# Patient Record
Sex: FEMALE | Race: BLACK OR AFRICAN AMERICAN | NOT HISPANIC OR LATINO | URBAN - METROPOLITAN AREA
[De-identification: names, ages, dates, MRNs, and addresses within clinical notes are randomized per-mention and may not be internally consistent; named-entity substitution may affect disease eponyms.]

---

## 2017-01-17 ENCOUNTER — INPATIENT (INPATIENT)
Facility: HOSPITAL | Age: 82
LOS: 2 days | Discharge: NURSING, REHAB OR LONG-TERM CARE | End: 2017-01-20
Attending: INTERNAL MEDICINE | Admitting: INTERNAL MEDICINE
Payer: MEDICARE

## 2017-01-17 VITALS
SYSTOLIC BLOOD PRESSURE: 184 MMHG | DIASTOLIC BLOOD PRESSURE: 87 MMHG | OXYGEN SATURATION: 100 % | TEMPERATURE: 98 F | RESPIRATION RATE: 16 BRPM | HEART RATE: 64 BPM

## 2017-01-17 DIAGNOSIS — E11.9 TYPE 2 DIABETES MELLITUS WITHOUT COMPLICATIONS: ICD-10-CM

## 2017-01-17 DIAGNOSIS — E05.00 THYROTOXICOSIS WITH DIFFUSE GOITER WITHOUT THYROTOXIC CRISIS OR STORM: ICD-10-CM

## 2017-01-17 DIAGNOSIS — Z90.721 ACQUIRED ABSENCE OF OVARIES, UNILATERAL: Chronic | ICD-10-CM

## 2017-01-17 DIAGNOSIS — Z41.8 ENCOUNTER FOR OTHER PROCEDURES FOR PURPOSES OTHER THAN REMEDYING HEALTH STATE: ICD-10-CM

## 2017-01-17 DIAGNOSIS — I47.1 SUPRAVENTRICULAR TACHYCARDIA: ICD-10-CM

## 2017-01-17 DIAGNOSIS — I10 ESSENTIAL (PRIMARY) HYPERTENSION: ICD-10-CM

## 2017-01-17 DIAGNOSIS — Z95.0 PRESENCE OF CARDIAC PACEMAKER: Chronic | ICD-10-CM

## 2017-01-17 DIAGNOSIS — Z95.0 PRESENCE OF CARDIAC PACEMAKER: ICD-10-CM

## 2017-01-17 LAB
ALBUMIN SERPL ELPH-MCNC: 3.4 G/DL — SIGNIFICANT CHANGE UP (ref 3.3–5)
ALP SERPL-CCNC: 58 U/L — SIGNIFICANT CHANGE UP (ref 40–120)
ALT FLD-CCNC: 64 U/L — HIGH (ref 4–33)
APTT BLD: 35.3 SEC — SIGNIFICANT CHANGE UP (ref 27.5–37.4)
AST SERPL-CCNC: 80 U/L — HIGH (ref 4–32)
BASE EXCESS BLDV CALC-SCNC: 3.1 MMOL/L — SIGNIFICANT CHANGE UP
BASOPHILS # BLD AUTO: 0.02 K/UL — SIGNIFICANT CHANGE UP (ref 0–0.2)
BASOPHILS NFR BLD AUTO: 0.4 % — SIGNIFICANT CHANGE UP (ref 0–2)
BASOPHILS NFR SPEC: 0 % — SIGNIFICANT CHANGE UP (ref 0–2)
BILIRUB SERPL-MCNC: 0.4 MG/DL — SIGNIFICANT CHANGE UP (ref 0.2–1.2)
BLOOD GAS VENOUS - CREATININE: 0.94 MG/DL — SIGNIFICANT CHANGE UP (ref 0.5–1.3)
BUN SERPL-MCNC: 21 MG/DL — SIGNIFICANT CHANGE UP (ref 7–23)
CALCIUM SERPL-MCNC: 9.8 MG/DL — SIGNIFICANT CHANGE UP (ref 8.4–10.5)
CHLORIDE BLDV-SCNC: 106 MMOL/L — SIGNIFICANT CHANGE UP (ref 96–108)
CHLORIDE SERPL-SCNC: 101 MMOL/L — SIGNIFICANT CHANGE UP (ref 98–107)
CK MB BLD-MCNC: 3.75 NG/ML — SIGNIFICANT CHANGE UP (ref 1–4.7)
CK MB BLD-MCNC: 4.65 NG/ML — SIGNIFICANT CHANGE UP (ref 1–4.7)
CK MB BLD-MCNC: SIGNIFICANT CHANGE UP (ref 0–2.5)
CK MB BLD-MCNC: SIGNIFICANT CHANGE UP (ref 0–2.5)
CK SERPL-CCNC: 101 U/L — SIGNIFICANT CHANGE UP (ref 25–170)
CK SERPL-CCNC: 91 U/L — SIGNIFICANT CHANGE UP (ref 25–170)
CO2 SERPL-SCNC: 20 MMOL/L — LOW (ref 22–31)
CREAT SERPL-MCNC: 0.91 MG/DL — SIGNIFICANT CHANGE UP (ref 0.5–1.3)
EOSINOPHIL # BLD AUTO: 0.05 K/UL — SIGNIFICANT CHANGE UP (ref 0–0.5)
EOSINOPHIL NFR BLD AUTO: 0.9 % — SIGNIFICANT CHANGE UP (ref 0–6)
EOSINOPHIL NFR FLD: 1 % — SIGNIFICANT CHANGE UP (ref 0–6)
GAS PNL BLDV: 135 MMOL/L — LOW (ref 136–146)
GLUCOSE BLDV-MCNC: 104 — HIGH (ref 70–99)
GLUCOSE SERPL-MCNC: 101 MG/DL — HIGH (ref 70–99)
HCO3 BLDV-SCNC: 25 MMOL/L — SIGNIFICANT CHANGE UP (ref 20–27)
HCT VFR BLD CALC: 37.6 % — SIGNIFICANT CHANGE UP (ref 34.5–45)
HCT VFR BLDV CALC: 39.5 % — SIGNIFICANT CHANGE UP (ref 34.5–45)
HGB BLD-MCNC: 12.6 G/DL — SIGNIFICANT CHANGE UP (ref 11.5–15.5)
HGB BLDV-MCNC: 12.8 G/DL — SIGNIFICANT CHANGE UP (ref 11.5–15.5)
IMM GRANULOCYTES NFR BLD AUTO: 0.4 % — SIGNIFICANT CHANGE UP (ref 0–1.5)
INR BLD: 1.09 — SIGNIFICANT CHANGE UP (ref 0.87–1.18)
LACTATE BLDV-MCNC: 2.4 MMOL/L — HIGH (ref 0.5–2)
LYMPHOCYTES # BLD AUTO: 2.11 K/UL — SIGNIFICANT CHANGE UP (ref 1–3.3)
LYMPHOCYTES # BLD AUTO: 37.4 % — SIGNIFICANT CHANGE UP (ref 13–44)
LYMPHOCYTES NFR SPEC AUTO: 38 % — SIGNIFICANT CHANGE UP (ref 13–44)
MANUAL SMEAR VERIFICATION: SIGNIFICANT CHANGE UP
MCHC RBC-ENTMCNC: 30 PG — SIGNIFICANT CHANGE UP (ref 27–34)
MCHC RBC-ENTMCNC: 33.5 % — SIGNIFICANT CHANGE UP (ref 32–36)
MCV RBC AUTO: 89.5 FL — SIGNIFICANT CHANGE UP (ref 80–100)
MONOCYTES # BLD AUTO: 0.47 K/UL — SIGNIFICANT CHANGE UP (ref 0–0.9)
MONOCYTES NFR BLD AUTO: 8.3 % — SIGNIFICANT CHANGE UP (ref 2–14)
MONOCYTES NFR BLD: 7 % — SIGNIFICANT CHANGE UP (ref 2–9)
MORPHOLOGY BLD-IMP: NORMAL — SIGNIFICANT CHANGE UP
NEUTROPHIL AB SER-ACNC: 53 % — SIGNIFICANT CHANGE UP (ref 43–77)
NEUTROPHILS # BLD AUTO: 2.97 K/UL — SIGNIFICANT CHANGE UP (ref 1.8–7.4)
NEUTROPHILS NFR BLD AUTO: 52.6 % — SIGNIFICANT CHANGE UP (ref 43–77)
PCO2 BLDV: 46 MMHG — SIGNIFICANT CHANGE UP (ref 41–51)
PH BLDV: 7.4 PH — SIGNIFICANT CHANGE UP (ref 7.32–7.43)
PLATELET # BLD AUTO: 87 K/UL — LOW (ref 150–400)
PLATELET COUNT - ESTIMATE: SIGNIFICANT CHANGE UP
PMV BLD: 10.8 FL — SIGNIFICANT CHANGE UP (ref 7–13)
PO2 BLDV: < 24 MMHG — LOW (ref 35–40)
POTASSIUM BLDV-SCNC: 3.4 MMOL/L — SIGNIFICANT CHANGE UP (ref 3.4–4.5)
POTASSIUM SERPL-MCNC: 3.9 MMOL/L — SIGNIFICANT CHANGE UP (ref 3.5–5.3)
POTASSIUM SERPL-SCNC: 3.9 MMOL/L — SIGNIFICANT CHANGE UP (ref 3.5–5.3)
PROT SERPL-MCNC: 7.8 G/DL — SIGNIFICANT CHANGE UP (ref 6–8.3)
PROTHROM AB SERPL-ACNC: 12.4 SEC — SIGNIFICANT CHANGE UP (ref 10–13.1)
RBC # BLD: 4.2 M/UL — SIGNIFICANT CHANGE UP (ref 3.8–5.2)
RBC # FLD: 13.3 % — SIGNIFICANT CHANGE UP (ref 10.3–14.5)
SAO2 % BLDV: 31.9 % — LOW (ref 60–85)
SODIUM SERPL-SCNC: 140 MMOL/L — SIGNIFICANT CHANGE UP (ref 135–145)
TROPONIN T SERPL-MCNC: < 0.06 NG/ML — SIGNIFICANT CHANGE UP (ref 0–0.06)
TROPONIN T SERPL-MCNC: < 0.06 NG/ML — SIGNIFICANT CHANGE UP (ref 0–0.06)
TSH SERPL-MCNC: 2.01 UIU/ML — SIGNIFICANT CHANGE UP (ref 0.27–4.2)
VARIANT LYMPHS # BLD: 1 % — SIGNIFICANT CHANGE UP
WBC # BLD: 5.64 K/UL — SIGNIFICANT CHANGE UP (ref 3.8–10.5)
WBC # FLD AUTO: 5.64 K/UL — SIGNIFICANT CHANGE UP (ref 3.8–10.5)

## 2017-01-17 PROCEDURE — 93280 PM DEVICE PROGR EVAL DUAL: CPT | Mod: 26

## 2017-01-17 RX ORDER — GLUCAGON INJECTION, SOLUTION 0.5 MG/.1ML
1 INJECTION, SOLUTION SUBCUTANEOUS ONCE
Qty: 0 | Refills: 0 | Status: DISCONTINUED | OUTPATIENT
Start: 2017-01-17 | End: 2017-01-20

## 2017-01-17 RX ORDER — SODIUM CHLORIDE 9 MG/ML
3 INJECTION INTRAMUSCULAR; INTRAVENOUS; SUBCUTANEOUS EVERY 8 HOURS
Qty: 0 | Refills: 0 | Status: DISCONTINUED | OUTPATIENT
Start: 2017-01-17 | End: 2017-01-20

## 2017-01-17 RX ORDER — SODIUM CHLORIDE 9 MG/ML
1000 INJECTION, SOLUTION INTRAVENOUS
Qty: 0 | Refills: 0 | Status: DISCONTINUED | OUTPATIENT
Start: 2017-01-17 | End: 2017-01-20

## 2017-01-17 RX ORDER — SIMVASTATIN 20 MG/1
20 TABLET, FILM COATED ORAL AT BEDTIME
Qty: 0 | Refills: 0 | Status: DISCONTINUED | OUTPATIENT
Start: 2017-01-17 | End: 2017-01-20

## 2017-01-17 RX ORDER — DEXTROSE 50 % IN WATER 50 %
12.5 SYRINGE (ML) INTRAVENOUS ONCE
Qty: 0 | Refills: 0 | Status: DISCONTINUED | OUTPATIENT
Start: 2017-01-17 | End: 2017-01-20

## 2017-01-17 RX ORDER — LATANOPROST 0.05 MG/ML
1 SOLUTION/ DROPS OPHTHALMIC; TOPICAL AT BEDTIME
Qty: 0 | Refills: 0 | Status: DISCONTINUED | OUTPATIENT
Start: 2017-01-17 | End: 2017-01-20

## 2017-01-17 RX ORDER — DEXTROSE 50 % IN WATER 50 %
25 SYRINGE (ML) INTRAVENOUS ONCE
Qty: 0 | Refills: 0 | Status: DISCONTINUED | OUTPATIENT
Start: 2017-01-17 | End: 2017-01-20

## 2017-01-17 RX ORDER — DEXTROSE 50 % IN WATER 50 %
1 SYRINGE (ML) INTRAVENOUS ONCE
Qty: 0 | Refills: 0 | Status: DISCONTINUED | OUTPATIENT
Start: 2017-01-17 | End: 2017-01-20

## 2017-01-17 RX ORDER — METOPROLOL TARTRATE 50 MG
25 TABLET ORAL
Qty: 0 | Refills: 0 | Status: DISCONTINUED | OUTPATIENT
Start: 2017-01-17 | End: 2017-01-20

## 2017-01-17 RX ORDER — INSULIN LISPRO 100/ML
VIAL (ML) SUBCUTANEOUS
Qty: 0 | Refills: 0 | Status: DISCONTINUED | OUTPATIENT
Start: 2017-01-17 | End: 2017-01-20

## 2017-01-17 RX ORDER — IPRATROPIUM/ALBUTEROL SULFATE 18-103MCG
3 AEROSOL WITH ADAPTER (GRAM) INHALATION ONCE
Qty: 0 | Refills: 0 | Status: COMPLETED | OUTPATIENT
Start: 2017-01-17 | End: 2017-01-17

## 2017-01-17 RX ORDER — HEPARIN SODIUM 5000 [USP'U]/ML
5000 INJECTION INTRAVENOUS; SUBCUTANEOUS EVERY 8 HOURS
Qty: 0 | Refills: 0 | Status: DISCONTINUED | OUTPATIENT
Start: 2017-01-17 | End: 2017-01-17

## 2017-01-17 RX ORDER — INSULIN LISPRO 100/ML
VIAL (ML) SUBCUTANEOUS AT BEDTIME
Qty: 0 | Refills: 0 | Status: DISCONTINUED | OUTPATIENT
Start: 2017-01-17 | End: 2017-01-20

## 2017-01-17 RX ADMIN — Medication 25 MILLIGRAM(S): at 19:06

## 2017-01-17 RX ADMIN — Medication 3 MILLILITER(S): at 15:48

## 2017-01-17 RX ADMIN — SIMVASTATIN 20 MILLIGRAM(S): 20 TABLET, FILM COATED ORAL at 23:14

## 2017-01-17 RX ADMIN — SODIUM CHLORIDE 3 MILLILITER(S): 9 INJECTION INTRAMUSCULAR; INTRAVENOUS; SUBCUTANEOUS at 22:23

## 2017-01-17 RX ADMIN — LATANOPROST 1 DROP(S): 0.05 SOLUTION/ DROPS OPHTHALMIC; TOPICAL at 23:14

## 2017-01-17 NOTE — ED ADULT NURSE NOTE - OBJECTIVE STATEMENT
pt presents to ED via EMS. patient c/o chest pain/discomfort x 1 week worsening today. patient was In SVT with EMS at a rate of 200, was given 6mg Adenosine IVP with initial break in SVT that then went back into SVT, pt was given another 12mgX2 of adenosine before conversion. pt currently in atrial paced rhythm. Denies pain at this time. 18 g IV present via EMS, + flush with no s/s of infiltration. Labs drawn via butterfly and sent. pt resting at this time in NAD.

## 2017-01-17 NOTE — H&P ADULT. - HISTORY OF PRESENT ILLNESS
86 y/o F pmhx htn, DM, vertigo, graves dz, h/o PPM p/w dizziness, shortness of breath, weakness, and palpitations.  Pt was at [facility] and started experiencing symptoms upon getting out of ambulette.  Pt is unsure how long symptoms lasted for.  Symptoms were not relieved with rest or changed with position or inspiration.  She denied fevers/chills, chest pain, cough, wheezing. 84 y/o F pmhx htn, DM, vertigo, graves dz, h/o cardiac arrest s/p medtronic PPM, ambulatory with cane, p/w dizziness, shortness of breath, weakness, and palpitations.  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  Pt is unsure how long symptoms lasted for.  Symptoms were not relieved with rest or changed with position or inspiration.  EMS was called.  Pt received adenosine 6mg, adenosine 12mg, now NSR and without complaints.  Pt denied history of chest pain, palpitations, dizziness, syncope, presyncope prior to this event.  (Last time she experienced these symptoms were before cardiac arrest).

## 2017-01-17 NOTE — H&P ADULT. - PROBLEM SELECTOR PLAN 2
Monitor blood pressure  Low salt diet Monitor blood pressure, started on lopressor 25mg po bid  Low salt diet

## 2017-01-17 NOTE — H&P ADULT. - NEGATIVE CARDIOVASCULAR SYMPTOMS
no chest pain/no dyspnea on exertion/no orthopnea/no paroxysmal nocturnal dyspnea/no peripheral edema

## 2017-01-17 NOTE — ED ADULT NURSE REASSESSMENT NOTE - NS ED NURSE REASSESS COMMENT FT1
HR remains stable A-paced at 60, BP stable SPO2 100% on RA. pt currently c/o SOB that was present during her SVT however pt is currently A-paced at 60. Repeat EKG in place as per Dr. Esparza

## 2017-01-17 NOTE — H&P ADULT. - ASSESSMENT
86 y/o F pmhx htn, DM, vertigo, graves dz, h/o PPM p/w dizziness, shortness of breath, weakness, and palpitations f/w SVT s/p adenosine 6mg, adenosine 12mg now NSR

## 2017-01-17 NOTE — H&P ADULT. - PRO ARRIVE FROM
[facility] center [facility] care center where volunteers

## 2017-01-17 NOTE — H&P ADULT. - PMH
Constipation    DM (diabetes mellitus)    Glaucoma    Graves disease    HTN (hypertension)    Pacemaker    Vertigo

## 2017-01-17 NOTE — H&P ADULT. - RS GEN PE MLT RESP DETAILS PC
no rhonchi/clear to auscultation bilaterally/good air movement/airway patent/breath sounds equal/normal/no rales/no wheezes

## 2017-01-17 NOTE — H&P ADULT. - ATTENDING COMMENTS
pt seen and examined at bedside. 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

## 2017-01-17 NOTE — H&P ADULT. - FAMILY HISTORY
Father  Still living? Unknown  Family history of hypertension, Age at diagnosis: Age Unknown     Mother  Still living? Unknown  Family history of diabetes mellitus, Age at diagnosis: Age Unknown

## 2017-01-17 NOTE — ED PROVIDER NOTE - MEDICAL DECISION MAKING DETAILS
84 yo F w/ HTN, DM, cardiac arrest w/ pacemaker BIBEMS from [facility] for [facility]. Pt was in SVT , given Adenosine 6mg, broke to SR, back to SVT, given Adenosine 12mg x2, then broke to SR-ST HR 90-100s. Denies any symptoms.   - Labs, EKG, Chest x-ray

## 2017-01-17 NOTE — ED ADULT NURSE NOTE - CHIEF COMPLAINT QUOTE
brought in by EMS from [facility] for SVT. Pt was in SVT , given Adenosine 6mg then 12mg then 12mg. Then broke to SR-ST HR 90-100s. Pt states has been having SOB/palpitations and HA intermittently x 1week. NJ=233. Hx cardiac arrest, pacemaker, HLD,DM

## 2017-01-17 NOTE — H&P ADULT. - PROBLEM SELECTOR PLAN 1
CBC, CMP, CEx2, EKG  Monitor on telemetry  Echocardiogram CBC, CMP, CEx2, EKG, CXR  Monitor on telemetry  Start lopressor 25mg po bid  PPM interrogation called ([employer])  Echocardiogram as an outpatient per Dr Atkins

## 2017-01-17 NOTE — ED PROVIDER NOTE - ATTENDING CONTRIBUTION TO CARE
Seen and examined, SVT on EMS strip, very rapid rate, to sinus in ED after adenosine tx by EMS PTA. In ED c/o mild SOB and occ. irreg. heartbeat but cont. NSR on monitor. Clear lungs, NT CW, soft, NT abd, no edema, equal girth, NT calves. Cont. rate ctrl, attempt oral meds, CM, admission.

## 2017-01-17 NOTE — ED ADULT TRIAGE NOTE - CHIEF COMPLAINT QUOTE
brought in by EMS from [facility] for SVT. Pt was in SVT , given Adenosine 6mg then 12mg then 12mg. Then broke to SR-ST HR 90-100s. Pt states has been having SOB/palpitations and HA intermittently x 1week. HG=051. Hx cardiac arrest, pacemaker, HLD brought in by EMS from [employer] for SVT. Pt was in SVT , given Adenosine 6mg then 12mg then 12mg. Then broke to SR-ST HR 90-100s. Pt states has been having SOB/palpitations and HA intermittently x 1week. PZ=776. Hx cardiac arrest, pacemaker, HLD,DM

## 2017-01-17 NOTE — ED PROVIDER NOTE - OBJECTIVE STATEMENT
86 yo F w/ HTN, DM, 86 yo F w/ HTN, DM, w/ pacemaker BIBEMS from [facility] for SVT. Pt was in SVT , given Adenosine 6mg, broke to SR, back to SVT, given Adenosine 12mg x2, then broke to SR-ST HR 90-100s. Pt states has been having SOB/palpitations and HA intermittently x 1week. OE=499. Hx cardiac arrest, pacemaker, HLD,DM  s/p SVT 84 yo F w/ HTN, DM, cardiac arrest w/ pacemaker BIBEMS from [facility] for SVT. Pt was in SVT , given Adenosine 6mg, broke to SR, back to SVT, given Adenosine 12mg x2, then broke to SR-ST HR 90-100s. Pt states that she has been having SOB/palpitations and HA intermittently x 1week.  DK=532. Pt is now back to baseline, denies any symptoms.

## 2017-01-18 LAB
ALBUMIN SERPL ELPH-MCNC: 3 G/DL — LOW (ref 3.3–5)
ALP SERPL-CCNC: 50 U/L — SIGNIFICANT CHANGE UP (ref 40–120)
ALT FLD-CCNC: 53 U/L — HIGH (ref 4–33)
AST SERPL-CCNC: 63 U/L — HIGH (ref 4–32)
BILIRUB DIRECT SERPL-MCNC: 0.1 MG/DL — SIGNIFICANT CHANGE UP (ref 0.1–0.2)
BILIRUB SERPL-MCNC: 0.4 MG/DL — SIGNIFICANT CHANGE UP (ref 0.2–1.2)
BUN SERPL-MCNC: 18 MG/DL — SIGNIFICANT CHANGE UP (ref 7–23)
CALCIUM SERPL-MCNC: 9.4 MG/DL — SIGNIFICANT CHANGE UP (ref 8.4–10.5)
CHLORIDE SERPL-SCNC: 103 MMOL/L — SIGNIFICANT CHANGE UP (ref 98–107)
CHOLEST SERPL-MCNC: 138 MG/DL — SIGNIFICANT CHANGE UP (ref 120–199)
CO2 SERPL-SCNC: 25 MMOL/L — SIGNIFICANT CHANGE UP (ref 22–31)
CREAT SERPL-MCNC: 0.78 MG/DL — SIGNIFICANT CHANGE UP (ref 0.5–1.3)
GLUCOSE SERPL-MCNC: 147 MG/DL — HIGH (ref 70–99)
HBA1C BLD-MCNC: 7.5 % — HIGH (ref 4–5.6)
HCT VFR BLD CALC: 33.7 % — LOW (ref 34.5–45)
HDLC SERPL-MCNC: 64 MG/DL — SIGNIFICANT CHANGE UP (ref 45–65)
HGB BLD-MCNC: 11 G/DL — LOW (ref 11.5–15.5)
INR BLD: 1.1 — SIGNIFICANT CHANGE UP (ref 0.87–1.18)
LIPID PNL WITH DIRECT LDL SERPL: 67 MG/DL — SIGNIFICANT CHANGE UP
MAGNESIUM SERPL-MCNC: 1.5 MG/DL — LOW (ref 1.6–2.6)
MCHC RBC-ENTMCNC: 29.6 PG — SIGNIFICANT CHANGE UP (ref 27–34)
MCHC RBC-ENTMCNC: 32.6 % — SIGNIFICANT CHANGE UP (ref 32–36)
MCV RBC AUTO: 90.8 FL — SIGNIFICANT CHANGE UP (ref 80–100)
PLATELET # BLD AUTO: 79 K/UL — LOW (ref 150–400)
PMV BLD: 11.5 FL — SIGNIFICANT CHANGE UP (ref 7–13)
POTASSIUM SERPL-MCNC: 3.3 MMOL/L — LOW (ref 3.5–5.3)
POTASSIUM SERPL-SCNC: 3.3 MMOL/L — LOW (ref 3.5–5.3)
PROT SERPL-MCNC: 6.6 G/DL — SIGNIFICANT CHANGE UP (ref 6–8.3)
PROTHROM AB SERPL-ACNC: 12.6 SEC — SIGNIFICANT CHANGE UP (ref 10–13.1)
RBC # BLD: 3.71 M/UL — LOW (ref 3.8–5.2)
RBC # FLD: 13.3 % — SIGNIFICANT CHANGE UP (ref 10.3–14.5)
SODIUM SERPL-SCNC: 139 MMOL/L — SIGNIFICANT CHANGE UP (ref 135–145)
TRIGL SERPL-MCNC: 61 MG/DL — SIGNIFICANT CHANGE UP (ref 10–149)
WBC # BLD: 4.62 K/UL — SIGNIFICANT CHANGE UP (ref 3.8–10.5)
WBC # FLD AUTO: 4.62 K/UL — SIGNIFICANT CHANGE UP (ref 3.8–10.5)

## 2017-01-18 PROCEDURE — 93306 TTE W/DOPPLER COMPLETE: CPT | Mod: 26

## 2017-01-18 RX ORDER — ACETAMINOPHEN 500 MG
650 TABLET ORAL EVERY 6 HOURS
Qty: 0 | Refills: 0 | Status: DISCONTINUED | OUTPATIENT
Start: 2017-01-18 | End: 2017-01-20

## 2017-01-18 RX ORDER — LISINOPRIL 2.5 MG/1
5 TABLET ORAL DAILY
Qty: 0 | Refills: 0 | Status: DISCONTINUED | OUTPATIENT
Start: 2017-01-18 | End: 2017-01-20

## 2017-01-18 RX ORDER — POTASSIUM CHLORIDE 20 MEQ
40 PACKET (EA) ORAL EVERY 4 HOURS
Qty: 0 | Refills: 0 | Status: COMPLETED | OUTPATIENT
Start: 2017-01-18 | End: 2017-01-18

## 2017-01-18 RX ORDER — MAGNESIUM OXIDE 400 MG ORAL TABLET 241.3 MG
400 TABLET ORAL
Qty: 0 | Refills: 0 | Status: COMPLETED | OUTPATIENT
Start: 2017-01-18 | End: 2017-01-19

## 2017-01-18 RX ADMIN — SIMVASTATIN 20 MILLIGRAM(S): 20 TABLET, FILM COATED ORAL at 22:10

## 2017-01-18 RX ADMIN — Medication 40 MILLIEQUIVALENT(S): at 14:19

## 2017-01-18 RX ADMIN — Medication 2: at 17:14

## 2017-01-18 RX ADMIN — Medication 40 MILLIEQUIVALENT(S): at 10:22

## 2017-01-18 RX ADMIN — LATANOPROST 1 DROP(S): 0.05 SOLUTION/ DROPS OPHTHALMIC; TOPICAL at 22:10

## 2017-01-18 RX ADMIN — SODIUM CHLORIDE 3 MILLILITER(S): 9 INJECTION INTRAMUSCULAR; INTRAVENOUS; SUBCUTANEOUS at 22:11

## 2017-01-18 RX ADMIN — Medication 650 MILLIGRAM(S): at 03:55

## 2017-01-18 RX ADMIN — MAGNESIUM OXIDE 400 MG ORAL TABLET 400 MILLIGRAM(S): 241.3 TABLET ORAL at 14:19

## 2017-01-18 RX ADMIN — MAGNESIUM OXIDE 400 MG ORAL TABLET 400 MILLIGRAM(S): 241.3 TABLET ORAL at 17:14

## 2017-01-18 RX ADMIN — Medication 6: at 12:25

## 2017-01-18 RX ADMIN — Medication 25 MILLIGRAM(S): at 05:30

## 2017-01-18 RX ADMIN — MAGNESIUM OXIDE 400 MG ORAL TABLET 400 MILLIGRAM(S): 241.3 TABLET ORAL at 10:22

## 2017-01-18 RX ADMIN — LISINOPRIL 5 MILLIGRAM(S): 2.5 TABLET ORAL at 22:09

## 2017-01-18 RX ADMIN — Medication 25 MILLIGRAM(S): at 17:14

## 2017-01-18 RX ADMIN — Medication 650 MILLIGRAM(S): at 02:55

## 2017-01-18 RX ADMIN — SODIUM CHLORIDE 3 MILLILITER(S): 9 INJECTION INTRAMUSCULAR; INTRAVENOUS; SUBCUTANEOUS at 14:18

## 2017-01-18 RX ADMIN — SODIUM CHLORIDE 3 MILLILITER(S): 9 INJECTION INTRAMUSCULAR; INTRAVENOUS; SUBCUTANEOUS at 04:52

## 2017-01-18 RX ADMIN — Medication: at 00:09

## 2017-01-19 LAB
BUN SERPL-MCNC: 18 MG/DL — SIGNIFICANT CHANGE UP (ref 7–23)
CALCIUM SERPL-MCNC: 9.6 MG/DL — SIGNIFICANT CHANGE UP (ref 8.4–10.5)
CHLORIDE SERPL-SCNC: 104 MMOL/L — SIGNIFICANT CHANGE UP (ref 98–107)
CO2 SERPL-SCNC: 23 MMOL/L — SIGNIFICANT CHANGE UP (ref 22–31)
CREAT SERPL-MCNC: 0.75 MG/DL — SIGNIFICANT CHANGE UP (ref 0.5–1.3)
GLUCOSE SERPL-MCNC: 147 MG/DL — HIGH (ref 70–99)
HCT VFR BLD CALC: 35.7 % — SIGNIFICANT CHANGE UP (ref 34.5–45)
HGB BLD-MCNC: 11.8 G/DL — SIGNIFICANT CHANGE UP (ref 11.5–15.5)
MAGNESIUM SERPL-MCNC: 1.6 MG/DL — SIGNIFICANT CHANGE UP (ref 1.6–2.6)
MCHC RBC-ENTMCNC: 29.9 PG — SIGNIFICANT CHANGE UP (ref 27–34)
MCHC RBC-ENTMCNC: 33.1 % — SIGNIFICANT CHANGE UP (ref 32–36)
MCV RBC AUTO: 90.6 FL — SIGNIFICANT CHANGE UP (ref 80–100)
PHOSPHATE SERPL-MCNC: 2 MG/DL — LOW (ref 2.5–4.5)
PLATELET # BLD AUTO: 82 K/UL — LOW (ref 150–400)
PMV BLD: 11.3 FL — SIGNIFICANT CHANGE UP (ref 7–13)
POTASSIUM SERPL-MCNC: 4.3 MMOL/L — SIGNIFICANT CHANGE UP (ref 3.5–5.3)
POTASSIUM SERPL-SCNC: 4.3 MMOL/L — SIGNIFICANT CHANGE UP (ref 3.5–5.3)
RBC # BLD: 3.94 M/UL — SIGNIFICANT CHANGE UP (ref 3.8–5.2)
RBC # FLD: 13.4 % — SIGNIFICANT CHANGE UP (ref 10.3–14.5)
SODIUM SERPL-SCNC: 140 MMOL/L — SIGNIFICANT CHANGE UP (ref 135–145)
WBC # BLD: 5 K/UL — SIGNIFICANT CHANGE UP (ref 3.8–10.5)
WBC # FLD AUTO: 5 K/UL — SIGNIFICANT CHANGE UP (ref 3.8–10.5)

## 2017-01-19 RX ORDER — MAGNESIUM SULFATE 500 MG/ML
1 VIAL (ML) INJECTION ONCE
Qty: 0 | Refills: 0 | Status: COMPLETED | OUTPATIENT
Start: 2017-01-19 | End: 2017-01-19

## 2017-01-19 RX ORDER — POTASSIUM PHOSPHATE, MONOBASIC POTASSIUM PHOSPHATE, DIBASIC 236; 224 MG/ML; MG/ML
15 INJECTION, SOLUTION INTRAVENOUS ONCE
Qty: 0 | Refills: 0 | Status: COMPLETED | OUTPATIENT
Start: 2017-01-19 | End: 2017-01-19

## 2017-01-19 RX ORDER — DOCUSATE SODIUM 100 MG
100 CAPSULE ORAL DAILY
Qty: 0 | Refills: 0 | Status: DISCONTINUED | OUTPATIENT
Start: 2017-01-19 | End: 2017-01-20

## 2017-01-19 RX ADMIN — Medication 650 MILLIGRAM(S): at 10:12

## 2017-01-19 RX ADMIN — Medication 6: at 11:34

## 2017-01-19 RX ADMIN — LISINOPRIL 5 MILLIGRAM(S): 2.5 TABLET ORAL at 05:30

## 2017-01-19 RX ADMIN — Medication 650 MILLIGRAM(S): at 11:00

## 2017-01-19 RX ADMIN — Medication 25 MILLIGRAM(S): at 05:29

## 2017-01-19 RX ADMIN — MAGNESIUM OXIDE 400 MG ORAL TABLET 400 MILLIGRAM(S): 241.3 TABLET ORAL at 08:12

## 2017-01-19 RX ADMIN — SODIUM CHLORIDE 3 MILLILITER(S): 9 INJECTION INTRAMUSCULAR; INTRAVENOUS; SUBCUTANEOUS at 14:31

## 2017-01-19 RX ADMIN — POTASSIUM PHOSPHATE, MONOBASIC POTASSIUM PHOSPHATE, DIBASIC 62.5 MILLIMOLE(S): 236; 224 INJECTION, SOLUTION INTRAVENOUS at 11:29

## 2017-01-19 RX ADMIN — Medication 100 GRAM(S): at 10:12

## 2017-01-19 RX ADMIN — MAGNESIUM OXIDE 400 MG ORAL TABLET 400 MILLIGRAM(S): 241.3 TABLET ORAL at 11:36

## 2017-01-19 RX ADMIN — Medication 100 MILLIGRAM(S): at 11:33

## 2017-01-19 RX ADMIN — SIMVASTATIN 20 MILLIGRAM(S): 20 TABLET, FILM COATED ORAL at 22:54

## 2017-01-19 RX ADMIN — SODIUM CHLORIDE 3 MILLILITER(S): 9 INJECTION INTRAMUSCULAR; INTRAVENOUS; SUBCUTANEOUS at 05:32

## 2017-01-19 RX ADMIN — LATANOPROST 1 DROP(S): 0.05 SOLUTION/ DROPS OPHTHALMIC; TOPICAL at 22:54

## 2017-01-19 RX ADMIN — MAGNESIUM OXIDE 400 MG ORAL TABLET 400 MILLIGRAM(S): 241.3 TABLET ORAL at 18:00

## 2017-01-19 RX ADMIN — SODIUM CHLORIDE 3 MILLILITER(S): 9 INJECTION INTRAMUSCULAR; INTRAVENOUS; SUBCUTANEOUS at 22:55

## 2017-01-19 RX ADMIN — Medication 25 MILLIGRAM(S): at 18:00

## 2017-01-20 VITALS
DIASTOLIC BLOOD PRESSURE: 70 MMHG | HEART RATE: 60 BPM | RESPIRATION RATE: 16 BRPM | SYSTOLIC BLOOD PRESSURE: 149 MMHG | OXYGEN SATURATION: 98 % | TEMPERATURE: 98 F

## 2017-01-20 LAB
BUN SERPL-MCNC: 15 MG/DL — SIGNIFICANT CHANGE UP (ref 7–23)
CALCIUM SERPL-MCNC: 9.4 MG/DL — SIGNIFICANT CHANGE UP (ref 8.4–10.5)
CHLORIDE SERPL-SCNC: 104 MMOL/L — SIGNIFICANT CHANGE UP (ref 98–107)
CO2 SERPL-SCNC: 26 MMOL/L — SIGNIFICANT CHANGE UP (ref 22–31)
CREAT SERPL-MCNC: 0.78 MG/DL — SIGNIFICANT CHANGE UP (ref 0.5–1.3)
GLUCOSE SERPL-MCNC: 166 MG/DL — HIGH (ref 70–99)
HCT VFR BLD CALC: 35.6 % — SIGNIFICANT CHANGE UP (ref 34.5–45)
HGB BLD-MCNC: 11.8 G/DL — SIGNIFICANT CHANGE UP (ref 11.5–15.5)
MAGNESIUM SERPL-MCNC: 1.8 MG/DL — SIGNIFICANT CHANGE UP (ref 1.6–2.6)
MCHC RBC-ENTMCNC: 30.2 PG — SIGNIFICANT CHANGE UP (ref 27–34)
MCHC RBC-ENTMCNC: 33.1 % — SIGNIFICANT CHANGE UP (ref 32–36)
MCV RBC AUTO: 91 FL — SIGNIFICANT CHANGE UP (ref 80–100)
PHOSPHATE SERPL-MCNC: 2.3 MG/DL — LOW (ref 2.5–4.5)
PLATELET # BLD AUTO: 75 K/UL — LOW (ref 150–400)
PMV BLD: 11.6 FL — SIGNIFICANT CHANGE UP (ref 7–13)
POTASSIUM SERPL-MCNC: 4.8 MMOL/L — SIGNIFICANT CHANGE UP (ref 3.5–5.3)
POTASSIUM SERPL-SCNC: 4.8 MMOL/L — SIGNIFICANT CHANGE UP (ref 3.5–5.3)
RBC # BLD: 3.91 M/UL — SIGNIFICANT CHANGE UP (ref 3.8–5.2)
RBC # FLD: 13.5 % — SIGNIFICANT CHANGE UP (ref 10.3–14.5)
SODIUM SERPL-SCNC: 142 MMOL/L — SIGNIFICANT CHANGE UP (ref 135–145)
WBC # BLD: 4.23 K/UL — SIGNIFICANT CHANGE UP (ref 3.8–10.5)
WBC # FLD AUTO: 4.23 K/UL — SIGNIFICANT CHANGE UP (ref 3.8–10.5)

## 2017-01-20 RX ORDER — DOCUSATE SODIUM 100 MG
1 CAPSULE ORAL
Qty: 0 | Refills: 0 | COMMUNITY
Start: 2017-01-20

## 2017-01-20 RX ORDER — METOPROLOL TARTRATE 50 MG
1 TABLET ORAL
Qty: 0 | Refills: 0 | COMMUNITY
Start: 2017-01-20

## 2017-01-20 RX ORDER — LISINOPRIL 2.5 MG/1
1 TABLET ORAL
Qty: 0 | Refills: 0 | COMMUNITY
Start: 2017-01-20

## 2017-01-20 RX ORDER — SIMETHICONE 80 MG/1
80 TABLET, CHEWABLE ORAL ONCE
Qty: 0 | Refills: 0 | Status: COMPLETED | OUTPATIENT
Start: 2017-01-20 | End: 2017-01-20

## 2017-01-20 RX ADMIN — Medication 100 MILLIGRAM(S): at 11:16

## 2017-01-20 RX ADMIN — LISINOPRIL 5 MILLIGRAM(S): 2.5 TABLET ORAL at 06:05

## 2017-01-20 RX ADMIN — Medication 2: at 08:49

## 2017-01-20 RX ADMIN — SODIUM CHLORIDE 3 MILLILITER(S): 9 INJECTION INTRAMUSCULAR; INTRAVENOUS; SUBCUTANEOUS at 06:18

## 2017-01-20 RX ADMIN — SODIUM CHLORIDE 3 MILLILITER(S): 9 INJECTION INTRAMUSCULAR; INTRAVENOUS; SUBCUTANEOUS at 13:40

## 2017-01-20 RX ADMIN — Medication 8: at 12:52

## 2017-01-20 RX ADMIN — SIMETHICONE 80 MILLIGRAM(S): 80 TABLET, CHEWABLE ORAL at 11:16

## 2017-01-20 RX ADMIN — Medication 25 MILLIGRAM(S): at 06:05

## 2017-01-20 NOTE — DISCHARGE NOTE ADULT - CARE PLAN
Principal Discharge DX:	SVT (supraventricular tachycardia)  Goal:	prevent further episodes  Instructions for follow-up, activity and diet:	follow up with your PMD in one week - call for appointment

## 2017-01-20 NOTE — DISCHARGE NOTE ADULT - HOSPITAL COURSE
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    1/18 Cards: add lisinopril 5mg qd, c/w lopressor, d/c to [facility]  1/19_Echo: 1. Severely dilated left atrium.  LA volume index = 65cc/m2.   2. Mild concentric left ventricular hypertrophy.   3. Normal left ventricular systolic function. No segmental wall motion abnormalities.  4. Severe diastolic dysfunction.   5. Normal right ventricular size and function. A device wire is noted in the right heart.   6. Normal tricuspid valve. Mild tricuspid regurgitation.  7. Estimated pulmonary artery systolic pressure equals 50mm Hg, assuming right atrial pressure equals 10  mm Hg, consistent with moderate pulmonary hypertension.  Discussed with MD - pt stable for discharge to rehab, pt with no complaints.

## 2017-01-20 NOTE — DISCHARGE NOTE ADULT - MEDICATION SUMMARY - MEDICATIONS TO TAKE
I will START or STAY ON the medications listed below when I get home from the hospital:    lisinopril 10 mg oral tablet  -- 1 tab(s) by mouth 2 times a day  -- Indication: For HTN (hypertension)    metFORMIN 500 mg oral tablet  -- 1 tab(s) by mouth 2 times a day  -- Indication: For DM (diabetes mellitus)    simvastatin 20 mg oral tablet  -- 1 tab(s) by mouth once a day (at bedtime)  -- Indication: For HLD    metoprolol tartrate 25 mg oral tablet  -- 1 tab(s) by mouth 2 times a day  -- Indication: For HTN (hypertension)    docusate sodium 100 mg oral capsule  -- 1 cap(s) by mouth once a day  -- Indication: For Constipation    Xalatan 0.005% ophthalmic solution  -- 1 drop(s) to each affected eye once a day (in the evening)  -- Indication: For glaucoma

## 2017-01-20 NOTE — DISCHARGE NOTE ADULT - CARE PROVIDER_API CALL
Derek Atkins (), Cardiology; Internal Medicine  [address] [place] [address] [address]  [place] [address]  Phone: [phone]  Fax: [phone]

## 2017-01-20 NOTE — DISCHARGE NOTE ADULT - ADDITIONAL INSTRUCTIONS
follow up with your PMD in one week - call for appointment  follow up with your Cardiologist in one week - call for appointment

## 2017-01-20 NOTE — DISCHARGE NOTE ADULT - COMMUNITY RESOURCES
Pt to be transferred to [facility] [address] [place] [address], t: [phone] via [facility] [phone].

## 2017-01-27 ENCOUNTER — INPATIENT (INPATIENT)
Facility: HOSPITAL | Age: 82
LOS: 2 days | Discharge: NURSING, REHAB OR LONG-TERM CARE | DRG: 309 | End: 2017-01-30
Attending: INTERNAL MEDICINE | Admitting: INTERNAL MEDICINE
Payer: MEDICARE

## 2017-01-27 VITALS
HEIGHT: 64 IN | DIASTOLIC BLOOD PRESSURE: 78 MMHG | RESPIRATION RATE: 16 BRPM | WEIGHT: 134.92 LBS | OXYGEN SATURATION: 100 % | HEART RATE: 92 BPM | SYSTOLIC BLOOD PRESSURE: 140 MMHG | TEMPERATURE: 98 F

## 2017-01-27 DIAGNOSIS — Z90.721 ACQUIRED ABSENCE OF OVARIES, UNILATERAL: Chronic | ICD-10-CM

## 2017-01-27 DIAGNOSIS — R07.9 CHEST PAIN, UNSPECIFIED: ICD-10-CM

## 2017-01-27 DIAGNOSIS — Z29.9 ENCOUNTER FOR PROPHYLACTIC MEASURES, UNSPECIFIED: ICD-10-CM

## 2017-01-27 DIAGNOSIS — I10 ESSENTIAL (PRIMARY) HYPERTENSION: ICD-10-CM

## 2017-01-27 DIAGNOSIS — Z95.0 PRESENCE OF CARDIAC PACEMAKER: Chronic | ICD-10-CM

## 2017-01-27 DIAGNOSIS — E11.9 TYPE 2 DIABETES MELLITUS WITHOUT COMPLICATIONS: ICD-10-CM

## 2017-01-27 LAB
ALBUMIN SERPL ELPH-MCNC: 2.3 G/DL — LOW (ref 3.5–5)
ALP SERPL-CCNC: 57 U/L — SIGNIFICANT CHANGE UP (ref 40–120)
ALT FLD-CCNC: 51 U/L DA — SIGNIFICANT CHANGE UP (ref 10–60)
ANION GAP SERPL CALC-SCNC: 9 MMOL/L — SIGNIFICANT CHANGE UP (ref 5–17)
APPEARANCE UR: CLEAR — SIGNIFICANT CHANGE UP
AST SERPL-CCNC: 56 U/L — HIGH (ref 10–40)
BACTERIA # UR AUTO: NEGATIVE /HPF — SIGNIFICANT CHANGE UP
BASOPHILS # BLD AUTO: 0 K/UL — SIGNIFICANT CHANGE UP (ref 0–0.2)
BASOPHILS NFR BLD AUTO: 0.8 % — SIGNIFICANT CHANGE UP (ref 0–2)
BILIRUB SERPL-MCNC: 0.2 MG/DL — SIGNIFICANT CHANGE UP (ref 0.2–1.2)
BILIRUB UR-MCNC: NEGATIVE — SIGNIFICANT CHANGE UP
BUN SERPL-MCNC: 17 MG/DL — SIGNIFICANT CHANGE UP (ref 7–18)
CALCIUM SERPL-MCNC: 8.7 MG/DL — SIGNIFICANT CHANGE UP (ref 8.4–10.5)
CHLORIDE SERPL-SCNC: 105 MMOL/L — SIGNIFICANT CHANGE UP (ref 96–108)
CK MB BLD-MCNC: 3 % — SIGNIFICANT CHANGE UP (ref 0–3.5)
CK MB CFR SERPL CALC: 1.5 NG/ML — SIGNIFICANT CHANGE UP (ref 0–3.6)
CO2 SERPL-SCNC: 27 MMOL/L — SIGNIFICANT CHANGE UP (ref 22–31)
COLOR SPEC: YELLOW — SIGNIFICANT CHANGE UP
CREAT SERPL-MCNC: 0.97 MG/DL — SIGNIFICANT CHANGE UP (ref 0.5–1.3)
DIFF PNL FLD: ABNORMAL
EOSINOPHIL # BLD AUTO: 0.1 K/UL — SIGNIFICANT CHANGE UP (ref 0–0.5)
EOSINOPHIL NFR BLD AUTO: 1.2 % — SIGNIFICANT CHANGE UP (ref 0–6)
EPI CELLS # UR: ABNORMAL (ref 0–10)
GLUCOSE SERPL-MCNC: 271 MG/DL — HIGH (ref 70–99)
GLUCOSE UR QL: NEGATIVE — SIGNIFICANT CHANGE UP
HCT VFR BLD CALC: 33.9 % — LOW (ref 34.5–45)
HGB BLD-MCNC: 10.9 G/DL — LOW (ref 11.5–15.5)
KETONES UR-MCNC: NEGATIVE — SIGNIFICANT CHANGE UP
LEUKOCYTE ESTERASE UR-ACNC: ABNORMAL
LYMPHOCYTES # BLD AUTO: 1.5 K/UL — SIGNIFICANT CHANGE UP (ref 1–3.3)
LYMPHOCYTES # BLD AUTO: 33.7 % — SIGNIFICANT CHANGE UP (ref 13–44)
MCHC RBC-ENTMCNC: 29.5 PG — SIGNIFICANT CHANGE UP (ref 27–34)
MCHC RBC-ENTMCNC: 32.2 GM/DL — SIGNIFICANT CHANGE UP (ref 32–36)
MCV RBC AUTO: 91.8 FL — SIGNIFICANT CHANGE UP (ref 80–100)
MONOCYTES # BLD AUTO: 0.3 K/UL — SIGNIFICANT CHANGE UP (ref 0–0.9)
MONOCYTES NFR BLD AUTO: 7.3 % — SIGNIFICANT CHANGE UP (ref 2–14)
NEUTROPHILS # BLD AUTO: 2.5 K/UL — SIGNIFICANT CHANGE UP (ref 1.8–7.4)
NEUTROPHILS NFR BLD AUTO: 57 % — SIGNIFICANT CHANGE UP (ref 43–77)
NITRITE UR-MCNC: NEGATIVE — SIGNIFICANT CHANGE UP
PH UR: 7 — SIGNIFICANT CHANGE UP (ref 4.8–8)
PLATELET # BLD AUTO: 63 K/UL — LOW (ref 150–400)
POTASSIUM SERPL-MCNC: 4.1 MMOL/L — SIGNIFICANT CHANGE UP (ref 3.5–5.3)
POTASSIUM SERPL-SCNC: 4.1 MMOL/L — SIGNIFICANT CHANGE UP (ref 3.5–5.3)
PROT SERPL-MCNC: 6.4 G/DL — SIGNIFICANT CHANGE UP (ref 6–8.3)
PROT UR-MCNC: NEGATIVE — SIGNIFICANT CHANGE UP
RBC # BLD: 3.7 M/UL — LOW (ref 3.8–5.2)
RBC # FLD: 12.5 % — SIGNIFICANT CHANGE UP (ref 10.3–14.5)
RBC CASTS # UR COMP ASSIST: SIGNIFICANT CHANGE UP /HPF (ref 0–2)
SODIUM SERPL-SCNC: 141 MMOL/L — SIGNIFICANT CHANGE UP (ref 135–145)
SP GR SPEC: 1.01 — SIGNIFICANT CHANGE UP (ref 1.01–1.02)
TROPONIN I SERPL-MCNC: 0.02 NG/ML — SIGNIFICANT CHANGE UP (ref 0–0.04)
UROBILINOGEN FLD QL: NEGATIVE — SIGNIFICANT CHANGE UP
WBC # BLD: 4.5 K/UL — SIGNIFICANT CHANGE UP (ref 3.8–10.5)
WBC # FLD AUTO: 4.5 K/UL — SIGNIFICANT CHANGE UP (ref 3.8–10.5)
WBC UR QL: SIGNIFICANT CHANGE UP /HPF (ref 0–5)

## 2017-01-27 PROCEDURE — 71010: CPT | Mod: 26

## 2017-01-27 PROCEDURE — 99285 EMERGENCY DEPT VISIT HI MDM: CPT

## 2017-01-27 PROCEDURE — 70450 CT HEAD/BRAIN W/O DYE: CPT | Mod: 26

## 2017-01-27 PROCEDURE — 71275 CT ANGIOGRAPHY CHEST: CPT | Mod: 26

## 2017-01-27 RX ORDER — LISINOPRIL 2.5 MG/1
10 TABLET ORAL DAILY
Qty: 0 | Refills: 0 | Status: DISCONTINUED | OUTPATIENT
Start: 2017-01-27 | End: 2017-01-29

## 2017-01-27 RX ORDER — HYDRALAZINE HCL 50 MG
25 TABLET ORAL ONCE
Qty: 0 | Refills: 0 | Status: COMPLETED | OUTPATIENT
Start: 2017-01-27 | End: 2017-01-27

## 2017-01-27 RX ORDER — ACETAMINOPHEN 500 MG
650 TABLET ORAL ONCE
Qty: 0 | Refills: 0 | Status: COMPLETED | OUTPATIENT
Start: 2017-01-27 | End: 2017-01-27

## 2017-01-27 RX ORDER — DEXTROSE 50 % IN WATER 50 %
25 SYRINGE (ML) INTRAVENOUS ONCE
Qty: 0 | Refills: 0 | Status: DISCONTINUED | OUTPATIENT
Start: 2017-01-27 | End: 2017-01-30

## 2017-01-27 RX ORDER — ENOXAPARIN SODIUM 100 MG/ML
40 INJECTION SUBCUTANEOUS DAILY
Qty: 0 | Refills: 0 | Status: DISCONTINUED | OUTPATIENT
Start: 2017-01-27 | End: 2017-01-30

## 2017-01-27 RX ORDER — DEXTROSE 50 % IN WATER 50 %
12.5 SYRINGE (ML) INTRAVENOUS ONCE
Qty: 0 | Refills: 0 | Status: DISCONTINUED | OUTPATIENT
Start: 2017-01-27 | End: 2017-01-30

## 2017-01-27 RX ORDER — METOPROLOL TARTRATE 50 MG
25 TABLET ORAL EVERY 8 HOURS
Qty: 0 | Refills: 0 | Status: DISCONTINUED | OUTPATIENT
Start: 2017-01-27 | End: 2017-01-28

## 2017-01-27 RX ORDER — LATANOPROST 0.05 MG/ML
1 SOLUTION/ DROPS OPHTHALMIC; TOPICAL AT BEDTIME
Qty: 0 | Refills: 0 | Status: DISCONTINUED | OUTPATIENT
Start: 2017-01-27 | End: 2017-01-30

## 2017-01-27 RX ORDER — METOPROLOL TARTRATE 50 MG
25 TABLET ORAL
Qty: 0 | Refills: 0 | Status: DISCONTINUED | OUTPATIENT
Start: 2017-01-27 | End: 2017-01-27

## 2017-01-27 RX ORDER — SODIUM CHLORIDE 9 MG/ML
3 INJECTION INTRAMUSCULAR; INTRAVENOUS; SUBCUTANEOUS ONCE
Qty: 0 | Refills: 0 | Status: COMPLETED | OUTPATIENT
Start: 2017-01-27 | End: 2017-01-27

## 2017-01-27 RX ORDER — MECLIZINE HCL 12.5 MG
25 TABLET ORAL ONCE
Qty: 0 | Refills: 0 | Status: COMPLETED | OUTPATIENT
Start: 2017-01-27 | End: 2017-01-27

## 2017-01-27 RX ORDER — ACETAMINOPHEN 500 MG
650 TABLET ORAL EVERY 6 HOURS
Qty: 0 | Refills: 0 | Status: DISCONTINUED | OUTPATIENT
Start: 2017-01-27 | End: 2017-01-30

## 2017-01-27 RX ORDER — DEXTROSE 50 % IN WATER 50 %
1 SYRINGE (ML) INTRAVENOUS ONCE
Qty: 0 | Refills: 0 | Status: DISCONTINUED | OUTPATIENT
Start: 2017-01-27 | End: 2017-01-30

## 2017-01-27 RX ORDER — DOCUSATE SODIUM 100 MG
100 CAPSULE ORAL DAILY
Qty: 0 | Refills: 0 | Status: DISCONTINUED | OUTPATIENT
Start: 2017-01-27 | End: 2017-01-30

## 2017-01-27 RX ORDER — GLUCAGON INJECTION, SOLUTION 0.5 MG/.1ML
1 INJECTION, SOLUTION SUBCUTANEOUS ONCE
Qty: 0 | Refills: 0 | Status: DISCONTINUED | OUTPATIENT
Start: 2017-01-27 | End: 2017-01-30

## 2017-01-27 RX ORDER — ATORVASTATIN CALCIUM 80 MG/1
40 TABLET, FILM COATED ORAL AT BEDTIME
Qty: 0 | Refills: 0 | Status: DISCONTINUED | OUTPATIENT
Start: 2017-01-27 | End: 2017-01-30

## 2017-01-27 RX ORDER — INSULIN LISPRO 100/ML
VIAL (ML) SUBCUTANEOUS
Qty: 0 | Refills: 0 | Status: DISCONTINUED | OUTPATIENT
Start: 2017-01-27 | End: 2017-01-30

## 2017-01-27 RX ORDER — SODIUM CHLORIDE 9 MG/ML
1000 INJECTION, SOLUTION INTRAVENOUS
Qty: 0 | Refills: 0 | Status: DISCONTINUED | OUTPATIENT
Start: 2017-01-27 | End: 2017-01-30

## 2017-01-27 RX ORDER — ASPIRIN/CALCIUM CARB/MAGNESIUM 324 MG
81 TABLET ORAL DAILY
Qty: 0 | Refills: 0 | Status: DISCONTINUED | OUTPATIENT
Start: 2017-01-27 | End: 2017-01-30

## 2017-01-27 RX ADMIN — ATORVASTATIN CALCIUM 40 MILLIGRAM(S): 80 TABLET, FILM COATED ORAL at 22:37

## 2017-01-27 RX ADMIN — Medication 650 MILLIGRAM(S): at 22:39

## 2017-01-27 RX ADMIN — LATANOPROST 1 DROP(S): 0.05 SOLUTION/ DROPS OPHTHALMIC; TOPICAL at 22:39

## 2017-01-27 RX ADMIN — Medication 650 MILLIGRAM(S): at 19:41

## 2017-01-27 RX ADMIN — Medication 25 MILLIGRAM(S): at 19:41

## 2017-01-27 RX ADMIN — Medication 2: at 22:37

## 2017-01-27 RX ADMIN — Medication 25 MILLIGRAM(S): at 22:37

## 2017-01-27 RX ADMIN — LISINOPRIL 10 MILLIGRAM(S): 2.5 TABLET ORAL at 16:46

## 2017-01-27 RX ADMIN — SODIUM CHLORIDE 3 MILLILITER(S): 9 INJECTION INTRAMUSCULAR; INTRAVENOUS; SUBCUTANEOUS at 10:16

## 2017-01-27 NOTE — H&P ADULT. - HISTORY OF PRESENT ILLNESS
84 y/o F pm hx htn, DM, vertigo, graves disease , h/o cardiac arrest s/p medtronic PPM, ambulatory with cane, p/w chest pain. Patient is poor historian , stated today all of a sudden she felt funny feeling all over her body and chest pain , which resolved on coming to ED. As per paper work , pt was walking back from bathroom and started c/o chest pain , nitro SL was given with some relief and heart rate was found to be 172. Patient was discharged from [facility] , received adenosine for SVT , and was found to have narrow complex tachycardia, Echo was done that showed Severely dilated left atrium. Mild concentric left ventricular hypertrophy, Normal left ventricular systolic function. No segmental wall motion abnormalities. Severe diastolic dysfunction. Patient was discharged on lopressor 25mg BID and lisinopril 10 mg.   ROS negative for fever, chills, headache, abdominal pain, change in BM, urinary pain or burning.

## 2017-01-27 NOTE — H&P ADULT. - ASSESSMENT
86 y/o F pm hx htn, DM, vertigo, graves disease , h/o cardiac arrest s/p medtronic PPM, ambulatory with cane, p/w chest pain. Patient is poor historian , stated today all of a sudden she felt funny feeling all over her body and chest pain , which resolved on coming to ED. As per paper work , pt was walking back from bathroom and started c/o chest pain , nitro SL was given with some relief and heart rate was found to be 172. Patient was discharged from [facility] , received adenosine for SVT , and was found to have narrow complex tachycardia, Echo was done that showed Severely dilated left atrium. Mild concentric left ventricular hypertrophy, Normal left ventricular systolic function. No segmental wall motion abnormalities. Severe diastolic dysfunction. Patient was discharged on lopressor 25mg BID and lisinopril 10 mg.   Episode of chest pain likely from tachycardia vs SVT vs Afib . EKG on admission showed paced rhythm ( atrial pacemaker) , no significant ST or T wave changes.

## 2017-01-27 NOTE — ED PROVIDER NOTE - MEDICAL DECISION MAKING DETAILS
Patient with chest pain and vertigo. Requires inpatient admission for cardiac and neurologic evaluation.

## 2017-01-27 NOTE — ED PROVIDER NOTE - PROGRESS NOTE DETAILS
No stroke code called as NIHSS is zero. Dysphagia screen passed. Patient is resting comfortably, NAD. Patient endorsed to Dr. Koehler.

## 2017-01-27 NOTE — ED PROVIDER NOTE - NS ED MD SCRIBE ATTENDING SCRIBE SECTIONS
PHYSICAL EXAM/DISPOSITION/HIV/REVIEW OF SYSTEMS/PAST MEDICAL/SURGICAL/SOCIAL HISTORY/HISTORY OF PRESENT ILLNESS/VITAL SIGNS( Pullset)

## 2017-01-27 NOTE — H&P ADULT. - PROBLEM SELECTOR PLAN 1
concern for SVT vs paroxysmal Afib   Will trend cardiac enzymes , first set of CE negative   Follow up T2 and T3   Echo done recently , showed  Mild concentric left ventricular hypertrophy, Normal left ventricular systolic function. No segmental wall motion abnormalities. Severe diastolic dysfunction.  D/w cardio Dr. Boo , as pt c/w chest pain, possible SVT with Echo changes , will rule out PE   Follow up  CTA chest   Monitor pt on Tele ,   If CTA negative , stress test on monday for ischemia evaluation   Increase lopressor to 25 mg Q8

## 2017-01-27 NOTE — ED PROVIDER NOTE - OBJECTIVE STATEMENT
86 y/o female with PMHx of DM, HTN Graves Disease, Pacemaker, and Vertigo presents to the ED for vertigo today. Pt reports she woke up out of bed with sudden onset of vertigo, CP, SOB, nausea, and diaphoresis. Pt states that now everything is resolved, except vertigo, which is worse with turning head and sitting up. Pt was recently discharged from [facility] 7 days ago for similar Sx, but pt with SVT at that time. Pt denies fever, chills, HA, abd pain, vomiting, diarrhea, focal weakness, paresthesias, or any other complaints. NKDA.

## 2017-01-28 PROBLEM — R42 DIZZINESS AND GIDDINESS: Chronic | Status: ACTIVE | Noted: 2017-01-17

## 2017-01-28 PROBLEM — H40.9 UNSPECIFIED GLAUCOMA: Chronic | Status: ACTIVE | Noted: 2017-01-17

## 2017-01-28 PROBLEM — I10 ESSENTIAL (PRIMARY) HYPERTENSION: Chronic | Status: ACTIVE | Noted: 2017-01-17

## 2017-01-28 PROBLEM — K59.00 CONSTIPATION, UNSPECIFIED: Chronic | Status: ACTIVE | Noted: 2017-01-17

## 2017-01-28 LAB
CK MB BLD-MCNC: <2.3 % — SIGNIFICANT CHANGE UP (ref 0–3.5)
CK MB CFR SERPL CALC: <1 NG/ML — SIGNIFICANT CHANGE UP (ref 0–3.6)
CK SERPL-CCNC: 43 U/L — SIGNIFICANT CHANGE UP (ref 21–215)
FERRITIN SERPL-MCNC: 205 NG/ML — HIGH (ref 15–150)
IRON SATN MFR SERPL: 103 UG/DL — SIGNIFICANT CHANGE UP (ref 40–150)
IRON SATN MFR SERPL: 34 % — SIGNIFICANT CHANGE UP (ref 15–50)
PF4 HEPARIN CMPLX AB SER-ACNC: NEGATIVE — SIGNIFICANT CHANGE UP
PF4 HEPARIN CMPLX AB SERPL QL IA: 0.15 ABS — SIGNIFICANT CHANGE UP
PROT SERPL-MCNC: 6.5 G/DL — SIGNIFICANT CHANGE UP (ref 6–8.3)
PROT SERPL-MCNC: 6.5 G/DL — SIGNIFICANT CHANGE UP (ref 6–8.3)
T4 FREE SERPL-MCNC: 1.3 NG/DL — SIGNIFICANT CHANGE UP (ref 0.9–1.8)
TIBC SERPL-MCNC: 306 UG/DL — SIGNIFICANT CHANGE UP (ref 250–450)
TROPONIN I SERPL-MCNC: 0.03 NG/ML — SIGNIFICANT CHANGE UP (ref 0–0.04)
TSH SERPL-MCNC: 4.54 UU/ML — SIGNIFICANT CHANGE UP (ref 0.34–4.82)
UIBC SERPL-MCNC: 203 UG/DL — SIGNIFICANT CHANGE UP (ref 110–370)
VIT B12 SERPL-MCNC: 863 PG/ML — SIGNIFICANT CHANGE UP (ref 243–894)
VIT D25+D1,25 OH+D1,25 PNL SERPL-MCNC: 29.4 PG/ML — SIGNIFICANT CHANGE UP (ref 19.9–79.3)

## 2017-01-28 RX ORDER — MORPHINE SULFATE 50 MG/1
2 CAPSULE, EXTENDED RELEASE ORAL EVERY 6 HOURS
Qty: 0 | Refills: 0 | Status: DISCONTINUED | OUTPATIENT
Start: 2017-01-28 | End: 2017-01-30

## 2017-01-28 RX ORDER — METOPROLOL TARTRATE 50 MG
50 TABLET ORAL
Qty: 0 | Refills: 0 | Status: DISCONTINUED | OUTPATIENT
Start: 2017-01-28 | End: 2017-01-30

## 2017-01-28 RX ADMIN — Medication 25 MILLIGRAM(S): at 06:06

## 2017-01-28 RX ADMIN — LATANOPROST 1 DROP(S): 0.05 SOLUTION/ DROPS OPHTHALMIC; TOPICAL at 21:52

## 2017-01-28 RX ADMIN — Medication 81 MILLIGRAM(S): at 12:06

## 2017-01-28 RX ADMIN — Medication 1: at 12:06

## 2017-01-28 RX ADMIN — Medication 100 MILLIGRAM(S): at 12:06

## 2017-01-28 RX ADMIN — LISINOPRIL 10 MILLIGRAM(S): 2.5 TABLET ORAL at 06:06

## 2017-01-28 RX ADMIN — ATORVASTATIN CALCIUM 40 MILLIGRAM(S): 80 TABLET, FILM COATED ORAL at 21:52

## 2017-01-28 RX ADMIN — MORPHINE SULFATE 2 MILLIGRAM(S): 50 CAPSULE, EXTENDED RELEASE ORAL at 09:02

## 2017-01-28 RX ADMIN — Medication 1: at 17:33

## 2017-01-28 RX ADMIN — MORPHINE SULFATE 2 MILLIGRAM(S): 50 CAPSULE, EXTENDED RELEASE ORAL at 09:17

## 2017-01-28 RX ADMIN — ENOXAPARIN SODIUM 40 MILLIGRAM(S): 100 INJECTION SUBCUTANEOUS at 12:07

## 2017-01-28 RX ADMIN — Medication 1: at 21:52

## 2017-01-28 RX ADMIN — Medication 50 MILLIGRAM(S): at 17:32

## 2017-01-29 RX ORDER — POLYETHYLENE GLYCOL 3350 17 G/17G
17 POWDER, FOR SOLUTION ORAL ONCE
Qty: 0 | Refills: 0 | Status: COMPLETED | OUTPATIENT
Start: 2017-01-29 | End: 2017-01-29

## 2017-01-29 RX ORDER — LISINOPRIL 2.5 MG/1
10 TABLET ORAL
Qty: 0 | Refills: 0 | Status: DISCONTINUED | OUTPATIENT
Start: 2017-01-29 | End: 2017-01-30

## 2017-01-29 RX ADMIN — Medication 100 MILLIGRAM(S): at 11:46

## 2017-01-29 RX ADMIN — ENOXAPARIN SODIUM 40 MILLIGRAM(S): 100 INJECTION SUBCUTANEOUS at 11:47

## 2017-01-29 RX ADMIN — ATORVASTATIN CALCIUM 40 MILLIGRAM(S): 80 TABLET, FILM COATED ORAL at 22:18

## 2017-01-29 RX ADMIN — LATANOPROST 1 DROP(S): 0.05 SOLUTION/ DROPS OPHTHALMIC; TOPICAL at 22:18

## 2017-01-29 RX ADMIN — Medication 81 MILLIGRAM(S): at 11:46

## 2017-01-29 RX ADMIN — Medication 50 MILLIGRAM(S): at 17:45

## 2017-01-29 RX ADMIN — POLYETHYLENE GLYCOL 3350 17 GRAM(S): 17 POWDER, FOR SOLUTION ORAL at 11:47

## 2017-01-29 RX ADMIN — LISINOPRIL 10 MILLIGRAM(S): 2.5 TABLET ORAL at 05:12

## 2017-01-29 RX ADMIN — Medication 1: at 11:46

## 2017-01-29 RX ADMIN — Medication 50 MILLIGRAM(S): at 05:12

## 2017-01-29 RX ADMIN — LISINOPRIL 10 MILLIGRAM(S): 2.5 TABLET ORAL at 17:45

## 2017-01-30 VITALS — HEART RATE: 63 BPM | DIASTOLIC BLOOD PRESSURE: 62 MMHG | SYSTOLIC BLOOD PRESSURE: 149 MMHG

## 2017-01-30 LAB
% ALBUMIN: 44.1 % — SIGNIFICANT CHANGE UP
% ALPHA 1: 3.5 % — SIGNIFICANT CHANGE UP
% ALPHA 2: 14.6 % — SIGNIFICANT CHANGE UP
% BETA: 12.3 % — SIGNIFICANT CHANGE UP
% GAMMA: 25.5 % — SIGNIFICANT CHANGE UP
% M SPIKE: 8 % — SIGNIFICANT CHANGE UP
ALBUMIN SERPL ELPH-MCNC: 2.9 G/DL — LOW (ref 3.6–5.5)
ALBUMIN/GLOB SERPL ELPH: 0.8 RATIO — SIGNIFICANT CHANGE UP
ALPHA1 GLOB SERPL ELPH-MCNC: 0.2 G/DL — SIGNIFICANT CHANGE UP (ref 0.1–0.4)
ALPHA2 GLOB SERPL ELPH-MCNC: 0.9 G/DL — SIGNIFICANT CHANGE UP (ref 0.5–1)
ANION GAP SERPL CALC-SCNC: 5 MMOL/L — SIGNIFICANT CHANGE UP (ref 5–17)
B-GLOBULIN SERPL ELPH-MCNC: 0.8 G/DL — SIGNIFICANT CHANGE UP (ref 0.5–1)
BUN SERPL-MCNC: 15 MG/DL — SIGNIFICANT CHANGE UP (ref 7–18)
CALCIUM SERPL-MCNC: 9.3 MG/DL — SIGNIFICANT CHANGE UP (ref 8.4–10.5)
CHLORIDE SERPL-SCNC: 104 MMOL/L — SIGNIFICANT CHANGE UP (ref 96–108)
CO2 SERPL-SCNC: 30 MMOL/L — SIGNIFICANT CHANGE UP (ref 22–31)
CREAT SERPL-MCNC: 0.84 MG/DL — SIGNIFICANT CHANGE UP (ref 0.5–1.3)
GAMMA GLOBULIN: 1.7 G/DL — HIGH (ref 0.6–1.6)
GLUCOSE SERPL-MCNC: 162 MG/DL — HIGH (ref 70–99)
HCT VFR BLD CALC: 40.4 % — SIGNIFICANT CHANGE UP (ref 34.5–45)
HGB BLD-MCNC: 13.4 G/DL — SIGNIFICANT CHANGE UP (ref 11.5–15.5)
M-SPIKE: 0.5 G/DL — HIGH (ref 0–0)
MAGNESIUM SERPL-MCNC: 1.9 MG/DL — SIGNIFICANT CHANGE UP (ref 1.8–2.4)
MCHC RBC-ENTMCNC: 30.4 PG — SIGNIFICANT CHANGE UP (ref 27–34)
MCHC RBC-ENTMCNC: 33.1 GM/DL — SIGNIFICANT CHANGE UP (ref 32–36)
MCV RBC AUTO: 91.7 FL — SIGNIFICANT CHANGE UP (ref 80–100)
PHOSPHATE SERPL-MCNC: 3 MG/DL — SIGNIFICANT CHANGE UP (ref 2.5–4.5)
PLATELET # BLD AUTO: 84 K/UL — LOW (ref 150–400)
POTASSIUM SERPL-MCNC: 3.9 MMOL/L — SIGNIFICANT CHANGE UP (ref 3.5–5.3)
POTASSIUM SERPL-SCNC: 3.9 MMOL/L — SIGNIFICANT CHANGE UP (ref 3.5–5.3)
PROT PATTERN SERPL ELPH-IMP: SIGNIFICANT CHANGE UP
RBC # BLD: 4.41 M/UL — SIGNIFICANT CHANGE UP (ref 3.8–5.2)
RBC # FLD: 12.7 % — SIGNIFICANT CHANGE UP (ref 10.3–14.5)
SODIUM SERPL-SCNC: 139 MMOL/L — SIGNIFICANT CHANGE UP (ref 135–145)
WBC # BLD: 5.8 K/UL — SIGNIFICANT CHANGE UP (ref 3.8–10.5)
WBC # FLD AUTO: 5.8 K/UL — SIGNIFICANT CHANGE UP (ref 3.8–10.5)

## 2017-01-30 PROCEDURE — 82728 ASSAY OF FERRITIN: CPT

## 2017-01-30 PROCEDURE — 93017 CV STRESS TEST TRACING ONLY: CPT

## 2017-01-30 PROCEDURE — 78452 HT MUSCLE IMAGE SPECT MULT: CPT

## 2017-01-30 PROCEDURE — 85027 COMPLETE CBC AUTOMATED: CPT

## 2017-01-30 PROCEDURE — A9502: CPT

## 2017-01-30 PROCEDURE — 80053 COMPREHEN METABOLIC PANEL: CPT

## 2017-01-30 PROCEDURE — 82652 VIT D 1 25-DIHYDROXY: CPT

## 2017-01-30 PROCEDURE — 84165 PROTEIN E-PHORESIS SERUM: CPT

## 2017-01-30 PROCEDURE — 81001 URINALYSIS AUTO W/SCOPE: CPT

## 2017-01-30 PROCEDURE — 84155 ASSAY OF PROTEIN SERUM: CPT

## 2017-01-30 PROCEDURE — 82550 ASSAY OF CK (CPK): CPT

## 2017-01-30 PROCEDURE — 83550 IRON BINDING TEST: CPT

## 2017-01-30 PROCEDURE — 80048 BASIC METABOLIC PNL TOTAL CA: CPT

## 2017-01-30 PROCEDURE — 71275 CT ANGIOGRAPHY CHEST: CPT

## 2017-01-30 PROCEDURE — 93005 ELECTROCARDIOGRAM TRACING: CPT

## 2017-01-30 PROCEDURE — 70450 CT HEAD/BRAIN W/O DYE: CPT

## 2017-01-30 PROCEDURE — 82607 VITAMIN B-12: CPT

## 2017-01-30 PROCEDURE — 84443 ASSAY THYROID STIM HORMONE: CPT

## 2017-01-30 PROCEDURE — 71045 X-RAY EXAM CHEST 1 VIEW: CPT

## 2017-01-30 PROCEDURE — 99285 EMERGENCY DEPT VISIT HI MDM: CPT | Mod: 25

## 2017-01-30 PROCEDURE — 83735 ASSAY OF MAGNESIUM: CPT

## 2017-01-30 PROCEDURE — 84484 ASSAY OF TROPONIN QUANT: CPT

## 2017-01-30 PROCEDURE — 84439 ASSAY OF FREE THYROXINE: CPT

## 2017-01-30 PROCEDURE — 84100 ASSAY OF PHOSPHORUS: CPT

## 2017-01-30 PROCEDURE — 82553 CREATINE MB FRACTION: CPT

## 2017-01-30 PROCEDURE — 86022 PLATELET ANTIBODIES: CPT

## 2017-01-30 RX ORDER — LISINOPRIL 2.5 MG/1
10 TABLET ORAL DAILY
Qty: 0 | Refills: 0 | Status: DISCONTINUED | OUTPATIENT
Start: 2017-01-30 | End: 2017-01-30

## 2017-01-30 RX ORDER — SIMVASTATIN 20 MG/1
1 TABLET, FILM COATED ORAL
Qty: 0 | Refills: 0 | COMMUNITY

## 2017-01-30 RX ORDER — LATANOPROST 0.05 MG/ML
1 SOLUTION/ DROPS OPHTHALMIC; TOPICAL
Qty: 0 | Refills: 0 | COMMUNITY

## 2017-01-30 RX ORDER — LATANOPROST 0.05 MG/ML
1 SOLUTION/ DROPS OPHTHALMIC; TOPICAL
Qty: 30 | Refills: 0 | OUTPATIENT
Start: 2017-01-30 | End: 2017-03-01

## 2017-01-30 RX ORDER — LISINOPRIL 2.5 MG/1
1 TABLET ORAL
Qty: 30 | Refills: 0 | OUTPATIENT
Start: 2017-01-30 | End: 2017-03-01

## 2017-01-30 RX ORDER — AMLODIPINE BESYLATE 2.5 MG/1
2.5 TABLET ORAL DAILY
Qty: 0 | Refills: 0 | Status: DISCONTINUED | OUTPATIENT
Start: 2017-01-30 | End: 2017-01-30

## 2017-01-30 RX ORDER — DOCUSATE SODIUM 100 MG
1 CAPSULE ORAL
Qty: 30 | Refills: 0 | OUTPATIENT
Start: 2017-01-30 | End: 2017-03-01

## 2017-01-30 RX ORDER — DOCUSATE SODIUM 100 MG
1 CAPSULE ORAL
Qty: 0 | Refills: 0 | COMMUNITY
Start: 2017-01-30

## 2017-01-30 RX ORDER — METFORMIN HYDROCHLORIDE 850 MG/1
1 TABLET ORAL
Qty: 0 | Refills: 0 | COMMUNITY

## 2017-01-30 RX ORDER — AMLODIPINE BESYLATE 2.5 MG/1
1 TABLET ORAL
Qty: 30 | Refills: 0 | OUTPATIENT
Start: 2017-01-30 | End: 2017-03-01

## 2017-01-30 RX ORDER — METFORMIN HYDROCHLORIDE 850 MG/1
1 TABLET ORAL
Qty: 60 | Refills: 0 | OUTPATIENT
Start: 2017-01-30 | End: 2017-03-01

## 2017-01-30 RX ORDER — METOPROLOL TARTRATE 50 MG
3 TABLET ORAL
Qty: 180 | Refills: 0 | OUTPATIENT
Start: 2017-01-30 | End: 2017-03-01

## 2017-01-30 RX ORDER — ASPIRIN/CALCIUM CARB/MAGNESIUM 324 MG
1 TABLET ORAL
Qty: 30 | Refills: 0 | OUTPATIENT
Start: 2017-01-30 | End: 2017-03-01

## 2017-01-30 RX ORDER — METOPROLOL TARTRATE 50 MG
75 TABLET ORAL
Qty: 0 | Refills: 0 | Status: DISCONTINUED | OUTPATIENT
Start: 2017-01-30 | End: 2017-01-30

## 2017-01-30 RX ORDER — ATORVASTATIN CALCIUM 80 MG/1
1 TABLET, FILM COATED ORAL
Qty: 30 | Refills: 0 | OUTPATIENT
Start: 2017-01-30 | End: 2017-03-01

## 2017-01-30 RX ORDER — ACETAMINOPHEN 500 MG
2 TABLET ORAL
Qty: 0 | Refills: 0 | COMMUNITY
Start: 2017-01-30

## 2017-01-30 RX ADMIN — LISINOPRIL 10 MILLIGRAM(S): 2.5 TABLET ORAL at 05:39

## 2017-01-30 RX ADMIN — Medication 100 MILLIGRAM(S): at 11:46

## 2017-01-30 RX ADMIN — Medication 81 MILLIGRAM(S): at 11:46

## 2017-01-30 RX ADMIN — ENOXAPARIN SODIUM 40 MILLIGRAM(S): 100 INJECTION SUBCUTANEOUS at 11:47

## 2017-01-30 RX ADMIN — Medication 1: at 16:47

## 2017-01-30 RX ADMIN — Medication 50 MILLIGRAM(S): at 05:39

## 2017-01-30 RX ADMIN — Medication 4: at 12:41

## 2017-01-30 NOTE — PHYSICAL THERAPY INITIAL EVALUATION ADULT - CRITERIA FOR SKILLED THERAPEUTIC INTERVENTIONS
predicted duration of therapy intervention/therapy frequency/impairments found/rehab potential/anticipated discharge recommendation

## 2017-01-30 NOTE — DISCHARGE NOTE ADULT - MEDICATION SUMMARY - MEDICATIONS TO TAKE
I will START or STAY ON the medications listed below when I get home from the hospital:    acetaminophen 325 mg oral tablet  -- 2 tab(s) by mouth every 6 hours, As needed, Mild Pain (1 - 3)  -- Indication: For Pain    aspirin 81 mg oral tablet, chewable  -- 1 tab(s) by mouth once a day  -- Indication: For Cardioprotective    lisinopril 10 mg oral tablet  -- 1 tab(s) by mouth once a day  -- Indication: For HTN (hypertension)    aluminum hydroxide-magnesium hydroxide 200 mg-200 mg/5 mL oral suspension  -- 30 milliliter(s) by mouth every 4 hours, As needed, Dyspepsia  -- Indication: For Constipation    metFORMIN 500 mg oral tablet  -- 1 tab(s) by mouth 2 times a day  -- Indication: For Diabetes    atorvastatin 40 mg oral tablet  -- 1 tab(s) by mouth once a day (at bedtime)  -- Indication: For HLD    metoprolol tartrate 25 mg oral tablet  -- 3 tab(s) by mouth 2 times a day  -- It is very important that you take or use this exactly as directed.  Do not skip doses or discontinue unless directed by your doctor.  May cause drowsiness.  Alcohol may intensify this effect.  Use care when operating dangerous machinery.  Some non-prescription drugs may aggravate your condition.  Read all labels carefully.  If a warning appears, check with your doctor before taking.  Take with food or milk.  This drug may impair the ability to drive or operate machinery.  Use care until you become familiar with its effects.    -- Indication: For HTN (hypertension)    amLODIPine 2.5 mg oral tablet  -- 1 tab(s) by mouth once a day  -- Indication: For HTN (hypertension)    docusate sodium 100 mg oral capsule  -- 1 cap(s) by mouth once a day  -- Indication: For Constipation    Xalatan 0.005% ophthalmic solution  -- 1 drop(s) to each affected eye once a day (in the evening)  -- Indication: For Glaucoma

## 2017-01-30 NOTE — DISCHARGE NOTE ADULT - PLAN OF CARE
rate control Patient is rate controlled post admission  Continue lopressor at given dose  Follow up with cardiologist, Dr. Boo as an out patient carbohydrate consistent diet  exercise as tolerated  Continue metformin  HbA1C every 3 months  Follow up with PMD within a week of discharge DASH diet  exercise as tolerated  continue current medications

## 2017-01-30 NOTE — PHYSICAL THERAPY INITIAL EVALUATION ADULT - ADDITIONAL COMMENTS
PATIENT USED A QUADCANE.  THIS HOSPITALIZATION IS FROM REHAB AND PATIENT REQUIRED ASSIST AND USED R.W. AT REHAB

## 2017-01-30 NOTE — DISCHARGE NOTE ADULT - PATIENT PORTAL LINK FT
“You can access the FollowHealth Patient Portal, offered by [facility], by registering with the following website: [email or web address]”

## 2017-01-30 NOTE — DISCHARGE NOTE ADULT - CARE PROVIDER_API CALL
Jazmín Boo), Cardiology; Internal Medicine  [address] [address]  Phone: [phone]  Fax: [phone]    Juan Pablo Gates), Internal Medicine  [address] [place] [address]  Phone: [phone]  Fax: [phone]

## 2017-01-30 NOTE — DISCHARGE NOTE ADULT - NS MD DC FALL RISK RISK
For information on Fall & Injury Prevention, visit [email or web address]

## 2017-01-30 NOTE — DISCHARGE NOTE ADULT - MEDICATION SUMMARY - MEDICATIONS TO CHANGE
I will SWITCH the dose or number of times a day I take the medications listed below when I get home from the hospital:    metoprolol tartrate 25 mg oral tablet  -- 1 tab(s) by mouth 2 times a day

## 2017-01-30 NOTE — DISCHARGE NOTE ADULT - HOSPITAL COURSE
Patient is a 84 y/o F pm hx htn, DM, vertigo, graves disease , h/o cardiac arrest s/p medtronic PPM, ambulatory with cane, p/w chest pain. As per paper work , pt was walking back from bathroom and started c/o chest pain , nitro SL was given with some relief and heart rate was found to be 172. Patient was recently discharged from [facility] , received adenosine for SVT , and was found to have narrow complex tachycardia, Echo was done that showed Severely dilated left atrium. Mild concentric left ventricular hypertrophy, Normal left ventricular systolic function. No segmental wall motion abnormalities. Severe diastolic dysfunction. Patient was discharged on lopressor 25mg BID and lisinopril 10 mg.   Episode of chest pain was likely from tachycardia vs SVT vs Afib . EKG on admission showed paced rhythm ( atrial pacemaker) , no significant ST or T wave changes.  Cardiac enzymes were negative*3. Cardio on the case was Dr. Boo who recommended a CTA to r/o PE since patient had SVt with chest pain however PE was ruled out. Patient remained rate control on Lopressor, however BP remained on the higher side after which Norvasc was added to Lopressor and Lisinopril.  ECHO showed: 1. Severely dilated left atrium.  LA volume index = 65  cc/m2.  2. Mild concentric left ventricular hypertrophy.  3. Normal left ventricular systolic function. No segmental  wall motion abnormalities.  4. Severe diastolic dysfunction.  5. Normal right ventricular size and function. A device  wire is noted in the right heart.  6. Normal tricuspid valve. Mild tricuspid regurgitation.  7. Estimated pulmonary artery systolic pressure equals 50  mm Hg, assuming right atrial pressure equals 10  mm Hg,  consistent with moderate pulmonary hypertension.  Patient also had a stress test for ischemia eval that showed there was no evidence of ischemia with LVEF>70%.  Patient tends to get dizzy when getting up from sleeping to standing position. Plan to continue currently prescribed antihypertensives and to avoid increasing doses of any of those and patient has a tendency to become dizzy. patient should follow up with cardiologist Dr. Boo, cardiologist as an out patient.  For DM, patient was put on sliding scale with Accu-Cheks  patient is vitally stable and symptomatically improved for discharge at this time as per medical attending and patient also cleared for discharge by cardiologist.

## 2017-01-30 NOTE — DISCHARGE NOTE ADULT - CARE PLAN
Principal Discharge DX:	SVT (supraventricular tachycardia)  Goal:	rate control  Instructions for follow-up, activity and diet:	Patient is rate controlled post admission  Continue lopressor at given dose  Follow up with cardiologist, Dr. Boo as an out patient  Secondary Diagnosis:	DM (diabetes mellitus)  Instructions for follow-up, activity and diet:	carbohydrate consistent diet  exercise as tolerated  Continue metformin  HbA1C every 3 months  Follow up with PMD within a week of discharge  Secondary Diagnosis:	HTN (hypertension)  Instructions for follow-up, activity and diet:	DASH diet  exercise as tolerated  continue current medications

## 2017-02-03 DIAGNOSIS — E11.9 TYPE 2 DIABETES MELLITUS WITHOUT COMPLICATIONS: ICD-10-CM

## 2017-02-03 DIAGNOSIS — K59.00 CONSTIPATION, UNSPECIFIED: ICD-10-CM

## 2017-02-03 DIAGNOSIS — I10 ESSENTIAL (PRIMARY) HYPERTENSION: ICD-10-CM

## 2017-02-03 DIAGNOSIS — E05.00 THYROTOXICOSIS WITH DIFFUSE GOITER WITHOUT THYROTOXIC CRISIS OR STORM: ICD-10-CM

## 2017-02-03 DIAGNOSIS — I50.30 UNSPECIFIED DIASTOLIC (CONGESTIVE) HEART FAILURE: ICD-10-CM

## 2017-02-03 DIAGNOSIS — I51.7 CARDIOMEGALY: ICD-10-CM

## 2017-02-03 DIAGNOSIS — R42 DIZZINESS AND GIDDINESS: ICD-10-CM

## 2017-02-03 DIAGNOSIS — R07.89 OTHER CHEST PAIN: ICD-10-CM

## 2017-02-03 DIAGNOSIS — Z95.0 PRESENCE OF CARDIAC PACEMAKER: ICD-10-CM

## 2017-02-03 DIAGNOSIS — H40.9 UNSPECIFIED GLAUCOMA: ICD-10-CM

## 2017-02-03 DIAGNOSIS — I47.1 SUPRAVENTRICULAR TACHYCARDIA: ICD-10-CM

## 2017-02-24 ENCOUNTER — EMERGENCY (EMERGENCY)
Facility: HOSPITAL | Age: 82
LOS: 1 days | Discharge: HOME | End: 2017-02-24
Attending: EMERGENCY MEDICINE
Payer: MEDICARE

## 2017-02-24 VITALS
DIASTOLIC BLOOD PRESSURE: 96 MMHG | TEMPERATURE: 98 F | WEIGHT: 100.97 LBS | HEIGHT: 59 IN | HEART RATE: 94 BPM | OXYGEN SATURATION: 99 % | SYSTOLIC BLOOD PRESSURE: 150 MMHG | RESPIRATION RATE: 18 BRPM

## 2017-02-24 DIAGNOSIS — I10 ESSENTIAL (PRIMARY) HYPERTENSION: ICD-10-CM

## 2017-02-24 DIAGNOSIS — Z86.74 PERSONAL HISTORY OF SUDDEN CARDIAC ARREST: ICD-10-CM

## 2017-02-24 DIAGNOSIS — Z95.0 PRESENCE OF CARDIAC PACEMAKER: ICD-10-CM

## 2017-02-24 DIAGNOSIS — Z95.0 PRESENCE OF CARDIAC PACEMAKER: Chronic | ICD-10-CM

## 2017-02-24 DIAGNOSIS — Z90.721 ACQUIRED ABSENCE OF OVARIES, UNILATERAL: Chronic | ICD-10-CM

## 2017-02-24 DIAGNOSIS — E11.9 TYPE 2 DIABETES MELLITUS WITHOUT COMPLICATIONS: ICD-10-CM

## 2017-02-24 DIAGNOSIS — R00.2 PALPITATIONS: ICD-10-CM

## 2017-02-24 PROCEDURE — 99283 EMERGENCY DEPT VISIT LOW MDM: CPT | Mod: 25

## 2017-02-24 PROCEDURE — 93005 ELECTROCARDIOGRAM TRACING: CPT

## 2017-02-24 PROCEDURE — 99284 EMERGENCY DEPT VISIT MOD MDM: CPT

## 2017-02-24 NOTE — ED ADULT NURSE NOTE - NS ED NURSE DC INFO COMPLEXITY
Returned Demonstration/Verbalized Understanding/Moderate: Comprehensive teaching/Patient asked questions

## 2017-02-24 NOTE — ED ADULT NURSE NOTE - OBJECTIVE STATEMENT
Pt a+ox3 from [facility] w/ c/o dizziness and palpitations while sitting before eating breakfast, pt denies SOB, chest pain, nausea, vomiting, cough, fever, 0/10 pain scale, no peripheral edema noted on assessment, L PPM in place.

## 2017-02-24 NOTE — ED PROVIDER NOTE - OBJECTIVE STATEMENT
86 y/o F pt w/ PMHx of HTN, DM, cardiac arrest p/w palpitations. As per pt's paperwork she was having breakfast and began to c/o dizziness and palpitations, found to have heart rate in 170s, resolved and sent her in. No documentation of whether or not any medicine was given. Pt was recently admitted for similar episode 1 month ago.

## 2017-02-24 NOTE — ED PROVIDER NOTE - MEDICAL DECISION MAKING DETAILS
84 y/o F pt p/w palpitations and dizziness. Will get EKG and discuss with primary care doc. 84 y/o F pt p/w palpitations and dizziness, currently asymptomatic. Will get EKG and discuss with primary care doc. D/W Dr. Quintana who notes that pt has had full workup recently, currently asymptomatic, patient has pace maker, ecg paced, will discharge home.

## 2017-02-24 NOTE — ED PROVIDER NOTE - NS ED MD SCRIBE ATTENDING SCRIBE SECTIONS
PAST MEDICAL/SURGICAL/SOCIAL HISTORY/PHYSICAL EXAM/HISTORY OF PRESENT ILLNESS/REVIEW OF SYSTEMS/VITAL SIGNS( Pullset)/DISPOSITION

## 2018-03-26 NOTE — ED PROVIDER NOTE - CPE EDP GASTRO NORM
Please call [email or web address] with creatinine result & once IV access is obtained so that we may proceed with scan. Bernie to inform CHING. normal...

## 2018-06-07 NOTE — PATIENT PROFILE ADULT. - FUNCTIONAL SCREEN CURRENT LEVEL: TRANSFERRING, MLM
Please review recent MR of Prostate results and advise.    Florence Castro RN  [facility] T[facility]     (1) assistive equipment

## 2018-07-09 ENCOUNTER — INPATIENT (INPATIENT)
Facility: HOSPITAL | Age: 83
LOS: 9 days | Discharge: HOME | DRG: 377 | End: 2018-07-19
Attending: FAMILY MEDICINE | Admitting: FAMILY MEDICINE
Payer: MEDICARE

## 2018-07-09 VITALS
OXYGEN SATURATION: 98 % | WEIGHT: 108.03 LBS | RESPIRATION RATE: 19 BRPM | HEART RATE: 82 BPM | TEMPERATURE: 98 F | HEIGHT: 59 IN | SYSTOLIC BLOOD PRESSURE: 102 MMHG | DIASTOLIC BLOOD PRESSURE: 62 MMHG

## 2018-07-09 DIAGNOSIS — Z95.0 PRESENCE OF CARDIAC PACEMAKER: Chronic | ICD-10-CM

## 2018-07-09 DIAGNOSIS — Z90.721 ACQUIRED ABSENCE OF OVARIES, UNILATERAL: Chronic | ICD-10-CM

## 2018-07-09 LAB
ALBUMIN SERPL ELPH-MCNC: 2.9 G/DL — LOW (ref 3.5–5)
ALP SERPL-CCNC: 77 U/L — SIGNIFICANT CHANGE UP (ref 40–120)
ALT FLD-CCNC: 51 U/L DA — SIGNIFICANT CHANGE UP (ref 10–60)
ANION GAP SERPL CALC-SCNC: 9 MMOL/L — SIGNIFICANT CHANGE UP (ref 5–17)
APTT BLD: 55 SEC — HIGH (ref 27.5–37.4)
AST SERPL-CCNC: 60 U/L — HIGH (ref 10–40)
BASE EXCESS BLDV CALC-SCNC: -1.5 MMOL/L — SIGNIFICANT CHANGE UP (ref -2–2)
BASOPHILS # BLD AUTO: 0.1 K/UL — SIGNIFICANT CHANGE UP (ref 0–0.2)
BASOPHILS NFR BLD AUTO: 1.5 % — SIGNIFICANT CHANGE UP (ref 0–2)
BILIRUB SERPL-MCNC: 0.3 MG/DL — SIGNIFICANT CHANGE UP (ref 0.2–1.2)
BUN SERPL-MCNC: 25 MG/DL — HIGH (ref 7–18)
CALCIUM SERPL-MCNC: 9 MG/DL — SIGNIFICANT CHANGE UP (ref 8.4–10.5)
CHLORIDE SERPL-SCNC: 105 MMOL/L — SIGNIFICANT CHANGE UP (ref 96–108)
CO2 SERPL-SCNC: 24 MMOL/L — SIGNIFICANT CHANGE UP (ref 22–31)
CREAT SERPL-MCNC: 1.15 MG/DL — SIGNIFICANT CHANGE UP (ref 0.5–1.3)
EOSINOPHIL # BLD AUTO: 0.2 K/UL — SIGNIFICANT CHANGE UP (ref 0–0.5)
EOSINOPHIL NFR BLD AUTO: 2.4 % — SIGNIFICANT CHANGE UP (ref 0–6)
GLUCOSE SERPL-MCNC: 222 MG/DL — HIGH (ref 70–99)
HCO3 BLDV-SCNC: 23 MMOL/L — SIGNIFICANT CHANGE UP (ref 21–29)
HCT VFR BLD CALC: 22.6 % — LOW (ref 34.5–45)
HGB BLD-MCNC: 7 G/DL — CRITICAL LOW (ref 11.5–15.5)
HOROWITZ INDEX BLDV+IHG-RTO: 21 — SIGNIFICANT CHANGE UP
INR BLD: 1.47 RATIO — HIGH (ref 0.88–1.16)
LYMPHOCYTES # BLD AUTO: 2.9 K/UL — SIGNIFICANT CHANGE UP (ref 1–3.3)
LYMPHOCYTES # BLD AUTO: 45.4 % — HIGH (ref 13–44)
MCHC RBC-ENTMCNC: 31.1 PG — SIGNIFICANT CHANGE UP (ref 27–34)
MCHC RBC-ENTMCNC: 31.2 GM/DL — LOW (ref 32–36)
MCV RBC AUTO: 99.8 FL — SIGNIFICANT CHANGE UP (ref 80–100)
MONOCYTES # BLD AUTO: 0.6 K/UL — SIGNIFICANT CHANGE UP (ref 0–0.9)
MONOCYTES NFR BLD AUTO: 9.8 % — SIGNIFICANT CHANGE UP (ref 2–14)
NEUTROPHILS # BLD AUTO: 2.6 K/UL — SIGNIFICANT CHANGE UP (ref 1.8–7.4)
NEUTROPHILS NFR BLD AUTO: 40.9 % — LOW (ref 43–77)
PCO2 BLDV: 40 MMHG — SIGNIFICANT CHANGE UP (ref 35–50)
PH BLDV: 7.37 — SIGNIFICANT CHANGE UP (ref 7.35–7.45)
PLATELET # BLD AUTO: 75 K/UL — LOW (ref 150–400)
PO2 BLDV: <44 MMHG — SIGNIFICANT CHANGE UP (ref 25–45)
POTASSIUM SERPL-MCNC: 4 MMOL/L — SIGNIFICANT CHANGE UP (ref 3.5–5.3)
POTASSIUM SERPL-SCNC: 4 MMOL/L — SIGNIFICANT CHANGE UP (ref 3.5–5.3)
PROT SERPL-MCNC: 7 G/DL — SIGNIFICANT CHANGE UP (ref 6–8.3)
PROTHROM AB SERPL-ACNC: 16.1 SEC — HIGH (ref 9.8–12.7)
RBC # BLD: 2.27 M/UL — LOW (ref 3.8–5.2)
RBC # FLD: 15.7 % — HIGH (ref 10.3–14.5)
SAO2 % BLDV: 18 % — LOW (ref 67–88)
SODIUM SERPL-SCNC: 138 MMOL/L — SIGNIFICANT CHANGE UP (ref 135–145)
WBC # BLD: 6.3 K/UL — SIGNIFICANT CHANGE UP (ref 3.8–10.5)
WBC # FLD AUTO: 6.3 K/UL — SIGNIFICANT CHANGE UP (ref 3.8–10.5)

## 2018-07-09 RX ORDER — PANTOPRAZOLE SODIUM 20 MG/1
80 TABLET, DELAYED RELEASE ORAL ONCE
Qty: 0 | Refills: 0 | Status: COMPLETED | OUTPATIENT
Start: 2018-07-09 | End: 2018-07-09

## 2018-07-09 RX ADMIN — PANTOPRAZOLE SODIUM 80 MILLIGRAM(S): 20 TABLET, DELAYED RELEASE ORAL at 23:44

## 2018-07-09 NOTE — ED ADULT NURSE NOTE - OBJECTIVE STATEMENT
pt is A&Ox3, ambulatory, able to make needs known and presents with C/O HTN and elevated B/S. PT denies any other complaints at time of record

## 2018-07-09 NOTE — ED PROVIDER NOTE - MEDICAL DECISION MAKING DETAILS
88 y/o F pt presents with black tarry stool and worsening SOB. Suspect upper GI bleed causing anemia. Will check H&H, stool Guaiac, basic blood work, and reassessment. If this is a bleed, it has been going on for several days and is likely not acute.

## 2018-07-09 NOTE — ED PROVIDER NOTE - OBJECTIVE STATEMENT
86 y/o F pt with PMHx of Constipation, DM, Glaucoma, Grave's Disease, HTN, Pacemaker, and Vertigo and PSHx of Oophorectomy and Artificial Pacemaker Placement presents to ED c/o black tarry stool for several days. Pt also reports SOB that is worse with exertion and occasionally associated with chest discomfort. Pt states that she now gets SOB after walking x5 feet whereas before, pt was able to walk across a room with no problems. Per pt, pt had an appointment with her PMD in x3 days, however given pt's hypoglycemia, pt visited ED for evaluation. Pt denies nausea, vomiting, diaphoresis, pleuritic CP, or any other complaints. Pt also denies currently being on blood thinners daily. NKDA.

## 2018-07-10 DIAGNOSIS — K92.2 GASTROINTESTINAL HEMORRHAGE, UNSPECIFIED: ICD-10-CM

## 2018-07-10 DIAGNOSIS — I10 ESSENTIAL (PRIMARY) HYPERTENSION: ICD-10-CM

## 2018-07-10 DIAGNOSIS — I46.9 CARDIAC ARREST, CAUSE UNSPECIFIED: ICD-10-CM

## 2018-07-10 DIAGNOSIS — Z29.9 ENCOUNTER FOR PROPHYLACTIC MEASURES, UNSPECIFIED: ICD-10-CM

## 2018-07-10 DIAGNOSIS — I50.9 HEART FAILURE, UNSPECIFIED: ICD-10-CM

## 2018-07-10 DIAGNOSIS — K92.1 MELENA: ICD-10-CM

## 2018-07-10 DIAGNOSIS — E11.9 TYPE 2 DIABETES MELLITUS WITHOUT COMPLICATIONS: ICD-10-CM

## 2018-07-10 LAB
24R-OH-CALCIDIOL SERPL-MCNC: 69.3 NG/ML — SIGNIFICANT CHANGE UP (ref 30–80)
ABO RH CONFIRMATION: SIGNIFICANT CHANGE UP
ANION GAP SERPL CALC-SCNC: 11 MMOL/L — SIGNIFICANT CHANGE UP (ref 5–17)
ANION GAP SERPL CALC-SCNC: 8 MMOL/L — SIGNIFICANT CHANGE UP (ref 5–17)
APPEARANCE UR: CLEAR — SIGNIFICANT CHANGE UP
BACTERIA # UR AUTO: ABNORMAL /HPF
BILIRUB UR-MCNC: NEGATIVE — SIGNIFICANT CHANGE UP
BUN SERPL-MCNC: 22 MG/DL — HIGH (ref 7–18)
BUN SERPL-MCNC: 24 MG/DL — HIGH (ref 7–18)
CALCIUM SERPL-MCNC: 8.4 MG/DL — SIGNIFICANT CHANGE UP (ref 8.4–10.5)
CALCIUM SERPL-MCNC: 8.8 MG/DL — SIGNIFICANT CHANGE UP (ref 8.4–10.5)
CHLORIDE SERPL-SCNC: 108 MMOL/L — SIGNIFICANT CHANGE UP (ref 96–108)
CHLORIDE SERPL-SCNC: 109 MMOL/L — HIGH (ref 96–108)
CHLORIDE UR-SCNC: 92 MMOL/L — SIGNIFICANT CHANGE UP (ref 55–125)
CHOLEST SERPL-MCNC: 108 MG/DL — SIGNIFICANT CHANGE UP (ref 10–199)
CK MB BLD-MCNC: 2 % — SIGNIFICANT CHANGE UP (ref 0–3.5)
CK MB BLD-MCNC: 2.4 % — SIGNIFICANT CHANGE UP (ref 0–3.5)
CK MB BLD-MCNC: 2.4 % — SIGNIFICANT CHANGE UP (ref 0–3.5)
CK MB CFR SERPL CALC: 2.4 NG/ML — SIGNIFICANT CHANGE UP (ref 0–3.6)
CK MB CFR SERPL CALC: 3.6 NG/ML — SIGNIFICANT CHANGE UP (ref 0–3.6)
CK MB CFR SERPL CALC: 3.8 NG/ML — HIGH (ref 0–3.6)
CK SERPL-CCNC: 119 U/L — SIGNIFICANT CHANGE UP (ref 21–215)
CK SERPL-CCNC: 148 U/L — SIGNIFICANT CHANGE UP (ref 21–215)
CK SERPL-CCNC: 157 U/L — SIGNIFICANT CHANGE UP (ref 21–215)
CO2 SERPL-SCNC: 21 MMOL/L — LOW (ref 22–31)
CO2 SERPL-SCNC: 24 MMOL/L — SIGNIFICANT CHANGE UP (ref 22–31)
COLOR SPEC: YELLOW — SIGNIFICANT CHANGE UP
CREAT ?TM UR-MCNC: <13 MG/DL — SIGNIFICANT CHANGE UP
CREAT SERPL-MCNC: 1.19 MG/DL — SIGNIFICANT CHANGE UP (ref 0.5–1.3)
CREAT SERPL-MCNC: 1.5 MG/DL — HIGH (ref 0.5–1.3)
DIFF PNL FLD: ABNORMAL
EPI CELLS # UR: SIGNIFICANT CHANGE UP /HPF
FERRITIN SERPL-MCNC: 64 NG/ML — SIGNIFICANT CHANGE UP (ref 15–150)
FOLATE SERPL-MCNC: 15.2 NG/ML — SIGNIFICANT CHANGE UP
GLUCOSE BLDC GLUCOMTR-MCNC: 138 MG/DL — HIGH (ref 70–99)
GLUCOSE BLDC GLUCOMTR-MCNC: 262 MG/DL — HIGH (ref 70–99)
GLUCOSE BLDC GLUCOMTR-MCNC: 300 MG/DL — HIGH (ref 70–99)
GLUCOSE SERPL-MCNC: 235 MG/DL — HIGH (ref 70–99)
GLUCOSE SERPL-MCNC: 284 MG/DL — HIGH (ref 70–99)
GLUCOSE UR QL: 250
HCT VFR BLD CALC: 26 % — LOW (ref 34.5–45)
HDLC SERPL-MCNC: 38 MG/DL — LOW (ref 40–125)
HGB BLD-MCNC: 8.2 G/DL — LOW (ref 11.5–15.5)
IRON SATN MFR SERPL: 24 % — SIGNIFICANT CHANGE UP (ref 15–50)
IRON SATN MFR SERPL: 92 UG/DL — SIGNIFICANT CHANGE UP (ref 40–150)
KETONES UR-MCNC: NEGATIVE — SIGNIFICANT CHANGE UP
LEUKOCYTE ESTERASE UR-ACNC: ABNORMAL
LIPID PNL WITH DIRECT LDL SERPL: 46 MG/DL — SIGNIFICANT CHANGE UP
MCHC RBC-ENTMCNC: 30 PG — SIGNIFICANT CHANGE UP (ref 27–34)
MCHC RBC-ENTMCNC: 31.5 GM/DL — LOW (ref 32–36)
MCV RBC AUTO: 95.2 FL — SIGNIFICANT CHANGE UP (ref 80–100)
NITRITE UR-MCNC: NEGATIVE — SIGNIFICANT CHANGE UP
NT-PROBNP SERPL-SCNC: 1030 PG/ML — HIGH (ref 0–450)
NT-PROBNP SERPL-SCNC: 1037 PG/ML — HIGH (ref 0–450)
OB PNL STL: POSITIVE
OSMOLALITY UR: 247 MOS/KG — SIGNIFICANT CHANGE UP (ref 50–1200)
PH UR: 5 — SIGNIFICANT CHANGE UP (ref 5–8)
PHOSPHATE SERPL-MCNC: 2.9 MG/DL — SIGNIFICANT CHANGE UP (ref 2.5–4.5)
PLATELET # BLD AUTO: 64 K/UL — LOW (ref 150–400)
POTASSIUM SERPL-MCNC: 3.8 MMOL/L — SIGNIFICANT CHANGE UP (ref 3.5–5.3)
POTASSIUM SERPL-MCNC: 4.2 MMOL/L — SIGNIFICANT CHANGE UP (ref 3.5–5.3)
POTASSIUM SERPL-SCNC: 3.8 MMOL/L — SIGNIFICANT CHANGE UP (ref 3.5–5.3)
POTASSIUM SERPL-SCNC: 4.2 MMOL/L — SIGNIFICANT CHANGE UP (ref 3.5–5.3)
PROT ?TM UR-MCNC: 23 MG/DL — HIGH (ref 0–12)
PROT UR-MCNC: 30 MG/DL
RBC # BLD: 2.74 M/UL — LOW (ref 3.8–5.2)
RBC # FLD: 16.8 % — HIGH (ref 10.3–14.5)
RBC CASTS # UR COMP ASSIST: ABNORMAL /HPF (ref 0–2)
SODIUM SERPL-SCNC: 140 MMOL/L — SIGNIFICANT CHANGE UP (ref 135–145)
SODIUM SERPL-SCNC: 141 MMOL/L — SIGNIFICANT CHANGE UP (ref 135–145)
SODIUM UR-SCNC: 89 MMOL/L — SIGNIFICANT CHANGE UP (ref 40–220)
SP GR SPEC: 1.01 — SIGNIFICANT CHANGE UP (ref 1.01–1.02)
TIBC SERPL-MCNC: 389 UG/DL — SIGNIFICANT CHANGE UP (ref 250–450)
TOTAL CHOLESTEROL/HDL RATIO MEASUREMENT: 2.8 RATIO — LOW (ref 3.3–7.1)
TRIGL SERPL-MCNC: 121 MG/DL — SIGNIFICANT CHANGE UP (ref 10–149)
TROPONIN I SERPL-MCNC: <0.015 NG/ML — SIGNIFICANT CHANGE UP (ref 0–0.04)
TSH SERPL-MCNC: 2.16 UU/ML — SIGNIFICANT CHANGE UP (ref 0.34–4.82)
UIBC SERPL-MCNC: 297 UG/DL — SIGNIFICANT CHANGE UP (ref 110–370)
UROBILINOGEN FLD QL: NEGATIVE — SIGNIFICANT CHANGE UP
VIT B12 SERPL-MCNC: 903 PG/ML — SIGNIFICANT CHANGE UP (ref 232–1245)
WBC # BLD: 5.3 K/UL — SIGNIFICANT CHANGE UP (ref 3.8–10.5)
WBC # FLD AUTO: 5.3 K/UL — SIGNIFICANT CHANGE UP (ref 3.8–10.5)
WBC UR QL: SIGNIFICANT CHANGE UP /HPF (ref 0–5)

## 2018-07-10 PROCEDURE — 99285 EMERGENCY DEPT VISIT HI MDM: CPT | Mod: 25

## 2018-07-10 RX ORDER — DABIGATRAN ETEXILATE MESYLATE 150 MG/1
75 CAPSULE ORAL EVERY 12 HOURS
Qty: 0 | Refills: 0 | Status: DISCONTINUED | OUTPATIENT
Start: 2018-07-10 | End: 2018-07-10

## 2018-07-10 RX ORDER — LATANOPROST 0.05 MG/ML
1 SOLUTION/ DROPS OPHTHALMIC; TOPICAL AT BEDTIME
Qty: 0 | Refills: 0 | Status: DISCONTINUED | OUTPATIENT
Start: 2018-07-10 | End: 2018-07-19

## 2018-07-10 RX ORDER — SODIUM CHLORIDE 9 MG/ML
1000 INJECTION, SOLUTION INTRAVENOUS
Qty: 0 | Refills: 0 | Status: DISCONTINUED | OUTPATIENT
Start: 2018-07-10 | End: 2018-07-11

## 2018-07-10 RX ORDER — SODIUM CHLORIDE 9 MG/ML
1000 INJECTION INTRAMUSCULAR; INTRAVENOUS; SUBCUTANEOUS
Qty: 0 | Refills: 0 | Status: DISCONTINUED | OUTPATIENT
Start: 2018-07-10 | End: 2018-07-10

## 2018-07-10 RX ORDER — DABIGATRAN ETEXILATE MESYLATE 150 MG/1
0 CAPSULE ORAL
Qty: 0 | Refills: 0 | COMMUNITY

## 2018-07-10 RX ORDER — ERGOCALCIFEROL 1.25 MG/1
0 CAPSULE ORAL
Qty: 0 | Refills: 0 | COMMUNITY

## 2018-07-10 RX ORDER — DEXTROSE 50 % IN WATER 50 %
15 SYRINGE (ML) INTRAVENOUS ONCE
Qty: 0 | Refills: 0 | Status: DISCONTINUED | OUTPATIENT
Start: 2018-07-10 | End: 2018-07-11

## 2018-07-10 RX ORDER — ERGOCALCIFEROL 1.25 MG/1
50000 CAPSULE ORAL
Qty: 0 | Refills: 0 | Status: DISCONTINUED | OUTPATIENT
Start: 2018-07-10 | End: 2018-07-14

## 2018-07-10 RX ORDER — AMLODIPINE BESYLATE 2.5 MG/1
0 TABLET ORAL
Qty: 0 | Refills: 0 | COMMUNITY

## 2018-07-10 RX ORDER — GLUCAGON INJECTION, SOLUTION 0.5 MG/.1ML
1 INJECTION, SOLUTION SUBCUTANEOUS ONCE
Qty: 0 | Refills: 0 | Status: DISCONTINUED | OUTPATIENT
Start: 2018-07-10 | End: 2018-07-11

## 2018-07-10 RX ORDER — METOPROLOL TARTRATE 50 MG
0 TABLET ORAL
Qty: 0 | Refills: 0 | COMMUNITY

## 2018-07-10 RX ORDER — INSULIN LISPRO 100/ML
VIAL (ML) SUBCUTANEOUS
Qty: 0 | Refills: 0 | Status: DISCONTINUED | OUTPATIENT
Start: 2018-07-10 | End: 2018-07-15

## 2018-07-10 RX ORDER — AMLODIPINE BESYLATE 2.5 MG/1
5 TABLET ORAL DAILY
Qty: 0 | Refills: 0 | Status: DISCONTINUED | OUTPATIENT
Start: 2018-07-10 | End: 2018-07-12

## 2018-07-10 RX ORDER — DEXTROSE 50 % IN WATER 50 %
25 SYRINGE (ML) INTRAVENOUS ONCE
Qty: 0 | Refills: 0 | Status: DISCONTINUED | OUTPATIENT
Start: 2018-07-10 | End: 2018-07-11

## 2018-07-10 RX ORDER — PANTOPRAZOLE SODIUM 20 MG/1
40 TABLET, DELAYED RELEASE ORAL
Qty: 0 | Refills: 0 | Status: DISCONTINUED | OUTPATIENT
Start: 2018-07-10 | End: 2018-07-19

## 2018-07-10 RX ORDER — ASPIRIN/CALCIUM CARB/MAGNESIUM 324 MG
81 TABLET ORAL DAILY
Qty: 0 | Refills: 0 | Status: DISCONTINUED | OUTPATIENT
Start: 2018-07-10 | End: 2018-07-10

## 2018-07-10 RX ORDER — DEXTROSE 50 % IN WATER 50 %
12.5 SYRINGE (ML) INTRAVENOUS ONCE
Qty: 0 | Refills: 0 | Status: DISCONTINUED | OUTPATIENT
Start: 2018-07-10 | End: 2018-07-11

## 2018-07-10 RX ADMIN — SODIUM CHLORIDE 60 MILLILITER(S): 9 INJECTION, SOLUTION INTRAVENOUS at 12:02

## 2018-07-10 RX ADMIN — SODIUM CHLORIDE 60 MILLILITER(S): 9 INJECTION, SOLUTION INTRAVENOUS at 22:54

## 2018-07-10 RX ADMIN — Medication 3: at 18:02

## 2018-07-10 RX ADMIN — LATANOPROST 1 DROP(S): 0.05 SOLUTION/ DROPS OPHTHALMIC; TOPICAL at 22:55

## 2018-07-10 RX ADMIN — PANTOPRAZOLE SODIUM 40 MILLIGRAM(S): 20 TABLET, DELAYED RELEASE ORAL at 06:12

## 2018-07-10 RX ADMIN — ERGOCALCIFEROL 50000 UNIT(S): 1.25 CAPSULE ORAL at 11:56

## 2018-07-10 RX ADMIN — PANTOPRAZOLE SODIUM 40 MILLIGRAM(S): 20 TABLET, DELAYED RELEASE ORAL at 18:01

## 2018-07-10 NOTE — H&P ADULT - PROBLEM SELECTOR PLAN 5
hx of cardiac arrest in 2011 ,s/p medtronic PPM, Cardiologist Dr. Rosado  pt on Pradaxa 75mg bid and asa 81mg daily at home   held AC due to UGI

## 2018-07-10 NOTE — H&P ADULT - PROBLEM SELECTOR PLAN 4
pt on glimepiride 4mg daily at home  hold glimepiride now  f/u A1C   keep NPO, on IVF   monitor FS q6hrs

## 2018-07-10 NOTE — H&P ADULT - PROBLEM SELECTOR PLAN 2
hx CHF, no SOB currently and no LE edema  TTE on 01/2017 show severe diastolic dysfunction, EF 68% and NST last yr normal  f/u TTE

## 2018-07-10 NOTE — H&P ADULT - PROBLEM SELECTOR PLAN 3
pt on metoprolol 200mg daily and amlodipine 5mg , lisinopril 5mg daily at home  /62 mmHg in ER  hold all BP meds for now  monitor BP closely

## 2018-07-10 NOTE — H&P ADULT - PROBLEM SELECTOR PLAN 6
IMPROVE VTE Individual Risk Assessment    RISK                                                          Points  [] Previous VTE                                           3  [] Thrombophilia                                        2  [] Lower limb paralysis                              2   [] Current Cancer                                       2   [X] Immobilization > 24 hrs                        1  [] ICU/CCU stay > 24 hours                       1  [X] Age > 60                                                   1    IMPROVE VTE Score: 2  need DVT ppx, hold AC due to UGI  need for GI ppx, on protonix IV

## 2018-07-10 NOTE — H&P ADULT - HISTORY OF PRESENT ILLNESS
Patient is 86 y/o F from home, lives with son, ambulate with cane, with PMHx of  DM, CHF, Glaucoma, grave's disease, HTN, cardiac arrest (s/p medtronic PPM, Cardiologist Dr. Rosado) and Vertigo and PSHx of Oophorectomy presents to ED c/o black tarry stool for 10 days. Pt also had associated SOB, worse with exertion and occasionally associated with chest discomfort. Pt states that she now gets SOB after walking x5 feet whereas before, pt was able to walk across a room with no problems. Pt had chronic headache and on Advil/ Aleve/ tylenol PRN for at last 3 yrs. Pt last EGD and colonoscopy was 30 yrs ago. Pt had TTE on 01/2017 show severe diastolic dysfunction and EF 68%, NST last yr normal.  Pt denies fever, chills, chest pain, abd pain,  nausea, vomiting, diarrhea, constipation, diaphoresis or any other complaints.     ED course, pt VS temp 98.4, HR 82, /62, RR 19 and SO2 98%, Labs shows Hb 7.0, FOBT positve, UA negative,  s/p 1 PRBC and protonix 80mg IV x1 in ED. Patient is 88 y/o F from home, lives with son, ambulate with cane, with PMHx of  DM, CHF, Glaucoma, grave's disease, HTN, cardiac arrest (s/p medtronic PPM, Cardiologist Dr. Rosado) and Vertigo and PSHx of Oophorectomy presents to ED c/o black tarry stool for 10 days. Pt also had associated SOB, worse with exertion and occasionally associated with chest discomfort. Pt states that she now gets SOB after walking x5 feet whereas before, pt was able to walk across a room with no problems. Pt had chronic headache and on Advil/ Aleve/ tylenol PRN for at last 3 yrs. Pt last EGD and colonoscopy was 30 yrs ago. Pt had TTE on 01/2017 show severe diastolic dysfunction and EF 68%, NST last yr normal.  Pt denies fever, chills, chest pain, abd pain,  nausea, vomiting, diarrhea, constipation, diaphoresis or any other complaints.     ED course, pt VS temp 98.4, HR 82, /62, RR 19 and SO2 98%, Labs shows Hb 7.0,(baseline Hb 12 ),  FOBT positve, UA negative,  s/p 1 PRBC and protonix 80mg IV x1 in ED.

## 2018-07-10 NOTE — H&P ADULT - NSHPLABSRESULTS_GEN_ALL_CORE
Comprehensive Metabolic Panel (07.09.18 @ 22:46)    Sodium, Serum: 138 mmol/L    Potassium, Serum: 4.0 mmol/L    Chloride, Serum: 105 mmol/L    Carbon Dioxide, Serum: 24 mmol/L    Anion Gap, Serum: 9 mmol/L    Blood Urea Nitrogen, Serum: 25 mg/dL    Creatinine, Serum: 1.15 mg/dL    Glucose, Serum: 222 mg/dL    Calcium, Total Serum: 9.0 mg/dL    Protein Total, Serum: 7.0 g/dL    Albumin, Serum: 2.9 g/dL    Bilirubin Total, Serum: 0.3 mg/dL    Alkaline Phosphatase, Serum: 77 U/L    Aspartate Aminotransferase (AST/SGOT): 60 U/L    Alanine Aminotransferase (ALT/SGPT): 51 U/L DA    eGFR if Non : 43: Interpretative comment  The units for eGFR are ml/min/1.73m2 (normalized body surface area). The  eGFR is calculated from a serum creatinine using the CKD-EPI equation.  Other variables required for calculation are race, age and sex. Among  patients with chronic kidney disease (CKD), the eGFR is useful in  determining the stage of disease according to KDOQI CKD classification.  All eGFR results are reported numerically with the following  interpretation.          GFR                    With                 Without     (ml/min/1.73 m2)    Kidney Damage       Kidney Damage        >= 90                    Stage 1                     Normal        60-89                    Stage 2                     Decreased GFR        30-59     Stage 3                     Stage 3        15-29                    Stage 4                     Stage 4        < 15                      Stage 5                     Stage 5  Each stage of CKD assumes that the associated GFR level has been in  effect for at least 3 months. Determination of stages one and two (with  eGFR > 59 ml/min/m2) requires estimation of kidney damage for at least 3  months as defined by structural or functional abnormalities.  Limitations: All estimates of GFR will be less accurate for patients at  extremes of muscle mass (including but not limited to frail elderly,  critically ill, or cancer patients), those with unusual diets, and those  with conditions associated with reduced secretion or extrarenal  elimination of creatinine. The eGFR equation is not recommended for use  in patients with unstable creatinine levels. mL/min/1.73M2    eGFR if African American: 50 mL/min/1.73M2      Complete Blood Count + Automated Diff (07.09.18 @ 22:46)    WBC Count: 6.3 K/uL    RBC Count: 2.27 M/uL    Hemoglobin: 7.0: TYPE:(C=Critical, N=Notification, A=Abnormal) c  TESTS: _hgb  DATE/TIME CALLED: _07/09/18 22:59  CALLED TO: maria luisa lanza  READ BACK (2 Patient Identifiers)(Y/N): _y  READ BACK VALUES (Y/N): _y  CALLED BY: samantha07/09/18 22:59 g/dL    Hematocrit: 22.6 %    Mean Cell Volume: 99.8 fl    Mean Cell Hemoglobin: 31.1 pg    Mean Cell Hemoglobin Conc: 31.2 gm/dL    Red Cell Distrib Width: 15.7 %    Platelet Count - Automated: 75: Results verified by smear review. K/uL    Auto Neutrophil #: 2.6 K/uL    Auto Lymphocyte #: 2.9 K/uL    Auto Monocyte #: 0.6 K/uL    Auto Eosinophil #: 0.2 K/uL    Auto Basophil #: 0.1 K/uL    Auto Neutrophil %: 40.9: Differential percentages must be correlated with absolute numbers for  clinical significance. %    Auto Lymphocyte %: 45.4 %    Auto Monocyte %: 9.8 %    Auto Eosinophil %: 2.4 %    Auto Basophil %: 1.5 %

## 2018-07-10 NOTE — H&P ADULT - ASSESSMENT
Patient is 86 y/o F from home, lives with son, ambulate with cane, with PMHx of  DM, CHF, Glaucoma, grave's disease, HTN, cardiac arrest (s/p medtronic PPM, Cardiologist Dr. Rosado) and Vertigo and PSHx of Oophorectomy presents to ED c/o black tarry stool for 10 days. Pt also had associated SOB, worse with exertion and occasionally associated with chest discomfort. Pt states that she now gets SOB after walking x5 feet whereas before, pt was able to walk across a room with no problems. Pt had chronic headache and on Advil/ Aleve/ tylenol PRN for at last 3 yrs. Pt last EGD and colonoscopy was 30 yrs ago. Pt had TTE on 01/2017 show severe diastolic dysfunction and EF 68%, NST last yr normal.  Pt denies fever, chills, chest pain, abd pain,  nausea, vomiting, diarrhea, constipation, diaphoresis or any other complaints.     ED course, pt VS temp 98.4, HR 82, /62, RR 19 and SO2 98%, Labs shows Hb 7.0, FOBT positve, UA negative,  s/p 1 PRBC and protonix 80mg IV x1 in ED. Patient is 88 y/o F from home, lives with son, ambulate with cane, with PMHx of  DM, CHF, Glaucoma, grave's disease, HTN, cardiac arrest (s/p medtronic PPM, Cardiologist Dr. Rosado) and Vertigo and PSHx of Oophorectomy presents to ED c/o black tarry stool for 10 days, pt on NSAIDs for HA at home for 3 yrs, Pt also had associated SOB, worse with exertion and occasionally associated with chest discomfort. Hb 7.0, FOBT positve, UA negative,  s/p 1 PRBC and protonix 80mg IV x1 in ED.

## 2018-07-10 NOTE — ED ADULT NURSE REASSESSMENT NOTE - NS ED NURSE REASSESS COMMENT FT1
Received pt from ERIN Nicole, pt is observed laying in bed, breathing room air, in no distress at time of assessment. Pt is A&O x3, able to make needs known. Skin intact, Rt arm 20Ga intact. No pending meds to be given at this time. Pt to be NPO @ midnight. Report given to ERIN Leon for [address] 617B.
pt resting comfortably in bed absent any distress or C/O pain. safety measures in place, able to make needs known with care provided PRN S/P 1 unit of PRBCs with no adverse reactions.

## 2018-07-10 NOTE — H&P ADULT - FAMILY HISTORY
Father  Still living? Unknown  Family history of diabetes mellitus, Age at diagnosis: Age Unknown     Mother  Still living? Unknown  Family history of diabetes mellitus, Age at diagnosis: Age Unknown  Family history of hypertension, Age at diagnosis: Age Unknown

## 2018-07-10 NOTE — CONSULT NOTE ADULT - NEGATIVE ENMT SYMPTOMS
no gum bleeding/no throat pain/no dysphagia/no nose bleeds/no dry mouth/no ear pain/no hearing difficulty

## 2018-07-10 NOTE — CONSULT NOTE ADULT - SUBJECTIVE AND OBJECTIVE BOX
[  ] STAT REQUEST              [ X ]ROUTINE REQUEST    Patient is a 87 year old female presents with melena. GI consulted to evaluate.      HPI:  Patient is 87 year old female with multiple medical problems presented with a few days h/o black tarry stool associated with SOB.  Patient has been taking NSAID for headache for past 3 years.  Patient denies abdominal pain, hematemesis, hematochezia, fever, chills, chest pain, cough, palpitation, hematuria, dysuria or hemoptysis.        PAIN MANAGEMENT:  Pain Scale:                0 /10  Pain Location:      Prior Colonoscopy:  30 years ago    PAST MEDICAL HISTORY  Constipation  Glaucoma  Vertigo  Graves disease  Pacemaker  DM (diabetes mellitus)  HTN (hypertension)         PAST SURGICAL HISTORY  H/O oophorectomy  Artificial pacemaker         Allergies    No Known Allergies         MEDICATIONS  (STANDING):  amLODIPine   Tablet 5 milliGRAM(s) Oral daily  dextrose 5%. 1000 milliLiter(s) (60 mL/Hr) IV Continuous <Continuous>  dextrose 5%. 1000 milliLiter(s) (50 mL/Hr) IV Continuous <Continuous>  dextrose 50% Injectable 12.5 Gram(s) IV Push once  dextrose 50% Injectable 25 Gram(s) IV Push once  dextrose 50% Injectable 25 Gram(s) IV Push once  ergocalciferol 93591 Unit(s) Oral <User Schedule>  insulin lispro (HumaLOG) corrective regimen sliding scale   SubCutaneous three times a day before meals  latanoprost 0.005% Ophthalmic Solution 1 Drop(s) Both EYES at bedtime  pantoprazole  Injectable 40 milliGRAM(s) IV Push two times a day    MEDICATIONS  (PRN):  dextrose 40% Gel 15 Gram(s) Oral once PRN Blood Glucose LESS THAN 70 milliGRAM(s)/deciliter  glucagon  Injectable 1 milliGRAM(s) IntraMuscular once PRN Glucose LESS THAN 70 milligrams/deciliter      SOCIAL HISTORY  Advanced Directives:       [ X ] Full Code       [  ] DNR  Marital Status:         [ X ] M      [  ] S      [  ] D       [  ] W  Children:       [ X ] Yes      [  ] No  Occupation:        [  ] Employed       [ X ] Unemployed       [  ] Retired  Diet:       [ X ] Regular       [  ] PEG feeding          [  ] NG tube feeding  Drug Use:           [ X ] Patient denied          [  ] Yes  Alcohol:           [ X ] No             [  ] Yes (socially)         [  ] Yes (chronic)  Tobacco:           [  ] Yes           [ X ] No    FAMILY HISTORY  [ X ] Heart Disease            [  ] Diabetes             [  ] HTN             [  ] Colon Cancer             [  ] Stomach Cancer              [  ] Pancreatic Cancer    VITAL SIGNS   Vital Signs Last 24 Hrs  T(C): 37.1 (10 Jul 2018 12:30), Max: 37.3 (10 Jul 2018 07:32)  T(F): 98.7 (10 Jul 2018 12:30), Max: 99.1 (10 Jul 2018 07:32)  HR: 114 (10 Jul 2018 12:30) (63 - 116)  BP: 134/83 (10 Jul 2018 12:30) (102/62 - 134/83)   RR: 18 (10 Jul 2018 12:30) (16 - 20)  SpO2: 100% (10 Jul 2018 12:30) (95% - 100%)  Daily Height in cm: 149.86 (09 Jul 2018 20:27)            CBC Full  -  ( 10 Jul 2018 10:46 )  WBC Count : 5.3 K/uL  Hemoglobin : 8.2 g/dL  Hematocrit : 26.0 %  Platelet Count - Automated : 64 K/uL  Mean Cell Volume : 95.2 fl  Mean Cell Hemoglobin : 30.0 pg  Mean Cell Hemoglobin Concentration : 31.5 gm/dL       140  |  108  |  22<H>  ----------------------------<  235<H>  3.8   |  21<L>  |  1.19    Ca    8.8      10 Jul 2018 10:46  Phos  2.9     07-10    TPro  7.0  /  Alb  2.9<L>  /  TBili  0.3  /  DBili  x   /  AST  60<H>  /  ALT  51  /  AlkPhos  77  07-09    PT/INR - ( 09 Jul 2018 22:46 )   PT: 16.1 sec;   INR: 1.47 ratio       PTT - ( 09 Jul 2018 22:46 )  PTT:55.0 sec    Iron with Total Binding Capacity (07.10.18 @ 10:51)    Iron - Total Binding Capacity.: 389 ug/dL    % Saturation, Iron: 24 %    Iron Total, Serum: 92 ug/dL    Unsaturated Iron Binding Capacity: 297 ug/dL    Urinalysis + Microscopic Examination (07.10.18 @ 00:16)    Urine Appearance: Clear    Urobilinogen: Negative    Specific Gravity: 1.010    Protein, Urine: 30 mg/dL    pH Urine: 5.0    Leukocyte Esterase Concentration: Small    Nitrite: Negative    Ketone - Urine: Negative    Bilirubin: Negative    Color: Yellow    Glucose Qualitative, Urine: 250    Blood, Urine: Trace    Red Blood Cell - Urine: 2-5 /HPF    White Blood Cell - Urine: 3-5 /HPF    Epithelial Cells: Few /HPF    Bacteria: Trace /HPF    ECG  Ventricular Rate 61 BPM    Atrial Rate 61 BPM    P-R Interval 128 ms    QRS Duration 72 ms     ms    QTc 429 ms    P Axis 91 degrees    R Axis 22 degrees    T Axis 56 degrees    Diagnosis Line Electronic atrial pacemaker  Nonspecific ST and Twave abnormality  Abnormal ECG      RADIOLOGY/IMAGING      EXAM:  CT ANGIO CHEST (W)AW IC                            PROCEDURE DATE:  01/27/2017        INTERPRETATION:  CT ANGIOGRAM CHEST WITH CONTRAST    HISTORY: Chest pain Rule out PE, C/w SVT.    TECHNIQUE: CT pulmonary angiogram of the chest was performed after   intravenous contrast. Sagittal and coronal and MIP reformations were made.    COMPARISON: Chest x-ray 1/27/2017. No prior chest CT.    FINDINGS:  There is no luminal filling defect in the pulmonary arteries to the   segmental level to suggest thromboembolic disease. The pulmonary trunk is   enlarged, measuring 3.4 cm in diameter, which may be seen in pulmonary   arterial hypertension in the appropriate clinical setting.    Cardiomegaly. No pericardial effusion. Atherosclerotic calcifications of   the aorta, coronary arteries and mitral annulus. Left-sided dual-lead   pacemaker with leads in the right atrium and right ventricle.    No enlarged axillary or mediastinal adenopathy. Mildly enlarged bilateral   hilar lymph nodes are present measuring up to 1.1 cm in short axis on the   left.    Evaluation of the lungs is limited due to motion artifact.    There is bilateral lower lobe and lingular subsegmental atelectasis. No   focal lung consolidation. No pneumothorax or pleural effusion. Trachea   and central airways are patent.    Limited arterial phase images of the visualized upper abdomen demonstrate   a 4 mm right renal calculus.    Degenerative changes in the spine.    IMPRESSION:  No CT evidence of pulmonary embolism.    Enlarged main pulmonary trunk 3.4 cm, may be seen in pulmonary arterial   hypertension in the appropriate clinical setting.    4 mm right renal calculus.                EXAM:  CHEST SINGLE AP OR PA                            PROCEDURE DATE:  01/27/2017        INTERPRETATION:  CLINICAL STATEMENT: Chest Pain.    TECHNIQUE: AP view of the chest.    COMPARISON: 1/17/2017    FINDINGS/  IMPRESSION:  Left cardiac device.    No new consolidation or pleural effusion    Heart size cannot be accurately assessed in this projection.

## 2018-07-10 NOTE — H&P ADULT - PROBLEM SELECTOR PLAN 1
melena x 10 days, on NSAIDs for HA at home for 3 yrs  Hb 7.0 in ER, baseline Hb 12 on 01/2017  last EGD/[facility] >30 yrs ago  s/p 1 PRBC and protonix 80mg IV x1 in ED  c/w protonix 40mg IV BID   keep NPO , on IVF   GI consulted Dr. Celis melena x 10 days, on NSAIDs for HA at home for 3 yrs and pt on pradaxa 75mg bid and asa for prevent stroke as per cardiologist Dr. Rosado   held AC due to UGI bleed  Hb 7.0 in ER, baseline Hb 12 on 01/2017  last EGD/[facility] >30 yrs ago  s/p 1 PRBC and protonix 80mg IV x1 in ED  c/w protonix 40mg IV BID   keep NPO , on IVF   GI consulted Dr. Huffman

## 2018-07-10 NOTE — H&P ADULT - ATTENDING COMMENTS
Patient seen and examined. Case fully discussed with  ER attending and medical resident. Reviewed chief complaint. Reviewed review of systems. Reviewed list of medications.  Reviewed physical exam.  Reviewed assessment and plan. agree with full H and P. Will follow up clinically. Case fully discussed with the son. Will follow up with GI, Dr. Huffman and Endocrine, Dr. Sierra.  Follow up CBC.

## 2018-07-11 DIAGNOSIS — E05.00 THYROTOXICOSIS WITH DIFFUSE GOITER WITHOUT THYROTOXIC CRISIS OR STORM: ICD-10-CM

## 2018-07-11 DIAGNOSIS — K92.2 GASTROINTESTINAL HEMORRHAGE, UNSPECIFIED: ICD-10-CM

## 2018-07-11 LAB
ANION GAP SERPL CALC-SCNC: 8 MMOL/L — SIGNIFICANT CHANGE UP (ref 5–17)
BUN SERPL-MCNC: 11 MG/DL — SIGNIFICANT CHANGE UP (ref 7–18)
CALCIUM SERPL-MCNC: 8.8 MG/DL — SIGNIFICANT CHANGE UP (ref 8.4–10.5)
CEA SERPL-MCNC: 2.8 NG/ML — SIGNIFICANT CHANGE UP (ref 0–3.8)
CHLORIDE SERPL-SCNC: 109 MMOL/L — HIGH (ref 96–108)
CO2 SERPL-SCNC: 24 MMOL/L — SIGNIFICANT CHANGE UP (ref 22–31)
CREAT SERPL-MCNC: 0.87 MG/DL — SIGNIFICANT CHANGE UP (ref 0.5–1.3)
GLUCOSE BLDC GLUCOMTR-MCNC: 197 MG/DL — HIGH (ref 70–99)
GLUCOSE BLDC GLUCOMTR-MCNC: 203 MG/DL — HIGH (ref 70–99)
GLUCOSE BLDC GLUCOMTR-MCNC: 210 MG/DL — HIGH (ref 70–99)
GLUCOSE BLDC GLUCOMTR-MCNC: 250 MG/DL — HIGH (ref 70–99)
GLUCOSE SERPL-MCNC: 192 MG/DL — HIGH (ref 70–99)
HBA1C BLD-MCNC: 8 % — HIGH (ref 4–5.6)
HCT VFR BLD CALC: 26.7 % — LOW (ref 34.5–45)
HGB BLD-MCNC: 8.2 G/DL — LOW (ref 11.5–15.5)
MCHC RBC-ENTMCNC: 29.3 PG — SIGNIFICANT CHANGE UP (ref 27–34)
MCHC RBC-ENTMCNC: 30.7 GM/DL — LOW (ref 32–36)
MCV RBC AUTO: 95.4 FL — SIGNIFICANT CHANGE UP (ref 80–100)
PLATELET # BLD AUTO: 62 K/UL — LOW (ref 150–400)
POTASSIUM SERPL-MCNC: 4.2 MMOL/L — SIGNIFICANT CHANGE UP (ref 3.5–5.3)
POTASSIUM SERPL-SCNC: 4.2 MMOL/L — SIGNIFICANT CHANGE UP (ref 3.5–5.3)
RBC # BLD: 2.8 M/UL — LOW (ref 3.8–5.2)
RBC # FLD: 17 % — HIGH (ref 10.3–14.5)
SODIUM SERPL-SCNC: 141 MMOL/L — SIGNIFICANT CHANGE UP (ref 135–145)
UUN UR-MCNC: 149 MG/DL — SIGNIFICANT CHANGE UP
WBC # BLD: 4.8 K/UL — SIGNIFICANT CHANGE UP (ref 3.8–10.5)
WBC # FLD AUTO: 4.8 K/UL — SIGNIFICANT CHANGE UP (ref 3.8–10.5)

## 2018-07-11 RX ORDER — SODIUM CHLORIDE 9 MG/ML
1000 INJECTION, SOLUTION INTRAVENOUS
Qty: 0 | Refills: 0 | Status: DISCONTINUED | OUTPATIENT
Start: 2018-07-11 | End: 2018-07-12

## 2018-07-11 RX ORDER — DIGOXIN 250 MCG
0.5 TABLET ORAL ONCE
Qty: 0 | Refills: 0 | Status: COMPLETED | OUTPATIENT
Start: 2018-07-11 | End: 2018-07-11

## 2018-07-11 RX ORDER — INSULIN GLARGINE 100 [IU]/ML
8 INJECTION, SOLUTION SUBCUTANEOUS AT BEDTIME
Qty: 0 | Refills: 0 | Status: DISCONTINUED | OUTPATIENT
Start: 2018-07-11 | End: 2018-07-18

## 2018-07-11 RX ORDER — METOPROLOL TARTRATE 50 MG
50 TABLET ORAL EVERY 8 HOURS
Qty: 0 | Refills: 0 | Status: DISCONTINUED | OUTPATIENT
Start: 2018-07-11 | End: 2018-07-12

## 2018-07-11 RX ORDER — METOPROLOL TARTRATE 50 MG
200 TABLET ORAL DAILY
Qty: 0 | Refills: 0 | Status: DISCONTINUED | OUTPATIENT
Start: 2018-07-11 | End: 2018-07-11

## 2018-07-11 RX ADMIN — AMLODIPINE BESYLATE 5 MILLIGRAM(S): 2.5 TABLET ORAL at 05:09

## 2018-07-11 RX ADMIN — PANTOPRAZOLE SODIUM 40 MILLIGRAM(S): 20 TABLET, DELAYED RELEASE ORAL at 05:09

## 2018-07-11 RX ADMIN — INSULIN GLARGINE 8 UNIT(S): 100 INJECTION, SOLUTION SUBCUTANEOUS at 21:32

## 2018-07-11 RX ADMIN — Medication 0.5 MILLIGRAM(S): at 13:55

## 2018-07-11 RX ADMIN — LATANOPROST 1 DROP(S): 0.05 SOLUTION/ DROPS OPHTHALMIC; TOPICAL at 21:31

## 2018-07-11 RX ADMIN — Medication 50 MILLIGRAM(S): at 21:32

## 2018-07-11 RX ADMIN — Medication 200 MILLIGRAM(S): at 10:05

## 2018-07-11 RX ADMIN — PANTOPRAZOLE SODIUM 40 MILLIGRAM(S): 20 TABLET, DELAYED RELEASE ORAL at 17:28

## 2018-07-11 RX ADMIN — SODIUM CHLORIDE 75 MILLILITER(S): 9 INJECTION, SOLUTION INTRAVENOUS at 12:26

## 2018-07-11 NOTE — CONSULT NOTE ADULT - SUBJECTIVE AND OBJECTIVE BOX
CHIEF COMPLAINT:Patient is a 87y old  Female who presents with a chief complaint of melena.      HPI:  Patient is 86 y/o F from home, lives with son, ambulate with cane, with PMHx of  DM, CHF, Glaucoma, grave's disease, HTN, cardiac arrest (s/p medtronic PPM, Cardiologist Dr. Rosado) and Vertigo and PSHx of Oophorectomy presents to ED c/o black tarry stool for 10 days. Pt also had associated SOB, worse with exertion and occasionally associated with chest discomfort. Pt states that she now gets SOB after walking x5 feet whereas before, pt was able to walk across a room with no problems. Pt had chronic headache and on Advil/ Aleve/ tylenol PRN for at last 3 yrs. Pt last EGD and colonoscopy was 30 yrs ago. Pt had TTE on 01/2017 show severe diastolic dysfunction and EF 68%, NST last yr normal.  Pt denies fever, chills, chest pain, abd pain,  nausea, vomiting, diarrhea, constipation, diaphoresis or any other complaints.     ED course, pt VS temp 98.4, HR 82, /62, RR 19 and SO2 98%, Labs shows Hb 7.0,(baseline Hb 12 ),  FOBT positve, UA negative,  s/p 1 PRBC and protonix 80mg IV x1 in ED. (10 Jul 2018 01:52)      PAST MEDICAL & SURGICAL HISTORY:  Constipation  Glaucoma  Vertigo  Graves disease  Pacemaker  DM (diabetes mellitus)  HTN (hypertension)  H/O oophorectomy        MEDICATIONS  (STANDING):  amLODIPine   Tablet 5 milliGRAM(s) Oral daily  ergocalciferol 06284 Unit(s) Oral <User Schedule>  insulin glargine Injectable (LANTUS) 8 Unit(s) SubCutaneous at bedtime  insulin lispro (HumaLOG) corrective regimen sliding scale   SubCutaneous three times a day before meals  lactated ringers. 1000 milliLiter(s) (75 mL/Hr) IV Continuous <Continuous>  latanoprost 0.005% Ophthalmic Solution 1 Drop(s) Both EYES at bedtime  metoprolol succinate  milliGRAM(s) Oral daily  pantoprazole  Injectable 40 milliGRAM(s) IV Push two times a day    MEDICATIONS  (PRN):      FAMILY HISTORY:  Family history of hypertension (Mother)  Family history of diabetes mellitus (Father, Mother)      SOCIAL HISTORY:    [x ] Non-smoker    [x ] Alcohol-denies    Allergies    No Known Allergies    Intolerances    	    REVIEW OF SYSTEMS:  CONSTITUTIONAL: No fever, weight loss, or fatigue  EYES: No eye pain, visual disturbances, or discharge  ENT:  No difficulty hearing, tinnitus, vertigo; No sinus or throat pain  NECK: No pain or stiffness  RESPIRATORY: No cough, wheezing, chills or hemoptysis; No Shortness of Breath  CARDIOVASCULAR: No chest pain, palpitations, passing out, dizziness, or leg swelling  GASTROINTESTINAL: No abdominal or epigastric pain. No nausea, vomiting, or hematemesis; No diarrhea or constipation. No melena or hematochezia.  GENITOURINARY: No dysuria, frequency, hematuria, or incontinence  NEUROLOGICAL: No headaches, memory loss, loss of strength, numbness, or tremors  SKIN: No itching, burning, rashes, or lesions   LYMPH Nodes: No enlarged glands  ENDOCRINE: No heat or cold intolerance; No hair loss  MUSCULOSKELETAL: No joint pain or swelling; No muscle, back, or extremity pain  PSYCHIATRIC: No depression, anxiety, mood swings, or difficulty sleeping  HEME/LYMPH: No easy bruising, or bleeding gums  ALLERGY AND IMMUNOLOGIC: No hives or eczema	      PHYSICAL EXAM:  T(C): 36.8 (07-11-18 @ 05:21), Max: 37.1 (07-10-18 @ 20:51)  HR: 121 (07-11-18 @ 09:43) (111 - 121)  BP: 147/84 (07-11-18 @ 09:43) (137/75 - 151/83)  RR: 16 (07-11-18 @ 05:21) (16 - 18)  SpO2: 97% (07-11-18 @ 05:21) (96% - 100%)    I&O's Summary    10 Jul 2018 07:01  -  11 Jul 2018 07:00  --------------------------------------------------------  IN: 0 mL / OUT: 1 mL / NET: -1 mL        Appearance: Normal	  HEENT:   Normal oral mucosa, PERRL, EOMI	  Lymphatic: No lymphadenopathy  Cardiovascular: Normal S1 S2, No JVD, No murmurs, No edema  Respiratory: Lungs clear to auscultation	  Psychiatry: A & O x 3, Mood & affect appropriate  Gastrointestinal:  Soft, Non-tender, + BS	  Skin: No rashes, No ecchymoses, No cyanosis	  Neurologic: Non-focal  Extremities: Normal range of motion, No clubbing, cyanosis or edema  Vascular: Peripheral pulses palpable 2+ bilaterally      ECG:  afib with RVR, st-t depression	    	  LABS:	 	    CARDIAC MARKERS:  CARDIAC MARKERS ( 10 Jul 2018 10:45 )  <0.015 ng/mL / x     / 119 U/L / x     / 2.4 ng/mL  CARDIAC MARKERS ( 10 Jul 2018 03:49 )  <0.015 ng/mL / x     / 157 U/L / x     / 3.8 ng/mL  CARDIAC MARKERS ( 09 Jul 2018 22:46 )  <0.015 ng/mL / x     / 148 U/L / x     / 3.6 ng/mL                              8.2    4.8   )-----------( 62       ( 11 Jul 2018 05:53 )             26.7     07-11    141  |  109<H>  |  11  ----------------------------<  192<H>  4.2   |  24  |  0.87    Ca    8.8      11 Jul 2018 05:53  Phos  2.9     07-10    TPro  7.0  /  Alb  2.9<L>  /  TBili  0.3  /  DBili  x   /  AST  60<H>  /  ALT  51  /  AlkPhos  77  07-09    HgA1c: Hemoglobin A1C, Whole Blood: 8.0 % (07-11 @ 09:26)      OBSERVATIONS:  Mitral Valve: Mitral annular calcification. Mild mitral  regurgitation.  Aortic Root: Normal aortic root.  Aortic Valve: Calcified trileaflet aortic valve with normal  opening.  Left Atrium: Moderately dilated left atrium.  LA volume  index = 45 cc/m2.  Left Ventricle: Endocardium not well visualized; grossly  normal left ventricular systolic function. Normal left  ventricular internal dimensions and wall thicknesses.  Right Heart: Normal right atrium. Right ventricular  enlargement with normal RV systolic function (TAPSE 2.1 cm)    A device lead is visualized in the right heart. There is  severe tricuspid regurgitation. There is mild pulmonic  regurgitation.  Pericardium/PleuraNormal pericardium with no pericardial  effusion.  Hemodynamic: RA Pressure is 8 mm Hg. RV systolic pressure  is 63 mm Hg. Severe pulmonary hypertension.      Occult Blood, Feces (07.09.18 @ 23:44)    Occult Blood, Feces: Positive

## 2018-07-11 NOTE — PROGRESS NOTE ADULT - ASSESSMENT
Patient is 88 y/o F from home, lives with son, ambulate with cane, with PMHx of  DM, CHF, Glaucoma, grave's disease, HTN, cardiac arrest (s/p medtronic PPM, Cardiologist Dr. Rosado) and Vertigo and PSHx of Oophorectomy presents to ED c/o black tarry stool for 10 days, pt on NSAIDs for HA at home for 3 yrs, Pt also had associated SOB, worse with exertion and occasionally associated with chest discomfort. Hb 7.0, FOBT positve, UA negative,  s/p 1 PRBC and protonix 80mg IV x1 in ED.

## 2018-07-11 NOTE — CONSULT NOTE ADULT - PROBLEM SELECTOR RECOMMENDATION 9
un cont (hx of dm >25 yrs)  d/w pt and family need for insulin   start lantus 8 units   consider lantus and oral agents upon d/c  fsg ac and hs

## 2018-07-11 NOTE — PROGRESS NOTE ADULT - SUBJECTIVE AND OBJECTIVE BOX
CHIEF COMPLAINT:Patient is a 87y old  Female who presents with a chief complaint of melena (10 Jul 2018 01:52)    	  REVIEW OF SYSTEMS:  CONSTITUTIONAL: No fever, weight loss, or fatigue  EYES: No eye pain, visual disturbances, or discharge  ENMT:  No difficulty hearing, tinnitus, vertigo; No sinus or throat pain  NECK: No pain or stiffness  RESPIRATORY: No cough, wheezing, chills or hemoptysis; No Shortness of Breath  CARDIOVASCULAR: No chest pain, palpitations, passing out, dizziness, or leg swelling  GASTROINTESTINAL: No abdominal or epigastric pain. No nausea, vomiting, or hematemesis; No diarrhea or constipation. No melena or hematochezia.  GENITOURINARY: No dysuria, frequency, hematuria, or incontinence  NEUROLOGICAL: No headaches, memory loss, loss of strength, numbness, or tremors  SKIN: No itching, burning, rashes, or lesions   LYMPH Nodes: No enlarged glands  ENDOCRINE: No heat or cold intolerance; No hair loss  MUSCULOSKELETAL: No joint pain or swelling; No muscle, back, or extremity pain  PSYCHIATRIC: No depression, anxiety, mood swings, or difficulty sleeping  HEME/LYMPH: No easy bruising, or bleeding gums  ALLERY AND IMMUNOLOGIC: No hives or eczema	    [ ] All others negative	  [ ] Unable to obtain    PHYSICAL EXAM:  T(C): 37.2 (18 @ 20:45), Max: 37.2 (18 @ 20:45)  HR: 65 (18 @ 20:45) (65 - 121)  BP: 136/67 (18 @ 20:45) (131/83 - 147/84)  RR: 17 (18 @ 20:45) (16 - 18)  SpO2: 98% (18 @ 20:45) (97% - 100%)  Wt(kg): --  I&O's Summary    10 Jul 2018 07:01  -  2018 07:00  --------------------------------------------------------  IN: 0 mL / OUT: 1 mL / NET: -1 mL        Appearance: Normal	  HEENT:   Normal oral mucosa, PERRL, EOMI	  Lymphatic: No lymphadenopathy  Cardiovascular: Normal S1 S2, No JVD, No murmurs, No edema  Respiratory: Lungs clear to auscultation	  Psychiatry: A & O x 3, Mood & affect appropriate  Gastrointestinal:  Soft, Non-tender, + BS	  Skin: No rashes, No ecchymoses, No cyanosis	  Neurologic: Non-focal  Extremities: Normal range of motion, No clubbing, cyanosis or edema  Vascular: Peripheral pulses palpable 2+ bilaterally    MEDICATIONS  (STANDING):  amLODIPine   Tablet 5 milliGRAM(s) Oral daily  ergocalciferol 38877 Unit(s) Oral <User Schedule>  insulin glargine Injectable (LANTUS) 8 Unit(s) SubCutaneous at bedtime  insulin lispro (HumaLOG) corrective regimen sliding scale   SubCutaneous three times a day before meals  lactated ringers. 1000 milliLiter(s) (75 mL/Hr) IV Continuous <Continuous>  latanoprost 0.005% Ophthalmic Solution 1 Drop(s) Both EYES at bedtime  metoprolol tartrate 50 milliGRAM(s) Oral every 8 hours  pantoprazole  Injectable 40 milliGRAM(s) IV Push two times a day      TELEMETRY: 	    ECG:  	  RADIOLOGY:  OTHER: 	  	  CBC Full  -  ( 2018 05:53 )  WBC Count : 4.8 K/uL  Hemoglobin : 8.2 g/dL  Hematocrit : 26.7 %  Platelet Count - Automated : 62 K/uL  Mean Cell Volume : 95.4 fl  Mean Cell Hemoglobin : 29.3 pg  Mean Cell Hemoglobin Concentration : 30.7 gm/dL  Auto Neutrophil # : x  Auto Lymphocyte # : x  Auto Monocyte # : x  Auto Eosinophil # : x  Auto Basophil # : x  Auto Neutrophil % : x  Auto Lymphocyte % : x  Auto Monocyte % : x  Auto Eosinophil % : x  Auto Basophil % : x        CARDIAC MARKERS:  CARDIAC MARKERS ( 10 Jul 2018 10:45 )  <0.015 ng/mL / x     / 119 U/L / x     / 2.4 ng/mL  CARDIAC MARKERS ( 10 Jul 2018 03:49 )  <0.015 ng/mL / x     / 157 U/L / x     / 3.8 ng/mL                              8.2    4.8   )-----------( 62       ( 2018 05:53 )             26.7       07-11    141  |  109<H>  |  11  ----------------------------<  192<H>  4.2   |  24  |  0.87    Ca    8.8      2018 05:53  Phos  2.9     07-10            Urinalysis Basic - ( 10 Jul 2018 00:16 )    Color: Yellow / Appearance: Clear / S.010 / pH: x  Gluc: x / Ketone: Negative  / Bili: Negative / Urobili: Negative   Blood: x / Protein: 30 mg/dL / Nitrite: Negative   Leuk Esterase: Small / RBC: 2-5 /HPF / WBC 3-5 /HPF   Sq Epi: x / Non Sq Epi: Few /HPF / Bacteria: Trace /HPF      proBNP: Serum Pro-Brain Natriuretic Peptide: 1030 pg/mL (07-10 @ 03:49)  Serum Pro-Brain Natriuretic Peptide: 1037 pg/mL ( @ 22:46)    Lipid Profile: Cholesterol 108  LDL 46  HDL 38      HgA1c: Hemoglobin A1C, Whole Blood: 8.0 % ( @ 09:26)    TSH: Thyroid Stimulating Hormone, Serum: 2.16 uU/mL (07-10 @ 03:49)

## 2018-07-11 NOTE — PROGRESS NOTE ADULT - PROBLEM SELECTOR PLAN 1
melena x 10 days, on NSAIDs for HA at home for 3 yrs and pt on pradaxa 75mg bid and asa for prevent stroke as per cardiologist Dr. Rosado   held AC due to UGI bleed  Hb 7.0 in ER, baseline Hb 12 on 01/2017  last EGD/[facility] >30 yrs ago  s/p 1 PRBC and protonix 80mg IV x1 in ED  c/w protonix 40mg IV BID   keep NPO , on IVF   GI consulted Dr. Huffman

## 2018-07-11 NOTE — CONSULT NOTE ADULT - SUBJECTIVE AND OBJECTIVE BOX
Patient is a 87y old  Female who presents with a chief complaint of melena (10 Jul 2018 01:52)      HPI:  Patient is 88 y/o F from home, lives with son, ambulate with cane, with PMHx of  DM, CHF, Glaucoma, grave's disease, HTN, cardiac arrest (s/p medtronic PPM, Cardiologist Dr. Rosado) and Vertigo and PSHx of Oophorectomy presents to ED c/o black tarry stool for 10 days. Pt also had associated SOB, worse with exertion and occasionally associated with chest discomfort. Pt states that she now gets SOB after walking x5 feet whereas before, pt was able to walk across a room with no problems. Pt had chronic headache and on Advil/ Aleve/ tylenol PRN for at last 3 yrs. Pt last EGD and colonoscopy was 30 yrs ago. Pt had TTE on 2017 show severe diastolic dysfunction and EF 68%, NST last yr normal.  Pt denies fever, chills, chest pain, abd pain,  nausea, vomiting, diarrhea, constipation, diaphoresis or any other complaints.     ED course, pt VS temp 98.4, HR 82, /62, RR 19 and SO2 98%, Labs shows Hb 7.0,(baseline Hb 12 ),  FOBT positve, UA negative,  s/p 1 PRBC and protonix 80mg IV x1 in ED. (10 Jul 2018 01:52)  Found to have un cont dm. Pt admits to fsg >200 for last few months on amaryl; 4 bid.     PAST MEDICAL & SURGICAL HISTORY:  Constipation  Glaucoma  Vertigo  Graves disease  Pacemaker  DM (diabetes mellitus)  HTN (hypertension)  H/O oophorectomy  Artificial pacemaker         MEDICATIONS  (STANDING):  amLODIPine   Tablet 5 milliGRAM(s) Oral daily  dextrose 5%. 1000 milliLiter(s) (60 mL/Hr) IV Continuous <Continuous>  dextrose 5%. 1000 milliLiter(s) (50 mL/Hr) IV Continuous <Continuous>  dextrose 50% Injectable 12.5 Gram(s) IV Push once  dextrose 50% Injectable 25 Gram(s) IV Push once  dextrose 50% Injectable 25 Gram(s) IV Push once  ergocalciferol 53494 Unit(s) Oral <User Schedule>  insulin lispro (HumaLOG) corrective regimen sliding scale   SubCutaneous three times a day before meals  latanoprost 0.005% Ophthalmic Solution 1 Drop(s) Both EYES at bedtime  metoprolol succinate  milliGRAM(s) Oral daily  pantoprazole  Injectable 40 milliGRAM(s) IV Push two times a day    MEDICATIONS  (PRN):  dextrose 40% Gel 15 Gram(s) Oral once PRN Blood Glucose LESS THAN 70 milliGRAM(s)/deciliter  glucagon  Injectable 1 milliGRAM(s) IntraMuscular once PRN Glucose LESS THAN 70 milligrams/deciliter      FAMILY HISTORY:  Family history of hypertension (Mother)  Family history of diabetes mellitus (Father, Mother)      SOCIAL HISTORY:      REVIEW OF SYSTEMS:  CONSTITUTIONAL: No fever, weight loss, or fatigue  EYES: No eye pain, visual disturbances, or discharge  ENT:  No difficulty hearing, tinnitus, vertigo; No sinus or throat pain  NECK: No pain or stiffness  RESPIRATORY: No cough, wheezing, chills or hemoptysis; No Shortness of Breath  CARDIOVASCULAR: No chest pain, palpitations, passing out, dizziness, or leg swelling  GASTROINTESTINAL: No abdominal or epigastric pain. No nausea, vomiting, or hematemesis; No diarrhea or constipation. No melena or hematochezia.  GENITOURINARY: No dysuria, frequency, hematuria, or incontinence  NEUROLOGICAL: No headaches, memory loss, loss of strength, numbness, or tremors  SKIN: No itching, burning, rashes, or lesions   LYMPH Nodes: No enlarged glands  ENDOCRINE: No heat or cold intolerance; No hair loss  MUSCULOSKELETAL: No joint pain or swelling; No muscle, back, or extremity pain  PSYCHIATRIC: No depression, anxiety, mood swings, or difficulty sleeping  HEME/LYMPH: No easy bruising, or bleeding gums  ALLERGY AND IMMUNOLOGIC: No hives or eczema	        Vital Signs Last 24 Hrs  T(C): 36.8 (2018 05:21), Max: 37.1 (10 Jul 2018 12:30)  T(F): 98.3 (2018 05:21), Max: 98.8 (10 Jul 2018 20:51)  HR: 121 (2018 09:43) (111 - 121)  BP: 147/84 (2018 09:43) (134/83 - 151/83)  BP(mean): --  RR: 16 (2018 05:21) (16 - 18)  SpO2: 97% (2018 05:21) (96% - 100%)      Constitutional:    HEENT: nad    Neck:  No JVD, bruits or thyromegaly    Respiratory:  Clear without rales or rhonchi    Cardiovascular:  RR without murmur, rub or gallop.    Gastrointestinal: Soft without hepatosplenomegaly.    Extremities: without cyanosis, clubbing or edema.    Neurological:  Oriented   x 3      . No gross sensory or motor defects.        LABS:                        8.2    4.8   )-----------( 62       ( 2018 05:53 )             26.7     07-11    141  |  109<H>  |  11  ----------------------------<  192<H>  4.2   |  24  |  0.87    Ca    8.8      2018 05:53  Phos  2.9     07-10    TPro  7.0  /  Alb  2.9<L>  /  TBili  0.3  /  DBili  x   /  AST  60<H>  /  ALT  51  /  AlkPhos  77  07-09    CARDIAC MARKERS ( 10 Jul 2018 10:45 )  <0.015 ng/mL / x     / 119 U/L / x     / 2.4 ng/mL  CARDIAC MARKERS ( 10 Jul 2018 03:49 )  <0.015 ng/mL / x     / 157 U/L / x     / 3.8 ng/mL  CARDIAC MARKERS ( 2018 22:46 )  <0.015 ng/mL / x     / 148 U/L / x     / 3.6 ng/mL      PT/INR - ( 2018 22:46 )   PT: 16.1 sec;   INR: 1.47 ratio         PTT - ( 2018 22:46 )  PTT:55.0 sec  Urinalysis Basic - ( 10 Jul 2018 00:16 )    Color: Yellow / Appearance: Clear / S.010 / pH: x  Gluc: x / Ketone: Negative  / Bili: Negative / Urobili: Negative   Blood: x / Protein: 30 mg/dL / Nitrite: Negative   Leuk Esterase: Small / RBC: 2-5 /HPF / WBC 3-5 /HPF   Sq Epi: x / Non Sq Epi: Few /HPF / Bacteria: Trace /HPF      CAPILLARY BLOOD GLUCOSE      POCT Blood Glucose.: 210 mg/dL (2018 07:43)  POCT Blood Glucose.: 138 mg/dL (10 Jul 2018 22:05)  POCT Blood Glucose.: 262 mg/dL (10 Jul 2018 16:27)  POCT Blood Glucose.: 300 mg/dL (10 Jul 2018 13:15)      RADIOLOGY & ADDITIONAL STUDIES:

## 2018-07-12 LAB
24R-OH-CALCIDIOL SERPL-MCNC: 83.6 NG/ML — HIGH (ref 30–80)
GLUCOSE BLDC GLUCOMTR-MCNC: 123 MG/DL — HIGH (ref 70–99)
GLUCOSE BLDC GLUCOMTR-MCNC: 128 MG/DL — HIGH (ref 70–99)
GLUCOSE BLDC GLUCOMTR-MCNC: 226 MG/DL — HIGH (ref 70–99)
GLUCOSE BLDC GLUCOMTR-MCNC: 94 MG/DL — SIGNIFICANT CHANGE UP (ref 70–99)
HCT VFR BLD CALC: 26.1 % — LOW (ref 34.5–45)
HCT VFR BLD CALC: 26.9 % — LOW (ref 34.5–45)
HGB BLD-MCNC: 8.5 G/DL — LOW (ref 11.5–15.5)
HGB BLD-MCNC: 8.6 G/DL — LOW (ref 11.5–15.5)
MCHC RBC-ENTMCNC: 30.2 PG — SIGNIFICANT CHANGE UP (ref 27–34)
MCHC RBC-ENTMCNC: 30.3 PG — SIGNIFICANT CHANGE UP (ref 27–34)
MCHC RBC-ENTMCNC: 31.9 GM/DL — LOW (ref 32–36)
MCHC RBC-ENTMCNC: 32.5 GM/DL — SIGNIFICANT CHANGE UP (ref 32–36)
MCV RBC AUTO: 93.3 FL — SIGNIFICANT CHANGE UP (ref 80–100)
MCV RBC AUTO: 94.6 FL — SIGNIFICANT CHANGE UP (ref 80–100)
PLATELET # BLD AUTO: 69 K/UL — LOW (ref 150–400)
PLATELET # BLD AUTO: 72 K/UL — LOW (ref 150–400)
RBC # BLD: 2.8 M/UL — LOW (ref 3.8–5.2)
RBC # BLD: 2.84 M/UL — LOW (ref 3.8–5.2)
RBC # FLD: 16 % — HIGH (ref 10.3–14.5)
RBC # FLD: 16.3 % — HIGH (ref 10.3–14.5)
TSH SERPL-MCNC: 3.02 UU/ML — SIGNIFICANT CHANGE UP (ref 0.34–4.82)
VIT B12 SERPL-MCNC: 1041 PG/ML — SIGNIFICANT CHANGE UP (ref 232–1245)
WBC # BLD: 5.4 K/UL — SIGNIFICANT CHANGE UP (ref 3.8–10.5)
WBC # BLD: 6.2 K/UL — SIGNIFICANT CHANGE UP (ref 3.8–10.5)
WBC # FLD AUTO: 5.4 K/UL — SIGNIFICANT CHANGE UP (ref 3.8–10.5)
WBC # FLD AUTO: 6.2 K/UL — SIGNIFICANT CHANGE UP (ref 3.8–10.5)

## 2018-07-12 RX ORDER — METOPROLOL TARTRATE 50 MG
50 TABLET ORAL EVERY 12 HOURS
Qty: 0 | Refills: 0 | Status: DISCONTINUED | OUTPATIENT
Start: 2018-07-12 | End: 2018-07-19

## 2018-07-12 RX ORDER — ASPIRIN/CALCIUM CARB/MAGNESIUM 324 MG
81 TABLET ORAL DAILY
Qty: 0 | Refills: 0 | Status: DISCONTINUED | OUTPATIENT
Start: 2018-07-12 | End: 2018-07-19

## 2018-07-12 RX ORDER — AMLODIPINE BESYLATE 2.5 MG/1
2.5 TABLET ORAL DAILY
Qty: 0 | Refills: 0 | Status: DISCONTINUED | OUTPATIENT
Start: 2018-07-12 | End: 2018-07-19

## 2018-07-12 RX ORDER — DIGOXIN 250 MCG
0.12 TABLET ORAL DAILY
Qty: 0 | Refills: 0 | Status: DISCONTINUED | OUTPATIENT
Start: 2018-07-12 | End: 2018-07-19

## 2018-07-12 RX ORDER — SOD SULF/SODIUM/NAHCO3/KCL/PEG
4000 SOLUTION, RECONSTITUTED, ORAL ORAL ONCE
Qty: 0 | Refills: 0 | Status: COMPLETED | OUTPATIENT
Start: 2018-07-12 | End: 2018-07-12

## 2018-07-12 RX ADMIN — Medication 50 MILLIGRAM(S): at 17:59

## 2018-07-12 RX ADMIN — AMLODIPINE BESYLATE 5 MILLIGRAM(S): 2.5 TABLET ORAL at 05:21

## 2018-07-12 RX ADMIN — PANTOPRAZOLE SODIUM 40 MILLIGRAM(S): 20 TABLET, DELAYED RELEASE ORAL at 17:59

## 2018-07-12 RX ADMIN — Medication 50 MILLIGRAM(S): at 05:21

## 2018-07-12 RX ADMIN — PANTOPRAZOLE SODIUM 40 MILLIGRAM(S): 20 TABLET, DELAYED RELEASE ORAL at 05:21

## 2018-07-12 RX ADMIN — Medication 4000 MILLILITER(S): at 17:58

## 2018-07-12 RX ADMIN — LATANOPROST 1 DROP(S): 0.05 SOLUTION/ DROPS OPHTHALMIC; TOPICAL at 21:29

## 2018-07-12 RX ADMIN — Medication 81 MILLIGRAM(S): at 11:52

## 2018-07-12 RX ADMIN — Medication 2: at 12:02

## 2018-07-12 NOTE — PROGRESS NOTE ADULT - PROBLEM SELECTOR PLAN 1
s/p EGD. showed Gastritis, AVMs, Antral Ulcer.   resumed aspirin . Discussed with GI.  H/H stable at 85 g/dl  s/p 1 PRBC  c/w protonix 40mg IV BID   clear liquid diet. Off IV fluids. NPO after mid night for colonoscopy  Bowel prep today   DR Huffman following.

## 2018-07-12 NOTE — PROGRESS NOTE ADULT - ASSESSMENT
Patient is 86 y/o F from home, lives with son, ambulate with cane, with PMHx of  DM, CHF, Glaucoma, grave's disease, HTN, cardiac arrest (s/p medtronic PPM, Cardiologist Dr. Rosado) and Vertigo and PSHx of Oophorectomy presents to ED c/o black tarry stool for 10 days, pt on NSAIDs for HA at home for 3 yrs, Pt also had associated SOB, worse with exertion and occasionally associated with chest discomfort. Hb 7.0, FOBT positve, UA negative,  s/p 1 PRBC and protonix 80mg IV x1 in ED.

## 2018-07-12 NOTE — PROGRESS NOTE ADULT - SUBJECTIVE AND OBJECTIVE BOX
CHIEF COMPLAINT:Patient is a 87y old  Female who presents with a chief complaint of melena .Pt appears comfortable.    	  REVIEW OF SYSTEMS:  CONSTITUTIONAL: No fever, weight loss, or fatigue  EYES: No eye pain, visual disturbances, or discharge  ENT:  No difficulty hearing, tinnitus, vertigo; No sinus or throat pain  NECK: No pain or stiffness  RESPIRATORY: No cough, wheezing, chills or hemoptysis; No Shortness of Breath  CARDIOVASCULAR: No chest pain, palpitations, passing out, dizziness, or leg swelling  GASTROINTESTINAL: No abdominal or epigastric pain. No nausea, vomiting, or hematemesis; No diarrhea or constipation. No melena or hematochezia.  GENITOURINARY: No dysuria, frequency, hematuria, or incontinence  NEUROLOGICAL: No headaches, memory loss, loss of strength, numbness, or tremors  SKIN: No itching, burning, rashes, or lesions   LYMPH Nodes: No enlarged glands  ENDOCRINE: No heat or cold intolerance; No hair loss  MUSCULOSKELETAL: No joint pain or swelling; No muscle, back, or extremity pain  PSYCHIATRIC: No depression, anxiety, mood swings, or difficulty sleeping  HEME/LYMPH: No easy bruising, or bleeding gums  ALLERGY AND IMMUNOLOGIC: No hives or eczema	      PHYSICAL EXAM:  T(C): 36.4 (07-12-18 @ 05:23), Max: 37.2 (07-11-18 @ 20:45)  HR: 60 (07-12-18 @ 05:23) (60 - 115)  BP: 114/53 (07-12-18 @ 05:23) (114/53 - 136/67)  RR: 16 (07-12-18 @ 05:23) (16 - 18)  SpO2: 97% (07-12-18 @ 05:23) (97% - 100%)      Appearance: Normal	  HEENT:   Normal oral mucosa, PERRL, EOMI	  Lymphatic: No lymphadenopathy  Cardiovascular: Normal S1 S2, No JVD, No murmurs, No edema  Respiratory: Lungs clear to auscultation	  Psychiatry: A & O x 3, Mood & affect appropriate  Gastrointestinal:  Soft, Non-tender, + BS	  Skin: No rashes, No ecchymoses, No cyanosis	  Neurologic: Non-focal  Extremities: Normal range of motion, No clubbing, cyanosis or edema  Vascular: Peripheral pulses palpable 2+ bilaterally    MEDICATIONS  (STANDING):  amLODIPine   Tablet 5 milliGRAM(s) Oral daily  aspirin enteric coated 81 milliGRAM(s) Oral daily  digoxin     Tablet 0.125 milliGRAM(s) Oral daily  ergocalciferol 32873 Unit(s) Oral <User Schedule>  insulin glargine Injectable (LANTUS) 8 Unit(s) SubCutaneous at bedtime  insulin lispro (HumaLOG) corrective regimen sliding scale   SubCutaneous three times a day before meals  lactated ringers. 1000 milliLiter(s) (75 mL/Hr) IV Continuous <Continuous>  latanoprost 0.005% Ophthalmic Solution 1 Drop(s) Both EYES at bedtime  metoprolol tartrate 50 milliGRAM(s) Oral every 8 hours  pantoprazole  Injectable 40 milliGRAM(s) IV Push two times a day        	  LABS:	 	                       8.6    5.4   )-----------( 69       ( 12 Jul 2018 08:55 )             26.9     07-11    141  |  109<H>  |  11  ----------------------------<  192<H>  4.2   |  24  |  0.87    Ca    8.8      11 Jul 2018 05:53      proBNP: Serum Pro-Brain Natriuretic Peptide: 1030 pg/mL (07-10 @ 03:49)  Serum Pro-Brain Natriuretic Peptide: 1037 pg/mL (07-09 @ 22:46)    Lipid Profile: Cholesterol 108  LDL 46  HDL 38      HgA1c: Hemoglobin A1C, Whole Blood: 8.0 % (07-11 @ 09:26)    TSH: Thyroid Stimulating Hormone, Serum: 3.02 uU/mL (07-12 @ 08:55)  Thyroid Stimulating Hormone, Serum: 2.16 uU/mL (07-10 @ 03:49)      EGD-PUD,AVM-s/p injection.

## 2018-07-12 NOTE — PROGRESS NOTE ADULT - SUBJECTIVE AND OBJECTIVE BOX
PGY 2 Note     Patient is a 87y old  Female who presents with a chief complaint of melena (10 Jul 2018 01:52)      INTERVAL HPI/OVERNIGHT EVENTS: Patient seen and examined at bed side. patient is still having melena.   MEDICATIONS  (STANDING):  amLODIPine   Tablet 2.5 milliGRAM(s) Oral daily  aspirin enteric coated 81 milliGRAM(s) Oral daily  digoxin     Tablet 0.125 milliGRAM(s) Oral daily  ergocalciferol 37252 Unit(s) Oral <User Schedule>  insulin glargine Injectable (LANTUS) 8 Unit(s) SubCutaneous at bedtime  insulin lispro (HumaLOG) corrective regimen sliding scale   SubCutaneous three times a day before meals  lactated ringers. 1000 milliLiter(s) (75 mL/Hr) IV Continuous <Continuous>  latanoprost 0.005% Ophthalmic Solution 1 Drop(s) Both EYES at bedtime  metoprolol tartrate 50 milliGRAM(s) Oral every 12 hours  pantoprazole  Injectable 40 milliGRAM(s) IV Push two times a day  polyethylene glycol/electrolyte Solution. 4000 milliLiter(s) Oral once    MEDICATIONS  (PRN):      __________________________________________________  REVIEW OF SYSTEMS:    CONSTITUTIONAL: No fever,   EYES: no acute visual disturbances  NECK: No pain or stiffness  RESPIRATORY: No cough; No shortness of breath  CARDIOVASCULAR: No chest pain, no palpitations  GASTROINTESTINAL: No pain. No nausea or vomiting; No diarrhea   NEUROLOGICAL: No headache or numbness, no tremors  MUSCULOSKELETAL: No joint pain, no muscle pain  GENITOURINARY: no dysuria, no frequency, no hesitancy  PSYCHIATRY: no depression , no anxiety  ALL OTHER  ROS negative        Vital Signs Last 24 Hrs  T(C): 36.9 (12 Jul 2018 14:35), Max: 37.2 (11 Jul 2018 20:45)  T(F): 98.4 (12 Jul 2018 14:35), Max: 99 (11 Jul 2018 20:45)  HR: 60 (12 Jul 2018 14:35) (60 - 65)  BP: 134/66 (12 Jul 2018 14:35) (114/53 - 136/67)  BP(mean): --  RR: 16 (12 Jul 2018 14:35) (16 - 17)  SpO2: 100% (12 Jul 2018 14:35) (97% - 100%)    ________________________________________________  PHYSICAL EXAM:  GENERAL: NAD  HEENT: Normocephalic;  conjunctivae and sclerae clear; moist mucous membranes;   NECK : supple  CHEST/LUNG: Clear to auscultation bilaterally with good air entry   HEART: S1 S2  regular; no murmurs, gallops or rubs  ABDOMEN: Soft, Nontender, Nondistended; Bowel sounds present  EXTREMITIES: no cyanosis; no edema; no calf tenderness  SKIN: warm and dry; no rash  NERVOUS SYSTEM:  Awake and alert; Oriented  to place, person and time ; no new deficits    _________________________________________________  LABS:                        8.5    6.2   )-----------( 72       ( 12 Jul 2018 15:45 )             26.1     07-11    141  |  109<H>  |  11  ----------------------------<  192<H>  4.2   |  24  |  0.87    Ca    8.8      11 Jul 2018 05:53          CAPILLARY BLOOD GLUCOSE      POCT Blood Glucose.: 226 mg/dL (12 Jul 2018 11:55)  POCT Blood Glucose.: 123 mg/dL (12 Jul 2018 08:03)  POCT Blood Glucose.: 203 mg/dL (11 Jul 2018 21:04)  POCT Blood Glucose.: 197 mg/dL (11 Jul 2018 16:16)        RADIOLOGY & ADDITIONAL TESTS:    Imaging Personally Reviewed:  YES    Consultant(s) Notes Reviewed:   YES    Care Discussed with Consultants :     Plan of care was discussed with patient and /or primary care giver; all questions and concerns were addressed and care was aligned with patient's wishes.

## 2018-07-12 NOTE — PHYSICAL THERAPY INITIAL EVALUATION ADULT - GENERAL OBSERVATIONS, REHAB EVAL
pt aox4 family members bedside, I bedmobility and transfers activities, tolerating standby assisted rw ambulation

## 2018-07-12 NOTE — PROGRESS NOTE ADULT - SUBJECTIVE AND OBJECTIVE BOX
[   ] ICU                                          [   ] CCU                                      [ X  ] Medical Floor    Patient is comfortable. No new complaints reported, No abdominal pain, N/V, hematemesis, hematochezia, melena, fever, chills, chest pain, SOB, cough or diarrhea reported.    VITALS  Vital Signs Last 24 Hrs  T(C): 36.4 (12 Jul 2018 05:23), Max: 37.2 (11 Jul 2018 20:45)  T(F): 97.6 (12 Jul 2018 05:23), Max: 99 (11 Jul 2018 20:45)  HR: 60 (12 Jul 2018 05:23) (60 - 115)  BP: 114/53 (12 Jul 2018 05:23) (114/53 - 136/67)   RR: 16 (12 Jul 2018 05:23) (16 - 18)  SpO2: 97% (12 Jul 2018 05:23) (97% - 100%)         MEDICATIONS  (STANDING):  amLODIPine   Tablet 5 milliGRAM(s) Oral daily  ergocalciferol 56446 Unit(s) Oral <User Schedule>  insulin glargine Injectable (LANTUS) 8 Unit(s) SubCutaneous at bedtime  insulin lispro (HumaLOG) corrective regimen sliding scale   SubCutaneous three times a day before meals  lactated ringers. 1000 milliLiter(s) (75 mL/Hr) IV Continuous <Continuous>  latanoprost 0.005% Ophthalmic Solution 1 Drop(s) Both EYES at bedtime  metoprolol tartrate 50 milliGRAM(s) Oral every 8 hours  pantoprazole  Injectable 40 milliGRAM(s) IV Push two times a day                               8.6    5.4   )-----------( 69       ( 12 Jul 2018 08:55 )             26.9       07-11    141  |  109<H>  |  11  ----------------------------<  192<H>  4.2   |  24  |  0.87    Ca    8.8      11 Jul 2018 05:53

## 2018-07-12 NOTE — PROGRESS NOTE ADULT - PROBLEM SELECTOR PLAN 5
hx of cardiac arrest in 2011 ,s/p medtronic PPM, Cardiologist Dr. Rosado  holding Pradaxa 75mg bid   resuming Aspirin as per GI

## 2018-07-12 NOTE — PROGRESS NOTE ADULT - ASSESSMENT
1. Anemia  2. Gastric AVM's  3. Gastric ulcer  4. Esophagitis  5. No evidence of acute GI bleeding    Suggestions:    1. Monitor H/H  2. Transfuse PRBC as needed  3. Protonix daily  4. Avoid NSAID  5. DVT prophylaxis 1. Anemia  2. Gastric AVM's  3. Gastric ulcer  4. Esophagitis  5. No evidence of acute GI bleeding    Suggestions:    1. Monitor H/H  2. Transfuse PRBC as needed  3. Colonoscopy  4. Protonix daily  5. Avoid NSAID  6. DVT prophylaxis

## 2018-07-12 NOTE — PROGRESS NOTE ADULT - ASSESSMENT
88 y/o F from home, lives with son, ambulate with cane, with PMHx of  DM, CHF, Glaucoma, grave's disease, HTN, cardiac arrest (s/p medtronic PPM, Cardiologist Dr. Rosado) and Vertigo and PSHx of Oophorectomy presents to ED c/o black tarry stool for 10 days.  1.Off AC for GI work-up.  2.H/H-stable.  3.Check spep.  4.Afib-dig .125mg qd, change lopressor 50mg q12h,d/w GI ok to resume asa.  5.PPI.  6.Pulmonary HTN-cont norvasc.  7.PPM interrogation.

## 2018-07-13 LAB
ANION GAP SERPL CALC-SCNC: 7 MMOL/L — SIGNIFICANT CHANGE UP (ref 5–17)
BASOPHILS # BLD AUTO: 0.1 K/UL — SIGNIFICANT CHANGE UP (ref 0–0.2)
BASOPHILS NFR BLD AUTO: 2.1 % — HIGH (ref 0–2)
BUN SERPL-MCNC: 10 MG/DL — SIGNIFICANT CHANGE UP (ref 7–18)
CALCIUM SERPL-MCNC: 8.6 MG/DL — SIGNIFICANT CHANGE UP (ref 8.4–10.5)
CHLORIDE SERPL-SCNC: 106 MMOL/L — SIGNIFICANT CHANGE UP (ref 96–108)
CO2 SERPL-SCNC: 26 MMOL/L — SIGNIFICANT CHANGE UP (ref 22–31)
CREAT SERPL-MCNC: 1.04 MG/DL — SIGNIFICANT CHANGE UP (ref 0.5–1.3)
EOSINOPHIL # BLD AUTO: 0.1 K/UL — SIGNIFICANT CHANGE UP (ref 0–0.5)
EOSINOPHIL NFR BLD AUTO: 3.2 % — SIGNIFICANT CHANGE UP (ref 0–6)
GLUCOSE BLDC GLUCOMTR-MCNC: 107 MG/DL — HIGH (ref 70–99)
GLUCOSE BLDC GLUCOMTR-MCNC: 123 MG/DL — HIGH (ref 70–99)
GLUCOSE BLDC GLUCOMTR-MCNC: 139 MG/DL — HIGH (ref 70–99)
GLUCOSE BLDC GLUCOMTR-MCNC: 180 MG/DL — HIGH (ref 70–99)
GLUCOSE SERPL-MCNC: 111 MG/DL — HIGH (ref 70–99)
HCT VFR BLD CALC: 25.8 % — LOW (ref 34.5–45)
HGB BLD-MCNC: 8 G/DL — LOW (ref 11.5–15.5)
LYMPHOCYTES # BLD AUTO: 1.6 K/UL — SIGNIFICANT CHANGE UP (ref 1–3.3)
LYMPHOCYTES # BLD AUTO: 38.7 % — SIGNIFICANT CHANGE UP (ref 13–44)
MCHC RBC-ENTMCNC: 29.4 PG — SIGNIFICANT CHANGE UP (ref 27–34)
MCHC RBC-ENTMCNC: 31.1 GM/DL — LOW (ref 32–36)
MCV RBC AUTO: 94.5 FL — SIGNIFICANT CHANGE UP (ref 80–100)
MONOCYTES # BLD AUTO: 0.5 K/UL — SIGNIFICANT CHANGE UP (ref 0–0.9)
MONOCYTES NFR BLD AUTO: 11.1 % — SIGNIFICANT CHANGE UP (ref 2–14)
NEUTROPHILS # BLD AUTO: 1.9 K/UL — SIGNIFICANT CHANGE UP (ref 1.8–7.4)
NEUTROPHILS NFR BLD AUTO: 44.9 % — SIGNIFICANT CHANGE UP (ref 43–77)
PLATELET # BLD AUTO: 67 K/UL — LOW (ref 150–400)
POTASSIUM SERPL-MCNC: 3.6 MMOL/L — SIGNIFICANT CHANGE UP (ref 3.5–5.3)
POTASSIUM SERPL-SCNC: 3.6 MMOL/L — SIGNIFICANT CHANGE UP (ref 3.5–5.3)
PROT SERPL-MCNC: 6.2 G/DL — SIGNIFICANT CHANGE UP (ref 6–8.3)
RBC # BLD: 2.73 M/UL — LOW (ref 3.8–5.2)
RBC # FLD: 16.1 % — HIGH (ref 10.3–14.5)
SODIUM SERPL-SCNC: 139 MMOL/L — SIGNIFICANT CHANGE UP (ref 135–145)
WBC # BLD: 4.2 K/UL — SIGNIFICANT CHANGE UP (ref 3.8–10.5)
WBC # FLD AUTO: 4.2 K/UL — SIGNIFICANT CHANGE UP (ref 3.8–10.5)

## 2018-07-13 RX ADMIN — LATANOPROST 1 DROP(S): 0.05 SOLUTION/ DROPS OPHTHALMIC; TOPICAL at 21:59

## 2018-07-13 RX ADMIN — INSULIN GLARGINE 8 UNIT(S): 100 INJECTION, SOLUTION SUBCUTANEOUS at 21:58

## 2018-07-13 RX ADMIN — PANTOPRAZOLE SODIUM 40 MILLIGRAM(S): 20 TABLET, DELAYED RELEASE ORAL at 17:53

## 2018-07-13 RX ADMIN — Medication 81 MILLIGRAM(S): at 11:14

## 2018-07-13 RX ADMIN — PANTOPRAZOLE SODIUM 40 MILLIGRAM(S): 20 TABLET, DELAYED RELEASE ORAL at 05:44

## 2018-07-13 RX ADMIN — AMLODIPINE BESYLATE 2.5 MILLIGRAM(S): 2.5 TABLET ORAL at 05:44

## 2018-07-13 RX ADMIN — Medication 50 MILLIGRAM(S): at 05:44

## 2018-07-13 RX ADMIN — Medication 0.12 MILLIGRAM(S): at 05:43

## 2018-07-13 RX ADMIN — Medication 50 MILLIGRAM(S): at 17:53

## 2018-07-13 NOTE — DIETITIAN INITIAL EVALUATION ADULT. - FACTORS AFF FOOD INTAKE
weakness, GI bleeding, SOB, chest pain, diabetes, Graves disease, CHF/pain/difficulty with food procurement/preparation/other (specify)/persistent constipation

## 2018-07-13 NOTE — DIETITIAN INITIAL EVALUATION ADULT. - MD RECOMMEND
Advance diet with nutrition supplement as medically feasible, or consider alternate nutrition support, if NPO prolonged/po supplement

## 2018-07-13 NOTE — PROGRESS NOTE ADULT - SUBJECTIVE AND OBJECTIVE BOX
CHIEF COMPLAINT:Patient is a 87y old  Female who presents with a chief complaint of melena .Pt appears comfortable.    	  REVIEW OF SYSTEMS:  CONSTITUTIONAL: No fever, weight loss, or fatigue  EYES: No eye pain, visual disturbances, or discharge  ENT:  No difficulty hearing, tinnitus, vertigo; No sinus or throat pain  NECK: No pain or stiffness  RESPIRATORY: No cough, wheezing, chills or hemoptysis; No Shortness of Breath  CARDIOVASCULAR: No chest pain, palpitations, passing out, dizziness, or leg swelling  GASTROINTESTINAL: No abdominal or epigastric pain. No nausea, vomiting, or hematemesis; No diarrhea or constipation. No melena or hematochezia.  GENITOURINARY: No dysuria, frequency, hematuria, or incontinence  NEUROLOGICAL: No headaches, memory loss, loss of strength, numbness, or tremors  SKIN: No itching, burning, rashes, or lesions   LYMPH Nodes: No enlarged glands  ENDOCRINE: No heat or cold intolerance; No hair loss  MUSCULOSKELETAL: No joint pain or swelling; No muscle, back, or extremity pain  PSYCHIATRIC: No depression, anxiety, mood swings, or difficulty sleeping  HEME/LYMPH: No easy bruising, or bleeding gums  ALLERGY AND IMMUNOLOGIC: No hives or eczema	      PHYSICAL EXAM:  T(C): 36.4 (07-13-18 @ 05:25), Max: 36.9 (07-12-18 @ 14:35)  HR: 59 (07-13-18 @ 05:25) (59 - 62)  BP: 132/58 (07-13-18 @ 05:25) (126/69 - 145/61)  RR: 16 (07-13-18 @ 05:25) (16 - 17)  SpO2: 100% (07-13-18 @ 05:25) (100% - 100%)      Appearance: Normal	  HEENT:   Normal oral mucosa, PERRL, EOMI	  Lymphatic: No lymphadenopathy  Cardiovascular: Normal S1 S2, No JVD, No murmurs, No edema  Respiratory: Lungs clear to auscultation	  Psychiatry: A & O x 3, Mood & affect appropriate  Gastrointestinal:  Soft, Non-tender, + BS	  Skin: No rashes, No ecchymoses, No cyanosis	  Neurologic: Non-focal  Extremities: Normal range of motion, No clubbing, cyanosis or edema  Vascular: Peripheral pulses palpable 2+ bilaterally    MEDICATIONS  (STANDING):  amLODIPine   Tablet 2.5 milliGRAM(s) Oral daily  aspirin enteric coated 81 milliGRAM(s) Oral daily  digoxin     Tablet 0.125 milliGRAM(s) Oral daily  ergocalciferol 12238 Unit(s) Oral <User Schedule>  insulin glargine Injectable (LANTUS) 8 Unit(s) SubCutaneous at bedtime  insulin lispro (HumaLOG) corrective regimen sliding scale   SubCutaneous three times a day before meals  latanoprost 0.005% Ophthalmic Solution 1 Drop(s) Both EYES at bedtime  metoprolol tartrate 50 milliGRAM(s) Oral every 12 hours  pantoprazole  Injectable 40 milliGRAM(s) IV Push two times a day        	  LABS:	 	                          8.0    4.2   )-----------( 67       ( 13 Jul 2018 08:55 )             25.8     07-13    139  |  106  |  10  ----------------------------<  111<H>  3.6   |  26  |  1.04    Ca    8.6      13 Jul 2018 08:55    TPro  6.2  /  Alb  x   /  TBili  x   /  DBili  x   /  AST  x   /  ALT  x   /  AlkPhos  x   07-12    proBNP: Serum Pro-Brain Natriuretic Peptide: 1030 pg/mL (07-10 @ 03:49)  Serum Pro-Brain Natriuretic Peptide: 1037 pg/mL (07-09 @ 22:46)    Lipid Profile: Cholesterol 108  LDL 46  HDL 38      HgA1c: Hemoglobin A1C, Whole Blood: 8.0 % (07-11 @ 09:26)    TSH: Thyroid Stimulating Hormone, Serum: 3.02 uU/mL (07-12 @ 08:55)  Thyroid Stimulating Hormone, Serum: 2.16 uU/mL (07-10 @ 03:49)

## 2018-07-13 NOTE — CHART NOTE - NSCHARTNOTEFT_GEN_A_CORE
Upon Nutritional Assessment by the Registered Dietitian your patient was determined to meet criteria / has evidence of the following diagnosis/diagnoses:          [ ]  Mild Protein Calorie Malnutrition        [ ]  Moderate Protein Calorie Malnutrition        [ X] Severe Protein Calorie Malnutrition        [ ] Unspecified Protein Calorie Malnutrition        [ ] Underweight / BMI <19        [ ] Morbid Obesity / BMI > 40      Findings as based on:  •  Comprehensive nutrition assessment and consultation  •  Calorie counts (nutrient intake analysis)  •  Food acceptance and intake status from observations by staff  •  Follow up  •  Patient education  •  Intervention secondary to interdisciplinary rounds  •   concerns      Treatment:    The following diet has been recommended:      PROVIDER Section:     By signing this assessment you are acknowledging and agree with the diagnosis/diagnoses assigned by the Registered Dietitian    Comments: Advance diet with nutrition supplement as medically feasible, or consider alternate nutrition support if NPO prolonged.

## 2018-07-13 NOTE — PROGRESS NOTE ADULT - SUBJECTIVE AND OBJECTIVE BOX
Interval Events:  pt in nad    Allergies    No Known Allergies    Intolerances      Endocrine/Metabolic Medications:  insulin glargine Injectable (LANTUS) 8 Unit(s) SubCutaneous at bedtime  insulin lispro (HumaLOG) corrective regimen sliding scale   SubCutaneous three times a day before meals      Vital Signs Last 24 Hrs  T(C): 36.4 (13 Jul 2018 05:25), Max: 36.9 (12 Jul 2018 14:35)  T(F): 97.6 (13 Jul 2018 05:25), Max: 98.4 (12 Jul 2018 14:35)  HR: 59 (13 Jul 2018 05:25) (59 - 62)  BP: 132/58 (13 Jul 2018 05:25) (126/69 - 145/61)  BP(mean): --  RR: 16 (13 Jul 2018 05:25) (16 - 17)  SpO2: 100% (13 Jul 2018 05:25) (100% - 100%)      PHYSICAL EXAM  All physical exam findings normal, except those marked:  General:	Alert, active, cooperative, NAD, well hydrated  .		[] Abnormal:  Neck		Normal: supple, no cervical adenopathy, no palpable thyroid  .		[] Abnormal:  Cardiovascular	Normal: regular rate, normal S1, S2, no murmurs  .		[] Abnormal:  Respiratory	Normal: no chest wall deformity, normal respiratory pattern, CTA B/L  .		[] Abnormal:  Abdominal	Normal: soft, ND, NT, bowel sounds present, no masses, no organomegaly  .		[] Abnormal:  		Normal normal genitalia, testes descended, circumcised/uncircumcised  .		Sonja stage:			Breast sonja:  .		Menstrual history:  .		[] Abnormal:  Extremities	Normal: FROM x4  .		[] Abnormal:  Skin		Normal: intact and not indurated, no rash, no acanthosis nigricans  .		[] Abnormal:  Neurologic	Normal: grossly intact  .		[] Abnormal:    LABS                        8.5    6.2   )-----------( 72       ( 12 Jul 2018 15:45 )             26.1       TPro  6.2    /  Alb  x      /  TBili  x      /  DBili  x      /  AST  x      /  ALT  x      /  AlkPhos  x      12 Jul 2018 14:05    CAPILLARY BLOOD GLUCOSE      POCT Blood Glucose.: 123 mg/dL (13 Jul 2018 08:06)  POCT Blood Glucose.: 128 mg/dL (12 Jul 2018 20:53)  POCT Blood Glucose.: 94 mg/dL (12 Jul 2018 16:40)  POCT Blood Glucose.: 226 mg/dL (12 Jul 2018 11:55)        Assesment/plan   dm- good control  npo-colonoscopy today  cont lantus 8 units  add prandin 1mg once po intake resumes and if fsg>200  fsg ac and hs

## 2018-07-13 NOTE — PROGRESS NOTE ADULT - SUBJECTIVE AND OBJECTIVE BOX
PGY 2 Note     Patient is a 87y old  Female who presents with a chief complaint of melena (10 Jul 2018 01:52)      INTERVAL HPI/OVERNIGHT EVENTS: Patient seen and examined at bed side. offers no new complaints; current symptoms resolving    MEDICATIONS  (STANDING):  amLODIPine   Tablet 2.5 milliGRAM(s) Oral daily  aspirin enteric coated 81 milliGRAM(s) Oral daily  digoxin     Tablet 0.125 milliGRAM(s) Oral daily  ergocalciferol 41056 Unit(s) Oral <User Schedule>  insulin glargine Injectable (LANTUS) 8 Unit(s) SubCutaneous at bedtime  insulin lispro (HumaLOG) corrective regimen sliding scale   SubCutaneous three times a day before meals  latanoprost 0.005% Ophthalmic Solution 1 Drop(s) Both EYES at bedtime  metoprolol tartrate 50 milliGRAM(s) Oral every 12 hours  pantoprazole  Injectable 40 milliGRAM(s) IV Push two times a day    MEDICATIONS  (PRN):      __________________________________________________  REVIEW OF SYSTEMS:    CONSTITUTIONAL: No fever,   EYES: no acute visual disturbances  NECK: No pain or stiffness  RESPIRATORY: No cough; No shortness of breath  CARDIOVASCULAR: No chest pain, no palpitations  GASTROINTESTINAL: No pain. No nausea or vomiting; No diarrhea   NEUROLOGICAL: No headache or numbness, no tremors  MUSCULOSKELETAL: No joint pain, no muscle pain  GENITOURINARY: no dysuria, no frequency, no hesitancy  PSYCHIATRY: no depression , no anxiety  ALL OTHER  ROS negative        Vital Signs Last 24 Hrs  T(C): 36.4 (13 Jul 2018 05:25), Max: 36.8 (12 Jul 2018 21:01)  T(F): 97.6 (13 Jul 2018 05:25), Max: 98.3 (12 Jul 2018 21:01)  HR: 59 (13 Jul 2018 05:25) (59 - 62)  BP: 132/58 (13 Jul 2018 05:25) (132/58 - 145/61)  BP(mean): --  RR: 16 (13 Jul 2018 05:25) (16 - 17)  SpO2: 100% (13 Jul 2018 05:25) (100% - 100%)    ________________________________________________  PHYSICAL EXAM:  GENERAL: NAD  HEENT: Normocephalic;  conjunctivae and sclerae clear; moist mucous membranes;   NECK : supple  CHEST/LUNG: Clear to auscultation bilaterally with good air entry   HEART: S1 S2  regular; no murmurs, gallops or rubs  ABDOMEN: Soft, Nontender, Nondistended; Bowel sounds present  EXTREMITIES: no cyanosis; no edema; no calf tenderness  SKIN: warm and dry; no rash  NERVOUS SYSTEM:  Awake and alert; Oriented  to place, person and time ; no new deficits    _________________________________________________  LABS:                        8.0    4.2   )-----------( 67       ( 13 Jul 2018 08:55 )             25.8     07-13    139  |  106  |  10  ----------------------------<  111<H>  3.6   |  26  |  1.04    Ca    8.6      13 Jul 2018 08:55    TPro  6.2  /  Alb  x   /  TBili  x   /  DBili  x   /  AST  x   /  ALT  x   /  AlkPhos  x   07-12        CAPILLARY BLOOD GLUCOSE      POCT Blood Glucose.: 107 mg/dL (13 Jul 2018 16:13)  POCT Blood Glucose.: 139 mg/dL (13 Jul 2018 11:40)  POCT Blood Glucose.: 123 mg/dL (13 Jul 2018 08:06)  POCT Blood Glucose.: 128 mg/dL (12 Jul 2018 20:53)  POCT Blood Glucose.: 94 mg/dL (12 Jul 2018 16:40)        RADIOLOGY & ADDITIONAL TESTS:    Imaging Personally Reviewed:  YES    Consultant(s) Notes Reviewed:   YES    Care Discussed with Consultants :     Plan of care was discussed with patient and /or primary care giver; all questions and concerns were addressed and care was aligned with patient's wishes.

## 2018-07-13 NOTE — PROGRESS NOTE ADULT - ASSESSMENT
88 y/o F from home, lives with son, ambulate with cane, with PMHx of  DM, CHF, Glaucoma, grave's disease, HTN, cardiac arrest (s/p medtronic PPM, Cardiologist Dr. Rosado) and Vertigo and PSHx of Oophorectomy presents to ED c/o black tarry stool for 10 days.  1.Off AC for GI work-up.  2.H/H-stable.  3.Check spep.  4.Afib-dig .125mg qd, lopressor 50mg q12h,d/w GI ok to resume asa.  5.PPI.  6.Pulmonary HTN-cont norvasc.

## 2018-07-13 NOTE — DIETITIAN INITIAL EVALUATION ADULT. - OTHER INFO
Patient seen for NPO/C/L status >3days. Patient from home lives son. Visited pt. alert, reports appetite poor >2wks with bloody stool & SOB, not eating so well, 2-3 small meals PTA, stated  Lbs & loss 7 Lbs over 2 months with current wt. 108 Lbs, in house pt. NPO awaiting colonoscopy & followed by GI MD, per pt. diabetic >10yrs & takes diabetic meds at home, decline diet education, willing to have oral supplement with meal, Endo consult noted, skin intact. Discussed with MD/RN.

## 2018-07-13 NOTE — DIETITIAN INITIAL EVALUATION ADULT. - PROBLEM SELECTOR PROBLEM 1
"Thoracic Surgery Progress Note  05/10/17    Yesterday afternoon with acute desaturations. Taken for bronchoscopy which was notable for some thick secretions. Oxygenation improved. Was kept on bipap overnight and weaned down on her oxygen requirements. Febrile overnight, cultures pending.     This morning reports feeling much better than yesterday. Breathing is more comfortable. Pain improving.     /75 (BP Location: Right arm)  Temp 98.8  F (37.1  C) (Oral)  Resp 20  Ht 1.676 m (5' 6\")  Wt 50.5 kg (111 lb 6.4 oz)  SpO2 98%  Breastfeeding? No  BMI 17.98 kg/m2  Laying in bed in no acute distress  On BiPap 15/7, FiO2 60.   Reg rate and rhythm  Abdomen is soft, not tender or distended.   G tube venting, minimal output  Extremities warm, well perfused.     Labs reviewed. WBC 11.3 Hgb 9.9   Sputum GS with PMNs, no organisms  Blood cultures pending.     CXR from yesterday evening (post bronch) RL opacities and small pleural effusion  AM CXR pending    55 F now post op day 2 from PORT/PEG with hypoxia and increased secretions yesterday requiring bronchoscopy.   Wean BiPap to trach dome today then off as able.   Aggressive pulmonary toilet.   Resume home TF schedule this morning  Discontinue IVF  Follow up AM CXR  " Kanika

## 2018-07-13 NOTE — PROGRESS NOTE ADULT - PROBLEM SELECTOR PLAN 1
s/p colonoscopy showed Internal hemorrhoids, diverticulosis.   s/p EGD. showed Gastritis, AVMs, Antral Ulcer.   resumed aspirin . Discussed with GI.  H/H stable at 8-8.5 g/dl  s/p 1 PRBC  c/w protonix 40mg IV BID   DR Huffman following.  diet advanced to regular  f/u CBC Qd

## 2018-07-13 NOTE — DIETITIAN INITIAL EVALUATION ADULT. - PERTINENT LABORATORY DATA
07-13 Na139 mmol/L Glu 111 mg/dL<H> K+ 3.6 mmol/L Cr  1.04 mg/dL BUN 10 mg/dL 07-10 Phos 2.9 mg/dL 07-09 Alb 2.9 g/dL<L> 07-11 [id number] 8.0 %<H> 07-10 Chol 108 mg/dL LDL 46 mg/dL HDL 38 mg/dL<L> Trig 121 mg/dL

## 2018-07-14 DIAGNOSIS — D69.6 THROMBOCYTOPENIA, UNSPECIFIED: ICD-10-CM

## 2018-07-14 LAB
ANION GAP SERPL CALC-SCNC: 6 MMOL/L — SIGNIFICANT CHANGE UP (ref 5–17)
BASOPHILS # BLD AUTO: 0 K/UL — SIGNIFICANT CHANGE UP (ref 0–0.2)
BASOPHILS NFR BLD AUTO: 0.8 % — SIGNIFICANT CHANGE UP (ref 0–2)
BUN SERPL-MCNC: 14 MG/DL — SIGNIFICANT CHANGE UP (ref 7–18)
CALCIUM SERPL-MCNC: 8.7 MG/DL — SIGNIFICANT CHANGE UP (ref 8.4–10.5)
CHLORIDE SERPL-SCNC: 108 MMOL/L — SIGNIFICANT CHANGE UP (ref 96–108)
CO2 SERPL-SCNC: 26 MMOL/L — SIGNIFICANT CHANGE UP (ref 22–31)
CREAT SERPL-MCNC: 1.13 MG/DL — SIGNIFICANT CHANGE UP (ref 0.5–1.3)
EOSINOPHIL # BLD AUTO: 0.2 K/UL — SIGNIFICANT CHANGE UP (ref 0–0.5)
EOSINOPHIL NFR BLD AUTO: 3.8 % — SIGNIFICANT CHANGE UP (ref 0–6)
GLUCOSE BLDC GLUCOMTR-MCNC: 141 MG/DL — HIGH (ref 70–99)
GLUCOSE BLDC GLUCOMTR-MCNC: 203 MG/DL — HIGH (ref 70–99)
GLUCOSE BLDC GLUCOMTR-MCNC: 221 MG/DL — HIGH (ref 70–99)
GLUCOSE BLDC GLUCOMTR-MCNC: 312 MG/DL — HIGH (ref 70–99)
GLUCOSE SERPL-MCNC: 151 MG/DL — HIGH (ref 70–99)
HCT VFR BLD CALC: 24 % — LOW (ref 34.5–45)
HCT VFR BLD CALC: 28 % — LOW (ref 34.5–45)
HGB BLD-MCNC: 7.5 G/DL — LOW (ref 11.5–15.5)
HGB BLD-MCNC: 8.5 G/DL — LOW (ref 11.5–15.5)
LYMPHOCYTES # BLD AUTO: 2 K/UL — SIGNIFICANT CHANGE UP (ref 1–3.3)
LYMPHOCYTES # BLD AUTO: 44.6 % — HIGH (ref 13–44)
MCHC RBC-ENTMCNC: 29.2 PG — SIGNIFICANT CHANGE UP (ref 27–34)
MCHC RBC-ENTMCNC: 29.8 PG — SIGNIFICANT CHANGE UP (ref 27–34)
MCHC RBC-ENTMCNC: 30.3 GM/DL — LOW (ref 32–36)
MCHC RBC-ENTMCNC: 31 GM/DL — LOW (ref 32–36)
MCV RBC AUTO: 96 FL — SIGNIFICANT CHANGE UP (ref 80–100)
MCV RBC AUTO: 96.4 FL — SIGNIFICANT CHANGE UP (ref 80–100)
MONOCYTES # BLD AUTO: 0.5 K/UL — SIGNIFICANT CHANGE UP (ref 0–0.9)
MONOCYTES NFR BLD AUTO: 10.3 % — SIGNIFICANT CHANGE UP (ref 2–14)
NEUTROPHILS # BLD AUTO: 1.8 K/UL — SIGNIFICANT CHANGE UP (ref 1.8–7.4)
NEUTROPHILS NFR BLD AUTO: 40.5 % — LOW (ref 43–77)
PLATELET # BLD AUTO: 57 K/UL — LOW (ref 150–400)
PLATELET # BLD AUTO: 71 K/UL — LOW (ref 150–400)
POTASSIUM SERPL-MCNC: 4 MMOL/L — SIGNIFICANT CHANGE UP (ref 3.5–5.3)
POTASSIUM SERPL-SCNC: 4 MMOL/L — SIGNIFICANT CHANGE UP (ref 3.5–5.3)
RBC # BLD: 2.5 M/UL — LOW (ref 3.8–5.2)
RBC # BLD: 2.9 M/UL — LOW (ref 3.8–5.2)
RBC # FLD: 15.9 % — HIGH (ref 10.3–14.5)
RBC # FLD: 16 % — HIGH (ref 10.3–14.5)
SODIUM SERPL-SCNC: 140 MMOL/L — SIGNIFICANT CHANGE UP (ref 135–145)
WBC # BLD: 4.4 K/UL — SIGNIFICANT CHANGE UP (ref 3.8–10.5)
WBC # BLD: 4.9 K/UL — SIGNIFICANT CHANGE UP (ref 3.8–10.5)
WBC # FLD AUTO: 4.4 K/UL — SIGNIFICANT CHANGE UP (ref 3.8–10.5)
WBC # FLD AUTO: 4.9 K/UL — SIGNIFICANT CHANGE UP (ref 3.8–10.5)

## 2018-07-14 RX ADMIN — Medication 50 MILLIGRAM(S): at 17:17

## 2018-07-14 RX ADMIN — Medication 0.12 MILLIGRAM(S): at 06:12

## 2018-07-14 RX ADMIN — Medication 2: at 16:38

## 2018-07-14 RX ADMIN — PANTOPRAZOLE SODIUM 40 MILLIGRAM(S): 20 TABLET, DELAYED RELEASE ORAL at 06:13

## 2018-07-14 RX ADMIN — INSULIN GLARGINE 8 UNIT(S): 100 INJECTION, SOLUTION SUBCUTANEOUS at 21:28

## 2018-07-14 RX ADMIN — Medication 50 MILLIGRAM(S): at 06:12

## 2018-07-14 RX ADMIN — AMLODIPINE BESYLATE 2.5 MILLIGRAM(S): 2.5 TABLET ORAL at 06:12

## 2018-07-14 RX ADMIN — LATANOPROST 1 DROP(S): 0.05 SOLUTION/ DROPS OPHTHALMIC; TOPICAL at 21:27

## 2018-07-14 RX ADMIN — PANTOPRAZOLE SODIUM 40 MILLIGRAM(S): 20 TABLET, DELAYED RELEASE ORAL at 17:17

## 2018-07-14 RX ADMIN — Medication 81 MILLIGRAM(S): at 11:11

## 2018-07-14 RX ADMIN — Medication 4: at 12:15

## 2018-07-14 NOTE — PROGRESS NOTE ADULT - PROBLEM SELECTOR PLAN 1
s/p colonoscopy showed Internal hemorrhoids, diverticulosis.   s/p EGD. showed Gastritis, AVMs, Antral Ulcer.   on aspirin . Discussed with GI.  H/H  7.5 g/dl  no melena over night.   s/p 1 PRBC  c/w protonix 40mg IV BID   DR Huffman following.  diet advanced to regular  f/u CBC Q12 hours

## 2018-07-14 NOTE — PROGRESS NOTE ADULT - PROBLEM SELECTOR PLAN 6
hx of cardiac arrest in 2011 ,s/p medtronic PPM, Cardiologist Dr. Rosado  holding Pradaxa 75mg bid   aspirin resume

## 2018-07-14 NOTE — PROGRESS NOTE ADULT - SUBJECTIVE AND OBJECTIVE BOX
PGY 2 Note     Patient is a 87y old  Female who presents with a chief complaint of melena (10 Jul 2018 01:52)      INTERVAL HPI/OVERNIGHT EVENTS: Patient seen and examined at bed side. offers no new complaints; current symptoms resolving. no melena over night     MEDICATIONS  (STANDING):  amLODIPine   Tablet 2.5 milliGRAM(s) Oral daily  aspirin enteric coated 81 milliGRAM(s) Oral daily  digoxin     Tablet 0.125 milliGRAM(s) Oral daily  insulin glargine Injectable (LANTUS) 8 Unit(s) SubCutaneous at bedtime  insulin lispro (HumaLOG) corrective regimen sliding scale   SubCutaneous three times a day before meals  latanoprost 0.005% Ophthalmic Solution 1 Drop(s) Both EYES at bedtime  metoprolol tartrate 50 milliGRAM(s) Oral every 12 hours  pantoprazole  Injectable 40 milliGRAM(s) IV Push two times a day    MEDICATIONS  (PRN):      __________________________________________________  REVIEW OF SYSTEMS:    CONSTITUTIONAL: No fever,   EYES: no acute visual disturbances  NECK: No pain or stiffness  RESPIRATORY: No cough; No shortness of breath  CARDIOVASCULAR: No chest pain, no palpitations  GASTROINTESTINAL: No pain. No nausea or vomiting; No diarrhea   NEUROLOGICAL: No headache or numbness, no tremors  MUSCULOSKELETAL: No joint pain, no muscle pain  GENITOURINARY: no dysuria, no frequency, no hesitancy  PSYCHIATRY: no depression , no anxiety  ALL OTHER  ROS negative        Vital Signs Last 24 Hrs  T(C): 36.8 (14 Jul 2018 05:12), Max: 37.1 (13 Jul 2018 20:35)  T(F): 98.2 (14 Jul 2018 05:12), Max: 98.7 (13 Jul 2018 20:35)  HR: 68 (14 Jul 2018 05:12) (62 - 68)  BP: 124/70 (14 Jul 2018 05:12) (124/70 - 137/56)  BP(mean): --  RR: 16 (14 Jul 2018 05:12) (16 - 16)  SpO2: 100% (14 Jul 2018 05:12) (100% - 100%)    ________________________________________________  PHYSICAL EXAM:  GENERAL: NAD  HEENT: Normocephalic;  conjunctivae and sclerae clear; moist mucous membranes;   NECK : supple  CHEST/LUNG: Clear to auscultation bilaterally with good air entry   HEART: S1 S2  regular; no murmurs, gallops or rubs  ABDOMEN: Soft, Nontender, Nondistended; Bowel sounds present  EXTREMITIES: no cyanosis; no edema; no calf tenderness  SKIN: warm and dry; no rash  NERVOUS SYSTEM:  Awake and alert; Oriented  to place, person and time ; no new deficits    _________________________________________________  LABS:                        7.5    4.4   )-----------( 57       ( 14 Jul 2018 07:14 )             24.0     07-14    140  |  108  |  14  ----------------------------<  151<H>  4.0   |  26  |  1.13    Ca    8.7      14 Jul 2018 07:14    TPro  6.2  /  Alb  x   /  TBili  x   /  DBili  x   /  AST  x   /  ALT  x   /  AlkPhos  x   07-12        CAPILLARY BLOOD GLUCOSE      POCT Blood Glucose.: 141 mg/dL (14 Jul 2018 08:30)  POCT Blood Glucose.: 180 mg/dL (13 Jul 2018 21:10)  POCT Blood Glucose.: 107 mg/dL (13 Jul 2018 16:13)  POCT Blood Glucose.: 139 mg/dL (13 Jul 2018 11:40)        RADIOLOGY & ADDITIONAL TESTS:    Imaging Personally Reviewed:  YES    Consultant(s) Notes Reviewed:   YES    Care Discussed with Consultants :     Plan of care was discussed with patient and /or primary care giver; all questions and concerns were addressed and care was aligned with patient's wishes.

## 2018-07-14 NOTE — PROGRESS NOTE ADULT - SUBJECTIVE AND OBJECTIVE BOX
CHIEF COMPLAINT:Patient is a 87y old  Female who presents with a chief complaint of melena .Pt appears comfortable.    	  REVIEW OF SYSTEMS:  CONSTITUTIONAL: No fever, weight loss, or fatigue  EYES: No eye pain, visual disturbances, or discharge  ENT:  No difficulty hearing, tinnitus, vertigo; No sinus or throat pain  NECK: No pain or stiffness  RESPIRATORY: No cough, wheezing, chills or hemoptysis; No Shortness of Breath  CARDIOVASCULAR: No chest pain, palpitations, passing out, dizziness, or leg swelling  GASTROINTESTINAL: No abdominal or epigastric pain. No nausea, vomiting, or hematemesis; No diarrhea or constipation. No melena or hematochezia.  GENITOURINARY: No dysuria, frequency, hematuria, or incontinence  NEUROLOGICAL: No headaches, memory loss, loss of strength, numbness, or tremors  SKIN: No itching, burning, rashes, or lesions   LYMPH Nodes: No enlarged glands  ENDOCRINE: No heat or cold intolerance; No hair loss  MUSCULOSKELETAL: No joint pain or swelling; No muscle, back, or extremity pain  PSYCHIATRIC: No depression, anxiety, mood swings, or difficulty sleeping  HEME/LYMPH: No easy bruising, or bleeding gums  ALLERGY AND IMMUNOLOGIC: No hives or eczema	      PHYSICAL EXAM:  T(C): 36.8 (07-14-18 @ 05:12), Max: 37.1 (07-13-18 @ 20:35)  HR: 68 (07-14-18 @ 05:12) (62 - 68)  BP: 124/70 (07-14-18 @ 05:12) (124/70 - 137/56)  RR: 16 (07-14-18 @ 05:12) (16 - 16)  SpO2: 100% (07-14-18 @ 05:12) (100% - 100%)      Appearance: Normal	  HEENT:   Normal oral mucosa, PERRL, EOMI	  Lymphatic: No lymphadenopathy  Cardiovascular: Normal S1 S2, No JVD, No murmurs, No edema  Respiratory: Lungs clear to auscultation	  Psychiatry: A & O x 3, Mood & affect appropriate  Gastrointestinal:  Soft, Non-tender, + BS	  Skin: No rashes, No ecchymoses, No cyanosis	  Neurologic: Non-focal  Extremities: Normal range of motion, No clubbing, cyanosis or edema  Vascular: Peripheral pulses palpable 2+ bilaterally    MEDICATIONS  (STANDING):  amLODIPine   Tablet 2.5 milliGRAM(s) Oral daily  aspirin enteric coated 81 milliGRAM(s) Oral daily  digoxin     Tablet 0.125 milliGRAM(s) Oral daily  insulin glargine Injectable (LANTUS) 8 Unit(s) SubCutaneous at bedtime  insulin lispro (HumaLOG) corrective regimen sliding scale   SubCutaneous three times a day before meals  latanoprost 0.005% Ophthalmic Solution 1 Drop(s) Both EYES at bedtime  metoprolol tartrate 50 milliGRAM(s) Oral every 12 hours  pantoprazole  Injectable 40 milliGRAM(s) IV Push two times a day      	  LABS:	 	                       7.5    4.4   )-----------( 57       ( 14 Jul 2018 07:14 )             24.0     07-14    140  |  108  |  14  ----------------------------<  151<H>  4.0   |  26  |  1.13    Ca    8.7      14 Jul 2018 07:14    TPro  6.2  /  Alb  x   /  TBili  x   /  DBili  x   /  AST  x   /  ALT  x   /  AlkPhos  x   07-12    proBNP: Serum Pro-Brain Natriuretic Peptide: 1030 pg/mL (07-10 @ 03:49)  Serum Pro-Brain Natriuretic Peptide: 1037 pg/mL (07-09 @ 22:46)    Lipid Profile: Cholesterol 108  LDL 46  HDL 38      HgA1c: Hemoglobin A1C, Whole Blood: 8.0 % (07-11 @ 09:26)    TSH: Thyroid Stimulating Hormone, Serum: 3.02 uU/mL (07-12 @ 08:55)  Thyroid Stimulating Hormone, Serum: 2.16 uU/mL (07-10 @ 03:49)

## 2018-07-14 NOTE — PROGRESS NOTE ADULT - ASSESSMENT
86 y/o F from home, lives with son, ambulate with cane, with PMHx of  DM, CHF, Glaucoma, grave's disease, HTN, cardiac arrest (s/p medtronic PPM, Cardiologist Dr. Rosado) and Vertigo and PSHx of Oophorectomy presents to ED c/o black tarry stool for 10 days.  1.Off AC for GI work-up done given low plt and h/h no AC for now.  2.H/H-stable.  3.Check spep.  4.Afib-dig .125mg qd, lopressor 50mg q12h,asa.  5.PPI.  6.Pulmonary HTN-cont norvasc.  7.Heme eval.

## 2018-07-14 NOTE — PROGRESS NOTE ADULT - PROBLEM SELECTOR PLAN 5
pt on glimepiride 4mg daily at home  Hba1c is 8  patient started on lantus 8 units.  sliding scale  Holding PO meds  -recommend Insulin on dc if patient agrees  [facility] following

## 2018-07-15 DIAGNOSIS — K92.2 GASTROINTESTINAL HEMORRHAGE, UNSPECIFIED: ICD-10-CM

## 2018-07-15 LAB
ANION GAP SERPL CALC-SCNC: 8 MMOL/L — SIGNIFICANT CHANGE UP (ref 5–17)
BASOPHILS # BLD AUTO: 0.1 K/UL — SIGNIFICANT CHANGE UP (ref 0–0.2)
BASOPHILS NFR BLD AUTO: 1.1 % — SIGNIFICANT CHANGE UP (ref 0–2)
BUN SERPL-MCNC: 23 MG/DL — HIGH (ref 7–18)
CALCIUM SERPL-MCNC: 8.6 MG/DL — SIGNIFICANT CHANGE UP (ref 8.4–10.5)
CHLORIDE SERPL-SCNC: 107 MMOL/L — SIGNIFICANT CHANGE UP (ref 96–108)
CO2 SERPL-SCNC: 23 MMOL/L — SIGNIFICANT CHANGE UP (ref 22–31)
CREAT SERPL-MCNC: 1.17 MG/DL — SIGNIFICANT CHANGE UP (ref 0.5–1.3)
EOSINOPHIL # BLD AUTO: 0.2 K/UL — SIGNIFICANT CHANGE UP (ref 0–0.5)
EOSINOPHIL NFR BLD AUTO: 3.1 % — SIGNIFICANT CHANGE UP (ref 0–6)
ERYTHROCYTE [SEDIMENTATION RATE] IN BLOOD: 49 MM/HR — HIGH (ref 0–20)
GLUCOSE BLDC GLUCOMTR-MCNC: 192 MG/DL — HIGH (ref 70–99)
GLUCOSE BLDC GLUCOMTR-MCNC: 199 MG/DL — HIGH (ref 70–99)
GLUCOSE BLDC GLUCOMTR-MCNC: 252 MG/DL — HIGH (ref 70–99)
GLUCOSE BLDC GLUCOMTR-MCNC: 294 MG/DL — HIGH (ref 70–99)
GLUCOSE SERPL-MCNC: 201 MG/DL — HIGH (ref 70–99)
HBV SURFACE AG SER-ACNC: SIGNIFICANT CHANGE UP
HCT VFR BLD CALC: 24.9 % — LOW (ref 34.5–45)
HCT VFR BLD CALC: 27.4 % — LOW (ref 34.5–45)
HCV AB S/CO SERPL IA: 10.38 S/CO — SIGNIFICANT CHANGE UP
HCV AB SERPL-IMP: REACTIVE
HGB BLD-MCNC: 7.7 G/DL — LOW (ref 11.5–15.5)
HGB BLD-MCNC: 8.6 G/DL — LOW (ref 11.5–15.5)
LYMPHOCYTES # BLD AUTO: 2.1 K/UL — SIGNIFICANT CHANGE UP (ref 1–3.3)
LYMPHOCYTES # BLD AUTO: 38.7 % — SIGNIFICANT CHANGE UP (ref 13–44)
MCHC RBC-ENTMCNC: 29.5 PG — SIGNIFICANT CHANGE UP (ref 27–34)
MCHC RBC-ENTMCNC: 30 PG — SIGNIFICANT CHANGE UP (ref 27–34)
MCHC RBC-ENTMCNC: 31.1 GM/DL — LOW (ref 32–36)
MCHC RBC-ENTMCNC: 31.5 GM/DL — LOW (ref 32–36)
MCV RBC AUTO: 94.8 FL — SIGNIFICANT CHANGE UP (ref 80–100)
MCV RBC AUTO: 95.1 FL — SIGNIFICANT CHANGE UP (ref 80–100)
MONOCYTES # BLD AUTO: 0.6 K/UL — SIGNIFICANT CHANGE UP (ref 0–0.9)
MONOCYTES NFR BLD AUTO: 10.4 % — SIGNIFICANT CHANGE UP (ref 2–14)
NEUTROPHILS # BLD AUTO: 2.6 K/UL — SIGNIFICANT CHANGE UP (ref 1.8–7.4)
NEUTROPHILS NFR BLD AUTO: 46.6 % — SIGNIFICANT CHANGE UP (ref 43–77)
PLATELET # BLD AUTO: 59 K/UL — LOW (ref 150–400)
PLATELET # BLD AUTO: 76 K/UL — LOW (ref 150–400)
POTASSIUM SERPL-MCNC: 4 MMOL/L — SIGNIFICANT CHANGE UP (ref 3.5–5.3)
POTASSIUM SERPL-SCNC: 4 MMOL/L — SIGNIFICANT CHANGE UP (ref 3.5–5.3)
PROCALCITONIN SERPL-MCNC: 0.08 NG/ML — SIGNIFICANT CHANGE UP (ref 0.02–0.1)
RBC # BLD: 2.62 M/UL — LOW (ref 3.8–5.2)
RBC # BLD: 2.74 M/UL — LOW (ref 3.8–5.2)
RBC # BLD: 2.88 M/UL — LOW (ref 3.8–5.2)
RBC # FLD: 15.4 % — HIGH (ref 10.3–14.5)
RBC # FLD: 15.5 % — HIGH (ref 10.3–14.5)
RETICS #: 107.4 K/UL — SIGNIFICANT CHANGE UP (ref 25–125)
RETICS/RBC NFR: 3.9 % — HIGH (ref 0.5–2.5)
SODIUM SERPL-SCNC: 138 MMOL/L — SIGNIFICANT CHANGE UP (ref 135–145)
WBC # BLD: 5.5 K/UL — SIGNIFICANT CHANGE UP (ref 3.8–10.5)
WBC # BLD: 6.6 K/UL — SIGNIFICANT CHANGE UP (ref 3.8–10.5)
WBC # FLD AUTO: 5.5 K/UL — SIGNIFICANT CHANGE UP (ref 3.8–10.5)
WBC # FLD AUTO: 6.6 K/UL — SIGNIFICANT CHANGE UP (ref 3.8–10.5)

## 2018-07-15 RX ORDER — INSULIN LISPRO 100/ML
VIAL (ML) SUBCUTANEOUS
Qty: 0 | Refills: 0 | Status: DISCONTINUED | OUTPATIENT
Start: 2018-07-15 | End: 2018-07-19

## 2018-07-15 RX ORDER — IRON SUCROSE 20 MG/ML
200 INJECTION, SOLUTION INTRAVENOUS EVERY 24 HOURS
Qty: 0 | Refills: 0 | Status: COMPLETED | OUTPATIENT
Start: 2018-07-15 | End: 2018-07-17

## 2018-07-15 RX ORDER — REPAGLINIDE 1 MG/1
1 TABLET ORAL
Qty: 0 | Refills: 0 | Status: DISCONTINUED | OUTPATIENT
Start: 2018-07-15 | End: 2018-07-19

## 2018-07-15 RX ADMIN — PANTOPRAZOLE SODIUM 40 MILLIGRAM(S): 20 TABLET, DELAYED RELEASE ORAL at 17:12

## 2018-07-15 RX ADMIN — Medication 3: at 12:21

## 2018-07-15 RX ADMIN — LATANOPROST 1 DROP(S): 0.05 SOLUTION/ DROPS OPHTHALMIC; TOPICAL at 22:12

## 2018-07-15 RX ADMIN — Medication 50 MILLIGRAM(S): at 05:44

## 2018-07-15 RX ADMIN — Medication 3: at 22:11

## 2018-07-15 RX ADMIN — IRON SUCROSE 110 MILLIGRAM(S): 20 INJECTION, SOLUTION INTRAVENOUS at 22:12

## 2018-07-15 RX ADMIN — Medication 81 MILLIGRAM(S): at 12:21

## 2018-07-15 RX ADMIN — AMLODIPINE BESYLATE 2.5 MILLIGRAM(S): 2.5 TABLET ORAL at 05:44

## 2018-07-15 RX ADMIN — PANTOPRAZOLE SODIUM 40 MILLIGRAM(S): 20 TABLET, DELAYED RELEASE ORAL at 05:44

## 2018-07-15 RX ADMIN — REPAGLINIDE 1 MILLIGRAM(S): 1 TABLET ORAL at 17:12

## 2018-07-15 RX ADMIN — Medication 1: at 17:08

## 2018-07-15 RX ADMIN — Medication 50 MILLIGRAM(S): at 17:13

## 2018-07-15 RX ADMIN — Medication 0.12 MILLIGRAM(S): at 05:44

## 2018-07-15 RX ADMIN — Medication 1: at 08:30

## 2018-07-15 RX ADMIN — INSULIN GLARGINE 8 UNIT(S): 100 INJECTION, SOLUTION SUBCUTANEOUS at 22:11

## 2018-07-15 NOTE — PROGRESS NOTE ADULT - PROBLEM SELECTOR PLAN 1
s/p colonoscopy showed Internal hemorrhoids, diverticulosis.   s/p EGD. showed Gastritis, AVMs, Antral Ulcer.   H/H  7.5 g/dl  no melena over night.   s/p 1 PRBC  c/w protonix 40mg IV BID with IV venofer  DR Huffman following.  regular diet  f/u CBC Q12 hours s/p colonoscopy showed Internal hemorrhoids, diverticulosis.   s/p EGD. showed Gastritis, AVMs, Antral Ulcer.   H/H  7.5 g/dl  no melena over night.   s/p 1 PRBC  c/w protonix 40mg IV BID with IV venofer  DR Huffman following.  regular diet  f/u CBC in AM

## 2018-07-15 NOTE — PROGRESS NOTE ADULT - ASSESSMENT
88 y/o F from home, lives with son, ambulate with cane, with PMHx of  DM, CHF, Glaucoma, grave's disease, HTN, cardiac arrest (s/p medtronic PPM, Cardiologist Dr. Rosado) and Vertigo and PSHx of Oophorectomy presents to ED c/o black tarry stool for 10 days.  1.Off AC for GI work-up done given low plt and h/h no AC for now.  2.H/H-stable.  3.Check spep.  4.Afib-dig .125mg qd, lopressor 50mg q12h,asa.  5.PPI.  6.Pulmonary HTN-cont norvasc.  7.Heme eval.

## 2018-07-15 NOTE — PROGRESS NOTE ADULT - PROBLEM SELECTOR PLAN 5
pt on glimepiride 4mg daily at home  Hba1c is 8  patient started on lantus 8 units along with prandin 1mg bid as per endocrinology recommendation  c/w sliding scale  [facility] following pt on glimepiride 4mg daily at home  Hba1c is 8  patient started on lantus 8 units along with prandin 1mg bid as per endocrinology recommendation  c/w sliding scale  [facility] following  diabetic education and insulin administration teaching

## 2018-07-15 NOTE — CONSULT NOTE ADULT - ASSESSMENT
88 y/o F from home, lives with son, ambulate with cane, with PMHx of  DM, CHF, Glaucoma, grave's disease, HTN, cardiac arrest (s/p medtronic PPM, Cardiologist Dr. Rosado) and Vertigo and PSHx of Oophorectomy presents to ED c/o black tarry stool for 10 days.  1.Off AC for GI work-up.  2.H/H-stable post transfusion.  3.Check spep.  4.Afib-dig .5mg ivx1, change lopressor 50mg q8h, add as id ok with GI.  5.PPI.  6.Pulmonary HTN-cont norvasc.  7.Pt reports recent ppm interrogation.
Patient is 86 y/o F from home, lives with son, ambulate with cane, with PMHx of  DM, CHF, Glaucoma, grave's disease, HTN, cardiac arrest (s/p medtronic PPM, Cardiologist Dr. Rosado) and Vertigo and PSHx of Oophorectomy presents to ED c/o black tarry stool for 10 days. Found to have un cont dm. Pt admits to fsg >200 for last few months on amaryl; 4 bid.
thrombocytopenia and GIB
The etiology for black stool and anemia in this patient can be due to:  1. NSAID induced gastric ulcer  2. Peptic ulcer disease  3. R side colonic bleeding  4. AVM's    Suggestions:    1. Monitor H/H  2. Trandfuse PRBC as needed  3. Protonix daily  4. EGD  5. Avoid NSAID  6. DVT prophylaxis  7. Check CEA level

## 2018-07-15 NOTE — CONSULT NOTE ADULT - PROBLEM SELECTOR RECOMMENDATION 9
in jan 2017, her platelet was 60-80  so, it has been stable  it is common for pt with graves disease to have thrombocytopenia  probably from autoimmune  will check b12, folate, CHARLES, hepatitis b and c

## 2018-07-15 NOTE — CONSULT NOTE ADULT - SUBJECTIVE AND OBJECTIVE BOX
87 year old lady with CHF, cardiac arrest on PPM, Graves disease many years ago, presented with dark stool for 10 days and SOB,  She has been taking NSAID for headache for 3 years.  Echo showed severe diastolic dysfunction and LVEF 68%.  no abd pain, cp, N/V?D  alert, not in distress. no palpable nodes at neck. chest is clear, abd is soft and flat, no mass, no leg edema                        7.7    5.5   )-----------( 59       ( 15 Jul 2018 07:19 )             24.9   07-15  at admission, Hb was 6 MCV 99, platelet 75    138  |  107  |  23<H>  ----------------------------<  201<H>  4.0   |  23  |  1.17    Ca    8.6      15 Jul 2018 07:19  Ferritin, Serum (07.10.18 @ 14:09)    Ferritin, Serum: 64: Note: New reference ranges effective 04/16/2018. ng/mLIron with Total Binding Capacity (07.10.18 @ 10:51)    Iron - Total Binding Capacity.: 389 ug/dL    % Saturation, Iron: 24 %    Iron Total, Serum: 92 ug/dL    Unsaturated Iron Binding Capacity: 297 ug/dL          EGD and colonoscopy showed gastric ulcer, gastric and colonic AVM with oozing

## 2018-07-15 NOTE — PROGRESS NOTE ADULT - PROBLEM SELECTOR PLAN 6
hx of cardiac arrest in 2011 ,s/p medtronic PPM, Cardiologist Dr. Rosado  holding Pradaxa 75mg bid   aspirin resume hx of cardiac arrest in 2011 ,s/p medtronic PPM, Cardiologist Dr. Rosado  holding Pradaxa 75mg bid   c/w aspirin for now

## 2018-07-15 NOTE — PROGRESS NOTE ADULT - SUBJECTIVE AND OBJECTIVE BOX
CHIEF COMPLAINT:Patient is a 87y old  Female who presents with a chief complaint of melena. Pt appears comfortable.    	  REVIEW OF SYSTEMS:  CONSTITUTIONAL: No fever, weight loss, or fatigue  EYES: No eye pain, visual disturbances, or discharge  ENT:  No difficulty hearing, tinnitus, vertigo; No sinus or throat pain  NECK: No pain or stiffness  RESPIRATORY: No cough, wheezing, chills or hemoptysis; No Shortness of Breath  CARDIOVASCULAR: No chest pain, palpitations, passing out, dizziness, or leg swelling  GASTROINTESTINAL: No abdominal or epigastric pain. No nausea, vomiting, or hematemesis; No diarrhea or constipation. No melena or hematochezia.  GENITOURINARY: No dysuria, frequency, hematuria, or incontinence  NEUROLOGICAL: No headaches, memory loss, loss of strength, numbness, or tremors  SKIN: No itching, burning, rashes, or lesions   LYMPH Nodes: No enlarged glands  ENDOCRINE: No heat or cold intolerance; No hair loss  MUSCULOSKELETAL: No joint pain or swelling; No muscle, back, or extremity pain  PSYCHIATRIC: No depression, anxiety, mood swings, or difficulty sleeping  HEME/LYMPH: No easy bruising, or bleeding gums  ALLERGY AND IMMUNOLOGIC: No hives or eczema	      PHYSICAL EXAM:  T(C): 37 (07-15-18 @ 05:15), Max: 37.3 (07-14-18 @ 21:25)  HR: 70 (07-15-18 @ 05:15) (64 - 70)  BP: 137/47 (07-15-18 @ 05:15) (125/52 - 153/60)  RR: 17 (07-15-18 @ 05:15) (16 - 17)  SpO2: 100% (07-15-18 @ 05:15) (100% - 100%)      Appearance: Normal	  HEENT:   Normal oral mucosa, PERRL, EOMI	  Lymphatic: No lymphadenopathy  Cardiovascular: Normal S1 S2, No JVD, No murmurs, No edema  Respiratory: Lungs clear to auscultation	  Psychiatry: A & O x 3, Mood & affect appropriate  Gastrointestinal:  Soft, Non-tender, + BS	  Skin: No rashes, No ecchymoses, No cyanosis	  Neurologic: Non-focal  Extremities: Normal range of motion, No clubbing, cyanosis or edema  Vascular: Peripheral pulses palpable 2+ bilaterally    MEDICATIONS  (STANDING):  amLODIPine   Tablet 2.5 milliGRAM(s) Oral daily  aspirin enteric coated 81 milliGRAM(s) Oral daily  digoxin     Tablet 0.125 milliGRAM(s) Oral daily  insulin glargine Injectable (LANTUS) 8 Unit(s) SubCutaneous at bedtime  insulin lispro (HumaLOG) corrective regimen sliding scale   SubCutaneous three times a day before meals  iron sucrose IVPB 200 milliGRAM(s) IV Intermittent every 24 hours  latanoprost 0.005% Ophthalmic Solution 1 Drop(s) Both EYES at bedtime  metoprolol tartrate 50 milliGRAM(s) Oral every 12 hours  pantoprazole  Injectable 40 milliGRAM(s) IV Push two times a day      	  LABS:	 	                        7.7    5.5   )-----------( 59       ( 15 Jul 2018 07:19 )             24.9     07-15    138  |  107  |  23<H>  ----------------------------<  201<H>  4.0   |  23  |  1.17    Ca    8.6      15 Jul 2018 07:19      proBNP: Serum Pro-Brain Natriuretic Peptide: 1030 pg/mL (07-10 @ 03:49)  Serum Pro-Brain Natriuretic Peptide: 1037 pg/mL (07-09 @ 22:46)    Lipid Profile: Cholesterol 108  LDL 46  HDL 38      HgA1c: Hemoglobin A1C, Whole Blood: 8.0 % (07-11 @ 09:26)    TSH: Thyroid Stimulating Hormone, Serum: 3.02 uU/mL (07-12 @ 08:55)  Thyroid Stimulating Hormone, Serum: 2.16 uU/mL (07-10 @ 03:49)

## 2018-07-15 NOTE — PROGRESS NOTE ADULT - ASSESSMENT
1. Anemia  2. Positive occult blood in stool  3. Colonic AVM's    Suggestions:    1. Monitor H/H  2. Transfuse PRBC as needed  3. Protonix daily  4. Capsule endoscopy out patient  5. DVT prophylaxis  6. Avoid NSAID

## 2018-07-15 NOTE — PROGRESS NOTE ADULT - SUBJECTIVE AND OBJECTIVE BOX
[   ] ICU                                          [   ] CCU                                      [  X ] Medical Floor    Patient is comfortable. No new complaints reported, No abdominal pain, N/V, hematemesis, hematochezia, melena, fever, chills, chest pain, SOB, cough or diarrhea reported.    VITALS  Vital Signs Last 24 Hrs  T(C): 37.3 (14 Jul 2018 21:25), Max: 37.3 (14 Jul 2018 21:25)  T(F): 99.2 (14 Jul 2018 21:25), Max: 99.2 (14 Jul 2018 21:25)  HR: 64 (14 Jul 2018 21:25) (64 - 68)  BP: 125/52 (14 Jul 2018 21:25) (124/70 - 153/60)   RR: 17 (14 Jul 2018 21:25) (16 - 17)  SpO2: 100% (14 Jul 2018 21:25) (100% - 100%)           MEDICATIONS  (STANDING):  amLODIPine   Tablet 2.5 milliGRAM(s) Oral daily  aspirin enteric coated 81 milliGRAM(s) Oral daily  digoxin     Tablet 0.125 milliGRAM(s) Oral daily  insulin glargine Injectable (LANTUS) 8 Unit(s) SubCutaneous at bedtime  insulin lispro (HumaLOG) corrective regimen sliding scale   SubCutaneous three times a day before meals  latanoprost 0.005% Ophthalmic Solution 1 Drop(s) Both EYES at bedtime  metoprolol tartrate 50 milliGRAM(s) Oral every 12 hours  pantoprazole  Injectable 40 milliGRAM(s) IV Push two times a day                             8.5    4.9   )-----------( 71       ( 14 Jul 2018 12:28 )             28.0       07-14    140  |  108  |  14  ----------------------------<  151<H>  4.0   |  26  |  1.13    Ca    8.7      14 Jul 2018 07:14

## 2018-07-16 LAB
ANION GAP SERPL CALC-SCNC: 8 MMOL/L — SIGNIFICANT CHANGE UP (ref 5–17)
BASOPHILS # BLD AUTO: 0 K/UL — SIGNIFICANT CHANGE UP (ref 0–0.2)
BASOPHILS NFR BLD AUTO: 1 % — SIGNIFICANT CHANGE UP (ref 0–2)
BUN SERPL-MCNC: 20 MG/DL — HIGH (ref 7–18)
CALCIUM SERPL-MCNC: 8.6 MG/DL — SIGNIFICANT CHANGE UP (ref 8.4–10.5)
CHLORIDE SERPL-SCNC: 109 MMOL/L — HIGH (ref 96–108)
CO2 SERPL-SCNC: 24 MMOL/L — SIGNIFICANT CHANGE UP (ref 22–31)
CREAT SERPL-MCNC: 0.94 MG/DL — SIGNIFICANT CHANGE UP (ref 0.5–1.3)
EOSINOPHIL # BLD AUTO: 0.2 K/UL — SIGNIFICANT CHANGE UP (ref 0–0.5)
EOSINOPHIL NFR BLD AUTO: 3.1 % — SIGNIFICANT CHANGE UP (ref 0–6)
GLUCOSE BLDC GLUCOMTR-MCNC: 161 MG/DL — HIGH (ref 70–99)
GLUCOSE BLDC GLUCOMTR-MCNC: 170 MG/DL — HIGH (ref 70–99)
GLUCOSE BLDC GLUCOMTR-MCNC: 253 MG/DL — HIGH (ref 70–99)
GLUCOSE BLDC GLUCOMTR-MCNC: 269 MG/DL — HIGH (ref 70–99)
GLUCOSE SERPL-MCNC: 142 MG/DL — HIGH (ref 70–99)
HCT VFR BLD CALC: 24 % — LOW (ref 34.5–45)
HGB BLD-MCNC: 7.5 G/DL — LOW (ref 11.5–15.5)
LYMPHOCYTES # BLD AUTO: 1.8 K/UL — SIGNIFICANT CHANGE UP (ref 1–3.3)
LYMPHOCYTES # BLD AUTO: 35.6 % — SIGNIFICANT CHANGE UP (ref 13–44)
MCHC RBC-ENTMCNC: 29.4 PG — SIGNIFICANT CHANGE UP (ref 27–34)
MCHC RBC-ENTMCNC: 31.2 GM/DL — LOW (ref 32–36)
MCV RBC AUTO: 94 FL — SIGNIFICANT CHANGE UP (ref 80–100)
MONOCYTES # BLD AUTO: 0.5 K/UL — SIGNIFICANT CHANGE UP (ref 0–0.9)
MONOCYTES NFR BLD AUTO: 9.6 % — SIGNIFICANT CHANGE UP (ref 2–14)
NEUTROPHILS # BLD AUTO: 2.6 K/UL — SIGNIFICANT CHANGE UP (ref 1.8–7.4)
NEUTROPHILS NFR BLD AUTO: 50.7 % — SIGNIFICANT CHANGE UP (ref 43–77)
PLATELET # BLD AUTO: 59 K/UL — LOW (ref 150–400)
POTASSIUM SERPL-MCNC: 3.6 MMOL/L — SIGNIFICANT CHANGE UP (ref 3.5–5.3)
POTASSIUM SERPL-SCNC: 3.6 MMOL/L — SIGNIFICANT CHANGE UP (ref 3.5–5.3)
PROT SERPL-MCNC: 5.6 G/DL — LOW (ref 6–8.3)
PROT SERPL-MCNC: 5.6 G/DL — LOW (ref 6–8.3)
RBC # BLD: 2.56 M/UL — LOW (ref 3.8–5.2)
RBC # FLD: 14.9 % — HIGH (ref 10.3–14.5)
SODIUM SERPL-SCNC: 141 MMOL/L — SIGNIFICANT CHANGE UP (ref 135–145)
WBC # BLD: 5.1 K/UL — SIGNIFICANT CHANGE UP (ref 3.8–10.5)
WBC # FLD AUTO: 5.1 K/UL — SIGNIFICANT CHANGE UP (ref 3.8–10.5)

## 2018-07-16 RX ORDER — INSULIN LISPRO 100/ML
2 VIAL (ML) SUBCUTANEOUS
Qty: 0 | Refills: 0 | Status: DISCONTINUED | OUTPATIENT
Start: 2018-07-16 | End: 2018-07-18

## 2018-07-16 RX ORDER — ACETAMINOPHEN 500 MG
650 TABLET ORAL ONCE
Qty: 0 | Refills: 0 | Status: COMPLETED | OUTPATIENT
Start: 2018-07-16 | End: 2018-07-16

## 2018-07-16 RX ADMIN — Medication 0.12 MILLIGRAM(S): at 05:25

## 2018-07-16 RX ADMIN — INSULIN GLARGINE 8 UNIT(S): 100 INJECTION, SOLUTION SUBCUTANEOUS at 21:51

## 2018-07-16 RX ADMIN — IRON SUCROSE 110 MILLIGRAM(S): 20 INJECTION, SOLUTION INTRAVENOUS at 21:51

## 2018-07-16 RX ADMIN — Medication 50 MILLIGRAM(S): at 17:56

## 2018-07-16 RX ADMIN — LATANOPROST 1 DROP(S): 0.05 SOLUTION/ DROPS OPHTHALMIC; TOPICAL at 21:52

## 2018-07-16 RX ADMIN — Medication 3: at 16:40

## 2018-07-16 RX ADMIN — REPAGLINIDE 1 MILLIGRAM(S): 1 TABLET ORAL at 08:02

## 2018-07-16 RX ADMIN — Medication 2 UNIT(S): at 16:40

## 2018-07-16 RX ADMIN — AMLODIPINE BESYLATE 2.5 MILLIGRAM(S): 2.5 TABLET ORAL at 05:25

## 2018-07-16 RX ADMIN — REPAGLINIDE 1 MILLIGRAM(S): 1 TABLET ORAL at 17:56

## 2018-07-16 RX ADMIN — Medication 650 MILLIGRAM(S): at 09:01

## 2018-07-16 RX ADMIN — Medication 3: at 12:23

## 2018-07-16 RX ADMIN — Medication 1: at 07:55

## 2018-07-16 RX ADMIN — PANTOPRAZOLE SODIUM 40 MILLIGRAM(S): 20 TABLET, DELAYED RELEASE ORAL at 17:56

## 2018-07-16 RX ADMIN — Medication 81 MILLIGRAM(S): at 12:44

## 2018-07-16 RX ADMIN — Medication 650 MILLIGRAM(S): at 08:06

## 2018-07-16 RX ADMIN — Medication 50 MILLIGRAM(S): at 05:25

## 2018-07-16 RX ADMIN — PANTOPRAZOLE SODIUM 40 MILLIGRAM(S): 20 TABLET, DELAYED RELEASE ORAL at 05:25

## 2018-07-16 RX ADMIN — Medication 1: at 21:51

## 2018-07-16 NOTE — PROGRESS NOTE ADULT - PROBLEM SELECTOR PLAN 6
hx of cardiac arrest in 2011 ,s/p medtronic PPM, Cardiologist Dr. Rosado  holding Pradaxa 75mg bid   c/w aspirin for now

## 2018-07-16 NOTE — PROGRESS NOTE ADULT - ASSESSMENT
88 y/o F from home, lives with son, ambulate with cane, with PMHx of  DM, CHF, Glaucoma, grave's disease, HTN, cardiac arrest (s/p medtronic PPM, Cardiologist Dr. Rosado) and Vertigo and PSHx of Oophorectomy presents to ED c/o black tarry stool for 10 days.  1.Off AC for GI work-up done given low plt and h/h no AC for now.  2.H/H-stable.  3.Check spep.  4.Afib-dig .125mg qd, lopressor 50mg q12h,asa.  5.PPI.  6.Pulmonary HTN-cont norvasc.  7.Heme eval appreciated.  8.Hep c+-GI f/u.

## 2018-07-16 NOTE — PROGRESS NOTE ADULT - PROBLEM SELECTOR PLAN 1
s/p colonoscopy showed Internal hemorrhoids, diverticulosis.   s/p EGD. showed Gastritis, AVMs, Antral Ulcer.   H/H  7.5 g/dl  no melena over night.   s/p 1 PRBC  c/w protonix 40mg IV BID with IV venofer  DR Huffman following.  regular diet  Hb 7.5  will monitor H/h

## 2018-07-16 NOTE — PROGRESS NOTE ADULT - SUBJECTIVE AND OBJECTIVE BOX
PGY 2 Note discussed with primary attending    Patient is a 87y old  Female who presents with a chief complaint of melena (10 Jul 2018 01:52)      INTERVAL HPI/OVERNIGHT EVENTS: patient seen  and examined at bed side complained of headaches over night,   seen offers no new complaints; current symptoms resolving    MEDICATIONS  (STANDING):  amLODIPine   Tablet 2.5 milliGRAM(s) Oral daily  aspirin enteric coated 81 milliGRAM(s) Oral daily  digoxin     Tablet 0.125 milliGRAM(s) Oral daily  insulin glargine Injectable (LANTUS) 8 Unit(s) SubCutaneous at bedtime  insulin lispro (HumaLOG) corrective regimen sliding scale   SubCutaneous Before meals and at bedtime  iron sucrose IVPB 200 milliGRAM(s) IV Intermittent every 24 hours  latanoprost 0.005% Ophthalmic Solution 1 Drop(s) Both EYES at bedtime  metoprolol tartrate 50 milliGRAM(s) Oral every 12 hours  pantoprazole  Injectable 40 milliGRAM(s) IV Push two times a day  repaglinide 1 milliGRAM(s) Oral two times a day    MEDICATIONS  (PRN):      __________________________________________________  REVIEW OF SYSTEMS:    CONSTITUTIONAL: No fever,   EYES: no acute visual disturbances  NECK: No pain or stiffness  RESPIRATORY: No cough; No shortness of breath  CARDIOVASCULAR: No chest pain, no palpitations  GASTROINTESTINAL: No pain. No nausea or vomiting; No diarrhea   NEUROLOGICAL: No headache or numbness, no tremors  MUSCULOSKELETAL: No joint pain, no muscle pain  GENITOURINARY: no dysuria, no frequency, no hesitancy  PSYCHIATRY: no depression , no anxiety  ALL OTHER  ROS negative        Vital Signs Last 24 Hrs  T(C): 36.3 (16 Jul 2018 15:00), Max: 37.2 (15 Jul 2018 20:20)  T(F): 97.4 (16 Jul 2018 15:00), Max: 98.9 (15 Jul 2018 20:20)  HR: 60 (16 Jul 2018 15:00) (59 - 67)  BP: 119/61 (16 Jul 2018 15:00) (119/61 - 163/57)  BP(mean): --  RR: 16 (16 Jul 2018 15:00) (16 - 17)  SpO2: 100% (16 Jul 2018 15:00) (98% - 100%)    ________________________________________________  PHYSICAL EXAM:  GENERAL: NAD  HEENT: Normocephalic;  conjunctivae and sclerae clear; moist mucous membranes;   NECK : supple  CHEST/LUNG: Clear to auscultation bilaterally with good air entry   HEART: S1 S2  regular; no murmurs, gallops or rubs  ABDOMEN: Soft, Nontender, Nondistended; Bowel sounds present  EXTREMITIES: no cyanosis; no edema; no calf tenderness  SKIN: warm and dry; no rash  NERVOUS SYSTEM:  Awake and alert; Oriented  to place, person and time ; no new deficits    _________________________________________________  LABS:                        7.5    5.1   )-----------( 59       ( 16 Jul 2018 07:08 )             24.0     07-16    141  |  109<H>  |  20<H>  ----------------------------<  142<H>  3.6   |  24  |  0.94    Ca    8.6      16 Jul 2018 07:08          CAPILLARY BLOOD GLUCOSE      POCT Blood Glucose.: 269 mg/dL (16 Jul 2018 11:47)  POCT Blood Glucose.: 161 mg/dL (16 Jul 2018 07:43)  POCT Blood Glucose.: 252 mg/dL (15 Jul 2018 21:15)  POCT Blood Glucose.: 199 mg/dL (15 Jul 2018 16:21)        RADIOLOGY & ADDITIONAL TESTS:        Consultant(s) Notes Reviewed:   YES    Care Discussed with Consultants :     Plan of care was discussed with patient and /or primary care giver; all questions and concerns were addressed and care was aligned with patient's wishes.

## 2018-07-16 NOTE — PROGRESS NOTE ADULT - SUBJECTIVE AND OBJECTIVE BOX
CHIEF COMPLAINT:Patient is a 87y old  Female who presents with a chief complaint of melena .Pt appears comfortable.    	  REVIEW OF SYSTEMS:  CONSTITUTIONAL: No fever, weight loss, or fatigue  EYES: No eye pain, visual disturbances, or discharge  ENT:  No difficulty hearing, tinnitus, vertigo; No sinus or throat pain  NECK: No pain or stiffness  RESPIRATORY: No cough, wheezing, chills or hemoptysis; No Shortness of Breath  CARDIOVASCULAR: No chest pain, palpitations, passing out, dizziness, or leg swelling  GASTROINTESTINAL: No abdominal or epigastric pain. No nausea, vomiting, or hematemesis; No diarrhea or constipation. No melena or hematochezia.  GENITOURINARY: No dysuria, frequency, hematuria, or incontinence  NEUROLOGICAL: No headaches, memory loss, loss of strength, numbness, or tremors  SKIN: No itching, burning, rashes, or lesions   LYMPH Nodes: No enlarged glands  ENDOCRINE: No heat or cold intolerance; No hair loss  MUSCULOSKELETAL: No joint pain or swelling; No muscle, back, or extremity pain  PSYCHIATRIC: No depression, anxiety, mood swings, or difficulty sleeping  HEME/LYMPH: No easy bruising, or bleeding gums  ALLERGY AND IMMUNOLOGIC: No hives or eczema	    PHYSICAL EXAM:  T(C): 37.2 (07-16-18 @ 05:11), Max: 37.2 (07-15-18 @ 20:20)  HR: 59 (07-16-18 @ 05:11) (59 - 88)  BP: 163/57 (07-16-18 @ 05:11) (143/55 - 163/57)  RR: 16 (07-16-18 @ 05:11) (16 - 17)  SpO2: 99% (07-16-18 @ 05:11) (98% - 100%)        Appearance: Normal	  HEENT:   Normal oral mucosa, PERRL, EOMI	  Lymphatic: No lymphadenopathy  Cardiovascular: Normal S1 S2, No JVD, No murmurs, No edema  Respiratory: Lungs clear to auscultation	  Psychiatry: A & O x 3, Mood & affect appropriate  Gastrointestinal:  Soft, Non-tender, + BS	  Skin: No rashes, No ecchymoses, No cyanosis	  Neurologic: Non-focal  Extremities: Normal range of motion, No clubbing, cyanosis or edema  Vascular: Peripheral pulses palpable 2+ bilaterally    MEDICATIONS  (STANDING):  amLODIPine   Tablet 2.5 milliGRAM(s) Oral daily  aspirin enteric coated 81 milliGRAM(s) Oral daily  digoxin     Tablet 0.125 milliGRAM(s) Oral daily  insulin glargine Injectable (LANTUS) 8 Unit(s) SubCutaneous at bedtime  insulin lispro (HumaLOG) corrective regimen sliding scale   SubCutaneous Before meals and at bedtime  iron sucrose IVPB 200 milliGRAM(s) IV Intermittent every 24 hours  latanoprost 0.005% Ophthalmic Solution 1 Drop(s) Both EYES at bedtime  metoprolol tartrate 50 milliGRAM(s) Oral every 12 hours  pantoprazole  Injectable 40 milliGRAM(s) IV Push two times a day  repaglinide 1 milliGRAM(s) Oral two times a day      LABS:	 	                        7.5    5.1   )-----------( 59       ( 16 Jul 2018 07:08 )             24.0     07-16    141  |  109<H>  |  20<H>  ----------------------------<  142<H>  3.6   |  24  |  0.94    Ca    8.6      16 Jul 2018 07:08      proBNP: Serum Pro-Brain Natriuretic Peptide: 1030 pg/mL (07-10 @ 03:49)  Serum Pro-Brain Natriuretic Peptide: 1037 pg/mL (07-09 @ 22:46)    Lipid Profile: Cholesterol 108  LDL 46  HDL 38      HgA1c: Hemoglobin A1C, Whole Blood: 8.0 % (07-11 @ 09:26)    TSH: Thyroid Stimulating Hormone, Serum: 3.02 uU/mL (07-12 @ 08:55)  Thyroid Stimulating Hormone, Serum: 2.16 uU/mL (07-10 @ 03:49)      Hepatitis c+.

## 2018-07-16 NOTE — PROGRESS NOTE ADULT - SUBJECTIVE AND OBJECTIVE BOX
condition same  still weak  not able to go to bathroom by herself  GIB from AVM  Hb stable at 7.6  ret is elevated as expected  platelet 57, stable as Jan 2017  HepC positive  will do sono of abd to see any liver cirrhosis and splenomegaly.

## 2018-07-16 NOTE — PROGRESS NOTE ADULT - PROBLEM SELECTOR PLAN 5
pt on glimepiride 4mg daily at home  Hba1c is 8  patient started on lantus 8 units along with prandin 1mg bid as per endocrinology recommendation  c/w sliding scale  Endocrine [facility] following  diabetic education and insulin administration teaching

## 2018-07-17 LAB
% ALBUMIN: 47.1 % — SIGNIFICANT CHANGE UP
% ALBUMIN: 50.3 % — SIGNIFICANT CHANGE UP
% ALPHA 1: 3.9 % — SIGNIFICANT CHANGE UP
% ALPHA 1: 4.1 % — SIGNIFICANT CHANGE UP
% ALPHA 2: 10.8 % — SIGNIFICANT CHANGE UP
% ALPHA 2: 9.9 % — SIGNIFICANT CHANGE UP
% BETA: 12.1 % — SIGNIFICANT CHANGE UP
% BETA: 12.7 % — SIGNIFICANT CHANGE UP
% GAMMA: 23.8 % — SIGNIFICANT CHANGE UP
% GAMMA: 25.3 % — SIGNIFICANT CHANGE UP
% M SPIKE: 5.7 % — SIGNIFICANT CHANGE UP
% M SPIKE: 7.5 % — SIGNIFICANT CHANGE UP
ALBUMIN SERPL ELPH-MCNC: 2.6 G/DL — LOW (ref 3.6–5.5)
ALBUMIN SERPL ELPH-MCNC: 3.1 G/DL — LOW (ref 3.6–5.5)
ALBUMIN/GLOB SERPL ELPH: 0.9 RATIO — SIGNIFICANT CHANGE UP
ALBUMIN/GLOB SERPL ELPH: 1 RATIO — SIGNIFICANT CHANGE UP
ALPHA1 GLOB SERPL ELPH-MCNC: 0.2 G/DL — SIGNIFICANT CHANGE UP (ref 0.1–0.4)
ALPHA1 GLOB SERPL ELPH-MCNC: 0.2 G/DL — SIGNIFICANT CHANGE UP (ref 0.1–0.4)
ALPHA2 GLOB SERPL ELPH-MCNC: 0.6 G/DL — SIGNIFICANT CHANGE UP (ref 0.5–1)
ALPHA2 GLOB SERPL ELPH-MCNC: 0.6 G/DL — SIGNIFICANT CHANGE UP (ref 0.5–1)
B-GLOBULIN SERPL ELPH-MCNC: 0.7 G/DL — SIGNIFICANT CHANGE UP (ref 0.5–1)
B-GLOBULIN SERPL ELPH-MCNC: 0.8 G/DL — SIGNIFICANT CHANGE UP (ref 0.5–1)
BASOPHILS # BLD AUTO: 0 K/UL — SIGNIFICANT CHANGE UP (ref 0–0.2)
BASOPHILS NFR BLD AUTO: 0.8 % — SIGNIFICANT CHANGE UP (ref 0–2)
EOSINOPHIL # BLD AUTO: 0.2 K/UL — SIGNIFICANT CHANGE UP (ref 0–0.5)
EOSINOPHIL NFR BLD AUTO: 3 % — SIGNIFICANT CHANGE UP (ref 0–6)
GAMMA GLOBULIN: 1.4 G/DL — SIGNIFICANT CHANGE UP (ref 0.6–1.6)
GAMMA GLOBULIN: 1.5 G/DL — SIGNIFICANT CHANGE UP (ref 0.6–1.6)
GLUCOSE BLDC GLUCOMTR-MCNC: 172 MG/DL — HIGH (ref 70–99)
GLUCOSE BLDC GLUCOMTR-MCNC: 199 MG/DL — HIGH (ref 70–99)
GLUCOSE BLDC GLUCOMTR-MCNC: 272 MG/DL — HIGH (ref 70–99)
GLUCOSE BLDC GLUCOMTR-MCNC: 299 MG/DL — HIGH (ref 70–99)
GLUCOSE BLDC GLUCOMTR-MCNC: 308 MG/DL — HIGH (ref 70–99)
HCT VFR BLD CALC: 25.1 % — LOW (ref 34.5–45)
HCV RNA FLD QL NAA+PROBE: SIGNIFICANT CHANGE UP
HCV RNA SPEC QL PROBE+SIG AMP: DETECTED
HGB BLD-MCNC: 7.8 G/DL — LOW (ref 11.5–15.5)
INTERPRETATION SERPL IFE-IMP: SIGNIFICANT CHANGE UP
INTERPRETATION SERPL IFE-IMP: SIGNIFICANT CHANGE UP
LDH SERPL L TO P-CCNC: 252 U/L — HIGH (ref 120–225)
LYMPHOCYTES # BLD AUTO: 2.3 K/UL — SIGNIFICANT CHANGE UP (ref 1–3.3)
LYMPHOCYTES # BLD AUTO: 36.3 % — SIGNIFICANT CHANGE UP (ref 13–44)
M-SPIKE: 0.3 G/DL — HIGH (ref 0–0)
M-SPIKE: 0.5 G/DL — HIGH (ref 0–0)
MCHC RBC-ENTMCNC: 29.2 PG — SIGNIFICANT CHANGE UP (ref 27–34)
MCHC RBC-ENTMCNC: 30.9 GM/DL — LOW (ref 32–36)
MCV RBC AUTO: 94.3 FL — SIGNIFICANT CHANGE UP (ref 80–100)
MONOCYTES # BLD AUTO: 0.6 K/UL — SIGNIFICANT CHANGE UP (ref 0–0.9)
MONOCYTES NFR BLD AUTO: 9.7 % — SIGNIFICANT CHANGE UP (ref 2–14)
NEUTROPHILS # BLD AUTO: 3.2 K/UL — SIGNIFICANT CHANGE UP (ref 1.8–7.4)
NEUTROPHILS NFR BLD AUTO: 50.2 % — SIGNIFICANT CHANGE UP (ref 43–77)
PLATELET # BLD AUTO: 66 K/UL — LOW (ref 150–400)
PROT PATTERN SERPL ELPH-IMP: SIGNIFICANT CHANGE UP
PROT SERPL-MCNC: 6.2 G/DL — SIGNIFICANT CHANGE UP (ref 6–8.3)
RBC # BLD: 2.66 M/UL — LOW (ref 3.8–5.2)
RBC # FLD: 15 % — HIGH (ref 10.3–14.5)
WBC # BLD: 6.3 K/UL — SIGNIFICANT CHANGE UP (ref 3.8–10.5)
WBC # FLD AUTO: 6.3 K/UL — SIGNIFICANT CHANGE UP (ref 3.8–10.5)

## 2018-07-17 PROCEDURE — 76700 US EXAM ABDOM COMPLETE: CPT | Mod: 26

## 2018-07-17 RX ORDER — ACETAMINOPHEN 500 MG
650 TABLET ORAL ONCE
Qty: 0 | Refills: 0 | Status: COMPLETED | OUTPATIENT
Start: 2018-07-17 | End: 2018-07-17

## 2018-07-17 RX ADMIN — Medication 50 MILLIGRAM(S): at 06:14

## 2018-07-17 RX ADMIN — REPAGLINIDE 1 MILLIGRAM(S): 1 TABLET ORAL at 06:14

## 2018-07-17 RX ADMIN — PANTOPRAZOLE SODIUM 40 MILLIGRAM(S): 20 TABLET, DELAYED RELEASE ORAL at 06:14

## 2018-07-17 RX ADMIN — PANTOPRAZOLE SODIUM 40 MILLIGRAM(S): 20 TABLET, DELAYED RELEASE ORAL at 18:41

## 2018-07-17 RX ADMIN — INSULIN GLARGINE 8 UNIT(S): 100 INJECTION, SOLUTION SUBCUTANEOUS at 22:15

## 2018-07-17 RX ADMIN — Medication 3: at 12:00

## 2018-07-17 RX ADMIN — REPAGLINIDE 1 MILLIGRAM(S): 1 TABLET ORAL at 19:01

## 2018-07-17 RX ADMIN — Medication 650 MILLIGRAM(S): at 22:06

## 2018-07-17 RX ADMIN — Medication 2 UNIT(S): at 12:00

## 2018-07-17 RX ADMIN — Medication 650 MILLIGRAM(S): at 22:36

## 2018-07-17 RX ADMIN — Medication 1: at 08:15

## 2018-07-17 RX ADMIN — IRON SUCROSE 110 MILLIGRAM(S): 20 INJECTION, SOLUTION INTRAVENOUS at 22:06

## 2018-07-17 RX ADMIN — AMLODIPINE BESYLATE 2.5 MILLIGRAM(S): 2.5 TABLET ORAL at 06:15

## 2018-07-17 RX ADMIN — Medication 2 UNIT(S): at 08:15

## 2018-07-17 RX ADMIN — LATANOPROST 1 DROP(S): 0.05 SOLUTION/ DROPS OPHTHALMIC; TOPICAL at 22:07

## 2018-07-17 RX ADMIN — Medication 50 MILLIGRAM(S): at 18:41

## 2018-07-17 RX ADMIN — Medication 81 MILLIGRAM(S): at 11:32

## 2018-07-17 RX ADMIN — Medication 3: at 22:15

## 2018-07-17 RX ADMIN — Medication 0.12 MILLIGRAM(S): at 06:14

## 2018-07-17 NOTE — CHART NOTE - NSCHARTNOTEFT_GEN_A_CORE
Reassessment:   87yFemalePatient is a 87y old  Female who presents with a chief complaint of melena (10 Jul 2018 01:52)      Factors impacting intake: [ ] none [ ] nausea  [ ] vomiting [ ] diarrhea [ ] constipation  [ ]chewing problems [ ] swallowing issues  [X ] other:     Diet Presciption: Diet, DASH/TLC:   Sodium & Cholesterol Restricted  Consistent Carbohydrate {Evening Snacks} (18 @ 15:17)    Intake: Visited pt. reports appetite improving, intake >50% & tolerating, observed breakfast tray, per flow sheet intake 100% stated no other nutrition concerns or questions, possible d/c to home today. Discussed with RN, c/w consistent carbohydrate w/evening snack, DASH/TLC, as medically feasible. RD available.      Daily Weight in k (2018 05:11)  Weight in k.9 (15 Jul 2018 05:15)  Weight in k.4 (2018 05:12)  Weight in k.9 (2018 15:03)  Weight in k.6 (10 Jul 2018 22:18)    % Weight Change: 5%    Pertinent Medications: MEDICATIONS  (STANDING):  amLODIPine   Tablet 2.5 milliGRAM(s) Oral daily  aspirin enteric coated 81 milliGRAM(s) Oral daily  digoxin     Tablet 0.125 milliGRAM(s) Oral daily  insulin glargine Injectable (LANTUS) 8 Unit(s) SubCutaneous at bedtime  insulin lispro (HumaLOG) corrective regimen sliding scale   SubCutaneous Before meals and at bedtime  insulin lispro Injectable (HumaLOG) 2 Unit(s) SubCutaneous three times a day before meals  iron sucrose IVPB 200 milliGRAM(s) IV Intermittent every 24 hours  latanoprost 0.005% Ophthalmic Solution 1 Drop(s) Both EYES at bedtime  metoprolol tartrate 50 milliGRAM(s) Oral every 12 hours  pantoprazole  Injectable 40 milliGRAM(s) IV Push two times a day  repaglinide 1 milliGRAM(s) Oral <User Schedule>    MEDICATIONS  (PRN):    Pertinent Labs:  Na141 mmol/L Glu 142 mg/dL<H> K+ 3.6 mmol/L Cr  0.94 mg/dL BUN 20 mg/dL<H>  Alb 2.6 g/dL<L> 07-11 [id number] 8.0 %<H> 07-10 Chol 108 mg/dL LDL 46 mg/dL HDL 38 mg/dL<L> Trig 121 mg/dL     CAPILLARY BLOOD GLUCOSE      POCT Blood Glucose.: 272 mg/dL (2018 11:36)  POCT Blood Glucose.: 199 mg/dL (2018 07:45)  POCT Blood Glucose.: 170 mg/dL (2018 21:24)  POCT Blood Glucose.: 253 mg/dL (2018 16:00)    Skin: Intact    Estimated Needs:   [ X] no change since previous assessment  [ ] recalculated:     Previous Nutrition Diagnosis:   [ ] Inadequate Energy Intake [ ]Inadequate Oral Intake [ ] Excessive Energy Intake   [ ] Underweight [ ] Increased Nutrient Needs [ ] Overweight/Obesity   [ ] Altered GI Function [ ] Unintended Weight Loss [ ] Food & Nutrition Related Knowledge Deficit [Severe ] Malnutrition     Nutrition Diagnosis is [ X] ongoing  [ ] resolved [ ] not applicable     New Nutrition Diagnosis: [ ] not applicable       Interventions: To meet nutrition needs   Recommend  [ ] Change Diet To:  [ ] Nutrition Supplement  [ ] Nutrition Support  [ ] Other:     Monitoring and Evaluation:   [ X] PO intake [ x ] Tolerance to diet prescription [ x ] weights [ x ] labs[ x ] follow up per protocol  [ ] other:

## 2018-07-17 NOTE — PROGRESS NOTE ADULT - SUBJECTIVE AND OBJECTIVE BOX
CHIEF COMPLAINT:Patient is a 87y old  Female who presents with a chief complaint of melena. Pt appears comfortable.    	  REVIEW OF SYSTEMS:  CONSTITUTIONAL: No fever, weight loss, or fatigue  EYES: No eye pain, visual disturbances, or discharge  ENT:  No difficulty hearing, tinnitus, vertigo; No sinus or throat pain  NECK: No pain or stiffness  RESPIRATORY: No cough, wheezing, chills or hemoptysis; No Shortness of Breath  CARDIOVASCULAR: No chest pain, palpitations, passing out, dizziness, or leg swelling  GASTROINTESTINAL: No abdominal or epigastric pain. No nausea, vomiting, or hematemesis; No diarrhea or constipation. No melena or hematochezia.  GENITOURINARY: No dysuria, frequency, hematuria, or incontinence  NEUROLOGICAL: No headaches, memory loss, loss of strength, numbness, or tremors  SKIN: No itching, burning, rashes, or lesions   LYMPH Nodes: No enlarged glands  ENDOCRINE: No heat or cold intolerance; No hair loss  MUSCULOSKELETAL: No joint pain or swelling; No muscle, back, or extremity pain  PSYCHIATRIC: No depression, anxiety, mood swings, or difficulty sleeping  HEME/LYMPH: No easy bruising, or bleeding gums  ALLERGY AND IMMUNOLOGIC: No hives or eczema	      PHYSICAL EXAM:  T(C): 36.8 (07-17-18 @ 05:46), Max: 37.1 (07-16-18 @ 21:45)  HR: 82 (07-17-18 @ 11:21) (60 - 86)  BP: 144/62 (07-17-18 @ 11:21) (119/61 - 159/73)  RR: 16 (07-17-18 @ 05:46) (16 - 16)  SpO2: 100% (07-17-18 @ 05:46) (100% - 100%)    Appearance: Normal	  HEENT:   Normal oral mucosa, PERRL, EOMI	  Lymphatic: No lymphadenopathy  Cardiovascular: Normal S1 S2, No JVD, No murmurs, No edema  Respiratory: Lungs clear to auscultation	  Psychiatry: A & O x 3, Mood & affect appropriate  Gastrointestinal:  Soft, Non-tender, + BS	  Skin: No rashes, No ecchymoses, No cyanosis	  Neurologic: Non-focal  Extremities: Normal range of motion, No clubbing, cyanosis or edema  Vascular: Peripheral pulses palpable 2+ bilaterally    MEDICATIONS  (STANDING):  amLODIPine   Tablet 2.5 milliGRAM(s) Oral daily  aspirin enteric coated 81 milliGRAM(s) Oral daily  digoxin     Tablet 0.125 milliGRAM(s) Oral daily  insulin glargine Injectable (LANTUS) 8 Unit(s) SubCutaneous at bedtime  insulin lispro (HumaLOG) corrective regimen sliding scale   SubCutaneous Before meals and at bedtime  insulin lispro Injectable (HumaLOG) 2 Unit(s) SubCutaneous three times a day before meals  iron sucrose IVPB 200 milliGRAM(s) IV Intermittent every 24 hours  latanoprost 0.005% Ophthalmic Solution 1 Drop(s) Both EYES at bedtime  metoprolol tartrate 50 milliGRAM(s) Oral every 12 hours  pantoprazole  Injectable 40 milliGRAM(s) IV Push two times a day  repaglinide 1 milliGRAM(s) Oral two times a day      	  LABS:	 	                       7.8    6.3   )-----------( 66       ( 17 Jul 2018 06:35 )             25.1     07-16    141  |  109<H>  |  20<H>  ----------------------------<  142<H>  3.6   |  24  |  0.94    Ca    8.6      16 Jul 2018 07:08    TPro  5.6<L>  /  Alb  x   /  TBili  x   /  DBili  x   /  AST  x   /  ALT  x   /  AlkPhos  x   07-16    proBNP: Serum Pro-Brain Natriuretic Peptide: 1030 pg/mL (07-10 @ 03:49)  Serum Pro-Brain Natriuretic Peptide: 1037 pg/mL (07-09 @ 22:46)    Lipid Profile: Cholesterol 108  LDL 46  HDL 38      HgA1c: Hemoglobin A1C, Whole Blood: 8.0 % (07-11 @ 09:26)    TSH: Thyroid Stimulating Hormone, Serum: 3.02 uU/mL (07-12 @ 08:55)  Thyroid Stimulating Hormone, Serum: 2.16 uU/mL (07-10 @ 03:49)      Immunofixation, Serum (07.12.18 @ 14:44)    Immunofixation, Serum:   Comigrating  IgG Kappa Band Identified and IgG Lambda Band Identified    Reference Range: None Detected

## 2018-07-17 NOTE — PROGRESS NOTE ADULT - SUBJECTIVE AND OBJECTIVE BOX
still feel weak, no fever, chills, pain,  no bleeding now  H/H still low  has small IgG K and IgG l spikes  will check light chains  I doubt it is myeloma  she said she has been anemic for long time  Hep C positive  sono did not show liver cirrhosis or splenomegaly  Hep C itself can cause thrombocytopenia.                      7.8    6.3   )-----------( 66       ( 17 Jul 2018 06:35 )             25.1 still feel weak, no fever, chills, pain,  no bleeding now  H/H still low  has small IgG K and IgG l spikes 0.3-0.5  will check light chains  I doubt it is myeloma  she said she has been anemic for long time  Hep C positive  sono did not show liver cirrhosis or splenomegaly  Hep C itself can cause thrombocytopenia.                      7.8    6.3   )-----------( 66       ( 17 Jul 2018 06:35 )             25.1

## 2018-07-17 NOTE — PROGRESS NOTE ADULT - PROBLEM SELECTOR PLAN 1
s/p colonoscopy showed Internal hemorrhoids, diverticulosis.   s/p EGD. showed Gastritis, AVMs, Antral Ulcer.   H/H  7.5 g/dl  no melena over night.   s/p 1 PRBC  c/w protonix 40mg IV BID with IV venofer  DR Huffman following.  regular diet  Hb 7.5-->7.8  will monitor H/h

## 2018-07-17 NOTE — PROGRESS NOTE ADULT - SUBJECTIVE AND OBJECTIVE BOX
PGY 2 Note discussed with  primary attending    Patient is a 87y old  Female who presents with a chief complaint of melena (10 Jul 2018 01:52)      INTERVAL HPI/OVERNIGHT EVENTS: offers no new complaints; current symptoms resolving    MEDICATIONS  (STANDING):  amLODIPine   Tablet 2.5 milliGRAM(s) Oral daily  aspirin enteric coated 81 milliGRAM(s) Oral daily  digoxin     Tablet 0.125 milliGRAM(s) Oral daily  insulin glargine Injectable (LANTUS) 8 Unit(s) SubCutaneous at bedtime  insulin lispro (HumaLOG) corrective regimen sliding scale   SubCutaneous Before meals and at bedtime  insulin lispro Injectable (HumaLOG) 2 Unit(s) SubCutaneous three times a day before meals  iron sucrose IVPB 200 milliGRAM(s) IV Intermittent every 24 hours  latanoprost 0.005% Ophthalmic Solution 1 Drop(s) Both EYES at bedtime  metoprolol tartrate 50 milliGRAM(s) Oral every 12 hours  pantoprazole  Injectable 40 milliGRAM(s) IV Push two times a day  repaglinide 1 milliGRAM(s) Oral two times a day    MEDICATIONS  (PRN):      __________________________________________________  REVIEW OF SYSTEMS:    CONSTITUTIONAL: No fever,   EYES: no acute visual disturbances  NECK: No pain or stiffness  RESPIRATORY: No cough; No shortness of breath  CARDIOVASCULAR: No chest pain, no palpitations  GASTROINTESTINAL: No pain. No nausea or vomiting; No diarrhea   NEUROLOGICAL: No headache or numbness, no tremors  MUSCULOSKELETAL: No joint pain, no muscle pain  GENITOURINARY: no dysuria, no frequency, no hesitancy  PSYCHIATRY: no depression , no anxiety  ALL OTHER  ROS negative        Vital Signs Last 24 Hrs  T(C): 36.8 (17 Jul 2018 14:56), Max: 37.1 (16 Jul 2018 21:45)  T(F): 98.2 (17 Jul 2018 14:56), Max: 98.8 (16 Jul 2018 21:45)  HR: 71 (17 Jul 2018 14:56) (62 - 86)  BP: 126/68 (17 Jul 2018 14:56) (126/68 - 159/73)  BP(mean): --  RR: 16 (17 Jul 2018 14:56) (16 - 16)  SpO2: 97% (17 Jul 2018 14:56) (97% - 100%)    ________________________________________________  PHYSICAL EXAM:  GENERAL: NAD  HEENT: Normocephalic;  conjunctivae and sclerae clear; moist mucous membranes;   NECK : supple  CHEST/LUNG: Clear to auscultation bilaterally with good air entry   HEART: S1 S2  regular; no murmurs, gallops or rubs  ABDOMEN: Soft, Nontender, Nondistended; Bowel sounds present  EXTREMITIES: no cyanosis; no edema; no calf tenderness  SKIN: warm and dry; no rash  NERVOUS SYSTEM:  Awake and alert; Oriented  to place, person and time ; no new deficits    _________________________________________________  LABS:                        7.8    6.3   )-----------( 66       ( 17 Jul 2018 06:35 )             25.1     07-16    141  |  109<H>  |  20<H>  ----------------------------<  142<H>  3.6   |  24  |  0.94    Ca    8.6      16 Jul 2018 07:08    TPro  5.6<L>  /  Alb  2.6<L>  /  TBili  x   /  DBili  x   /  AST  x   /  ALT  x   /  AlkPhos  x   07-16        CAPILLARY BLOOD GLUCOSE      POCT Blood Glucose.: 272 mg/dL (17 Jul 2018 11:36)  POCT Blood Glucose.: 199 mg/dL (17 Jul 2018 07:45)  POCT Blood Glucose.: 170 mg/dL (16 Jul 2018 21:24)  POCT Blood Glucose.: 253 mg/dL (16 Jul 2018 16:00)        RADIOLOGY & ADDITIONAL TESTS:    Consultant(s) Notes Reviewed:   YES    Care Discussed with Consultants :     Plan of care was discussed with patient and /or primary care giver; all questions and concerns were addressed and care was aligned with patient's wishes.

## 2018-07-18 LAB
% GAMMA, URINE: 10.9 % — SIGNIFICANT CHANGE UP
ALBUMIN 24H MFR UR ELPH: 67.8 % — SIGNIFICANT CHANGE UP
ALBUMIN SERPL ELPH-MCNC: 2.3 G/DL — LOW (ref 3.5–5)
ALP SERPL-CCNC: 60 U/L — SIGNIFICANT CHANGE UP (ref 40–120)
ALPHA1 GLOB 24H MFR UR ELPH: 5.7 % — SIGNIFICANT CHANGE UP
ALPHA2 GLOB 24H MFR UR ELPH: 5.8 % — SIGNIFICANT CHANGE UP
ALT FLD-CCNC: 33 U/L DA — SIGNIFICANT CHANGE UP (ref 10–60)
ANA PAT FLD IF-IMP: ABNORMAL
ANA TITR SER: ABNORMAL
ANION GAP SERPL CALC-SCNC: 7 MMOL/L — SIGNIFICANT CHANGE UP (ref 5–17)
AST SERPL-CCNC: 45 U/L — HIGH (ref 10–40)
B-GLOBULIN 24H MFR UR ELPH: 9.8 % — SIGNIFICANT CHANGE UP
BASOPHILS # BLD AUTO: 0.1 K/UL — SIGNIFICANT CHANGE UP (ref 0–0.2)
BASOPHILS NFR BLD AUTO: 0.9 % — SIGNIFICANT CHANGE UP (ref 0–2)
BILIRUB SERPL-MCNC: 0.2 MG/DL — SIGNIFICANT CHANGE UP (ref 0.2–1.2)
BUN SERPL-MCNC: 22 MG/DL — HIGH (ref 7–18)
CALCIUM SERPL-MCNC: 8.8 MG/DL — SIGNIFICANT CHANGE UP (ref 8.4–10.5)
CHLORIDE SERPL-SCNC: 111 MMOL/L — HIGH (ref 96–108)
CO2 SERPL-SCNC: 25 MMOL/L — SIGNIFICANT CHANGE UP (ref 22–31)
CREAT SERPL-MCNC: 1.15 MG/DL — SIGNIFICANT CHANGE UP (ref 0.5–1.3)
CREATININE, URINE RESULT: 30 MG/DL — SIGNIFICANT CHANGE UP
EOSINOPHIL # BLD AUTO: 0.2 K/UL — SIGNIFICANT CHANGE UP (ref 0–0.5)
EOSINOPHIL NFR BLD AUTO: 2.8 % — SIGNIFICANT CHANGE UP (ref 0–6)
GLUCOSE BLDC GLUCOMTR-MCNC: 182 MG/DL — HIGH (ref 70–99)
GLUCOSE BLDC GLUCOMTR-MCNC: 198 MG/DL — HIGH (ref 70–99)
GLUCOSE BLDC GLUCOMTR-MCNC: 198 MG/DL — HIGH (ref 70–99)
GLUCOSE BLDC GLUCOMTR-MCNC: 273 MG/DL — HIGH (ref 70–99)
GLUCOSE BLDC GLUCOMTR-MCNC: 323 MG/DL — HIGH (ref 70–99)
GLUCOSE SERPL-MCNC: 180 MG/DL — HIGH (ref 70–99)
HAPTOGLOB SERPL-MCNC: 69 MG/DL — SIGNIFICANT CHANGE UP (ref 34–200)
HCT VFR BLD CALC: 23.7 % — LOW (ref 34.5–45)
HGB BLD-MCNC: 7.5 G/DL — LOW (ref 11.5–15.5)
INTERPRETATION 24H UR IFE-IMP: SIGNIFICANT CHANGE UP
LYMPHOCYTES # BLD AUTO: 2.6 K/UL — SIGNIFICANT CHANGE UP (ref 1–3.3)
LYMPHOCYTES # BLD AUTO: 38.5 % — SIGNIFICANT CHANGE UP (ref 13–44)
M PROTEIN 24H UR ELPH-MRATE: PRESENT
MAGNESIUM SERPL-MCNC: 2 MG/DL — SIGNIFICANT CHANGE UP (ref 1.6–2.6)
MCHC RBC-ENTMCNC: 30 PG — SIGNIFICANT CHANGE UP (ref 27–34)
MCHC RBC-ENTMCNC: 31.7 GM/DL — LOW (ref 32–36)
MCV RBC AUTO: 94.8 FL — SIGNIFICANT CHANGE UP (ref 80–100)
MONOCYTES # BLD AUTO: 0.7 K/UL — SIGNIFICANT CHANGE UP (ref 0–0.9)
MONOCYTES NFR BLD AUTO: 10.5 % — SIGNIFICANT CHANGE UP (ref 2–14)
NEUTROPHILS # BLD AUTO: 3.1 K/UL — SIGNIFICANT CHANGE UP (ref 1.8–7.4)
NEUTROPHILS NFR BLD AUTO: 47.3 % — SIGNIFICANT CHANGE UP (ref 43–77)
PHOSPHATE SERPL-MCNC: 2.5 MG/DL — SIGNIFICANT CHANGE UP (ref 2.5–4.5)
PLATELET # BLD AUTO: 64 K/UL — LOW (ref 150–400)
POTASSIUM SERPL-MCNC: 4 MMOL/L — SIGNIFICANT CHANGE UP (ref 3.5–5.3)
POTASSIUM SERPL-SCNC: 4 MMOL/L — SIGNIFICANT CHANGE UP (ref 3.5–5.3)
PROT ?TM UR-MCNC: 36 MG/DL — HIGH (ref 0–12)
PROT ?TM UR-MCNC: 36 MG/DL — HIGH (ref 0–12)
PROT PATTERN 24H UR ELPH-IMP: SIGNIFICANT CHANGE UP
PROT SERPL-MCNC: 6.2 G/DL — SIGNIFICANT CHANGE UP (ref 6–8.3)
RBC # BLD: 2.5 M/UL — LOW (ref 3.8–5.2)
RBC # FLD: 15.7 % — HIGH (ref 10.3–14.5)
SODIUM SERPL-SCNC: 143 MMOL/L — SIGNIFICANT CHANGE UP (ref 135–145)
TOTAL VOLUME - 24 HOUR: SIGNIFICANT CHANGE UP ML
URINE CREATININE CALCULATION: SIGNIFICANT CHANGE UP G/24 H (ref 0.8–1.8)
WBC # BLD: 6.6 K/UL — SIGNIFICANT CHANGE UP (ref 3.8–10.5)
WBC # FLD AUTO: 6.6 K/UL — SIGNIFICANT CHANGE UP (ref 3.8–10.5)

## 2018-07-18 RX ORDER — INSULIN LISPRO 100/ML
4 VIAL (ML) SUBCUTANEOUS
Qty: 0 | Refills: 0 | Status: DISCONTINUED | OUTPATIENT
Start: 2018-07-18 | End: 2018-07-19

## 2018-07-18 RX ORDER — INSULIN GLARGINE 100 [IU]/ML
12 INJECTION, SOLUTION SUBCUTANEOUS AT BEDTIME
Qty: 0 | Refills: 0 | Status: DISCONTINUED | OUTPATIENT
Start: 2018-07-18 | End: 2018-07-19

## 2018-07-18 RX ADMIN — Medication 4 UNIT(S): at 16:32

## 2018-07-18 RX ADMIN — Medication 1: at 08:18

## 2018-07-18 RX ADMIN — Medication 50 MILLIGRAM(S): at 17:12

## 2018-07-18 RX ADMIN — Medication 4 UNIT(S): at 12:13

## 2018-07-18 RX ADMIN — Medication 200 MILLIGRAM(S): at 17:28

## 2018-07-18 RX ADMIN — AMLODIPINE BESYLATE 2.5 MILLIGRAM(S): 2.5 TABLET ORAL at 05:36

## 2018-07-18 RX ADMIN — Medication 50 MILLIGRAM(S): at 05:36

## 2018-07-18 RX ADMIN — LATANOPROST 1 DROP(S): 0.05 SOLUTION/ DROPS OPHTHALMIC; TOPICAL at 21:39

## 2018-07-18 RX ADMIN — PANTOPRAZOLE SODIUM 40 MILLIGRAM(S): 20 TABLET, DELAYED RELEASE ORAL at 17:13

## 2018-07-18 RX ADMIN — Medication 81 MILLIGRAM(S): at 12:14

## 2018-07-18 RX ADMIN — Medication 3: at 16:32

## 2018-07-18 RX ADMIN — REPAGLINIDE 1 MILLIGRAM(S): 1 TABLET ORAL at 08:18

## 2018-07-18 RX ADMIN — Medication 4: at 12:14

## 2018-07-18 RX ADMIN — PANTOPRAZOLE SODIUM 40 MILLIGRAM(S): 20 TABLET, DELAYED RELEASE ORAL at 05:36

## 2018-07-18 RX ADMIN — Medication 1: at 21:38

## 2018-07-18 RX ADMIN — INSULIN GLARGINE 12 UNIT(S): 100 INJECTION, SOLUTION SUBCUTANEOUS at 21:38

## 2018-07-18 RX ADMIN — REPAGLINIDE 1 MILLIGRAM(S): 1 TABLET ORAL at 17:12

## 2018-07-18 RX ADMIN — Medication 0.12 MILLIGRAM(S): at 05:36

## 2018-07-18 NOTE — PROGRESS NOTE ADULT - PROBLEM SELECTOR PLAN 1
s/p colonoscopy showed Internal hemorrhoids, diverticulosis.   s/p EGD. showed Gastritis, AVMs, Antral Ulcer.   H/H  7.5 g/dl  no melena over night.   s/p 1 PRBC  c/w protonix 40mg IV BID with IV venofer  DR Huffman following.  regular diet  Hb 7.5-->7.8--> 7.6  will transfuse 1 PRBC today   pt completed IV  venofer   will discharge her tomorrow on oral Iron   will monitor H/h

## 2018-07-18 NOTE — PROGRESS NOTE ADULT - PROBLEM SELECTOR PLAN 4
metoprolol changed to 50 TID. s/p 1 dose dig.   HR now in 60s  monitor BP closely
pt on glimepiride 4mg daily at home  hold glimepiride now  f/u A1C   keep NPO, on IVF   monitor FS q6hrs
metoprolol changed to 50 TID. s/p 1 dose dig.   HR now in 60s  monitor BP closely
pt on glimepiride 4mg daily at home  Hba1c is 8  patient started on lantus 8 units.  sliding scale  Holding PO meds  -recommend Insulin on dc if patient agrees  Endocrine [facility] following
pt on glimepiride 4mg daily at home  Hba1c is 8  patient started on lantus 8 units.  sliding scale  Holding PO meds  -recommend Insulin on dc if patient agrees  Endocrine [facility] following

## 2018-07-18 NOTE — PROGRESS NOTE ADULT - ASSESSMENT
1. Anemia  2. AVM's  3. No evidence of acute GI bleeding    Suggestions:    1. [place] H/H  2. Transfuse PRBC  3. Avoid NSAID  4. Capsule endoscopy outpatient  5. Protonix daily  6. DVT prophylaxis

## 2018-07-18 NOTE — PROGRESS NOTE ADULT - PROBLEM SELECTOR PROBLEM 5
DM (diabetes mellitus)
Cardiac arrest
DM (diabetes mellitus)

## 2018-07-18 NOTE — PROGRESS NOTE ADULT - SUBJECTIVE AND OBJECTIVE BOX
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

## 2018-07-18 NOTE — PROGRESS NOTE ADULT - SUBJECTIVE AND OBJECTIVE BOX
PGY 2 Note discussed with  primary attending    Patient is a 87y old  Female who presents with a chief complaint of melena (10 Jul 2018 01:52)      INTERVAL HPI/OVERNIGHT EVENTS: offers no new complaints; current symptoms resolving    MEDICATIONS  (STANDING):  amLODIPine   Tablet 2.5 milliGRAM(s) Oral daily  aspirin enteric coated 81 milliGRAM(s) Oral daily  digoxin     Tablet 0.125 milliGRAM(s) Oral daily  insulin glargine Injectable (LANTUS) 12 Unit(s) SubCutaneous at bedtime  insulin lispro (HumaLOG) corrective regimen sliding scale   SubCutaneous Before meals and at bedtime  insulin lispro Injectable (HumaLOG) 4 Unit(s) SubCutaneous three times a day before meals  latanoprost 0.005% Ophthalmic Solution 1 Drop(s) Both EYES at bedtime  metoprolol tartrate 50 milliGRAM(s) Oral every 12 hours  pantoprazole  Injectable 40 milliGRAM(s) IV Push two times a day  repaglinide 1 milliGRAM(s) Oral two times a day    MEDICATIONS  (PRN):      __________________________________________________  REVIEW OF SYSTEMS:    CONSTITUTIONAL: No fever,   EYES: no acute visual disturbances  NECK: No pain or stiffness  RESPIRATORY: No cough; No shortness of breath  CARDIOVASCULAR: No chest pain, no palpitations  GASTROINTESTINAL: No pain. No nausea or vomiting; No diarrhea   NEUROLOGICAL: No headache or numbness, no tremors  MUSCULOSKELETAL: No joint pain, no muscle pain  GENITOURINARY: no dysuria, no frequency, no hesitancy  PSYCHIATRY: no depression , no anxiety  ALL OTHER  ROS negative        Vital Signs Last 24 Hrs  T(C): 37.2 (18 Jul 2018 05:24), Max: 37.3 (17 Jul 2018 21:20)  T(F): 98.9 (18 Jul 2018 05:24), Max: 99.1 (17 Jul 2018 21:20)  HR: 61 (18 Jul 2018 05:24) (60 - 71)  BP: 127/54 (18 Jul 2018 05:24) (118/59 - 127/54)  BP(mean): --  RR: 16 (18 Jul 2018 05:24) (16 - 17)  SpO2: 95% (18 Jul 2018 05:24) (95% - 99%)    ________________________________________________  PHYSICAL EXAM:  GENERAL: NAD  HEENT: Normocephalic;  conjunctivae and sclerae clear; moist mucous membranes;   NECK : supple  CHEST/LUNG: Clear to auscultation bilaterally with good air entry   HEART: S1 S2  regular; no murmurs, gallops or rubs  ABDOMEN: Soft, Nontender, Nondistended; Bowel sounds present  EXTREMITIES: no cyanosis; no edema; no calf tenderness  SKIN: warm and dry; no rash  NERVOUS SYSTEM:  Awake and alert; Oriented  to place, person and time ; no new deficits    _________________________________________________  LABS:                        7.5    6.6   )-----------( 64       ( 18 Jul 2018 06:14 )             23.7     07-18    143  |  111<H>  |  22<H>  ----------------------------<  180<H>  4.0   |  25  |  1.15    Ca    8.8      18 Jul 2018 06:14  Phos  2.5     07-18  Mg     2.0     07-18    TPro  6.2  /  Alb  2.3<L>  /  TBili  0.2  /  DBili  x   /  AST  45<H>  /  ALT  33  /  AlkPhos  60  07-18        CAPILLARY BLOOD GLUCOSE      POCT Blood Glucose.: 323 mg/dL (18 Jul 2018 11:36)  POCT Blood Glucose.: 182 mg/dL (18 Jul 2018 07:39)  POCT Blood Glucose.: 299 mg/dL (17 Jul 2018 22:11)  POCT Blood Glucose.: 308 mg/dL (17 Jul 2018 21:54)  POCT Blood Glucose.: 172 mg/dL (17 Jul 2018 16:17)        RADIOLOGY & ADDITIONAL TESTS:        Consultant(s) Notes Reviewed:   YES    Care Discussed with Consultants :     Plan of care was discussed with patient and /or primary care giver; all questions and concerns were addressed and care was aligned with patient's wishes.

## 2018-07-18 NOTE — PROGRESS NOTE ADULT - PROBLEM SELECTOR PROBLEM 2
CHF (congestive heart failure)
Thrombocytopenia

## 2018-07-18 NOTE — PROGRESS NOTE ADULT - NEGATIVE GENERAL GENITOURINARY SYMPTOMS
no hematuria/no renal colic/no dysuria
no dysuria/no renal colic/no nocturia/no hematuria
no hematuria/no dysuria/no renal colic/no incontinence

## 2018-07-18 NOTE — PROGRESS NOTE ADULT - SUBJECTIVE AND OBJECTIVE BOX
[   ] ICU                                          [   ] CCU                                      [ X  ] Medical Floor    Patient is comfortable. No new complaints reported, No abdominal pain, N/V, hematemesis, hematochezia, melena, fever, chills, chest pain, SOB, cough or diarrhea reported.    VITALS  Vital Signs Last 24 Hrs  T(C): 36.4 (18 Jul 2018 17:11), Max: 37.3 (17 Jul 2018 21:20)  T(F): 97.6 (18 Jul 2018 17:11), Max: 99.1 (17 Jul 2018 21:20)  HR: 59 (18 Jul 2018 17:11) (59 - 61)  BP: 142/65 (18 Jul 2018 17:11) (118/59 - 149/67)   RR: 18 (18 Jul 2018 17:11) (16 - 18)  SpO2: 100% (18 Jul 2018 17:11) (95% - 100%)       MEDICATIONS  (STANDING):  amLODIPine   Tablet 2.5 milliGRAM(s) Oral daily  aspirin enteric coated 81 milliGRAM(s) Oral daily  digoxin     Tablet 0.125 milliGRAM(s) Oral daily  insulin glargine Injectable (LANTUS) 12 Unit(s) SubCutaneous at bedtime  insulin lispro (HumaLOG) corrective regimen sliding scale   SubCutaneous Before meals and at bedtime  insulin lispro Injectable (HumaLOG) 4 Unit(s) SubCutaneous three times a day before meals  latanoprost 0.005% Ophthalmic Solution 1 Drop(s) Both EYES at bedtime  metoprolol tartrate 50 milliGRAM(s) Oral every 12 hours  pantoprazole  Injectable 40 milliGRAM(s) IV Push two times a day  repaglinide 1 milliGRAM(s) Oral two times a day    MEDICATIONS  (PRN):  guaiFENesin   Syrup  (Sugar-Free) 200 milliGRAM(s) Oral every 6 hours PRN Cough                            7.5    6.6   )-----------( 64       ( 18 Jul 2018 06:14 )             23.7       07-18    143  |  111<H>  |  22<H>  ----------------------------<  180<H>  4.0   |  25  |  1.15    Ca    8.8      18 Jul 2018 06:14  Phos  2.5     07-18  Mg     2.0     07-18    TPro  6.2  /  Alb  2.3<L>  /  TBili  0.2  /  DBili  x   /  AST  45<H>  /  ALT  33  /  AlkPhos  60  07-18

## 2018-07-18 NOTE — PROGRESS NOTE ADULT - ATTENDING COMMENTS
AF (Atrial Fibrillation)  pt had cardioversion in 7/11, recurrence in 5/12 s/p cardioversion, subsequent recurrence, s/p albation 7/3/12  NO A/C  Asthma    Esophageal atresia    H/O: Pneumothorax    Ischemic bowel disease
Patient is seen and examined. Case reviewed with the medical team. Above note is appreciated. Will follow up clinically. Continue DVT prophylaxis. Case discussed with Cardiology, and GI and endocrine. Will follow up with EGD.  Will start lantus.
Patient is seen and examined. Case reviewed with the medical team. Above note is appreciated. Will follow up clinically. Continue DVT prophylaxis. Case discussed with Cardiology and endocrine. continue lantus. Will add prandin.
Patient is seen and examined. Case reviewed with the medical team. Above note is appreciated. Will follow up clinically. Continue DVT prophylaxis. Case discussed with Cardiology, and GI and endocrine. CBC stable. Diet advanced. continue lantus insulin. spoke with family in detail. They have spoken to Dr. Sierra from endocrine about inhaled insulin as patient lives alone and hard for her to inject insulin.
Patient is seen and examined. Case reviewed with the medical team. Above note is appreciated. Will follow up clinically. Continue DVT prophylaxis. Case discussed with Cardiology , Endocrinology and oncology. Will transfuse one unit of blood today and follow up CBC. For discharge planning. Diabetic insulin training.
Patient is seen and examined. Case reviewed with the medical team. Above note is appreciated. Will follow up clinically. Continue DVT prophylaxis. Case discussed with Cardiology and Gi and hematology. Spoke with family who states the patient was diagnosed with Hep C and was told is to old for treatment. Will follow up viral load and follow up. Follow up CBC for thrombocytopenia.
Patient is seen and examined. Case reviewed with the medical team. Above note is appreciated. Will follow up clinically. Continue DVT prophylaxis. Case discussed with Cardiology, and GI and endocrine. Follow up CBC. Diabetic teaching.
Patient is seen and examined. Case reviewed with the medical team. Above note is appreciated. Will follow up clinically. Continue DVT prophylaxis. Case discussed with Cardiology, and endocrine. Spoke with family in detail and all questions answered. Doing better . Walking around and sitting in the chair. Will follow up CBC and if any lower than 7.5 Will give PRBC.
Patient is seen and examined. Case reviewed with the medical team. Above note is appreciated. Will follow up clinically. Continue DVT prophylaxis. Case discussed with Cardiology, and GI and endocrine. Will continue to follow up CBC. Continue Lantus. Need diabetic education and on how to inject insulin.

## 2018-07-18 NOTE — PROGRESS NOTE ADULT - SUBJECTIVE AND OBJECTIVE BOX
CHIEF COMPLAINT:Patient is a 87y old  Female who presents with a chief complaint of melena .Pt appears comfortable.    	  REVIEW OF SYSTEMS:  CONSTITUTIONAL: No fever, weight loss, or fatigue  EYES: No eye pain, visual disturbances, or discharge  ENT:  No difficulty hearing, tinnitus, vertigo; No sinus or throat pain  NECK: No pain or stiffness  RESPIRATORY: No cough, wheezing, chills or hemoptysis; No Shortness of Breath  CARDIOVASCULAR: No chest pain, palpitations, passing out, dizziness, or leg swelling  GASTROINTESTINAL: No abdominal or epigastric pain. No nausea, vomiting, or hematemesis; No diarrhea or constipation. No melena or hematochezia.  GENITOURINARY: No dysuria, frequency, hematuria, or incontinence  NEUROLOGICAL: No headaches, memory loss, loss of strength, numbness, or tremors  SKIN: No itching, burning, rashes, or lesions   LYMPH Nodes: No enlarged glands  ENDOCRINE: No heat or cold intolerance; No hair loss  MUSCULOSKELETAL: No joint pain or swelling; No muscle, back, or extremity pain  PSYCHIATRIC: No depression, anxiety, mood swings, or difficulty sleeping  HEME/LYMPH: No easy bruising, or bleeding gums  ALLERGY AND IMMUNOLOGIC: No hives or eczema	      PHYSICAL EXAM:  T(C): 37.2 (07-18-18 @ 05:24), Max: 37.3 (07-17-18 @ 21:20)  HR: 61 (07-18-18 @ 05:24) (60 - 82)  BP: 127/54 (07-18-18 @ 05:24) (118/59 - 144/62)  RR: 16 (07-18-18 @ 05:24) (16 - 17)  SpO2: 95% (07-18-18 @ 05:24) (95% - 99%)  Wt(kg): --  I&O's Summary      Appearance: Normal	  HEENT:   Normal oral mucosa, PERRL, EOMI	  Lymphatic: No lymphadenopathy  Cardiovascular: Normal S1 S2, No JVD, No murmurs, No edema  Respiratory: Lungs clear to auscultation	  Psychiatry: A & O x 3, Mood & affect appropriate  Gastrointestinal:  Soft, Non-tender, + BS	  Skin: No rashes, No ecchymoses, No cyanosis	  Neurologic: Non-focal  Extremities: Normal range of motion, No clubbing, cyanosis or edema  Vascular: Peripheral pulses palpable 2+ bilaterally    MEDICATIONS  (STANDING):  amLODIPine   Tablet 2.5 milliGRAM(s) Oral daily  aspirin enteric coated 81 milliGRAM(s) Oral daily  digoxin     Tablet 0.125 milliGRAM(s) Oral daily  insulin glargine Injectable (LANTUS) 12 Unit(s) SubCutaneous at bedtime  insulin lispro (HumaLOG) corrective regimen sliding scale   SubCutaneous Before meals and at bedtime  insulin lispro Injectable (HumaLOG) 4 Unit(s) SubCutaneous three times a day before meals  latanoprost 0.005% Ophthalmic Solution 1 Drop(s) Both EYES at bedtime  metoprolol tartrate 50 milliGRAM(s) Oral every 12 hours  pantoprazole  Injectable 40 milliGRAM(s) IV Push two times a day  repaglinide 1 milliGRAM(s) Oral two times a day      LABS:	 	                          7.5    6.6   )-----------( 64       ( 18 Jul 2018 06:14 )             23.7     07-18    143  |  111<H>  |  22<H>  ----------------------------<  180<H>  4.0   |  25  |  1.15    Ca    8.8      18 Jul 2018 06:14  Phos  2.5     07-18  Mg     2.0     07-18    TPro  6.2  /  Alb  2.3<L>  /  TBili  0.2  /  DBili  x   /  AST  45<H>  /  ALT  33  /  AlkPhos  60  07-18    proBNP: Serum Pro-Brain Natriuretic Peptide: 1030 pg/mL (07-10 @ 03:49)  Serum Pro-Brain Natriuretic Peptide: 1037 pg/mL (07-09 @ 22:46)    Lipid Profile: Cholesterol 108  LDL 46  HDL 38      HgA1c: Hemoglobin A1C, Whole Blood: 8.0 % (07-11 @ 09:26)    TSH: Thyroid Stimulating Hormone, Serum: 3.02 uU/mL (07-12 @ 08:55)  Thyroid Stimulating Hormone, Serum: 2.16 uU/mL (07-10 @ 03:49)      EXAM:  US ABDOMEN COMPLETE                            PROCEDURE DATE:  07/17/2018          INTERPRETATION:  EXAM: US ABDOMEN COMPLETE   INDICATION: liver cirrhosis/ splenomegaly. Low platelet count.  COMPARISON: None.    TECHNIQUE: Grayscale ultrasound of the abdomen was performed.    FINDINGS:  Liver: Liver contour appears noncirrhotic. Normal echogenicity and   echotexture. No focal hepatic mass is demonstrated. Measures 14.6 cm in   craniocaudal span. Portal and hepatic veins are patent.    Bile ducts: No intra or extrahepatic biliary ductal dilatation. Common   duct = 6 mm.    Gallbladder: Cholecystectomy.    Pancreas: Visualized pancreatic head and body are unremarkable. The   pancreatic tail is obscured by bowel gas.     Right kidney:No hydronephrosis. Measures 10.0 x 4.5 x 4.7 cm.  Left kidney: No hydronephrosis. Measures 10.5 x 3.4 x 4.7 cm. There is a   1.5 cm cyst in the lower pole.    Spleen: Normal size, 8.7 cm.    Peritoneum: No ascites.    Proximal Aorta & IVC: Visualizedabdominal aorta and IVC are grossly   unremarkable.    IMPRESSION:  Liver contour appears noncirrhotic. No splenomegaly.

## 2018-07-18 NOTE — PROGRESS NOTE ADULT - NEGATIVE RESPIRATORY AND THORAX SYMPTOMS
no wheezing/no cough/no dyspnea/no hemoptysis
no cough/no dyspnea/no hemoptysis/no wheezing
no cough/no hemoptysis/no wheezing/no dyspnea

## 2018-07-18 NOTE — PROGRESS NOTE ADULT - PROBLEM SELECTOR PROBLEM 6
Cardiac arrest
Need for prophylactic measure
Cardiac arrest
Need for prophylactic measure
Need for prophylactic measure

## 2018-07-18 NOTE — PROGRESS NOTE ADULT - PROBLEM SELECTOR PLAN 3
hx CHF, no SOB currently and no LE edema  repeat echo shows EF >55%. grossly normal LV systolic function. Severe TR  PPM check -> medtronic
pt on metoprolol 200mg daily and amlodipine 5mg , lisinopril 5mg daily at home  /62 mmHg in ER  hold all BP meds for now  monitor BP closely
hx CHF, no SOB currently and no LE edema  repeat echo shows EF >55%. grossly normal LV systolic function. Severe TR  PPM check -> medtronic
metoprolol changed to 50 TID. s/p 1 dose dig.   HR now in 60s  monitor BP closely
metoprolol changed to 50 TID. s/p 1 dose dig.   HR now in 60s  monitor BP closely

## 2018-07-18 NOTE — PROGRESS NOTE ADULT - NEUROLOGICAL DETAILS
Yes
responds to pain/alert and oriented x 3/responds to verbal commands/sensation intact
responds to pain/responds to verbal commands/sensation intact
responds to verbal commands/responds to pain/sensation intact

## 2018-07-18 NOTE — PROGRESS NOTE ADULT - NEGATIVE ENMT SYMPTOMS
no hearing difficulty/no gum bleeding/no dry mouth/no dysphagia/no ear pain/no throat pain/no nose bleeds
no dysphagia/no ear pain/no gum bleeding/no dry mouth/no throat pain/no hearing difficulty
no hearing difficulty/no gum bleeding/no throat pain/no dysphagia/no ear pain/no dry mouth

## 2018-07-18 NOTE — PROGRESS NOTE ADULT - GASTROINTESTINAL DETAILS
bowel sounds normal/no bruit/no guarding/no distention/no rebound tenderness/no rigidity/nontender/soft

## 2018-07-18 NOTE — PROGRESS NOTE ADULT - PROBLEM SELECTOR PROBLEM 4
HTN (hypertension)
DM (diabetes mellitus)
HTN (hypertension)

## 2018-07-18 NOTE — PROGRESS NOTE ADULT - NEGATIVE NEUROLOGICAL SYMPTOMS
no tremors/no syncope/no vertigo/no headache
no syncope/no headache/no tremors/no vertigo/no confusion
no vertigo/no syncope/no headache/no tremors

## 2018-07-18 NOTE — PROGRESS NOTE ADULT - PROBLEM SELECTOR PROBLEM 3
CHF (congestive heart failure)
HTN (hypertension)
CHF (congestive heart failure)
HTN (hypertension)
HTN (hypertension)

## 2018-07-18 NOTE — PROGRESS NOTE ADULT - NEGATIVE GASTROINTESTINAL SYMPTOMS
no diarrhea/no hematochezia/no jaundice/no abdominal pain/no melena/no nausea/no vomiting
no nausea/no vomiting/no hematochezia/no jaundice/no abdominal pain/no melena
no vomiting/no diarrhea/no melena/no hematochezia/no jaundice/no nausea/no abdominal pain

## 2018-07-18 NOTE — PROGRESS NOTE ADULT - NEGATIVE CARDIOVASCULAR SYMPTOMS
no chest pain/no palpitations/no orthopnea
no chest pain/no palpitations/no orthopnea
no palpitations/no chest pain/no orthopnea

## 2018-07-18 NOTE — PROGRESS NOTE ADULT - ASSESSMENT
86 y/o F from home, lives with son, ambulate with cane, with PMHx of  DM, CHF, Glaucoma, grave's disease, HTN, cardiac arrest (s/p medtronic PPM, Cardiologist Dr. Rosado) and Vertigo and PSHx of Oophorectomy presents to ED c/o black tarry stool for 10 days.  1.Off AC for GI work-up done given low plt and h/h no AC for now.  2.H/H-stable.  3.Check spep +, heme f/u..  4.Afib-dig .125mg qd, lopressor 50mg q12h,asa.  5.PPI.  6.Pulmonary HTN-cont norvasc.

## 2018-07-18 NOTE — PROGRESS NOTE ADULT - NEGATIVE MUSCULOSKELETAL SYMPTOMS
no arthralgia/no neck pain/no stiffness/no muscle cramps
no muscle cramps/no stiffness/no arthralgia/no neck pain
no neck pain/no muscle cramps/no stiffness/no arthralgia

## 2018-07-19 ENCOUNTER — TRANSCRIPTION ENCOUNTER (OUTPATIENT)
Age: 83
End: 2018-07-19

## 2018-07-19 VITALS
RESPIRATION RATE: 17 BRPM | TEMPERATURE: 98 F | HEART RATE: 66 BPM | SYSTOLIC BLOOD PRESSURE: 100 MMHG | DIASTOLIC BLOOD PRESSURE: 66 MMHG | OXYGEN SATURATION: 100 %

## 2018-07-19 LAB
ANTI-RIBONUCLEAR PROTEIN: <0.2 AI — SIGNIFICANT CHANGE UP
DSDNA AB FLD-ACNC: <0.2 AI — SIGNIFICANT CHANGE UP
DSDNA AB SER-ACNC: <12 IU/ML — SIGNIFICANT CHANGE UP
ENA SS-A AB FLD IA-ACNC: <0.2 AI — SIGNIFICANT CHANGE UP
GLUCOSE BLDC GLUCOMTR-MCNC: 195 MG/DL — HIGH (ref 70–99)
GLUCOSE BLDC GLUCOMTR-MCNC: 91 MG/DL — SIGNIFICANT CHANGE UP (ref 70–99)
HCT VFR BLD CALC: 27.6 % — LOW (ref 34.5–45)
HGB BLD-MCNC: 8.8 G/DL — LOW (ref 11.5–15.5)
KAPPA LC SER QL IFE: 6.93 MG/DL — HIGH (ref 0.33–1.94)
KAPPA/LAMBDA FREE LIGHT CHAIN RATIO, SERUM: 1.59 RATIO — SIGNIFICANT CHANGE UP (ref 0.26–1.65)
LAMBDA LC SER QL IFE: 4.35 MG/DL — HIGH (ref 0.57–2.63)
MCHC RBC-ENTMCNC: 29.8 PG — SIGNIFICANT CHANGE UP (ref 27–34)
MCHC RBC-ENTMCNC: 32 GM/DL — SIGNIFICANT CHANGE UP (ref 32–36)
MCV RBC AUTO: 92.9 FL — SIGNIFICANT CHANGE UP (ref 80–100)
PLATELET # BLD AUTO: 65 K/UL — LOW (ref 150–400)
RBC # BLD: 2.97 M/UL — LOW (ref 3.8–5.2)
RBC # FLD: 16.3 % — HIGH (ref 10.3–14.5)
RHEUMATOID FACT SERPL-ACNC: 52 IU/ML — HIGH (ref 0–13)
WBC # BLD: 7.8 K/UL — SIGNIFICANT CHANGE UP (ref 3.8–10.5)
WBC # FLD AUTO: 7.8 K/UL — SIGNIFICANT CHANGE UP (ref 3.8–10.5)

## 2018-07-19 PROCEDURE — 83935 ASSAY OF URINE OSMOLALITY: CPT

## 2018-07-19 PROCEDURE — 86334 IMMUNOFIX E-PHORESIS SERUM: CPT

## 2018-07-19 PROCEDURE — 83521 IG LIGHT CHAINS FREE EACH: CPT

## 2018-07-19 PROCEDURE — 82550 ASSAY OF CK (CPK): CPT

## 2018-07-19 PROCEDURE — 84165 PROTEIN E-PHORESIS SERUM: CPT

## 2018-07-19 PROCEDURE — 76700 US EXAM ABDOM COMPLETE: CPT

## 2018-07-19 PROCEDURE — 80061 LIPID PANEL: CPT

## 2018-07-19 PROCEDURE — 36415 COLL VENOUS BLD VENIPUNCTURE: CPT

## 2018-07-19 PROCEDURE — 86146 BETA-2 GLYCOPROTEIN ANTIBODY: CPT

## 2018-07-19 PROCEDURE — 85652 RBC SED RATE AUTOMATED: CPT

## 2018-07-19 PROCEDURE — 99285 EMERGENCY DEPT VISIT HI MDM: CPT | Mod: 25

## 2018-07-19 PROCEDURE — 83010 ASSAY OF HAPTOGLOBIN QUANT: CPT

## 2018-07-19 PROCEDURE — 84484 ASSAY OF TROPONIN QUANT: CPT

## 2018-07-19 PROCEDURE — 85730 THROMBOPLASTIN TIME PARTIAL: CPT

## 2018-07-19 PROCEDURE — 84300 ASSAY OF URINE SODIUM: CPT

## 2018-07-19 PROCEDURE — 82306 VITAMIN D 25 HYDROXY: CPT

## 2018-07-19 PROCEDURE — 83036 HEMOGLOBIN GLYCOSYLATED A1C: CPT

## 2018-07-19 PROCEDURE — 82962 GLUCOSE BLOOD TEST: CPT

## 2018-07-19 PROCEDURE — 84156 ASSAY OF PROTEIN URINE: CPT

## 2018-07-19 PROCEDURE — 82272 OCCULT BLD FECES 1-3 TESTS: CPT

## 2018-07-19 PROCEDURE — 86901 BLOOD TYPING SEROLOGIC RH(D): CPT

## 2018-07-19 PROCEDURE — 97161 PT EVAL LOW COMPLEX 20 MIN: CPT

## 2018-07-19 PROCEDURE — 82746 ASSAY OF FOLIC ACID SERUM: CPT

## 2018-07-19 PROCEDURE — 86900 BLOOD TYPING SEROLOGIC ABO: CPT

## 2018-07-19 PROCEDURE — 85027 COMPLETE CBC AUTOMATED: CPT

## 2018-07-19 PROCEDURE — 86923 COMPATIBILITY TEST ELECTRIC: CPT

## 2018-07-19 PROCEDURE — 84540 ASSAY OF URINE/UREA-N: CPT

## 2018-07-19 PROCEDURE — 36430 TRANSFUSION BLD/BLD COMPNT: CPT

## 2018-07-19 PROCEDURE — 93005 ELECTROCARDIOGRAM TRACING: CPT

## 2018-07-19 PROCEDURE — 86147 CARDIOLIPIN ANTIBODY EA IG: CPT

## 2018-07-19 PROCEDURE — 97116 GAIT TRAINING THERAPY: CPT

## 2018-07-19 PROCEDURE — 86431 RHEUMATOID FACTOR QUANT: CPT

## 2018-07-19 PROCEDURE — 83550 IRON BINDING TEST: CPT

## 2018-07-19 PROCEDURE — 82436 ASSAY OF URINE CHLORIDE: CPT

## 2018-07-19 PROCEDURE — 82378 CARCINOEMBRYONIC ANTIGEN: CPT

## 2018-07-19 PROCEDURE — 85610 PROTHROMBIN TIME: CPT

## 2018-07-19 PROCEDURE — 86803 HEPATITIS C AB TEST: CPT

## 2018-07-19 PROCEDURE — 82553 CREATINE MB FRACTION: CPT

## 2018-07-19 PROCEDURE — 83615 LACTATE (LD) (LDH) ENZYME: CPT

## 2018-07-19 PROCEDURE — 82728 ASSAY OF FERRITIN: CPT

## 2018-07-19 PROCEDURE — 86225 DNA ANTIBODY NATIVE: CPT

## 2018-07-19 PROCEDURE — 82803 BLOOD GASES ANY COMBINATION: CPT

## 2018-07-19 PROCEDURE — 82570 ASSAY OF URINE CREATININE: CPT

## 2018-07-19 PROCEDURE — P9040: CPT

## 2018-07-19 PROCEDURE — 83880 ASSAY OF NATRIURETIC PEPTIDE: CPT

## 2018-07-19 PROCEDURE — 87340 HEPATITIS B SURFACE AG IA: CPT

## 2018-07-19 PROCEDURE — 93306 TTE W/DOPPLER COMPLETE: CPT

## 2018-07-19 PROCEDURE — 80053 COMPREHEN METABOLIC PANEL: CPT

## 2018-07-19 PROCEDURE — 84166 PROTEIN E-PHORESIS/URINE/CSF: CPT

## 2018-07-19 PROCEDURE — 81001 URINALYSIS AUTO W/SCOPE: CPT

## 2018-07-19 PROCEDURE — 86235 NUCLEAR ANTIGEN ANTIBODY: CPT

## 2018-07-19 PROCEDURE — 84145 PROCALCITONIN (PCT): CPT

## 2018-07-19 PROCEDURE — 87521 HEPATITIS C PROBE&RVRS TRNSC: CPT

## 2018-07-19 PROCEDURE — 86038 ANTINUCLEAR ANTIBODIES: CPT

## 2018-07-19 PROCEDURE — 83735 ASSAY OF MAGNESIUM: CPT

## 2018-07-19 PROCEDURE — 84100 ASSAY OF PHOSPHORUS: CPT

## 2018-07-19 PROCEDURE — 85045 AUTOMATED RETICULOCYTE COUNT: CPT

## 2018-07-19 PROCEDURE — 96374 THER/PROPH/DIAG INJ IV PUSH: CPT

## 2018-07-19 PROCEDURE — 82607 VITAMIN B-12: CPT

## 2018-07-19 PROCEDURE — 84155 ASSAY OF PROTEIN SERUM: CPT

## 2018-07-19 PROCEDURE — 80048 BASIC METABOLIC PNL TOTAL CA: CPT

## 2018-07-19 PROCEDURE — 84443 ASSAY THYROID STIM HORMONE: CPT

## 2018-07-19 PROCEDURE — 86850 RBC ANTIBODY SCREEN: CPT

## 2018-07-19 RX ORDER — ENOXAPARIN SODIUM 100 MG/ML
8 INJECTION SUBCUTANEOUS
Qty: 0 | Refills: 0 | DISCHARGE
Start: 2018-07-19 | End: 2018-08-17

## 2018-07-19 RX ORDER — ASCORBIC ACID 60 MG
1 TABLET,CHEWABLE ORAL
Qty: 30 | Refills: 0
Start: 2018-07-19 | End: 2018-08-17

## 2018-07-19 RX ORDER — PANTOPRAZOLE SODIUM 20 MG/1
1 TABLET, DELAYED RELEASE ORAL
Qty: 30 | Refills: 0 | OUTPATIENT
Start: 2018-07-19 | End: 2018-08-17

## 2018-07-19 RX ORDER — DIGOXIN 250 MCG
1 TABLET ORAL
Qty: 0 | Refills: 0 | COMMUNITY
Start: 2018-07-19

## 2018-07-19 RX ORDER — IBUPROFEN 200 MG
1 TABLET ORAL
Qty: 0 | Refills: 0 | COMMUNITY

## 2018-07-19 RX ORDER — METOPROLOL TARTRATE 50 MG
1 TABLET ORAL
Qty: 60 | Refills: 0 | OUTPATIENT
Start: 2018-07-19 | End: 2018-08-17

## 2018-07-19 RX ORDER — LATANOPROST 0.05 MG/ML
1 SOLUTION/ DROPS OPHTHALMIC; TOPICAL
Qty: 0 | Refills: 0 | DISCHARGE
Start: 2018-07-19

## 2018-07-19 RX ORDER — AMLODIPINE BESYLATE 2.5 MG/1
1 TABLET ORAL
Qty: 0 | Refills: 0 | COMMUNITY

## 2018-07-19 RX ORDER — LATANOPROST 0.05 MG/ML
1 SOLUTION/ DROPS OPHTHALMIC; TOPICAL
Qty: 0 | Refills: 0 | COMMUNITY

## 2018-07-19 RX ORDER — ENOXAPARIN SODIUM 100 MG/ML
12 INJECTION SUBCUTANEOUS
Qty: 0 | Refills: 0 | DISCHARGE
Start: 2018-07-19 | End: 2018-08-17

## 2018-07-19 RX ORDER — AMLODIPINE BESYLATE 2.5 MG/1
1 TABLET ORAL
Qty: 30 | Refills: 0 | OUTPATIENT
Start: 2018-07-19 | End: 2018-08-17

## 2018-07-19 RX ORDER — ASPIRIN/CALCIUM CARB/MAGNESIUM 324 MG
1 TABLET ORAL
Qty: 0 | Refills: 0 | COMMUNITY
Start: 2018-07-19

## 2018-07-19 RX ORDER — FERROUS FUMARATE 350(115)MG
1 TABLET ORAL
Qty: 30 | Refills: 0 | OUTPATIENT
Start: 2018-07-19 | End: 2018-08-17

## 2018-07-19 RX ORDER — ASPIRIN/CALCIUM CARB/MAGNESIUM 324 MG
1 TABLET ORAL
Qty: 0 | Refills: 0 | DISCHARGE
Start: 2018-07-19 | End: 2018-08-17

## 2018-07-19 RX ORDER — ENOXAPARIN SODIUM 100 MG/ML
12 INJECTION SUBCUTANEOUS
Qty: 1 | Refills: 0 | OUTPATIENT
Start: 2018-07-19 | End: 2018-08-17

## 2018-07-19 RX ORDER — METOPROLOL TARTRATE 50 MG
1 TABLET ORAL
Qty: 0 | Refills: 0 | COMMUNITY

## 2018-07-19 RX ORDER — GLIMEPIRIDE 1 MG
1 TABLET ORAL
Qty: 0 | Refills: 0 | COMMUNITY

## 2018-07-19 RX ORDER — ENOXAPARIN SODIUM 100 MG/ML
15 INJECTION SUBCUTANEOUS
Qty: 0 | Refills: 0 | COMMUNITY
Start: 2018-07-19 | End: 2018-08-17

## 2018-07-19 RX ORDER — AMLODIPINE BESYLATE 2.5 MG/1
1 TABLET ORAL
Qty: 0 | Refills: 0 | COMMUNITY
Start: 2018-07-19

## 2018-07-19 RX ORDER — DIGOXIN 250 MCG
1 TABLET ORAL
Qty: 30 | Refills: 0
Start: 2018-07-19 | End: 2018-08-17

## 2018-07-19 RX ORDER — REPAGLINIDE 1 MG/1
1 TABLET ORAL
Qty: 0 | Refills: 0 | COMMUNITY
Start: 2018-07-19

## 2018-07-19 RX ORDER — METOPROLOL TARTRATE 50 MG
1 TABLET ORAL
Qty: 0 | Refills: 0 | COMMUNITY
Start: 2018-07-19

## 2018-07-19 RX ORDER — ASPIRIN/CALCIUM CARB/MAGNESIUM 324 MG
1 TABLET ORAL
Qty: 30 | Refills: 0
Start: 2018-07-19 | End: 2018-08-17

## 2018-07-19 RX ORDER — DABIGATRAN ETEXILATE MESYLATE 150 MG/1
1 CAPSULE ORAL
Qty: 0 | Refills: 0 | COMMUNITY

## 2018-07-19 RX ORDER — REPAGLINIDE 1 MG/1
1 TABLET ORAL
Qty: 60 | Refills: 0 | OUTPATIENT
Start: 2018-07-19 | End: 2018-08-17

## 2018-07-19 RX ORDER — LISINOPRIL 2.5 MG/1
1 TABLET ORAL
Qty: 0 | Refills: 0 | COMMUNITY

## 2018-07-19 RX ORDER — ERGOCALCIFEROL 1.25 MG/1
1 CAPSULE ORAL
Qty: 0 | Refills: 0 | COMMUNITY

## 2018-07-19 RX ORDER — SENNA PLUS 8.6 MG/1
2 TABLET ORAL
Qty: 60 | Refills: 0 | OUTPATIENT
Start: 2018-07-19 | End: 2018-08-17

## 2018-07-19 RX ADMIN — Medication 81 MILLIGRAM(S): at 12:01

## 2018-07-19 RX ADMIN — Medication 50 MILLIGRAM(S): at 05:19

## 2018-07-19 RX ADMIN — REPAGLINIDE 1 MILLIGRAM(S): 1 TABLET ORAL at 05:19

## 2018-07-19 RX ADMIN — PANTOPRAZOLE SODIUM 40 MILLIGRAM(S): 20 TABLET, DELAYED RELEASE ORAL at 05:19

## 2018-07-19 RX ADMIN — Medication 0.12 MILLIGRAM(S): at 05:19

## 2018-07-19 RX ADMIN — Medication 4 UNIT(S): at 08:11

## 2018-07-19 RX ADMIN — AMLODIPINE BESYLATE 2.5 MILLIGRAM(S): 2.5 TABLET ORAL at 05:19

## 2018-07-19 RX ADMIN — Medication 4 UNIT(S): at 12:00

## 2018-07-19 RX ADMIN — Medication 1: at 12:00

## 2018-07-19 NOTE — PROGRESS NOTE ADULT - SUBJECTIVE AND OBJECTIVE BOX
feel good, stronger  she thinks she is able to manage at home  chest is clear, abd is soft  no leg edema  no bleeding noticed  Anemia from GIB secondary to AVM  it seems stopped now  she got 1 unit of PRBC yesterday and 3 doses of venofer  will continue po iron at home  thrombocytopenia probably related to hep C, will see GI for treatment.

## 2018-07-19 NOTE — DISCHARGE NOTE ADULT - SECONDARY DIAGNOSIS.
Thrombocytopenia HTN (hypertension) Afib CHF (congestive heart failure) Hepatitis C DM (diabetes mellitus)

## 2018-07-19 NOTE — DISCHARGE NOTE ADULT - CARE PROVIDER_API CALL
Jose Daniel Huffman), Medicine  [address] [place] [address]  Phone: [phone]  Fax: [phone]    Ovidio Cohn), Internal Medicine; Medical Oncology  [address] [place] [address]  Phone: [phone]  Fax: [phone]    Santos Quintana (), Medicine  [address] [place], [place] [id number]  Phone: [phone]  Fax: [phone]

## 2018-07-19 NOTE — DISCHARGE NOTE ADULT - MEDICATION SUMMARY - MEDICATIONS TO TAKE
I will START or STAY ON the medications listed below when I get home from the hospital:    aspirin 81 mg oral delayed release tablet  -- 1 tab(s) by mouth once a day  -- Indication: For CVD    digoxin 125 mcg (0.125 mg) oral tablet  -- 1 tab(s) by mouth once a day  -- Indication: For Afib     repaglinide 1 mg oral tablet  -- 1 tab(s) by mouth 2 times a day  -- Indication: For Diabetes     Lantus Solostar Pen 100 units/mL subcutaneous solution  -- 12 unit(s) subcutaneous once a day (at bedtime)   -- Do not drink alcoholic beverages when taking this medication.  It is very important that you take or use this exactly as directed.  Do not skip doses or discontinue unless directed by your doctor.  Keep in refrigerator.  Do not freeze.    -- Indication: For DM (diabetes mellitus)    metoprolol tartrate 50 mg oral tablet  -- 1 tab(s) by mouth every 12 hours  -- Indication: For HTN (hypertension)    amLODIPine 5 mg oral tablet  -- 1 tab(s) by mouth once a day   -- It is very important that you take or use this exactly as directed.  Do not skip doses or discontinue unless directed by your doctor.  Some non-prescription drugs may aggravate your condition.  Read all labels carefully.  If a warning appears, check with your doctor before taking.    -- Indication: For HTN (hypertension)    guaiFENesin 100 mg/5 mL oral liquid  -- 10 milliliter(s) by mouth every 6 hours, As needed, Cough  -- Indication: For Cough     ferrous fumarate 325 mg (106 mg elemental iron) oral tablet  -- 1 tab(s) by mouth once a day   -- May discolor urine or feces.    -- Indication: For Anemia     senna oral tablet  -- 2 tab(s) by mouth once a day (at bedtime)   -- Indication: For Constipation     latanoprost 0.005% ophthalmic solution  -- 1 drop(s) to each affected eye once a day (at bedtime)  -- Indication: For Glucoma     Protonix 40 mg oral delayed release tablet  -- 1 tab(s) by mouth once a day   -- It is very important that you take or use this exactly as directed.  Do not skip doses or discontinue unless directed by your doctor.  Obtain medical advice before taking any non-prescription drugs as some may affect the action of this medication.  Swallow whole.  Do not crush.    -- Indication: For Gastric ulcer     Vitamin C 500 mg oral tablet  -- 1 tab(s) by mouth once a day   -- Indication: For supplement I will START or STAY ON the medications listed below when I get home from the hospital:    alcohol swab   -- 1   -- Indication: For Diabetes    insulin pen needles   -- Indication: For Diabetes     aspirin 81 mg oral delayed release tablet  -- 1 tab(s) by mouth once a day  -- Indication: For CVD    digoxin 125 mcg (0.125 mg) oral tablet  -- 1 tab(s) by mouth once a day  -- Indication: For Afib     repaglinide 1 mg oral tablet  -- 1 tab(s) by mouth 2 times a day  -- Indication: For Diabetes     Lantus Solostar Pen 100 units/mL subcutaneous solution  -- 12 unit(s) subcutaneous once a day (at bedtime)   -- Do not drink alcoholic beverages when taking this medication.  It is very important that you take or use this exactly as directed.  Do not skip doses or discontinue unless directed by your doctor.  Keep in refrigerator.  Do not freeze.    -- Indication: For DM (diabetes mellitus)    metoprolol tartrate 50 mg oral tablet  -- 1 tab(s) by mouth every 12 hours  -- Indication: For HTN (hypertension)    amLODIPine 2.5 mg oral tablet  -- 1 tab(s) by mouth once a day  -- Indication: For HTN (hypertension)    guaiFENesin 100 mg/5 mL oral liquid  -- 10 milliliter(s) by mouth every 6 hours, As needed, Cough  -- Indication: For Cough     ferrous fumarate 325 mg (106 mg elemental iron) oral tablet  -- 1 tab(s) by mouth once a day   -- May discolor urine or feces.    -- Indication: For Anemia     senna oral tablet  -- 2 tab(s) by mouth once a day (at bedtime)   -- Indication: For Constipation     latanoprost 0.005% ophthalmic solution  -- 1 drop(s) to each affected eye once a day (at bedtime)  -- Indication: For Glucoma     Protonix 40 mg oral delayed release tablet  -- 1 tab(s) by mouth once a day   -- It is very important that you take or use this exactly as directed.  Do not skip doses or discontinue unless directed by your doctor.  Obtain medical advice before taking any non-prescription drugs as some may affect the action of this medication.  Swallow whole.  Do not crush.    -- Indication: For Gastric ulcer     Vitamin C 500 mg oral tablet  -- 1 tab(s) by mouth once a day   -- Indication: For supplement

## 2018-07-19 NOTE — DISCHARGE NOTE ADULT - MEDICATION SUMMARY - MEDICATIONS TO STOP TAKING
I will STOP taking the medications listed below when I get home from the hospital:    acetaminophen 325 mg oral tablet  -- 2 tab(s) by mouth every 6 hours, As needed, Mild Pain (1 - 3)    lisinopril 5 mg oral tablet  -- 1 tab(s) by mouth once a day    Pradaxa 75 mg oral capsule  -- 1 cap(s) by mouth 2 times a day    ergocalciferol 50,000 intl units (1.25 mg) oral capsule  -- 1 cap(s) by mouth once a week    Aleve 220 mg oral tablet  -- 1 tab(s) by mouth every 8 hours, As Needed    Advil 200 mg oral tablet  -- 1 tab(s) by mouth every 6 hours, As Needed    glimepiride 4 mg oral tablet  -- 1 tab(s) by mouth 2 times a day

## 2018-07-19 NOTE — DISCHARGE NOTE ADULT - HOSPITAL COURSE
Patient is 86 y/o F from home, lives with son, ambulate with cane, with PMHx of  DM, CHF, Glaucoma, grave's disease, HTN, cardiac arrest (s/p medtronic PPM, Cardiologist Dr. Rosado) and Vertigo and PSHx of Oophorectomy presents to ED c/o black tarry stool for 10 days. Pt also had associated SOB, worse with exertion and occasionally associated with chest discomfort. Pt states that she now gets SOB after walking x5 feet whereas before, pt was able to walk across a room with no problems. Pt had chronic headache and on Advil/ Aleve/ tylenol PRN for at last 3 yrs. Pt last EGD and colonoscopy was 30 yrs ago. Pt had TTE on 01/2017 show severe diastolic dysfunction and EF 68%, NST last yr normal.  Pt denies fever, chills, chest pain, abd pain,  nausea, vomiting, diarrhea, constipation, diaphoresis or any other complaints.     ED course, pt VS temp 98.4, HR 82, /62, RR 19 and SO2 98%, Labs shows Hb 7.0,(baseline Hb 12 ),  FOBT positve, UA negative,  s/p 1 PRBC and protonix 80mg IV x1 in ED. (10 Jul 2018 01:52)    patient admitted with Melena.  Plan: s/p colonoscopy showed Internal hemorrhoids, diverticulosis. s/p EGD. showed Gastritis, AVMs, Antral Ulcer. treated with protonix For anemia paitent was treated with blood transfusion 2 PRBC and IV Venofer x3 dosed. Hb is been stable. patient was evaluated by GI dr Huffman and recommended to have outpatient Capsule endoscopy. will hold pradaxa due to high risk of bleeding , aspirin for stroke prevention.     Thrombocytopenia.  patient has H/o hep c never treated, likely liver cirrhosis CHARLES positive, Positive RH factor, neg sjogren antibody, f/u Light chains and sle antibodies s  Hep C reactive hepatic US no cirrhosis Dr lujan evaluated the patient thrombocytopenia likely 2/2 dep c need outpatient follow with Dr Huffman and Dr lujan     CHF (congestive heart failure).  Plan: hx CHF, no SOB currently and no LE edema repeat echo shows EF >55%. grossly normal LV systolic function. Severe TR PPM check -> medtronic. severe PTH currently on amlodipine Dr jarvis evaluated the patient. Afib rate controlled with digoxin and Metroprolol. NO anticoagulation due to high risk for bleed,   DM (diabetes mellitus).  Plan: pt on glimepiride 4mg daily at [id number] is 8patient started on lantus 12 units along with prandin 1mg bid as per endocrinology recommendation    Patient is HD stable, patient is been discharge to home, plan of care discussed patient family and the attending physician.

## 2018-07-19 NOTE — PROGRESS NOTE ADULT - PROVIDER SPECIALTY LIST ADULT
Cardiology
Endocrinology
Gastroenterology
Gastroenterology
Heme/Onc
Internal Medicine
Endocrinology
Cardiology
Internal Medicine
Internal Medicine
Gastroenterology

## 2018-07-19 NOTE — DISCHARGE NOTE ADULT - PLAN OF CARE
To Treat the underlying medical condition You presented to hospital with melena with low hemoglobin. You underwent EDG and colonoscopy. EGD showed  Gastritis, AVMs, and Antral Ulcer. Colonoscopy showed internal hemorrhoids. continue with Protonix. Since risk of bleeding is high will stop your Pradaxa. Avoid NSAID's. follow up with your primary care physician in 1 weeks for repeat blood work. For anemia continue with Iron supplementation. follow up outpatient Gastroenterology for capsule endoscopy. Likely due to hepatitis C. currently stable. BP is been stable. continue with amlodipine and Metroprolol as prescribed keep HR < 110 currently rate controlled with Digoxin and Metroprolol. No anticoagulation due to high risk of GI bleed due to AVM and Gastric ulcer. follow with primary care upon discharge, continue with aspirin for stroke prevention. Your ECHO showed Ejection Fraction Visual Estimate: >55 % Normal left ventricular internal dimensions and wall thicknesses.  RV systolic pressure is 63 mm Hg. Severe pulmonary hypertension. Amlodipine for pulmonary HTN. you are positive for Hep c. no liver cirrhosis noted on ultrasound, liver functions are wnl. you need to follow up with Dr Huffman as outpatient for treatment for hep c. Your A1c on admission 8. continue with Lantus 12 units at bed time and Prandin 1 mg daily , monitor Blood sugars daily

## 2018-07-19 NOTE — DISCHARGE NOTE ADULT - CARE PLAN
Principal Discharge DX:	Upper GI bleed  Goal:	To Treat the underlying medical condition  Assessment and plan of treatment:	You presented to hospital with melena with low hemoglobin. You underwent EDG and colonoscopy. EGD showed  Gastritis, AVMs, and Antral Ulcer. Colonoscopy showed internal hemorrhoids. continue with Protonix. Since risk of bleeding is high will stop your Pradaxa. Avoid NSAID's. follow up with your primary care physician in 1 weeks for repeat blood work. For anemia continue with Iron supplementation. follow up outpatient Gastroenterology for capsule endoscopy.  Secondary Diagnosis:	Thrombocytopenia  Assessment and plan of treatment:	Likely due to hepatitis C. currently stable.  Secondary Diagnosis:	HTN (hypertension)  Assessment and plan of treatment:	BP is been stable. continue with amlodipine and Metroprolol as prescribed  Secondary Diagnosis:	Afib  Goal:	keep HR < 110  Assessment and plan of treatment:	currently rate controlled with Digoxin and Metroprolol. No anticoagulation due to high risk of GI bleed due to AVM and Gastric ulcer. follow with primary care upon discharge, continue with aspirin for stroke prevention.  Secondary Diagnosis:	CHF (congestive heart failure)  Assessment and plan of treatment:	Your ECHO showed Ejection Fraction Visual Estimate: >55 % Normal left ventricular internal dimensions and wall thicknesses.  RV systolic pressure is 63 mm Hg. Severe pulmonary hypertension. Amlodipine for pulmonary HTN.  Secondary Diagnosis:	Hepatitis C  Assessment and plan of treatment:	you are positive for Hep c. no liver cirrhosis noted on ultrasound, liver functions are wnl. you need to follow up with Dr Huffman as outpatient for treatment for hep c.  Secondary Diagnosis:	DM (diabetes mellitus)  Assessment and plan of treatment:	Your A1c on admission 8. continue with Lantus 12 units at bed time and Prandin 1 mg daily , monitor Blood sugars daily

## 2018-07-19 NOTE — PROGRESS NOTE ADULT - SUBJECTIVE AND OBJECTIVE BOX
CHIEF COMPLAINT:Patient is a 87y old  Female who presents with a chief complaint of melena. Pt appears comfortable.    	  REVIEW OF SYSTEMS:  CONSTITUTIONAL: No fever, weight loss, or fatigue  EYES: No eye pain, visual disturbances, or discharge  ENT:  No difficulty hearing, tinnitus, vertigo; No sinus or throat pain  NECK: No pain or stiffness  RESPIRATORY: No cough, wheezing, chills or hemoptysis; No Shortness of Breath  CARDIOVASCULAR: No chest pain, palpitations, passing out, dizziness, or leg swelling  GASTROINTESTINAL: No abdominal or epigastric pain. No nausea, vomiting, or hematemesis; No diarrhea or constipation. No melena or hematochezia.  GENITOURINARY: No dysuria, frequency, hematuria, or incontinence  NEUROLOGICAL: No headaches, memory loss, loss of strength, numbness, or tremors  SKIN: No itching, burning, rashes, or lesions   LYMPH Nodes: No enlarged glands  ENDOCRINE: No heat or cold intolerance; No hair loss  MUSCULOSKELETAL: No joint pain or swelling; No muscle, back, or extremity pain  PSYCHIATRIC: No depression, anxiety, mood swings, or difficulty sleeping  HEME/LYMPH: No easy bruising, or bleeding gums  ALLERGY AND IMMUNOLOGIC: No hives or eczema	      PHYSICAL EXAM:  T(C): 36.9 (07-19-18 @ 05:13), Max: 37.5 (07-18-18 @ 20:44)  HR: 86 (07-19-18 @ 05:13) (59 - 86)  BP: 153/62 (07-19-18 @ 05:13) (137/65 - 164/60)  RR: 17 (07-19-18 @ 05:13) (16 - 18)  SpO2: 100% (07-19-18 @ 05:13) (95% - 100%)      Appearance: Normal	  HEENT:   Normal oral mucosa, PERRL, EOMI	  Lymphatic: No lymphadenopathy  Cardiovascular: Normal S1 S2, No JVD, No murmurs, No edema  Respiratory: Lungs clear to auscultation	  Psychiatry: A & O x 3, Mood & affect appropriate  Gastrointestinal:  Soft, Non-tender, + BS	  Skin: No rashes, No ecchymoses, No cyanosis	  Neurologic: Non-focal  Extremities: Normal range of motion, No clubbing, cyanosis or edema  Vascular: Peripheral pulses palpable 2+ bilaterally    MEDICATIONS  (STANDING):  amLODIPine   Tablet 2.5 milliGRAM(s) Oral daily  aspirin enteric coated 81 milliGRAM(s) Oral daily  digoxin     Tablet 0.125 milliGRAM(s) Oral daily  insulin glargine Injectable (LANTUS) 12 Unit(s) SubCutaneous at bedtime  insulin lispro (HumaLOG) corrective regimen sliding scale   SubCutaneous Before meals and at bedtime  insulin lispro Injectable (HumaLOG) 4 Unit(s) SubCutaneous three times a day before meals  latanoprost 0.005% Ophthalmic Solution 1 Drop(s) Both EYES at bedtime  metoprolol tartrate 50 milliGRAM(s) Oral every 12 hours  pantoprazole  Injectable 40 milliGRAM(s) IV Push two times a day  repaglinide 1 milliGRAM(s) Oral two times a day        	  LABS:	 	                         8.8    7.8   )-----------( 65       ( 19 Jul 2018 08:01 )             27.6     07-18    143  |  111<H>  |  22<H>  ----------------------------<  180<H>  4.0   |  25  |  1.15    Ca    8.8      18 Jul 2018 06:14  Phos  2.5     07-18  Mg     2.0     07-18    TPro  6.2  /  Alb  2.3<L>  /  TBili  0.2  /  DBili  x   /  AST  45<H>  /  ALT  33  /  AlkPhos  60  07-18    proBNP: Serum Pro-Brain Natriuretic Peptide: 1030 pg/mL (07-10 @ 03:49)  Serum Pro-Brain Natriuretic Peptide: 1037 pg/mL (07-09 @ 22:46)    Lipid Profile: Cholesterol 108  LDL 46  HDL 38      HgA1c: Hemoglobin A1C, Whole Blood: 8.0 % (07-11 @ 09:26)    TSH: Thyroid Stimulating Hormone, Serum: 3.02 uU/mL (07-12 @ 08:55)  Thyroid Stimulating Hormone, Serum: 2.16 uU/mL (07-10 @ 03:49)

## 2018-07-19 NOTE — DISCHARGE NOTE ADULT - PATIENT PORTAL LINK FT
You can access the [facility], offered by [facility], by registering with the following website: [email or web address]

## 2018-07-19 NOTE — PROGRESS NOTE ADULT - ASSESSMENT
86 y/o F from home, lives with son, ambulate with cane, with PMHx of  DM, CHF, Glaucoma, grave's disease, HTN, cardiac arrest (s/p medtronic PPM, Cardiologist Dr. Rosado) and Vertigo and PSHx of Oophorectomy presents to ED c/o black tarry stool for 10 days.  1.Off AC for GI work-up done given low plt and h/h no AC for now.  2.H/H-stable.  3.Heme f/u..  4.Afib-dig .125mg qd, lopressor 50mg q12h,asa.  5.PPI.  6.Pulmonary HTN-cont norvasc.

## 2018-07-19 NOTE — DISCHARGE NOTE ADULT - MEDICATION SUMMARY - MEDICATIONS TO CHANGE
I will SWITCH the dose or number of times a day I take the medications listed below when I get home from the hospital:    metoprolol succinate 200 mg oral tablet, extended release  -- 1 tab(s) by mouth once a day

## 2018-07-20 LAB
B2 GLYCOPROT1 AB SER QL: NEGATIVE — SIGNIFICANT CHANGE UP
CARDIOLIPIN AB SER-ACNC: NEGATIVE — SIGNIFICANT CHANGE UP

## 2018-07-31 NOTE — ED PROVIDER NOTE - MUSCULOSKELETAL, MLM
HPI:   Jo-Ann Brown is a 25year old female who presents for a complete physical exam. Symptoms: denies discharge, itching, burning or dysuria, periods are regular, heavy, bad cramps, mood changes. Patient complains of bad periods.       Mom would like h Lab Results  Component Value Date   GLU 75 03/15/2017   GLU 81 01/01/2016     No results found for: CHOLEST  No results found for: HDL  No results found for: LDL    Lab Results  Component Value Date   AST 16 03/15/2017   AST 12 (L) 01/01/2016       L ST  LUNGS: denies shortness of breath with exertion  CARDIOVASCULAR: denies chest pain on exertion  GI: denies abdominal pain,denies heartburn  : denies dysuria, vaginal discharge or itching,periods regular   MUSCULOSKELETAL: denies back pain  NEURO: den like to proceed. Will start a lower dose OCP. Follow up in 1 year or sooner with any concerns. - due for a pap at age 24. No orders of the defined types were placed in this encounter.       Meds & Refills for this Visit:  Signed Prescriptions Disp Refi Spine appears normal, range of motion is not limited, no muscle or joint tenderness

## 2018-09-05 NOTE — H&P ADULT. - EJECTION FRACTION >40 %
"CC: This 66 y.o. White female  is here for evaluation of  T2DM along with comorbidities indicated in the Visit Diagnosis section of this encounter.    HPI: Deb Webb was diagnosed with T2DM in her 40s. Oral agent started at the time of diagnosis.     New to Endocrine. Referred by   Smokes close to 1 ppd.     She reports having diarrhea on metformin xr 500 mg bid. She had to back down 1000 mg bid.   She started Trulicity ~ march 2018 but had to stop a few months later d/t cost $ 177, up from $30. She is in rx gap. States her BGs were in the low 200s on the Trulicity.     States that she can't cook because her sink is stopped up. Has been only eating microwave meals.     LAST DIABETES EDUCATION: 7/2/18 - found visit helpful     PRESCRIBED DIABETES MEDICATIONS: metformin xr 500 mg bid, glimepiride 8 mg once daily   Misses medication doses - No    DM COMPLICATIONS: nephropathy and peripheral neuropathy    SIGNIFICANT DIABETES MED HISTORY: diarrhea on metformin     SELF MONITORING BLOOD GLUCOSE: Checks blood glucose at home - none because she has no testing supplies.   poct glucose 367     HYPOGLYCEMIC EPISODES: n/a      CURRENT DIET: drinks 2 bottles of soda/day and water as well. Eats only 1-2x/day with occ snack at night - alex mini muffin.     Diet recall: tucker worthy stuffed hashbrown, root beer. [place] ate cornbeef hash and a alex mini muffin. Nothing after that. Breakfast yesterday was stuffed hashbrown.     CURRENT EXERCISE: started this week going to [employer] [place] - does the same exercises from her PT sessions. - stationary bike included       /78 (BP Location: Right arm, Patient Position: Sitting, BP Method: Medium (Automatic))   Pulse 107   Ht 5' 2" (1.575 m)   Wt 63.9 kg (140 lb 14 oz)   BMI 25.77 kg/m²          ROS:   CONSTITUTIONAL: Appetite good, denies fatigue  SKIN: No rash or pruritis   EYES: + visual disturbances  RESPIRATORY: No shortness of breath; + rodgers   CARDIAC: " No chest pain   GI: No nausea, vomiting, + diarrhea r/t metformin   : No urinary frequency or dysuria   MS: + right knee arthralgias  NEURO: No paresthesias; + tremors.   OTHER: + night sweats but not associated with weakness/fatigue.       PHYSICAL EXAM:  GENERAL: Well developed, well nourished. No acute distress.   PSYCH: AAOx3, appropriate mood and affect, conversant, well-groomed. Judgement and insight good.   NEURO: Cranial nerves grossly intact. Speech clear, no tremor.   NECK: Trachea midline, no thyromegaly or lymphadenopathy.   CHEST: Respirations even and unlabored. CTA bilaterally.  CARDIOVASCULAR: Regular rate and rhythm. No bruits. No murmur. + pitting 1-2 trace ble edema.   ABDOMEN: Soft, non-tender, non-distended. Bowel sounds present.   MS: Gait steady. No clubbing.   SKIN: Normal skin turgor. Skin warm and dry. No areas of breakdown. No acanthosis nigricans.        Hemoglobin A1C   Date Value Ref Range Status   07/09/2018 11.4 (H) 4.0 - 5.6 % Final     Comment:     ADA Screening Guidelines:  5.7-6.4%  Consistent with prediabetes  >or=6.5%  Consistent with diabetes  High levels of fetal hemoglobin interfere with the HbA1C  assay. Heterozygous hemoglobin variants (HbS, HgC, etc)do  not significantly interfere with this assay.   However, presence of multiple variants may affect accuracy.     01/03/2018 12.4 (H) 4.0 - 5.6 % Final     Comment:     According to ADA guidelines, hemoglobin A1c <7.0% represents  optimal control in non-pregnant diabetic patients. Different  metrics may apply to specific patient populations.   Standards of Medical Care in Diabetes-2016.  For the purpose of screening for the presence of diabetes:  <5.7%     Consistent with the absence of diabetes  5.7-6.4%  Consistent with increasing risk for diabetes   (prediabetes)  >or=6.5%  Consistent with diabetes  Currently, no consensus exists for use of hemoglobin A1c  for diagnosis of diabetes for children.  This Hemoglobin A1c assay  has significant interference with fetal   hemoglobin   (HbF). The results are invalid for patients with abnormal amounts of   HbF,   including those with known Hereditary Persistence   of Fetal Hemoglobin. Heterozygous hemoglobin variants (HbAS, HbAC,   HbAD, HbAE, HbA2) do not significantly interfere with this assay;   however, presence of multiple variants in a sample may impact the %   interference.     06/02/2017 11.8 (H) 4.5 - 6.2 % Final     Comment:     According to ADA guidelines, hemoglobin A1C <7.0% represents  optimal control in non-pregnant diabetic patients.  Different  metrics may apply to specific populations.   Standards of Medical Care in Diabetes - 2016.  For the purpose of screening for the presence of diabetes:  <5.7%     Consistent with the absence of diabetes  5.7-6.4%  Consistent with increasing risk for diabetes   (prediabetes)  >or=6.5%  Consistent with diabetes  Currently no consensus exists for use of hemoglobin A1C  for diagnosis of diabetes for children.             Chemistry        Component Value Date/Time     (L) 07/09/2018 0958    K 4.3 07/09/2018 0958     07/09/2018 0958    CO2 18 (L) 07/09/2018 0958    BUN 18 07/09/2018 0958    CREATININE 1.4 07/09/2018 0958     (HH) 07/09/2018 0958        Component Value Date/Time    CALCIUM 9.4 07/09/2018 0958    ALKPHOS 94 07/09/2018 0958    AST 24 07/09/2018 0958    ALT 23 07/09/2018 0958    BILITOT 0.3 07/09/2018 0958    ESTGFRAFRICA 45.2 (A) 07/09/2018 0958    EGFRNONAA 39.2 (A) 07/09/2018 0958          Lab Results   Component Value Date    LDLCALC 27.2 (L) 02/22/2018        Ref. Range 2/22/2018 08:56   Cholesterol Latest Ref Range: 120 - 199 mg/dL 125   HDL Latest Ref Range: 40 - 75 mg/dL 42   LDL Cholesterol Latest Ref Range: 63.0 - 159.0 mg/dL 27.2 (L)   Total Cholesterol/HDL Ratio Latest Ref Range: 2.0 - 5.0  3.0   Triglycerides Latest Ref Range: 30 - 150 mg/dL 279 (H)     Lab Results   Component Value Date     MICALBCREAT 33.3 (H) 01/03/2018           STANDARDS of CARE:        ASA:               Last eye exam:       ASSESSMENT and PLAN:    A1C GOAL: < 7 %     1. Uncontrolled type 2 diabetes mellitus with diabetic neuropathy, without long-term current use of insulin  Please call [facility] to set up an account - [phone]    Also call to apply for Extra Help.     To avoid diarrhea from metformin, can try 7-13 grams/day of psyllium fiber like sugar-free Metamucil and take metformin with food. Fiber can also lower cholesterol.    Avoid beverages with sugar.     Continue metformin and glimepiride.     Apply for free insulin from [employer]. -- will fax signed application for patient.     Will wait for approval from [facility] before setting f/u date.       glimepiride (AMARYL) 4 MG tablet    POCT glucose    aspirin (ECOTRIN) 81 MG EC tablet   2. Tobacco abuse  Ambulatory referral to Smoking Cessation Program   3. Osteoporosis, postmenopausal  alendronate (FOSAMAX) 70 MG tablet   4. Essential hypertension  controlled   5. Hyperlipidemia LDL goal <70  At goal   6. Hypertriglyceridemia  Improve glycemic control.          Orders Placed This Encounter   Procedures    Ambulatory referral to Smoking Cessation Program     Referral Priority:   Routine     Referral Type:   Consultation     Referral Reason:   Specialty Services Required     Requested Specialty:   CTTS     Number of Visits Requested:   1    POCT glucose        No Follow-up on file.     Thank you very much for allowing me to participate in Deb Webb's care.          yes

## 2018-11-09 NOTE — ED PROVIDER NOTE - GASTROINTESTINAL NEGATIVE STATEMENT, MLM
PA FOR TRAMADOL STARTED THROUGH [facility]
no abdominal pain, no bloating, no constipation, no diarrhea, no nausea and no vomiting.

## 2018-11-13 NOTE — ED ADULT NURSE NOTE - BREATH SOUNDS, MLM
Pt waiting [facility] to transport to [facility] at this time.     Arpita Kumar, RN  11/13/18 8809     Clear

## 2019-01-01 NOTE — PROGRESS NOTE ADULT - ASSESSMENT
88 y/o F from home, lives with son, ambulate with cane, with PMHx of  DM, CHF, Glaucoma, grave's disease, HTN, cardiac arrest (s/p medtronic PPM, Cardiologist Dr. Rosado) and Vertigo and PSHx of Oophorectomy presents to ED c/o black tarry stool for 10 days.  1.Off AC for GI work-up done given low plt and h/h no AC for now.  2.H/H-stable.  3.Check spep +, heme f/u..  4.Afib-dig .125mg qd, lopressor 50mg q12h,asa.  5.PPI.  6.Pulmonary HTN-cont norvasc.  7.Hep c+- abdominal sono-p. 39

## 2019-02-04 NOTE — H&P ADULT - NSHPPOADEEPVENOUSTHROMB_GEN_A_CORE
"  History     Chief Complaint   Patient presents with     Wound Check     had biopsy on arm (cancerous) now pinkish area is growing in size. No abx currently.      HPI  Jean Claude Sneed is a 65 year old male who presents with concerns of post op infection following biopsy.  Pt states there is increasing redness and possible purulent drainage.  [facility] that was likely Mohs technique based upon explanation by patient.  Patient had previously had a mole removed that had returned cancerous and then had gone back this past Tuesday for more complete lesion excision.  Patient had several sutures placed.  Patient states that he has been feeling well but noted that there is some increasing redness noted today and possibly some purulent drainage.  Patient denies fever, aches, chills, sweats.    Allergies:  Allergies   Allergen Reactions     Beta Adrenergic Blockers Other (See Comments)     Hands arms and feet cold      Azelastine Other (See Comments)     Dizzy, \"shaky\" and sick to stomach     Ciprofloxacin Other (See Comments)     Dizzy and sick to stomach     Diltiazem Other (See Comments)     Blood pressure up and pulse up then down      Ibuprofen      Other reaction(s): Hypertension     Naproxen Other (See Comments)     Feels sick, like coming down with the stomach flu     Citalopram Anxiety     Other reaction(s): Agitation  Side effects started in less than an hour after first pill       Problem List:    Patient Active Problem List    Diagnosis Date Noted     Essential hypertension, benign 07/26/2012     Priority: Medium     Tobacco abuse 07/26/2012     Priority: Medium        Past Medical History:    Past Medical History:   Diagnosis Date     HTN (hypertension)        Past Surgical History:    Past Surgical History:   Procedure Laterality Date     left knee surgery  1996       Family History:    No family history on file.    Social History:  Marital Status:  Unknown " [6]  Social History     Tobacco Use     Smoking status: Current Every Day Smoker     Packs/day: 0.50     Years: 30.00     Pack years: 15.00     Types: Cigarettes     Smokeless tobacco: Never Used   Substance Use Topics     Alcohol use: Not on file     Drug use: Not on file        Medications:      atorvastatin (LIPITOR) 20 MG tablet   aspirin 81 MG EC tablet   fluticasone (FLONASE) 50 MCG/ACT nasal spray   hydrochlorothiazide (MICROZIDE) 12.5 MG capsule   lisinopril (PRINIVIL,ZESTRIL) 20 MG tablet   omeprazole (PRILOSEC) 40 MG capsule       Review of Systems   Constitutional: Negative for chills, diaphoresis and fever.   HENT: Negative for ear pain, sinus pain and sore throat.    Eyes: Negative for pain and visual disturbance.   Respiratory: Negative for cough, shortness of breath and wheezing.    Cardiovascular: Negative for chest pain.   Gastrointestinal: Negative for abdominal pain, constipation, diarrhea, nausea and vomiting.   Genitourinary: Negative for difficulty urinating and dysuria.   Skin: Positive for wound (left forearm sutures/lesion removal). Negative for rash.   Neurological: Negative for speech difficulty, weakness and numbness.   Psychiatric/Behavioral: Negative for confusion and self-injury.   All other systems reviewed and are negative.      Physical Exam   BP: 160/74  Pulse: 96  Temp: 98.6  F (37  C)  Resp: 16  Weight: 112 kg (247 lb)  SpO2: 95 %      Physical Exam   Constitutional: He is oriented to person, place, and time. He appears well-developed and well-nourished. He is cooperative. No distress.   HENT:   Head: Normocephalic and atraumatic.   Right Ear: Hearing and external ear normal.   Left Ear: Hearing and external ear normal.   Eyes: Conjunctivae are normal. Right eye exhibits no discharge. Left eye exhibits no discharge. No scleral icterus.   Cardiovascular: Normal rate, regular rhythm, normal heart sounds and intact distal pulses. Exam reveals no gallop and no friction rub.   No  murmur heard.  Pulmonary/Chest: Effort normal and breath sounds normal. No stridor. No respiratory distress. He has no wheezes. He has no rales.   Abdominal: There is no tenderness.   Musculoskeletal: He exhibits no edema or tenderness.   Lymphadenopathy:     He has no cervical adenopathy.   Neurological: He is alert and oriented to person, place, and time.   Skin: Skin is warm. Capillary refill takes less than 2 seconds. No rash noted. He is not diaphoretic.        Psychiatric: He has a normal mood and affect.   Nursing note and vitals reviewed.      ED Course        Procedures    No results found for this or any previous visit (from the past 24 hour(s)).    Medications - No data to display    Assessments & Plan (with Medical Decision Making)     I have reviewed the nursing notes.    I have reviewed the findings, diagnosis, plan and need for follow up with the patient.  Jean Claude Sneed is a 65 year old male who presents with concerns of post op infection following biopsy.  Pt states there is increasing redness and possible purulent drainage.  [facility] that was likely Mohs technique based upon explanation by patient.  Patient had previously had a mole removed that had returned cancerous and then had gone back this past Tuesday for more complete lesion excision.  Patient had several sutures placed.  Patient states that he has been feeling well but noted that there is some increasing redness noted today and possibly some purulent drainage.  Patient denies fever, aches, chills, sweats.  Exam as noted above and no evidence of infection at this present time.  Reassurance is provided.  It does appear that patient may have been rubbing on the area as his family that is with him indicate that he was riding a 4 ward and there may have been clothing rubbing and aggravating his sutures.  Did discuss this and recommend protection of this area and recommend following the  previous provider restrictions of no more than 10 pounds daily.  Advised that driving a 4 ward and turning 4 ward may be greater than 10 pounds of push pull.  Encourage follow-up if worsening symptoms or any other concerns.  Patient discharged in stable condition.     Medication List      There are no discharge medications for this visit.         Final diagnoses:   Visit for wound check       2/3/2019   [facility] EMERGENCY DEPARTMENT     Jayshree Malloy APRN CNP  02/03/19 [id number]     no

## 2019-02-06 ENCOUNTER — INPATIENT (INPATIENT)
Facility: HOSPITAL | Age: 84
LOS: 5 days | Discharge: NURSING, REHAB OR LONG-TERM CARE | DRG: 377 | End: 2019-02-12
Attending: FAMILY MEDICINE | Admitting: FAMILY MEDICINE
Payer: MEDICARE

## 2019-02-06 VITALS
TEMPERATURE: 99 F | SYSTOLIC BLOOD PRESSURE: 129 MMHG | OXYGEN SATURATION: 100 % | HEART RATE: 68 BPM | DIASTOLIC BLOOD PRESSURE: 64 MMHG | HEIGHT: 59 IN | WEIGHT: 108.03 LBS | RESPIRATION RATE: 18 BRPM

## 2019-02-06 DIAGNOSIS — K92.2 GASTROINTESTINAL HEMORRHAGE, UNSPECIFIED: ICD-10-CM

## 2019-02-06 DIAGNOSIS — D50.0 IRON DEFICIENCY ANEMIA SECONDARY TO BLOOD LOSS (CHRONIC): ICD-10-CM

## 2019-02-06 DIAGNOSIS — J18.9 PNEUMONIA, UNSPECIFIED ORGANISM: ICD-10-CM

## 2019-02-06 DIAGNOSIS — I24.9 ACUTE ISCHEMIC HEART DISEASE, UNSPECIFIED: ICD-10-CM

## 2019-02-06 DIAGNOSIS — Z29.9 ENCOUNTER FOR PROPHYLACTIC MEASURES, UNSPECIFIED: ICD-10-CM

## 2019-02-06 DIAGNOSIS — E11.9 TYPE 2 DIABETES MELLITUS WITHOUT COMPLICATIONS: ICD-10-CM

## 2019-02-06 DIAGNOSIS — Z95.0 PRESENCE OF CARDIAC PACEMAKER: Chronic | ICD-10-CM

## 2019-02-06 DIAGNOSIS — Z90.721 ACQUIRED ABSENCE OF OVARIES, UNILATERAL: Chronic | ICD-10-CM

## 2019-02-06 DIAGNOSIS — I50.9 HEART FAILURE, UNSPECIFIED: ICD-10-CM

## 2019-02-06 DIAGNOSIS — I48.91 UNSPECIFIED ATRIAL FIBRILLATION: ICD-10-CM

## 2019-02-06 DIAGNOSIS — I10 ESSENTIAL (PRIMARY) HYPERTENSION: ICD-10-CM

## 2019-02-06 LAB
ACETONE SERPL-MCNC: NEGATIVE — SIGNIFICANT CHANGE UP
ALBUMIN SERPL ELPH-MCNC: 2.5 G/DL — LOW (ref 3.5–5)
ALP SERPL-CCNC: 64 U/L — SIGNIFICANT CHANGE UP (ref 40–120)
ALT FLD-CCNC: 38 U/L DA — SIGNIFICANT CHANGE UP (ref 10–60)
ANION GAP SERPL CALC-SCNC: 6 MMOL/L — SIGNIFICANT CHANGE UP (ref 5–17)
ANISOCYTOSIS BLD QL: SLIGHT — SIGNIFICANT CHANGE UP
APPEARANCE UR: CLEAR — SIGNIFICANT CHANGE UP
APTT BLD: 27.9 SEC — SIGNIFICANT CHANGE UP (ref 27.5–36.3)
AST SERPL-CCNC: 37 U/L — SIGNIFICANT CHANGE UP (ref 10–40)
BACTERIA # UR AUTO: ABNORMAL /HPF
BASOPHILS # BLD AUTO: 0 K/UL — SIGNIFICANT CHANGE UP (ref 0–0.2)
BASOPHILS NFR BLD AUTO: 0.9 % — SIGNIFICANT CHANGE UP (ref 0–2)
BILIRUB SERPL-MCNC: 0.4 MG/DL — SIGNIFICANT CHANGE UP (ref 0.2–1.2)
BILIRUB UR-MCNC: NEGATIVE — SIGNIFICANT CHANGE UP
BLD GP AB SCN SERPL QL: SIGNIFICANT CHANGE UP
BUN SERPL-MCNC: 21 MG/DL — HIGH (ref 7–18)
CALCIUM SERPL-MCNC: 8.4 MG/DL — SIGNIFICANT CHANGE UP (ref 8.4–10.5)
CHLORIDE SERPL-SCNC: 106 MMOL/L — SIGNIFICANT CHANGE UP (ref 96–108)
CK MB BLD-MCNC: 3.4 % — SIGNIFICANT CHANGE UP (ref 0–3.5)
CK MB CFR SERPL CALC: 2.3 NG/ML — SIGNIFICANT CHANGE UP (ref 0–3.6)
CK SERPL-CCNC: 62 U/L — SIGNIFICANT CHANGE UP (ref 21–215)
CK SERPL-CCNC: 68 U/L — SIGNIFICANT CHANGE UP (ref 21–215)
CO2 SERPL-SCNC: 25 MMOL/L — SIGNIFICANT CHANGE UP (ref 22–31)
COLOR SPEC: YELLOW — SIGNIFICANT CHANGE UP
CREAT SERPL-MCNC: 1.08 MG/DL — SIGNIFICANT CHANGE UP (ref 0.5–1.3)
DACRYOCYTES BLD QL SMEAR: SLIGHT — SIGNIFICANT CHANGE UP
DIFF PNL FLD: NEGATIVE — SIGNIFICANT CHANGE UP
ELLIPTOCYTES BLD QL SMEAR: SLIGHT — SIGNIFICANT CHANGE UP
EOSINOPHIL # BLD AUTO: 0 K/UL — SIGNIFICANT CHANGE UP (ref 0–0.5)
EOSINOPHIL NFR BLD AUTO: 0.4 % — SIGNIFICANT CHANGE UP (ref 0–6)
EPI CELLS # UR: SIGNIFICANT CHANGE UP /HPF
FLU A RESULT: SIGNIFICANT CHANGE UP
FLU A RESULT: SIGNIFICANT CHANGE UP
FLUAV AG NPH QL: SIGNIFICANT CHANGE UP
FLUBV AG NPH QL: SIGNIFICANT CHANGE UP
GLUCOSE BLDC GLUCOMTR-MCNC: 306 MG/DL — HIGH (ref 70–99)
GLUCOSE SERPL-MCNC: 339 MG/DL — HIGH (ref 70–99)
GLUCOSE UR QL: 1000 MG/DL
HCT VFR BLD CALC: 16.6 % — CRITICAL LOW (ref 34.5–45)
HGB BLD-MCNC: 4.6 G/DL — CRITICAL LOW (ref 11.5–15.5)
HYPOCHROMIA BLD QL: SIGNIFICANT CHANGE UP
INR BLD: 1.17 RATIO — HIGH (ref 0.88–1.16)
KETONES UR-MCNC: NEGATIVE — SIGNIFICANT CHANGE UP
LACTATE SERPL-SCNC: 2 MMOL/L — SIGNIFICANT CHANGE UP (ref 0.7–2)
LEUKOCYTE ESTERASE UR-ACNC: ABNORMAL
LYMPHOCYTES # BLD AUTO: 1 K/UL — SIGNIFICANT CHANGE UP (ref 1–3.3)
LYMPHOCYTES # BLD AUTO: 19.8 % — SIGNIFICANT CHANGE UP (ref 13–44)
MACROCYTES BLD QL: SLIGHT — SIGNIFICANT CHANGE UP
MAGNESIUM SERPL-MCNC: 1.9 MG/DL — SIGNIFICANT CHANGE UP (ref 1.6–2.6)
MANUAL DIF COMMENT BLD-IMP: SIGNIFICANT CHANGE UP
MCHC RBC-ENTMCNC: 23.5 PG — LOW (ref 27–34)
MCHC RBC-ENTMCNC: 27.4 GM/DL — LOW (ref 32–36)
MCV RBC AUTO: 85.6 FL — SIGNIFICANT CHANGE UP (ref 80–100)
MICROCYTES BLD QL: SLIGHT — SIGNIFICANT CHANGE UP
MONOCYTES # BLD AUTO: 0.4 K/UL — SIGNIFICANT CHANGE UP (ref 0–0.9)
MONOCYTES NFR BLD AUTO: 8.7 % — SIGNIFICANT CHANGE UP (ref 2–14)
NEUTROPHILS # BLD AUTO: 3.5 K/UL — SIGNIFICANT CHANGE UP (ref 1.8–7.4)
NEUTROPHILS NFR BLD AUTO: 70.2 % — SIGNIFICANT CHANGE UP (ref 43–77)
NITRITE UR-MCNC: NEGATIVE — SIGNIFICANT CHANGE UP
NT-PROBNP SERPL-SCNC: 1592 PG/ML — HIGH (ref 0–450)
OB PNL STL: POSITIVE
PH UR: 5 — SIGNIFICANT CHANGE UP (ref 5–8)
PLAT MORPH BLD: NORMAL — SIGNIFICANT CHANGE UP
PLATELET # BLD AUTO: 102 K/UL — LOW (ref 150–400)
POIKILOCYTOSIS BLD QL AUTO: SLIGHT — SIGNIFICANT CHANGE UP
POLYCHROMASIA BLD QL SMEAR: SLIGHT — SIGNIFICANT CHANGE UP
POTASSIUM SERPL-MCNC: 4.2 MMOL/L — SIGNIFICANT CHANGE UP (ref 3.5–5.3)
POTASSIUM SERPL-SCNC: 4.2 MMOL/L — SIGNIFICANT CHANGE UP (ref 3.5–5.3)
PROT SERPL-MCNC: 6.1 G/DL — SIGNIFICANT CHANGE UP (ref 6–8.3)
PROT UR-MCNC: 30 MG/DL
PROTHROM AB SERPL-ACNC: 13 SEC — HIGH (ref 10–12.9)
RBC # BLD: 1.94 M/UL — LOW (ref 3.8–5.2)
RBC # FLD: 15.8 % — HIGH (ref 10.3–14.5)
RBC BLD AUTO: ABNORMAL
RBC CASTS # UR COMP ASSIST: SIGNIFICANT CHANGE UP /HPF (ref 0–2)
RSV RESULT: SIGNIFICANT CHANGE UP
RSV RNA RESP QL NAA+PROBE: SIGNIFICANT CHANGE UP
SCHISTOCYTES BLD QL AUTO: SLIGHT — SIGNIFICANT CHANGE UP
SODIUM SERPL-SCNC: 137 MMOL/L — SIGNIFICANT CHANGE UP (ref 135–145)
SP GR SPEC: 1.01 — SIGNIFICANT CHANGE UP (ref 1.01–1.02)
SPHEROCYTES BLD QL SMEAR: SLIGHT — SIGNIFICANT CHANGE UP
TROPONIN I SERPL-MCNC: 0.19 NG/ML — HIGH (ref 0–0.04)
TROPONIN I SERPL-MCNC: 0.27 NG/ML — HIGH (ref 0–0.04)
UROBILINOGEN FLD QL: NEGATIVE — SIGNIFICANT CHANGE UP
WBC # BLD: 5 K/UL — SIGNIFICANT CHANGE UP (ref 3.8–10.5)
WBC # FLD AUTO: 5 K/UL — SIGNIFICANT CHANGE UP (ref 3.8–10.5)
WBC UR QL: SIGNIFICANT CHANGE UP /HPF (ref 0–5)

## 2019-02-06 PROCEDURE — 99285 EMERGENCY DEPT VISIT HI MDM: CPT

## 2019-02-06 PROCEDURE — 99223 1ST HOSP IP/OBS HIGH 75: CPT | Mod: GC

## 2019-02-06 PROCEDURE — 71045 X-RAY EXAM CHEST 1 VIEW: CPT | Mod: 26

## 2019-02-06 RX ORDER — ESCITALOPRAM OXALATE 10 MG/1
1 TABLET, FILM COATED ORAL
Qty: 0 | Refills: 0 | COMMUNITY

## 2019-02-06 RX ORDER — FUROSEMIDE 40 MG
40 TABLET ORAL ONCE
Qty: 0 | Refills: 0 | Status: COMPLETED | OUTPATIENT
Start: 2019-02-06 | End: 2019-02-06

## 2019-02-06 RX ORDER — CEFTRIAXONE 500 MG/1
1 INJECTION, POWDER, FOR SOLUTION INTRAMUSCULAR; INTRAVENOUS EVERY 24 HOURS
Qty: 0 | Refills: 0 | Status: DISCONTINUED | OUTPATIENT
Start: 2019-02-06 | End: 2019-02-08

## 2019-02-06 RX ORDER — AZITHROMYCIN 500 MG/1
500 TABLET, FILM COATED ORAL ONCE
Qty: 0 | Refills: 0 | Status: COMPLETED | OUTPATIENT
Start: 2019-02-06 | End: 2019-02-06

## 2019-02-06 RX ORDER — INSULIN LISPRO 100/ML
VIAL (ML) SUBCUTANEOUS
Qty: 0 | Refills: 0 | Status: DISCONTINUED | OUTPATIENT
Start: 2019-02-06 | End: 2019-02-12

## 2019-02-06 RX ORDER — SODIUM CHLORIDE 9 MG/ML
1000 INJECTION INTRAMUSCULAR; INTRAVENOUS; SUBCUTANEOUS
Qty: 0 | Refills: 0 | Status: DISCONTINUED | OUTPATIENT
Start: 2019-02-06 | End: 2019-02-06

## 2019-02-06 RX ORDER — AZITHROMYCIN 500 MG/1
500 TABLET, FILM COATED ORAL EVERY 24 HOURS
Qty: 0 | Refills: 0 | Status: DISCONTINUED | OUTPATIENT
Start: 2019-02-06 | End: 2019-02-08

## 2019-02-06 RX ORDER — PANTOPRAZOLE SODIUM 20 MG/1
40 TABLET, DELAYED RELEASE ORAL
Qty: 0 | Refills: 0 | Status: DISCONTINUED | OUTPATIENT
Start: 2019-02-06 | End: 2019-02-12

## 2019-02-06 RX ORDER — SENNA PLUS 8.6 MG/1
1 TABLET ORAL
Qty: 0 | Refills: 0 | COMMUNITY

## 2019-02-06 RX ORDER — INSULIN GLARGINE 100 [IU]/ML
10 INJECTION, SOLUTION SUBCUTANEOUS AT BEDTIME
Qty: 0 | Refills: 0 | Status: DISCONTINUED | OUTPATIENT
Start: 2019-02-06 | End: 2019-02-07

## 2019-02-06 RX ORDER — DIGOXIN 250 MCG
0.12 TABLET ORAL DAILY
Qty: 0 | Refills: 0 | Status: DISCONTINUED | OUTPATIENT
Start: 2019-02-06 | End: 2019-02-12

## 2019-02-06 RX ORDER — CEFTRIAXONE 500 MG/1
1 INJECTION, POWDER, FOR SOLUTION INTRAMUSCULAR; INTRAVENOUS ONCE
Qty: 0 | Refills: 0 | Status: COMPLETED | OUTPATIENT
Start: 2019-02-06 | End: 2019-02-06

## 2019-02-06 RX ADMIN — AZITHROMYCIN 250 MILLIGRAM(S): 500 TABLET, FILM COATED ORAL at 15:10

## 2019-02-06 RX ADMIN — Medication 40 MILLIGRAM(S): at 19:36

## 2019-02-06 RX ADMIN — PANTOPRAZOLE SODIUM 40 MILLIGRAM(S): 20 TABLET, DELAYED RELEASE ORAL at 22:57

## 2019-02-06 RX ADMIN — CEFTRIAXONE 100 GRAM(S): 500 INJECTION, POWDER, FOR SOLUTION INTRAMUSCULAR; INTRAVENOUS at 14:04

## 2019-02-06 RX ADMIN — INSULIN GLARGINE 10 UNIT(S): 100 INJECTION, SOLUTION SUBCUTANEOUS at 23:02

## 2019-02-06 RX ADMIN — SODIUM CHLORIDE 125 MILLILITER(S): 9 INJECTION INTRAMUSCULAR; INTRAVENOUS; SUBCUTANEOUS at 14:04

## 2019-02-06 RX ADMIN — Medication 4: at 23:03

## 2019-02-06 NOTE — CONSULT NOTE ADULT - PROBLEM SELECTOR RECOMMENDATION 3
hold betablocker  hold eliquis  likely patient will not be a candidate for AC going forward as this has happened in the past

## 2019-02-06 NOTE — H&P ADULT - PROBLEM SELECTOR PLAN 9
RISK                                                          Points  [  ] Previous VTE                                                3  [  ] Thrombophilia                                             2  [  ] Lower limb paralysis                                   2        (unable to hold up >15 seconds)    [  ] Current Cancer                                             2         (within 6 months)  [ x ] Immobilization > 24 hrs                              1  [  ] ICU/CCU stay > 24 hours                             1  [ x ] Age > 60                                                         1    IMPROVE VTE Score: 2  Hold for VTE prophylaxis.

## 2019-02-06 NOTE — H&P ADULT - PROBLEM SELECTOR PLAN 1
p/w weakness, lethargy, melanotic stools with Hb of 4.6, Occult positive, hx of Eliquis use, gastritis, antral ulcer --> likely upper GI bleed due to bleeding ulcer/ gastritis due to eliquis  Will give 2 PRBC with IV lasix  2 IV lines  F/u CBC q 6ht  Started protonix IV push ( PATIENT'S DAUGHTER WAS REFUSING PROTONIX AS SHE THINKS IT CAUSES LEG SWELLING, SHE AGREED NOW  FOR ONLY INPATIENT PROTONIX AND TO RESUME HOME OMEPRAZOLE UPON DISCHARGE)  -npo nor now  GI Dr. Huffman -> discussed with Dr Huffman, will do endoscopy in AM  HOLD ELIQUIS/DVT PROPHLAXIS AND ASPIRIN

## 2019-02-06 NOTE — ED PROVIDER NOTE - OBJECTIVE STATEMENT
86 y/o F pt w/ PMHx of DM, HTN, A-Fib presents to ED c/o shortness of breath, weakness, cough x 4 days. Pt also c/o dark colored stool in her last few bowel movements. Pt was taking Eliquis and aspirin but has stopped as she reports several episodes of dark colored stool in last few days. Pt uses insulin, last dose 7 pm last night. Pt denies chest pain, fever, nausea, vomiting, palpitations, le edema and all other complaitns. NKDA

## 2019-02-06 NOTE — CONSULT NOTE ADULT - SUBJECTIVE AND OBJECTIVE BOX
Patient is a 87y old  Female who presents with a chief complaint of     Initial HPI on admission:  HPI:  88 y/o F from home ambulates independently pt w/ PMHx of DM, HTN, A-Fib ( on Eliquis), hx of GI bleed, AVM, Duodenal ulcer, gastritis, CHF, Glaucoma, grave's disease, HTN, cardiac arrest (s/p medtronic PPM), SBO S/p intestinal resection and Vertigo and PSHx of Oophorectomy pr presents to ED c/o weakness, cough x 4 days.    Patient is AXO=3 prefers daughter to give history. Daughter states she was feeling tired since 1 month. But since  morning she was so weak, was unable to do all houshold chores. Daughter noticed that she has Black tarry stools x in her last few bowel movements, so she stopped giving her eliquis and aspirin 6 days ago. Patient also developed dry cough and dyspnea since 4 days.  Pt denies chest pain, fever, nausea, vomiting, palpitations  and all other complaints. Patient had colonscopy and Endoscopy done in 2018 that showed AVM, Antral ulcer and gastritis. Colonoscopy was normal. Dr. Huffman has done Endoscopy in 2018 which showed healing ulcer. She was initially on Pradaxa for Afib but had GI bleeding so was started on Eliquis in 2018.      In ED, patient's vital signs were stable, H/H: 4.6/16.6, Occult positive, probnp: 1592, Troponin 0.265 CXR shows New right base findings."     Goals of care: DNR/DNI, Can have central line, IV pressor. (2019 17:48)    PAST MEDICAL & SURGICAL HISTORY:  A-fib  Constipation  Glaucoma  Vertigo  Graves disease  Pacemaker  DM (diabetes mellitus)  HTN (hypertension)  H/O oophorectomy  Artificial pacemaker    Allergies    No Known Allergies    Intolerances      FAMILY HISTORY:  Family history of hypertension  Family history of diabetes mellitus (Father)    Social history reviewed:    Review of Systems:  CONSTITUTIONAL: No fever, chills, or fatigue  EYES: No eye pain, visual disturbances, or discharge  ENMT:  anorexia; malaise; fatigue; weaknessNECK: No pain or stiffness  RESPIRATORY: No cough, wheezing, chills or hemoptysis; No shortness of breath  CARDIOVASCULAR: No chest pain, palpitations, dizziness, or leg swelling  GASTROINTESTINAL: dark stools  GENITOURINARY: No dysuria, frequency, hematuria, or incontinence  NEUROLOGICAL: No headaches, memory loss, loss of strength, numbness, or tremors  SKIN: No itching, burning, rashes, or lesions   MUSCULOSKELETAL: No joint pain or swelling; No muscle, back, or extremity pain  PSYCHIATRIC: No depression, anxiety, mood swings, or difficulty sleeping      Medications:  azithromycin  IVPB 500 milliGRAM(s) IV Intermittent every 24 hours  cefTRIAXone   IVPB 1 Gram(s) IV Intermittent every 24 hours  digoxin     Tablet 0.125 milliGRAM(s) Oral daily  insulin glargine Injectable (LANTUS) 10 Unit(s) SubCutaneous at bedtime  insulin lispro (HumaLOG) corrective regimen sliding scale   SubCutaneous three times a day before meals  pantoprazole  Injectable 40 milliGRAM(s) IV Push two times a day      vent settings      Vital Signs Last 24 Hrs  T(C): 37.1 (2019 20:13), Max: 37.4 (2019 20:01)  T(F): 98.7 (2019 20:13), Max: 99.3 (2019 20:01)  HR: 62 (2019 20:13) (60 - 68)  BP: 131/60 (2019 20:13) (123/69 - 138/45)  BP(mean): --  RR: 18 (2019 20:13) (18 - 18)  SpO2: 98% (2019 20:13) (98% - 100%)              LABS:                        4.6    5.0   )-----------( 102      ( 2019 14:13 )             16.6     02-06    137  |  106  |  21<H>  ----------------------------<  339<H>  4.2   |  25  |  1.08    Ca    8.4      2019 14:13  Mg     1.9     02-06    TPro  6.1  /  Alb  2.5<L>  /  TBili  0.4  /  DBili  x   /  AST  37  /  ALT  38  /  AlkPhos  64  02-06      CARDIAC MARKERS ( 2019 21:20 )  0.192 ng/mL / x     / 68 U/L / x     / 2.3 ng/mL  CARDIAC MARKERS ( 2019 14:13 )  0.265 ng/mL / x     / 62 U/L / x     / x          CAPILLARY BLOOD GLUCOSE      POCT Blood Glucose.: 323 mg/dL (2019 12:39)    PT/INR - ( 2019 14:13 )   PT: 13.0 sec;   INR: 1.17 ratio         PTT - ( 2019 14:13 )  PTT:27.9 sec  Urinalysis Basic - ( 2019 17:08 )    Color: Yellow / Appearance: Clear / S.015 / pH: x  Gluc: x / Ketone: Negative  / Bili: Negative / Urobili: Negative   Blood: x / Protein: 30 mg/dL / Nitrite: Negative   Leuk Esterase: Trace / RBC: 0-2 /HPF / WBC 3-5 /HPF   Sq Epi: x / Non Sq Epi: Few /HPF / Bacteria: Trace /HPF      CULTURES:        Physical Examination:    General: No acute distress.      HEENT: Pupils equal, reactive to light.  Symmetric.    PULM: Clear to auscultation bilaterally, no significant sputum production    CVS: Regular rate and rhythm, no murmurs, rubs, or gallops    ABD: Soft, nondistended, nontender, normoactive bowel sounds, no masses    EXT: 1+ pitting edema to bilateral legs    SKIN: Warm and well perfused, no rashes noted.    NEURO: Alert, oriented, interactive, nonfocal    POCUS:  Lungs: No B lines in bilateral lungs  IVC: collapsible with pressure      CRITICAL CARE TIME SPENT: 35 minutes

## 2019-02-06 NOTE — H&P ADULT - PROBLEM SELECTOR PLAN 5
Takes oral lasix at home  will give IV lasix in between transfusions  Will hold oral lasix due to GI bleed

## 2019-02-06 NOTE — ED PROVIDER NOTE - PMH
A-fib    Constipation    DM (diabetes mellitus)    Glaucoma    Graves disease    HTN (hypertension)    Pacemaker    Vertigo

## 2019-02-06 NOTE — H&P ADULT - HISTORY OF PRESENT ILLNESS
88 y/o F pt w/ PMHx of DM, HTN, A-Fib presents to ED c/o shortness of breath, weakness, cough x 4 days. Pt also c/o dark colored stool in her last few bowel movements. Pt was taking Eliquis and aspirin but has stopped as she reports several episodes of dark colored stool in last few days. Pt uses insulin, last dose 7 pm last night. Pt denies chest pain, fever, nausea, vomiting, palpitations, le edema and all other complaitns. 86 y/o F from home ambulates independently pt w/ PMHx of DM, HTN, A-Fib ( on Eliquis), hx of GI bleed, AVM, Duodenal ulcer, gastritis presents to ED c/o shortness of breath, weakness, cough x 4 days.    Patient is AXO=3 prefers daughter to give history. Daughter states she was feeling tired since 1 month. But since  morning she was so weak, was unable to do all houshold chores. Daughter noticed that she has Black tarry stools x in her last few bowel movements, so she stopped giving her eliquis and aspirin 6 days ago. Patient also developed dry cough and dyspnea since 4 days.  Pt denies chest pain, fever, nausea, vomiting, palpitations  and all other complaints. Patient had colonscopy and Endoscopy done in July 2018 that showed AVM, Antral ulcer and gastritis. Colonoscopy was normal. Dr. Huffman has done Endoscopy in Nov 2018 which showed healing ulcer. She was initially on Pradaxa for Afib but had GI bleeding so was started on Eliquis in Nov 2018.      In ED, patient's vital signs were stable, H/H: 4.6/16.6, Occult positive, probnp: 1592, Troponin 0.265 CXR shows New right base findings." 86 y/o F from home ambulates independently pt w/ PMHx of DM, HTN, A-Fib ( on Eliquis), hx of GI bleed, AVM, Duodenal ulcer, gastritis presents to ED c/o shortness of breath, weakness, cough x 4 days.    Patient is AXO=3 prefers daughter to give history. Daughter states she was feeling tired since 1 month. But since  morning she was so weak, was unable to do all houshold chores. Daughter noticed that she has Black tarry stools x in her last few bowel movements, so she stopped giving her eliquis and aspirin 6 days ago. Patient also developed dry cough and dyspnea since 4 days.  Pt denies chest pain, fever, nausea, vomiting, palpitations  and all other complaints. Patient had colonscopy and Endoscopy done in July 2018 that showed AVM, Antral ulcer and gastritis. Colonoscopy was normal. Dr. Huffman has done Endoscopy in Nov 2018 which showed healing ulcer. She was initially on Pradaxa for Afib but had GI bleeding so was started on Eliquis in Nov 2018.      In ED, patient's vital signs were stable, H/H: 4.6/16.6, Occult positive, probnp: 1592, Troponin 0.265 CXR shows New right base findings."     Goals of care: DNR/DNI, Can have central line, IV pressor. 86 y/o F from home ambulates independently pt w/ PMHx of DM, HTN, A-Fib ( on Eliquis), hx of GI bleed, AVM, Duodenal ulcer, gastritis, CHF, Glaucoma, grave's disease, HTN, cardiac arrest (s/p medtronic PPM) and Vertigo and PSHx of Oophorectomy pr presents to ED c/o weakness, cough x 4 days.    Patient is AXO=3 prefers daughter to give history. Daughter states she was feeling tired since 1 month. But since  morning she was so weak, was unable to do all houshold chores. Daughter noticed that she has Black tarry stools x in her last few bowel movements, so she stopped giving her eliquis and aspirin 6 days ago. Patient also developed dry cough and dyspnea since 4 days.  Pt denies chest pain, fever, nausea, vomiting, palpitations  and all other complaints. Patient had colonscopy and Endoscopy done in July 2018 that showed AVM, Antral ulcer and gastritis. Colonoscopy was normal. Dr. Huffman has done Endoscopy in Nov 2018 which showed healing ulcer. She was initially on Pradaxa for Afib but had GI bleeding so was started on Eliquis in Nov 2018.      In ED, patient's vital signs were stable, H/H: 4.6/16.6, Occult positive, probnp: 1592, Troponin 0.265 CXR shows New right base findings."     Goals of care: DNR/DNI, Can have central line, IV pressor. 86 y/o F from home ambulates independently pt w/ PMHx of DM, HTN, A-Fib ( on Eliquis), hx of GI bleed, AVM, Duodenal ulcer, gastritis, CHF, Glaucoma, grave's disease, HTN, cardiac arrest (s/p medtronic PPM), SBO S/p intestinal resection and Vertigo and PSHx of Oophorectomy pr presents to ED c/o weakness, cough x 4 days.    Patient is AXO=3 prefers daughter to give history. Daughter states she was feeling tired since 1 month. But since  morning she was so weak, was unable to do all houshold chores. Daughter noticed that she has Black tarry stools x in her last few bowel movements, so she stopped giving her eliquis and aspirin 6 days ago. Patient also developed dry cough and dyspnea since 4 days.  Pt denies chest pain, fever, nausea, vomiting, palpitations  and all other complaints. Patient had colonscopy and Endoscopy done in July 2018 that showed AVM, Antral ulcer and gastritis. Colonoscopy was normal. Dr. Huffman has done Endoscopy in Nov 2018 which showed healing ulcer. She was initially on Pradaxa for Afib but had GI bleeding so was started on Eliquis in Nov 2018.      In ED, patient's vital signs were stable, H/H: 4.6/16.6, Occult positive, probnp: 1592, Troponin 0.265 CXR shows New right base findings."     Goals of care: DNR/DNI, Can have central line, IV pressor.

## 2019-02-06 NOTE — ED ADULT NURSE NOTE - NSIMPLEMENTINTERV_GEN_ALL_ED
Implemented All Fall Risk Interventions:  [place] to call system. Call bell, personal items and telephone within reach. Instruct patient to call for assistance. Room bathroom lighting operational. Non-slip footwear when patient is off stretcher. Physically safe environment: no spills, clutter or unnecessary equipment. Stretcher in lowest position, wheels locked, appropriate side rails in place. Provide visual cue, wrist band, yellow gown, etc. Monitor gait and stability. Monitor for mental status changes and reorient to person, place, and time. Review medications for side effects contributing to fall risk. Reinforce activity limits and safety measures with patient and family.

## 2019-02-06 NOTE — H&P ADULT - PROBLEM SELECTOR PLAN 7
c/w accuchecks every 6 hr as patient is NPO  c/w lantus half dose as Blood sugars are in 300 ( takes 15 units at bedtime and hss)

## 2019-02-06 NOTE — H&P ADULT - PROBLEM SELECTOR PLAN 4
takes metoprolol and Eliquis  CHADVASC : 6 points   Hold Metoprolol and eliquis as patient is bleeding  Cardio Dr. Boo

## 2019-02-06 NOTE — H&P ADULT - ASSESSMENT
88 y/o F from home ambulates independently pt w/ PMHx of DM, HTN, A-Fib ( on Eliquis), hx of GI bleed, AVM, Duodenal ulcer, gastritis, CHF, Glaucoma, grave's disease, HTN, cardiac arrest (s/p medtronic PPM) and Vertigo and PSHx of Oophorectomy pr presents to ED c/o weakness, cough x 4 days.

## 2019-02-06 NOTE — ED PROVIDER NOTE - MEDICAL DECISION MAKING DETAILS
86 y/o F pt w/ cough, concern for pnuemonia, pale, dizziness, concern for GI bleed, labs, x-ray and admit

## 2019-02-06 NOTE — H&P ADULT - ATTENDING COMMENTS
Patient seen/evaluated at bedside 2/6/2019 in the ED w/ Dr. Benton at 7pm. I agree with the resident H&P  note/outlined plan of care. My independent findings and conclusions are documented.    86 y/o f admitted w/     1. profound acute blood loss anemia  2. suspected upper gi hemorrhage  3. h/o gastric/duodenal ulcer  4. h/o atrial fibrillaton, rate controlled  5. chronic diastolic CHF (actually volume depleted)  6. elevated troponin/Type II MI    Pt w/ 3-4 episodes of melena w/in past week. Risk benefit ratio suggest not rechallenging this patient with anticoagulation/antiplatelets  transfuse 2 units of prbc, check cbc, likely will require at least 3 units,   protonix IV, IVF hydration  npo, maintain large bore iv  serial cbc  hold antihypertensives  Dr. Huffman who perfomed prior EGDs notified, plan for EGD in AM  ICU deemed pt hemodynamically stable for floor  elevated troponin likely demand ischemia in setting of profound anemia, statin, hold ASA. cycle cardiac biomarkers  pt known to Dr. Boo, notify for cardiology  rest of plan as above Patient seen/evaluated at bedside 2/6/2019 in the ED w/ Dr. Bentno at 7pm. I agree with the resident H&P  note/outlined plan of care. My independent findings and conclusions are documented.    86 y/o f admitted w/     1. profound acute blood loss anemia  2. suspected upper gi hemorrhage  3. h/o gastric/duodenal ulcer  4. h/o atrial fibrillation, rate controlled  5. chronic diastolic CHF (actually volume depleted)  6. elevated troponin/Type II MI  7. h/o cardiac arrest s/p ppm    Pt w/ 3-4 episodes of melena w/in past week. Risk benefit ratio suggest not rechallenging this patient with anticoagulation/antiplatelets  transfuse 2 units of prbc, check cbc, likely will require at least 3 units,   protonix IV, IVF hydration  npo, maintain large bore iv  serial cbc  hold antihypertensives  Dr. Huffman who perfomed prior EGDs notified, plan for EGD in AM  ICU deemed pt hemodynamically stable for floor  elevated troponin likely demand ischemia in setting of profound anemia, statin, hold ASA. cycle cardiac biomarkers  pt known to Dr. Boo, notify for cardiology  rest of plan as above

## 2019-02-06 NOTE — H&P ADULT - FAMILY HISTORY
Family history of hypertension     Father  Still living? Unknown  Family history of diabetes mellitus, Age at diagnosis: Age Unknown

## 2019-02-06 NOTE — CONSULT NOTE ADULT - PROBLEM SELECTOR RECOMMENDATION 5
Hold Aspirin, bb  c/w atorvastatin  previous echo shows normal EF with no mention of diastolic function   recheck TTE  primary team to consult Dr. Boo  transfuse Hb to > 8 given cardiac history

## 2019-02-06 NOTE — CONSULT NOTE ADULT - PROBLEM SELECTOR RECOMMENDATION 4
?diastolic hf?  currently fluid depleted  will get about 3 units of PRBC  monitor fluid status  may need 1 shot of lasix after transfusions

## 2019-02-06 NOTE — H&P ADULT - PROBLEM SELECTOR PLAN 2
H/H:4.6/16.6 likely due to GI bleeding  will transfuse 2 PRBC  continue to monitor CBC  Anemia panel not send in ED before blood transfusion

## 2019-02-06 NOTE — H&P ADULT - NSHPPHYSICALEXAM_GEN_ALL_CORE
ICU Vital Signs Last 24 Hrs  T(C): 37.4 (06 Feb 2019 20:01), Max: 37.4 (06 Feb 2019 20:01)  T(F): 99.3 (06 Feb 2019 20:01), Max: 99.3 (06 Feb 2019 20:01)  HR: 65 (06 Feb 2019 20:01) (60 - 68)  BP: 129/51 (06 Feb 2019 20:01) (123/69 - 138/45)  BP(mean): --  ABP: --  ABP(mean): --  RR: 18 (06 Feb 2019 20:01) (18 - 18)  SpO2: 98% (06 Feb 2019 20:01) (98% - 100%)

## 2019-02-06 NOTE — H&P ADULT - PROBLEM SELECTOR PLAN 6
c/o weakness and lethargy with low hemoglobin   Hold Aspirin, bb  c/w atorvastatin  Cardio: Dr. Boo  F/u Echo

## 2019-02-07 LAB
ALBUMIN SERPL ELPH-MCNC: 2.4 G/DL — LOW (ref 3.5–5)
ALP SERPL-CCNC: 57 U/L — SIGNIFICANT CHANGE UP (ref 40–120)
ALT FLD-CCNC: 32 U/L DA — SIGNIFICANT CHANGE UP (ref 10–60)
ANION GAP SERPL CALC-SCNC: 4 MMOL/L — LOW (ref 5–17)
AST SERPL-CCNC: 33 U/L — SIGNIFICANT CHANGE UP (ref 10–40)
BASOPHILS # BLD AUTO: 0.1 K/UL — SIGNIFICANT CHANGE UP (ref 0–0.2)
BASOPHILS NFR BLD AUTO: 1 % — SIGNIFICANT CHANGE UP (ref 0–2)
BILIRUB SERPL-MCNC: 1.1 MG/DL — SIGNIFICANT CHANGE UP (ref 0.2–1.2)
BUN SERPL-MCNC: 18 MG/DL — SIGNIFICANT CHANGE UP (ref 7–18)
CALCIUM SERPL-MCNC: 8 MG/DL — LOW (ref 8.4–10.5)
CHLORIDE SERPL-SCNC: 109 MMOL/L — HIGH (ref 96–108)
CHOLEST SERPL-MCNC: 99 MG/DL — SIGNIFICANT CHANGE UP (ref 10–199)
CK MB BLD-MCNC: 3.1 % — SIGNIFICANT CHANGE UP (ref 0–3.5)
CK MB CFR SERPL CALC: 2 NG/ML — SIGNIFICANT CHANGE UP (ref 0–3.6)
CK SERPL-CCNC: 64 U/L — SIGNIFICANT CHANGE UP (ref 21–215)
CO2 SERPL-SCNC: 27 MMOL/L — SIGNIFICANT CHANGE UP (ref 22–31)
CREAT SERPL-MCNC: 0.93 MG/DL — SIGNIFICANT CHANGE UP (ref 0.5–1.3)
EOSINOPHIL # BLD AUTO: 0.1 K/UL — SIGNIFICANT CHANGE UP (ref 0–0.5)
EOSINOPHIL NFR BLD AUTO: 1.8 % — SIGNIFICANT CHANGE UP (ref 0–6)
FOLATE SERPL-MCNC: 15.5 NG/ML — SIGNIFICANT CHANGE UP
GLUCOSE BLDC GLUCOMTR-MCNC: 116 MG/DL — HIGH (ref 70–99)
GLUCOSE BLDC GLUCOMTR-MCNC: 131 MG/DL — HIGH (ref 70–99)
GLUCOSE BLDC GLUCOMTR-MCNC: 151 MG/DL — HIGH (ref 70–99)
GLUCOSE BLDC GLUCOMTR-MCNC: 164 MG/DL — HIGH (ref 70–99)
GLUCOSE SERPL-MCNC: 161 MG/DL — HIGH (ref 70–99)
HBA1C BLD-MCNC: 7.2 % — HIGH (ref 4–5.6)
HCT VFR BLD CALC: 25 % — LOW (ref 34.5–45)
HCT VFR BLD CALC: 26 % — LOW (ref 34.5–45)
HCT VFR BLD CALC: 26.3 % — LOW (ref 34.5–45)
HDLC SERPL-MCNC: 31 MG/DL — LOW
HGB BLD-MCNC: 7.8 G/DL — LOW (ref 11.5–15.5)
HGB BLD-MCNC: 8.1 G/DL — LOW (ref 11.5–15.5)
HGB BLD-MCNC: 8.1 G/DL — LOW (ref 11.5–15.5)
LIPID PNL WITH DIRECT LDL SERPL: 48 MG/DL — SIGNIFICANT CHANGE UP
LYMPHOCYTES # BLD AUTO: 1.6 K/UL — SIGNIFICANT CHANGE UP (ref 1–3.3)
LYMPHOCYTES # BLD AUTO: 23.2 % — SIGNIFICANT CHANGE UP (ref 13–44)
MAGNESIUM SERPL-MCNC: 2 MG/DL — SIGNIFICANT CHANGE UP (ref 1.6–2.6)
MCHC RBC-ENTMCNC: 26.4 PG — LOW (ref 27–34)
MCHC RBC-ENTMCNC: 26.5 PG — LOW (ref 27–34)
MCHC RBC-ENTMCNC: 26.5 PG — LOW (ref 27–34)
MCHC RBC-ENTMCNC: 30.9 GM/DL — LOW (ref 32–36)
MCHC RBC-ENTMCNC: 31 GM/DL — LOW (ref 32–36)
MCHC RBC-ENTMCNC: 31 GM/DL — LOW (ref 32–36)
MCV RBC AUTO: 85.2 FL — SIGNIFICANT CHANGE UP (ref 80–100)
MCV RBC AUTO: 85.3 FL — SIGNIFICANT CHANGE UP (ref 80–100)
MCV RBC AUTO: 85.8 FL — SIGNIFICANT CHANGE UP (ref 80–100)
MONOCYTES # BLD AUTO: 0.7 K/UL — SIGNIFICANT CHANGE UP (ref 0–0.9)
MONOCYTES NFR BLD AUTO: 9.9 % — SIGNIFICANT CHANGE UP (ref 2–14)
NEUTROPHILS # BLD AUTO: 4.3 K/UL — SIGNIFICANT CHANGE UP (ref 1.8–7.4)
NEUTROPHILS NFR BLD AUTO: 64.2 % — SIGNIFICANT CHANGE UP (ref 43–77)
PHOSPHATE SERPL-MCNC: 2.4 MG/DL — LOW (ref 2.5–4.5)
PLATELET # BLD AUTO: 82 K/UL — LOW (ref 150–400)
PLATELET # BLD AUTO: 86 K/UL — LOW (ref 150–400)
PLATELET # BLD AUTO: 90 K/UL — LOW (ref 150–400)
POTASSIUM SERPL-MCNC: 4 MMOL/L — SIGNIFICANT CHANGE UP (ref 3.5–5.3)
POTASSIUM SERPL-SCNC: 4 MMOL/L — SIGNIFICANT CHANGE UP (ref 3.5–5.3)
PROT SERPL-MCNC: 5.6 G/DL — LOW (ref 6–8.3)
RBC # BLD: 2.94 M/UL — LOW (ref 3.8–5.2)
RBC # BLD: 3.05 M/UL — LOW (ref 3.8–5.2)
RBC # BLD: 3.06 M/UL — LOW (ref 3.8–5.2)
RBC # FLD: 14.1 % — SIGNIFICANT CHANGE UP (ref 10.3–14.5)
RBC # FLD: 14.1 % — SIGNIFICANT CHANGE UP (ref 10.3–14.5)
RBC # FLD: 14.2 % — SIGNIFICANT CHANGE UP (ref 10.3–14.5)
SODIUM SERPL-SCNC: 140 MMOL/L — SIGNIFICANT CHANGE UP (ref 135–145)
TOTAL CHOLESTEROL/HDL RATIO MEASUREMENT: 3.2 RATIO — LOW (ref 3.3–7.1)
TRIGL SERPL-MCNC: 100 MG/DL — SIGNIFICANT CHANGE UP (ref 10–149)
TROPONIN I SERPL-MCNC: 0.19 NG/ML — HIGH (ref 0–0.04)
TSH SERPL-MCNC: 2.06 UU/ML — SIGNIFICANT CHANGE UP (ref 0.34–4.82)
VIT B12 SERPL-MCNC: 661 PG/ML — SIGNIFICANT CHANGE UP (ref 232–1245)
WBC # BLD: 6 K/UL — SIGNIFICANT CHANGE UP (ref 3.8–10.5)
WBC # BLD: 6.7 K/UL — SIGNIFICANT CHANGE UP (ref 3.8–10.5)
WBC # BLD: 7.1 K/UL — SIGNIFICANT CHANGE UP (ref 3.8–10.5)
WBC # FLD AUTO: 6 K/UL — SIGNIFICANT CHANGE UP (ref 3.8–10.5)
WBC # FLD AUTO: 6.7 K/UL — SIGNIFICANT CHANGE UP (ref 3.8–10.5)
WBC # FLD AUTO: 7.1 K/UL — SIGNIFICANT CHANGE UP (ref 3.8–10.5)

## 2019-02-07 PROCEDURE — 99233 SBSQ HOSP IP/OBS HIGH 50: CPT | Mod: GC

## 2019-02-07 RX ORDER — METOPROLOL TARTRATE 50 MG
12.5 TABLET ORAL
Qty: 0 | Refills: 0 | Status: DISCONTINUED | OUTPATIENT
Start: 2019-02-07 | End: 2019-02-12

## 2019-02-07 RX ORDER — ACETAMINOPHEN 500 MG
650 TABLET ORAL EVERY 6 HOURS
Qty: 0 | Refills: 0 | Status: DISCONTINUED | OUTPATIENT
Start: 2019-02-07 | End: 2019-02-12

## 2019-02-07 RX ORDER — AMLODIPINE BESYLATE 2.5 MG/1
2.5 TABLET ORAL DAILY
Qty: 0 | Refills: 0 | Status: DISCONTINUED | OUTPATIENT
Start: 2019-02-07 | End: 2019-02-12

## 2019-02-07 RX ORDER — AMIODARONE HYDROCHLORIDE 400 MG/1
200 TABLET ORAL DAILY
Qty: 0 | Refills: 0 | Status: DISCONTINUED | OUTPATIENT
Start: 2019-02-11 | End: 2019-02-12

## 2019-02-07 RX ORDER — AMIODARONE HYDROCHLORIDE 400 MG/1
TABLET ORAL
Qty: 0 | Refills: 0 | Status: DISCONTINUED | OUTPATIENT
Start: 2019-02-07 | End: 2019-02-12

## 2019-02-07 RX ORDER — AMIODARONE HYDROCHLORIDE 400 MG/1
400 TABLET ORAL EVERY 8 HOURS
Qty: 0 | Refills: 0 | Status: COMPLETED | OUTPATIENT
Start: 2019-02-07 | End: 2019-02-11

## 2019-02-07 RX ORDER — INSULIN GLARGINE 100 [IU]/ML
10 INJECTION, SOLUTION SUBCUTANEOUS AT BEDTIME
Qty: 0 | Refills: 0 | Status: DISCONTINUED | OUTPATIENT
Start: 2019-02-08 | End: 2019-02-12

## 2019-02-07 RX ORDER — INSULIN GLARGINE 100 [IU]/ML
4 INJECTION, SOLUTION SUBCUTANEOUS AT BEDTIME
Qty: 0 | Refills: 0 | Status: DISCONTINUED | OUTPATIENT
Start: 2019-02-07 | End: 2019-02-08

## 2019-02-07 RX ADMIN — PANTOPRAZOLE SODIUM 40 MILLIGRAM(S): 20 TABLET, DELAYED RELEASE ORAL at 17:52

## 2019-02-07 RX ADMIN — Medication 0.12 MILLIGRAM(S): at 05:51

## 2019-02-07 RX ADMIN — PANTOPRAZOLE SODIUM 40 MILLIGRAM(S): 20 TABLET, DELAYED RELEASE ORAL at 05:51

## 2019-02-07 RX ADMIN — CEFTRIAXONE 100 GRAM(S): 500 INJECTION, POWDER, FOR SOLUTION INTRAMUSCULAR; INTRAVENOUS at 14:27

## 2019-02-07 RX ADMIN — INSULIN GLARGINE 4 UNIT(S): 100 INJECTION, SOLUTION SUBCUTANEOUS at 23:51

## 2019-02-07 RX ADMIN — Medication 12.5 MILLIGRAM(S): at 17:52

## 2019-02-07 RX ADMIN — AMLODIPINE BESYLATE 2.5 MILLIGRAM(S): 2.5 TABLET ORAL at 21:48

## 2019-02-07 RX ADMIN — AMIODARONE HYDROCHLORIDE 400 MILLIGRAM(S): 400 TABLET ORAL at 21:48

## 2019-02-07 RX ADMIN — AMIODARONE HYDROCHLORIDE 400 MILLIGRAM(S): 400 TABLET ORAL at 13:27

## 2019-02-07 RX ADMIN — AZITHROMYCIN 250 MILLIGRAM(S): 500 TABLET, FILM COATED ORAL at 14:27

## 2019-02-07 NOTE — CHART NOTE - NSCHARTNOTEFT_GEN_A_CORE
Pt blood sugar was low 116 and she is on npo. so gave 4 units of lantus instead of 10 units. Would monitor blood sugar again.

## 2019-02-07 NOTE — PROGRESS NOTE ADULT - SUBJECTIVE AND OBJECTIVE BOX
PGY 1 Note discussed with supervising resident and primary attending    Patient is a 87y old  Female who presents with a chief complaint of Anemia (2019 11:00)      INTERVAL HPI/OVERNIGHT EVENTS: offers no new complaints; current symptoms resolving    MEDICATIONS  (STANDING):  amiodarone    Tablet   Oral   amiodarone    Tablet 400 milliGRAM(s) Oral every 8 hours  amLODIPine   Tablet 2.5 milliGRAM(s) Oral daily  azithromycin  IVPB 500 milliGRAM(s) IV Intermittent every 24 hours  cefTRIAXone   IVPB 1 Gram(s) IV Intermittent every 24 hours  digoxin     Tablet 0.125 milliGRAM(s) Oral daily  insulin glargine Injectable (LANTUS) 10 Unit(s) SubCutaneous at bedtime  insulin lispro (HumaLOG) corrective regimen sliding scale   SubCutaneous three times a day before meals  metoprolol tartrate 12.5 milliGRAM(s) Oral two times a day  pantoprazole  Injectable 40 milliGRAM(s) IV Push two times a day    MEDICATIONS  (PRN):  acetaminophen   Tablet .. 650 milliGRAM(s) Oral every 6 hours PRN Mild Pain (1 - 3)      __________________________________________________  REVIEW OF SYSTEMS:    CONSTITUTIONAL: No fever,   EYES: no acute visual disturbances  NECK: No pain or stiffness  RESPIRATORY: No cough; No shortness of breath  CARDIOVASCULAR: No chest pain, no palpitations  GASTROINTESTINAL: No pain. No nausea or vomiting; No diarrhea   NEUROLOGICAL: No headache or numbness, no tremors  MUSCULOSKELETAL: No joint pain, no muscle pain  GENITOURINARY: no dysuria, no frequency, no hesitancy  PSYCHIATRY: no depression , no anxiety  ALL OTHER  ROS negative        Vital Signs Last 24 Hrs  T(C): 36.8 (2019 10:23), Max: 37.4 (2019 20:01)  T(F): 98.2 (2019 10:23), Max: 99.3 (2019 20:01)  HR: 65 (2019 10:23) (59 - 68)  BP: 147/48 (2019 10:23) (123/69 - 168/51)  BP(mean): --  RR: 16 (2019 10:23) (16 - 18)  SpO2: 98% (2019 10:23) (98% - 100%)    ________________________________________________  PHYSICAL EXAM:  GENERAL: NAD  HEENT: Normocephalic;  conjunctivae and sclerae clear; moist mucous membranes;   NECK : supple  CHEST/LUNG: Clear to auscultation bilaterally with good air entry   HEART: S1 S2  regular; no murmurs, gallops or rubs  ABDOMEN: Soft, Nontender, Nondistended; Bowel sounds present  EXTREMITIES: no cyanosis; no edema; no calf tenderness  SKIN: warm and dry; no rash  NERVOUS SYSTEM:  Awake and alert; Oriented  to place, person and time ; no new deficits    _________________________________________________  LABS:                        8.1    6.7   )-----------( 90       ( 2019 06:06 )             26.3     02-07    140  |  109<H>  |  18  ----------------------------<  161<H>  4.0   |  27  |  0.93    Ca    8.0<L>      2019 06:06  Phos  2.4     02-07  Mg     2.0     02-07    TPro  5.6<L>  /  Alb  2.4<L>  /  TBili  1.1  /  DBili  x   /  AST  33  /  ALT  32  /  AlkPhos  57  02-07    PT/INR - ( 2019 14:13 )   PT: 13.0 sec;   INR: 1.17 ratio         PTT - ( 2019 14:13 )  PTT:27.9 sec  Urinalysis Basic - ( 2019 17:08 )    Color: Yellow / Appearance: Clear / S.015 / pH: x  Gluc: x / Ketone: Negative  / Bili: Negative / Urobili: Negative   Blood: x / Protein: 30 mg/dL / Nitrite: Negative   Leuk Esterase: Trace / RBC: 0-2 /HPF / WBC 3-5 /HPF   Sq Epi: x / Non Sq Epi: Few /HPF / Bacteria: Trace /HPF      CAPILLARY BLOOD GLUCOSE      POCT Blood Glucose.: 151 mg/dL (2019 11:35)  POCT Blood Glucose.: 164 mg/dL (2019 08:27)  POCT Blood Glucose.: 306 mg/dL (2019 22:59)  POCT Blood Glucose.: 323 mg/dL (2019 12:39)        RADIOLOGY & ADDITIONAL TESTS:    Imaging Personally Reviewed:  YES  Consultant(s) Notes Reviewed:   YES    Care Discussed with Consultants : YES    Plan of care was discussed with patient and /or primary care giver; all questions and concerns were addressed and care was aligned with patient's wishes.

## 2019-02-07 NOTE — PROGRESS NOTE ADULT - PROBLEM SELECTOR PLAN 8
takes amlodipine, metoprolol  BP stable  Hold all antihypertensive meds c/w amlodipine, metoprolol  BP stable

## 2019-02-07 NOTE — CONSULT NOTE ADULT - SUBJECTIVE AND OBJECTIVE BOX
[  ] STAT REQUEST              [ X ]ROUTINE REQUEST    Patient is a 87 year old female with GI bleeding. GI consulted to evaluate.     HPI:  87 year old female with multiple medical problems including peptic ulcer disease and Afib on Eliquis presented with symptomatic anemia associated with black stool.  No abdominal pain, nausea, vomiting, hematemesis, hematochezia, fever, chills, chest pain, SOB, cough, hemoptysis, epistaxis, hematuria, dysuria or diarrhea reported.            PAIN MANAGEMENT:  Pain Scale:                0 /10  Pain Location:      Prior Colonoscopy:  2018    PAST MEDICAL HISTORY  A-fib  Constipation  Glaucoma  Vertigo  Graves disease  Pacemaker  DM (diabetes mellitus)  HTN (hypertension)         PAST SURGICAL HISTORY  Oophorectomy  Artificial pacemaker         Allergies    No Known Allergies       MEDICATIONS  (STANDING):  azithromycin  IVPB 500 milliGRAM(s) IV Intermittent every 24 hours  cefTRIAXone   IVPB 1 Gram(s) IV Intermittent every 24 hours  digoxin     Tablet 0.125 milliGRAM(s) Oral daily  insulin glargine Injectable (LANTUS) 10 Unit(s) SubCutaneous at bedtime  insulin lispro (HumaLOG) corrective regimen sliding scale   SubCutaneous three times a day before meals  pantoprazole  Injectable 40 milliGRAM(s) IV Push two times a day    MEDICATIONS  (PRN):  acetaminophen   Tablet .. 650 milliGRAM(s) Oral every 6 hours PRN Mild Pain (1 - 3)      SOCIAL HISTORY  Advanced Directives:       [  ] Full Code       [ X ] DNR  Marital Status:         [  ] M      [ X ] S      [  ] D       [  ] W  Children:       [ X ] Yes      [  ] No  Occupation:        [  ] Employed       [ X ] Unemployed       [  ] Retired  Diet:       [ X ] Regular       [  ] PEG feeding          [  ] NG tube feeding  Drug Use:           [ X ] No          [  ] Yes  Alcohol:           [ X ] No             [  ] Yes (socially)         [  ] Yes (chronic)  Tobacco:           [  ] Yes           [ X ] No    FAMILY HISTORY  [ X ] Heart Disease            [  ] Diabetes             [  ] HTN             [  ] Colon Cancer             [  ] Stomach Cancer              [  ] Pancreatic Cancer    VITAL SIGNS  Temp:  97  BP:  155/56  P:  62  R:  18       Complete Blood Count + Automated Diff (02.06.19 @ 14:13)    WBC Count: 5.0 K/uL    RBC Count: 1.94 M/uL    Hemoglobin: 4.6: TYPE      Mean Cell Volume: 85.6 fl    Mean Cell Hemoglobin: 23.5 pg    Mean Cell Hemoglobin Conc: 27.4 gm/dL    Red Cell Distrib Width: 15.8 %    Platelet Count - Automated: 102 K/uL    Auto Neutrophil #: 3.5 K/uL    Auto Lymphocyte #: 1.0 K/uL    Auto Monocyte #: 0.4 K/uL    Auto Eosinophil #: 0.0 K/uL    Auto Basophil #: 0.0 K/uL    Auto Neutrophil %: 70.2   clinical significance. %    Auto Lymphocyte %: 19.8 %    Auto Monocyte %: 8.7 %    Auto Eosinophil %: 0.4 %    Auto Basophil %: 0.9 %          140  |  109<H>  |  18  ----------------------------<  161<H>  4.0   |  27  |  0.93    Ca    8.0<L>      07 Feb 2019 06:06  Phos  2.4     02-07  Mg     2.0     02-07    TPro  5.6<L>  /  Alb  2.4<L>  /  TBili  1.1  /  DBili  x   /  AST  33  /  ALT  32  /  AlkPhos  57  02-07     PT/INR - ( 06 Feb 2019 14:13 )   PT: 13.0 sec;   INR: 1.17 ratio     PTT - ( 06 Feb 2019 14:13 )  PTT:27.9 sec    Occult Blood, Feces (02.06.19 @ 15:40)    Occult Blood, Feces: Positive    Iron with Total Binding Capacity (07.10.18 @ 10:51)    Iron - Total Binding Capacity.: 389 ug/dL    % Saturation, Iron: 24 %    Iron Total, Serum: 92 ug/dL    Unsaturated Iron Binding Capacity: 297 ug/dL    Urinalysis (02.06.19 @ 17:08)    Glucose Qualitative, Urine: 1000 mg/dL    Blood, Urine: Negative    pH Urine: 5.0    Color: Yellow    Urine Appearance: Clear    Bilirubin: Negative    Ketone - Urine: Negative    Specific Gravity: 1.015    Protein, Urine: 30 mg/dL    Urobilinogen: Negative    Nitrite: Negative    Leukocyte Esterase Concentration: Trace      ECG   Ventricular Rate 114 BPM    Atrial Rate 114 BPM    P-R Interval 150 ms    QRS Duration 74 ms     ms    QTc 446 ms    P Axis 99 degrees    R Axis 20 degrees    T Axis -171 degrees    Diagnosis Line atrial flutter   ST & T wave abnormality, consider anterolateral ischemia  Abnormal ECG      RADIOLOGY/IMAGING    EXAM:  CT ANGIO CHEST (W)AW IC                            PROCEDURE DATE:  01/27/2017        INTERPRETATION:  CT ANGIOGRAM CHEST WITH CONTRAST    HISTORY: Chest pain Rule out PE, C/w SVT.    TECHNIQUE: CT pulmonary angiogram of the chest was performed after   intravenous contrast. Sagittal and coronal and MIP reformations were made.    COMPARISON: Chest x-ray 1/27/2017. No prior chest CT.    FINDINGS:  There is no luminal filling defect in the pulmonary arteries to the   segmental level to suggest thromboembolic disease. The pulmonary trunk is   enlarged, measuring 3.4 cm in diameter, which may be seen in pulmonary   arterial hypertension in the appropriate clinical setting.    Cardiomegaly. No pericardial effusion. Atherosclerotic calcifications of   the aorta, coronary arteries and mitral annulus. Left-sided dual-lead   pacemaker with leads in the right atrium and right ventricle.    No enlarged axillary or mediastinal adenopathy. Mildly enlarged bilateral   hilar lymph nodes are present measuring up to 1.1 cm in short axis on the   left.    Evaluation of the lungs is limited due to motion artifact.    There is bilateral lower lobe and lingular subsegmental atelectasis. No   focal lung consolidation. No pneumothorax or pleural effusion. Trachea   and central airways are patent.    Limited arterial phase images of the visualized upper abdomen demonstrate   a 4 mm right renal calculus.    Degenerative changes in the spine.    IMPRESSION:  No CT evidence of pulmonary embolism.    Enlarged main pulmonary trunk 3.4 cm, may be seen in pulmonary arterial   hypertension in the appropriate clinical setting.    4 mm right renal calculus.                  EXAM:  XR CHEST AP OR PA 1V                            PROCEDURE DATE:  02/06/2019          INTERPRETATION:  AP semierect chest on February 6, 2019 at 2:01 PM.   Patient has cough.    Heart is magnified by technique. Left-sided pacemaker again noted.    Present film shows a right base effusion with adjacent infiltrate new   since January 27, 2017.    IMPRESSION: New right base findings.

## 2019-02-07 NOTE — PROGRESS NOTE ADULT - PROBLEM SELECTOR PLAN 4
takes metoprolol and Eliquis  CHADVASC : 6 points   Hold Metoprolol and eliquis as patient is bleeding  Cardio Dr. Boo takes metoprolol and Eliquis  CHADVASC : 6 points   C/w metoprolol, amiodarone and digox and hold eliquis as patient is bleeding  Cardio Dr. Boo

## 2019-02-07 NOTE — PROGRESS NOTE ADULT - PROBLEM SELECTOR PLAN 9
RISK                                                          Points  [  ] Previous VTE                                                3  [  ] Thrombophilia                                             2  [  ] Lower limb paralysis                                   2        (unable to hold up >15 seconds)    [  ] Current Cancer                                             2         (within 6 months)  [ x ] Immobilization > 24 hrs                              1  [  ] ICU/CCU stay > 24 hours                             1  [ x ] Age > 60                                                         1    IMPROVE VTE Score: 2  Hold for VTE prophylaxis. RISK                                                          Points  [  ] Previous VTE                                                3  [  ] Thrombophilia                                             2  [  ] Lower limb paralysis                                   2        (unable to hold up >15 seconds)    [  ] Current Cancer                                             2         (within 6 months)  [ x ] Immobilization > 24 hrs                              1  [  ] ICU/CCU stay > 24 hours                             1  [ x ] Age > 60                                                         1    IMPROVE VTE Score: 2  Hold for VTE prophylaxis.  C/w SCD boots

## 2019-02-07 NOTE — PROGRESS NOTE ADULT - PROBLEM SELECTOR PLAN 3
p/w cough with CXR finding of Rt base process  F/u Blood cultures  started rocephin and zithromax p/w cough with CXR finding of Rt base process  F/u Blood cultures  started rocephin and zithromax D2

## 2019-02-07 NOTE — PATIENT PROFILE ADULT - NSPROHMCARDIOMGMTSTRAT_GEN_A_NUR
activity/fluid modification/medication therapy/adequate rest/diet modification/exercise/routine screening/weight management

## 2019-02-07 NOTE — PROGRESS NOTE ADULT - PROBLEM SELECTOR PLAN 1
p/w weakness, lethargy, melanotic stools with Hb of 4.6, Occult positive, hx of Eliquis use, gastritis, antral ulcer --> likely upper GI bleed due to bleeding ulcer/ gastritis due to eliquis  s/p 2 PRBC with IV lasix  Hb stable at 9.1  2 IV lines  F/u CBC q 12  Started protonix IV push ( PATIENT'S DAUGHTER WAS REFUSING PROTONIX AS SHE THINKS IT CAUSES LEG SWELLING, SHE AGREED NOW  FOR ONLY INPATIENT PROTONIX AND TO RESUME HOME OMEPRAZOLE UPON DISCHARGE)  -npo nor now  GI Dr. Huffman -> discussed with Dr Huffman, will do endoscopy today  HOLD ELIQUIS/DVT PROPHLAXIS AND ASPIRIN p/w weakness, lethargy, melanotic stools with Hb of 4.6, Occult positive, hx of Eliquis use, gastritis, antral ulcer --> likely upper GI bleed due to bleeding ulcer/ gastritis due to eliquis  s/p 2 PRBC with IV lasix  Hb stable at 9.1  2 IV lines  F/u CBC q 12  Started protonix IV push ( PATIENT'S DAUGHTER WAS REFUSING PROTONIX AS SHE THINKS IT CAUSES LEG SWELLING, SHE AGREED NOW  FOR ONLY INPATIENT PROTONIX AND TO RESUME HOME OMEPRAZOLE UPON DISCHARGE)  -npo nor now  GI Dr. Huffman -> discussed with Dr Huffman, will do endoscopy today  HOLD ELIQUIS/DVT PROPHLAXIS AND ASPIRIN  f/u CBC in evening

## 2019-02-07 NOTE — PROGRESS NOTE ADULT - ATTENDING COMMENTS
Patient seen/evaluated at bedside 2/7/2019. I agree with the resident progress note/outlined plan of care. My independent findings and conclusions are documented.    Pt seen s/p EGD which revealed gastric AVMs, now status post cauterization. Pt denies abd pain, lightheadedness, nausea/vomiting.  Team advised to maintain patient NPO post procedure    TTE: 1. Mitral annular calcification. Mild mitral regurgitation.    2. Severely dilated left atrium.  LA volume index = 54  cc/m2.  3. Mild concentric left ventricular hypertrophy.  4. Endocardium not well visualized; grossly normal left  ventricular systolic function. Severe diastolic dysfunction  (stage III -IV).  5. Normal right ventricular size and function.  6. RV systolic pressure is 59 mm Hg. Moderate pulmonary  hypertension.      86 y/o f admitted w/     1. profound acute blood loss anemia  2.upper gi hemorrhage s/t bleeding AVM s/p cauterization  3. h/o gastric/duodenal ulcer  4. h/o atrial fibrillation, rate controlled  5. chronic diastolic CHF (actually volume depleted)  6. elevated troponin/Type II MI  7. h/o cardiac arrest s/p ppm    Pt w/ 3-4 episodes of melena w/in past week. Risk benefit ratio suggest not re-challenging this patient with anticoagulation/antiplatelets  hgb/hct appropriate and stable post transfusion  protonix IV  npo, maintain large bore iv--> likely advance to clear liquid diet tomorrow  serial cbc  hold antihypertensives  ICU deemed pt hemodynamically stable for floor  elevated troponin likely demand ischemia in setting of profound anemia, statin, hold ASA. cycle cardiac biomarkers  hold fluids, please note pt with severe diastolic dysfunction

## 2019-02-08 LAB
ANION GAP SERPL CALC-SCNC: 6 MMOL/L — SIGNIFICANT CHANGE UP (ref 5–17)
BUN SERPL-MCNC: 12 MG/DL — SIGNIFICANT CHANGE UP (ref 7–18)
CALCIUM SERPL-MCNC: 8.1 MG/DL — LOW (ref 8.4–10.5)
CHLORIDE SERPL-SCNC: 108 MMOL/L — SIGNIFICANT CHANGE UP (ref 96–108)
CO2 SERPL-SCNC: 26 MMOL/L — SIGNIFICANT CHANGE UP (ref 22–31)
CREAT SERPL-MCNC: 0.72 MG/DL — SIGNIFICANT CHANGE UP (ref 0.5–1.3)
CULTURE RESULTS: SIGNIFICANT CHANGE UP
GLUCOSE BLDC GLUCOMTR-MCNC: 102 MG/DL — HIGH (ref 70–99)
GLUCOSE BLDC GLUCOMTR-MCNC: 103 MG/DL — HIGH (ref 70–99)
GLUCOSE BLDC GLUCOMTR-MCNC: 196 MG/DL — HIGH (ref 70–99)
GLUCOSE BLDC GLUCOMTR-MCNC: 201 MG/DL — HIGH (ref 70–99)
GLUCOSE SERPL-MCNC: 83 MG/DL — SIGNIFICANT CHANGE UP (ref 70–99)
HCT VFR BLD CALC: 27.7 % — LOW (ref 34.5–45)
HCT VFR BLD CALC: 28.4 % — LOW (ref 34.5–45)
HGB BLD-MCNC: 8.5 G/DL — LOW (ref 11.5–15.5)
HGB BLD-MCNC: 8.7 G/DL — LOW (ref 11.5–15.5)
MCHC RBC-ENTMCNC: 26.2 PG — LOW (ref 27–34)
MCHC RBC-ENTMCNC: 26.5 PG — LOW (ref 27–34)
MCHC RBC-ENTMCNC: 30.7 GM/DL — LOW (ref 32–36)
MCHC RBC-ENTMCNC: 30.8 GM/DL — LOW (ref 32–36)
MCV RBC AUTO: 85.5 FL — SIGNIFICANT CHANGE UP (ref 80–100)
MCV RBC AUTO: 86.1 FL — SIGNIFICANT CHANGE UP (ref 80–100)
PLATELET # BLD AUTO: 100 K/UL — LOW (ref 150–400)
PLATELET # BLD AUTO: 87 K/UL — LOW (ref 150–400)
POTASSIUM SERPL-MCNC: 3.6 MMOL/L — SIGNIFICANT CHANGE UP (ref 3.5–5.3)
POTASSIUM SERPL-SCNC: 3.6 MMOL/L — SIGNIFICANT CHANGE UP (ref 3.5–5.3)
RBC # BLD: 3.22 M/UL — LOW (ref 3.8–5.2)
RBC # BLD: 3.32 M/UL — LOW (ref 3.8–5.2)
RBC # FLD: 14.2 % — SIGNIFICANT CHANGE UP (ref 10.3–14.5)
RBC # FLD: 14.2 % — SIGNIFICANT CHANGE UP (ref 10.3–14.5)
SODIUM SERPL-SCNC: 140 MMOL/L — SIGNIFICANT CHANGE UP (ref 135–145)
SPECIMEN SOURCE: SIGNIFICANT CHANGE UP
WBC # BLD: 6.2 K/UL — SIGNIFICANT CHANGE UP (ref 3.8–10.5)
WBC # BLD: 6.6 K/UL — SIGNIFICANT CHANGE UP (ref 3.8–10.5)
WBC # FLD AUTO: 6.2 K/UL — SIGNIFICANT CHANGE UP (ref 3.8–10.5)
WBC # FLD AUTO: 6.6 K/UL — SIGNIFICANT CHANGE UP (ref 3.8–10.5)

## 2019-02-08 PROCEDURE — 99233 SBSQ HOSP IP/OBS HIGH 50: CPT | Mod: GC

## 2019-02-08 RX ORDER — CYCLOBENZAPRINE HYDROCHLORIDE 10 MG/1
5 TABLET, FILM COATED ORAL
Qty: 0 | Refills: 0 | Status: DISCONTINUED | OUTPATIENT
Start: 2019-02-08 | End: 2019-02-12

## 2019-02-08 RX ORDER — CEFUROXIME AXETIL 250 MG
250 TABLET ORAL EVERY 12 HOURS
Qty: 0 | Refills: 0 | Status: DISCONTINUED | OUTPATIENT
Start: 2019-02-08 | End: 2019-02-12

## 2019-02-08 RX ORDER — AZITHROMYCIN 500 MG/1
500 TABLET, FILM COATED ORAL DAILY
Qty: 0 | Refills: 0 | Status: COMPLETED | OUTPATIENT
Start: 2019-02-08 | End: 2019-02-09

## 2019-02-08 RX ORDER — LACTOBACILLUS ACIDOPHILUS 100MM CELL
1 CAPSULE ORAL
Qty: 0 | Refills: 0 | Status: DISCONTINUED | OUTPATIENT
Start: 2019-02-08 | End: 2019-02-12

## 2019-02-08 RX ADMIN — AZITHROMYCIN 500 MILLIGRAM(S): 500 TABLET, FILM COATED ORAL at 21:42

## 2019-02-08 RX ADMIN — AZITHROMYCIN 250 MILLIGRAM(S): 500 TABLET, FILM COATED ORAL at 13:18

## 2019-02-08 RX ADMIN — AMIODARONE HYDROCHLORIDE 400 MILLIGRAM(S): 400 TABLET ORAL at 06:18

## 2019-02-08 RX ADMIN — Medication 2: at 17:37

## 2019-02-08 RX ADMIN — CEFTRIAXONE 100 GRAM(S): 500 INJECTION, POWDER, FOR SOLUTION INTRAMUSCULAR; INTRAVENOUS at 13:18

## 2019-02-08 RX ADMIN — AMIODARONE HYDROCHLORIDE 400 MILLIGRAM(S): 400 TABLET ORAL at 21:42

## 2019-02-08 RX ADMIN — CYCLOBENZAPRINE HYDROCHLORIDE 5 MILLIGRAM(S): 10 TABLET, FILM COATED ORAL at 22:26

## 2019-02-08 RX ADMIN — AMIODARONE HYDROCHLORIDE 400 MILLIGRAM(S): 400 TABLET ORAL at 13:17

## 2019-02-08 RX ADMIN — Medication 100 MILLIGRAM(S): at 17:36

## 2019-02-08 RX ADMIN — Medication 0.12 MILLIGRAM(S): at 06:18

## 2019-02-08 RX ADMIN — Medication 250 MILLIGRAM(S): at 21:43

## 2019-02-08 RX ADMIN — PANTOPRAZOLE SODIUM 40 MILLIGRAM(S): 20 TABLET, DELAYED RELEASE ORAL at 17:37

## 2019-02-08 RX ADMIN — Medication 12.5 MILLIGRAM(S): at 06:19

## 2019-02-08 RX ADMIN — PANTOPRAZOLE SODIUM 40 MILLIGRAM(S): 20 TABLET, DELAYED RELEASE ORAL at 06:19

## 2019-02-08 RX ADMIN — AMLODIPINE BESYLATE 2.5 MILLIGRAM(S): 2.5 TABLET ORAL at 06:18

## 2019-02-08 RX ADMIN — Medication 12.5 MILLIGRAM(S): at 17:37

## 2019-02-08 RX ADMIN — INSULIN GLARGINE 10 UNIT(S): 100 INJECTION, SOLUTION SUBCUTANEOUS at 22:23

## 2019-02-08 NOTE — PROGRESS NOTE ADULT - PROBLEM SELECTOR PLAN 6
c/o weakness and lethargy with low hemoglobin   Hold Aspirin, bb  c/w atorvastatin  Cardio: Dr. Boo  F/u Echo c/o weakness and lethargy with low hemoglobin   Hold Aspirin, bb  c/w atorvastatin  Cardio: Dr. Boo  Echo pEF, severe DD, pul. htn, left atrium enlargement

## 2019-02-08 NOTE — PROGRESS NOTE ADULT - SUBJECTIVE AND OBJECTIVE BOX
[   ] ICU                                          [   ] CCU                                      [ x  ] Medical Floor    Patient is comfortable. No new complaints reported, No abdominal pain, N/V, hematemesis, hematochezia, melena, fever, chills, chest pain, SOB, cough or diarrhea reported.    VITALS    Vital Signs Last 24 Hrs  T(C): 36.9 (08 Feb 2019 14:45), Max: 37.1 (08 Feb 2019 02:19)  T(F): 98.4 (08 Feb 2019 14:45), Max: 98.7 (08 Feb 2019 02:19)  HR: 59 (08 Feb 2019 14:45) (59 - 78)  BP: 136/452 (08 Feb 2019 14:45) (136/452 - 167/56)     RR: 17 (08 Feb 2019 14:45) (16 - 17)  SpO2: 99% (08 Feb 2019 14:45) (96% - 100%)BP:       MEDICATIONS  (STANDING):  amiodarone    Tablet   Oral   amiodarone    Tablet 400 milliGRAM(s) Oral every 8 hours  amLODIPine   Tablet 2.5 milliGRAM(s) Oral daily  azithromycin  IVPB 500 milliGRAM(s) IV Intermittent every 24 hours  cefTRIAXone   IVPB 1 Gram(s) IV Intermittent every 24 hours  digoxin     Tablet 0.125 milliGRAM(s) Oral daily  insulin glargine Injectable (LANTUS) 10 Unit(s) SubCutaneous at bedtime  insulin glargine Injectable (LANTUS) 4 Unit(s) SubCutaneous at bedtime  insulin lispro (HumaLOG) corrective regimen sliding scale   SubCutaneous three times a day before meals  metoprolol tartrate 12.5 milliGRAM(s) Oral two times a day  pantoprazole  Injectable 40 milliGRAM(s) IV Push two times a day    MEDICATIONS  (PRN):  acetaminophen   Tablet .. 650 milliGRAM(s) Oral every 6 hours PRN Mild Pain (1 - 3)  guaiFENesin   Syrup  (Sugar-Free) 100 milliGRAM(s) Oral every 6 hours PRN Cough                            8.5    6.6   )-----------( 100      ( 08 Feb 2019 06:30 )             27.7       02-08    140  |  108  |  12  ----------------------------<  83  3.6   |  26  |  0.72    Ca    8.1<L>      08 Feb 2019 06:30  Phos  2.4     02-07  Mg     2.0     02-07    TPro  5.6<L>  /  Alb  2.4<L>  /  TBili  1.1  /  DBili  x   /  AST  33  /  ALT  32  /  AlkPhos  57  02-07

## 2019-02-08 NOTE — PROGRESS NOTE ADULT - ATTENDING COMMENTS
Patient seen/evaluated at bedside 2/8/2019. I agree with the resident progress note/outlined plan of care. My independent findings and conclusions are documented.     Pt feels much better today. Energy improved. States cough is still present, though significantly improved.    PE unchanged    86 y/o f admitted w/     1. profound acute blood loss anemia s/p 2 units prbc  2.upper gi hemorrhage s/t bleeding AVM s/p cauterization  3. h/o gastric/duodenal ulcer  4. h/o atrial fibrillation, rate controlled  5. chronic diastolic CHF (actually volume depleted)  6. elevated troponin/Type II MI  7. h/o cardiac arrest s/p ppm    Pt w/ 3-4 episodes of melena w/in past week. Risk benefit ratio suggest not re-challenging this patient with anticoagulation/antiplatelets  hgb/hct appropriate and stable post transfusion  protonix --> switch to oral  tolerated clears, now advanced to regular diet  serial cbc have been stable. Maintain active type and screen  hold antihypertensives  elevated troponin likely demand ischemia in setting of profound anemia, statin, hold ASA. cycle cardiac biomarkers  hold fluids, pt euvolemic, please note pt with severe diastolic dysfunction .  lasix administered w/ blood transfusion  cardiology consult has initiated this patient on an amiodarone load  plan for BETSY, family submitted choices  scd, please ensure they are in place Patient seen/evaluated at bedside 2/8/2019. I agree with the resident progress note/outlined plan of care. My independent findings and conclusions are documented.     Pt feels much better today. Energy improved. States cough is still present, though significantly improved.    PE unchanged    88 y/o f admitted w/     1. profound acute blood loss anemia s/p 2 units prbc  2.upper gi hemorrhage s/t bleeding AVM s/p cauterization  3. h/o gastric/duodenal ulcer  4. h/o atrial fibrillation, rate controlled  5. chronic diastolic CHF   6. elevated troponin/Type II MI  7. h/o cardiac arrest s/p ppm    Pt w/ 3-4 episodes of melena w/in past week. Risk benefit ratio suggest not re-challenging this patient with anticoagulation/antiplatelets  hgb/hct appropriate and stable post transfusion  protonix --> switch to oral  tolerated clears, now advanced to regular diet  serial cbc have been stable. Maintain active type and screen  hold antihypertensives  elevated troponin likely demand ischemia in setting of profound anemia, statin, hold ASA. cycle cardiac biomarkers  hold fluids, pt euvolemic, please note pt with severe diastolic dysfunction .  lasix administered w/ blood transfusion  resume outpt lasix tomorrow  cardiology consult has initiated this patient on an amiodarone load  plan for [facility], family submitted choices  scd, please ensure they are in place

## 2019-02-08 NOTE — PROGRESS NOTE ADULT - PROBLEM SELECTOR PLAN 3
p/w cough with CXR finding of Rt base process  F/u Blood cultures  started rocephin and zithromax D2 p/w cough with CXR finding of Rt base process  Blood cultures neg  started rocephin and zithromax D3

## 2019-02-08 NOTE — PROGRESS NOTE ADULT - PROBLEM SELECTOR PLAN 9
RISK                                                          Points  [  ] Previous VTE                                                3  [  ] Thrombophilia                                             2  [  ] Lower limb paralysis                                   2        (unable to hold up >15 seconds)    [  ] Current Cancer                                             2         (within 6 months)  [ x ] Immobilization > 24 hrs                              1  [  ] ICU/CCU stay > 24 hours                             1  [ x ] Age > 60                                                         1    IMPROVE VTE Score: 2  Hold for VTE prophylaxis.  C/w SCD boots

## 2019-02-08 NOTE — PROGRESS NOTE ADULT - PROBLEM SELECTOR PLAN 7
c/w accuchecks every 6 hr as patient is NPO  c/w lantus half dose as Blood sugars are in 300 ( takes 15 units at bedtime and hss) c/w accuchecks   C/w lantus 10 U and humalog 4 U and HSS  Blood glucose level 100-200

## 2019-02-08 NOTE — PROGRESS NOTE ADULT - SUBJECTIVE AND OBJECTIVE BOX
CHIEF COMPLAINT:Patient is a 87y old  Female who presents with a chief complaint of Anemia .Pt appears comfortable.    	  REVIEW OF SYSTEMS:  CONSTITUTIONAL: No fever, weight loss, or fatigue  EYES: No eye pain, visual disturbances, or discharge  ENT:  No difficulty hearing, tinnitus, vertigo; No sinus or throat pain  NECK: No pain or stiffness  RESPIRATORY: No cough, wheezing, chills or hemoptysis; No Shortness of Breath  CARDIOVASCULAR: No chest pain, palpitations, passing out, dizziness, or leg swelling  GASTROINTESTINAL: No abdominal or epigastric pain. No nausea, vomiting, or hematemesis; No diarrhea or constipation. No melena or hematochezia.  GENITOURINARY: No dysuria, frequency, hematuria, or incontinence  NEUROLOGICAL: No headaches, memory loss, loss of strength, numbness, or tremors  SKIN: No itching, burning, rashes, or lesions   LYMPH Nodes: No enlarged glands  ENDOCRINE: No heat or cold intolerance; No hair loss  MUSCULOSKELETAL: No joint pain or swelling; No muscle, back, or extremity pain  PSYCHIATRIC: No depression, anxiety, mood swings, or difficulty sleeping  HEME/LYMPH: No easy bruising, or bleeding gums  ALLERGY AND IMMUNOLOGIC: No hives or eczema	      PHYSICAL EXAM:  T(C): 37 (02-08-19 @ 10:51), Max: 37.1 (02-08-19 @ 02:19)  HR: 60 (02-08-19 @ 10:51) (60 - 66)  BP: 151/51 (02-08-19 @ 10:51) (142/52 - 155/59)  RR: 17 (02-08-19 @ 10:51) (16 - 17)  SpO2: 100% (02-08-19 @ 10:51) (96% - 100%)      Appearance: Normal	  HEENT:   Normal oral mucosa, PERRL, EOMI	  Lymphatic: No lymphadenopathy  Cardiovascular: Normal S1 S2, No JVD, No murmurs, No edema  Respiratory: Lungs clear to auscultation	  Psychiatry: A & O x 3, Mood & affect appropriate  Gastrointestinal:  Soft, Non-tender, + BS	  Skin: No rashes, No ecchymoses, No cyanosis	  Neurologic: Non-focal  Extremities: Normal range of motion, No clubbing, cyanosis or edema  Vascular: Peripheral pulses palpable 2+ bilaterally    MEDICATIONS  (STANDING):  amiodarone    Tablet   Oral   amiodarone    Tablet 400 milliGRAM(s) Oral every 8 hours  amLODIPine   Tablet 2.5 milliGRAM(s) Oral daily  azithromycin  IVPB 500 milliGRAM(s) IV Intermittent every 24 hours  cefTRIAXone   IVPB 1 Gram(s) IV Intermittent every 24 hours  digoxin     Tablet 0.125 milliGRAM(s) Oral daily  insulin glargine Injectable (LANTUS) 10 Unit(s) SubCutaneous at bedtime  insulin glargine Injectable (LANTUS) 4 Unit(s) SubCutaneous at bedtime  insulin lispro (HumaLOG) corrective regimen sliding scale   SubCutaneous three times a day before meals  metoprolol tartrate 12.5 milliGRAM(s) Oral two times a day  pantoprazole  Injectable 40 milliGRAM(s) IV Push two times a day    	  LABS:	 	    CARDIAC MARKERS:  CARDIAC MARKERS ( 07 Feb 2019 06:06 )  0.186 ng/mL / x     / 64 U/L / x     / 2.0 ng/mL  CARDIAC MARKERS ( 06 Feb 2019 21:20 )  0.192 ng/mL / x     / 68 U/L / x     / 2.3 ng/mL  CARDIAC MARKERS ( 06 Feb 2019 14:13 )  0.265 ng/mL / x     / 62 U/L / x     / x                              8.5    6.6   )-----------( 100      ( 08 Feb 2019 06:30 )             27.7     02-08    140  |  108  |  12  ----------------------------<  83  3.6   |  26  |  0.72    Ca    8.1<L>      08 Feb 2019 06:30  Phos  2.4     02-07  Mg     2.0     02-07    TPro  5.6<L>  /  Alb  2.4<L>  /  TBili  1.1  /  DBili  x   /  AST  33  /  ALT  32  /  AlkPhos  57  02-07    proBNP: Serum Pro-Brain Natriuretic Peptide: 1592 pg/mL (02-06 @ 14:13)    Lipid Profile: Cholesterol 99  LDL 48  HDL 31      HgA1c: Hemoglobin A1C, Whole Blood: 7.2 % (02-07 @ 09:52)    TSH: Thyroid Stimulating Hormone, Serum: 2.06 uU/mL (02-07 @ 06:06)      	  OBSERVATIONS:  Mitral Valve: Mitral annular calcification. Mild mitral  regurgitation.  Aortic Root: Normal aortic root.  Aortic Valve: Normal trileaflet aortic valve.  Left Atrium: Severely dilated left atrium.  LA volume index  = 54 cc/m2.  Left Ventricle: Endocardium not well visualized; grossly  normal left ventricular systolic function. Severe diastolic  dysfunction (stage III -IV). Mild concentric left  ventricular hypertrophy.  Right Heart: Normal right atrium. Normal right ventricular  size and function. There is mild-moderate tricuspid  regurgitation. There is mild pulmonic regurgitation.  Pericardium/PleuraNormal pericardium with no pericardial  effusion.  Hemodynamic: RA Pressure is 10 mm Hg. RV systolic pressure  is 59 mm Hg. Moderate pulmonary hypertension.    EGD-+ AVM-s/p cautery

## 2019-02-08 NOTE — PROGRESS NOTE ADULT - PROBLEM SELECTOR PLAN 4
takes metoprolol and Eliquis  CHADVASC : 6 points   C/w metoprolol, amiodarone and digox and hold eliquis as patient is bleeding  Cardio Dr. Boo takes metoprolol and Eliquis  CHADVASC : 6 points   C/w metoprolol, amiodarone and digox and hold eliquis  Cardio Dr. Boo

## 2019-02-08 NOTE — PROGRESS NOTE ADULT - SUBJECTIVE AND OBJECTIVE BOX
PGY 1 Note discussed with supervising resident and primary attending    Patient is a 87y old  Female who presents with a chief complaint of weakness (2019 11:15)      INTERVAL HPI/OVERNIGHT EVENTS: offers no new complaints; current symptoms resolving    MEDICATIONS  (STANDING):  amiodarone    Tablet   Oral   amiodarone    Tablet 400 milliGRAM(s) Oral every 8 hours  amLODIPine   Tablet 2.5 milliGRAM(s) Oral daily  azithromycin  IVPB 500 milliGRAM(s) IV Intermittent every 24 hours  cefTRIAXone   IVPB 1 Gram(s) IV Intermittent every 24 hours  digoxin     Tablet 0.125 milliGRAM(s) Oral daily  insulin glargine Injectable (LANTUS) 10 Unit(s) SubCutaneous at bedtime  insulin glargine Injectable (LANTUS) 4 Unit(s) SubCutaneous at bedtime  insulin lispro (HumaLOG) corrective regimen sliding scale   SubCutaneous three times a day before meals  metoprolol tartrate 12.5 milliGRAM(s) Oral two times a day  pantoprazole  Injectable 40 milliGRAM(s) IV Push two times a day    MEDICATIONS  (PRN):  acetaminophen   Tablet .. 650 milliGRAM(s) Oral every 6 hours PRN Mild Pain (1 - 3)  guaiFENesin   Syrup  (Sugar-Free) 100 milliGRAM(s) Oral every 6 hours PRN Cough      __________________________________________________  REVIEW OF SYSTEMS:    CONSTITUTIONAL: No fever,   EYES: no acute visual disturbances  NECK: No pain or stiffness  RESPIRATORY: No cough; No shortness of breath  CARDIOVASCULAR: No chest pain, no palpitations  GASTROINTESTINAL: No pain. No nausea or vomiting; No diarrhea   NEUROLOGICAL: No headache or numbness, no tremors  MUSCULOSKELETAL: No joint pain, no muscle pain  GENITOURINARY: no dysuria, no frequency, no hesitancy  PSYCHIATRY: no depression , no anxiety  ALL OTHER  ROS negative        Vital Signs Last 24 Hrs  T(C): 37 (2019 10:51), Max: 37.1 (2019 02:19)  T(F): 98.6 (2019 10:51), Max: 98.7 (2019 02:19)  HR: 60 (2019 10:51) (60 - 66)  BP: 151/51 (2019 10:51) (142/52 - 155/59)  BP(mean): --  RR: 17 (2019 10:51) (16 - 17)  SpO2: 100% (2019 10:51) (96% - 100%)    ________________________________________________  PHYSICAL EXAM:  GENERAL: NAD  HEENT: Normocephalic;  conjunctivae and sclerae clear; moist mucous membranes;   NECK : supple  CHEST/LUNG: Clear to auscultation bilaterally with good air entry   HEART: S1 S2  regular; no murmurs, gallops or rubs  ABDOMEN: Soft, Nontender, Nondistended; Bowel sounds present  EXTREMITIES: no cyanosis; no edema; no calf tenderness  SKIN: warm and dry; no rash  NERVOUS SYSTEM:  Awake and alert; Oriented  to place, person and time ; no new deficits    _________________________________________________  LABS:                        8.5    6.6   )-----------( 100      ( 2019 06:30 )             27.7     02-08    140  |  108  |  12  ----------------------------<  83  3.6   |  26  |  0.72    Ca    8.1<L>      2019 06:30  Phos  2.4     02-07  Mg     2.0     02-07    TPro  5.6<L>  /  Alb  2.4<L>  /  TBili  1.1  /  DBili  x   /  AST  33  /  ALT  32  /  AlkPhos  57  02-07    PT/INR - ( 2019 14:13 )   PT: 13.0 sec;   INR: 1.17 ratio         PTT - ( 2019 14:13 )  PTT:27.9 sec  Urinalysis Basic - ( 2019 17:08 )    Color: Yellow / Appearance: Clear / S.015 / pH: x  Gluc: x / Ketone: Negative  / Bili: Negative / Urobili: Negative   Blood: x / Protein: 30 mg/dL / Nitrite: Negative   Leuk Esterase: Trace / RBC: 0-2 /HPF / WBC 3-5 /HPF   Sq Epi: x / Non Sq Epi: Few /HPF / Bacteria: Trace /HPF      CAPILLARY BLOOD GLUCOSE      POCT Blood Glucose.: 103 mg/dL (2019 11:28)  POCT Blood Glucose.: 102 mg/dL (2019 08:22)  POCT Blood Glucose.: 116 mg/dL (2019 21:37)  POCT Blood Glucose.: 131 mg/dL (2019 17:14)        RADIOLOGY & ADDITIONAL TESTS:    Imaging Personally Reviewed:  YES    Consultant(s) Notes Reviewed:   YES    Care Discussed with Consultants : YES    Plan of care was discussed with patient and /or primary care giver; all questions and concerns were addressed and care was aligned with patient's wishes.

## 2019-02-08 NOTE — PROGRESS NOTE ADULT - PROBLEM SELECTOR PLAN 1
p/w weakness, lethargy, melanotic stools with Hb of 4.6, Occult positive, hx of Eliquis use, gastritis, antral ulcer --> likely upper GI bleed due to bleeding ulcer/ gastritis due to eliquis  s/p 2 PRBC with IV lasix  Hb stable at 9.1  2 IV lines  F/u CBC q 12  Started protonix IV push ( PATIENT'S DAUGHTER WAS REFUSING PROTONIX AS SHE THINKS IT CAUSES LEG SWELLING, SHE AGREED NOW  FOR ONLY INPATIENT PROTONIX AND TO RESUME HOME OMEPRAZOLE UPON DISCHARGE)  -npo nor now  GI Dr. Huffman -> discussed with Dr Huffman, will do endoscopy today  HOLD ELIQUIS/DVT PROPHLAXIS AND ASPIRIN  f/u CBC in evening S/p EGD, Cauterization of AVM   h/h stable at 8.5  pt feeling better   CBC q12  f/u cbc in evening   avoid NSAIDS   C/w soft diet    GI Dr. Huffman  PT kalin- BETSY

## 2019-02-09 LAB
ANION GAP SERPL CALC-SCNC: 6 MMOL/L — SIGNIFICANT CHANGE UP (ref 5–17)
BUN SERPL-MCNC: 11 MG/DL — SIGNIFICANT CHANGE UP (ref 7–18)
CALCIUM SERPL-MCNC: 8 MG/DL — LOW (ref 8.4–10.5)
CHLORIDE SERPL-SCNC: 107 MMOL/L — SIGNIFICANT CHANGE UP (ref 96–108)
CO2 SERPL-SCNC: 24 MMOL/L — SIGNIFICANT CHANGE UP (ref 22–31)
CREAT SERPL-MCNC: 0.84 MG/DL — SIGNIFICANT CHANGE UP (ref 0.5–1.3)
GLUCOSE BLDC GLUCOMTR-MCNC: 153 MG/DL — HIGH (ref 70–99)
GLUCOSE BLDC GLUCOMTR-MCNC: 238 MG/DL — HIGH (ref 70–99)
GLUCOSE BLDC GLUCOMTR-MCNC: 337 MG/DL — HIGH (ref 70–99)
GLUCOSE BLDC GLUCOMTR-MCNC: 348 MG/DL — HIGH (ref 70–99)
GLUCOSE SERPL-MCNC: 132 MG/DL — HIGH (ref 70–99)
HCT VFR BLD CALC: 28.4 % — LOW (ref 34.5–45)
HGB BLD-MCNC: 9 G/DL — LOW (ref 11.5–15.5)
MCHC RBC-ENTMCNC: 26.6 PG — LOW (ref 27–34)
MCHC RBC-ENTMCNC: 31.6 GM/DL — LOW (ref 32–36)
MCV RBC AUTO: 84.3 FL — SIGNIFICANT CHANGE UP (ref 80–100)
PLATELET # BLD AUTO: 86 K/UL — LOW (ref 150–400)
POTASSIUM SERPL-MCNC: 3.6 MMOL/L — SIGNIFICANT CHANGE UP (ref 3.5–5.3)
POTASSIUM SERPL-SCNC: 3.6 MMOL/L — SIGNIFICANT CHANGE UP (ref 3.5–5.3)
RBC # BLD: 3.37 M/UL — LOW (ref 3.8–5.2)
RBC # FLD: 14.2 % — SIGNIFICANT CHANGE UP (ref 10.3–14.5)
SODIUM SERPL-SCNC: 137 MMOL/L — SIGNIFICANT CHANGE UP (ref 135–145)
WBC # BLD: 5.8 K/UL — SIGNIFICANT CHANGE UP (ref 3.8–10.5)
WBC # FLD AUTO: 5.8 K/UL — SIGNIFICANT CHANGE UP (ref 3.8–10.5)

## 2019-02-09 RX ADMIN — AMLODIPINE BESYLATE 2.5 MILLIGRAM(S): 2.5 TABLET ORAL at 06:56

## 2019-02-09 RX ADMIN — AMIODARONE HYDROCHLORIDE 400 MILLIGRAM(S): 400 TABLET ORAL at 06:56

## 2019-02-09 RX ADMIN — PANTOPRAZOLE SODIUM 40 MILLIGRAM(S): 20 TABLET, DELAYED RELEASE ORAL at 17:26

## 2019-02-09 RX ADMIN — Medication 1 TABLET(S): at 12:23

## 2019-02-09 RX ADMIN — INSULIN GLARGINE 10 UNIT(S): 100 INJECTION, SOLUTION SUBCUTANEOUS at 22:31

## 2019-02-09 RX ADMIN — AMIODARONE HYDROCHLORIDE 400 MILLIGRAM(S): 400 TABLET ORAL at 22:30

## 2019-02-09 RX ADMIN — Medication 250 MILLIGRAM(S): at 17:24

## 2019-02-09 RX ADMIN — Medication 2: at 17:24

## 2019-02-09 RX ADMIN — Medication 12.5 MILLIGRAM(S): at 17:27

## 2019-02-09 RX ADMIN — AMIODARONE HYDROCHLORIDE 400 MILLIGRAM(S): 400 TABLET ORAL at 13:09

## 2019-02-09 RX ADMIN — PANTOPRAZOLE SODIUM 40 MILLIGRAM(S): 20 TABLET, DELAYED RELEASE ORAL at 06:55

## 2019-02-09 RX ADMIN — Medication 4: at 13:09

## 2019-02-09 RX ADMIN — Medication 250 MILLIGRAM(S): at 06:54

## 2019-02-09 RX ADMIN — AZITHROMYCIN 500 MILLIGRAM(S): 500 TABLET, FILM COATED ORAL at 17:25

## 2019-02-09 RX ADMIN — Medication 0.12 MILLIGRAM(S): at 06:56

## 2019-02-09 RX ADMIN — Medication 1 TABLET(S): at 17:24

## 2019-02-09 RX ADMIN — Medication 100 MILLIGRAM(S): at 06:56

## 2019-02-09 RX ADMIN — Medication 12.5 MILLIGRAM(S): at 06:55

## 2019-02-09 RX ADMIN — Medication 1 TABLET(S): at 08:37

## 2019-02-09 NOTE — PROGRESS NOTE ADULT - PROBLEM SELECTOR PLAN 6
c/o weakness and lethargy with low hemoglobin   Hold Aspirin, bb  c/w atorvastatin  Cardio: Dr. Boo  Echo pEF, severe DD, pul. htn, left atrium enlargement

## 2019-02-09 NOTE — PROGRESS NOTE ADULT - SUBJECTIVE AND OBJECTIVE BOX
CHIEF COMPLAINT:Patient is a 87y old  Female who presents with a chief complaint of weakness.Pt feeling better.    	  REVIEW OF SYSTEMS:  CONSTITUTIONAL: No fever, weight loss, or fatigue  EYES: No eye pain, visual disturbances, or discharge  ENT:  No difficulty hearing, tinnitus, vertigo; No sinus or throat pain  NECK: No pain or stiffness  RESPIRATORY: No cough, wheezing, chills or hemoptysis; No Shortness of Breath  CARDIOVASCULAR: No chest pain, palpitations, passing out, dizziness, or leg swelling  GASTROINTESTINAL: No abdominal or epigastric pain. No nausea, vomiting, or hematemesis; No diarrhea or constipation. No melena or hematochezia.  GENITOURINARY: No dysuria, frequency, hematuria, or incontinence  NEUROLOGICAL: No headaches, memory loss, loss of strength, numbness, or tremors  SKIN: No itching, burning, rashes, or lesions   LYMPH Nodes: No enlarged glands  ENDOCRINE: No heat or cold intolerance; No hair loss  MUSCULOSKELETAL: No joint pain or swelling; No muscle, back, or extremity pain  PSYCHIATRIC: No depression, anxiety, mood swings, or difficulty sleeping  HEME/LYMPH: No easy bruising, or bleeding gums  ALLERGY AND IMMUNOLOGIC: No hives or eczema	      PHYSICAL EXAM:  T(C): 36.7 (02-09-19 @ 05:55), Max: 36.9 (02-08-19 @ 14:45)  HR: 59 (02-09-19 @ 05:55) (59 - 63)  BP: 158/51 (02-09-19 @ 05:55) (136/452 - 158/51)  RR: 17 (02-09-19 @ 05:55) (17 - 18)  SpO2: 100% (02-09-19 @ 05:55) (99% - 100%)  Wt(kg): --  I&O's Summary      Appearance: Normal	  HEENT:   Normal oral mucosa, PERRL, EOMI	  Lymphatic: No lymphadenopathy  Cardiovascular: Normal S1 S2, No JVD, No murmurs, No edema  Respiratory: Lungs clear to auscultation	  Psychiatry: A & O x 3, Mood & affect appropriate  Gastrointestinal:  Soft, Non-tender, + BS	  Skin: No rashes, No ecchymoses, No cyanosis	  Neurologic: Non-focal  Extremities: Normal range of motion, No clubbing, cyanosis or edema  Vascular: Peripheral pulses palpable 2+ bilaterally    MEDICATIONS  (STANDING):  amiodarone    Tablet   Oral   amiodarone    Tablet 400 milliGRAM(s) Oral every 8 hours  amLODIPine   Tablet 2.5 milliGRAM(s) Oral daily  azithromycin   Tablet 500 milliGRAM(s) Oral daily  cefuroxime   Tablet 250 milliGRAM(s) Oral every 12 hours  digoxin     Tablet 0.125 milliGRAM(s) Oral daily  insulin glargine Injectable (LANTUS) 10 Unit(s) SubCutaneous at bedtime  insulin lispro (HumaLOG) corrective regimen sliding scale   SubCutaneous three times a day before meals  lactobacillus acidophilus 1 Tablet(s) Oral three times a day with meals  metoprolol tartrate 12.5 milliGRAM(s) Oral two times a day  pantoprazole  Injectable 40 milliGRAM(s) IV Push two times a day    Tele-a paced    	  LABS:	 	                       9.0    5.8   )-----------( 86       ( 09 Feb 2019 07:49 )             28.4     02-09    137  |  107  |  11  ----------------------------<  132<H>  3.6   |  24  |  0.84    Ca    8.0<L>      09 Feb 2019 07:49      proBNP: Serum Pro-Brain Natriuretic Peptide: 1592 pg/mL (02-06 @ 14:13)    Lipid Profile: Cholesterol 99  LDL 48  HDL 31      HgA1c: Hemoglobin A1C, Whole Blood: 7.2 % (02-07 @ 09:52)    TSH: Thyroid Stimulating Hormone, Serum: 2.06 uU/mL (02-07 @ 06:06)

## 2019-02-09 NOTE — PROGRESS NOTE ADULT - ATTENDING COMMENTS
Covering for Dr. Quintana   Agree with above   Patient seen and examined together with Dr. Morris PGY1. Son was present during examination. No new events. H/H is stable   ROS and PE as above  Vital signs and labs reviewed     Vital Signs Last 24 Hrs  T(C): 36.9 (09 Feb 2019 13:48), Max: 36.9 (08 Feb 2019 14:45)  T(F): 98.4 (09 Feb 2019 13:48), Max: 98.4 (08 Feb 2019 14:45)  HR: 87 (09 Feb 2019 13:48) (59 - 87)  BP: 137/77 (09 Feb 2019 13:48) (136/452 - 158/51)  BP(mean): --  RR: 18 (09 Feb 2019 13:48) (17 - 18)  SpO2: 98% (09 Feb 2019 13:48) (98% - 100%)    1. GI bleed: s/p cauterisation.    2. Acute blood loss anemia: H/H is stable    3. Chronic Afib: on amiodarone loading.   4. DM   5. HTN   PT eval done.   Out of bed to chair.    Plan of care as above and it discussed with patient and her son.

## 2019-02-09 NOTE — PROGRESS NOTE ADULT - PROBLEM SELECTOR PLAN 4
takes metoprolol and Eliquis  CHADVASC : 6 points   C/w metoprolol, amiodarone and digox and hold eliquis  Cardio Dr. Boo

## 2019-02-09 NOTE — PROGRESS NOTE ADULT - SUBJECTIVE AND OBJECTIVE BOX
PGY 1 Note discussed with supervising resident and primary attending    Patient is a 87y old  Female who presents with a chief complaint of weakness (08 Feb 2019 11:15)      INTERVAL HPI/OVERNIGHT EVENTS: offers no new complaints; current symptoms resolving    MEDICATIONS  (STANDING):  amiodarone    Tablet   Oral   amiodarone    Tablet 400 milliGRAM(s) Oral every 8 hours  amLODIPine   Tablet 2.5 milliGRAM(s) Oral daily  azithromycin   Tablet 500 milliGRAM(s) Oral daily  cefuroxime   Tablet 250 milliGRAM(s) Oral every 12 hours  digoxin     Tablet 0.125 milliGRAM(s) Oral daily  insulin glargine Injectable (LANTUS) 10 Unit(s) SubCutaneous at bedtime  insulin lispro (HumaLOG) corrective regimen sliding scale   SubCutaneous three times a day before meals  lactobacillus acidophilus 1 Tablet(s) Oral three times a day with meals  metoprolol tartrate 12.5 milliGRAM(s) Oral two times a day  pantoprazole  Injectable 40 milliGRAM(s) IV Push two times a day    MEDICATIONS  (PRN):  acetaminophen   Tablet .. 650 milliGRAM(s) Oral every 6 hours PRN Mild Pain (1 - 3)  cyclobenzaprine 5 milliGRAM(s) Oral two times a day PRN Muscle Spasm  guaiFENesin   Syrup  (Sugar-Free) 100 milliGRAM(s) Oral every 6 hours PRN Cough      __________________________________________________  REVIEW OF SYSTEMS:    CONSTITUTIONAL: No fever,   EYES: no acute visual disturbances  NECK: No pain or stiffness  RESPIRATORY: No cough; No shortness of breath  CARDIOVASCULAR: No chest pain, no palpitations  GASTROINTESTINAL: No pain. No nausea or vomiting; No diarrhea   NEUROLOGICAL: No headache or numbness, no tremors  MUSCULOSKELETAL: No joint pain, no muscle pain  GENITOURINARY: no dysuria, no frequency, no hesitancy  PSYCHIATRY: no depression , no anxiety  ALL OTHER  ROS negative        Vital Signs Last 24 Hrs  T(C): 36.7 (09 Feb 2019 05:55), Max: 37 (08 Feb 2019 10:51)  T(F): 98 (09 Feb 2019 05:55), Max: 98.6 (08 Feb 2019 10:51)  HR: 59 (09 Feb 2019 05:55) (59 - 78)  BP: 158/51 (09 Feb 2019 05:55) (136/452 - 167/56)  BP(mean): --  RR: 17 (09 Feb 2019 05:55) (17 - 18)  SpO2: 100% (09 Feb 2019 05:55) (99% - 100%)    ________________________________________________  PHYSICAL EXAM:  GENERAL: NAD  HEENT: Normocephalic;  conjunctivae and sclerae clear; moist mucous membranes;   NECK : supple  CHEST/LUNG: Clear to auscultation bilaterally with good air entry   HEART: S1 S2  regular; no murmurs, gallops or rubs  ABDOMEN: Soft, Nontender, Nondistended; Bowel sounds present  EXTREMITIES: no cyanosis; no edema; no calf tenderness  SKIN: warm and dry; no rash  NERVOUS SYSTEM:  Awake and alert; Oriented  to place, person and time ; no new deficits    _________________________________________________  LABS:                        8.7    6.2   )-----------( 87       ( 08 Feb 2019 17:48 )             28.4     02-08    140  |  108  |  12  ----------------------------<  83  3.6   |  26  |  0.72    Ca    8.1<L>      08 Feb 2019 06:30          CAPILLARY BLOOD GLUCOSE      POCT Blood Glucose.: 196 mg/dL (08 Feb 2019 22:06)  POCT Blood Glucose.: 201 mg/dL (08 Feb 2019 17:01)  POCT Blood Glucose.: 103 mg/dL (08 Feb 2019 11:28)  POCT Blood Glucose.: 102 mg/dL (08 Feb 2019 08:22)        RADIOLOGY & ADDITIONAL TESTS:    Imaging Personally Reviewed:  YES/NO    Consultant(s) Notes Reviewed:   YES/ No    Care Discussed with Consultants :     Plan of care was discussed with patient and /or primary care giver; all questions and concerns were addressed and care was aligned with patient's wishes.

## 2019-02-09 NOTE — PROGRESS NOTE ADULT - PROBLEM SELECTOR PLAN 1
S/p EGD, Cauterization of AVM   h/h stable at 8.7  pt feeling better   CBC q12  f/u cbc in evening   avoid NSAIDS   C/w soft diet    GI Dr. Huffman  PT kalin- [facility]

## 2019-02-09 NOTE — PROGRESS NOTE ADULT - PROBLEM SELECTOR PLAN 3
p/w cough with CXR finding of Rt base process  Blood cultures neg  started rocephin and zithromax D4

## 2019-02-10 ENCOUNTER — TRANSCRIPTION ENCOUNTER (OUTPATIENT)
Age: 84
End: 2019-02-10

## 2019-02-10 LAB
ANION GAP SERPL CALC-SCNC: 8 MMOL/L — SIGNIFICANT CHANGE UP (ref 5–17)
BUN SERPL-MCNC: 16 MG/DL — SIGNIFICANT CHANGE UP (ref 7–18)
CALCIUM SERPL-MCNC: 8.1 MG/DL — LOW (ref 8.4–10.5)
CHLORIDE SERPL-SCNC: 108 MMOL/L — SIGNIFICANT CHANGE UP (ref 96–108)
CO2 SERPL-SCNC: 24 MMOL/L — SIGNIFICANT CHANGE UP (ref 22–31)
CREAT SERPL-MCNC: 0.84 MG/DL — SIGNIFICANT CHANGE UP (ref 0.5–1.3)
GLUCOSE BLDC GLUCOMTR-MCNC: 139 MG/DL — HIGH (ref 70–99)
GLUCOSE BLDC GLUCOMTR-MCNC: 243 MG/DL — HIGH (ref 70–99)
GLUCOSE BLDC GLUCOMTR-MCNC: 243 MG/DL — HIGH (ref 70–99)
GLUCOSE BLDC GLUCOMTR-MCNC: 331 MG/DL — HIGH (ref 70–99)
GLUCOSE SERPL-MCNC: 158 MG/DL — HIGH (ref 70–99)
HCT VFR BLD CALC: 27.7 % — LOW (ref 34.5–45)
HGB BLD-MCNC: 8.4 G/DL — LOW (ref 11.5–15.5)
MCHC RBC-ENTMCNC: 26.2 PG — LOW (ref 27–34)
MCHC RBC-ENTMCNC: 30.4 GM/DL — LOW (ref 32–36)
MCV RBC AUTO: 86.1 FL — SIGNIFICANT CHANGE UP (ref 80–100)
PLATELET # BLD AUTO: 86 K/UL — LOW (ref 150–400)
POTASSIUM SERPL-MCNC: 3.5 MMOL/L — SIGNIFICANT CHANGE UP (ref 3.5–5.3)
POTASSIUM SERPL-SCNC: 3.5 MMOL/L — SIGNIFICANT CHANGE UP (ref 3.5–5.3)
RBC # BLD: 3.22 M/UL — LOW (ref 3.8–5.2)
RBC # FLD: 15 % — HIGH (ref 10.3–14.5)
SODIUM SERPL-SCNC: 140 MMOL/L — SIGNIFICANT CHANGE UP (ref 135–145)
WBC # BLD: 7.3 K/UL — SIGNIFICANT CHANGE UP (ref 3.8–10.5)
WBC # FLD AUTO: 7.3 K/UL — SIGNIFICANT CHANGE UP (ref 3.8–10.5)

## 2019-02-10 PROCEDURE — 99232 SBSQ HOSP IP/OBS MODERATE 35: CPT | Mod: GC

## 2019-02-10 RX ORDER — LACTULOSE 10 G/15ML
10 SOLUTION ORAL ONCE
Qty: 0 | Refills: 0 | Status: COMPLETED | OUTPATIENT
Start: 2019-02-10 | End: 2019-02-10

## 2019-02-10 RX ADMIN — Medication 30 MILLILITER(S): at 20:10

## 2019-02-10 RX ADMIN — PANTOPRAZOLE SODIUM 40 MILLIGRAM(S): 20 TABLET, DELAYED RELEASE ORAL at 05:55

## 2019-02-10 RX ADMIN — AMIODARONE HYDROCHLORIDE 400 MILLIGRAM(S): 400 TABLET ORAL at 14:01

## 2019-02-10 RX ADMIN — Medication 12.5 MILLIGRAM(S): at 05:55

## 2019-02-10 RX ADMIN — Medication 250 MILLIGRAM(S): at 18:17

## 2019-02-10 RX ADMIN — Medication 1 TABLET(S): at 12:32

## 2019-02-10 RX ADMIN — Medication 250 MILLIGRAM(S): at 05:54

## 2019-02-10 RX ADMIN — Medication 12.5 MILLIGRAM(S): at 18:17

## 2019-02-10 RX ADMIN — AMLODIPINE BESYLATE 2.5 MILLIGRAM(S): 2.5 TABLET ORAL at 05:54

## 2019-02-10 RX ADMIN — Medication 0.12 MILLIGRAM(S): at 05:54

## 2019-02-10 RX ADMIN — INSULIN GLARGINE 10 UNIT(S): 100 INJECTION, SOLUTION SUBCUTANEOUS at 21:41

## 2019-02-10 RX ADMIN — LACTULOSE 10 GRAM(S): 10 SOLUTION ORAL at 19:55

## 2019-02-10 RX ADMIN — AMIODARONE HYDROCHLORIDE 400 MILLIGRAM(S): 400 TABLET ORAL at 22:16

## 2019-02-10 RX ADMIN — PANTOPRAZOLE SODIUM 40 MILLIGRAM(S): 20 TABLET, DELAYED RELEASE ORAL at 18:19

## 2019-02-10 RX ADMIN — Medication 1 TABLET(S): at 18:17

## 2019-02-10 RX ADMIN — AMIODARONE HYDROCHLORIDE 400 MILLIGRAM(S): 400 TABLET ORAL at 05:55

## 2019-02-10 RX ADMIN — Medication 2: at 12:31

## 2019-02-10 RX ADMIN — Medication 2: at 17:54

## 2019-02-10 RX ADMIN — CYCLOBENZAPRINE HYDROCHLORIDE 5 MILLIGRAM(S): 10 TABLET, FILM COATED ORAL at 18:18

## 2019-02-10 NOTE — PROGRESS NOTE ADULT - ATTENDING COMMENTS
Patient is seen and examined. Case reviewed with the medical team. Above note is appreciated. Will follow up clinically. Continue DVT prophylaxis. Coverage by the hospitalist team is greatly appreciated. S/P PRBC. Follow up CBC. Hold eliquis. Risk vs benefits fully discussed with the family.

## 2019-02-10 NOTE — CHART NOTE - NSCHARTNOTEFT_GEN_A_CORE
Paged by RN that pt has not had a BM since 3 days, and complaining of abdominal pain. Assessed bedside, patient reports epigastric pain radiating to chest. Abdomen mildly distended and tender. BS wale. Vitals stable. Noted that patient is s/p AVM cauterization. Will give patient STAT maalox and tap water enema.

## 2019-02-10 NOTE — PROGRESS NOTE ADULT - SUBJECTIVE AND OBJECTIVE BOX
[   ] ICU                                          [   ] CCU                                      [ X  ] Medical Floor    Patient is comfortable. No new complaints reported, No abdominal pain, N/V, hematemesis, hematochezia, melena, fever, chills, chest pain, SOB, cough or diarrhea reported.    VITALS  Vital Signs Last 24 Hrs  T(C): 36.6 (10 Feb 2019 14:00), Max: 37.2 (09 Feb 2019 21:50)  T(F): 97.8 (10 Feb 2019 14:00), Max: 98.9 (09 Feb 2019 21:50)  HR: 60 (10 Feb 2019 18:25) (60 - 78)  BP: 153/53 (10 Feb 2019 18:25) (138/46 - 161/58)   RR: 18 (10 Feb 2019 18:25) (16 - 18)  SpO2: 99% (10 Feb 2019 18:25) (98% - 100%)       MEDICATIONS  (STANDING):  amiodarone    Tablet   Oral   amiodarone    Tablet 400 milliGRAM(s) Oral every 8 hours  amLODIPine   Tablet 2.5 milliGRAM(s) Oral daily  cefuroxime   Tablet 250 milliGRAM(s) Oral every 12 hours  digoxin     Tablet 0.125 milliGRAM(s) Oral daily  insulin glargine Injectable (LANTUS) 10 Unit(s) SubCutaneous at bedtime  insulin lispro (HumaLOG) corrective regimen sliding scale   SubCutaneous three times a day before meals  lactobacillus acidophilus 1 Tablet(s) Oral three times a day with meals  lactulose Syrup 10 Gram(s) Oral once  metoprolol tartrate 12.5 milliGRAM(s) Oral two times a day  pantoprazole  Injectable 40 milliGRAM(s) IV Push two times a day    MEDICATIONS  (PRN):  acetaminophen   Tablet .. 650 milliGRAM(s) Oral every 6 hours PRN Mild Pain (1 - 3)  cyclobenzaprine 5 milliGRAM(s) Oral two times a day PRN Muscle Spasm  guaiFENesin   Syrup  (Sugar-Free) 100 milliGRAM(s) Oral every 6 hours PRN Cough                            8.4    7.3   )-----------( 86       ( 10 Feb 2019 06:11 )             27.7       02-10    140  |  108  |  16  ----------------------------<  158<H>  3.5   |  24  |  0.84    Ca    8.1<L>      10 Feb 2019 06:11

## 2019-02-10 NOTE — PROGRESS NOTE ADULT - SUBJECTIVE AND OBJECTIVE BOX
CHIEF COMPLAINT:Patient is a 87y old  Female who presents with a chief complaint of weakness (10 Feb 2019 10:44)    	  REVIEW OF SYSTEMS:  CONSTITUTIONAL: No fever, weight loss, or fatigue  EYES: No eye pain, visual disturbances, or discharge  ENMT:  No difficulty hearing, tinnitus, vertigo; No sinus or throat pain  NECK: No pain or stiffness  RESPIRATORY: No cough, wheezing, chills or hemoptysis; No Shortness of Breath  CARDIOVASCULAR: No chest pain, palpitations, passing out, dizziness, or leg swelling  GASTROINTESTINAL: No abdominal or epigastric pain. No nausea, vomiting, or hematemesis; No diarrhea or constipation. No melena or hematochezia.  GENITOURINARY: No dysuria, frequency, hematuria, or incontinence  NEUROLOGICAL: No headaches, memory loss, loss of strength, numbness, or tremors  SKIN: No itching, burning, rashes, or lesions   LYMPH Nodes: No enlarged glands  ENDOCRINE: No heat or cold intolerance; No hair loss  MUSCULOSKELETAL: No joint pain or swelling; No muscle, back, or extremity pain  PSYCHIATRIC: No depression, anxiety, mood swings, or difficulty sleeping  HEME/LYMPH: No easy bruising, or bleeding gums  ALLERY AND IMMUNOLOGIC: No hives or eczema	    [ ] All others negative	  [ ] Unable to obtain    PHYSICAL EXAM:  T(C): 36.8 (02-10-19 @ 21:35), Max: 37 (02-10-19 @ 02:35)  HR: 60 (02-10-19 @ 21:35) (60 - 78)  BP: 146/55 (02-10-19 @ 21:35) (138/46 - 161/58)  RR: 17 (02-10-19 @ 21:35) (16 - 18)  SpO2: 98% (02-10-19 @ 21:35) (98% - 100%)  Wt(kg): --  I&O's Summary      Appearance: Normal	  HEENT:   Normal oral mucosa, PERRL, EOMI	  Lymphatic: No lymphadenopathy  Cardiovascular: Normal S1 S2, No JVD, No murmurs, No edema  Respiratory: Lungs clear to auscultation	  Psychiatry: A & O x 3, Mood & affect appropriate  Gastrointestinal:  Soft, Non-tender, + BS	  Skin: No rashes, No ecchymoses, No cyanosis	  Neurologic: Non-focal  Extremities: Normal range of motion, No clubbing, cyanosis or edema  Vascular: Peripheral pulses palpable 2+ bilaterally    MEDICATIONS  (STANDING):  amiodarone    Tablet   Oral   amiodarone    Tablet 400 milliGRAM(s) Oral every 8 hours  amLODIPine   Tablet 2.5 milliGRAM(s) Oral daily  cefuroxime   Tablet 250 milliGRAM(s) Oral every 12 hours  digoxin     Tablet 0.125 milliGRAM(s) Oral daily  insulin glargine Injectable (LANTUS) 10 Unit(s) SubCutaneous at bedtime  insulin lispro (HumaLOG) corrective regimen sliding scale   SubCutaneous three times a day before meals  lactobacillus acidophilus 1 Tablet(s) Oral three times a day with meals  metoprolol tartrate 12.5 milliGRAM(s) Oral two times a day  pantoprazole  Injectable 40 milliGRAM(s) IV Push two times a day      TELEMETRY: 	    ECG:  	  RADIOLOGY:  OTHER: 	  	  CBC Full  -  ( 10 Feb 2019 06:11 )  WBC Count : 7.3 K/uL  Hemoglobin : 8.4 g/dL  Hematocrit : 27.7 %  Platelet Count - Automated : 86 K/uL  Mean Cell Volume : 86.1 fl  Mean Cell Hemoglobin : 26.2 pg  Mean Cell Hemoglobin Concentration : 30.4 gm/dL  Auto Neutrophil # : x  Auto Lymphocyte # : x  Auto Monocyte # : x  Auto Eosinophil # : x  Auto Basophil # : x  Auto Neutrophil % : x  Auto Lymphocyte % : x  Auto Monocyte % : x  Auto Eosinophil % : x  Auto Basophil % : x        CARDIAC MARKERS:                              8.4    7.3   )-----------( 86       ( 10 Feb 2019 06:11 )             27.7       02-10    140  |  108  |  16  ----------------------------<  158<H>  3.5   |  24  |  0.84    Ca    8.1<L>      10 Feb 2019 06:11              proBNP: Serum Pro-Brain Natriuretic Peptide: 1592 pg/mL (02-06 @ 14:13)    Lipid Profile: Cholesterol 99  LDL 48  HDL 31      HgA1c: Hemoglobin A1C, Whole Blood: 7.2 % (02-07 @ 09:52)    TSH: Thyroid Stimulating Hormone, Serum: 2.06 uU/mL (02-07 @ 06:06)

## 2019-02-10 NOTE — PROGRESS NOTE ADULT - SUBJECTIVE AND OBJECTIVE BOX
CHIEF COMPLAINT:Patient is a 87y old  Female who presents with a chief complaint of Weakness.Pt appears comfortable.    	  REVIEW OF SYSTEMS:  CONSTITUTIONAL: No fever, weight loss, or fatigue  EYES: No eye pain, visual disturbances, or discharge  ENT:  No difficulty hearing, tinnitus, vertigo; No sinus or throat pain  NECK: No pain or stiffness  RESPIRATORY: No cough, wheezing, chills or hemoptysis; No Shortness of Breath  CARDIOVASCULAR: No chest pain, palpitations, passing out, dizziness, or leg swelling  GASTROINTESTINAL: No abdominal or epigastric pain. No nausea, vomiting, or hematemesis; No diarrhea or constipation. No melena or hematochezia.  GENITOURINARY: No dysuria, frequency, hematuria, or incontinence  NEUROLOGICAL: No headaches, memory loss, loss of strength, numbness, or tremors  SKIN: No itching, burning, rashes, or lesions   LYMPH Nodes: No enlarged glands  ENDOCRINE: No heat or cold intolerance; No hair loss  MUSCULOSKELETAL: No joint pain or swelling; No muscle, back, or extremity pain  PSYCHIATRIC: No depression, anxiety, mood swings, or difficulty sleeping  HEME/LYMPH: No easy bruising, or bleeding gums  ALLERGY AND IMMUNOLOGIC: No hives or eczema	      PHYSICAL EXAM:  T(C): 36.9 (02-10-19 @ 05:05), Max: 37.2 (02-09-19 @ 21:50)  HR: 62 (02-10-19 @ 05:05) (60 - 87)  BP: 161/58 (02-10-19 @ 05:05) (137/77 - 161/58)  RR: 16 (02-10-19 @ 05:05) (16 - 18)  SpO2: 98% (02-10-19 @ 05:05) (98% - 100%)      Appearance: Normal	  HEENT:   Normal oral mucosa, PERRL, EOMI	  Lymphatic: No lymphadenopathy  Cardiovascular: Normal S1 S2, No JVD, No murmurs, No edema  Respiratory: Lungs clear to auscultation	  Psychiatry: A & O x 3, Mood & affect appropriate  Gastrointestinal:  Soft, Non-tender, + BS	  Skin: No rashes, No ecchymoses, No cyanosis	  Neurologic: Non-focal  Extremities: Normal range of motion, No clubbing, cyanosis or edema  Vascular: Peripheral pulses palpable 2+ bilaterally    MEDICATIONS  (STANDING):  amiodarone    Tablet   Oral   amiodarone    Tablet 400 milliGRAM(s) Oral every 8 hours  amLODIPine   Tablet 2.5 milliGRAM(s) Oral daily  cefuroxime   Tablet 250 milliGRAM(s) Oral every 12 hours  digoxin     Tablet 0.125 milliGRAM(s) Oral daily  insulin glargine Injectable (LANTUS) 10 Unit(s) SubCutaneous at bedtime  insulin lispro (HumaLOG) corrective regimen sliding scale   SubCutaneous three times a day before meals  lactobacillus acidophilus 1 Tablet(s) Oral three times a day with meals  metoprolol tartrate 12.5 milliGRAM(s) Oral two times a day  pantoprazole  Injectable 40 milliGRAM(s) IV Push two times a day        	  LABS:	 	                      8.4    7.3   )-----------( 86       ( 10 Feb 2019 06:11 )             27.7     02-10    140  |  108  |  16  ----------------------------<  158<H>  3.5   |  24  |  0.84    Ca    8.1<L>      10 Feb 2019 06:11      proBNP: Serum Pro-Brain Natriuretic Peptide: 1592 pg/mL (02-06 @ 14:13)    Lipid Profile: Cholesterol 99  LDL 48  HDL 31      HgA1c: Hemoglobin A1C, Whole Blood: 7.2 % (02-07 @ 09:52)    TSH: Thyroid Stimulating Hormone, Serum: 2.06 uU/mL (02-07 @ 06:06)

## 2019-02-11 LAB
ANION GAP SERPL CALC-SCNC: 8 MMOL/L — SIGNIFICANT CHANGE UP (ref 5–17)
BUN SERPL-MCNC: 20 MG/DL — HIGH (ref 7–18)
CALCIUM SERPL-MCNC: 8.5 MG/DL — SIGNIFICANT CHANGE UP (ref 8.4–10.5)
CHLORIDE SERPL-SCNC: 104 MMOL/L — SIGNIFICANT CHANGE UP (ref 96–108)
CO2 SERPL-SCNC: 24 MMOL/L — SIGNIFICANT CHANGE UP (ref 22–31)
CREAT SERPL-MCNC: 0.95 MG/DL — SIGNIFICANT CHANGE UP (ref 0.5–1.3)
CULTURE RESULTS: SIGNIFICANT CHANGE UP
CULTURE RESULTS: SIGNIFICANT CHANGE UP
GLUCOSE BLDC GLUCOMTR-MCNC: 171 MG/DL — HIGH (ref 70–99)
GLUCOSE BLDC GLUCOMTR-MCNC: 224 MG/DL — HIGH (ref 70–99)
GLUCOSE BLDC GLUCOMTR-MCNC: 304 MG/DL — HIGH (ref 70–99)
GLUCOSE SERPL-MCNC: 197 MG/DL — HIGH (ref 70–99)
HCT VFR BLD CALC: 27.2 % — LOW (ref 34.5–45)
HGB BLD-MCNC: 8.2 G/DL — LOW (ref 11.5–15.5)
MAGNESIUM SERPL-MCNC: 2.1 MG/DL — SIGNIFICANT CHANGE UP (ref 1.6–2.6)
MCHC RBC-ENTMCNC: 26 PG — LOW (ref 27–34)
MCHC RBC-ENTMCNC: 30.2 GM/DL — LOW (ref 32–36)
MCV RBC AUTO: 86 FL — SIGNIFICANT CHANGE UP (ref 80–100)
PHOSPHATE SERPL-MCNC: 2.5 MG/DL — SIGNIFICANT CHANGE UP (ref 2.5–4.5)
PLATELET # BLD AUTO: 87 K/UL — LOW (ref 150–400)
POTASSIUM SERPL-MCNC: 3.7 MMOL/L — SIGNIFICANT CHANGE UP (ref 3.5–5.3)
POTASSIUM SERPL-SCNC: 3.7 MMOL/L — SIGNIFICANT CHANGE UP (ref 3.5–5.3)
RBC # BLD: 3.17 M/UL — LOW (ref 3.8–5.2)
RBC # FLD: 14.5 % — SIGNIFICANT CHANGE UP (ref 10.3–14.5)
SODIUM SERPL-SCNC: 136 MMOL/L — SIGNIFICANT CHANGE UP (ref 135–145)
SPECIMEN SOURCE: SIGNIFICANT CHANGE UP
SPECIMEN SOURCE: SIGNIFICANT CHANGE UP
WBC # BLD: 9.3 K/UL — SIGNIFICANT CHANGE UP (ref 3.8–10.5)
WBC # FLD AUTO: 9.3 K/UL — SIGNIFICANT CHANGE UP (ref 3.8–10.5)

## 2019-02-11 RX ORDER — INSULIN LISPRO 100/ML
3 VIAL (ML) SUBCUTANEOUS
Qty: 0 | Refills: 0 | Status: DISCONTINUED | OUTPATIENT
Start: 2019-02-11 | End: 2019-02-12

## 2019-02-11 RX ORDER — SENNA PLUS 8.6 MG/1
2 TABLET ORAL AT BEDTIME
Qty: 0 | Refills: 0 | Status: DISCONTINUED | OUTPATIENT
Start: 2019-02-11 | End: 2019-02-12

## 2019-02-11 RX ORDER — APIXABAN 2.5 MG/1
1 TABLET, FILM COATED ORAL
Qty: 0 | Refills: 0 | COMMUNITY

## 2019-02-11 RX ORDER — IRON SUCROSE 20 MG/ML
200 INJECTION, SOLUTION INTRAVENOUS ONCE
Qty: 0 | Refills: 0 | Status: COMPLETED | OUTPATIENT
Start: 2019-02-11 | End: 2019-02-11

## 2019-02-11 RX ADMIN — AMLODIPINE BESYLATE 2.5 MILLIGRAM(S): 2.5 TABLET ORAL at 06:33

## 2019-02-11 RX ADMIN — Medication 1 TABLET(S): at 08:07

## 2019-02-11 RX ADMIN — Medication 250 MILLIGRAM(S): at 19:19

## 2019-02-11 RX ADMIN — Medication 0.12 MILLIGRAM(S): at 06:33

## 2019-02-11 RX ADMIN — Medication 1 TABLET(S): at 13:15

## 2019-02-11 RX ADMIN — INSULIN GLARGINE 10 UNIT(S): 100 INJECTION, SOLUTION SUBCUTANEOUS at 22:09

## 2019-02-11 RX ADMIN — IRON SUCROSE 110 MILLIGRAM(S): 20 INJECTION, SOLUTION INTRAVENOUS at 17:46

## 2019-02-11 RX ADMIN — CYCLOBENZAPRINE HYDROCHLORIDE 5 MILLIGRAM(S): 10 TABLET, FILM COATED ORAL at 04:43

## 2019-02-11 RX ADMIN — Medication 650 MILLIGRAM(S): at 05:40

## 2019-02-11 RX ADMIN — Medication 100 MILLIGRAM(S): at 22:09

## 2019-02-11 RX ADMIN — CYCLOBENZAPRINE HYDROCHLORIDE 5 MILLIGRAM(S): 10 TABLET, FILM COATED ORAL at 19:19

## 2019-02-11 RX ADMIN — Medication 4: at 13:15

## 2019-02-11 RX ADMIN — PANTOPRAZOLE SODIUM 40 MILLIGRAM(S): 20 TABLET, DELAYED RELEASE ORAL at 06:34

## 2019-02-11 RX ADMIN — Medication 3 UNIT(S): at 17:45

## 2019-02-11 RX ADMIN — Medication 650 MILLIGRAM(S): at 04:48

## 2019-02-11 RX ADMIN — Medication 12.5 MILLIGRAM(S): at 06:33

## 2019-02-11 RX ADMIN — AMIODARONE HYDROCHLORIDE 400 MILLIGRAM(S): 400 TABLET ORAL at 06:33

## 2019-02-11 RX ADMIN — Medication 250 MILLIGRAM(S): at 06:32

## 2019-02-11 RX ADMIN — Medication 1: at 08:07

## 2019-02-11 RX ADMIN — SENNA PLUS 2 TABLET(S): 8.6 TABLET ORAL at 22:09

## 2019-02-11 NOTE — PROGRESS NOTE ADULT - NEGATIVE PSYCHIATRIC SYMPTOMS
no suicidal ideation/no depression
no depression/no anxiety/no suicidal ideation
no suicidal ideation/no depression

## 2019-02-11 NOTE — PROGRESS NOTE ADULT - NEGATIVE NEUROLOGICAL SYMPTOMS
no vertigo/no headache/no syncope/no confusion/no tremors
no syncope/no headache/no vertigo/no confusion
no syncope/no tremors/no vertigo/no headache/no confusion

## 2019-02-11 NOTE — PROGRESS NOTE ADULT - NEGATIVE GENERAL SYMPTOMS
no chills/no fever
no chills/no sweating/no anorexia/no fever
no fever/no sweating/no chills/no anorexia

## 2019-02-11 NOTE — CONSULT NOTE ADULT - PROBLEM SELECTOR RECOMMENDATION 2
p/w cough with CXR finding of Rt base process  F/u Blood cultures  started rocephin and zithromax.
- monitor H/H  - management as per primary team

## 2019-02-11 NOTE — PROGRESS NOTE ADULT - NEGATIVE CARDIOVASCULAR SYMPTOMS
no chest pain/no palpitations/no orthopnea
no orthopnea/no chest pain/no palpitations
no palpitations/no chest pain/no orthopnea

## 2019-02-11 NOTE — PROGRESS NOTE ADULT - SUBJECTIVE AND OBJECTIVE BOX
CHIEF COMPLAINT:Patient is a 87y old  Female who presents with a chief complaint of weakness .Pt appears comfortable.    	  REVIEW OF SYSTEMS:  CONSTITUTIONAL: No fever, weight loss, or fatigue  EYES: No eye pain, visual disturbances, or discharge  ENT:  No difficulty hearing, tinnitus, vertigo; No sinus or throat pain  NECK: No pain or stiffness  RESPIRATORY: No cough, wheezing, chills or hemoptysis; No Shortness of Breath  CARDIOVASCULAR: No chest pain, palpitations, passing out, dizziness, or leg swelling  GASTROINTESTINAL: No abdominal or epigastric pain. No nausea, vomiting, or hematemesis; No diarrhea or constipation. No melena or hematochezia.  GENITOURINARY: No dysuria, frequency, hematuria, or incontinence  NEUROLOGICAL: No headaches, memory loss, loss of strength, numbness, or tremors  SKIN: No itching, burning, rashes, or lesions   LYMPH Nodes: No enlarged glands  ENDOCRINE: No heat or cold intolerance; No hair loss  MUSCULOSKELETAL: No joint pain or swelling; No muscle, back, or extremity pain  PSYCHIATRIC: No depression, anxiety, mood swings, or difficulty sleeping  HEME/LYMPH: No easy bruising, or bleeding gums  ALLERGY AND IMMUNOLOGIC: No hives or eczema	      PHYSICAL EXAM:  T(C): 36.9 (02-11-19 @ 05:02), Max: 37 (02-11-19 @ 01:54)  HR: 71 (02-11-19 @ 05:02) (60 - 71)  BP: 141/57 (02-11-19 @ 05:02) (141/57 - 154/59)  RR: 18 (02-11-19 @ 05:02) (17 - 18)  SpO2: 98% (02-11-19 @ 05:02) (98% - 100%)      Appearance: Normal	  HEENT:   Normal oral mucosa, PERRL, EOMI	  Lymphatic: No lymphadenopathy  Cardiovascular: Normal S1 S2, No JVD, No murmurs, No edema  Respiratory: Lungs clear to auscultation	  Psychiatry: A & O x 3, Mood & affect appropriate  Gastrointestinal:  Soft, Non-tender, + BS	  Skin: No rashes, No ecchymoses, No cyanosis	  Neurologic: Non-focal  Extremities: Normal range of motion, No clubbing, cyanosis or edema  Vascular: Peripheral pulses palpable 2+ bilaterally    MEDICATIONS  (STANDING):  amiodarone    Tablet   Oral   amiodarone    Tablet 200 milliGRAM(s) Oral daily  amLODIPine   Tablet 2.5 milliGRAM(s) Oral daily  cefuroxime   Tablet 250 milliGRAM(s) Oral every 12 hours  digoxin     Tablet 0.125 milliGRAM(s) Oral daily  insulin glargine Injectable (LANTUS) 10 Unit(s) SubCutaneous at bedtime  insulin lispro (HumaLOG) corrective regimen sliding scale   SubCutaneous three times a day before meals  lactobacillus acidophilus 1 Tablet(s) Oral three times a day with meals  metoprolol tartrate 12.5 milliGRAM(s) Oral two times a day  pantoprazole  Injectable 40 milliGRAM(s) IV Push two times a day  senna 2 Tablet(s) Oral at bedtime      	  LABS:	 	                      8.2    9.3   )-----------( 87       ( 11 Feb 2019 06:00 )             27.2     02-11    136  |  104  |  20<H>  ----------------------------<  197<H>  3.7   |  24  |  0.95    Ca    8.5      11 Feb 2019 06:00  Phos  2.5     02-11  Mg     2.1     02-11      proBNP: Serum Pro-Brain Natriuretic Peptide: 1592 pg/mL (02-06 @ 14:13)    Lipid Profile: Cholesterol 99  LDL 48  HDL 31      HgA1c: Hemoglobin A1C, Whole Blood: 7.2 % (02-07 @ 09:52)    TSH: Thyroid Stimulating Hormone, Serum: 2.06 uU/mL (02-07 @ 06:06)

## 2019-02-11 NOTE — PROGRESS NOTE ADULT - PROBLEM SELECTOR PLAN 1
S/p EGD, Cauterization of AVM   Hb falling, IV Venofer first, will tranfuse if falls further  Pt feeling better   CBC q12  F/u cbc in evening   Avoid NSAIDS   C/w DASH diet    GI Dr. Huffman  PT eval- [facility]

## 2019-02-11 NOTE — PROGRESS NOTE ADULT - CONSTITUTIONAL DETAILS
no distress/well-groomed/well-developed/well-nourished
well-developed/well-groomed/no distress/well-nourished
well-developed/well-nourished/no distress/well-groomed

## 2019-02-11 NOTE — PROGRESS NOTE ADULT - NEGATIVE MUSCULOSKELETAL SYMPTOMS
no neck pain/no muscle cramps/no stiffness
no muscle cramps/no stiffness/no neck pain
no muscle cramps/no stiffness/no neck pain

## 2019-02-11 NOTE — PROGRESS NOTE ADULT - NEUROLOGICAL DETAILS
responds to verbal commands/sensation intact/responds to pain
sensation intact/responds to pain/responds to verbal commands

## 2019-02-11 NOTE — PROGRESS NOTE ADULT - SUBJECTIVE AND OBJECTIVE BOX
PGY 1 Note discussed with supervising resident and primary attending    Patient is a 87y old  Female who presents with a chief complaint of Weakness (11 Feb 2019 09:28)      INTERVAL HPI/OVERNIGHT EVENTS: No acute events overnight, remains afebrile; HD stable, WBC WNL  Hb falling 9.0->8.4->8.2, patient also appears weak today, will transfuse 1 PRBC tomorrow if Hgb further falls tomorrow.  Will give IV Venofer to the patient tomorrow.    MEDICATIONS  (STANDING):  amiodarone    Tablet   Oral   amiodarone    Tablet 200 milliGRAM(s) Oral daily  amLODIPine   Tablet 2.5 milliGRAM(s) Oral daily  cefuroxime   Tablet 250 milliGRAM(s) Oral every 12 hours  digoxin     Tablet 0.125 milliGRAM(s) Oral daily  insulin glargine Injectable (LANTUS) 10 Unit(s) SubCutaneous at bedtime  insulin lispro (HumaLOG) corrective regimen sliding scale   SubCutaneous three times a day before meals  lactobacillus acidophilus 1 Tablet(s) Oral three times a day with meals  metoprolol tartrate 12.5 milliGRAM(s) Oral two times a day  pantoprazole  Injectable 40 milliGRAM(s) IV Push two times a day  senna 2 Tablet(s) Oral at bedtime    MEDICATIONS  (PRN):  acetaminophen   Tablet .. 650 milliGRAM(s) Oral every 6 hours PRN Mild Pain (1 - 3)  aluminum hydroxide/magnesium hydroxide/simethicone Suspension 30 milliLiter(s) Oral every 4 hours PRN Dyspepsia  cyclobenzaprine 5 milliGRAM(s) Oral two times a day PRN Muscle Spasm  guaiFENesin   Syrup  (Sugar-Free) 100 milliGRAM(s) Oral every 6 hours PRN Cough      __________________________________________________  REVIEW OF SYSTEMS:    CONSTITUTIONAL: No fever, fatigued  EYES: No acute visual disturbances  NECK: No pain or stiffness  RESPIRATORY: No cough; No shortness of breath  CARDIOVASCULAR: No chest pain, No palpitations  GASTROINTESTINAL: No pain. No nausea or vomiting; No diarrhea   NEUROLOGICAL: No headache or numbness, no tremors  MUSCULOSKELETAL: No joint pain, no muscle pain  GENITOURINARY: no dysuria, no frequency, no hesitancy  PSYCHIATRY: no depression , no anxiety  ALL OTHER  ROS negative          Vital Signs Last 24 Hrs  T(C): 36.7 (11 Feb 2019 10:51), Max: 37 (11 Feb 2019 01:54)  T(F): 98.1 (11 Feb 2019 10:51), Max: 98.6 (11 Feb 2019 01:54)  HR: 60 (11 Feb 2019 10:51) (60 - 71)  BP: 130/45 (11 Feb 2019 10:51) (130/45 - 154/59)  BP(mean): --  RR: 18 (11 Feb 2019 10:51) (17 - 18)  SpO2: 100% (11 Feb 2019 10:51) (98% - 100%)    ________________________________________________  PHYSICAL EXAM:    GENERAL: NAD  HEENT: Normocephalic;  conjunctivae and sclerae clear; moist mucous membranes;   NECK : supple  CHEST/LUNG: Clear to auscultation bilaterally with good air entry   HEART: S1 S2  regular; no murmurs, gallops or rubs  ABDOMEN: Soft, Nontender, Nondistended; Bowel sounds present  EXTREMITIES: no cyanosis; no edema; no calf tenderness  SKIN: warm and dry; no rash  NERVOUS SYSTEM:  Awake and alert; Oriented  to place, person and time ; no new deficits    _________________________________________________  LABS:                        8.2    9.3   )-----------( 87       ( 11 Feb 2019 06:00 )             27.2     02-11    136  |  104  |  20<H>  ----------------------------<  197<H>  3.7   |  24  |  0.95    Ca    8.5      11 Feb 2019 06:00  Phos  2.5     02-11  Mg     2.1     02-11          CAPILLARY BLOOD GLUCOSE      POCT Blood Glucose.: 171 mg/dL (11 Feb 2019 07:37)  POCT Blood Glucose.: 331 mg/dL (10 Feb 2019 21:32)  POCT Blood Glucose.: 243 mg/dL (10 Feb 2019 17:19)        RADIOLOGY & ADDITIONAL TESTS:    Imaging Personally Reviewed:  YES    Consultant(s) Notes Reviewed:   YES    Care Discussed with Consultants :     Plan of care was discussed with patient and /or primary care giver; all questions and concerns were addressed and care was aligned with patient's wishes.

## 2019-02-11 NOTE — PROGRESS NOTE ADULT - NEGATIVE GASTROINTESTINAL SYMPTOMS
no nausea/no abdominal pain/no melena/no hematochezia/no jaundice/no diarrhea/no vomiting
no hematochezia/no melena/no jaundice/no diarrhea/no vomiting/no nausea/no abdominal pain
no melena/no hematochezia/no jaundice/no nausea/no abdominal pain/no vomiting/no diarrhea

## 2019-02-11 NOTE — PROGRESS NOTE ADULT - SUBJECTIVE AND OBJECTIVE BOX
[   ] ICU                                          [   ] CCU                                      [ X  ] Medical Floor    Patient is comfortable. No new complaints reported, No abdominal pain, N/V, hematemesis, hematochezia, melena, fever, chills, chest pain, SOB, cough or diarrhea reported.    VITALS  Vital Signs Last 24 Hrs  T(C): 36.7 (11 Feb 2019 13:40), Max: 37 (11 Feb 2019 01:54)  T(F): 98 (11 Feb 2019 13:40), Max: 98.6 (11 Feb 2019 01:54)  HR: 65 (11 Feb 2019 13:40) (60 - 71)  BP: 129/54 (11 Feb 2019 13:40) (129/54 - 154/59)   RR: 16 (11 Feb 2019 13:40) (16 - 18)  SpO2: 99% (11 Feb 2019 13:40) (98% - 100%)       MEDICATIONS  (STANDING):  amiodarone    Tablet   Oral   amiodarone    Tablet 200 milliGRAM(s) Oral daily  amLODIPine   Tablet 2.5 milliGRAM(s) Oral daily  cefuroxime   Tablet 250 milliGRAM(s) Oral every 12 hours  digoxin     Tablet 0.125 milliGRAM(s) Oral daily  insulin glargine Injectable (LANTUS) 10 Unit(s) SubCutaneous at bedtime  insulin lispro (HumaLOG) corrective regimen sliding scale   SubCutaneous three times a day before meals  insulin lispro Injectable (HumaLOG) 3 Unit(s) SubCutaneous three times a day before meals  iron sucrose IVPB 200 milliGRAM(s) IV Intermittent once  lactobacillus acidophilus 1 Tablet(s) Oral three times a day with meals  metoprolol tartrate 12.5 milliGRAM(s) Oral two times a day  pantoprazole  Injectable 40 milliGRAM(s) IV Push two times a day  senna 2 Tablet(s) Oral at bedtime    MEDICATIONS  (PRN):  acetaminophen   Tablet .. 650 milliGRAM(s) Oral every 6 hours PRN Mild Pain (1 - 3)  aluminum hydroxide/magnesium hydroxide/simethicone Suspension 30 milliLiter(s) Oral every 4 hours PRN Dyspepsia  cyclobenzaprine 5 milliGRAM(s) Oral two times a day PRN Muscle Spasm  guaiFENesin   Syrup  (Sugar-Free) 100 milliGRAM(s) Oral every 6 hours PRN Cough                            8.2    9.3   )-----------( 87       ( 11 Feb 2019 06:00 )             27.2       02-11    136  |  104  |  20<H>  ----------------------------<  197<H>  3.7   |  24  |  0.95    Ca    8.5      11 Feb 2019 06:00  Phos  2.5     02-11  Mg     2.1     02-11

## 2019-02-11 NOTE — PROGRESS NOTE ADULT - PROBLEM SELECTOR PLAN 4
takes metoprolol and Eliquis  CHADVASC : 6 points   C/w metoprolol, amiodarone and digox and hold eliquis  Cardio Dr. Boo  Afib-low dose b blocker and amiodarone maintenance dosage s/p loading to keep in NSR (intolerant of Multaq as outpatient)

## 2019-02-11 NOTE — PROGRESS NOTE ADULT - PROBLEM SELECTOR PROBLEM 6
ACS (acute coronary syndrome)

## 2019-02-11 NOTE — CONSULT NOTE ADULT - PROBLEM SELECTOR RECOMMENDATION 9
- HbA1c currently 7.1, last month was around 6.9 or 7 as per patient's daughter; results could be false low secondary to anemia  - Blood sugars currently running high   - Continue Lantus 10 units qhs, add Humalog 3 units tid and continue mild HSS  - monitor FS ac and hs  - Discussed the option for Afrezza as outpatient with patient and family at bedside
p/w weakness, lethargy, melanotic stools with Hb of 4.6, Occult positive, hx of Eliquis use, gastritis, antral ulcer --> likely upper GI bleed due to bleeding ulcer/ gastritis due to eliquis or possibly due AVMs since that has happened in the past  Will give 3 PRBC with IV lasix  2 IV lines  F/u CBC q 6ht  Started protonix IV push ( PATIENT'S DAUGHTER WAS REFUSING PROTONIX AS SHE THINKS IT CAUSES LEG SWELLING, SHE AGREED NOW  FOR ONLY INPATIENT PROTONIX AND TO RESUME HOME OMEPRAZOLE UPON DISCHARGE)  -npo nor now  GI Dr. Huffman -> discussed with Dr Huffman, will do endoscopy in AM  HOLD ELIQUIS/DVT PROPHLAXIS AND ASPIRIN.     At this time patient is hemodynamically stable and doesn't need ICU admission at this time

## 2019-02-11 NOTE — PROGRESS NOTE ADULT - PROBLEM SELECTOR PLAN 3
p/w cough with CXR finding of Rt base process  Blood cultures nega  started rocephin and zithromax D4

## 2019-02-11 NOTE — DISCHARGE NOTE ADULT - PATIENT PORTAL LINK FT
You can access the [email or web address] Patient Portal, offered by [facility], by registering with the following website: [email or web address]

## 2019-02-11 NOTE — PROGRESS NOTE ADULT - NEGATIVE ENMT SYMPTOMS
no ear pain/no hearing difficulty/no nose bleeds/no dry mouth/no gum bleeding/no throat pain/no dysphagia
no nose bleeds/no dry mouth/no hearing difficulty/no dysphagia/no gum bleeding/no throat pain/no ear pain
no dry mouth/no dysphagia/no nose bleeds/no gum bleeding/no hearing difficulty/no throat pain

## 2019-02-11 NOTE — PROGRESS NOTE ADULT - ATTENDING COMMENTS
Patient is seen and examined. Case reviewed with the medical team. Above note is appreciated. Will follow up clinically. Continue DVT prophylaxis. Case discussed with Cardiology. Will continue ASA only. No eliquis and plavix given 2nd episode of GI bleed. Continue amiodarone. Continue Vinofer and follow up CBC in am.

## 2019-02-11 NOTE — CONSULT NOTE ADULT - SUBJECTIVE AND OBJECTIVE BOX
HPI:  88 y/o F from home ambulates independently pt w/ PMHx of DM, HTN, A-Fib ( on Eliquis), hx of GI bleed, AVM, Duodenal ulcer, gastritis, CHF, Glaucoma, grave's disease, HTN, cardiac arrest (s/p medtronic PPM), SBO S/p intestinal resection and Vertigo and PSHx of Oophorectomy pr presents to ED c/o weakness, cough x 4 days.   (06 Feb 2019 17:48)    Patient admitted to medicine for GI bleeding secondary to AVM s/p EGD s/p cauterization.     Patient's blood sugars running high, hence asked to see. Patient known from before, follows as outpatient. Currently on Lantus 12 qhs and repaglinide 2 mg bid. HbA1c currently is 7.2, but could be falsely low secondary to anemia and blood transfusion. Currently blood sugars running slightly high. Spoke to daughter and patient at bedside, daughter stated that blood sugars running high pre meals at home also So discussed with daughter about other options like inhaled insulin like Afrezza to try as outpatient.       PAST MEDICAL & SURGICAL HISTORY:  A-fib  Constipation  Glaucoma  Vertigo  Graves disease  Pacemaker  DM (diabetes mellitus)  HTN (hypertension)  H/O oophorectomy  Artificial pacemaker      FAMILY HISTORY:  Family history of hypertension  Family history of diabetes mellitus (Father)      Social History:  denies any smoking, alcohol or illicit drug use     Outpatient Medications:  Lantus 12 qhs  Repaglinide 2 mg bid     MEDICATIONS  (STANDING):  amiodarone    Tablet   Oral   amiodarone    Tablet 200 milliGRAM(s) Oral daily  amLODIPine   Tablet 2.5 milliGRAM(s) Oral daily  cefuroxime   Tablet 250 milliGRAM(s) Oral every 12 hours  digoxin     Tablet 0.125 milliGRAM(s) Oral daily  insulin glargine Injectable (LANTUS) 10 Unit(s) SubCutaneous at bedtime  insulin lispro (HumaLOG) corrective regimen sliding scale   SubCutaneous three times a day before meals  iron sucrose IVPB 200 milliGRAM(s) IV Intermittent once  lactobacillus acidophilus 1 Tablet(s) Oral three times a day with meals  metoprolol tartrate 12.5 milliGRAM(s) Oral two times a day  pantoprazole  Injectable 40 milliGRAM(s) IV Push two times a day  senna 2 Tablet(s) Oral at bedtime    MEDICATIONS  (PRN):  acetaminophen   Tablet .. 650 milliGRAM(s) Oral every 6 hours PRN Mild Pain (1 - 3)  aluminum hydroxide/magnesium hydroxide/simethicone Suspension 30 milliLiter(s) Oral every 4 hours PRN Dyspepsia  cyclobenzaprine 5 milliGRAM(s) Oral two times a day PRN Muscle Spasm  guaiFENesin   Syrup  (Sugar-Free) 100 milliGRAM(s) Oral every 6 hours PRN Cough      Allergies  No Known Allergies      Review of Systems:  Constitutional: no complaints   Cardiovascular: No chest pain, palpitations  Respiratory: No SOB, no cough  GI: No nausea, vomiting,   : No dysuria, hematuria  Skin: no rash  Psych: no depression          VITALS: T(C): 36.7 (02-11-19 @ 13:40)  T(F): 98 (02-11-19 @ 13:40), Max: 98.6 (02-11-19 @ 01:54)  HR: 65 (02-11-19 @ 13:40) (60 - 71)  BP: 129/54 (02-11-19 @ 13:40) (129/54 - 154/59)  RR:  (16 - 18)  SpO2:  (98% - 100%)      PHYSICAL EXAM:   GENERAL: NAD, well-groomed, well-developed  HEENT: PERRLA, no other abnormalities   RESPIRATORY: Clear to auscultation bilaterally; No rales, rhonchi, wheezing, or rubs  CARDIOVASCULAR: Regular rate and rhythm; No murmurs; no peripheral edema  GI: Soft, nontender, non distended, normal bowel sounds  SKIN: Dry, intact, No rashes or lesions  NEURO: grossly intact         POCT Blood Glucose.: 304 mg/dL (02-11-19 @ 12:20)  POCT Blood Glucose.: 171 mg/dL (02-11-19 @ 07:37)  POCT Blood Glucose.: 331 mg/dL (02-10-19 @ 21:32)  POCT Blood Glucose.: 243 mg/dL (02-10-19 @ 17:19)  POCT Blood Glucose.: 243 mg/dL (02-10-19 @ 12:00)  POCT Blood Glucose.: 139 mg/dL (02-10-19 @ 08:07)  POCT Blood Glucose.: 337 mg/dL (02-09-19 @ 22:24)  POCT Blood Glucose.: 238 mg/dL (02-09-19 @ 17:08)  POCT Blood Glucose.: 348 mg/dL (02-09-19 @ 12:34)  POCT Blood Glucose.: 153 mg/dL (02-09-19 @ 08:36)  POCT Blood Glucose.: 196 mg/dL (02-08-19 @ 22:06)  POCT Blood Glucose.: 201 mg/dL (02-08-19 @ 17:01)                            8.2    9.3   )-----------( 87       ( 11 Feb 2019 06:00 )             27.2       02-11    136  |  104  |  20<H>  ----------------------------<  197<H>  3.7   |  24  |  0.95    EGFR if : 62  EGFR if non : 54<L>    Ca    8.5      02-11  Mg     2.1     02-11  Phos  2.5     02-11        Thyroid Function Tests:  02-07 @ 06:06 TSH 2.06       Hemoglobin A1C, Whole Blood: 7.2 % <H> [4.0 - 5.6] (02-07-19 @ 09:52)      02-07 Chol 99 LDL 48 HDL 31<L> Trig 100 HPI:  86 y/o F from home ambulates independently pt w/ PMHx of DM, HTN, A-Fib ( on Eliquis), hx of GI bleed, AVM, Duodenal ulcer, gastritis, CHF, Glaucoma, grave's disease, HTN, cardiac arrest (s/p medtronic PPM), SBO S/p intestinal resection and Vertigo and PSHx of Oophorectomy pr presents to ED c/o weakness, cough x 4 days.   (06 Feb 2019 17:48)    Patient admitted to medicine for GI bleeding secondary to AVM s/p EGD s/p cauterization.     Patient's blood sugars running high, hence asked to see. Patient known from before, follows as outpatient. Currently on Lantus 12 qhs and repaglinide 2 mg bid. HbA1c currently is 7.2, but could be falsely low secondary to anemia and blood transfusion. Currently blood sugars running slightly high. Spoke to daughter and patient at bedside, daughter stated that blood sugars running high pre meals at home also So discussed with daughter about other options like inhaled insulin- Afrezza to try as outpatient.       PAST MEDICAL & SURGICAL HISTORY:  A-fib  Constipation  Glaucoma  Vertigo  Graves disease  Pacemaker  DM (diabetes mellitus)  HTN (hypertension)  H/O oophorectomy  Artificial pacemaker      FAMILY HISTORY:  Family history of hypertension  Family history of diabetes mellitus (Father)      Social History:  denies any smoking, alcohol or illicit drug use     Outpatient Medications:  Lantus 12 qhs  Repaglinide 2 mg bid     MEDICATIONS  (STANDING):  amiodarone    Tablet   Oral   amiodarone    Tablet 200 milliGRAM(s) Oral daily  amLODIPine   Tablet 2.5 milliGRAM(s) Oral daily  cefuroxime   Tablet 250 milliGRAM(s) Oral every 12 hours  digoxin     Tablet 0.125 milliGRAM(s) Oral daily  insulin glargine Injectable (LANTUS) 10 Unit(s) SubCutaneous at bedtime  insulin lispro (HumaLOG) corrective regimen sliding scale   SubCutaneous three times a day before meals  iron sucrose IVPB 200 milliGRAM(s) IV Intermittent once  lactobacillus acidophilus 1 Tablet(s) Oral three times a day with meals  metoprolol tartrate 12.5 milliGRAM(s) Oral two times a day  pantoprazole  Injectable 40 milliGRAM(s) IV Push two times a day  senna 2 Tablet(s) Oral at bedtime    MEDICATIONS  (PRN):  acetaminophen   Tablet .. 650 milliGRAM(s) Oral every 6 hours PRN Mild Pain (1 - 3)  aluminum hydroxide/magnesium hydroxide/simethicone Suspension 30 milliLiter(s) Oral every 4 hours PRN Dyspepsia  cyclobenzaprine 5 milliGRAM(s) Oral two times a day PRN Muscle Spasm  guaiFENesin   Syrup  (Sugar-Free) 100 milliGRAM(s) Oral every 6 hours PRN Cough      Allergies  No Known Allergies      Review of Systems:  Constitutional: no complaints   Cardiovascular: No chest pain, palpitations  Respiratory: No SOB, no cough  GI: No nausea, vomiting,   : No dysuria, hematuria  Skin: no rash  Psych: no depression          VITALS: T(C): 36.7 (02-11-19 @ 13:40)  T(F): 98 (02-11-19 @ 13:40), Max: 98.6 (02-11-19 @ 01:54)  HR: 65 (02-11-19 @ 13:40) (60 - 71)  BP: 129/54 (02-11-19 @ 13:40) (129/54 - 154/59)  RR:  (16 - 18)  SpO2:  (98% - 100%)      PHYSICAL EXAM:   GENERAL: NAD, well-groomed, well-developed  HEENT: PERRLA, no other abnormalities   RESPIRATORY: Clear to auscultation bilaterally; No rales, rhonchi, wheezing, or rubs  CARDIOVASCULAR: Regular rate and rhythm; No murmurs; no peripheral edema  GI: Soft, nontender, non distended, normal bowel sounds  SKIN: Dry, intact, No rashes or lesions  NEURO: grossly intact         POCT Blood Glucose.: 304 mg/dL (02-11-19 @ 12:20)  POCT Blood Glucose.: 171 mg/dL (02-11-19 @ 07:37)  POCT Blood Glucose.: 331 mg/dL (02-10-19 @ 21:32)  POCT Blood Glucose.: 243 mg/dL (02-10-19 @ 17:19)  POCT Blood Glucose.: 243 mg/dL (02-10-19 @ 12:00)  POCT Blood Glucose.: 139 mg/dL (02-10-19 @ 08:07)  POCT Blood Glucose.: 337 mg/dL (02-09-19 @ 22:24)  POCT Blood Glucose.: 238 mg/dL (02-09-19 @ 17:08)  POCT Blood Glucose.: 348 mg/dL (02-09-19 @ 12:34)  POCT Blood Glucose.: 153 mg/dL (02-09-19 @ 08:36)  POCT Blood Glucose.: 196 mg/dL (02-08-19 @ 22:06)  POCT Blood Glucose.: 201 mg/dL (02-08-19 @ 17:01)                            8.2    9.3   )-----------( 87       ( 11 Feb 2019 06:00 )             27.2       02-11    136  |  104  |  20<H>  ----------------------------<  197<H>  3.7   |  24  |  0.95    EGFR if : 62  EGFR if non : 54<L>    Ca    8.5      02-11  Mg     2.1     02-11  Phos  2.5     02-11        Thyroid Function Tests:  02-07 @ 06:06 TSH 2.06       Hemoglobin A1C, Whole Blood: 7.2 % <H> [4.0 - 5.6] (02-07-19 @ 09:52)      02-07 Chol 99 LDL 48 HDL 31<L> Trig 100

## 2019-02-11 NOTE — PROGRESS NOTE ADULT - GASTROINTESTINAL DETAILS
no distention/nontender/no rebound tenderness/soft/bowel sounds normal/no rigidity/no guarding
soft/no distention/nontender/bowel sounds normal/no guarding/no rebound tenderness/no rigidity
soft/nontender/no guarding/bowel sounds normal/no rebound tenderness/no rigidity/no distention

## 2019-02-11 NOTE — DISCHARGE NOTE ADULT - MEDICATION SUMMARY - MEDICATIONS TO TAKE
I will START or STAY ON the medications listed below when I get home from the hospital:    glucometer  -- Indication: For DM (diabetes mellitus)    lancets  -- 100   -- Indication: For DM (diabetes mellitus)    alcohol swab   -- 1   -- Indication: For DM (diabetes mellitus)    insulin pen needles   -- Indication: For DM (diabetes mellitus)    aspirin 81 mg oral delayed release tablet  -- 1 tab(s) by mouth once a day  -- Indication: For Prophylactic measure    digoxin 125 mcg (0.125 mg) oral tablet  -- 1 tab(s) by mouth once a day  -- Indication: For A-fib    amiodarone 200 mg oral tablet  -- 1 tab(s) by mouth once a day   -- Indication: For A-fib    Prandin 2 mg oral tablet  -- 1 tab(s) by mouth 3 times a day (before meals)  -- Indication: For DM (diabetes mellitus)    Lantus Solostar Pen 100 units/mL subcutaneous solution  -- 15 unit(s) subcutaneous once a day (at bedtime)  -- Do not drink alcoholic beverages when taking this medication.  It is very important that you take or use this exactly as directed.  Do not skip doses or discontinue unless directed by your doctor.  Keep in refrigerator.  Do not freeze.    -- Indication: For DM (diabetes mellitus)    Toprol-XL 25 mg oral tablet, extended release  -- 1 tab(s) by mouth once a day  -- Indication: For HTN (hypertension)    amLODIPine 2.5 mg oral tablet  -- 1 tab(s) by mouth once a day  -- Indication: For HTN (hypertension)    Lasix 20 mg oral tablet  -- 1 tab(s) by mouth once a day  -- Indication: For Heart failure    senna oral tablet  -- 2 tab(s) by mouth once a day (at bedtime)   -- Indication: For Constipation    potassium chloride 10 mEq oral capsule, extended release  -- 1 cap(s) by mouth once a day  -- Indication: For Supplement    latanoprost 0.005% ophthalmic solution  -- 1 drop(s) to each affected eye once a day (at bedtime)  -- Indication: For Ophthalmic agent    omeprazole 20 mg oral delayed release capsule  -- 1 cap(s) by mouth once a day  -- Indication: For GIB (gastrointestinal bleeding)    Vitamin C 500 mg oral tablet  -- 1 tab(s) by mouth once a day   -- Indication: For Supplement

## 2019-02-11 NOTE — PROGRESS NOTE ADULT - RS GEN PE MLT RESP DETAILS PC
breath sounds equal/airway patent/good air movement/respirations non-labored
respirations non-labored/airway patent/breath sounds equal/good air movement
good air movement/breath sounds equal/respirations non-labored/airway patent

## 2019-02-11 NOTE — PROGRESS NOTE ADULT - NEGATIVE RESPIRATORY AND THORAX SYMPTOMS
no hemoptysis/no wheezing/no dyspnea/no cough
no wheezing/no dyspnea/no cough/no hemoptysis
no wheezing/no dyspnea/no cough/no hemoptysis

## 2019-02-11 NOTE — DISCHARGE NOTE ADULT - PLAN OF CARE
To avoid NSAIDs and follow up with your primary care doctor in a week You were admitted to the hospital secondary to stomach bleed, you were transfused 2 units of blood and you were seen by Dr. Huffman and you had endoscopy and stomach bleed was arrested. Please take all medications as advised and follow up with your primary care doctor in a week. Continue with your blood sugar medication. HbAIC was found 7.2 on admission.  You must maintain a healthy diet that consist of low sugar, low fat, low sodium diet. Exercise frequently if possible. Consider repeating your Hemoglobin A1c within 3 months after discharge to monitor your average blood glucose control. Follow up with primary care physician in one week after discharge. Continue with blood pressure medication. Maintain a healthy diet that consist of low sugar, low fat, low sodium diet. Exercise frequently if possible.  Follow up with primary care physician in one week after discharge. You were found to have pneumonia. Take all your medications as advised. You were admitted to the hospital secondary to stomach bleed, you were transfused 2 units of blood and you were seen by Dr. Huffman and you had endoscopy. Please take all medications as advised and follow up with your primary care doctor in a week. Continue with your blood sugar medication. HbAIC was found 7.2 on admission.  You must maintain a healthy diet that consist of low sugar, low fat, low sodium diet. Exercise frequently if possible. Consider repeating your Hemoglobin A1c within 3 months after discharge to monitor your average blood glucose control. Follow up with primary care physician in one week after discharge. Your medication regimen for this medical condition is not changed.

## 2019-02-11 NOTE — PROGRESS NOTE ADULT - PROBLEM SELECTOR PLAN 5
Takes oral lasix at home  c/w metoprolol, digox  Echo pEF, severe DD, pul. htn, left atrium enlargement
Takes oral lasix at home  will give IV lasix in between transfusions  Will hold oral lasix due to GI bleed
Takes oral lasix at home  will give IV lasix in between transfusions  Will hold oral lasix due to GI bleed

## 2019-02-11 NOTE — CONSULT NOTE ADULT - ASSESSMENT
86 y/o F from home ambulates independently pt w/ PMHx of DM, HTN, A-Fib ( on Eliquis), hx of GI bleed, AVM, Duodenal ulcer, gastritis, CHF, Glaucoma, grave's disease, HTN, cardiac arrest (s/p medtronic PPM) and Vertigo and PSHx of Oophorectomy pr presents to ED c/o weakness, cough x 4 days.  Found to have GI bleeding/ s/p EGD, AVM noted, s/p cauterization. Patient known from before, asked to evaluated for uncontrolled blood sugars.
88 y/o F from home, lives with son, ambulate with cane, with PMHx of  DM, CHF, Glaucoma, grave's disease, HTN, s/p medtronic PPM and Vertigo and PSHx of Oophorectomy presents to ED c/o black tarry stool, anemia,pneumonia.  1.Off AC for GI bleed-recurrent.  2.H/H-stable post transfusion.  3.Pneumonia-ABX.  4.Afib-low dose b blocker and amiodarone loading to keep in NSR (intolerant of Multaq as outpatient)  5.PPI.  6.Pulmonary HTN-resume norvasc.  7.GI work-up, PPI.
88 y/o F from home ambulates independently pt w/ PMHx of DM, HTN, A-Fib ( on Eliquis), hx of GI bleed, AVM, Duodenal ulcer, gastritis, CHFpEF, Glaucoma, grave's disease, HTN, cardiac arrest (s/p medtronic PPM) and Vertigo and PSHx of Oophorectomy pr presents to ED c/o weakness, cough x 4 days.    Patient is found to be profoundly anemic but currently hemodynamically stable. Patient is fluid depleted and will likely respond further to blood transfusions. Goal is to get Hb > 8 given cardiac history. At this time given patient is hemodynamically stable, not accepted into the ICU.
The etiology for GI bleeding in this patient is most likely due to:  1. Recurrent peptic ulcer disease  2. AVM's  3. Right side colonic bleed    Suggestions:    1. Monitor H/H  2. Transfuse PRBC as needed  3. Protonix daily  4. EGD  5. Avoid NSAID  6. DVT prophylaxis

## 2019-02-11 NOTE — DISCHARGE NOTE ADULT - HOSPITAL COURSE
HPI:  88 y/o F from home ambulates independently pt w/ PMHx of DM, HTN, A-Fib ( on Eliquis), hx of GI bleed, AVM, Duodenal ulcer, gastritis, CHF, Glaucoma, grave's disease, HTN, cardiac arrest (s/p medtronic PPM), SBO S/p intestinal resection and Vertigo and PSHx of Oophorectomy pr presents to ED c/o weakness, cough x 4 days.    Patient is AXO=3 prefers daughter to give history. Daughter states she was feeling tired since 1 month. But since  morning she was so weak, was unable to do all houshold chores. Daughter noticed that she has Black tarry stools x in her last few bowel movements, so she stopped giving her eliquis and aspirin 6 days ago. Patient also developed dry cough and dyspnea since 4 days.  Pt denies chest pain, fever, nausea, vomiting, palpitations  and all other complaints. Patient had colonscopy and Endoscopy done in July 2018 that showed AVM, Antral ulcer and gastritis. Colonoscopy was normal. Dr. Crespo has done Endoscopy in Nov 2018 which showed healing ulcer. She was initially on Pradaxa for Afib but had GI bleeding so was started on Eliquis in Nov 2018.      In ED, patient's vital signs were stable, H/H: 4.6/16.6, Occult positive, probnp: 1592, Troponin 0.265 CXR shows New right base findings."     Goals of care: DNR/DNI, Can have central line, IV pressor. (06 Feb 2019 17:48)    pt was admitted with weakness and found to have low hb of 4.6, fobt was positive, pt was transfused 2 prbcs and pt had egd by dr. crespo which showed AVM in gastric base which was cauterized, pt's diet was advanced as tolerated and protonix was continued, cbc was monitored closely. all the anticoagulation was held and scd boots were given to prevent dvt.  Afib was seen by Dr. Boo and pt was started on amio load. anti coagulation was held.   PNA; pt was treated with azithromycin and rocephin for cap and later rocephin was changed to ceftin   PT eval: BETSY

## 2019-02-11 NOTE — DISCHARGE NOTE ADULT - MEDICATION SUMMARY - MEDICATIONS TO STOP TAKING
I will STOP taking the medications listed below when I get home from the hospital:    repaglinide 1 mg oral tablet  -- 1 tab(s) by mouth 2 times a day    Protonix 40 mg oral delayed release tablet  -- 1 tab(s) by mouth once a day   -- It is very important that you take or use this exactly as directed.  Do not skip doses or discontinue unless directed by your doctor.  Obtain medical advice before taking any non-prescription drugs as some may affect the action of this medication.  Swallow whole.  Do not crush.

## 2019-02-11 NOTE — PROGRESS NOTE ADULT - PROBLEM SELECTOR PLAN 2
H/H:4.6/16.6 initially, likely due to GI bleeding  S/p 2 prbc  Continue to monitor CBC  Anemia panel not send in ED before blood transfusion
H/H:4.6/16.6 likely due to GI bleeding  s/p 2 prbc  continue to monitor CBC  Anemia panel not send in ED before blood transfusion

## 2019-02-11 NOTE — PROGRESS NOTE ADULT - NEGATIVE GENERAL GENITOURINARY SYMPTOMS
no renal colic/no dysuria/no hematuria
no dysuria/no hematuria/no incontinence/no renal colic
no renal colic/no hematuria

## 2019-02-12 VITALS
DIASTOLIC BLOOD PRESSURE: 50 MMHG | RESPIRATION RATE: 16 BRPM | SYSTOLIC BLOOD PRESSURE: 130 MMHG | OXYGEN SATURATION: 100 % | HEART RATE: 60 BPM | TEMPERATURE: 99 F

## 2019-02-12 LAB
ANION GAP SERPL CALC-SCNC: 8 MMOL/L — SIGNIFICANT CHANGE UP (ref 5–17)
BASOPHILS # BLD AUTO: 0.1 K/UL — SIGNIFICANT CHANGE UP (ref 0–0.2)
BASOPHILS NFR BLD AUTO: 0.7 % — SIGNIFICANT CHANGE UP (ref 0–2)
BUN SERPL-MCNC: 20 MG/DL — HIGH (ref 7–18)
CALCIUM SERPL-MCNC: 8.3 MG/DL — LOW (ref 8.4–10.5)
CHLORIDE SERPL-SCNC: 103 MMOL/L — SIGNIFICANT CHANGE UP (ref 96–108)
CO2 SERPL-SCNC: 24 MMOL/L — SIGNIFICANT CHANGE UP (ref 22–31)
CREAT SERPL-MCNC: 0.94 MG/DL — SIGNIFICANT CHANGE UP (ref 0.5–1.3)
EOSINOPHIL # BLD AUTO: 0.1 K/UL — SIGNIFICANT CHANGE UP (ref 0–0.5)
EOSINOPHIL NFR BLD AUTO: 1.1 % — SIGNIFICANT CHANGE UP (ref 0–6)
GLUCOSE BLDC GLUCOMTR-MCNC: 118 MG/DL — HIGH (ref 70–99)
GLUCOSE SERPL-MCNC: 112 MG/DL — HIGH (ref 70–99)
HCT VFR BLD CALC: 27.1 % — LOW (ref 34.5–45)
HGB BLD-MCNC: 8.2 G/DL — LOW (ref 11.5–15.5)
LYMPHOCYTES # BLD AUTO: 1.5 K/UL — SIGNIFICANT CHANGE UP (ref 1–3.3)
LYMPHOCYTES # BLD AUTO: 17.6 % — SIGNIFICANT CHANGE UP (ref 13–44)
MCHC RBC-ENTMCNC: 25.5 PG — LOW (ref 27–34)
MCHC RBC-ENTMCNC: 30.2 GM/DL — LOW (ref 32–36)
MCV RBC AUTO: 84.7 FL — SIGNIFICANT CHANGE UP (ref 80–100)
MONOCYTES # BLD AUTO: 0.8 K/UL — SIGNIFICANT CHANGE UP (ref 0–0.9)
MONOCYTES NFR BLD AUTO: 9.2 % — SIGNIFICANT CHANGE UP (ref 2–14)
NEUTROPHILS # BLD AUTO: 6.2 K/UL — SIGNIFICANT CHANGE UP (ref 1.8–7.4)
NEUTROPHILS NFR BLD AUTO: 71.4 % — SIGNIFICANT CHANGE UP (ref 43–77)
PLATELET # BLD AUTO: 84 K/UL — LOW (ref 150–400)
POTASSIUM SERPL-MCNC: 3.8 MMOL/L — SIGNIFICANT CHANGE UP (ref 3.5–5.3)
POTASSIUM SERPL-SCNC: 3.8 MMOL/L — SIGNIFICANT CHANGE UP (ref 3.5–5.3)
RBC # BLD: 3.2 M/UL — LOW (ref 3.8–5.2)
RBC # FLD: 15.2 % — HIGH (ref 10.3–14.5)
SODIUM SERPL-SCNC: 135 MMOL/L — SIGNIFICANT CHANGE UP (ref 135–145)
WBC # BLD: 8.6 K/UL — SIGNIFICANT CHANGE UP (ref 3.8–10.5)
WBC # FLD AUTO: 8.6 K/UL — SIGNIFICANT CHANGE UP (ref 3.8–10.5)

## 2019-02-12 PROCEDURE — 80061 LIPID PANEL: CPT

## 2019-02-12 PROCEDURE — 86923 COMPATIBILITY TEST ELECTRIC: CPT

## 2019-02-12 PROCEDURE — 85027 COMPLETE CBC AUTOMATED: CPT

## 2019-02-12 PROCEDURE — 82009 KETONE BODYS QUAL: CPT

## 2019-02-12 PROCEDURE — 80048 BASIC METABOLIC PNL TOTAL CA: CPT

## 2019-02-12 PROCEDURE — 82746 ASSAY OF FOLIC ACID SERUM: CPT

## 2019-02-12 PROCEDURE — 82553 CREATINE MB FRACTION: CPT

## 2019-02-12 PROCEDURE — 84443 ASSAY THYROID STIM HORMONE: CPT

## 2019-02-12 PROCEDURE — 84100 ASSAY OF PHOSPHORUS: CPT

## 2019-02-12 PROCEDURE — 99285 EMERGENCY DEPT VISIT HI MDM: CPT | Mod: 25

## 2019-02-12 PROCEDURE — 82272 OCCULT BLD FECES 1-3 TESTS: CPT

## 2019-02-12 PROCEDURE — 93306 TTE W/DOPPLER COMPLETE: CPT

## 2019-02-12 PROCEDURE — 71045 X-RAY EXAM CHEST 1 VIEW: CPT

## 2019-02-12 PROCEDURE — 97162 PT EVAL MOD COMPLEX 30 MIN: CPT

## 2019-02-12 PROCEDURE — 84484 ASSAY OF TROPONIN QUANT: CPT

## 2019-02-12 PROCEDURE — 87086 URINE CULTURE/COLONY COUNT: CPT

## 2019-02-12 PROCEDURE — 96375 TX/PRO/DX INJ NEW DRUG ADDON: CPT

## 2019-02-12 PROCEDURE — 87631 RESP VIRUS 3-5 TARGETS: CPT

## 2019-02-12 PROCEDURE — 85730 THROMBOPLASTIN TIME PARTIAL: CPT

## 2019-02-12 PROCEDURE — 80053 COMPREHEN METABOLIC PANEL: CPT

## 2019-02-12 PROCEDURE — 36430 TRANSFUSION BLD/BLD COMPNT: CPT

## 2019-02-12 PROCEDURE — 83735 ASSAY OF MAGNESIUM: CPT

## 2019-02-12 PROCEDURE — P9040: CPT

## 2019-02-12 PROCEDURE — 86901 BLOOD TYPING SEROLOGIC RH(D): CPT

## 2019-02-12 PROCEDURE — 81001 URINALYSIS AUTO W/SCOPE: CPT

## 2019-02-12 PROCEDURE — 82550 ASSAY OF CK (CPK): CPT

## 2019-02-12 PROCEDURE — 96374 THER/PROPH/DIAG INJ IV PUSH: CPT

## 2019-02-12 PROCEDURE — 93005 ELECTROCARDIOGRAM TRACING: CPT

## 2019-02-12 PROCEDURE — 36415 COLL VENOUS BLD VENIPUNCTURE: CPT

## 2019-02-12 PROCEDURE — 85610 PROTHROMBIN TIME: CPT

## 2019-02-12 PROCEDURE — 82962 GLUCOSE BLOOD TEST: CPT

## 2019-02-12 PROCEDURE — 83605 ASSAY OF LACTIC ACID: CPT

## 2019-02-12 PROCEDURE — 86900 BLOOD TYPING SEROLOGIC ABO: CPT

## 2019-02-12 PROCEDURE — 83880 ASSAY OF NATRIURETIC PEPTIDE: CPT

## 2019-02-12 PROCEDURE — 86850 RBC ANTIBODY SCREEN: CPT

## 2019-02-12 PROCEDURE — 82607 VITAMIN B-12: CPT

## 2019-02-12 PROCEDURE — 87040 BLOOD CULTURE FOR BACTERIA: CPT

## 2019-02-12 PROCEDURE — 83036 HEMOGLOBIN GLYCOSYLATED A1C: CPT

## 2019-02-12 RX ORDER — INSULIN LISPRO 100/ML
5 VIAL (ML) SUBCUTANEOUS
Qty: 0 | Refills: 0 | Status: DISCONTINUED | OUTPATIENT
Start: 2019-02-12 | End: 2019-02-12

## 2019-02-12 RX ORDER — AMIODARONE HYDROCHLORIDE 400 MG/1
1 TABLET ORAL
Qty: 30 | Refills: 0 | OUTPATIENT
Start: 2019-02-12 | End: 2019-03-13

## 2019-02-12 RX ORDER — IRON SUCROSE 20 MG/ML
200 INJECTION, SOLUTION INTRAVENOUS ONCE
Qty: 0 | Refills: 0 | Status: COMPLETED | OUTPATIENT
Start: 2019-02-12 | End: 2019-02-12

## 2019-02-12 RX ADMIN — IRON SUCROSE 110 MILLIGRAM(S): 20 INJECTION, SOLUTION INTRAVENOUS at 10:03

## 2019-02-12 RX ADMIN — PANTOPRAZOLE SODIUM 40 MILLIGRAM(S): 20 TABLET, DELAYED RELEASE ORAL at 05:43

## 2019-02-12 RX ADMIN — Medication 1 TABLET(S): at 08:46

## 2019-02-12 RX ADMIN — Medication 250 MILLIGRAM(S): at 05:43

## 2019-02-12 RX ADMIN — Medication 0.12 MILLIGRAM(S): at 05:44

## 2019-02-12 RX ADMIN — AMLODIPINE BESYLATE 2.5 MILLIGRAM(S): 2.5 TABLET ORAL at 05:43

## 2019-02-12 RX ADMIN — Medication 3 UNIT(S): at 08:46

## 2019-02-12 RX ADMIN — Medication 100 MILLIGRAM(S): at 05:43

## 2019-02-12 RX ADMIN — Medication 12.5 MILLIGRAM(S): at 05:43

## 2019-02-12 RX ADMIN — AMIODARONE HYDROCHLORIDE 200 MILLIGRAM(S): 400 TABLET ORAL at 05:44

## 2019-02-12 NOTE — PROGRESS NOTE ADULT - PROVIDER SPECIALTY LIST ADULT
Cardiology
Endocrinology
Gastroenterology
Gastroenterology
Internal Medicine
Cardiology
Internal Medicine
Gastroenterology

## 2019-02-12 NOTE — PROGRESS NOTE ADULT - REASON FOR ADMISSION
Quality 337: Tuberculosis Prevention For Psoriasis And Psoriatic Arthritis Patients On A Biological Immune Response Modifier: Patient has a documented negative annual TB screening.
Detail Level: Generalized
weakness

## 2019-02-12 NOTE — PROGRESS NOTE ADULT - SUBJECTIVE AND OBJECTIVE BOX
Interval Events:      Allergies    No Known Allergies    Intolerances      Endocrine/Metabolic Medications:  insulin glargine Injectable (LANTUS) 10 Unit(s) SubCutaneous at bedtime  insulin lispro (HumaLOG) corrective regimen sliding scale   SubCutaneous three times a day before meals  insulin lispro Injectable (HumaLOG) 3 Unit(s) SubCutaneous three times a day before meals      Vital Signs Last 24 Hrs  T(C): 37.4 (12 Feb 2019 05:00), Max: 37.4 (12 Feb 2019 05:00)  T(F): 99.4 (12 Feb 2019 05:00), Max: 99.4 (12 Feb 2019 05:00)  HR: 61 (12 Feb 2019 05:00) (60 - 65)  BP: 138/70 (12 Feb 2019 05:00) (124/85 - 139/53)  BP(mean): --  RR: 16 (12 Feb 2019 05:00) (16 - 18)  SpO2: 100% (12 Feb 2019 05:00) (99% - 100%)      PHYSICAL EXAM  All physical exam findings normal, except those marked:  General:	Alert, active, cooperative, NAD, well hydrated  .		[] Abnormal:  Neck		Normal: supple, no cervical adenopathy, no palpable thyroid  .		[] Abnormal:  Cardiovascular	Normal: regular rate, normal S1, S2, no murmurs  .		[] Abnormal:  Respiratory	Normal: no chest wall deformity, normal respiratory pattern, CTA B/L  .		[] Abnormal:  Abdominal	Normal: soft, ND, NT, bowel sounds present, no masses, no organomegaly  .		[] Abnormal:  		Normal normal genitalia, testes descended, circumcised/uncircumcised  .		Sonja stage:			Breast sonja:  .		Menstrual history:  .		[] Abnormal:  Extremities	Normal: FROM x4  .		[] Abnormal:  Skin		Normal: intact and not indurated, no rash, no acanthosis nigricans  .		[] Abnormal:  Neurologic	Normal: grossly intact  .		[] Abnormal:    LABS                        8.2    8.6   )-----------( 84       ( 12 Feb 2019 07:26 )             27.1                               135    |  103    |  20                  Calcium: 8.3   / iCa: x      (02-12 @ 07:26)    ----------------------------<  112       Magnesium: x                                3.8     |  24     |  0.94             Phosphorous: x          CAPILLARY BLOOD GLUCOSE      POCT Blood Glucose.: 118 mg/dL (12 Feb 2019 07:50)  POCT Blood Glucose.: 224 mg/dL (11 Feb 2019 21:18)  POCT Blood Glucose.: 271 mg/dL (11 Feb 2019 16:53)  POCT Blood Glucose.: 304 mg/dL (11 Feb 2019 12:20)        Assesment/plan Interval Events:  pt in nad    Allergies    No Known Allergies    Intolerances      Endocrine/Metabolic Medications:  insulin glargine Injectable (LANTUS) 10 Unit(s) SubCutaneous at bedtime  insulin lispro (HumaLOG) corrective regimen sliding scale   SubCutaneous three times a day before meals  insulin lispro Injectable (HumaLOG) 3 Unit(s) SubCutaneous three times a day before meals      Vital Signs Last 24 Hrs  T(C): 37.4 (12 Feb 2019 05:00), Max: 37.4 (12 Feb 2019 05:00)  T(F): 99.4 (12 Feb 2019 05:00), Max: 99.4 (12 Feb 2019 05:00)  HR: 61 (12 Feb 2019 05:00) (60 - 65)  BP: 138/70 (12 Feb 2019 05:00) (124/85 - 139/53)  BP(mean): --  RR: 16 (12 Feb 2019 05:00) (16 - 18)  SpO2: 100% (12 Feb 2019 05:00) (99% - 100%)      PHYSICAL EXAM  All physical exam findings normal, except those marked:  General:	Alert, active, cooperative, NAD, well hydrated  .		[] Abnormal:  Neck		Normal: supple, no cervical adenopathy, no palpable thyroid  .		[] Abnormal:  Cardiovascular	Normal: regular rate, normal S1, S2, no murmurs  .		[] Abnormal:  Respiratory	Normal: no chest wall deformity, normal respiratory pattern, CTA B/L  .		[] Abnormal:  Abdominal	Normal: soft, ND, NT, bowel sounds present, no masses, no organomegaly  .		[] Abnormal:  		Normal normal genitalia, testes descended, circumcised/uncircumcised  .		Sonja stage:			Breast sonja:  .		Menstrual history:  .		[] Abnormal:  Extremities	Normal: FROM x4  .		[] Abnormal:  Skin		Normal: intact and not indurated, no rash, no acanthosis nigricans  .		[] Abnormal:  Neurologic	Normal: grossly intact  .		[] Abnormal:    LABS                        8.2    8.6   )-----------( 84       ( 12 Feb 2019 07:26 )             27.1                               135    |  103    |  20                  Calcium: 8.3   / iCa: x      (02-12 @ 07:26)    ----------------------------<  112       Magnesium: x                                3.8     |  24     |  0.94             Phosphorous: x          CAPILLARY BLOOD GLUCOSE      POCT Blood Glucose.: 118 mg/dL (12 Feb 2019 07:50)  POCT Blood Glucose.: 224 mg/dL (11 Feb 2019 21:18)  POCT Blood Glucose.: 271 mg/dL (11 Feb 2019 16:53)  POCT Blood Glucose.: 304 mg/dL (11 Feb 2019 12:20)        Assesment/plan   Dm- remains hyperglycemic post meals  change humalog to 5 ac tid  cont lantus 10 units  fsg ac and hs  fine tuning as out pt

## 2019-02-12 NOTE — PROGRESS NOTE ADULT - SUBJECTIVE AND OBJECTIVE BOX
CHIEF COMPLAINT:Patient is a 87y old  Female who presents with a chief complaint of Weakness.Pt appears comfortable.    	  REVIEW OF SYSTEMS:  CONSTITUTIONAL: No fever, weight loss, or fatigue  EYES: No eye pain, visual disturbances, or discharge  ENT:  No difficulty hearing, tinnitus, vertigo; No sinus or throat pain  NECK: No pain or stiffness  RESPIRATORY: No cough, wheezing, chills or hemoptysis; No Shortness of Breath  CARDIOVASCULAR: No chest pain, palpitations, passing out, dizziness, or leg swelling  GASTROINTESTINAL: No abdominal or epigastric pain. No nausea, vomiting, or hematemesis; No diarrhea or constipation. No melena or hematochezia.  GENITOURINARY: No dysuria, frequency, hematuria, or incontinence  NEUROLOGICAL: No headaches, memory loss, loss of strength, numbness, or tremors  SKIN: No itching, burning, rashes, or lesions   LYMPH Nodes: No enlarged glands  ENDOCRINE: No heat or cold intolerance; No hair loss  MUSCULOSKELETAL: No joint pain or swelling; No muscle, back, or extremity pain  PSYCHIATRIC: No depression, anxiety, mood swings, or difficulty sleeping  HEME/LYMPH: No easy bruising, or bleeding gums  ALLERGY AND IMMUNOLOGIC: No hives or eczema	      PHYSICAL EXAM:  T(C): 37.4 (02-12-19 @ 05:00), Max: 37.4 (02-12-19 @ 05:00)  HR: 61 (02-12-19 @ 05:00) (60 - 65)  BP: 138/70 (02-12-19 @ 05:00) (124/85 - 139/53)  RR: 16 (02-12-19 @ 05:00) (16 - 18)  SpO2: 100% (02-12-19 @ 05:00) (99% - 100%)    I&O's Summary    11 Feb 2019 07:01  -  12 Feb 2019 07:00  --------------------------------------------------------  IN: 0 mL / OUT: 0 mL / NET: 0 mL        Appearance: Normal	  HEENT:   Normal oral mucosa, PERRL, EOMI	  Lymphatic: No lymphadenopathy  Cardiovascular: Normal S1 S2, No JVD, No murmurs, No edema  Respiratory: Lungs clear to auscultation	  Psychiatry: A & O x 3, Mood & affect appropriate  Gastrointestinal:  Soft, Non-tender, + BS	  Skin: No rashes, No ecchymoses, No cyanosis	  Neurologic: Non-focal  Extremities: Normal range of motion, No clubbing, cyanosis or edema  Vascular: Peripheral pulses palpable 2+ bilaterally    MEDICATIONS  (STANDING):  amiodarone    Tablet   Oral   amiodarone    Tablet 200 milliGRAM(s) Oral daily  amLODIPine   Tablet 2.5 milliGRAM(s) Oral daily  cefuroxime   Tablet 250 milliGRAM(s) Oral every 12 hours  digoxin     Tablet 0.125 milliGRAM(s) Oral daily  insulin glargine Injectable (LANTUS) 10 Unit(s) SubCutaneous at bedtime  insulin lispro (HumaLOG) corrective regimen sliding scale   SubCutaneous three times a day before meals  insulin lispro Injectable (HumaLOG) 5 Unit(s) SubCutaneous three times a day before meals  lactobacillus acidophilus 1 Tablet(s) Oral three times a day with meals  metoprolol tartrate 12.5 milliGRAM(s) Oral two times a day  pantoprazole  Injectable 40 milliGRAM(s) IV Push two times a day  senna 2 Tablet(s) Oral at bedtime        	  LABS:	 	                     8.2    8.6   )-----------( 84       ( 12 Feb 2019 07:26 )             27.1     02-12    135  |  103  |  20<H>  ----------------------------<  112<H>  3.8   |  24  |  0.94    Ca    8.3<L>      12 Feb 2019 07:26  Phos  2.5     02-11  Mg     2.1     02-11      proBNP: Serum Pro-Brain Natriuretic Peptide: 1592 pg/mL (02-06 @ 14:13)    Lipid Profile: Cholesterol 99  LDL 48  HDL 31      HgA1c: Hemoglobin A1C, Whole Blood: 7.2 % (02-07 @ 09:52)    TSH: Thyroid Stimulating Hormone, Serum: 2.06 uU/mL (02-07 @ 06:06)

## 2019-02-12 NOTE — PROGRESS NOTE ADULT - ASSESSMENT
88 y/o F from home, lives with son, ambulate with cane, with PMHx of  DM, CHF, Glaucoma, grave's disease, HTN, s/p medtronic PPM and Vertigo and PSHx of Oophorectomy presents to ED c/o black tarry stool, anemia,pneumonia.  1.Off AC for GI bleed-recurrent.  2.H/H-stable.  3.Pneumonia-ABX.  4.Afib-low dose b blocker and amiodarone loading to keep in NSR (intolerant of Multaq as outpatient)  5.PPI.  6.Pulmonary HTN-norvasc.  7.GI f/u PPI.  8.Advance diet as tolerated.  9.Above plan d/w pt's family at bed side and via phone.
86 y/o F from home ambulates independently pt w/ PMHx of DM, HTN, A-Fib ( on Eliquis), hx of GI bleed, AVM, Duodenal ulcer, gastritis, CHF, Glaucoma, grave's disease, HTN, cardiac arrest (s/p medtronic PPM) and Vertigo and PSHx of Oophorectomy pr presents to ED c/o weakness, cough x 4 days.
86 y/o F from home, lives with son, ambulate with cane, with PMHx of  DM, CHF, Glaucoma, grave's disease, HTN, s/p medtronic PPM and Vertigo and PSHx of Oophorectomy presents to ED c/o black tarry stool, anemia,pneumonia.  1.Off AC for GI bleed-recurrent.  2.H/H-stable.  3.Pneumonia-ABX.  4.Afib-low dose b blocker and amiodarone 200mg qd.  5.PPI.  6.Pulmonary HTN-norvasc.  7.F/U as outpatient 2 weeks.
86 y/o F from home, lives with son, ambulate with cane, with PMHx of  DM, CHF, Glaucoma, grave's disease, HTN, s/p medtronic PPM and Vertigo and PSHx of Oophorectomy presents to ED c/o black tarry stool, anemia,pneumonia.  1.Off AC for GI bleed-recurrent.  2.H/H-stable.  3.Pneumonia-ABX.  4.Afib-low dose b blocker and amiodarone loading to keep in NSR (intolerant of Multaq as outpatient)  5.PPI.  6.Pulmonary HTN-norvasc.  7.GI f/u PPI.
88 y/o F from home ambulates independently pt w/ PMHx of DM, HTN, A-Fib ( on Eliquis), hx of GI bleed, AVM, Duodenal ulcer, gastritis, CHF, Glaucoma, grave's disease, HTN, cardiac arrest (s/p medtronic PPM) and Vertigo and PSHx of Oophorectomy pr presents to ED c/o weakness, cough x 4 days  with melanotic stool. The daughter in law had stopped the Eliquis one week ago.
88 y/o F from home ambulates independently pt w/ PMHx of DM, HTN, A-Fib ( on Eliquis), hx of GI bleed, AVM, Duodenal ulcer, gastritis, CHF, Glaucoma, grave's disease, HTN, cardiac arrest (s/p medtronic PPM) and Vertigo and PSHx of Oophorectomy pr presents to ED c/o weakness, cough x 4 days.
1. Anemia  2. GI bleeding stopped  3. Bleeding gastric AVM's (s/p cautry)    Suggestions:    1. Monitor H/H  2. Protonix daily  3. Transfuse PRBC as needed  4. Protonix daily  5. DVT prophylaxis
1. Anemia  2. AVM's  3. No evidence of acute GI bleeding    Suggestions:    1. Monitor H/H  2. Transfuse PRBc as needed  3. Protonix daily  4. Avoid NSAID  5. DVT prophylaxis
1. Anemia  2. AVM's  3. No evidence of acute GI bleeding    Suggestions:    1. Monitor H/H  2. Transfuse PRBC as needed  3. Avoid NSAID  4. Protonix daily  5. DVT prophylkaxis

## 2019-04-10 ENCOUNTER — INPATIENT (INPATIENT)
Facility: HOSPITAL | Age: 84
LOS: 3 days | Discharge: HOME | DRG: 391 | End: 2019-04-14
Attending: FAMILY MEDICINE | Admitting: FAMILY MEDICINE
Payer: MEDICARE

## 2019-04-10 VITALS
HEART RATE: 69 BPM | RESPIRATION RATE: 16 BRPM | WEIGHT: 104.94 LBS | HEIGHT: 59 IN | OXYGEN SATURATION: 100 % | DIASTOLIC BLOOD PRESSURE: 75 MMHG | SYSTOLIC BLOOD PRESSURE: 127 MMHG | TEMPERATURE: 97 F

## 2019-04-10 DIAGNOSIS — E86.0 DEHYDRATION: ICD-10-CM

## 2019-04-10 DIAGNOSIS — Z90.721 ACQUIRED ABSENCE OF OVARIES, UNILATERAL: Chronic | ICD-10-CM

## 2019-04-10 DIAGNOSIS — K52.9 NONINFECTIVE GASTROENTERITIS AND COLITIS, UNSPECIFIED: ICD-10-CM

## 2019-04-10 DIAGNOSIS — Z95.0 PRESENCE OF CARDIAC PACEMAKER: Chronic | ICD-10-CM

## 2019-04-10 DIAGNOSIS — E11.9 TYPE 2 DIABETES MELLITUS WITHOUT COMPLICATIONS: ICD-10-CM

## 2019-04-10 DIAGNOSIS — H40.9 UNSPECIFIED GLAUCOMA: ICD-10-CM

## 2019-04-10 DIAGNOSIS — I48.91 UNSPECIFIED ATRIAL FIBRILLATION: ICD-10-CM

## 2019-04-10 DIAGNOSIS — K80.51 CALCULUS OF BILE DUCT WITHOUT CHOLANGITIS OR CHOLECYSTITIS WITH OBSTRUCTION: ICD-10-CM

## 2019-04-10 DIAGNOSIS — E05.00 THYROTOXICOSIS WITH DIFFUSE GOITER WITHOUT THYROTOXIC CRISIS OR STORM: ICD-10-CM

## 2019-04-10 DIAGNOSIS — Z29.9 ENCOUNTER FOR PROPHYLACTIC MEASURES, UNSPECIFIED: ICD-10-CM

## 2019-04-10 DIAGNOSIS — I10 ESSENTIAL (PRIMARY) HYPERTENSION: ICD-10-CM

## 2019-04-10 LAB
ACETONE SERPL-MCNC: NEGATIVE — SIGNIFICANT CHANGE UP
ALBUMIN SERPL ELPH-MCNC: 2.6 G/DL — LOW (ref 3.5–5)
ALP SERPL-CCNC: 100 U/L — SIGNIFICANT CHANGE UP (ref 40–120)
ALT FLD-CCNC: 47 U/L DA — SIGNIFICANT CHANGE UP (ref 10–60)
ANION GAP SERPL CALC-SCNC: 5 MMOL/L — SIGNIFICANT CHANGE UP (ref 5–17)
APPEARANCE UR: CLEAR — SIGNIFICANT CHANGE UP
AST SERPL-CCNC: 52 U/L — HIGH (ref 10–40)
BACTERIA # UR AUTO: ABNORMAL /HPF
BASOPHILS # BLD AUTO: 0.02 K/UL — SIGNIFICANT CHANGE UP (ref 0–0.2)
BASOPHILS NFR BLD AUTO: 0.4 % — SIGNIFICANT CHANGE UP (ref 0–2)
BILIRUB SERPL-MCNC: 0.3 MG/DL — SIGNIFICANT CHANGE UP (ref 0.2–1.2)
BILIRUB UR-MCNC: NEGATIVE — SIGNIFICANT CHANGE UP
BUN SERPL-MCNC: 21 MG/DL — HIGH (ref 7–18)
CALCIUM SERPL-MCNC: 8.5 MG/DL — SIGNIFICANT CHANGE UP (ref 8.4–10.5)
CHLORIDE SERPL-SCNC: 100 MMOL/L — SIGNIFICANT CHANGE UP (ref 96–108)
CO2 SERPL-SCNC: 28 MMOL/L — SIGNIFICANT CHANGE UP (ref 22–31)
COLOR SPEC: YELLOW — SIGNIFICANT CHANGE UP
CREAT SERPL-MCNC: 1.1 MG/DL — SIGNIFICANT CHANGE UP (ref 0.5–1.3)
DIFF PNL FLD: ABNORMAL
EOSINOPHIL # BLD AUTO: 0.02 K/UL — SIGNIFICANT CHANGE UP (ref 0–0.5)
EOSINOPHIL NFR BLD AUTO: 0.4 % — SIGNIFICANT CHANGE UP (ref 0–6)
EPI CELLS # UR: ABNORMAL /HPF
GLUCOSE SERPL-MCNC: 170 MG/DL — HIGH (ref 70–99)
GLUCOSE UR QL: 50 MG/DL
GRAN CASTS # UR COMP ASSIST: ABNORMAL /LPF
HCT VFR BLD CALC: 40.6 % — SIGNIFICANT CHANGE UP (ref 34.5–45)
HGB BLD-MCNC: 13.1 G/DL — SIGNIFICANT CHANGE UP (ref 11.5–15.5)
HYALINE CASTS # UR AUTO: ABNORMAL /LPF
IMM GRANULOCYTES NFR BLD AUTO: 0.2 % — SIGNIFICANT CHANGE UP (ref 0–1.5)
KETONES UR-MCNC: NEGATIVE — SIGNIFICANT CHANGE UP
LACTATE SERPL-SCNC: 1.1 MMOL/L — SIGNIFICANT CHANGE UP (ref 0.7–2)
LEUKOCYTE ESTERASE UR-ACNC: ABNORMAL
LIDOCAIN IGE QN: 248 U/L — SIGNIFICANT CHANGE UP (ref 73–393)
LYMPHOCYTES # BLD AUTO: 1.08 K/UL — SIGNIFICANT CHANGE UP (ref 1–3.3)
LYMPHOCYTES # BLD AUTO: 21.3 % — SIGNIFICANT CHANGE UP (ref 13–44)
MAGNESIUM SERPL-MCNC: 2 MG/DL — SIGNIFICANT CHANGE UP (ref 1.6–2.6)
MCHC RBC-ENTMCNC: 27.4 PG — SIGNIFICANT CHANGE UP (ref 27–34)
MCHC RBC-ENTMCNC: 32.3 GM/DL — SIGNIFICANT CHANGE UP (ref 32–36)
MCV RBC AUTO: 84.9 FL — SIGNIFICANT CHANGE UP (ref 80–100)
MONOCYTES # BLD AUTO: 0.41 K/UL — SIGNIFICANT CHANGE UP (ref 0–0.9)
MONOCYTES NFR BLD AUTO: 8.1 % — SIGNIFICANT CHANGE UP (ref 2–14)
NEUTROPHILS # BLD AUTO: 3.53 K/UL — SIGNIFICANT CHANGE UP (ref 1.8–7.4)
NEUTROPHILS NFR BLD AUTO: 69.6 % — SIGNIFICANT CHANGE UP (ref 43–77)
NITRITE UR-MCNC: NEGATIVE — SIGNIFICANT CHANGE UP
NRBC # BLD: 0 /100 WBCS — SIGNIFICANT CHANGE UP (ref 0–0)
NT-PROBNP SERPL-SCNC: 3280 PG/ML — HIGH (ref 0–450)
PH UR: 5 — SIGNIFICANT CHANGE UP (ref 5–8)
PLATELET # BLD AUTO: 76 K/UL — LOW (ref 150–400)
POTASSIUM SERPL-MCNC: 4.3 MMOL/L — SIGNIFICANT CHANGE UP (ref 3.5–5.3)
POTASSIUM SERPL-SCNC: 4.3 MMOL/L — SIGNIFICANT CHANGE UP (ref 3.5–5.3)
PROT SERPL-MCNC: 6.8 G/DL — SIGNIFICANT CHANGE UP (ref 6–8.3)
PROT UR-MCNC: 500 MG/DL
RBC # BLD: 4.78 M/UL — SIGNIFICANT CHANGE UP (ref 3.8–5.2)
RBC # FLD: 17.8 % — HIGH (ref 10.3–14.5)
RBC CASTS # UR COMP ASSIST: SIGNIFICANT CHANGE UP /HPF (ref 0–2)
SODIUM SERPL-SCNC: 133 MMOL/L — LOW (ref 135–145)
SP GR SPEC: 1.02 — SIGNIFICANT CHANGE UP (ref 1.01–1.02)
TROPONIN I SERPL-MCNC: 0.03 NG/ML — SIGNIFICANT CHANGE UP (ref 0–0.04)
UROBILINOGEN FLD QL: 1
WBC # BLD: 5.07 K/UL — SIGNIFICANT CHANGE UP (ref 3.8–10.5)
WBC # FLD AUTO: 5.07 K/UL — SIGNIFICANT CHANGE UP (ref 3.8–10.5)
WBC UR QL: SIGNIFICANT CHANGE UP /HPF (ref 0–5)

## 2019-04-10 PROCEDURE — 74177 CT ABD & PELVIS W/CONTRAST: CPT | Mod: 26

## 2019-04-10 PROCEDURE — 71045 X-RAY EXAM CHEST 1 VIEW: CPT | Mod: 26

## 2019-04-10 PROCEDURE — 99285 EMERGENCY DEPT VISIT HI MDM: CPT

## 2019-04-10 RX ORDER — ONDANSETRON 8 MG/1
4 TABLET, FILM COATED ORAL ONCE
Qty: 0 | Refills: 0 | Status: COMPLETED | OUTPATIENT
Start: 2019-04-10 | End: 2019-04-10

## 2019-04-10 RX ORDER — AMIODARONE HYDROCHLORIDE 400 MG/1
200 TABLET ORAL DAILY
Qty: 0 | Refills: 0 | Status: DISCONTINUED | OUTPATIENT
Start: 2019-04-10 | End: 2019-04-14

## 2019-04-10 RX ORDER — LATANOPROST 0.05 MG/ML
1 SOLUTION/ DROPS OPHTHALMIC; TOPICAL AT BEDTIME
Qty: 0 | Refills: 0 | Status: DISCONTINUED | OUTPATIENT
Start: 2019-04-10 | End: 2019-04-14

## 2019-04-10 RX ORDER — SODIUM CHLORIDE 9 MG/ML
3 INJECTION INTRAMUSCULAR; INTRAVENOUS; SUBCUTANEOUS ONCE
Qty: 0 | Refills: 0 | Status: COMPLETED | OUTPATIENT
Start: 2019-04-10 | End: 2019-04-10

## 2019-04-10 RX ORDER — METOPROLOL TARTRATE 50 MG
25 TABLET ORAL DAILY
Qty: 0 | Refills: 0 | Status: DISCONTINUED | OUTPATIENT
Start: 2019-04-10 | End: 2019-04-14

## 2019-04-10 RX ORDER — ASPIRIN/CALCIUM CARB/MAGNESIUM 324 MG
81 TABLET ORAL DAILY
Qty: 0 | Refills: 0 | Status: DISCONTINUED | OUTPATIENT
Start: 2019-04-10 | End: 2019-04-14

## 2019-04-10 RX ORDER — HEPARIN SODIUM 5000 [USP'U]/ML
5000 INJECTION INTRAVENOUS; SUBCUTANEOUS EVERY 8 HOURS
Qty: 0 | Refills: 0 | Status: DISCONTINUED | OUTPATIENT
Start: 2019-04-10 | End: 2019-04-14

## 2019-04-10 RX ORDER — SODIUM CHLORIDE 9 MG/ML
1000 INJECTION INTRAMUSCULAR; INTRAVENOUS; SUBCUTANEOUS
Qty: 0 | Refills: 0 | Status: DISCONTINUED | OUTPATIENT
Start: 2019-04-10 | End: 2019-04-10

## 2019-04-10 RX ORDER — REPAGLINIDE 1 MG/1
1 TABLET ORAL
Qty: 0 | Refills: 0 | COMMUNITY

## 2019-04-10 RX ORDER — PSYLLIUM SEED (WITH DEXTROSE)
1 POWDER (GRAM) ORAL DAILY
Qty: 0 | Refills: 0 | Status: DISCONTINUED | OUTPATIENT
Start: 2019-04-10 | End: 2019-04-14

## 2019-04-10 RX ORDER — DIGOXIN 250 MCG
0.12 TABLET ORAL DAILY
Qty: 0 | Refills: 0 | Status: DISCONTINUED | OUTPATIENT
Start: 2019-04-10 | End: 2019-04-14

## 2019-04-10 RX ORDER — LACTULOSE 10 G/15ML
20 SOLUTION ORAL ONCE
Qty: 0 | Refills: 0 | Status: COMPLETED | OUTPATIENT
Start: 2019-04-10 | End: 2019-04-10

## 2019-04-10 RX ORDER — ONDANSETRON 8 MG/1
4 TABLET, FILM COATED ORAL EVERY 4 HOURS
Qty: 0 | Refills: 0 | Status: DISCONTINUED | OUTPATIENT
Start: 2019-04-10 | End: 2019-04-14

## 2019-04-10 RX ORDER — PANTOPRAZOLE SODIUM 20 MG/1
40 TABLET, DELAYED RELEASE ORAL
Qty: 0 | Refills: 0 | Status: DISCONTINUED | OUTPATIENT
Start: 2019-04-10 | End: 2019-04-14

## 2019-04-10 RX ORDER — ASCORBIC ACID 60 MG
500 TABLET,CHEWABLE ORAL DAILY
Qty: 0 | Refills: 0 | Status: DISCONTINUED | OUTPATIENT
Start: 2019-04-10 | End: 2019-04-14

## 2019-04-10 RX ORDER — INSULIN GLARGINE 100 [IU]/ML
12 INJECTION, SOLUTION SUBCUTANEOUS AT BEDTIME
Qty: 0 | Refills: 0 | Status: DISCONTINUED | OUTPATIENT
Start: 2019-04-10 | End: 2019-04-12

## 2019-04-10 RX ORDER — METRONIDAZOLE 500 MG
500 TABLET ORAL ONCE
Qty: 0 | Refills: 0 | Status: COMPLETED | OUTPATIENT
Start: 2019-04-10 | End: 2019-04-10

## 2019-04-10 RX ORDER — IOHEXOL 300 MG/ML
30 INJECTION, SOLUTION INTRAVENOUS ONCE
Qty: 0 | Refills: 0 | Status: COMPLETED | OUTPATIENT
Start: 2019-04-10 | End: 2019-04-10

## 2019-04-10 RX ORDER — INSULIN LISPRO 100/ML
VIAL (ML) SUBCUTANEOUS
Qty: 0 | Refills: 0 | Status: DISCONTINUED | OUTPATIENT
Start: 2019-04-10 | End: 2019-04-14

## 2019-04-10 RX ORDER — AMLODIPINE BESYLATE 2.5 MG/1
2.5 TABLET ORAL DAILY
Qty: 0 | Refills: 0 | Status: DISCONTINUED | OUTPATIENT
Start: 2019-04-10 | End: 2019-04-14

## 2019-04-10 RX ORDER — METRONIDAZOLE 500 MG
500 TABLET ORAL EVERY 8 HOURS
Qty: 0 | Refills: 0 | Status: DISCONTINUED | OUTPATIENT
Start: 2019-04-11 | End: 2019-04-14

## 2019-04-10 RX ORDER — CEFTRIAXONE 500 MG/1
1 INJECTION, POWDER, FOR SOLUTION INTRAMUSCULAR; INTRAVENOUS EVERY 24 HOURS
Qty: 0 | Refills: 0 | Status: DISCONTINUED | OUTPATIENT
Start: 2019-04-11 | End: 2019-04-14

## 2019-04-10 RX ORDER — FUROSEMIDE 40 MG
20 TABLET ORAL DAILY
Qty: 0 | Refills: 0 | Status: DISCONTINUED | OUTPATIENT
Start: 2019-04-10 | End: 2019-04-14

## 2019-04-10 RX ORDER — POTASSIUM CHLORIDE 20 MEQ
10 PACKET (EA) ORAL DAILY
Qty: 0 | Refills: 0 | Status: DISCONTINUED | OUTPATIENT
Start: 2019-04-10 | End: 2019-04-14

## 2019-04-10 RX ORDER — CIPROFLOXACIN LACTATE 400MG/40ML
400 VIAL (ML) INTRAVENOUS ONCE
Qty: 0 | Refills: 0 | Status: COMPLETED | OUTPATIENT
Start: 2019-04-10 | End: 2019-04-10

## 2019-04-10 RX ADMIN — SODIUM CHLORIDE 3 MILLILITER(S): 9 INJECTION INTRAMUSCULAR; INTRAVENOUS; SUBCUTANEOUS at 12:21

## 2019-04-10 RX ADMIN — IOHEXOL 30 MILLILITER(S): 300 INJECTION, SOLUTION INTRAVENOUS at 12:15

## 2019-04-10 RX ADMIN — Medication 200 MILLIGRAM(S): at 16:53

## 2019-04-10 RX ADMIN — ONDANSETRON 4 MILLIGRAM(S): 8 TABLET, FILM COATED ORAL at 19:22

## 2019-04-10 RX ADMIN — Medication 100 MILLIGRAM(S): at 17:38

## 2019-04-10 RX ADMIN — SODIUM CHLORIDE 125 MILLILITER(S): 9 INJECTION INTRAMUSCULAR; INTRAVENOUS; SUBCUTANEOUS at 19:22

## 2019-04-10 NOTE — ED PROVIDER NOTE - CLINICAL SUMMARY MEDICAL DECISION MAKING FREE TEXT BOX
weakness, no appetite, abd pain, pt with h/o ?SBO, will get CT A/P, give IVF, concern for infectious process

## 2019-04-10 NOTE — H&P ADULT - ATTENDING COMMENTS
Patient seen and examined. Case fully discussed with  ER attending and medical resident. Reviewed chief complaint. Reviewed review of systems. Reviewed list of medications.  Reviewed physical exam.  Reviewed assessment and plan. agree with full H and P. Will follow up clinically. Will follow up with Gi Dr. Huffman. Will continue antibiotics.

## 2019-04-10 NOTE — H&P ADULT - HISTORY OF PRESENT ILLNESS
87 y.o. female with h/o IDDM, last dose this am, as per daughter-in-law, pt complaitns of feeling nausea for past 10 days, hiccups, weakness with 5 lbs weight loss in 2 weeks, abd pain/bloating for past 10 days, last BM was 2 days ago. Pt describes abdominal pain as right-sided cramping radiating to the left. Pt denies fever, chills, vomiting, burning with urination, however admits to urinary frequency of every 1-2 hours. Pt denies diarrhea, all other ROS negative. Of note, patient last had colonoscopy on 07/2018 which was negative and EGD in 02/2019 which showed healing gastric ulcer along with GERD. 86 yo F with PMH of Afib, PPM, DM, HTN, Hep C, Grave's disease, last dose this am, as per daughter-in-law, pt complaitns of feeling nausea for past 10 days, hiccups, weakness with 5 lbs weight loss in 2 weeks, abd pain/bloating for past 10 days, last BM was 2 days ago. Pt describes abdominal pain as right-sided cramping radiating to the left. Pt denies fever, chills, vomiting, burning with urination, however admits to urinary frequency of every 1-2 hours. Pt denies diarrhea, all other ROS negative. Of note, patient last had colonoscopy on 07/2018 which was negative and EGD in 02/2019 which showed healing gastric ulcer along with GERD. 88 yo F with PMH of Afib, PPM, DM, HTN, Hep C, Grave's disease, s/p cholecystectomy, bowel obstruction in the past, presented to ED due to nausea for the past 10 days. Per daughter-in-law, pt has also had generalized weakness, deconditioning, hiccups, weakness with 5 lbs weight loss in 2 weeks, abd pain/bloating for past 10 days, last BM was 2 days ago. Pt describes abdominal pain as right-sided cramping radiating to the left. Pt denies fever, chills, vomiting, burning with urination, however admits to urinary frequency of every 1-2 hours. Pt denies diarrhea, all other ROS negative. Of note, patient last had colonoscopy on 07/2018 which was negative and EGD in 02/2019 which showed healing gastric ulcer along with GERD.

## 2019-04-10 NOTE — H&P ADULT - NSHPPHYSICALEXAM_GEN_ALL_CORE
Vital Signs Last 24 Hrs  T(C): 36.6 (10 Apr 2019 15:57), Max: 36.6 (10 Apr 2019 15:57)  T(F): 97.8 (10 Apr 2019 15:57), Max: 97.8 (10 Apr 2019 15:57)  HR: 63 (10 Apr 2019 15:57) (63 - 69)  BP: 168/63 (10 Apr 2019 15:57) (127/75 - 168/63)  RR: 17 (10 Apr 2019 15:57) (16 - 17)  SpO2: 100% (10 Apr 2019 15:57) (100% - 100%)  ________________________________________________  PHYSICAL EXAM:  GENERAL: NAD  HEENT:Normocephalic;  conjunctivae and sclerae clear; moist mucous membranes;   NECK : supple  CHEST/LUNG: Clear to auscuitation bilaterally with good air entry   HEART: S1 S2  regular; no murmurs, gallops or rubs  ABDOMEN: Soft, Nontender, Nondistended; Bowel sounds present  EXTREMITIES: no cyanosis; no edema; no calf tenderness  NERVOUS SYSTEM:  Awake and alert; Oriented  to place, person and time ; no new deficits Vital Signs Last 24 Hrs  T(C): 36.6 (10 Apr 2019 15:57), Max: 36.6 (10 Apr 2019 15:57)  T(F): 97.8 (10 Apr 2019 15:57), Max: 97.8 (10 Apr 2019 15:57)  HR: 63 (10 Apr 2019 15:57) (63 - 69)  BP: 168/63 (10 Apr 2019 15:57) (127/75 - 168/63)  RR: 17 (10 Apr 2019 15:57) (16 - 17)  SpO2: 100% (10 Apr 2019 15:57) (100% - 100%)  ________________________________________________  PHYSICAL EXAM:  GENERAL: NAD  HEENT: Normocephalic;  conjunctivae and sclerae clear; moist mucous membranes;   NECK : supple  CHEST/LUNG: Clear to auscultation bilaterally with good air entry   HEART: S1 S2  regular; no murmurs, gallops or rubs  ABDOMEN: Soft, mild tenderness on left flank, Nondistended; Bowel sounds present  SKIN: umbilical scar  EXTREMITIES: no cyanosis; trace bilateral lower extremity pitting edema; no calf tenderness  NERVOUS SYSTEM:  Awake and alert; Oriented  to place, person; no focal neurologic deficits

## 2019-04-10 NOTE — H&P ADULT - PROBLEM SELECTOR PLAN 1
likely secondary to constipation  will give lactulose one dose for now  diet as tolerated   Zofran PRN nausea  s/p 1 dose of cipro, flagyl  c/w cipro/flagyl for now likely secondary to constipation  will give metamucil and lactulose one dose for now  diet as tolerated   Zofran PRN nausea  s/p 1 dose of cipro, flagyl  c/w rocephin and flagyl given reduced creatinine clearance and possible QT prolongation with medication interaction between amiodarone and fluoroquinolones likely secondary to constipation  will give metamucil and lactulose one dose for now  diet as tolerated   Zofran PRN nausea  s/p 1 dose of cipro, flagyl  c/w rocephin and flagyl given reduced creatinine clearance and possible QT prolongation with medication interaction between amiodarone and fluoroquinolones  f/u GI consult - Dr. Huffman

## 2019-04-10 NOTE — H&P ADULT - ASSESSMENT
Patient admitted for nausea, poor appetite, found to have colitis, with possible choledocholithiasis

## 2019-04-10 NOTE — H&P ADULT - NSHPLABSRESULTS_GEN_ALL_CORE
LABS:    CBC Full  -  ( 10 Apr 2019 12:12 )  WBC Count : 5.07 K/uL  RBC Count : 4.78 M/uL  Hemoglobin : 13.1 g/dL  Hematocrit : 40.6 %  Platelet Count - Automated : 76 K/uL  Mean Cell Volume : 84.9 fl  Mean Cell Hemoglobin : 27.4 pg  Mean Cell Hemoglobin Concentration : 32.3 gm/dL  Auto Neutrophil # : 3.53 K/uL  Auto Lymphocyte # : 1.08 K/uL  Auto Monocyte # : 0.41 K/uL  Auto Eosinophil # : 0.02 K/uL  Auto Basophil # : 0.02 K/uL  Auto Neutrophil % : 69.6 %  Auto Lymphocyte % : 21.3 %  Auto Monocyte % : 8.1 %  Auto Eosinophil % : 0.4 %  Auto Basophil % : 0.4 %    04-10    133<L>  |  100  |  21<H>  ----------------------------<  170<H>  4.3   |  28  |  1.10    Ca    8.5      10 Apr 2019 12:12  Mg     2.0     04-10    TPro  6.8  /  Alb  2.6<L>  /  TBili  0.3  /  DBili  x   /  AST  52<H>  /  ALT  47  /  AlkPhos  100  04-10    Urinalysis Basic - ( 10 Apr 2019 12:13 )    Color: Yellow / Appearance: Clear / S.020 / pH: x  Gluc: x / Ketone: Negative  / Bili: Negative / Urobili: 1   Blood: x / Protein: 500 mg/dL / Nitrite: Negative   Leuk Esterase: Trace / RBC: 0-2 /HPF / WBC 0-2 /HPF   Sq Epi: x / Non Sq Epi: Occasional /HPF / Bacteria: Trace /HPF    RADIOLOGY & ADDITIONAL STUDIES (The following images were personally reviewed):  EXAM:  CT ABDOMEN AND PELVIS OC IC                        PROCEDURE DATE:  04/10/2019    INTERPRETATION:  CLINICAL HISTORY: 87 years  Female with generalized abd   pain, not eating 11.  COMPARISON: Chest CT 2017  Survey scans through the lung bases demonstrate mild bibasilar discoid   atelectasis. The heart is mildly enlarged. There is no pericardial   effusion. Pacemaker leads are seen in the heart. Coronary artery   calcifications are present.. The descending thoracic aorta is   atherosclerotic but not aneurysmal.  The liver demonstrates no focal mass or biliary distention.  The pancreas demonstrates no mass or duct distention. The pancreatic duct   is normal in caliber measuring 2.5 mm.  The gallbladder is nonvisualized. The common duct is distended measuring   1.4 cm. There is intermediate density in the distal common duct which may   represent a calculus or mass.   The spleen is normal in size without focal mass.  The adrenal glands are normal without focal nodule.  The kidneys demonstrate no hydronephrosis, mass or calculi. There is a   1.4 cm left lower pole renal cyst and an 8 mm left upper pole renal cyst.   There is a 5 mm hypodensity in the right midpole cortex, too small to   characterize certainty. There is a 6 mm right lower pole renal cyst..  There is no retroperitoneal or mesenteric adenopathy and no ascites. The   aorta is atherosclerotic but normal in caliber.  The bowel demonstrates no obstruction or surrounding inflammation. There   is mild colonic wall thickening involving the ascending and transverse   colon. The stomach is decompressed and cannot be assessed. There is   retained fecal matter from cecum to rectum. The appendix is not   visualized with certainty. There is no pneumoperitoneum.  There is a small fat-containing left inguinal hernia.  Within the pelvis, the urinary bladder is unremarkable. The uterus is   normal in size. There is 1 cm calcified uterine myoma. There is no pelvic   adenopathy.  The osseous structures are unremarkable.    IMPRESSION:  Mild right-sided colonic wall thickening consistent with colitis.  Nonvisualization the gallbladder. Distended common duct. Questionable   mass or calculus in the distal common duct. If there is concern for   distal common duct obstruction, ERCP is advised.  Constipated colon.  Cardiomegaly. Pacemaker.

## 2019-04-10 NOTE — H&P ADULT - NSICDXPASTMEDICALHX_GEN_ALL_CORE_FT
PAST MEDICAL HISTORY:  A-fib     Constipation     DM (diabetes mellitus)     Glaucoma     Graves disease     HTN (hypertension)     Pacemaker     Vertigo PAST MEDICAL HISTORY:  A-fib     Constipation     DM (diabetes mellitus)     Glaucoma     Graves disease     Hepatitis C virus     HTN (hypertension)     Pacemaker     Vertigo

## 2019-04-10 NOTE — H&P ADULT - PROBLEM SELECTOR PLAN 3
patient is s/p cholecystectomy, however has bile duct dilatation  continue to monitor for now, may dilation due to colonic distension 2/2 constipation  continue to monitor liver enzymes

## 2019-04-10 NOTE — H&P ADULT - PROBLEM SELECTOR PLAN 2
hyponatremic, likely due to poor oral intake as described by daughter-in-law  s/p IVF  holding on lasix for now

## 2019-04-10 NOTE — ED PROVIDER NOTE - OBJECTIVE STATEMENT
87 y.o. female with h/o IDDM, last dose this am, as per daughter in law, pt c/o feeling nausea for past 10 days, hiccups, weakness, no vomiting, no appetite, lost 5 lbs in 2 weeks, abd pain/bloating for past 10 days, last BM yest., no dysuria, fever, last colonoscopy 7/18-neg, EGD 2/19-gastric ulcer with GERD, no bleeding

## 2019-04-11 DIAGNOSIS — K59.00 CONSTIPATION, UNSPECIFIED: ICD-10-CM

## 2019-04-11 LAB
24R-OH-CALCIDIOL SERPL-MCNC: 31.2 NG/ML — SIGNIFICANT CHANGE UP (ref 30–80)
ANION GAP SERPL CALC-SCNC: 7 MMOL/L — SIGNIFICANT CHANGE UP (ref 5–17)
BASOPHILS # BLD AUTO: 0.02 K/UL — SIGNIFICANT CHANGE UP (ref 0–0.2)
BASOPHILS NFR BLD AUTO: 0.4 % — SIGNIFICANT CHANGE UP (ref 0–2)
BUN SERPL-MCNC: 15 MG/DL — SIGNIFICANT CHANGE UP (ref 7–18)
CALCIUM SERPL-MCNC: 8.5 MG/DL — SIGNIFICANT CHANGE UP (ref 8.4–10.5)
CHLORIDE SERPL-SCNC: 101 MMOL/L — SIGNIFICANT CHANGE UP (ref 96–108)
CHOLEST SERPL-MCNC: 220 MG/DL — HIGH (ref 10–199)
CO2 SERPL-SCNC: 28 MMOL/L — SIGNIFICANT CHANGE UP (ref 22–31)
CREAT SERPL-MCNC: 1.02 MG/DL — SIGNIFICANT CHANGE UP (ref 0.5–1.3)
CULTURE RESULTS: SIGNIFICANT CHANGE UP
EOSINOPHIL # BLD AUTO: 0.06 K/UL — SIGNIFICANT CHANGE UP (ref 0–0.5)
EOSINOPHIL NFR BLD AUTO: 1.2 % — SIGNIFICANT CHANGE UP (ref 0–6)
GLUCOSE BLDC GLUCOMTR-MCNC: 114 MG/DL — HIGH (ref 70–99)
GLUCOSE BLDC GLUCOMTR-MCNC: 129 MG/DL — HIGH (ref 70–99)
GLUCOSE BLDC GLUCOMTR-MCNC: 181 MG/DL — HIGH (ref 70–99)
GLUCOSE BLDC GLUCOMTR-MCNC: 222 MG/DL — HIGH (ref 70–99)
GLUCOSE BLDC GLUCOMTR-MCNC: 229 MG/DL — HIGH (ref 70–99)
GLUCOSE SERPL-MCNC: 139 MG/DL — HIGH (ref 70–99)
HBA1C BLD-MCNC: 7.8 % — HIGH (ref 4–5.6)
HCT VFR BLD CALC: 39.8 % — SIGNIFICANT CHANGE UP (ref 34.5–45)
HDLC SERPL-MCNC: 54 MG/DL — SIGNIFICANT CHANGE UP
HGB BLD-MCNC: 12.5 G/DL — SIGNIFICANT CHANGE UP (ref 11.5–15.5)
IMM GRANULOCYTES NFR BLD AUTO: 0.2 % — SIGNIFICANT CHANGE UP (ref 0–1.5)
LIPID PNL WITH DIRECT LDL SERPL: 138 MG/DL — SIGNIFICANT CHANGE UP
LYMPHOCYTES # BLD AUTO: 1.16 K/UL — SIGNIFICANT CHANGE UP (ref 1–3.3)
LYMPHOCYTES # BLD AUTO: 22.4 % — SIGNIFICANT CHANGE UP (ref 13–44)
MAGNESIUM SERPL-MCNC: 2.1 MG/DL — SIGNIFICANT CHANGE UP (ref 1.6–2.6)
MCHC RBC-ENTMCNC: 27.1 PG — SIGNIFICANT CHANGE UP (ref 27–34)
MCHC RBC-ENTMCNC: 31.4 GM/DL — LOW (ref 32–36)
MCV RBC AUTO: 86.1 FL — SIGNIFICANT CHANGE UP (ref 80–100)
MONOCYTES # BLD AUTO: 0.41 K/UL — SIGNIFICANT CHANGE UP (ref 0–0.9)
MONOCYTES NFR BLD AUTO: 7.9 % — SIGNIFICANT CHANGE UP (ref 2–14)
NEUTROPHILS # BLD AUTO: 3.51 K/UL — SIGNIFICANT CHANGE UP (ref 1.8–7.4)
NEUTROPHILS NFR BLD AUTO: 67.9 % — SIGNIFICANT CHANGE UP (ref 43–77)
NRBC # BLD: 0 /100 WBCS — SIGNIFICANT CHANGE UP (ref 0–0)
PHOSPHATE SERPL-MCNC: 2.4 MG/DL — LOW (ref 2.5–4.5)
PLATELET # BLD AUTO: 74 K/UL — LOW (ref 150–400)
POTASSIUM SERPL-MCNC: 3.9 MMOL/L — SIGNIFICANT CHANGE UP (ref 3.5–5.3)
POTASSIUM SERPL-SCNC: 3.9 MMOL/L — SIGNIFICANT CHANGE UP (ref 3.5–5.3)
RBC # BLD: 4.62 M/UL — SIGNIFICANT CHANGE UP (ref 3.8–5.2)
RBC # FLD: 17.7 % — HIGH (ref 10.3–14.5)
SODIUM SERPL-SCNC: 136 MMOL/L — SIGNIFICANT CHANGE UP (ref 135–145)
SPECIMEN SOURCE: SIGNIFICANT CHANGE UP
TOTAL CHOLESTEROL/HDL RATIO MEASUREMENT: 4.1 RATIO — SIGNIFICANT CHANGE UP (ref 3.3–7.1)
TRIGL SERPL-MCNC: 142 MG/DL — SIGNIFICANT CHANGE UP (ref 10–149)
TSH SERPL-MCNC: 2.82 UU/ML — SIGNIFICANT CHANGE UP (ref 0.34–4.82)
VIT B12 SERPL-MCNC: 799 PG/ML — SIGNIFICANT CHANGE UP (ref 232–1245)
WBC # BLD: 5.17 K/UL — SIGNIFICANT CHANGE UP (ref 3.8–10.5)
WBC # FLD AUTO: 5.17 K/UL — SIGNIFICANT CHANGE UP (ref 3.8–10.5)

## 2019-04-11 RX ADMIN — INSULIN GLARGINE 12 UNIT(S): 100 INJECTION, SOLUTION SUBCUTANEOUS at 22:15

## 2019-04-11 RX ADMIN — LATANOPROST 1 DROP(S): 0.05 SOLUTION/ DROPS OPHTHALMIC; TOPICAL at 22:15

## 2019-04-11 RX ADMIN — Medication 1 PACKET(S): at 02:56

## 2019-04-11 RX ADMIN — HEPARIN SODIUM 5000 UNIT(S): 5000 INJECTION INTRAVENOUS; SUBCUTANEOUS at 02:00

## 2019-04-11 RX ADMIN — Medication 100 MILLIGRAM(S): at 22:14

## 2019-04-11 RX ADMIN — Medication 20 MILLIGRAM(S): at 06:20

## 2019-04-11 RX ADMIN — AMLODIPINE BESYLATE 2.5 MILLIGRAM(S): 2.5 TABLET ORAL at 06:20

## 2019-04-11 RX ADMIN — Medication 500 MILLIGRAM(S): at 13:40

## 2019-04-11 RX ADMIN — Medication 1 PACKET(S): at 16:04

## 2019-04-11 RX ADMIN — LACTULOSE 20 GRAM(S): 10 SOLUTION ORAL at 01:59

## 2019-04-11 RX ADMIN — HEPARIN SODIUM 5000 UNIT(S): 5000 INJECTION INTRAVENOUS; SUBCUTANEOUS at 22:15

## 2019-04-11 RX ADMIN — Medication 100 MILLIGRAM(S): at 06:23

## 2019-04-11 RX ADMIN — Medication 81 MILLIGRAM(S): at 13:40

## 2019-04-11 RX ADMIN — Medication 10 MILLIEQUIVALENT(S): at 13:40

## 2019-04-11 RX ADMIN — CEFTRIAXONE 100 GRAM(S): 500 INJECTION, POWDER, FOR SOLUTION INTRAMUSCULAR; INTRAVENOUS at 02:01

## 2019-04-11 RX ADMIN — INSULIN GLARGINE 12 UNIT(S): 100 INJECTION, SOLUTION SUBCUTANEOUS at 02:59

## 2019-04-11 RX ADMIN — Medication 25 MILLIGRAM(S): at 06:20

## 2019-04-11 RX ADMIN — AMIODARONE HYDROCHLORIDE 200 MILLIGRAM(S): 400 TABLET ORAL at 06:20

## 2019-04-11 RX ADMIN — HEPARIN SODIUM 5000 UNIT(S): 5000 INJECTION INTRAVENOUS; SUBCUTANEOUS at 06:20

## 2019-04-11 RX ADMIN — HEPARIN SODIUM 5000 UNIT(S): 5000 INJECTION INTRAVENOUS; SUBCUTANEOUS at 13:40

## 2019-04-11 RX ADMIN — Medication 0.12 MILLIGRAM(S): at 06:20

## 2019-04-11 RX ADMIN — Medication 100 MILLIGRAM(S): at 13:40

## 2019-04-11 RX ADMIN — PANTOPRAZOLE SODIUM 40 MILLIGRAM(S): 20 TABLET, DELAYED RELEASE ORAL at 06:20

## 2019-04-11 RX ADMIN — Medication 2: at 12:07

## 2019-04-11 NOTE — CONSULT NOTE ADULT - SUBJECTIVE AND OBJECTIVE BOX
Patient is a 87y old  Female who presents with a chief complaint of nausea, abdominal pain and bloating (2019 17:20)      HPI:  86 yo F with PMH of Afib, PPM, DM, HTN, Hep C, Grave's disease, s/p cholecystectomy, bowel obstruction in the past, presented to ED due to nausea for the past 10 days. Per daughter-in-law, pt has also had generalized weakness, deconditioning, hiccups, weakness with 5 lbs weight loss in 2 weeks, abd pain/bloating for past 10 days, last BM was 2 days ago. Pt describes abdominal pain as right-sided cramping radiating to the left. Pt denies fever, chills, vomiting, burning with urination, however admits to urinary frequency of every 1-2 hours. Pt denies diarrhea, all other ROS negative. Of note, patient last had colonoscopy on 2018 which was negative and EGD in 2019 which showed healing gastric ulcer along with GERD. (10 Apr 2019 17:00). Found to have un cont dm. Pt was recently started on afrezza with better dm control.       PAST MEDICAL & SURGICAL HISTORY:  Hepatitis C virus  A-fib  Constipation  Glaucoma  Vertigo  Graves disease  Pacemaker  DM (diabetes mellitus)  HTN (hypertension)  H/O oophorectomy  Artificial pacemaker         MEDICATIONS  (STANDING):  amiodarone    Tablet 200 milliGRAM(s) Oral daily  amLODIPine   Tablet 2.5 milliGRAM(s) Oral daily  ascorbic acid 500 milliGRAM(s) Oral daily  aspirin enteric coated 81 milliGRAM(s) Oral daily  cefTRIAXone   IVPB 1 Gram(s) IV Intermittent every 24 hours  digoxin     Tablet 0.125 milliGRAM(s) Oral daily  furosemide    Tablet 20 milliGRAM(s) Oral daily  heparin  Injectable 5000 Unit(s) SubCutaneous every 8 hours  insulin glargine Injectable (LANTUS) 12 Unit(s) SubCutaneous at bedtime  insulin lispro (HumaLOG) corrective regimen sliding scale   SubCutaneous three times a day before meals  latanoprost 0.005% Ophthalmic Solution 1 Drop(s) Both EYES at bedtime  metoprolol succinate ER 25 milliGRAM(s) Oral daily  metroNIDAZOLE  IVPB 500 milliGRAM(s) IV Intermittent every 8 hours  pantoprazole    Tablet 40 milliGRAM(s) Oral before breakfast  potassium chloride    Tablet ER 10 milliEquivalent(s) Oral daily  psyllium Powder 1 Packet(s) Oral daily    MEDICATIONS  (PRN):  ondansetron Injectable 4 milliGRAM(s) IV Push every 4 hours PRN Nausea and/or Vomiting      FAMILY HISTORY:  Family history of hypertension  Family history of diabetes mellitus      SOCIAL HISTORY:      REVIEW OF SYSTEMS:  CONSTITUTIONAL: No fever, weight loss, or fatigue  EYES: No eye pain, visual disturbances, or discharge  ENT:  No difficulty hearing, tinnitus, vertigo; No sinus or throat pain  NECK: No pain or stiffness  RESPIRATORY: No cough, wheezing, chills or hemoptysis; No Shortness of Breath  CARDIOVASCULAR: No chest pain, palpitations, passing out, dizziness, or leg swelling  GASTROINTESTINAL: No abdominal or epigastric pain. No nausea, vomiting, or hematemesis; No diarrhea or constipation. No melena or hematochezia.  GENITOURINARY: No dysuria, frequency, hematuria, or incontinence  NEUROLOGICAL: No headaches, memory loss, loss of strength, numbness, or tremors  SKIN: No itching, burning, rashes, or lesions   LYMPH Nodes: No enlarged glands  ENDOCRINE: No heat or cold intolerance; No hair loss  MUSCULOSKELETAL: No joint pain or swelling; No muscle, back, or extremity pain  PSYCHIATRIC: No depression, anxiety, mood swings, or difficulty sleeping  HEME/LYMPH: No easy bruising, or bleeding gums  ALLERGY AND IMMUNOLOGIC: No hives or eczema	        Vital Signs Last 24 Hrs  T(C): 36.4 (2019 20:47), Max: 36.7 (2019 05:31)  T(F): 97.5 (2019 20:47), Max: 98 (2019 05:31)  HR: 60 (2019 20:47) (60 - 71)  BP: 153/64 (2019 20:47) (136/62 - 156/92)  BP(mean): --  RR: 16 (2019 20:47) (14 - 16)  SpO2: 100% (2019 20:47) (100% - 100%)      Constitutional:    HEENT: nad    Neck:  No JVD, bruits or thyromegaly    Respiratory:  Clear without rales or rhonchi    Cardiovascular:  RR without murmur, rub or gallop.    Gastrointestinal: Soft without hepatosplenomegaly.    Extremities: without cyanosis, clubbing or edema.    Neurological:  Oriented   x  3    . No gross sensory or motor defects.        LABS:                        12.5   5.17  )-----------( 74       ( 2019 07:47 )             39.8     04-11    136  |  101  |  15  ----------------------------<  139<H>  3.9   |  28  |  1.02    Ca    8.5      2019 07:47  Phos  2.4     -  Mg     2.1     11    TPro  6.8  /  Alb  2.6<L>  /  TBili  0.3  /  DBili  x   /  AST  52<H>  /  ALT  47  /  AlkPhos  100  04-10    CARDIAC MARKERS ( 10 Apr 2019 12:12 )  0.026 ng/mL / x     / x     / x     / x            Urinalysis Basic - ( 10 Apr 2019 12:13 )    Color: Yellow / Appearance: Clear / S.020 / pH: x  Gluc: x / Ketone: Negative  / Bili: Negative / Urobili: 1   Blood: x / Protein: 500 mg/dL / Nitrite: Negative   Leuk Esterase: Trace / RBC: 0-2 /HPF / WBC 0-2 /HPF   Sq Epi: x / Non Sq Epi: Occasional /HPF / Bacteria: Trace /HPF      Hemoglobin A1C, Whole Blood (19 @ 11:34)    Hemoglobin A1C, Whole Blood: 7.8: Method: Immunoassay           CAPILLARY BLOOD GLUCOSE      POCT Blood Glucose.: 181 mg/dL (2019 21:33)  POCT Blood Glucose.: 129 mg/dL (2019 17:04)  POCT Blood Glucose.: 222 mg/dL (2019 11:52)  POCT Blood Glucose.: 114 mg/dL (2019 08:03)  POCT Blood Glucose.: 229 mg/dL (2019 02:37)      RADIOLOGY & ADDITIONAL STUDIES:
[  ] STAT REQUEST              [ C ] ROUTINE REQUEST    Patient is a 87 year old female with abdominal pain. GI consulted to evaluate.      HPI:  87 year old female with multiple medical problems including Afib and hepatitis C presented with diffuse crampy abdominal pain, nausea, bloating and hiccups associated with weight loss. Patient denies vomiting, hematemesis, hematochezia, melena, fever, chills, chest pain, SOB, palpitation, hemoptysis, cough, hematuria, dysuria or diarrhea. EGD 02/2019 showed healing gastric ulcer.      PAIN MANAGEMENT:  Pain Scale:                3-4 /10  Pain Location:  Diffuse abdominal pain    Prior Colonoscopy:  7/2018    PAST MEDICAL HISTORY  Hepatitis C   A-fib  Glaucoma  Vertigo  Graves disease  SBO   DM    HTN          PAST SURGICAL HISTORY  Oophorectomy  PPM  Cholecystectomy       Allergies    No Known Allergies          MEDICATIONS  (STANDING):  amiodarone    Tablet 200 milliGRAM(s) Oral daily  amLODIPine   Tablet 2.5 milliGRAM(s) Oral daily  ascorbic acid 500 milliGRAM(s) Oral daily  aspirin enteric coated 81 milliGRAM(s) Oral daily  cefTRIAXone   IVPB 1 Gram(s) IV Intermittent every 24 hours  digoxin     Tablet 0.125 milliGRAM(s) Oral daily  furosemide    Tablet 20 milliGRAM(s) Oral daily  heparin  Injectable 5000 Unit(s) SubCutaneous every 8 hours  insulin glargine Injectable (LANTUS) 12 Unit(s) SubCutaneous at bedtime  insulin lispro (HumaLOG) corrective regimen sliding scale   SubCutaneous three times a day before meals  latanoprost 0.005% Ophthalmic Solution 1 Drop(s) Both EYES at bedtime  metoprolol succinate ER 25 milliGRAM(s) Oral daily  metroNIDAZOLE  IVPB 500 milliGRAM(s) IV Intermittent every 8 hours  pantoprazole    Tablet 40 milliGRAM(s) Oral before breakfast  potassium chloride    Tablet ER 10 milliEquivalent(s) Oral daily  psyllium Powder 1 Packet(s) Oral daily    MEDICATIONS  (PRN):  ondansetron Injectable 4 milliGRAM(s) IV Push every 4 hours PRN Nausea and/or Vomiting      SOCIAL HISTORY  Advanced Directives:       [ X ] Full Code       [  ] DNR  Marital Status:         [  ] M      [X  ] S      [  ] D       [  ] W  Children:       [ X ] Yes      [  ] No  Occupation:        [  ] Employed       [ X ] Unemployed       [  ] Retired  Diet:       [ X ] Regular       [  ] PEG feeding          [  ] NG tube feeding  Drug Use:           [ X ] Patient denied          [  ] Yes  Alcohol:           [ X ] No             [  ] Yes (socially)         [  ] Yes (chronic)  Tobacco:           [  ] Yes           [ X ] No    FAMILY HISTORY  [ X ] Heart Disease            [  ] Diabetes             [  ] HTN             [  ] Colon Cancer             [  ] Stomach Cancer              [  ] Pancreatic Cancer    VITAL SIGNS   Vital Signs Last 24 Hrs  T(C): 36.7 (11 Apr 2019 05:31), Max: 36.7 (11 Apr 2019 05:31)  T(F): 98 (11 Apr 2019 05:31), Max: 98 (11 Apr 2019 05:31)  HR: 63 (11 Apr 2019 05:31) (62 - 71)  BP: 150/63 (11 Apr 2019 05:31) (136/62 - 168/63)   RR: 14 (11 Apr 2019 05:31) (14 - 18)  SpO2: 100% (11 Apr 2019 05:31) (100% - 100%)  Daily Height in cm: 149.86 (11 Apr 2019 03:26)           CBC Full  -  ( 11 Apr 2019 07:47 )  WBC Count : 5.17 K/uL  RBC Count : 4.62 M/uL  Hemoglobin : 12.5 g/dL  Hematocrit : 39.8 %  Platelet Count - Automated : 74 K/uL  Mean Cell Volume : 86.1 fl  Mean Cell Hemoglobin : 27.1 pg  Mean Cell Hemoglobin Concentration : 31.4 gm/dL  Auto Neutrophil # : 3.51 K/uL  Auto Lymphocyte # : 1.16 K/uL  Auto Monocyte # : 0.41 K/uL  Auto Eosinophil # : 0.06 K/uL  Auto Basophil # : 0.02 K/uL  Auto Neutrophil % : 67.9 %  Auto Lymphocyte % : 22.4 %  Auto Monocyte % : 7.9 %  Auto Eosinophil % : 1.2 %  Auto Basophil % : 0.4 %      04-11    136  |  101  |  15  ----------------------------<  139<H>  3.9   |  28  |  1.02    Ca    8.5      11 Apr 2019 07:47  Phos  2.4     04-11  Mg     2.1     04-11    TPro  6.8  /  Alb  2.6<L>  /  TBili  0.3  /  DBili  x   /  AST  52<H>  /  ALT  47  /  AlkPhos  100  04-10      Lipase, Serum: 248 U/L (04-10 @ 12:12)     Occult Blood, Feces (02.06.19 @ 15:40)    Occult Blood, Feces: Positive    Iron with Total Binding Capacity (07.10.18 @ 10:51)    Iron - Total Binding Capacity.: 389 ug/dL    % Saturation, Iron: 24 %    Iron Total, Serum: 92 ug/dL    Unsaturated Iron Binding Capacity: 297 ug/dL    Urinalysis (04.10.19 @ 12:13)    Blood, Urine: Small    Glucose Qualitative, Urine: 50 mg/dL    pH Urine: 5.0    Color: Yellow    Urine Appearance: Clear    Bilirubin: Negative    Ketone - Urine: Negative    Specific Gravity: 1.020    Protein, Urine: 500 mg/dL    Urobilinogen: 1    Nitrite: Negative    Leukocyte Esterase Concentration: Trace      ECG  Ventricular Rate 64 BPM    Atrial Rate 300 BPM    P-R Interval 250 ms    QRS Duration 74 ms    Q-T Interval 354 ms    QTC Calculation(Bezet) 365 ms    R Axis 24 degrees    T Axis 196 degrees    Diagnosis Line Atrial-paced rhythm with prolonged AV conduction  Marked ST abnormality, possible inferior subendocardial injury  Abnormal ECG    RADIOLOGY/IMAGING      EXAM:  CT ABDOMEN AND PELVIS OC IC                            PROCEDURE DATE:  04/10/2019          INTERPRETATION:  CLINICAL HISTORY: 87 years  Female with generalized abd   pain, not eating 11.    COMPARISON: Chest CT 1/27/2017    PROCEDURE:   CT of the Abdomen and Pelvis was performed with intravenous contrast.   Intravenous contrast: 90 ml Omnipaque 350. 10 ml discarded.  Oral contrast: positive contrast was administered.  Sagittal and coronal reformats were performed.    Survey scans through the lung bases demonstrate mild bibasilar discoid   atelectasis. The heart is mildly enlarged. There is no pericardial   effusion. Pacemaker leads are seen in the heart. Coronary artery   calcifications are present.. The descending thoracic aorta is  atherosclerotic but not aneurysmal.    The liver demonstrates no focal mass or biliary distention.    The pancreas demonstrates no mass or duct distention. The pancreatic duct   is normal in caliber measuring 2.5 mm.    The gallbladder is nonvisualized. The common duct is distended measuring   1.4 cm. There is intermediate density in the distal common duct which may   represent a calculus or mass.     The spleen is normal in size without focal mass.    The adrenal glands are normal without focal nodule.    The kidneys demonstrate no hydronephrosis, mass or calculi. There is a   1.4 cm left lower pole renal cyst and an 8 mm left upper pole renal cyst.   There is a 5 mm hypodensity in the right midpole cortex, too small to   characterize certainty. There is a 6 mm right lower pole renal cyst..    There is no retroperitoneal or mesenteric adenopathy and no ascites. The   aorta is atherosclerotic but normal in caliber.    The bowel demonstrates no obstruction or surrounding inflammation. There   is mild colonic wall thickening involving the ascending and transverse   colon. The stomach is decompressed and cannot be assessed. There is   retained fecal matter from cecum to rectum. The appendix is not   visualized with certainty. There is no pneumoperitoneum.    There is a small fat-containing left inguinal hernia.    Within the pelvis, the urinary bladder is unremarkable. The uterus is   normal in size. There is 1 cm calcified uterine myoma. There is no pelvic   adenopathy.    The osseous structures are unremarkable.    IMPRESSION:    Mild right-sided colonic wall thickening consistent with colitis.    Nonvisualization the gallbladder. Distended common duct. Questionable   mass or calculus in the distal common duct. If there is concern for   distal common duct obstruction, ERCP is advised.    Constipated colon.    Cardiomegaly. Pacemaker.

## 2019-04-11 NOTE — PROGRESS NOTE ADULT - SUBJECTIVE AND OBJECTIVE BOX
Patient is a 87y old  Female who presents with a chief complaint of nausea, abdominal pain and bloating (2019 16:18)      HPI:  88 yo F with PMH of Afib, PPM, DM, HTN, Hep C, Grave's disease, s/p cholecystectomy, bowel obstruction in the past, presented to ED due to nausea for the past 10 days. Per daughter-in-law, pt has also had generalized weakness, deconditioning, hiccups, weakness with 5 lbs weight loss in 2 weeks, abd pain/bloating for past 10 days, last BM was 2 days ago. Pt describes abdominal pain as right-sided cramping radiating to the left. Pt denies fever, chills, vomiting, burning with urination, however admits to urinary frequency of every 1-2 hours. Pt denies diarrhea, all other ROS negative. Of note, patient last had colonoscopy on 2018 which was negative and EGD in 2019 which showed healing gastric ulcer along with GERD. (10 Apr 2019 17:00)      PAST MEDICAL & SURGICAL HISTORY:  Hepatitis C virus  A-fib  Constipation  Glaucoma  Vertigo  Graves disease  Pacemaker  DM (diabetes mellitus)  HTN (hypertension)  H/O oophorectomy  Artificial pacemaker         MEDICATIONS  (STANDING):  amiodarone    Tablet 200 milliGRAM(s) Oral daily  amLODIPine   Tablet 2.5 milliGRAM(s) Oral daily  ascorbic acid 500 milliGRAM(s) Oral daily  aspirin enteric coated 81 milliGRAM(s) Oral daily  cefTRIAXone   IVPB 1 Gram(s) IV Intermittent every 24 hours  digoxin     Tablet 0.125 milliGRAM(s) Oral daily  furosemide    Tablet 20 milliGRAM(s) Oral daily  heparin  Injectable 5000 Unit(s) SubCutaneous every 8 hours  insulin glargine Injectable (LANTUS) 12 Unit(s) SubCutaneous at bedtime  insulin lispro (HumaLOG) corrective regimen sliding scale   SubCutaneous three times a day before meals  latanoprost 0.005% Ophthalmic Solution 1 Drop(s) Both EYES at bedtime  metoprolol succinate ER 25 milliGRAM(s) Oral daily  metroNIDAZOLE  IVPB 500 milliGRAM(s) IV Intermittent every 8 hours  pantoprazole    Tablet 40 milliGRAM(s) Oral before breakfast  potassium chloride    Tablet ER 10 milliEquivalent(s) Oral daily  psyllium Powder 1 Packet(s) Oral daily    MEDICATIONS  (PRN):  ondansetron Injectable 4 milliGRAM(s) IV Push every 4 hours PRN Nausea and/or Vomiting      FAMILY HISTORY:  Family history of hypertension  Family history of diabetes mellitus      SOCIAL HISTORY:      REVIEW OF SYSTEMS:  CONSTITUTIONAL: No fever, weight loss, or fatigue  EYES: No eye pain, visual disturbances, or discharge  ENT:  No difficulty hearing, tinnitus, vertigo; No sinus or throat pain  NECK: No pain or stiffness  RESPIRATORY: No cough, wheezing, chills or hemoptysis; No Shortness of Breath  CARDIOVASCULAR: No chest pain, palpitations, passing out, dizziness, or leg swelling  GASTROINTESTINAL: No abdominal or epigastric pain. No nausea, vomiting, or hematemesis; No diarrhea or constipation. No melena or hematochezia.  GENITOURINARY: No dysuria, frequency, hematuria, or incontinence  NEUROLOGICAL: No headaches, memory loss, loss of strength, numbness, or tremors  SKIN: No itching, burning, rashes, or lesions   LYMPH Nodes: No enlarged glands  ENDOCRINE: No heat or cold intolerance; No hair loss  MUSCULOSKELETAL: No joint pain or swelling; No muscle, back, or extremity pain  PSYCHIATRIC: No depression, anxiety, mood swings, or difficulty sleeping  HEME/LYMPH: No easy bruising, or bleeding gums  ALLERGY AND IMMUNOLOGIC: No hives or eczema	        Vital Signs Last 24 Hrs  T(C): 36.6 (2019 14:32), Max: 36.7 (2019 05:31)  T(F): 97.8 (2019 14:32), Max: 98 (2019 05:31)  HR: 60 (2019 14:32) (60 - 71)  BP: 140/56 (2019 14:32) (136/62 - 156/92)  BP(mean): --  RR: 16 (2019 14:32) (14 - 18)  SpO2: 100% (2019 14:32) (100% - 100%)      Constitutional:    NC/AT:    HEENT:    Neck:  No JVD, bruits or thyromegaly    Respiratory:  Clear without rales or rhonchi    Cardiovascular:  RR without murmur, rub or gallop.    Gastrointestinal: Soft without hepatosplenomegaly.    Extremities: without cyanosis, clubbing or edema.    Neurological:  Oriented   x      . No gross sensory or motor defects.        LABS:                        12.5   5.17  )-----------( 74       ( 2019 07:47 )             39.8     04-11    136  |  101  |  15  ----------------------------<  139<H>  3.9   |  28  |  1.02    Ca    8.5      2019 07:47  Phos  2.4     04-11  Mg     2.1     04-11    TPro  6.8  /  Alb  2.6<L>  /  TBili  0.3  /  DBili  x   /  AST  52<H>  /  ALT  47  /  AlkPhos  100  04-10    CARDIAC MARKERS ( 10 Apr 2019 12:12 )  0.026 ng/mL / x     / x     / x     / x            Urinalysis Basic - ( 10 Apr 2019 12:13 )    Color: Yellow / Appearance: Clear / S.020 / pH: x  Gluc: x / Ketone: Negative  / Bili: Negative / Urobili: 1   Blood: x / Protein: 500 mg/dL / Nitrite: Negative   Leuk Esterase: Trace / RBC: 0-2 /HPF / WBC 0-2 /HPF   Sq Epi: x / Non Sq Epi: Occasional /HPF / Bacteria: Trace /HPF      CAPILLARY BLOOD GLUCOSE      POCT Blood Glucose.: 129 mg/dL (2019 17:04)  POCT Blood Glucose.: 222 mg/dL (2019 11:52)  POCT Blood Glucose.: 114 mg/dL (2019 08:03)  POCT Blood Glucose.: 229 mg/dL (2019 02:37)      RADIOLOGY & ADDITIONAL STUDIES:

## 2019-04-11 NOTE — CONSULT NOTE ADULT - ASSESSMENT
88 yo F with PMH of Afib, PPM, DM, HTN, Hep C, Grave's disease, s/p cholecystectomy, bowel obstruction in the past, presented to ED due to nausea for the past 10 days. Found to have un cont dm. Pt was recently started on afrezza with better dm control.
A. The etiology for abdominal pain is most likely due to:  1. Fecal impaction  2. Colitis    B. Dilated CBD with ? stone or mass on CT-Scan      - No evidence of biliary obstruction    C. Chronic hepatitis C    Suggestions:    1. Fleet enema x 2 half hour apart  2. Citrate magnesium one bottle (8oz) half hour after second enema  3. Check stool for culture, O7P and c. diff toxin  4. MRCP  5. Check AFP and   6. Protonix daily  7. Avoid NSAID  8. DVT prophylaxis

## 2019-04-11 NOTE — PROGRESS NOTE ADULT - SUBJECTIVE AND OBJECTIVE BOX
86 yo F with PMH of Afib, PPM, DM, HTN, Hep C, Grave's disease, s/p cholecystectomy, bowel obstruction in the past, presented to ED due to nausea for the past 10 days. Per daughter-in-law, pt has also had generalized weakness, deconditioning, hiccups, weakness with 5 lbs weight loss in 2 weeks, abd pain/bloating for past 10 days, last BM was 2 days ago. Pt describes abdominal pain as right-sided cramping radiating to the left. Pt denies fever, chills, vomiting, burning with urination, however admits to urinary frequency of every 1-2 hours. Pt denies diarrhea, all other ROS negative. Of note, patient last had colonoscopy on 2018 which was negative and EGD in 2019 which showed healing gastric ulcer along with GERD.   NP Note discussed with  Primary Attending    Patient is a 87y old  Female who presents with a chief complaint of nausea, abdominal pain and bloating (2019 11:42)      INTERVAL HPI/OVERNIGHT EVENTS: no new complaints    MEDICATIONS  (STANDING):  amiodarone    Tablet 200 milliGRAM(s) Oral daily  amLODIPine   Tablet 2.5 milliGRAM(s) Oral daily  ascorbic acid 500 milliGRAM(s) Oral daily  aspirin enteric coated 81 milliGRAM(s) Oral daily  cefTRIAXone   IVPB 1 Gram(s) IV Intermittent every 24 hours  digoxin     Tablet 0.125 milliGRAM(s) Oral daily  furosemide    Tablet 20 milliGRAM(s) Oral daily  heparin  Injectable 5000 Unit(s) SubCutaneous every 8 hours  insulin glargine Injectable (LANTUS) 12 Unit(s) SubCutaneous at bedtime  insulin lispro (HumaLOG) corrective regimen sliding scale   SubCutaneous three times a day before meals  latanoprost 0.005% Ophthalmic Solution 1 Drop(s) Both EYES at bedtime  metoprolol succinate ER 25 milliGRAM(s) Oral daily  metroNIDAZOLE  IVPB 500 milliGRAM(s) IV Intermittent every 8 hours  pantoprazole    Tablet 40 milliGRAM(s) Oral before breakfast  potassium chloride    Tablet ER 10 milliEquivalent(s) Oral daily  psyllium Powder 1 Packet(s) Oral daily    MEDICATIONS  (PRN):  ondansetron Injectable 4 milliGRAM(s) IV Push every 4 hours PRN Nausea and/or Vomiting      __________________________________________________  REVIEW OF SYSTEMS:    CONSTITUTIONAL: No fever,   EYES: no acute visual disturbances  NECK: No pain or stiffness  RESPIRATORY: No cough; No shortness of breath  CARDIOVASCULAR: No chest pain, no palpitations  GASTROINTESTINAL: No pain. No nausea or vomiting; No diarrhea   NEUROLOGICAL: No headache or numbness, no tremors  MUSCULOSKELETAL: No joint pain, no muscle pain  GENITOURINARY: no dysuria, no frequency, no hesitancy  PSYCHIATRY: no depression , no anxiety  ALL OTHER  ROS negative        Vital Signs Last 24 Hrs  T(C): 36.6 (2019 14:32), Max: 36.7 (2019 05:31)  T(F): 97.8 (2019 14:32), Max: 98 (2019 05:31)  HR: 60 (2019 14:32) (60 - 71)  BP: 140/56 (2019 14:32) (136/62 - 156/92)  BP(mean): --  RR: 16 (2019 14:32) (14 - 18)  SpO2: 100% (2019 14:32) (100% - 100%)    ________________________________________________  PHYSICAL EXAM:  GENERAL: NAD  HEENT: Normocephalic;  conjunctivae and sclerae clear; moist mucous membranes;   NECK : supple  CHEST/LUNG: Clear to auscultation bilaterally with good air entry   HEART: S1 S2 1rregularA  ABDOMEN: Soft, Nontender, Nondistended; Bowel sounds present s/p bowel evacuation  EXTREMITIES: no cyanosis; no edema; no calf tenderness  SKIN: warm and dry; no rash  NERVOUS SYSTEM:  Awake and alert; Oriented  to place, person and time     _________________________________________________  LABS:                        12.5   5.17  )-----------( 74       ( 2019 07:47 )             39.8     04-11    136  |  101  |  15  ----------------------------<  139<H>  3.9   |  28  |  1.02    Ca    8.5      2019 07:47  Phos  2.4     04-11  Mg     2.1     04-11    TPro  6.8  /  Alb  2.6<L>  /  TBili  0.3  /  DBili  x   /  AST  52<H>  /  ALT  47  /  AlkPhos  100  04-10      Urinalysis Basic - ( 10 Apr 2019 12:13 )    Color: Yellow / Appearance: Clear / S.020 / pH: x  Gluc: x / Ketone: Negative  / Bili: Negative / Urobili: 1   Blood: x / Protein: 500 mg/dL / Nitrite: Negative   Leuk Esterase: Trace / RBC: 0-2 /HPF / WBC 0-2 /HPF   Sq Epi: x / Non Sq Epi: Occasional /HPF / Bacteria: Trace /HPF      CAPILLARY BLOOD GLUCOSE      POCT Blood Glucose.: 222 mg/dL (2019 11:52)  POCT Blood Glucose.: 114 mg/dL (2019 08:03)  POCT Blood Glucose.: 229 mg/dL (2019 02:37)        RADIOLOGY & ADDITIONAL TESTS:    Imaging Personally Reviewed:  YES    Consultant(s) Notes Reviewed:   YES    Care Discussed with Consultants : yes. Dr Huffman    Plan of care was discussed with patient all questions and concerns were addressed and care was aligned with patient's wishes.

## 2019-04-12 ENCOUNTER — TRANSCRIPTION ENCOUNTER (OUTPATIENT)
Age: 84
End: 2019-04-12

## 2019-04-12 LAB
AFP-TM SERPL-MCNC: 13.7 NG/ML — HIGH
ALBUMIN SERPL ELPH-MCNC: 2.3 G/DL — LOW (ref 3.5–5)
ALP SERPL-CCNC: 89 U/L — SIGNIFICANT CHANGE UP (ref 40–120)
ALT FLD-CCNC: 41 U/L DA — SIGNIFICANT CHANGE UP (ref 10–60)
ANION GAP SERPL CALC-SCNC: 7 MMOL/L — SIGNIFICANT CHANGE UP (ref 5–17)
AST SERPL-CCNC: 52 U/L — HIGH (ref 10–40)
BILIRUB DIRECT SERPL-MCNC: 0.1 MG/DL — SIGNIFICANT CHANGE UP (ref 0–0.2)
BILIRUB INDIRECT FLD-MCNC: 0.2 MG/DL — SIGNIFICANT CHANGE UP (ref 0.2–1)
BILIRUB SERPL-MCNC: 0.3 MG/DL — SIGNIFICANT CHANGE UP (ref 0.2–1.2)
BUN SERPL-MCNC: 13 MG/DL — SIGNIFICANT CHANGE UP (ref 7–18)
CALCIUM SERPL-MCNC: 8.3 MG/DL — LOW (ref 8.4–10.5)
CANCER AG19-9 SERPL-ACNC: 127 U/ML — HIGH
CHLORIDE SERPL-SCNC: 105 MMOL/L — SIGNIFICANT CHANGE UP (ref 96–108)
CO2 SERPL-SCNC: 29 MMOL/L — SIGNIFICANT CHANGE UP (ref 22–31)
CREAT SERPL-MCNC: 0.87 MG/DL — SIGNIFICANT CHANGE UP (ref 0.5–1.3)
GLUCOSE BLDC GLUCOMTR-MCNC: 126 MG/DL — HIGH (ref 70–99)
GLUCOSE BLDC GLUCOMTR-MCNC: 130 MG/DL — HIGH (ref 70–99)
GLUCOSE BLDC GLUCOMTR-MCNC: 169 MG/DL — HIGH (ref 70–99)
GLUCOSE BLDC GLUCOMTR-MCNC: 190 MG/DL — HIGH (ref 70–99)
GLUCOSE BLDC GLUCOMTR-MCNC: 54 MG/DL — LOW (ref 70–99)
GLUCOSE SERPL-MCNC: 66 MG/DL — LOW (ref 70–99)
HCT VFR BLD CALC: 38 % — SIGNIFICANT CHANGE UP (ref 34.5–45)
HGB BLD-MCNC: 12.4 G/DL — SIGNIFICANT CHANGE UP (ref 11.5–15.5)
MCHC RBC-ENTMCNC: 27.6 PG — SIGNIFICANT CHANGE UP (ref 27–34)
MCHC RBC-ENTMCNC: 32.6 GM/DL — SIGNIFICANT CHANGE UP (ref 32–36)
MCV RBC AUTO: 84.4 FL — SIGNIFICANT CHANGE UP (ref 80–100)
NRBC # BLD: 0 /100 WBCS — SIGNIFICANT CHANGE UP (ref 0–0)
PLATELET # BLD AUTO: 71 K/UL — LOW (ref 150–400)
POTASSIUM SERPL-MCNC: 4.4 MMOL/L — SIGNIFICANT CHANGE UP (ref 3.5–5.3)
POTASSIUM SERPL-SCNC: 4.4 MMOL/L — SIGNIFICANT CHANGE UP (ref 3.5–5.3)
PROT SERPL-MCNC: 6.3 G/DL — SIGNIFICANT CHANGE UP (ref 6–8.3)
RBC # BLD: 4.5 M/UL — SIGNIFICANT CHANGE UP (ref 3.8–5.2)
RBC # FLD: 17.3 % — HIGH (ref 10.3–14.5)
SODIUM SERPL-SCNC: 141 MMOL/L — SIGNIFICANT CHANGE UP (ref 135–145)
WBC # BLD: 5.7 K/UL — SIGNIFICANT CHANGE UP (ref 3.8–10.5)
WBC # FLD AUTO: 5.7 K/UL — SIGNIFICANT CHANGE UP (ref 3.8–10.5)

## 2019-04-12 RX ORDER — INSULIN LISPRO 100/ML
4 VIAL (ML) SUBCUTANEOUS
Qty: 0 | Refills: 0 | Status: DISCONTINUED | OUTPATIENT
Start: 2019-04-12 | End: 2019-04-14

## 2019-04-12 RX ORDER — MULTIVIT WITH MIN/MFOLATE/K2 340-15/3 G
1 POWDER (GRAM) ORAL ONCE
Qty: 0 | Refills: 0 | Status: COMPLETED | OUTPATIENT
Start: 2019-04-12 | End: 2019-04-12

## 2019-04-12 RX ORDER — ACETAMINOPHEN 500 MG
650 TABLET ORAL EVERY 6 HOURS
Qty: 0 | Refills: 0 | Status: DISCONTINUED | OUTPATIENT
Start: 2019-04-12 | End: 2019-04-14

## 2019-04-12 RX ORDER — INSULIN GLARGINE 100 [IU]/ML
10 INJECTION, SOLUTION SUBCUTANEOUS AT BEDTIME
Qty: 0 | Refills: 0 | Status: DISCONTINUED | OUTPATIENT
Start: 2019-04-12 | End: 2019-04-14

## 2019-04-12 RX ORDER — AMIODARONE HYDROCHLORIDE 400 MG/1
1 TABLET ORAL
Qty: 30 | Refills: 0 | OUTPATIENT
Start: 2019-04-12 | End: 2019-05-11

## 2019-04-12 RX ORDER — AMLODIPINE BESYLATE 2.5 MG/1
1 TABLET ORAL
Qty: 0 | Refills: 0 | DISCHARGE
Start: 2019-04-12

## 2019-04-12 RX ORDER — MORPHINE SULFATE 50 MG/1
2 CAPSULE, EXTENDED RELEASE ORAL ONCE
Qty: 0 | Refills: 0 | Status: DISCONTINUED | OUTPATIENT
Start: 2019-04-12 | End: 2019-04-14

## 2019-04-12 RX ADMIN — INSULIN GLARGINE 10 UNIT(S): 100 INJECTION, SOLUTION SUBCUTANEOUS at 21:43

## 2019-04-12 RX ADMIN — AMIODARONE HYDROCHLORIDE 200 MILLIGRAM(S): 400 TABLET ORAL at 06:17

## 2019-04-12 RX ADMIN — Medication 650 MILLIGRAM(S): at 11:42

## 2019-04-12 RX ADMIN — Medication 1: at 12:15

## 2019-04-12 RX ADMIN — Medication 500 MILLIGRAM(S): at 12:19

## 2019-04-12 RX ADMIN — AMLODIPINE BESYLATE 2.5 MILLIGRAM(S): 2.5 TABLET ORAL at 06:17

## 2019-04-12 RX ADMIN — Medication 4 UNIT(S): at 12:16

## 2019-04-12 RX ADMIN — Medication 100 MILLIGRAM(S): at 13:41

## 2019-04-12 RX ADMIN — HEPARIN SODIUM 5000 UNIT(S): 5000 INJECTION INTRAVENOUS; SUBCUTANEOUS at 06:17

## 2019-04-12 RX ADMIN — Medication 4 UNIT(S): at 17:20

## 2019-04-12 RX ADMIN — HEPARIN SODIUM 5000 UNIT(S): 5000 INJECTION INTRAVENOUS; SUBCUTANEOUS at 13:41

## 2019-04-12 RX ADMIN — Medication 650 MILLIGRAM(S): at 12:20

## 2019-04-12 RX ADMIN — Medication 25 MILLIGRAM(S): at 06:17

## 2019-04-12 RX ADMIN — Medication 1 BOTTLE: at 13:42

## 2019-04-12 RX ADMIN — HEPARIN SODIUM 5000 UNIT(S): 5000 INJECTION INTRAVENOUS; SUBCUTANEOUS at 21:42

## 2019-04-12 RX ADMIN — PANTOPRAZOLE SODIUM 40 MILLIGRAM(S): 20 TABLET, DELAYED RELEASE ORAL at 06:17

## 2019-04-12 RX ADMIN — Medication 100 MILLIGRAM(S): at 21:43

## 2019-04-12 RX ADMIN — Medication 20 MILLIGRAM(S): at 06:17

## 2019-04-12 RX ADMIN — Medication 10 MILLIEQUIVALENT(S): at 12:19

## 2019-04-12 RX ADMIN — LATANOPROST 1 DROP(S): 0.05 SOLUTION/ DROPS OPHTHALMIC; TOPICAL at 21:43

## 2019-04-12 RX ADMIN — Medication 100 MILLIGRAM(S): at 06:17

## 2019-04-12 RX ADMIN — Medication 81 MILLIGRAM(S): at 12:19

## 2019-04-12 RX ADMIN — CEFTRIAXONE 100 GRAM(S): 500 INJECTION, POWDER, FOR SOLUTION INTRAMUSCULAR; INTRAVENOUS at 01:18

## 2019-04-12 RX ADMIN — Medication 1 PACKET(S): at 12:19

## 2019-04-12 RX ADMIN — Medication 0.12 MILLIGRAM(S): at 06:17

## 2019-04-12 NOTE — PROGRESS NOTE ADULT - SUBJECTIVE AND OBJECTIVE BOX
86 yo F with PMH of Afib, PPM, DM, HTN, Hep C, Grave's disease, s/p cholecystectomy, bowel obstruction in the past, presented to ED due to nausea for the past 10 days. Per daughter-in-law, pt has also had generalized weakness, deconditioning, hiccups, weakness with 5 lbs weight loss in 2 weeks, abd pain/bloating for past 10 days, last BM was 2 days ago. Pt describes abdominal pain as right-sided cramping radiating to the left. Pt denies fever, chills, vomiting, burning with urination, however admits to urinary frequency of every 1-2 hours. Pt denies diarrhea, all other ROS negative. Of note, patient last had colonoscopy on 07/2018 which was negative and EGD in 02/2019 which showed healing gastric ulcer along with GERD. 86 yo F with PMH of Afib, PPM, DM, HTN, Hep C, Grave's disease, s/p cholecystectomy, bowel obstruction in the past, presented to ED due to nausea for the past 10 days. Per daughter-in-law, pt has also had generalized weakness, deconditioning, hiccups, weakness with 5 lbs weight loss in 2 weeks, abd pain/bloating for past 10 days, last BM was 2 days ago. Pt describes abdominal pain as right-sided cramping radiating to the left. Pt denies fever, chills, vomiting, burning with urination, however admits to urinary frequency of every 1-2 hours. Pt denies diarrhea, all other ROS negative. Of note, patient last had colonoscopy on 07/2018 which was negative and EGD in 02/2019 which showed healing gastric ulcer along with GERD.   NP Note discussed with  Primary Attending    Patient is a 87y old  Female who presents with a chief complaint of nausea, abdominal pain and bloating (12 Apr 2019 12:58)      INTERVAL HPI/OVERNIGHT EVENTS: no new complaints    MEDICATIONS  (STANDING):  amiodarone    Tablet 200 milliGRAM(s) Oral daily  amLODIPine   Tablet 2.5 milliGRAM(s) Oral daily  ascorbic acid 500 milliGRAM(s) Oral daily  aspirin enteric coated 81 milliGRAM(s) Oral daily  cefTRIAXone   IVPB 1 Gram(s) IV Intermittent every 24 hours  digoxin     Tablet 0.125 milliGRAM(s) Oral daily  furosemide    Tablet 20 milliGRAM(s) Oral daily  heparin  Injectable 5000 Unit(s) SubCutaneous every 8 hours  insulin glargine Injectable (LANTUS) 12 Unit(s) SubCutaneous at bedtime  insulin lispro (HumaLOG) corrective regimen sliding scale   SubCutaneous three times a day before meals  insulin lispro Injectable (HumaLOG) 4 Unit(s) SubCutaneous three times a day before meals  latanoprost 0.005% Ophthalmic Solution 1 Drop(s) Both EYES at bedtime  magnesium citrate Oral Solution 1 Bottle Oral once  metoprolol succinate ER 25 milliGRAM(s) Oral daily  metroNIDAZOLE  IVPB 500 milliGRAM(s) IV Intermittent every 8 hours  pantoprazole    Tablet 40 milliGRAM(s) Oral before breakfast  potassium chloride    Tablet ER 10 milliEquivalent(s) Oral daily  psyllium Powder 1 Packet(s) Oral daily    MEDICATIONS  (PRN):  acetaminophen   Tablet .. 650 milliGRAM(s) Oral every 6 hours PRN Mild Pain (1 - 3), Moderate Pain (4 - 6)  ondansetron Injectable 4 milliGRAM(s) IV Push every 4 hours PRN Nausea and/or Vomiting      __________________________________________________  REVIEW OF SYSTEMS:    CONSTITUTIONAL: No fever,   EYES: no acute visual disturbances  NECK: No pain or stiffness  RESPIRATORY: No cough; No shortness of breath  CARDIOVASCULAR: No chest pain, no palpitations  GASTROINTESTINAL: No pain. No nausea or vomiting; + diarrhea   NEUROLOGICAL: No headache or numbness, no tremors  MUSCULOSKELETAL: No joint pain, no muscle pain  GENITOURINARY: no dysuria, no frequency, no hesitancy  PSYCHIATRY: no depression , no anxiety  ALL OTHER  ROS negative        Vital Signs Last 24 Hrs  T(C): 36.9 (12 Apr 2019 04:59), Max: 36.9 (12 Apr 2019 04:59)  T(F): 98.4 (12 Apr 2019 04:59), Max: 98.4 (12 Apr 2019 04:59)  HR: 60 (12 Apr 2019 04:59) (60 - 60)  BP: 143/60 (12 Apr 2019 04:59) (140/56 - 153/64)  BP(mean): --  RR: 18 (12 Apr 2019 04:59) (16 - 18)  SpO2: 100% (12 Apr 2019 04:59) (100% - 100%)    ________________________________________________  PHYSICAL EXAM:  GENERAL: NAD  HEENT: Normocephalic;  conjunctivae and sclerae clear; moist mucous membranes;   NECK : supple  CHEST/LUNG: Clear to auscultation bilaterally with good air entry   HEART: S1 S2  regular; no murmurs, gallops or rubs  ABDOMEN: + firm, + distention  ; Bowel sounds present  EXTREMITIES: no cyanosis; no edema; no calf tenderness  SKIN: warm and dry; no rash  NERVOUS SYSTEM:  Awake and alert; Oriented  to place, person and time     _________________________________________________  LABS:                        12.4   5.70  )-----------( 71       ( 12 Apr 2019 07:29 )             38.0     04-12    141  |  105  |  13  ----------------------------<  66<L>  4.4   |  29  |  0.87    Ca    8.3<L>      12 Apr 2019 07:29  Phos  2.4     04-11  Mg     2.1     04-11    TPro  6.3  /  Alb  2.3<L>  /  TBili  0.3  /  DBili  0.1  /  AST  52<H>  /  ALT  41  /  AlkPhos  89  04-12        CAPILLARY BLOOD GLUCOSE      POCT Blood Glucose.: 190 mg/dL (12 Apr 2019 12:03)  POCT Blood Glucose.: 169 mg/dL (12 Apr 2019 09:34)  POCT Blood Glucose.: 54 mg/dL (12 Apr 2019 08:04)  POCT Blood Glucose.: 181 mg/dL (11 Apr 2019 21:33)  POCT Blood Glucose.: 129 mg/dL (11 Apr 2019 17:04)        RADIOLOGY & ADDITIONAL TESTS:    Imaging Personally Reviewed:  YES  Consultant(s) Notes Reviewed:   YES  Care Discussed with Consultants :     Plan of care was discussed with patient and or primary care giver; all questions and concerns were addressed and care was aligned with patient's wishes.

## 2019-04-12 NOTE — DISCHARGE NOTE PROVIDER - NSDCCPCAREPLAN_GEN_ALL_CORE_FT
PRINCIPAL DISCHARGE DIAGNOSIS  Diagnosis: Colitis  Assessment and Plan of Treatment: You were admitted for colitis diagnosed on CT scan      SECONDARY DISCHARGE DIAGNOSES  Diagnosis: Dehydration  Assessment and Plan of Treatment: You were goiven IVF for dehydration PRINCIPAL DISCHARGE DIAGNOSIS  Diagnosis: Colitis  Assessment and Plan of Treatment: You were admitted for colitis diagnosed on CT scan      SECONDARY DISCHARGE DIAGNOSES  Diagnosis: Choledocholithiasis with obstruction  Assessment and Plan of Treatment: You have a blockage in you common bile duct. You need to f/u with Dr Huffman in 1 week to schedule an Endoscopic Ultrasound  at [facility] to evaluate the blokage    Diagnosis: Dehydration  Assessment and Plan of Treatment: You were given IVF for dehydration

## 2019-04-12 NOTE — PROGRESS NOTE ADULT - SUBJECTIVE AND OBJECTIVE BOX
[   ] ICU                                          [   ] CCU                                      [  X ] Medical Floor    Patient is comfortable. No new complaints reported, No abdominal pain, N/V, hematemesis, hematochezia, melena, fever, chills, chest pain, SOB, cough or diarrhea reported.    VITALS  Vital Signs Last 24 Hrs  T(C): 36.6 (12 Apr 2019 13:48), Max: 36.9 (12 Apr 2019 04:59)  T(F): 97.8 (12 Apr 2019 13:48), Max: 98.4 (12 Apr 2019 04:59)  HR: 60 (12 Apr 2019 13:48) (60 - 60)  BP: 157/67 (12 Apr 2019 13:48) (143/60 - 157/67)     RR: 18 (12 Apr 2019 13:48) (16 - 18)  SpO2: 100% (12 Apr 2019 13:48) (100% - 100%)       MEDICATIONS  (STANDING):  amiodarone    Tablet 200 milliGRAM(s) Oral daily  amLODIPine   Tablet 2.5 milliGRAM(s) Oral daily  ascorbic acid 500 milliGRAM(s) Oral daily  aspirin enteric coated 81 milliGRAM(s) Oral daily  cefTRIAXone   IVPB 1 Gram(s) IV Intermittent every 24 hours  digoxin     Tablet 0.125 milliGRAM(s) Oral daily  furosemide    Tablet 20 milliGRAM(s) Oral daily  heparin  Injectable 5000 Unit(s) SubCutaneous every 8 hours  insulin glargine Injectable (LANTUS) 10 Unit(s) SubCutaneous at bedtime  insulin lispro (HumaLOG) corrective regimen sliding scale   SubCutaneous three times a day before meals  insulin lispro Injectable (HumaLOG) 4 Unit(s) SubCutaneous three times a day before meals  latanoprost 0.005% Ophthalmic Solution 1 Drop(s) Both EYES at bedtime  metoprolol succinate ER 25 milliGRAM(s) Oral daily  metroNIDAZOLE  IVPB 500 milliGRAM(s) IV Intermittent every 8 hours  pantoprazole    Tablet 40 milliGRAM(s) Oral before breakfast  potassium chloride    Tablet ER 10 milliEquivalent(s) Oral daily  psyllium Powder 1 Packet(s) Oral daily    MEDICATIONS  (PRN):  acetaminophen   Tablet .. 650 milliGRAM(s) Oral every 6 hours PRN Mild Pain (1 - 3), Moderate Pain (4 - 6)  ondansetron Injectable 4 milliGRAM(s) IV Push every 4 hours PRN Nausea and/or Vomiting                            12.4   5.70  )-----------( 71       ( 12 Apr 2019 07:29 )             38.0       04-12    141  |  105  |  13  ----------------------------<  66<L>  4.4   |  29  |  0.87    Ca    8.3<L>      12 Apr 2019 07:29  Phos  2.4     04-11  Mg     2.1     04-11    TPro  6.3  /  Alb  2.3<L>  /  TBili  0.3  /  DBili  0.1  /  AST  52<H>  /  ALT  41  /  AlkPhos  89  04-12

## 2019-04-12 NOTE — PROGRESS NOTE ADULT - SUBJECTIVE AND OBJECTIVE BOX
Interval Events:      Allergies    No Known Allergies    Intolerances      Endocrine/Metabolic Medications:  insulin glargine Injectable (LANTUS) 12 Unit(s) SubCutaneous at bedtime  insulin lispro (HumaLOG) corrective regimen sliding scale   SubCutaneous three times a day before meals  insulin lispro Injectable (HumaLOG) 4 Unit(s) SubCutaneous three times a day before meals      Vital Signs Last 24 Hrs  T(C): 36.9 (12 Apr 2019 04:59), Max: 36.9 (12 Apr 2019 04:59)  T(F): 98.4 (12 Apr 2019 04:59), Max: 98.4 (12 Apr 2019 04:59)  HR: 60 (12 Apr 2019 04:59) (60 - 60)  BP: 143/60 (12 Apr 2019 04:59) (140/56 - 153/64)  BP(mean): --  RR: 18 (12 Apr 2019 04:59) (16 - 18)  SpO2: 100% (12 Apr 2019 04:59) (100% - 100%)      PHYSICAL EXAM  All physical exam findings normal, except those marked:  General:	Alert, active, cooperative, NAD, well hydrated  .		[] Abnormal:  Neck		Normal: supple, no cervical adenopathy, no palpable thyroid  .		[] Abnormal:  Cardiovascular	Normal: regular rate, normal S1, S2, no murmurs  .		[] Abnormal:  Respiratory	Normal: no chest wall deformity, normal respiratory pattern, CTA B/L  .		[] Abnormal:  Abdominal	Normal: soft, ND, NT, bowel sounds present, no masses, no organomegaly  .		[] Abnormal:  		Normal normal genitalia, testes descended, circumcised/uncircumcised  .		Sonja stage:			Breast sonja:  .		Menstrual history:  .		[] Abnormal:  Extremities	Normal: FROM x4  .		[] Abnormal:  Skin		Normal: intact and not indurated, no rash, no acanthosis nigricans  .		[] Abnormal:  Neurologic	Normal: grossly intact  .		[] Abnormal:    LABS                        12.4   5.70  )-----------( 71       ( 12 Apr 2019 07:29 )             38.0                               141    |  105    |  13                  Calcium: 8.3   / iCa: x      (04-12 @ 07:29)    ----------------------------<  66        Magnesium: x                                4.4     |  29     |  0.87             Phosphorous: x        TPro  6.3    /  Alb  2.3    /  TBili  0.3    /  DBili  0.1    /  AST  52     /  ALT  41     /  AlkPhos  89     12 Apr 2019 07:29    CAPILLARY BLOOD GLUCOSE      POCT Blood Glucose.: 169 mg/dL (12 Apr 2019 09:34)  POCT Blood Glucose.: 54 mg/dL (12 Apr 2019 08:04)  POCT Blood Glucose.: 181 mg/dL (11 Apr 2019 21:33)  POCT Blood Glucose.: 129 mg/dL (11 Apr 2019 17:04)  POCT Blood Glucose.: 222 mg/dL (11 Apr 2019 11:52)        Assesment/plan Interval Events:  pt in nad    Allergies    No Known Allergies    Intolerances      Endocrine/Metabolic Medications:  insulin glargine Injectable (LANTUS) 12 Unit(s) SubCutaneous at bedtime  insulin lispro (HumaLOG) corrective regimen sliding scale   SubCutaneous three times a day before meals  insulin lispro Injectable (HumaLOG) 4 Unit(s) SubCutaneous three times a day before meals      Vital Signs Last 24 Hrs  T(C): 36.9 (12 Apr 2019 04:59), Max: 36.9 (12 Apr 2019 04:59)  T(F): 98.4 (12 Apr 2019 04:59), Max: 98.4 (12 Apr 2019 04:59)  HR: 60 (12 Apr 2019 04:59) (60 - 60)  BP: 143/60 (12 Apr 2019 04:59) (140/56 - 153/64)  BP(mean): --  RR: 18 (12 Apr 2019 04:59) (16 - 18)  SpO2: 100% (12 Apr 2019 04:59) (100% - 100%)      PHYSICAL EXAM  All physical exam findings normal, except those marked:  General:	Alert, active, cooperative, NAD, well hydrated  .		[] Abnormal:  Neck		Normal: supple, no cervical adenopathy, no palpable thyroid  .		[] Abnormal:  Cardiovascular	Normal: regular rate, normal S1, S2, no murmurs  .		[] Abnormal:  Respiratory	Normal: no chest wall deformity, normal respiratory pattern, CTA B/L  .		[] Abnormal:  Abdominal	Normal: soft, ND, NT, bowel sounds present, no masses, no organomegaly  .		[] Abnormal:  		Normal normal genitalia, testes descended, circumcised/uncircumcised  .		Sonja stage:			Breast sonja:  .		Menstrual history:  .		[] Abnormal:  Extremities	Normal: FROM x4  .		[] Abnormal:  Skin		Normal: intact and not indurated, no rash, no acanthosis nigricans  .		[] Abnormal:  Neurologic	Normal: grossly intact  .		[] Abnormal:    LABS                        12.4   5.70  )-----------( 71       ( 12 Apr 2019 07:29 )             38.0                               141    |  105    |  13                  Calcium: 8.3   / iCa: x      (04-12 @ 07:29)    ----------------------------<  66        Magnesium: x                                4.4     |  29     |  0.87             Phosphorous: x        TPro  6.3    /  Alb  2.3    /  TBili  0.3    /  DBili  0.1    /  AST  52     /  ALT  41     /  AlkPhos  89     12 Apr 2019 07:29    CAPILLARY BLOOD GLUCOSE      POCT Blood Glucose.: 169 mg/dL (12 Apr 2019 09:34)  POCT Blood Glucose.: 54 mg/dL (12 Apr 2019 08:04)  POCT Blood Glucose.: 181 mg/dL (11 Apr 2019 21:33)  POCT Blood Glucose.: 129 mg/dL (11 Apr 2019 17:04)  POCT Blood Glucose.: 222 mg/dL (11 Apr 2019 11:52)        Assesment/plan  Dm- with am hypos  change lantus to 10 units  cont pre meal humalog  fsg ac and hs  afrezza pre meals as out pt  d/w pts family

## 2019-04-12 NOTE — DISCHARGE NOTE PROVIDER - PROVIDER TOKENS
PROVIDER:[TOKEN:[[id number]],FOLLOWUP:[1-3 days]],PROVIDER:[TOKEN:[[id number]],FOLLOWUP:[1 week]]

## 2019-04-12 NOTE — DISCHARGE NOTE PROVIDER - HOSPITAL COURSE
88 yo F with PMH of Afib, PPM, DM, HTN, Hep C, Grave's disease, s/p cholecystectomy, bowel obstruction in the past, presented to ED due to nausea for the past 10 days. Per daughter-in-law, pt has also had generalized weakness, deconditioning, hiccups, weakness with 5 lbs weight loss in 2 weeks, abd pain/bloating for past 10 days, last BM was 2 days ago. Pt describes abdominal pain as right-sided cramping radiating to the left. Pt denies fever, chills, vomiting, burning with urination, however admits to urinary frequency of every 1-2 hours. Pt denies diarrhea, all other ROS negative. Of note, patient last had colonoscopy on 07/2018 which was negative and EGD in 02/2019 which showed healing gastric ulcer along with GERD.     Pt ruelas ct OF ABD Pt had CT of abdomen    < from: CT Abdomen and Pelvis w/ Oral Cont and w/ IV Cont (04.10.19 @ 14:18) >        IMPRESSION:        Mild right-sided colonic wall thickening consistent with colitis.        Nonvisualization the gallbladder. Distended common duct. Questionable     mass or calculus in the distal common duct. If there is concern for     distal common duct obstruction, ERCP is advised.        Constipated colon.        Cardiomegaly. Pacemaker.        < end of copied text >    Dr Huffman was consulted for ERCP which cannot be done in a timely fashion. MRCP is contraindicated 2/2 PPM    Pt received enemas  x 2 and mag citrate x 1 on 4/12 . Pt had 1 soft BM up until 4:30. No large evacuation noted.     The plan is for outpatient Endoscopic Ultrasound to be set up after April 24 when DIL and son return from [place]. This needs to be scheduled by Dr Huffman

## 2019-04-12 NOTE — DISCHARGE NOTE PROVIDER - CARE PROVIDER_API CALL
Jose Daniel Huffman)  Medicine  [address] [place] [address]  Phone: [phone]  Fax: [phone]  Follow Up Time: 1-3 days    Santos Quintana ()  Medicine  [address] [place] [address], [address] [place] [address]  Phone: [phone]  Fax: [phone]  Follow Up Time: 1 week

## 2019-04-13 LAB
ANION GAP SERPL CALC-SCNC: 3 MMOL/L — LOW (ref 5–17)
BUN SERPL-MCNC: 10 MG/DL — SIGNIFICANT CHANGE UP (ref 7–18)
CALCIUM SERPL-MCNC: 8.8 MG/DL — SIGNIFICANT CHANGE UP (ref 8.4–10.5)
CHLORIDE SERPL-SCNC: 102 MMOL/L — SIGNIFICANT CHANGE UP (ref 96–108)
CO2 SERPL-SCNC: 33 MMOL/L — HIGH (ref 22–31)
CREAT SERPL-MCNC: 0.75 MG/DL — SIGNIFICANT CHANGE UP (ref 0.5–1.3)
GLUCOSE BLDC GLUCOMTR-MCNC: 111 MG/DL — HIGH (ref 70–99)
GLUCOSE BLDC GLUCOMTR-MCNC: 157 MG/DL — HIGH (ref 70–99)
GLUCOSE BLDC GLUCOMTR-MCNC: 171 MG/DL — HIGH (ref 70–99)
GLUCOSE BLDC GLUCOMTR-MCNC: 79 MG/DL — SIGNIFICANT CHANGE UP (ref 70–99)
GLUCOSE SERPL-MCNC: 81 MG/DL — SIGNIFICANT CHANGE UP (ref 70–99)
HCT VFR BLD CALC: 40.1 % — SIGNIFICANT CHANGE UP (ref 34.5–45)
HGB BLD-MCNC: 13 G/DL — SIGNIFICANT CHANGE UP (ref 11.5–15.5)
MCHC RBC-ENTMCNC: 27 PG — SIGNIFICANT CHANGE UP (ref 27–34)
MCHC RBC-ENTMCNC: 32.4 GM/DL — SIGNIFICANT CHANGE UP (ref 32–36)
MCV RBC AUTO: 83.4 FL — SIGNIFICANT CHANGE UP (ref 80–100)
NRBC # BLD: 0 /100 WBCS — SIGNIFICANT CHANGE UP (ref 0–0)
PLATELET # BLD AUTO: 68 K/UL — LOW (ref 150–400)
POTASSIUM SERPL-MCNC: 3.6 MMOL/L — SIGNIFICANT CHANGE UP (ref 3.5–5.3)
POTASSIUM SERPL-SCNC: 3.6 MMOL/L — SIGNIFICANT CHANGE UP (ref 3.5–5.3)
RBC # BLD: 4.81 M/UL — SIGNIFICANT CHANGE UP (ref 3.8–5.2)
RBC # FLD: 17.6 % — HIGH (ref 10.3–14.5)
SODIUM SERPL-SCNC: 138 MMOL/L — SIGNIFICANT CHANGE UP (ref 135–145)
WBC # BLD: 5.07 K/UL — SIGNIFICANT CHANGE UP (ref 3.8–10.5)
WBC # FLD AUTO: 5.07 K/UL — SIGNIFICANT CHANGE UP (ref 3.8–10.5)

## 2019-04-13 RX ADMIN — Medication 100 MILLIGRAM(S): at 14:21

## 2019-04-13 RX ADMIN — Medication 650 MILLIGRAM(S): at 14:26

## 2019-04-13 RX ADMIN — Medication 650 MILLIGRAM(S): at 17:21

## 2019-04-13 RX ADMIN — CEFTRIAXONE 100 GRAM(S): 500 INJECTION, POWDER, FOR SOLUTION INTRAMUSCULAR; INTRAVENOUS at 01:28

## 2019-04-13 RX ADMIN — AMLODIPINE BESYLATE 2.5 MILLIGRAM(S): 2.5 TABLET ORAL at 05:43

## 2019-04-13 RX ADMIN — AMIODARONE HYDROCHLORIDE 200 MILLIGRAM(S): 400 TABLET ORAL at 05:43

## 2019-04-13 RX ADMIN — INSULIN GLARGINE 10 UNIT(S): 100 INJECTION, SOLUTION SUBCUTANEOUS at 21:21

## 2019-04-13 RX ADMIN — Medication 0.12 MILLIGRAM(S): at 05:43

## 2019-04-13 RX ADMIN — Medication 20 MILLIGRAM(S): at 05:43

## 2019-04-13 RX ADMIN — HEPARIN SODIUM 5000 UNIT(S): 5000 INJECTION INTRAVENOUS; SUBCUTANEOUS at 21:20

## 2019-04-13 RX ADMIN — Medication 4 UNIT(S): at 17:23

## 2019-04-13 RX ADMIN — Medication 100 MILLIGRAM(S): at 05:44

## 2019-04-13 RX ADMIN — HEPARIN SODIUM 5000 UNIT(S): 5000 INJECTION INTRAVENOUS; SUBCUTANEOUS at 14:21

## 2019-04-13 RX ADMIN — Medication 25 MILLIGRAM(S): at 05:43

## 2019-04-13 RX ADMIN — Medication 4 UNIT(S): at 08:42

## 2019-04-13 RX ADMIN — HEPARIN SODIUM 5000 UNIT(S): 5000 INJECTION INTRAVENOUS; SUBCUTANEOUS at 05:44

## 2019-04-13 RX ADMIN — Medication 100 MILLIGRAM(S): at 21:19

## 2019-04-13 RX ADMIN — PANTOPRAZOLE SODIUM 40 MILLIGRAM(S): 20 TABLET, DELAYED RELEASE ORAL at 05:44

## 2019-04-13 RX ADMIN — Medication 10 MILLIEQUIVALENT(S): at 12:13

## 2019-04-13 RX ADMIN — Medication 1: at 17:24

## 2019-04-13 RX ADMIN — LATANOPROST 1 DROP(S): 0.05 SOLUTION/ DROPS OPHTHALMIC; TOPICAL at 21:19

## 2019-04-13 RX ADMIN — Medication 1 PACKET(S): at 12:20

## 2019-04-13 RX ADMIN — Medication 81 MILLIGRAM(S): at 12:13

## 2019-04-13 RX ADMIN — Medication 4 UNIT(S): at 12:12

## 2019-04-13 RX ADMIN — Medication 500 MILLIGRAM(S): at 12:13

## 2019-04-13 NOTE — DIETITIAN INITIAL EVALUATION ADULT. - OTHER INFO
Nutrition consult requested for unintended wt loss and decreased intake; lives home PTA with family; skin intact; still poor appetite, food choices obtained, requesting Glucerna Shake; GI consult noted, possible transfer to [facility] for EUS and ERCP per MD

## 2019-04-13 NOTE — DIETITIAN INITIAL EVALUATION ADULT. - FACTORS AFF FOOD INTAKE
difficulty with food procurement/preparation/[place]/ethnic/cultural/personal food preferences/persistent lack of appetite/altered GI function with nausea, distended/bloated abdomin

## 2019-04-13 NOTE — CHART NOTE - NSCHARTNOTEFT_GEN_A_CORE
Upon Nutritional Assessment by the Registered Dietitian your patient was determined to meet criteria / has evidence of the following diagnosis/diagnoses:          [ ]  Mild Protein Calorie Malnutrition        [ ]  Moderate Protein Calorie Malnutrition        [ X ] Severe Protein Calorie Malnutrition        [ ] Unspecified Protein Calorie Malnutrition        [ ] Underweight / BMI <19        [ ] Morbid Obesity / BMI > 40      Findings as based on:  •  Comprehensive nutrition assessment and consultation  •  Calorie counts (nutrient intake analysis)  •  Food acceptance and intake status from observations by staff  •  Follow up  •  Patient education  •  Intervention secondary to interdisciplinary rounds  •   concerns      Treatment:    The following diet has been recommended:  Add Glucerna Shake 1can bid as medically feasible (440kcal, 20g protein)        PROVIDER Section:     By signing this assessment you are acknowledging and agree with the diagnosis/diagnoses assigned by the Registered Dietitian    Comments:

## 2019-04-13 NOTE — PROGRESS NOTE ADULT - SUBJECTIVE AND OBJECTIVE BOX
CHIEF COMPLAINT:Patient is a 87y old  Female who presents with a chief complaint of nausea, abdominal pain and bloating (12 Apr 2019 17:17)    	  REVIEW OF SYSTEMS:  CONSTITUTIONAL: No fever, weight loss, or fatigue  EYES: No eye pain, visual disturbances, or discharge  ENMT:  No difficulty hearing, tinnitus, vertigo; No sinus or throat pain  NECK: No pain or stiffness  RESPIRATORY: No cough, wheezing, chills or hemoptysis; No Shortness of Breath  CARDIOVASCULAR: No chest pain, palpitations, passing out, dizziness, or leg swelling  GASTROINTESTINAL: No abdominal or epigastric pain. No nausea, vomiting, or hematemesis; No diarrhea or constipation. No melena or hematochezia.  GENITOURINARY: No dysuria, frequency, hematuria, or incontinence  NEUROLOGICAL: No headaches, memory loss, loss of strength, numbness, or tremors  SKIN: No itching, burning, rashes, or lesions   LYMPH Nodes: No enlarged glands  ENDOCRINE: No heat or cold intolerance; No hair loss  MUSCULOSKELETAL: No joint pain or swelling; No muscle, back, or extremity pain  PSYCHIATRIC: No depression, anxiety, mood swings, or difficulty sleeping  HEME/LYMPH: No easy bruising, or bleeding gums  ALLERY AND IMMUNOLOGIC: No hives or eczema	    [ ] All others negative	  [ ] Unable to obtain    PHYSICAL EXAM:  T(C): 36.4 (04-13-19 @ 14:50), Max: 36.9 (04-12-19 @ 20:53)  HR: 61 (04-13-19 @ 14:50) (60 - 61)  BP: 131/86 (04-13-19 @ 14:50) (131/86 - 158/73)  RR: 18 (04-13-19 @ 14:50) (16 - 18)  SpO2: 100% (04-13-19 @ 14:50) (100% - 100%)  Wt(kg): --  I&O's Summary      Appearance: Normal	  HEENT:   Normal oral mucosa, PERRL, EOMI	  Lymphatic: No lymphadenopathy  Cardiovascular: Normal S1 S2, No JVD, No murmurs, No edema  Respiratory: Lungs clear to auscultation	  Psychiatry: A & O x 3, Mood & affect appropriate  Gastrointestinal:  Soft, mildly distended, Non-tender, + BS	  Skin: No rashes, No ecchymoses, No cyanosis	  Neurologic: Non-focal  Extremities: Normal range of motion, No clubbing, cyanosis or edema  Vascular: Peripheral pulses palpable 2+ bilaterally    MEDICATIONS  (STANDING):  amiodarone    Tablet 200 milliGRAM(s) Oral daily  amLODIPine   Tablet 2.5 milliGRAM(s) Oral daily  ascorbic acid 500 milliGRAM(s) Oral daily  aspirin enteric coated 81 milliGRAM(s) Oral daily  cefTRIAXone   IVPB 1 Gram(s) IV Intermittent every 24 hours  digoxin     Tablet 0.125 milliGRAM(s) Oral daily  furosemide    Tablet 20 milliGRAM(s) Oral daily  heparin  Injectable 5000 Unit(s) SubCutaneous every 8 hours  insulin glargine Injectable (LANTUS) 10 Unit(s) SubCutaneous at bedtime  insulin lispro (HumaLOG) corrective regimen sliding scale   SubCutaneous three times a day before meals  insulin lispro Injectable (HumaLOG) 4 Unit(s) SubCutaneous three times a day before meals  latanoprost 0.005% Ophthalmic Solution 1 Drop(s) Both EYES at bedtime  metoprolol succinate ER 25 milliGRAM(s) Oral daily  metroNIDAZOLE  IVPB 500 milliGRAM(s) IV Intermittent every 8 hours  pantoprazole    Tablet 40 milliGRAM(s) Oral before breakfast  potassium chloride    Tablet ER 10 milliEquivalent(s) Oral daily  psyllium Powder 1 Packet(s) Oral daily      TELEMETRY: 	    ECG:  	  RADIOLOGY:  OTHER: 	  	  CBC Full  -  ( 13 Apr 2019 07:16 )  WBC Count : 5.07 K/uL  Hemoglobin : 13.0 g/dL  Hematocrit : 40.1 %  Platelet Count - Automated : 68 K/uL  Mean Cell Volume : 83.4 fl  Mean Cell Hemoglobin : 27.0 pg  Mean Cell Hemoglobin Concentration : 32.4 gm/dL  Auto Neutrophil # : x  Auto Lymphocyte # : x  Auto Monocyte # : x  Auto Eosinophil # : x  Auto Basophil # : x  Auto Neutrophil % : x  Auto Lymphocyte % : x  Auto Monocyte % : x  Auto Eosinophil % : x  Auto Basophil % : x        CARDIAC MARKERS:                              13.0   5.07  )-----------( 68       ( 13 Apr 2019 07:16 )             40.1       04-13    138  |  102  |  10  ----------------------------<  81  3.6   |  33<H>  |  0.75    Ca    8.8      13 Apr 2019 07:16    TPro  6.3  /  Alb  2.3<L>  /  TBili  0.3  /  DBili  0.1  /  AST  52<H>  /  ALT  41  /  AlkPhos  89  04-12            proBNP: Serum Pro-Brain Natriuretic Peptide: 3280 pg/mL (04-10 @ 12:12)    Lipid Profile: Cholesterol 220    HDL 54      HgA1c: Hemoglobin A1C, Whole Blood: 7.8 % (04-11 @ 11:34)    TSH: Thyroid Stimulating Hormone, Serum: 2.82 uU/mL (04-11 @ 07:47)

## 2019-04-13 NOTE — DIETITIAN INITIAL EVALUATION ADULT. - PERTINENT MEDS FT
reviewed   MEDICATIONS  (STANDING):  amiodarone    Tablet 200 milliGRAM(s) Oral daily  amLODIPine   Tablet 2.5 milliGRAM(s) Oral daily  ascorbic acid 500 milliGRAM(s) Oral daily  aspirin enteric coated 81 milliGRAM(s) Oral daily  cefTRIAXone   IVPB 1 Gram(s) IV Intermittent every 24 hours  digoxin     Tablet 0.125 milliGRAM(s) Oral daily  furosemide    Tablet 20 milliGRAM(s) Oral daily  heparin  Injectable 5000 Unit(s) SubCutaneous every 8 hours  insulin glargine Injectable (LANTUS) 10 Unit(s) SubCutaneous at bedtime  insulin lispro (HumaLOG) corrective regimen sliding scale   SubCutaneous three times a day before meals  insulin lispro Injectable (HumaLOG) 4 Unit(s) SubCutaneous three times a day before meals  latanoprost 0.005% Ophthalmic Solution 1 Drop(s) Both EYES at bedtime  metoprolol succinate ER 25 milliGRAM(s) Oral daily  metroNIDAZOLE  IVPB 500 milliGRAM(s) IV Intermittent every 8 hours  pantoprazole    Tablet 40 milliGRAM(s) Oral before breakfast  potassium chloride    Tablet ER 10 milliEquivalent(s) Oral daily  psyllium Powder 1 Packet(s) Oral daily    MEDICATIONS  (PRN):  acetaminophen   Tablet .. 650 milliGRAM(s) Oral every 6 hours PRN Mild Pain (1 - 3), Moderate Pain (4 - 6)  morphine  - Injectable 2 milliGRAM(s) IV Push once PRN Severe Pain (7 - 10)  ondansetron Injectable 4 milliGRAM(s) IV Push every 4 hours PRN Nausea and/or Vomiting

## 2019-04-13 NOTE — DIETITIAN INITIAL EVALUATION ADULT. - PERTINENT LABORATORY DATA
reviewed   04-13 Na138 mmol/L Glu 81 mg/dL K+ 3.6 mmol/L Cr  0.75 mg/dL BUN 10 mg/dL  Finger stick range=79 to 222   KneM0W=5.8

## 2019-04-14 ENCOUNTER — TRANSCRIPTION ENCOUNTER (OUTPATIENT)
Age: 84
End: 2019-04-14

## 2019-04-14 VITALS
SYSTOLIC BLOOD PRESSURE: 136 MMHG | HEART RATE: 60 BPM | TEMPERATURE: 98 F | RESPIRATION RATE: 16 BRPM | OXYGEN SATURATION: 100 % | DIASTOLIC BLOOD PRESSURE: 64 MMHG

## 2019-04-14 LAB
ANION GAP SERPL CALC-SCNC: 3 MMOL/L — LOW (ref 5–17)
BUN SERPL-MCNC: 11 MG/DL — SIGNIFICANT CHANGE UP (ref 7–18)
CALCIUM SERPL-MCNC: 8.4 MG/DL — SIGNIFICANT CHANGE UP (ref 8.4–10.5)
CHLORIDE SERPL-SCNC: 102 MMOL/L — SIGNIFICANT CHANGE UP (ref 96–108)
CO2 SERPL-SCNC: 32 MMOL/L — HIGH (ref 22–31)
CREAT SERPL-MCNC: 0.83 MG/DL — SIGNIFICANT CHANGE UP (ref 0.5–1.3)
GLUCOSE BLDC GLUCOMTR-MCNC: 189 MG/DL — HIGH (ref 70–99)
GLUCOSE BLDC GLUCOMTR-MCNC: 72 MG/DL — SIGNIFICANT CHANGE UP (ref 70–99)
GLUCOSE SERPL-MCNC: 84 MG/DL — SIGNIFICANT CHANGE UP (ref 70–99)
HCT VFR BLD CALC: 39.5 % — SIGNIFICANT CHANGE UP (ref 34.5–45)
HGB BLD-MCNC: 12.8 G/DL — SIGNIFICANT CHANGE UP (ref 11.5–15.5)
MCHC RBC-ENTMCNC: 27.1 PG — SIGNIFICANT CHANGE UP (ref 27–34)
MCHC RBC-ENTMCNC: 32.4 GM/DL — SIGNIFICANT CHANGE UP (ref 32–36)
MCV RBC AUTO: 83.5 FL — SIGNIFICANT CHANGE UP (ref 80–100)
NRBC # BLD: 0 /100 WBCS — SIGNIFICANT CHANGE UP (ref 0–0)
PLATELET # BLD AUTO: 72 K/UL — LOW (ref 150–400)
POTASSIUM SERPL-MCNC: 3.6 MMOL/L — SIGNIFICANT CHANGE UP (ref 3.5–5.3)
POTASSIUM SERPL-SCNC: 3.6 MMOL/L — SIGNIFICANT CHANGE UP (ref 3.5–5.3)
RBC # BLD: 4.73 M/UL — SIGNIFICANT CHANGE UP (ref 3.8–5.2)
RBC # FLD: 17.9 % — HIGH (ref 10.3–14.5)
SODIUM SERPL-SCNC: 137 MMOL/L — SIGNIFICANT CHANGE UP (ref 135–145)
WBC # BLD: 5.09 K/UL — SIGNIFICANT CHANGE UP (ref 3.8–10.5)
WBC # FLD AUTO: 5.09 K/UL — SIGNIFICANT CHANGE UP (ref 3.8–10.5)

## 2019-04-14 PROCEDURE — 36415 COLL VENOUS BLD VENIPUNCTURE: CPT

## 2019-04-14 PROCEDURE — 85027 COMPLETE CBC AUTOMATED: CPT

## 2019-04-14 PROCEDURE — 84484 ASSAY OF TROPONIN QUANT: CPT

## 2019-04-14 PROCEDURE — 87086 URINE CULTURE/COLONY COUNT: CPT

## 2019-04-14 PROCEDURE — 80053 COMPREHEN METABOLIC PANEL: CPT

## 2019-04-14 PROCEDURE — 80048 BASIC METABOLIC PNL TOTAL CA: CPT

## 2019-04-14 PROCEDURE — 82962 GLUCOSE BLOOD TEST: CPT

## 2019-04-14 PROCEDURE — 80061 LIPID PANEL: CPT

## 2019-04-14 PROCEDURE — 81001 URINALYSIS AUTO W/SCOPE: CPT

## 2019-04-14 PROCEDURE — 83605 ASSAY OF LACTIC ACID: CPT

## 2019-04-14 PROCEDURE — 83735 ASSAY OF MAGNESIUM: CPT

## 2019-04-14 PROCEDURE — 84100 ASSAY OF PHOSPHORUS: CPT

## 2019-04-14 PROCEDURE — 82009 KETONE BODYS QUAL: CPT

## 2019-04-14 PROCEDURE — 86301 IMMUNOASSAY TUMOR CA 19-9: CPT

## 2019-04-14 PROCEDURE — 87040 BLOOD CULTURE FOR BACTERIA: CPT

## 2019-04-14 PROCEDURE — 71045 X-RAY EXAM CHEST 1 VIEW: CPT

## 2019-04-14 PROCEDURE — 82306 VITAMIN D 25 HYDROXY: CPT

## 2019-04-14 PROCEDURE — 83690 ASSAY OF LIPASE: CPT

## 2019-04-14 PROCEDURE — 82105 ALPHA-FETOPROTEIN SERUM: CPT

## 2019-04-14 PROCEDURE — 82607 VITAMIN B-12: CPT

## 2019-04-14 PROCEDURE — 74177 CT ABD & PELVIS W/CONTRAST: CPT

## 2019-04-14 PROCEDURE — 99285 EMERGENCY DEPT VISIT HI MDM: CPT | Mod: 25

## 2019-04-14 PROCEDURE — 87045 FECES CULTURE AEROBIC BACT: CPT

## 2019-04-14 PROCEDURE — 87046 STOOL CULTR AEROBIC BACT EA: CPT

## 2019-04-14 PROCEDURE — 84443 ASSAY THYROID STIM HORMONE: CPT

## 2019-04-14 PROCEDURE — 83880 ASSAY OF NATRIURETIC PEPTIDE: CPT

## 2019-04-14 PROCEDURE — 83036 HEMOGLOBIN GLYCOSYLATED A1C: CPT

## 2019-04-14 PROCEDURE — 93005 ELECTROCARDIOGRAM TRACING: CPT

## 2019-04-14 PROCEDURE — 80076 HEPATIC FUNCTION PANEL: CPT

## 2019-04-14 RX ORDER — METRONIDAZOLE 500 MG
1 TABLET ORAL
Qty: 15 | Refills: 0
Start: 2019-04-14 | End: 2019-04-18

## 2019-04-14 RX ORDER — CEFUROXIME AXETIL 250 MG
500 TABLET ORAL EVERY 12 HOURS
Qty: 0 | Refills: 0 | Status: DISCONTINUED | OUTPATIENT
Start: 2019-04-14 | End: 2019-04-14

## 2019-04-14 RX ORDER — CEFUROXIME AXETIL 250 MG
1 TABLET ORAL
Qty: 10 | Refills: 0
Start: 2019-04-14 | End: 2019-04-18

## 2019-04-14 RX ORDER — INSULIN GLARGINE 100 [IU]/ML
8 INJECTION, SOLUTION SUBCUTANEOUS AT BEDTIME
Qty: 0 | Refills: 0 | Status: DISCONTINUED | OUTPATIENT
Start: 2019-04-14 | End: 2019-04-14

## 2019-04-14 RX ADMIN — Medication 10 MILLIEQUIVALENT(S): at 11:20

## 2019-04-14 RX ADMIN — Medication 100 MILLIGRAM(S): at 06:27

## 2019-04-14 RX ADMIN — Medication 0.12 MILLIGRAM(S): at 06:28

## 2019-04-14 RX ADMIN — Medication 25 MILLIGRAM(S): at 06:28

## 2019-04-14 RX ADMIN — Medication 1: at 11:19

## 2019-04-14 RX ADMIN — AMLODIPINE BESYLATE 2.5 MILLIGRAM(S): 2.5 TABLET ORAL at 06:28

## 2019-04-14 RX ADMIN — PANTOPRAZOLE SODIUM 40 MILLIGRAM(S): 20 TABLET, DELAYED RELEASE ORAL at 06:28

## 2019-04-14 RX ADMIN — HEPARIN SODIUM 5000 UNIT(S): 5000 INJECTION INTRAVENOUS; SUBCUTANEOUS at 06:27

## 2019-04-14 RX ADMIN — CEFTRIAXONE 100 GRAM(S): 500 INJECTION, POWDER, FOR SOLUTION INTRAMUSCULAR; INTRAVENOUS at 02:03

## 2019-04-14 RX ADMIN — Medication 4 UNIT(S): at 08:00

## 2019-04-14 RX ADMIN — AMIODARONE HYDROCHLORIDE 200 MILLIGRAM(S): 400 TABLET ORAL at 06:28

## 2019-04-14 RX ADMIN — Medication 81 MILLIGRAM(S): at 11:20

## 2019-04-14 RX ADMIN — Medication 1 PACKET(S): at 12:53

## 2019-04-14 RX ADMIN — Medication 20 MILLIGRAM(S): at 06:28

## 2019-04-14 RX ADMIN — Medication 500 MILLIGRAM(S): at 11:20

## 2019-04-14 RX ADMIN — Medication 4 UNIT(S): at 11:18

## 2019-04-14 NOTE — PROGRESS NOTE ADULT - PROBLEM SELECTOR PROBLEM 1
Choledocholithiasis with obstruction

## 2019-04-14 NOTE — PROGRESS NOTE ADULT - ASSESSMENT
1. Fecal impaction improving  2. ? CBD stone vs tumor  3. Anemia  4. No evidence of acute GI bleeding    Suggestions:    1. Continue stool softener / Laxative  2. Monitor H/H  3. Transfuse PRBC as needed  4. Patient needs EUS and ERCP at [facility]  5. Avoid NSAID  6. DVT prophylaxis
1. Abdominal pain  2. Fecal impaction  3. Colitis  4. Dilated CBD with ? mass or stone  5. Elevated   6. R/o pancreatic CA    Suggestions:    1. Transfer to [facility] for EUS and ERCP  2. Daily stool softener / Laxative  3. Avoid NSAID  4. Protonix daily  5. DVT prophylaxis

## 2019-04-14 NOTE — PROGRESS NOTE ADULT - PROBLEM SELECTOR PLAN 4
DVT PROPHYLAXIS WITH HEPARIN  GI ppx with Protonix

## 2019-04-14 NOTE — PROGRESS NOTE ADULT - PROBLEM SELECTOR PLAN 3
Pt has large amount of stool in her colon possibly impacted possibly causing spurious diarrhea.  Enema x 2 ordered and then to follow with Citrate of magnesia.
Pt has large amount of stool in her colon possibly impacted possibly causing spurious diarrhea.  Now resolved.
S/P LACTULOSE LAST NIGHT WITH MULTIPLE LARGE BMS.
Pt has large amount of stool in her colon possibly impacted possibly causing spurious diarrhea.  Enema x 2 ordered and then to follow with Citrate of magnesia.

## 2019-04-14 NOTE — PROGRESS NOTE ADULT - PROBLEM SELECTOR PLAN 1
D/W DR Huffman. CT scan showed possible stone or mass. Had remote Kalyn in past Pt may need ERCP. Continue Ceftriaxone and Flagyl. WBC  WNL
D/W DR Huffman. Feels well with normal bowel movement today.  No abdominal pain Tolerates diet. Pt will need EUS at [facility] as outpt.      Continue Ceftriaxone and Flagyl. WBC  WNL
D/W DR Huffman. Still with mild abdominal distention and still having bowel movements with all the stool softeners. Pt will need EUS at [facility] as outpt.      Continue Ceftriaxone and Flagyl. WBC  WNL
D/W DR Huffman. CT scan showed possible stone or mass AEB dilated CBC. Had remote Kalyn in past. Case discussed at length with Rehana Quintana and Armida. MRCP is contraindicated due to PPM. Pt will need EUS at [facility] as outpt.   Ca19-9 AFP   Continue Ceftriaxone and Flagyl. WBC  WNL

## 2019-04-14 NOTE — PROGRESS NOTE ADULT - PROBLEM SELECTOR PLAN 2
Continue AMIODARONE, DIGOXIN AND TOPROL, rate irregular today
Continue Amiodarone, Toprol and Dig, rate regular today

## 2019-04-14 NOTE — PROGRESS NOTE ADULT - GASTROINTESTINAL DETAILS
no rebound tenderness/no rigidity/soft/nontender/no guarding/no distention/bowel sounds normal
no distention/no guarding/no rigidity/nontender/no rebound tenderness/soft/bowel sounds normal

## 2019-04-14 NOTE — PROGRESS NOTE ADULT - REASON FOR ADMISSION
nausea, abdominal pain and bloating

## 2019-04-14 NOTE — PROGRESS NOTE ADULT - SUBJECTIVE AND OBJECTIVE BOX
Interval Events:      Allergies    No Known Allergies    Intolerances      Endocrine/Metabolic Medications:  insulin glargine Injectable (LANTUS) 8 Unit(s) SubCutaneous at bedtime  insulin lispro (HumaLOG) corrective regimen sliding scale   SubCutaneous three times a day before meals  insulin lispro Injectable (HumaLOG) 4 Unit(s) SubCutaneous three times a day before meals      Vital Signs Last 24 Hrs  T(C): 36.9 (14 Apr 2019 05:18), Max: 36.9 (14 Apr 2019 05:18)  T(F): 98.5 (14 Apr 2019 05:18), Max: 98.5 (14 Apr 2019 05:18)  HR: 60 (14 Apr 2019 05:18) (60 - 62)  BP: 136/64 (14 Apr 2019 05:18) (131/86 - 143/66)  BP(mean): --  RR: 16 (14 Apr 2019 05:18) (16 - 18)  SpO2: 100% (14 Apr 2019 05:18) (100% - 100%)      PHYSICAL EXAM  All physical exam findings normal, except those marked:  General:	Alert, active, cooperative, NAD, well hydrated  .		[] Abnormal:  Neck		Normal: supple, no cervical adenopathy, no palpable thyroid  .		[] Abnormal:  Cardiovascular	Normal: regular rate, normal S1, S2, no murmurs  .		[] Abnormal:  Respiratory	Normal: no chest wall deformity, normal respiratory pattern, CTA B/L  .		[] Abnormal:  Abdominal	Normal: soft, ND, NT, bowel sounds present, no masses, no organomegaly  .		[] Abnormal:  		Normal normal genitalia, testes descended, circumcised/uncircumcised  .		Sonja stage:			Breast sonja:  .		Menstrual history:  .		[] Abnormal:  Extremities	Normal: FROM x4  .		[] Abnormal:  Skin		Normal: intact and not indurated, no rash, no acanthosis nigricans  .		[] Abnormal:  Neurologic	Normal: grossly intact  .		[] Abnormal:    LABS                        12.8   5.09  )-----------( 72       ( 14 Apr 2019 07:50 )             39.5                               137    |  102    |  11                  Calcium: 8.4   / iCa: x      (04-14 @ 07:50)    ----------------------------<  84        Magnesium: x                                3.6     |  32     |  0.83             Phosphorous: x          CAPILLARY BLOOD GLUCOSE      POCT Blood Glucose.: 189 mg/dL (14 Apr 2019 11:11)  POCT Blood Glucose.: 72 mg/dL (14 Apr 2019 07:51)  POCT Blood Glucose.: 171 mg/dL (13 Apr 2019 22:08)  POCT Blood Glucose.: 157 mg/dL (13 Apr 2019 17:08)        Assesment/plan Interval Events:  pt in nad    Allergies    No Known Allergies    Intolerances      Endocrine/Metabolic Medications:  insulin glargine Injectable (LANTUS) 8 Unit(s) SubCutaneous at bedtime  insulin lispro (HumaLOG) corrective regimen sliding scale   SubCutaneous three times a day before meals  insulin lispro Injectable (HumaLOG) 4 Unit(s) SubCutaneous three times a day before meals      Vital Signs Last 24 Hrs  T(C): 36.9 (14 Apr 2019 05:18), Max: 36.9 (14 Apr 2019 05:18)  T(F): 98.5 (14 Apr 2019 05:18), Max: 98.5 (14 Apr 2019 05:18)  HR: 60 (14 Apr 2019 05:18) (60 - 62)  BP: 136/64 (14 Apr 2019 05:18) (131/86 - 143/66)  BP(mean): --  RR: 16 (14 Apr 2019 05:18) (16 - 18)  SpO2: 100% (14 Apr 2019 05:18) (100% - 100%)      PHYSICAL EXAM  All physical exam findings normal, except those marked:  General:	Alert, active, cooperative, NAD, well hydrated  .		[] Abnormal:  Neck		Normal: supple, no cervical adenopathy, no palpable thyroid  .		[] Abnormal:  Cardiovascular	Normal: regular rate, normal S1, S2, no murmurs  .		[] Abnormal:  Respiratory	Normal: no chest wall deformity, normal respiratory pattern, CTA B/L  .		[] Abnormal:  Abdominal	Normal: soft, ND, NT, bowel sounds present, no masses, no organomegaly  .		[] Abnormal:  		Normal normal genitalia, testes descended, circumcised/uncircumcised  .		Sonja stage:			Breast sonja:  .		Menstrual history:  .		[] Abnormal:  Extremities	Normal: FROM x4  .		[] Abnormal:  Skin		Normal: intact and not indurated, no rash, no acanthosis nigricans  .		[] Abnormal:  Neurologic	Normal: grossly intact  .		[] Abnormal:    LABS                        12.8   5.09  )-----------( 72       ( 14 Apr 2019 07:50 )             39.5                               137    |  102    |  11                  Calcium: 8.4   / iCa: x      (04-14 @ 07:50)    ----------------------------<  84        Magnesium: x                                3.6     |  32     |  0.83             Phosphorous: x          CAPILLARY BLOOD GLUCOSE      POCT Blood Glucose.: 189 mg/dL (14 Apr 2019 11:11)  POCT Blood Glucose.: 72 mg/dL (14 Apr 2019 07:51)  POCT Blood Glucose.: 171 mg/dL (13 Apr 2019 22:08)  POCT Blood Glucose.: 157 mg/dL (13 Apr 2019 17:08)        Assesment/plan  Dm- with mild am hpos  change lantus to 8 units  change humalog to afrezza 4 ubits ac tid as out pt  d/w pts family

## 2019-04-14 NOTE — PROGRESS NOTE ADULT - SUBJECTIVE AND OBJECTIVE BOX
[   ] ICU                                          [   ] CCU                                      [  X ] Medical Floor    Patient is comfortable. Nursing staff reported patient with large bowel movement. No new complaints reported.  No abdominal pain, N/V, hematemesis, hematochezia, melena, fever, chills, chest pain, SOB, cough or diarrhea reported.    VITALS  Vital Signs Last 24 Hrs  T(C): 36.9 (14 Apr 2019 05:18), Max: 36.9 (14 Apr 2019 05:18)  T(F): 98.5 (14 Apr 2019 05:18), Max: 98.5 (14 Apr 2019 05:18)  HR: 60 (14 Apr 2019 05:18) (60 - 62)  BP: 136/64 (14 Apr 2019 05:18) (131/86 - 143/66)   RR: 16 (14 Apr 2019 05:18) (16 - 18)  SpO2: 100% (14 Apr 2019 05:18) (100% - 100%)       MEDICATIONS  (STANDING):  amiodarone    Tablet 200 milliGRAM(s) Oral daily  amLODIPine   Tablet 2.5 milliGRAM(s) Oral daily  ascorbic acid 500 milliGRAM(s) Oral daily  aspirin enteric coated 81 milliGRAM(s) Oral daily  cefuroxime   Tablet 500 milliGRAM(s) Oral every 12 hours  digoxin     Tablet 0.125 milliGRAM(s) Oral daily  furosemide    Tablet 20 milliGRAM(s) Oral daily  heparin  Injectable 5000 Unit(s) SubCutaneous every 8 hours  insulin glargine Injectable (LANTUS) 8 Unit(s) SubCutaneous at bedtime  insulin lispro (HumaLOG) corrective regimen sliding scale   SubCutaneous three times a day before meals  insulin lispro Injectable (HumaLOG) 4 Unit(s) SubCutaneous three times a day before meals  latanoprost 0.005% Ophthalmic Solution 1 Drop(s) Both EYES at bedtime  metoprolol succinate ER 25 milliGRAM(s) Oral daily  metroNIDAZOLE  IVPB 500 milliGRAM(s) IV Intermittent every 8 hours  pantoprazole    Tablet 40 milliGRAM(s) Oral before breakfast  potassium chloride    Tablet ER 10 milliEquivalent(s) Oral daily  psyllium Powder 1 Packet(s) Oral daily    MEDICATIONS  (PRN):  acetaminophen   Tablet .. 650 milliGRAM(s) Oral every 6 hours PRN Mild Pain (1 - 3), Moderate Pain (4 - 6)  morphine  - Injectable 2 milliGRAM(s) IV Push once PRN Severe Pain (7 - 10)  ondansetron Injectable 4 milliGRAM(s) IV Push every 4 hours PRN Nausea and/or Vomiting                            12.8   5.09  )-----------( 72       ( 14 Apr 2019 07:50 )             39.5       04-14    137  |  102  |  11  ----------------------------<  84  3.6   |  32<H>  |  0.83    Ca    8.4      14 Apr 2019 07:50

## 2019-04-14 NOTE — PROGRESS NOTE ADULT - PROVIDER SPECIALTY LIST ADULT
Endocrinology
Gastroenterology
Internal Medicine
Gastroenterology

## 2019-04-14 NOTE — DISCHARGE NOTE NURSING/CASE MANAGEMENT/SOCIAL WORK - NSDCDPATPORTLINK_GEN_ALL_CORE
You can access the [facility] Patient Portal, offered by [facility], by registering with the following website: [email or web address]

## 2019-04-14 NOTE — PROGRESS NOTE ADULT - ATTENDING COMMENTS
Patient is seen and examined. Case reviewed with the medical team. Above note is appreciated. Will follow up clinically. Continue DVT prophylaxis. Case discussed with GI and Endocrine. Will monitor all BMs. discharge planning and will schedule endoscopic ultrasound as outpatient. Spoke with son and daughter in law in the room. Patient wants to go home likely tomorrow, possibly on monday.
Patient is seen and examined. Case reviewed with the medical team. Above note is appreciated. Will follow up clinically. Continue DVT prophylaxis. Case discussed with GI and Endocrine. Will monitor all BMs. discharge planning and will schedule endoscopic ultrasound as outpatient. Spoke with son and daughter in law in the room. Patient wants to go home likely tomorrow, possibly on monday.
Patient is seen and examined. Case reviewed with the medical team. Above note is appreciated. Will follow up clinically. Continue DVT prophylaxis. Case discussed with NP and  GI. Will follow up clinically. May need ERCP  Spoke with family in detail. Endocrine note is greatly appreciated.
Patient is seen and examined. Case reviewed with the medical team. Above note is appreciated. Will follow up clinically. Continue DVT prophylaxis. Case discussed with GI and Endocrine. Will monitor all BMs. discharge planning and will schedule endoscopic ultrasound as outpatient. Spoke with son and daughter in law in the room.

## 2019-04-14 NOTE — PROGRESS NOTE ADULT - SUBJECTIVE AND OBJECTIVE BOX
CHIEF COMPLAINT:Patient is a 87y old  Female who presents with a chief complaint of nausea, abdominal pain and bloating (14 Apr 2019 13:53)    	  REVIEW OF SYSTEMS:  CONSTITUTIONAL: No fever, weight loss, or fatigue  EYES: No eye pain, visual disturbances, or discharge  ENMT:  No difficulty hearing, tinnitus, vertigo; No sinus or throat pain  NECK: No pain or stiffness  RESPIRATORY: No cough, wheezing, chills or hemoptysis; No Shortness of Breath  CARDIOVASCULAR: No chest pain, palpitations, passing out, dizziness, or leg swelling  GASTROINTESTINAL: No abdominal or epigastric pain. No nausea, vomiting, or hematemesis; No diarrhea or constipation. No melena or hematochezia.  GENITOURINARY: No dysuria, frequency, hematuria, or incontinence  NEUROLOGICAL: No headaches, memory loss, loss of strength, numbness, or tremors  SKIN: No itching, burning, rashes, or lesions   LYMPH Nodes: No enlarged glands  ENDOCRINE: No heat or cold intolerance; No hair loss  MUSCULOSKELETAL: No joint pain or swelling; No muscle, back, or extremity pain  PSYCHIATRIC: No depression, anxiety, mood swings, or difficulty sleeping  HEME/LYMPH: No easy bruising, or bleeding gums  ALLERY AND IMMUNOLOGIC: No hives or eczema	    [ ] All others negative	  [ ] Unable to obtain    PHYSICAL EXAM:  T(C): 36.9 (04-14-19 @ 05:18), Max: 36.9 (04-14-19 @ 05:18)  HR: 60 (04-14-19 @ 05:18) (60 - 62)  BP: 136/64 (04-14-19 @ 05:18) (136/64 - 143/66)  RR: 16 (04-14-19 @ 05:18) (16 - 16)  SpO2: 100% (04-14-19 @ 05:18) (100% - 100%)  Wt(kg): --  I&O's Summary      Appearance: Normal	  HEENT:   Normal oral mucosa, PERRL, EOMI	  Lymphatic: No lymphadenopathy  Cardiovascular: Normal S1 S2, No JVD, No murmurs, No edema  Respiratory: Lungs clear to auscultation	  Psychiatry: A & O x 3, Mood & affect appropriate  Gastrointestinal:  Soft, Non-tender, + BS	  Skin: No rashes, No ecchymoses, No cyanosis	  Neurologic: Non-focal  Extremities: Normal range of motion, No clubbing, cyanosis or edema  Vascular: Peripheral pulses palpable 2+ bilaterally    MEDICATIONS  (STANDING):  amiodarone    Tablet 200 milliGRAM(s) Oral daily  amLODIPine   Tablet 2.5 milliGRAM(s) Oral daily  ascorbic acid 500 milliGRAM(s) Oral daily  aspirin enteric coated 81 milliGRAM(s) Oral daily  cefuroxime   Tablet 500 milliGRAM(s) Oral every 12 hours  digoxin     Tablet 0.125 milliGRAM(s) Oral daily  furosemide    Tablet 20 milliGRAM(s) Oral daily  heparin  Injectable 5000 Unit(s) SubCutaneous every 8 hours  insulin glargine Injectable (LANTUS) 8 Unit(s) SubCutaneous at bedtime  insulin lispro (HumaLOG) corrective regimen sliding scale   SubCutaneous three times a day before meals  insulin lispro Injectable (HumaLOG) 4 Unit(s) SubCutaneous three times a day before meals  latanoprost 0.005% Ophthalmic Solution 1 Drop(s) Both EYES at bedtime  metoprolol succinate ER 25 milliGRAM(s) Oral daily  metroNIDAZOLE  IVPB 500 milliGRAM(s) IV Intermittent every 8 hours  pantoprazole    Tablet 40 milliGRAM(s) Oral before breakfast  potassium chloride    Tablet ER 10 milliEquivalent(s) Oral daily  psyllium Powder 1 Packet(s) Oral daily      TELEMETRY: 	    ECG:  	  RADIOLOGY:  OTHER: 	  	  CBC Full  -  ( 14 Apr 2019 07:50 )  WBC Count : 5.09 K/uL  Hemoglobin : 12.8 g/dL  Hematocrit : 39.5 %  Platelet Count - Automated : 72 K/uL  Mean Cell Volume : 83.5 fl  Mean Cell Hemoglobin : 27.1 pg  Mean Cell Hemoglobin Concentration : 32.4 gm/dL  Auto Neutrophil # : x  Auto Lymphocyte # : x  Auto Monocyte # : x  Auto Eosinophil # : x  Auto Basophil # : x  Auto Neutrophil % : x  Auto Lymphocyte % : x  Auto Monocyte % : x  Auto Eosinophil % : x  Auto Basophil % : x        CARDIAC MARKERS:                              12.8   5.09  )-----------( 72       ( 14 Apr 2019 07:50 )             39.5       04-14    137  |  102  |  11  ----------------------------<  84  3.6   |  32<H>  |  0.83    Ca    8.4      14 Apr 2019 07:50              proBNP: Serum Pro-Brain Natriuretic Peptide: 3280 pg/mL (04-10 @ 12:12)    Lipid Profile: Cholesterol 220    HDL 54      HgA1c: Hemoglobin A1C, Whole Blood: 7.8 % (04-11 @ 11:34)    TSH: Thyroid Stimulating Hormone, Serum: 2.82 uU/mL (04-11 @ 07:47)

## 2019-04-14 NOTE — PROGRESS NOTE ADULT - PROBLEM SELECTOR PLAN 5
BS  today. A1C 7.8 cont Lantus and SS
-378. A1C pending cont Lantus and SS
well controlled.  A1C 7.8 cont Lantus and SS
well controlled.  A1C 7.8 cont Lantus and SS

## 2019-04-14 NOTE — PROGRESS NOTE ADULT - RS GEN PE MLT RESP DETAILS PC
respirations non-labored/good air movement/airway patent/breath sounds equal
breath sounds equal/respirations non-labored/good air movement/airway patent

## 2019-04-15 LAB
CULTURE RESULTS: SIGNIFICANT CHANGE UP
SPECIMEN SOURCE: SIGNIFICANT CHANGE UP

## 2019-05-03 PROBLEM — Z00.00 ENCOUNTER FOR PREVENTIVE HEALTH EXAMINATION: Status: ACTIVE | Noted: 2019-05-03

## 2019-05-10 DIAGNOSIS — K83.9 DISEASE OF BILIARY TRACT, UNSPECIFIED: ICD-10-CM

## 2019-05-17 ENCOUNTER — OUTPATIENT (OUTPATIENT)
Dept: OUTPATIENT SERVICES | Facility: HOSPITAL | Age: 84
LOS: 1 days | End: 2019-05-17
Payer: MEDICARE

## 2019-05-17 VITALS
OXYGEN SATURATION: 97 % | RESPIRATION RATE: 14 BRPM | HEART RATE: 60 BPM | HEIGHT: 59 IN | TEMPERATURE: 99 F | WEIGHT: 95.9 LBS | SYSTOLIC BLOOD PRESSURE: 135 MMHG | DIASTOLIC BLOOD PRESSURE: 71 MMHG

## 2019-05-17 DIAGNOSIS — Z90.49 ACQUIRED ABSENCE OF OTHER SPECIFIED PARTS OF DIGESTIVE TRACT: Chronic | ICD-10-CM

## 2019-05-17 DIAGNOSIS — K83.9 DISEASE OF BILIARY TRACT, UNSPECIFIED: ICD-10-CM

## 2019-05-17 DIAGNOSIS — Z90.721 ACQUIRED ABSENCE OF OVARIES, UNILATERAL: Chronic | ICD-10-CM

## 2019-05-17 DIAGNOSIS — Z95.0 PRESENCE OF CARDIAC PACEMAKER: Chronic | ICD-10-CM

## 2019-05-17 DIAGNOSIS — Z95.0 PRESENCE OF CARDIAC PACEMAKER: ICD-10-CM

## 2019-05-17 DIAGNOSIS — K83.1 OBSTRUCTION OF BILE DUCT: ICD-10-CM

## 2019-05-17 DIAGNOSIS — E11.9 TYPE 2 DIABETES MELLITUS WITHOUT COMPLICATIONS: ICD-10-CM

## 2019-05-17 DIAGNOSIS — Z01.818 ENCOUNTER FOR OTHER PREPROCEDURAL EXAMINATION: ICD-10-CM

## 2019-05-17 LAB
ALBUMIN SERPL ELPH-MCNC: 2.7 G/DL — LOW (ref 3.3–5)
ALP SERPL-CCNC: 192 U/L — HIGH (ref 40–120)
ALT FLD-CCNC: 37 U/L — SIGNIFICANT CHANGE UP (ref 10–45)
ANION GAP SERPL CALC-SCNC: 10 MMOL/L — SIGNIFICANT CHANGE UP (ref 5–17)
AST SERPL-CCNC: 45 U/L — HIGH (ref 10–40)
BILIRUB SERPL-MCNC: 0.6 MG/DL — SIGNIFICANT CHANGE UP (ref 0.2–1.2)
BUN SERPL-MCNC: 18 MG/DL — SIGNIFICANT CHANGE UP (ref 7–23)
CALCIUM SERPL-MCNC: 9.1 MG/DL — SIGNIFICANT CHANGE UP (ref 8.4–10.5)
CHLORIDE SERPL-SCNC: 91 MMOL/L — LOW (ref 96–108)
CO2 SERPL-SCNC: 27 MMOL/L — SIGNIFICANT CHANGE UP (ref 22–31)
CREAT SERPL-MCNC: 0.94 MG/DL — SIGNIFICANT CHANGE UP (ref 0.5–1.3)
GLUCOSE SERPL-MCNC: 178 MG/DL — HIGH (ref 70–99)
HCT VFR BLD CALC: 37.7 % — SIGNIFICANT CHANGE UP (ref 34.5–45)
HGB BLD-MCNC: 12.2 G/DL — SIGNIFICANT CHANGE UP (ref 11.5–15.5)
MCHC RBC-ENTMCNC: 27.5 PG — SIGNIFICANT CHANGE UP (ref 27–34)
MCHC RBC-ENTMCNC: 32.4 GM/DL — SIGNIFICANT CHANGE UP (ref 32–36)
MCV RBC AUTO: 85.1 FL — SIGNIFICANT CHANGE UP (ref 80–100)
PLATELET # BLD AUTO: 106 K/UL — LOW (ref 150–400)
POTASSIUM SERPL-MCNC: 5 MMOL/L — SIGNIFICANT CHANGE UP (ref 3.5–5.3)
POTASSIUM SERPL-SCNC: 5 MMOL/L — SIGNIFICANT CHANGE UP (ref 3.5–5.3)
PROT SERPL-MCNC: 6.6 G/DL — SIGNIFICANT CHANGE UP (ref 6–8.3)
RBC # BLD: 4.43 M/UL — SIGNIFICANT CHANGE UP (ref 3.8–5.2)
RBC # FLD: 16.5 % — HIGH (ref 10.3–14.5)
SODIUM SERPL-SCNC: 128 MMOL/L — LOW (ref 135–145)
WBC # BLD: 7.59 K/UL — SIGNIFICANT CHANGE UP (ref 3.8–10.5)
WBC # FLD AUTO: 7.59 K/UL — SIGNIFICANT CHANGE UP (ref 3.8–10.5)

## 2019-05-17 PROCEDURE — G0463: CPT

## 2019-05-17 PROCEDURE — 85027 COMPLETE CBC AUTOMATED: CPT

## 2019-05-17 PROCEDURE — 80053 COMPREHEN METABOLIC PANEL: CPT

## 2019-05-17 RX ORDER — POTASSIUM CHLORIDE 20 MEQ
1 PACKET (EA) ORAL
Qty: 0 | Refills: 0 | DISCHARGE

## 2019-05-17 RX ORDER — INSULIN HUMAN 4-8-12(60)
4 KIT INHALATION
Qty: 0 | Refills: 0 | DISCHARGE

## 2019-05-17 NOTE — H&P PST ADULT - OTHER CARE PROVIDERS
Dr. Jose Daniel Huffman  (GI) [phone]    Dr. Tobias Huffman (EP)   Dr. Boo  (cards) Dr. Jose Daniel Huffman  () [phone]    Dr. Tobias Huffman (EP) [phone]  Dr. Boo ([facility]) [phone]

## 2019-05-17 NOTE — H&P PST ADULT - HISTORY OF PRESENT ILLNESS
88 yo F with PMH of Afib, PPM, DM, HTN, Hep C, Grave's disease, s/p cholecystectomy, bowel obstruction in the past, presented to ED due to nausea for the past 10 days. Per daughter-in-law, pt has also had generalized weakness, deconditioning, hiccups, weakness with 5 lbs weight loss in 2 weeks, abd pain/bloating for past 10 days, last BM was 2 days ago. Pt describes abdominal pain as right-sided cramping radiating to the left. Pt denies fever, chills, vomiting, burning with urination, however admits to urinary frequency of every 1-2 hours. Pt denies diarrhea, all other ROS negative. Of note, patient last had colonoscopy on 07/2018 which was negative and EGD in 02/2019 which showed healing gastric ulcer along with GERD. 87 y/o female with pmhx of Afib (no Eliquis since GIB in January), bradycardia s/p PPM in 2011, T2DM, HCV, GERD, 87 y/o female with pmhx of Afib (no Eliquis since GIB in January), CHF, s/p PPM in 2011, T2DM, HCV, GERD, SBO s/p colon resection, chronic vertigo, 89 y/o female, poor historian, accompanied by her DIL, Yanna, with pmhx of Afib (no Eliquis since GIB in January), CHF, s/p PPM in 2011, T2DM, HCV, GERD, SBO s/p colon resection, chronic vertigo, presented to [facility] in April w/ nausea, worsening weakness and weight loss, RUQ abdominal pain, bloating and constipation. CT of the abdomen found mild right-sided colonic wall thickening consistent with colitis, distended common duct and questionable mass or calculus in the distal common duct. She was discharged and instructed to follow-up with GI. Now scheduled for ERCP.

## 2019-05-17 NOTE — H&P PST ADULT - NSICDXPASTSURGICALHX_GEN_ALL_CORE_FT
PAST SURGICAL HISTORY:  Artificial pacemaker     H/O oophorectomy     S/P cholecystectomy     S/P partial resection of colon > 5 yrs ago PAST SURGICAL HISTORY:  Artificial pacemaker <edtronic    H/O oophorectomy     S/P cholecystectomy     S/P partial resection of colon > 5 yrs ago PAST SURGICAL HISTORY:  H/O oophorectomy     S/P cardiac pacemaker procedure Medtronic [id number]  2011    S/P cholecystectomy     S/P partial resection of colon > 5 yrs ago

## 2019-05-17 NOTE — H&P PST ADULT - ACTIVITY
since April she mostly lays in bed; rarely gets up to do chores 2/2 extreme fatigue; prior to April she would walk down stairs to get her mail, able to do light household work

## 2019-05-17 NOTE — H&P PST ADULT - NSICDXPASTMEDICALHX_GEN_ALL_CORE_FT
PAST MEDICAL HISTORY:  A-fib on AC    Constipation     Glaucoma     Graves disease     Hepatitis C virus     HTN (hypertension)     Pacemaker     T2DM (type 2 diabetes mellitus)     Vertigo PAST MEDICAL HISTORY:  A-fib no Eliquis since 1/2019    Constipation     Glaucoma     Graves disease no treatment as per patient and family    Hepatitis C virus not treated    HTN (hypertension)     Pacemaker medtronic ADDRL1  2011    T2DM (type 2 diabetes mellitus) last A1c 7.8   4/11/19    Vertigo PAST MEDICAL HISTORY:  A-fib no Eliquis since 1/2019    Constipation     Gastric AVM EGD 2/2019 - cauterized    GIB (gastrointestinal bleeding) 2/2019 requiring 2 PRBCs    Glaucoma     Graves disease no treatment as per patient and family    Hepatitis C virus not treated    HTN (hypertension)     Pacemaker medtronic ADDRL1  2011    PNA (pneumonia) 2/2019   treated with ABX while in [facility]    T2DM (type 2 diabetes mellitus) last A1c 7.8   4/11/19    Vertigo

## 2019-05-17 NOTE — H&P PST ADULT - NSICDXPROBLEM_GEN_ALL_CORE_FT
PROBLEM DIAGNOSES  Problem: Common bile duct (CBD) obstruction  Assessment and Plan: Scheduled ERCP  Last seen by her cardiologist in May. Will request note from office    Problem: T2DM (type 2 diabetes mellitus)  Assessment and Plan: Last dose of Afrezza will be the morning before ERCP  Will take 4 units of Lantus the evening before  last A1c 7.8 from April 2019      Problem: Pacemaker  Assessment and Plan: will need last interrogation report from Dr. Tobias Huffman's office

## 2019-05-17 NOTE — H&P PST ADULT - NSANTHOSAYNRD_GEN_A_CORE
No. GOLDEN screening performed.  STOP BANG Legend: 0-2 = LOW Risk; 3-4 = INTERMEDIATE Risk; 5-8 = HIGH Risk

## 2019-05-24 ENCOUNTER — APPOINTMENT (OUTPATIENT)
Dept: GASTROENTEROLOGY | Facility: HOSPITAL | Age: 84
End: 2019-05-24

## 2019-05-24 ENCOUNTER — OUTPATIENT (OUTPATIENT)
Dept: OUTPATIENT SERVICES | Facility: HOSPITAL | Age: 84
LOS: 1 days | End: 2019-05-24
Payer: MEDICARE

## 2019-05-24 ENCOUNTER — RESULT REVIEW (OUTPATIENT)
Age: 84
End: 2019-05-24

## 2019-05-24 DIAGNOSIS — Z95.0 PRESENCE OF CARDIAC PACEMAKER: Chronic | ICD-10-CM

## 2019-05-24 DIAGNOSIS — K83.9 DISEASE OF BILIARY TRACT, UNSPECIFIED: ICD-10-CM

## 2019-05-24 DIAGNOSIS — Z90.49 ACQUIRED ABSENCE OF OTHER SPECIFIED PARTS OF DIGESTIVE TRACT: Chronic | ICD-10-CM

## 2019-05-24 DIAGNOSIS — Z01.818 ENCOUNTER FOR OTHER PREPROCEDURAL EXAMINATION: ICD-10-CM

## 2019-05-24 DIAGNOSIS — Z90.721 ACQUIRED ABSENCE OF OVARIES, UNILATERAL: Chronic | ICD-10-CM

## 2019-05-24 LAB
GLUCOSE BLDC GLUCOMTR-MCNC: 103 MG/DL — HIGH (ref 70–99)
GLUCOSE BLDC GLUCOMTR-MCNC: 97 MG/DL — SIGNIFICANT CHANGE UP (ref 70–99)

## 2019-05-24 PROCEDURE — 43239 EGD BIOPSY SINGLE/MULTIPLE: CPT | Mod: GC,59

## 2019-05-24 PROCEDURE — 82962 GLUCOSE BLOOD TEST: CPT

## 2019-05-24 PROCEDURE — 43259 EGD US EXAM DUODENUM/JEJUNUM: CPT | Mod: GC

## 2019-05-24 PROCEDURE — 43259 EGD US EXAM DUODENUM/JEJUNUM: CPT

## 2019-05-24 PROCEDURE — 43239 EGD BIOPSY SINGLE/MULTIPLE: CPT | Mod: XS

## 2019-05-28 ENCOUNTER — INPATIENT (INPATIENT)
Facility: HOSPITAL | Age: 84
LOS: 7 days | Discharge: NURSING, REHAB OR LONG-TERM CARE | DRG: 193 | End: 2019-06-05
Attending: FAMILY MEDICINE | Admitting: FAMILY MEDICINE
Payer: MEDICARE

## 2019-05-28 VITALS
WEIGHT: 95.02 LBS | TEMPERATURE: 98 F | OXYGEN SATURATION: 97 % | HEART RATE: 67 BPM | DIASTOLIC BLOOD PRESSURE: 69 MMHG | HEIGHT: 59 IN | SYSTOLIC BLOOD PRESSURE: 157 MMHG | RESPIRATION RATE: 18 BRPM

## 2019-05-28 DIAGNOSIS — Z90.721 ACQUIRED ABSENCE OF OVARIES, UNILATERAL: Chronic | ICD-10-CM

## 2019-05-28 DIAGNOSIS — Z95.0 PRESENCE OF CARDIAC PACEMAKER: Chronic | ICD-10-CM

## 2019-05-28 DIAGNOSIS — R42 DIZZINESS AND GIDDINESS: ICD-10-CM

## 2019-05-28 DIAGNOSIS — Z90.49 ACQUIRED ABSENCE OF OTHER SPECIFIED PARTS OF DIGESTIVE TRACT: Chronic | ICD-10-CM

## 2019-05-28 LAB
ANION GAP SERPL CALC-SCNC: 6 MMOL/L — SIGNIFICANT CHANGE UP (ref 5–17)
BASOPHILS # BLD AUTO: 0.02 K/UL — SIGNIFICANT CHANGE UP (ref 0–0.2)
BASOPHILS NFR BLD AUTO: 0.2 % — SIGNIFICANT CHANGE UP (ref 0–2)
BUN SERPL-MCNC: 19 MG/DL — HIGH (ref 7–18)
CALCIUM SERPL-MCNC: 8.3 MG/DL — LOW (ref 8.4–10.5)
CHLORIDE SERPL-SCNC: 98 MMOL/L — SIGNIFICANT CHANGE UP (ref 96–108)
CK MB BLD-MCNC: 1.9 % — SIGNIFICANT CHANGE UP (ref 0–3.5)
CK MB CFR SERPL CALC: 1.9 NG/ML — SIGNIFICANT CHANGE UP (ref 0–3.6)
CK SERPL-CCNC: 99 U/L — SIGNIFICANT CHANGE UP (ref 21–215)
CO2 SERPL-SCNC: 28 MMOL/L — SIGNIFICANT CHANGE UP (ref 22–31)
CREAT SERPL-MCNC: 0.85 MG/DL — SIGNIFICANT CHANGE UP (ref 0.5–1.3)
EOSINOPHIL # BLD AUTO: 0.1 K/UL — SIGNIFICANT CHANGE UP (ref 0–0.5)
EOSINOPHIL NFR BLD AUTO: 0.9 % — SIGNIFICANT CHANGE UP (ref 0–6)
GLUCOSE SERPL-MCNC: 207 MG/DL — HIGH (ref 70–99)
HCT VFR BLD CALC: 35 % — SIGNIFICANT CHANGE UP (ref 34.5–45)
HGB BLD-MCNC: 11.5 G/DL — SIGNIFICANT CHANGE UP (ref 11.5–15.5)
IMM GRANULOCYTES NFR BLD AUTO: 0.5 % — SIGNIFICANT CHANGE UP (ref 0–1.5)
LYMPHOCYTES # BLD AUTO: 0.89 K/UL — LOW (ref 1–3.3)
LYMPHOCYTES # BLD AUTO: 8.4 % — LOW (ref 13–44)
MAGNESIUM SERPL-MCNC: 2.1 MG/DL — SIGNIFICANT CHANGE UP (ref 1.6–2.6)
MCHC RBC-ENTMCNC: 28 PG — SIGNIFICANT CHANGE UP (ref 27–34)
MCHC RBC-ENTMCNC: 32.9 GM/DL — SIGNIFICANT CHANGE UP (ref 32–36)
MCV RBC AUTO: 85.4 FL — SIGNIFICANT CHANGE UP (ref 80–100)
MONOCYTES # BLD AUTO: 0.71 K/UL — SIGNIFICANT CHANGE UP (ref 0–0.9)
MONOCYTES NFR BLD AUTO: 6.7 % — SIGNIFICANT CHANGE UP (ref 2–14)
NEUTROPHILS # BLD AUTO: 8.8 K/UL — HIGH (ref 1.8–7.4)
NEUTROPHILS NFR BLD AUTO: 83.3 % — HIGH (ref 43–77)
NRBC # BLD: 0 /100 WBCS — SIGNIFICANT CHANGE UP (ref 0–0)
PHOSPHATE SERPL-MCNC: 2 MG/DL — LOW (ref 2.5–4.5)
PLATELET # BLD AUTO: 115 K/UL — LOW (ref 150–400)
POTASSIUM SERPL-MCNC: 4.5 MMOL/L — SIGNIFICANT CHANGE UP (ref 3.5–5.3)
POTASSIUM SERPL-SCNC: 4.5 MMOL/L — SIGNIFICANT CHANGE UP (ref 3.5–5.3)
RBC # BLD: 4.1 M/UL — SIGNIFICANT CHANGE UP (ref 3.8–5.2)
RBC # FLD: 16.4 % — HIGH (ref 10.3–14.5)
SODIUM SERPL-SCNC: 132 MMOL/L — LOW (ref 135–145)
TROPONIN I SERPL-MCNC: 0.04 NG/ML — SIGNIFICANT CHANGE UP (ref 0–0.04)
WBC # BLD: 10.57 K/UL — HIGH (ref 3.8–10.5)
WBC # FLD AUTO: 10.57 K/UL — HIGH (ref 3.8–10.5)

## 2019-05-28 PROCEDURE — 93010 ELECTROCARDIOGRAM REPORT: CPT

## 2019-05-28 PROCEDURE — 99284 EMERGENCY DEPT VISIT MOD MDM: CPT

## 2019-05-28 PROCEDURE — 71045 X-RAY EXAM CHEST 1 VIEW: CPT | Mod: 26

## 2019-05-28 PROCEDURE — 70450 CT HEAD/BRAIN W/O DYE: CPT | Mod: 26

## 2019-05-28 RX ORDER — POTASSIUM CHLORIDE 20 MEQ
1 PACKET (EA) ORAL
Qty: 0 | Refills: 0 | DISCHARGE

## 2019-05-28 NOTE — ED PROVIDER NOTE - PSH
H/O oophorectomy    S/P cardiac pacemaker procedure  Medtronic ADDRL1  2011  S/P cholecystectomy    S/P partial resection of colon  > 5 yrs ago

## 2019-05-28 NOTE — H&P ADULT - ASSESSMENT
88 Female with PMH of Multicystic complex pancreatic head lesion, Afib (No A/c due to UGIB, AVM) s/p Medtronic PPM, DM, HTN, Grade 4 Diastolic HF, Glaucoma, Grave's disease, HTN, SBO (s/p intestinal resection) and Vertigo and PSHx of Oophorectomy came to hospital after 2 episodes of fall today. Admitted to medicine for weakness.

## 2019-05-28 NOTE — H&P ADULT - PROBLEM SELECTOR PLAN 4
Pt underwent Upper EUS last week and was found to have A 20mm x 19mm multicystic complex lesion was seen in the pancrease head calcifications  Currently having epigastric and LLQ tenderness  Ordered Ct abdomen EKG showed Paced @ 65 bpm  C/w Rhythm control with Digoxin and Amiodarone  Rate control with metoprolol   No Anticoagulation due to AVM and UGIB

## 2019-05-28 NOTE — H&P ADULT - PROBLEM SELECTOR PLAN 3
EKG showed Paced @ 65 bpm  C/w Rhythm control with Digoxin and Amiodarone  Rate control with metoprolol   No Anticoagulation due to AVM and UGIB Pt underwent Upper EUS last week and was found to have A 20mm x 19mm multicystic complex lesion was seen in the pancrease head calcifications  Currently having epigastric and LLQ tenderness  Ordered Ct abdomen

## 2019-05-28 NOTE — H&P ADULT - NSICDXPASTSURGICALHX_GEN_ALL_CORE_FT
PAST SURGICAL HISTORY:  H/O oophorectomy     S/P cardiac pacemaker procedure Medtronic ADDRL1  2011    S/P cholecystectomy     S/P partial resection of colon > 5 yrs ago

## 2019-05-28 NOTE — ED PROVIDER NOTE - PMH
A-fib  no Eliquis since 1/2019  Constipation    Gastric AVM  EGD 2/2019 - cauterized  GIB (gastrointestinal bleeding)  2/2019 requiring 2 PRBCs  Glaucoma    Graves disease  no treatment as per patient and family  Hepatitis C virus  not treated  HTN (hypertension)    Multiple falls    Pacemaker  medtronic ADDRL1  2011  PNA (pneumonia)  2/2019   treated with ABX while in [facility]  T2DM (type 2 diabetes mellitus)  last A1c 7.8   4/11/19  Vertigo

## 2019-05-28 NOTE — H&P ADULT - ATTENDING COMMENTS
Patient seen and examined. Case fully discussed with  ER attending and medical resident. Reviewed chief complaint. Reviewed review of systems. Reviewed list of medications.  Reviewed physical exam.  Reviewed assessment and plan. agree with full H and P. Will follow up clinically. Will get PT consult.

## 2019-05-28 NOTE — H&P ADULT - PROBLEM SELECTOR PLAN 1
Recurrent falls with use of walker  Denies loss of consciousness   Pt is too weak to stand up and refuses Orthostatic BP  Unable to give IV fluids due to Grade 4 Diastolic HF  PT evaluation

## 2019-05-28 NOTE — ED PROVIDER NOTE - MUSCULOSKELETAL, MLM
Spine appears normal, range of motion is not limited, no muscle or joint tenderness. Head with no signs of trauma. Moving all extremities.

## 2019-05-28 NOTE — ED ADULT NURSE NOTE - CHIEF COMPLAINT QUOTE
brought by family forc/o dizziness , weakness , decreased PO for few days . daughter reports pt had fallen twice for the last 24 hrs

## 2019-05-28 NOTE — ED ADULT TRIAGE NOTE - CHIEF COMPLAINT QUOTE
brought by family forc/o dizziness , weakness , decreased PO for few days . daughter reports pt had fallen twice for the last 24 hrs None

## 2019-05-28 NOTE — H&P ADULT - NSHPPHYSICALEXAM_GEN_ALL_CORE
Vital Signs Last 24 Hrs  T(C): 37.1 (28 May 2019 23:37), Max: 37.1 (28 May 2019 23:37)  T(F): 98.7 (28 May 2019 23:37), Max: 98.7 (28 May 2019 23:37)  HR: 61 (28 May 2019 23:37) (61 - 67)  BP: 133/50 (28 May 2019 23:37) (133/50 - 157/69)  RR: 18 (28 May 2019 23:37) (18 - 18)  SpO2: 99% (28 May 2019 23:37) (97% - 99%)  .  GENERAL: Well developed, Elderly female, NAD  HEENT:  Normocephalic/Atraumatic, reactive light reflex, moist mucous membranes  NECK: Supple, no JVD  RESP: Symmetric movement of the chest, clear to auscultation bilaterally  CVS: +Paced S1 and S2 audible, no murmur, rubs or gallops noted, +PPM  GI: Normal active bowel sounds present, abdomen soft, epigastric tenderness, LLQ pain   EXTREMITIES:  1+ edema, no clubbing, cyanosis  MSK: 5/5 strength bilateral upper and lower extremities, intact sensations to light & crude touch  PSYCH: Normal mood, normal affect observed    NEURO: Alert and oriented x 3

## 2019-05-28 NOTE — ED PROVIDER NOTE - CLINICAL SUMMARY MEDICAL DECISION MAKING FREE TEXT BOX
87 y/o F with multiple falls, living at home, complaining of weakness and dizziness. Will order labs, head CT and reassess for admission.

## 2019-05-28 NOTE — ED PROVIDER NOTE - OBJECTIVE STATEMENT
87 y/o F with a significant PMHx of atrial fibrillation, not on blood thinners, gastric AVM, GIB, glaucoma, graves disease, Hepatitis C, HTN, PNA, type 2 DM, multiple falls and a significant PSHx of oophorectomy, cardiac pacemaker, cholecystectomy and partial resection of colon  presents to the ED s/p fall x2 today. Patient reports feeling dizzy, weak and eating/drinking less. Patient reports fall at home and stayed on ground for 15-20 minutes due to being unable to get up. Patient had cell phone on her and called neighbor for help. In ED, patient reports feeling weak. Patient lives alone.

## 2019-05-28 NOTE — H&P ADULT - NSICDXPASTMEDICALHX_GEN_ALL_CORE_FT
PAST MEDICAL HISTORY:  A-fib no Eliquis since 1/2019    Constipation     Gastric AVM EGD 2/2019 - cauterized    GIB (gastrointestinal bleeding) 2/2019 requiring 2 PRBCs    Glaucoma     Graves disease no treatment as per patient and family    Hepatitis C virus not treated    HTN (hypertension)     Multiple falls     Pacemaker medtronic ADDRL1  2011    PNA (pneumonia) 2/2019   treated with ABX while in [facility]    T2DM (type 2 diabetes mellitus) last A1c 7.8   4/11/19    Vertigo

## 2019-05-28 NOTE — H&P ADULT - HISTORY OF PRESENT ILLNESS
88 Female with PMH of Afib (No A/c due to UGIB, AVM) s/p medtronic PPM, DM, HTN, CHF, Glaucoma, Grave's disease, HTN, SBO (s/p intestinal resection) and Vertigo and PSHx of Oophorectomy came to hospital after 2 falls today. 88 Female with PMH of Multicystic complex pancreatic head lesion, Afib (No A/c due to UGIB, AVM) s/p Medtronic PPM, DM, HTN, CHF, Glaucoma, Grave's disease, HTN, SBO (s/p intestinal resection) and Vertigo and PSHx of Oophorectomy came to hospital after 2 episodes of fall today. Pt lives alone and had new commode installation recently to prevent falls. According to patient, the new commode is too low for her and it is hard for her to stand up from that. While getting up she had a fall. Later she was about to sit on her bed when she was feeling dizzy leading to fall. She called her neighbors who came for her help. Pt has been feeling very weak now and it is hard for her to stand up. No focal weakness, hip pain, chest pain, headache, problems with lower back.    SH: Lives alone, walks with walker. No smoking    ED Course: Hemodynamically stable. Labs showed Leukocytosis and serum na of 132. Cardiac enzymes and Ct head was normal. EKG showed Paced @ 65 bpm. 88 Female with PMH of Multicystic complex pancreatic head lesion, Afib (No A/c due to UGIB, AVM) s/p Medtronic PPM, DM, HTN, Grade 4 Diastolic HF, Glaucoma, Grave's disease, HTN, SBO (s/p intestinal resection) and Vertigo and PSHx of Oophorectomy came to hospital after 2 episodes of fall today. Pt lives alone and had new commode installation recently to prevent falls. According to patient, the new commode is too low for her and it is hard for her to stand up from that. While getting up she had a fall. Later she was about to sit on her bed when she was feeling dizzy leading to fall. She called her neighbors who came for her help. Pt has been feeling very weak now and it is hard for her to stand up. No focal weakness, hip pain, chest pain, headache, problems with lower back.    SH: Lives alone, walks with walker. No smoking    ED Course: Hemodynamically stable. Labs showed Leukocytosis and serum na of 132. Cardiac enzymes and Ct head was normal. EKG showed Paced @ 65 bpm.

## 2019-05-28 NOTE — H&P ADULT - PROBLEM SELECTOR PLAN 2
Chronic history of vertigo  Denies focal weakness and vomiting  Ct head: No acute pathology  PT evaluation Chronic history of vertigo  Denies focal weakness and vomiting  Ct head: No acute pathology  PT evaluation  Consulted Dr Boo

## 2019-05-29 DIAGNOSIS — Z29.9 ENCOUNTER FOR PROPHYLACTIC MEASURES, UNSPECIFIED: ICD-10-CM

## 2019-05-29 DIAGNOSIS — I48.91 UNSPECIFIED ATRIAL FIBRILLATION: ICD-10-CM

## 2019-05-29 DIAGNOSIS — I50.32 CHRONIC DIASTOLIC (CONGESTIVE) HEART FAILURE: ICD-10-CM
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
## 2019-05-29 DIAGNOSIS — E11.9 TYPE 2 DIABETES MELLITUS WITHOUT COMPLICATIONS: ICD-10-CM

## 2019-05-29 DIAGNOSIS — Z71.89 OTHER SPECIFIED COUNSELING: ICD-10-CM

## 2019-05-29 DIAGNOSIS — K86.2 CYST OF PANCREAS: ICD-10-CM

## 2019-05-29 DIAGNOSIS — K27.9 PEPTIC ULCER, SITE UNSPECIFIED, UNSPECIFIED AS ACUTE OR CHRONIC, WITHOUT HEMORRHAGE OR PERFORATION: ICD-10-CM

## 2019-05-29 DIAGNOSIS — I10 ESSENTIAL (PRIMARY) HYPERTENSION: ICD-10-CM

## 2019-05-29 DIAGNOSIS — R42 DIZZINESS AND GIDDINESS: ICD-10-CM

## 2019-05-29 PROBLEM — J18.9 PNEUMONIA, UNSPECIFIED ORGANISM: Chronic | Status: ACTIVE | Noted: 2019-05-17

## 2019-05-29 PROBLEM — Z95.0 PRESENCE OF CARDIAC PACEMAKER: Chronic | Status: ACTIVE | Noted: 2017-01-17

## 2019-05-29 PROBLEM — K31.819 ANGIODYSPLASIA OF STOMACH AND DUODENUM WITHOUT BLEEDING: Chronic | Status: ACTIVE | Noted: 2019-05-17

## 2019-05-29 PROBLEM — K92.2 GASTROINTESTINAL HEMORRHAGE, UNSPECIFIED: Chronic | Status: ACTIVE | Noted: 2019-05-17

## 2019-05-29 PROBLEM — E05.00 THYROTOXICOSIS WITH DIFFUSE GOITER WITHOUT THYROTOXIC CRISIS OR STORM: Chronic | Status: ACTIVE | Noted: 2017-01-17

## 2019-05-29 PROBLEM — B19.20 UNSPECIFIED VIRAL HEPATITIS C WITHOUT HEPATIC COMA: Chronic | Status: ACTIVE | Noted: 2019-04-10

## 2019-05-29 LAB
ALBUMIN SERPL ELPH-MCNC: 2 G/DL — LOW (ref 3.5–5)
ALP SERPL-CCNC: 175 U/L — HIGH (ref 40–120)
ALT FLD-CCNC: 35 U/L DA — SIGNIFICANT CHANGE UP (ref 10–60)
ANION GAP SERPL CALC-SCNC: 5 MMOL/L — SIGNIFICANT CHANGE UP (ref 5–17)
APPEARANCE UR: CLEAR — SIGNIFICANT CHANGE UP
AST SERPL-CCNC: 29 U/L — SIGNIFICANT CHANGE UP (ref 10–40)
BACTERIA # UR AUTO: ABNORMAL /HPF
BASOPHILS # BLD AUTO: 0.01 K/UL — SIGNIFICANT CHANGE UP (ref 0–0.2)
BASOPHILS NFR BLD AUTO: 0.1 % — SIGNIFICANT CHANGE UP (ref 0–2)
BILIRUB SERPL-MCNC: 0.8 MG/DL — SIGNIFICANT CHANGE UP (ref 0.2–1.2)
BILIRUB UR-MCNC: NEGATIVE — SIGNIFICANT CHANGE UP
BUN SERPL-MCNC: 18 MG/DL — SIGNIFICANT CHANGE UP (ref 7–18)
CALCIUM SERPL-MCNC: 8.6 MG/DL — SIGNIFICANT CHANGE UP (ref 8.4–10.5)
CHLORIDE SERPL-SCNC: 99 MMOL/L — SIGNIFICANT CHANGE UP (ref 96–108)
CHOLEST SERPL-MCNC: 203 MG/DL — HIGH (ref 10–199)
CO2 SERPL-SCNC: 29 MMOL/L — SIGNIFICANT CHANGE UP (ref 22–31)
COLOR SPEC: YELLOW — SIGNIFICANT CHANGE UP
COMMENT - URINE: SIGNIFICANT CHANGE UP
CREAT SERPL-MCNC: 0.79 MG/DL — SIGNIFICANT CHANGE UP (ref 0.5–1.3)
DIFF PNL FLD: NEGATIVE — SIGNIFICANT CHANGE UP
EOSINOPHIL # BLD AUTO: 0.07 K/UL — SIGNIFICANT CHANGE UP (ref 0–0.5)
EOSINOPHIL NFR BLD AUTO: 0.6 % — SIGNIFICANT CHANGE UP (ref 0–6)
EPI CELLS # UR: ABNORMAL /HPF
ERYTHROCYTE [SEDIMENTATION RATE] IN BLOOD: 62 MM/HR — HIGH (ref 0–20)
FOLATE SERPL-MCNC: 7.8 NG/ML — SIGNIFICANT CHANGE UP
GLUCOSE BLDC GLUCOMTR-MCNC: 162 MG/DL — HIGH (ref 70–99)
GLUCOSE BLDC GLUCOMTR-MCNC: 219 MG/DL — HIGH (ref 70–99)
GLUCOSE BLDC GLUCOMTR-MCNC: 256 MG/DL — HIGH (ref 70–99)
GLUCOSE BLDC GLUCOMTR-MCNC: 294 MG/DL — HIGH (ref 70–99)
GLUCOSE SERPL-MCNC: 171 MG/DL — HIGH (ref 70–99)
GLUCOSE UR QL: 1000 MG/DL
HBA1C BLD-MCNC: 7.7 % — HIGH (ref 4–5.6)
HCT VFR BLD CALC: 33.7 % — LOW (ref 34.5–45)
HDLC SERPL-MCNC: 35 MG/DL — LOW
HGB BLD-MCNC: 11.1 G/DL — LOW (ref 11.5–15.5)
IMM GRANULOCYTES NFR BLD AUTO: 0.5 % — SIGNIFICANT CHANGE UP (ref 0–1.5)
KETONES UR-MCNC: NEGATIVE — SIGNIFICANT CHANGE UP
LEUKOCYTE ESTERASE UR-ACNC: NEGATIVE — SIGNIFICANT CHANGE UP
LIPID PNL WITH DIRECT LDL SERPL: 139 MG/DL — SIGNIFICANT CHANGE UP
LYMPHOCYTES # BLD AUTO: 0.97 K/UL — LOW (ref 1–3.3)
LYMPHOCYTES # BLD AUTO: 8.7 % — LOW (ref 13–44)
MAGNESIUM SERPL-MCNC: 2 MG/DL — SIGNIFICANT CHANGE UP (ref 1.6–2.6)
MCHC RBC-ENTMCNC: 27.9 PG — SIGNIFICANT CHANGE UP (ref 27–34)
MCHC RBC-ENTMCNC: 32.9 GM/DL — SIGNIFICANT CHANGE UP (ref 32–36)
MCV RBC AUTO: 84.7 FL — SIGNIFICANT CHANGE UP (ref 80–100)
MONOCYTES # BLD AUTO: 0.72 K/UL — SIGNIFICANT CHANGE UP (ref 0–0.9)
MONOCYTES NFR BLD AUTO: 6.5 % — SIGNIFICANT CHANGE UP (ref 2–14)
NEUTROPHILS # BLD AUTO: 9.28 K/UL — HIGH (ref 1.8–7.4)
NEUTROPHILS NFR BLD AUTO: 83.6 % — HIGH (ref 43–77)
NITRITE UR-MCNC: NEGATIVE — SIGNIFICANT CHANGE UP
NRBC # BLD: 0 /100 WBCS — SIGNIFICANT CHANGE UP (ref 0–0)
PH UR: 5 — SIGNIFICANT CHANGE UP (ref 5–8)
PHOSPHATE SERPL-MCNC: 2 MG/DL — LOW (ref 2.5–4.5)
PLATELET # BLD AUTO: 125 K/UL — LOW (ref 150–400)
POTASSIUM SERPL-MCNC: 4.4 MMOL/L — SIGNIFICANT CHANGE UP (ref 3.5–5.3)
POTASSIUM SERPL-SCNC: 4.4 MMOL/L — SIGNIFICANT CHANGE UP (ref 3.5–5.3)
PROT SERPL-MCNC: 6.5 G/DL — SIGNIFICANT CHANGE UP (ref 6–8.3)
PROT UR-MCNC: 100
RBC # BLD: 3.98 M/UL — SIGNIFICANT CHANGE UP (ref 3.8–5.2)
RBC # FLD: 16.3 % — HIGH (ref 10.3–14.5)
RBC CASTS # UR COMP ASSIST: SIGNIFICANT CHANGE UP /HPF (ref 0–2)
SODIUM SERPL-SCNC: 133 MMOL/L — LOW (ref 135–145)
SP GR SPEC: 1.01 — SIGNIFICANT CHANGE UP (ref 1.01–1.02)
T4 AB SER-ACNC: 18.1 UG/DL — HIGH (ref 4.6–12)
TOTAL CHOLESTEROL/HDL RATIO MEASUREMENT: 5.8 RATIO — SIGNIFICANT CHANGE UP (ref 3.3–7.1)
TRIGL SERPL-MCNC: 147 MG/DL — SIGNIFICANT CHANGE UP (ref 10–149)
TSH SERPL-MCNC: 1.47 UU/ML — SIGNIFICANT CHANGE UP (ref 0.34–4.82)
UROBILINOGEN FLD QL: 1
VIT B12 SERPL-MCNC: 953 PG/ML — SIGNIFICANT CHANGE UP (ref 232–1245)
WBC # BLD: 11.11 K/UL — HIGH (ref 3.8–10.5)
WBC # FLD AUTO: 11.11 K/UL — HIGH (ref 3.8–10.5)
WBC UR QL: SIGNIFICANT CHANGE UP /HPF (ref 0–5)

## 2019-05-29 PROCEDURE — 93970 EXTREMITY STUDY: CPT | Mod: 26

## 2019-05-29 PROCEDURE — 74177 CT ABD & PELVIS W/CONTRAST: CPT | Mod: 26

## 2019-05-29 RX ORDER — SODIUM CHLORIDE 9 MG/ML
1000 INJECTION INTRAMUSCULAR; INTRAVENOUS; SUBCUTANEOUS
Refills: 0 | Status: DISCONTINUED | OUTPATIENT
Start: 2019-05-29 | End: 2019-05-30

## 2019-05-29 RX ORDER — ASPIRIN/CALCIUM CARB/MAGNESIUM 324 MG
81 TABLET ORAL DAILY
Refills: 0 | Status: DISCONTINUED | OUTPATIENT
Start: 2019-05-29 | End: 2019-06-05

## 2019-05-29 RX ORDER — DIGOXIN 250 MCG
0.12 TABLET ORAL DAILY
Refills: 0 | Status: DISCONTINUED | OUTPATIENT
Start: 2019-05-29 | End: 2019-06-05

## 2019-05-29 RX ORDER — INSULIN LISPRO 100/ML
VIAL (ML) SUBCUTANEOUS
Refills: 0 | Status: DISCONTINUED | OUTPATIENT
Start: 2019-05-28 | End: 2019-06-05

## 2019-05-29 RX ORDER — SODIUM,POTASSIUM PHOSPHATES 278-250MG
1 POWDER IN PACKET (EA) ORAL ONCE
Refills: 0 | Status: COMPLETED | OUTPATIENT
Start: 2019-05-29 | End: 2019-05-30

## 2019-05-29 RX ORDER — AMLODIPINE BESYLATE 2.5 MG/1
2.5 TABLET ORAL AT BEDTIME
Refills: 0 | Status: DISCONTINUED | OUTPATIENT
Start: 2019-05-29 | End: 2019-06-05

## 2019-05-29 RX ORDER — AMIODARONE HYDROCHLORIDE 400 MG/1
200 TABLET ORAL DAILY
Refills: 0 | Status: DISCONTINUED | OUTPATIENT
Start: 2019-05-29 | End: 2019-06-05

## 2019-05-29 RX ORDER — ENOXAPARIN SODIUM 100 MG/ML
40 INJECTION SUBCUTANEOUS DAILY
Refills: 0 | Status: DISCONTINUED | OUTPATIENT
Start: 2019-05-29 | End: 2019-06-05

## 2019-05-29 RX ORDER — ACETAMINOPHEN 500 MG
650 TABLET ORAL EVERY 6 HOURS
Refills: 0 | Status: DISCONTINUED | OUTPATIENT
Start: 2019-05-29 | End: 2019-06-05

## 2019-05-29 RX ORDER — METOPROLOL TARTRATE 50 MG
25 TABLET ORAL DAILY
Refills: 0 | Status: DISCONTINUED | OUTPATIENT
Start: 2019-05-29 | End: 2019-06-05

## 2019-05-29 RX ORDER — SODIUM CHLORIDE 9 MG/ML
1000 INJECTION INTRAMUSCULAR; INTRAVENOUS; SUBCUTANEOUS
Refills: 0 | Status: DISCONTINUED | OUTPATIENT
Start: 2019-05-29 | End: 2019-05-29

## 2019-05-29 RX ADMIN — SODIUM CHLORIDE 60 MILLILITER(S): 9 INJECTION INTRAMUSCULAR; INTRAVENOUS; SUBCUTANEOUS at 12:13

## 2019-05-29 RX ADMIN — Medication 650 MILLIGRAM(S): at 06:12

## 2019-05-29 RX ADMIN — Medication 81 MILLIGRAM(S): at 12:02

## 2019-05-29 RX ADMIN — AMLODIPINE BESYLATE 2.5 MILLIGRAM(S): 2.5 TABLET ORAL at 21:39

## 2019-05-29 RX ADMIN — Medication 650 MILLIGRAM(S): at 08:33

## 2019-05-29 RX ADMIN — Medication 1: at 08:06

## 2019-05-29 RX ADMIN — AMIODARONE HYDROCHLORIDE 200 MILLIGRAM(S): 400 TABLET ORAL at 06:12

## 2019-05-29 RX ADMIN — Medication 25 MILLIGRAM(S): at 06:12

## 2019-05-29 RX ADMIN — Medication 3: at 12:01

## 2019-05-29 RX ADMIN — Medication 2: at 21:39

## 2019-05-29 RX ADMIN — ENOXAPARIN SODIUM 40 MILLIGRAM(S): 100 INJECTION SUBCUTANEOUS at 12:02

## 2019-05-29 RX ADMIN — Medication 0.12 MILLIGRAM(S): at 06:12

## 2019-05-29 NOTE — PATIENT PROFILE ADULT - FUNCTIONAL SCREEN CURRENT LEVEL: COMMUNICATION, MLM
0 = understands/communicates without difficulty 2 = difficulty understanding (not related to language barrier)/related to hearing difficulty

## 2019-05-29 NOTE — PROGRESS NOTE ADULT - SUBJECTIVE AND OBJECTIVE BOX
NP Note discussed with  Primary Attending    Patient is a 88y old  Female who presents with a chief complaint of Fall (28 May 2019 23:56)      INTERVAL HPI/OVERNIGHT EVENTS: no new complaints    MEDICATIONS  (STANDING):  amiodarone    Tablet 200 milliGRAM(s) Oral daily  amLODIPine   Tablet 2.5 milliGRAM(s) Oral at bedtime  aspirin enteric coated 81 milliGRAM(s) Oral daily  digoxin     Tablet 0.125 milliGRAM(s) Oral daily  enoxaparin Injectable 40 milliGRAM(s) SubCutaneous daily  insulin lispro (HumaLOG) corrective regimen sliding scale   SubCutaneous Before meals and at bedtime  metoprolol succinate ER 25 milliGRAM(s) Oral daily  sodium chloride 0.9%. 1000 milliLiter(s) (60 mL/Hr) IV Continuous <Continuous>    MEDICATIONS  (PRN):  acetaminophen   Tablet .. 650 milliGRAM(s) Oral every 6 hours PRN Mild Pain (1 - 3)      __________________________________________________  REVIEW OF SYSTEMS:    CONSTITUTIONAL: No fever,   EYES: no acute visual disturbances  NECK: No pain or stiffness  RESPIRATORY: No cough; No shortness of breath  CARDIOVASCULAR: No chest pain, no palpitations  GASTROINTESTINAL: No pain. No nausea or vomiting; No diarrhea   NEUROLOGICAL: No headache or numbness, no tremors  MUSCULOSKELETAL: No joint pain, no muscle pain  GENITOURINARY: no dysuria, no frequency, no hesitancy  PSYCHIATRY: no depression , no anxiety  ALL OTHER  ROS negative        Vital Signs Last 24 Hrs  T(C): 36.7 (29 May 2019 11:20), Max: 37.2 (29 May 2019 07:22)  T(F): 98 (29 May 2019 11:20), Max: 99 (29 May 2019 07:22)  HR: 59 (29 May 2019 11:20) (59 - 119)  BP: 128/52 (29 May 2019 11:20) (125/51 - 157/69)  BP(mean): --  RR: 17 (29 May 2019 07:22) (16 - 18)  SpO2: 96% (29 May 2019 07:22) (96% - 99%)    ________________________________________________  PHYSICAL EXAM:  GENERAL: NAD  HEENT: Normocephalic;  conjunctivae and sclerae clear; moist mucous membranes;   NECK : supple  CHEST/LUNG: Clear to auscultation bilaterally with good air entry   HEART: S1 S2  regular; no murmurs, gallops or rubs  ABDOMEN: Soft, Nontender, Nondistended; Bowel sounds present  EXTREMITIES: no cyanosis; no edema; no calf tenderness  SKIN: warm and dry; no rash  NERVOUS SYSTEM:  Awake and alert; Oriented  x 2-3   forgetful   _________________________________________________  LABS:                        11.1   11.11 )-----------( 125      ( 29 May 2019 05:31 )             33.7     05-29    133<L>  |  99  |  18  ----------------------------<  171<H>  4.4   |  29  |  0.79    Ca    8.6      29 May 2019 05:31  Phos  2.0     05-29  Mg     2.0     05-29    TPro  6.5  /  Alb  2.0<L>  /  TBili  0.8  /  DBili  x   /  AST  29  /  ALT  35  /  AlkPhos  175<H>  05-29        CAPILLARY BLOOD GLUCOSE      POCT Blood Glucose.: 294 mg/dL (29 May 2019 11:02)  POCT Blood Glucose.: 162 mg/dL (29 May 2019 07:52)        RADIOLOGY & ADDITIONAL TESTS:    Imaging Personally Reviewed:  YES/NO    Consultant(s) Notes Reviewed:   YES/ No    Care Discussed with Consultants :     Plan of care was discussed with patient and /or primary care giver; all questions and concerns were addressed and care was aligned with patient's wishes.

## 2019-05-29 NOTE — PROGRESS NOTE ADULT - ASSESSMENT
88 Female with PMH of Multicystic complex pancreatic head lesion, Afib (No A/c due to UGIB, AVM) s/p Medtronic PPM, DM, HTN, Grade 4 Diastolic HF, Glaucoma, Grave's disease, HTN, SBO (s/p intestinal resection) and Vertigo and PSHx of Oophorectomy came to hospital after 2 episodes of fall today. Pt lives alone and had new commode installation recently to prevent falls. According to patient, the new commode is too low for her and it is hard for her to stand up from that. While getting up she had a fall. Later she was about to sit on her bed when she was feeling dizzy leading to fall. She called her neighbors who came for her help. Pt has been feeling very weak now and it is hard for her to stand up. No focal weakness, hip pain, chest pain, headache, problems with lower back. admitted to medicine due to weakness. spoke with son in AM and daughter in law this afternoon, daughter in law brought HCP and living will form with goal of care choices, DNR/ DNI/ No artificial feeding or ABT use. HCP is son. Pt reported poor appetite for few weeks and wt loss of 20lbs x several months. Claimed that her dentures need  adjustment for better fitting 2/2 wt. loss. Pt and family agree rehab if she needs. Afebrile VSs. 88 Female with PMH of Multicystic complex pancreatic head lesion, Afib (No A/c due to UGIB, AVM) s/p Medtronic PPM, DM, HTN, Grade 4 Diastolic HF, Glaucoma, Grave's disease, HTN, SBO (s/p intestinal resection) and Vertigo and PSHx of Oophorectomy came to hospital after 2 episodes of fall today. Pt lives alone and had new commode installation recently to prevent falls. According to patient, the new commode is too low for her and it is hard for her to stand up from that. While getting up she had a fall. Later she was about to sit on her bed when she was feeling dizzy leading to fall. She called her neighbors who came for her help. Pt has been feeling very weak now and it is hard for her to stand up. No focal weakness, hip pain, chest pain, headache, problems with lower back. admitted to medicine due to weakness. spoke with son in AM and daughter in law this afternoon, daughter in law brought HCP and living will form with goal of care choices, DNR/ DNI/ No artificial feeding or ABT use. HCP is son. went over [facility] together, continue DNR/ DNI/ No artificial feeding / determine use of ABT.  Pt reported poor appetite for few weeks and wt loss of 20lbs x several months. Claimed that her dentures need  adjustment for better fitting 2/2 wt. loss. Pt and family agree rehab if she needs. Afebrile VSs.

## 2019-05-29 NOTE — CONSULT NOTE ADULT - SUBJECTIVE AND OBJECTIVE BOX
CHIEF COMPLAINT:Patient is a 88y old  Female who presents with a chief complaint of Fall.      HPI:  88 Female with PMH of Multicystic complex pancreatic head lesion, Afib (No A/c due to UGIB, AVM) s/p Medtronic PPM, DM, HTN, Grade 4 Diastolic HF, Glaucoma, Grave's disease, HTN, SBO (s/p intestinal resection) and Vertigo and PSHx of Oophorectomy came to hospital after 2 episodes of fall today. Pt lives alone and had new commode installation recently to prevent falls. According to patient, the new commode is too low for her and it is hard for her to stand up from that. While getting up she had a fall. Later she was about to sit on her bed when she was feeling dizzy leading to fall. She called her neighbors who came for her help. Pt has been feeling very weak now and it is hard for her to stand up. No focal weakness, hip pain, chest pain, headache, problems with lower back.        PAST MEDICAL & SURGICAL HISTORY:  Multiple falls  GIB (gastrointestinal bleeding): 2/2019 requiring 2 PRBCs  Gastric AVM: EGD 2/2019 - cauterized  PNA (pneumonia): 2/2019   treated with ABX while in [facility]  T2DM (type 2 diabetes mellitus): last A1c 7.8   4/11/19  Hepatitis C virus: not treated  A-fib: no Eliquis since 1/2019  Constipation  Glaucoma  Vertigo  Graves disease: no treatment as per patient and family  Pacemaker: medtronic ADDRL1  2011  HTN (hypertension)  S/P cardiac pacemaker procedure: Medtronic ADDRL1  2011  S/P cholecystectomy  S/P partial resection of colon: &gt; 5 yrs ago  H/O oophorectomy      MEDICATIONS  (STANDING):  amiodarone    Tablet 200 milliGRAM(s) Oral daily  amLODIPine   Tablet 2.5 milliGRAM(s) Oral at bedtime  aspirin enteric coated 81 milliGRAM(s) Oral daily  digoxin     Tablet 0.125 milliGRAM(s) Oral daily  enoxaparin Injectable 40 milliGRAM(s) SubCutaneous daily  insulin lispro (HumaLOG) corrective regimen sliding scale   SubCutaneous Before meals and at bedtime  metoprolol succinate ER 25 milliGRAM(s) Oral daily  potassium phosphate / sodium phosphate powder 1 Packet(s) Oral once  sodium chloride 0.9%. 1000 milliLiter(s) (60 mL/Hr) IV Continuous <Continuous>    MEDICATIONS  (PRN):  acetaminophen   Tablet .. 650 milliGRAM(s) Oral every 6 hours PRN Mild Pain (1 - 3)      FAMILY HISTORY:  Family history of hypertension  Family history of diabetes mellitus      SOCIAL HISTORY:    [ x] Non-smoker    [x ] Alcohol-denies    Allergies    No Known Allergies    Intolerances    	    REVIEW OF SYSTEMS:  CONSTITUTIONAL: No fever, weight loss, or fatigue  EYES: No eye pain, visual disturbances, or discharge  ENT:  No difficulty hearing, tinnitus, vertigo; No sinus or throat pain  NECK: No pain or stiffness  RESPIRATORY: No cough, wheezing, chills or hemoptysis; No Shortness of Breath  CARDIOVASCULAR: No chest pain, palpitations, passing out,+ dizziness  GASTROINTESTINAL: No abdominal or epigastric pain. No nausea, vomiting, or hematemesis; No diarrhea or constipation. No melena or hematochezia.  GENITOURINARY: No dysuria, frequency, hematuria, or incontinence  NEUROLOGICAL: No headaches, memory loss, loss of strength, numbness, or tremors  SKIN: No itching, burning, rashes, or lesions   LYMPH Nodes: No enlarged glands  ENDOCRINE: No heat or cold intolerance; No hair loss  MUSCULOSKELETAL: No joint pain or swelling; No muscle, back, or extremity pain  PSYCHIATRIC: No depression, anxiety, mood swings, or difficulty sleeping  HEME/LYMPH: No easy bruising, or bleeding gums  ALLERGY AND IMMUNOLOGIC: No hives or eczema	      PHYSICAL EXAM:  T(C): 36.7 (05-29-19 @ 11:20), Max: 37.2 (05-29-19 @ 07:22)  HR: 59 (05-29-19 @ 11:20) (59 - 119)  BP: 128/52 (05-29-19 @ 11:20) (125/51 - 157/69)  RR: 17 (05-29-19 @ 07:22) (16 - 18)  SpO2: 96% (05-29-19 @ 07:22) (96% - 99%)      Appearance: Normal	  HEENT:   Normal oral mucosa, PERRL, EOMI	  Lymphatic: No lymphadenopathy  Cardiovascular: Normal S1 S2, No JVD, No murmurs, No edema  Respiratory: Lungs clear to auscultation	  Psychiatry: A & O x 3, Mood & affect appropriate  Gastrointestinal:  Soft, Non-tender, + BS	  Skin: No rashes, No ecchymoses, No cyanosis	  Neurologic: Non-focal  Extremities: Normal range of motion, No clubbing, cyanosis or edema  Vascular: Peripheral pulses palpable 2+ bilaterally        ECG:  a paced v sensed	   	  	  LABS:	 	    CARDIAC MARKERS:  CARDIAC MARKERS ( 28 May 2019 21:09 )  0.037 ng/mL / x     / 99 U/L / x     / 1.9 ng/mL                              11.1   11.11 )-----------( 125      ( 29 May 2019 05:31 )             33.7     05-29    133<L>  |  99  |  18  ----------------------------<  171<H>  4.4   |  29  |  0.79    Ca    8.6      29 May 2019 05:31  Phos  2.0     05-29  Mg     2.0     05-29    TPro  6.5  /  Alb  2.0<L>  /  TBili  0.8  /  DBili  x   /  AST  29  /  ALT  35  /  AlkPhos  175<H>  05-29      Lipid Profile: Cholesterol 203    HDL 35      HgA1c: Hemoglobin A1C, Whole Blood: 7.7 % (05-29 @ 09:20)    TSH: Thyroid Stimulating Hormone, Serum: 1.47 uU/mL (05-29 @ 05:31)        EXAM:  CT BRAIN                            PROCEDURE DATE:  05/28/2019          INTERPRETATION:  CLINICAL INFORMATION: fall. fall. .    TECHNIQUE: Sequential axial images were obtained from the vertex to the   skull base without intravenous contrast. Coronal and sagittal   reformations were obtained.     COMPARISON: Prior CT dated 1/27/2017    FINDINGS:    There is no acute intracranial hemorrhage or mass effect. There are areas   of hypodensity in the bilateral hemispheric white matter suggesting   chronic white matter microvascular ischemic change. There is cerebral   volume loss.    There is no extraaxial fluid collection.     There is no displaced calvarial fracture. Status post bilateral   intraocular lens implants. The visualized portions of the paranasal   sinuses are well aerated. The mastoid air cells are well aerated.    IMPRESSION: No acute intracranial hemorrhage or mass effect.           EXAM:  CT ABDOMEN AND PELVIS IC                            PROCEDURE DATE:  05/29/2019          INTERPRETATION:  CT ABDOMEN AND PELVIS WITH IV CONTRAST    CLINICAL INFORMATION: epigastric pain and left lower quadrant pain      COMPARISON: CT abdomen pelvis 4/10/2019    PROCEDURE:   CT of the Abdomen and Pelvis was performed with intravenous contrast.  Intravenous Contrast: 90 cc Omnipaque 350  Oral contrast: None.  Sagittal and coronal reformats were performed.    FINDINGS:    LOWER CHEST: Trace bilateral pleural effusions. Right lower lobe   atelectasis versus consolidation.    LIVER: Normal.  BILE DUCTS: Stable caliber.  GALLBLADDER: Not seen  SPLEEN: Normal.  PANCREAS: Normal.  ADRENALS: Normal.  KIDNEYS/URETERS: Symmetric nephrograms. No hydronephrosis. Small right   renal calculus.    BLADDER: Normal.  REPRODUCTIVE ORGANS: No masses.    BOWEL: No bowel-related abnormality. Specifically, no evidence of acute   diverticulitis. Normal appendix and ileocecal region. No bowel   obstruction or bowel inflammation.  PERITONEUM: Small ascites. No free air.  VESSELS:  Aortoiliac atherosclerosis without aneurysm.  RETROPERITONEUM: No adenopathy.    ABDOMINAL WALL: Diffuse body wall edema.  BONES: No acute bony abnormality.    IMPRESSION:     No acute pathology in the abdomen.    Stable degree of mild biliary dilatation. Correlate with LFTs to   determine the clinical significance.    Right lower lobe atelectasis versus consolidation.          Findings:       EGD:       - Normal esophagus.       - Erythematous mucosa in gastric antrum. Biopsied.       - Normal duodenal bulb and D2.       EUS:       - EUS was performed using a linear echoendoscope. A 20mm x 19mm multicystic complex lesion        was seen in the pancreas head with calcifications. This lesion was located just adjacent to        the distal bile duct. The pancreatic duct were normal in caliber and contour. The bile duct        was mildly dilated. No intrinsic stones/sludge were seen in the common bile duct. Remainder        of pancreas appeared normal. The ampulla was imaged and appeared normal.       The liver appeared heterogeneous.                                                                                                        Impression:          A 20mm x 19mm multicystic complex lesion was seen in the pancreas head with                        calcifications. This lesion was located just adjacent to the distal bile                        duct. The pancreatic duct were normal in caliber and contour. The bile duct                        was mildly dilated. No intrinsic stones/sludge were seen in the common bile                        duct. Remainder of pancreas appeared normal. The ampulla was imaged and                        appeared normal.                       The liver appeared heterogeneous.  Recommendation:      - Discharge patient to home.                       - Await path results.                       - Would survey pancreas cyst in 1 year if clinically warranted.                                          Attending Participation:       I was present and participated during the entire procedure, including non-key portions.               ______________________  Braydon Bedolla MD      OBSERVATIONS:  Mitral Valve: Mitral annular calcification. Mild mitral  regurgitation.  Aortic Root: Normal aortic root.  Aortic Valve: Normal trileaflet aortic valve.  Left Atrium: Severely dilated left atrium.  LA volume index  = 54 cc/m2.  Left Ventricle: Endocardium not well visualized; grossly  normal left ventricular systolic function. Severe diastolic  dysfunction (stage III -IV). Mild concentric left  ventricular hypertrophy.  Right Heart: Normal right atrium. Normal right ventricular  size and function. There is mild-moderate tricuspid  regurgitation. There is mild pulmonic regurgitation.  Pericardium/PleuraNormal pericardium with no pericardial  effusion.  Hemodynamic: RA Pressure is 10 mm Hg. RV systolic pressure  is 59 mm Hg. Moderate pulmonary hypertension.

## 2019-05-29 NOTE — PROGRESS NOTE ADULT - PROBLEM SELECTOR PLAN 4
EKG showed Paced @ 65 bpm  C/w Rhythm control with Digoxin and Amiodarone  Rate control with metoprolol   No Anticoagulation due to AVM and UGIB.

## 2019-05-29 NOTE — PROGRESS NOTE ADULT - PROBLEM SELECTOR PLAN 1
Recurrent falls with use of walker  No LOC  Pt is too weak to stand up and refuses Orthostatic BP  PT evaluation.

## 2019-05-30 DIAGNOSIS — D72.829 ELEVATED WHITE BLOOD CELL COUNT, UNSPECIFIED: ICD-10-CM

## 2019-05-30 LAB
% ALBUMIN: 39.6 % — SIGNIFICANT CHANGE UP
% ALPHA 1: 5.4 % — SIGNIFICANT CHANGE UP
% ALPHA 2: 16.8 % — SIGNIFICANT CHANGE UP
% BETA: 12.9 % — SIGNIFICANT CHANGE UP
% GAMMA: 25.3 % — SIGNIFICANT CHANGE UP
% M SPIKE: SIGNIFICANT CHANGE UP
ALBUMIN SERPL ELPH-MCNC: 2.6 G/DL — LOW (ref 3.6–5.5)
ALBUMIN/GLOB SERPL ELPH: 0.7 RATIO — SIGNIFICANT CHANGE UP
ALPHA1 GLOB SERPL ELPH-MCNC: 0.4 G/DL — SIGNIFICANT CHANGE UP (ref 0.1–0.4)
ALPHA2 GLOB SERPL ELPH-MCNC: 1.1 G/DL — HIGH (ref 0.5–1)
ANION GAP SERPL CALC-SCNC: 7 MMOL/L — SIGNIFICANT CHANGE UP (ref 5–17)
B-GLOBULIN SERPL ELPH-MCNC: 0.8 G/DL — SIGNIFICANT CHANGE UP (ref 0.5–1)
BUN SERPL-MCNC: 21 MG/DL — HIGH (ref 7–18)
CALCIUM SERPL-MCNC: 8.2 MG/DL — LOW (ref 8.4–10.5)
CHLORIDE SERPL-SCNC: 99 MMOL/L — SIGNIFICANT CHANGE UP (ref 96–108)
CO2 SERPL-SCNC: 26 MMOL/L — SIGNIFICANT CHANGE UP (ref 22–31)
CREAT SERPL-MCNC: 0.91 MG/DL — SIGNIFICANT CHANGE UP (ref 0.5–1.3)
CULTURE RESULTS: NO GROWTH — SIGNIFICANT CHANGE UP
GAMMA GLOBULIN: 1.6 G/DL — SIGNIFICANT CHANGE UP (ref 0.6–1.6)
GLUCOSE BLDC GLUCOMTR-MCNC: 169 MG/DL — HIGH (ref 70–99)
GLUCOSE BLDC GLUCOMTR-MCNC: 186 MG/DL — HIGH (ref 70–99)
GLUCOSE BLDC GLUCOMTR-MCNC: 238 MG/DL — HIGH (ref 70–99)
GLUCOSE BLDC GLUCOMTR-MCNC: 273 MG/DL — HIGH (ref 70–99)
GLUCOSE BLDC GLUCOMTR-MCNC: 302 MG/DL — HIGH (ref 70–99)
GLUCOSE SERPL-MCNC: 188 MG/DL — HIGH (ref 70–99)
HCT VFR BLD CALC: 33.3 % — LOW (ref 34.5–45)
HGB BLD-MCNC: 11 G/DL — LOW (ref 11.5–15.5)
INTERPRETATION SERPL IFE-IMP: SIGNIFICANT CHANGE UP
M-SPIKE: SIGNIFICANT CHANGE UP (ref 0–0)
MCHC RBC-ENTMCNC: 27.8 PG — SIGNIFICANT CHANGE UP (ref 27–34)
MCHC RBC-ENTMCNC: 33 GM/DL — SIGNIFICANT CHANGE UP (ref 32–36)
MCV RBC AUTO: 84.1 FL — SIGNIFICANT CHANGE UP (ref 80–100)
NRBC # BLD: 0 /100 WBCS — SIGNIFICANT CHANGE UP (ref 0–0)
PHOSPHATE SERPL-MCNC: 2.1 MG/DL — LOW (ref 2.5–4.5)
PLATELET # BLD AUTO: 127 K/UL — LOW (ref 150–400)
POTASSIUM SERPL-MCNC: 4.6 MMOL/L — SIGNIFICANT CHANGE UP (ref 3.5–5.3)
POTASSIUM SERPL-SCNC: 4.6 MMOL/L — SIGNIFICANT CHANGE UP (ref 3.5–5.3)
PROT PATTERN SERPL ELPH-IMP: SIGNIFICANT CHANGE UP
PROT SERPL-MCNC: 6.5 G/DL — SIGNIFICANT CHANGE UP (ref 6–8.3)
RBC # BLD: 3.96 M/UL — SIGNIFICANT CHANGE UP (ref 3.8–5.2)
RBC # FLD: 16.6 % — HIGH (ref 10.3–14.5)
SODIUM SERPL-SCNC: 132 MMOL/L — LOW (ref 135–145)
SPECIMEN SOURCE: SIGNIFICANT CHANGE UP
WBC # BLD: 15.36 K/UL — HIGH (ref 3.8–10.5)
WBC # FLD AUTO: 15.36 K/UL — HIGH (ref 3.8–10.5)

## 2019-05-30 RX ORDER — INSULIN LISPRO 100/ML
3 VIAL (ML) SUBCUTANEOUS
Refills: 0 | Status: DISCONTINUED | OUTPATIENT
Start: 2019-05-30 | End: 2019-06-05

## 2019-05-30 RX ORDER — CEFTRIAXONE 500 MG/1
1 INJECTION, POWDER, FOR SOLUTION INTRAMUSCULAR; INTRAVENOUS EVERY 24 HOURS
Refills: 0 | Status: DISCONTINUED | OUTPATIENT
Start: 2019-05-31 | End: 2019-05-31

## 2019-05-30 RX ORDER — CEFTRIAXONE 500 MG/1
1 INJECTION, POWDER, FOR SOLUTION INTRAMUSCULAR; INTRAVENOUS ONCE
Refills: 0 | Status: COMPLETED | OUTPATIENT
Start: 2019-05-30 | End: 2019-05-30

## 2019-05-30 RX ORDER — CEFTRIAXONE 500 MG/1
INJECTION, POWDER, FOR SOLUTION INTRAMUSCULAR; INTRAVENOUS
Refills: 0 | Status: DISCONTINUED | OUTPATIENT
Start: 2019-05-30 | End: 2019-05-31

## 2019-05-30 RX ADMIN — AMIODARONE HYDROCHLORIDE 200 MILLIGRAM(S): 400 TABLET ORAL at 05:42

## 2019-05-30 RX ADMIN — CEFTRIAXONE 100 GRAM(S): 500 INJECTION, POWDER, FOR SOLUTION INTRAMUSCULAR; INTRAVENOUS at 09:24

## 2019-05-30 RX ADMIN — Medication 1: at 07:57

## 2019-05-30 RX ADMIN — Medication 1 PACKET(S): at 05:42

## 2019-05-30 RX ADMIN — Medication 81 MILLIGRAM(S): at 14:08

## 2019-05-30 RX ADMIN — SODIUM CHLORIDE 60 MILLILITER(S): 9 INJECTION INTRAMUSCULAR; INTRAVENOUS; SUBCUTANEOUS at 07:58

## 2019-05-30 RX ADMIN — Medication 0.12 MILLIGRAM(S): at 05:42

## 2019-05-30 RX ADMIN — ENOXAPARIN SODIUM 40 MILLIGRAM(S): 100 INJECTION SUBCUTANEOUS at 14:07

## 2019-05-30 RX ADMIN — Medication 4: at 12:12

## 2019-05-30 RX ADMIN — Medication 3: at 21:35

## 2019-05-30 RX ADMIN — AMLODIPINE BESYLATE 2.5 MILLIGRAM(S): 2.5 TABLET ORAL at 21:35

## 2019-05-30 RX ADMIN — Medication 2: at 16:52

## 2019-05-30 RX ADMIN — Medication 25 MILLIGRAM(S): at 05:42

## 2019-05-30 NOTE — PROGRESS NOTE ADULT - ASSESSMENT
88 Female with PMH of Multicystic complex pancreatic head lesion, Afib (No A/c due to UGIB, AVM) s/p Medtronic PPM, DM, HTN, Grade 4 Diastolic HF, Glaucoma, Grave's disease, HTN, SBO (s/p intestinal resection) and Vertigo and PSHx of Oophorectomy came to hospital after 2 episodes of fall today,UTI  1.ABX.  2.PAF-asa,amiodarone, dig,toproloff NOAC due to recurrent GI bleed.  3.DM-insulin.  4.HTN and pulm htn-cont bp medication.  5.Check Spep.  6.D/C IVF.  7.GI and DVT prophylaxis.

## 2019-05-30 NOTE — PROGRESS NOTE ADULT - PROBLEM SELECTOR PLAN 3
Pt underwent Upper EUS last week and was found to have A 20mm x 19mm multicystic complex lesion was seen in the pancrease head calcifications  Currently having epigastric and LLQ tenderness  Ordered Ct abdomen Pt underwent Upper EUS last week and was found to have A 20mm x 19mm multicystic complex lesion was seen in the pancrease head calcifications  Currently having epigastric and LLQ tenderness  -No acute pathology in abdomen  -Tolerating PO diet with no N/V

## 2019-05-30 NOTE — PROGRESS NOTE ADULT - ASSESSMENT
88 Female with PMH of Multicystic complex pancreatic head lesion, Afib (No A/c due to UGIB, AVM) s/p Medtronic PPM, DM, HTN, Grade 4 Diastolic HF, Glaucoma, Grave's disease, HTN, SBO (s/p intestinal resection) and Vertigo and PSHx of Oophorectomy came to hospital after 2 episodes of fall today. Pt lives alone and had new commode installation recently to prevent falls. According to patient, the new commode is too low for her and it is hard for her to stand up from that. While getting up she had a fall. Later she was about to sit on her bed when she was feeling dizzy leading to fall. She called her neighbors who came for her help. Pt has been feeling very weak now and it is hard for her to stand up. No focal weakness, hip pain, chest pain, headache, problems with lower back.    Pt. seen and examined, noted alert/oriented x 1, in NAD, no pain or bruises noted, REESE with no difficulty 88 Female with PMH of Multicystic complex pancreatic head lesion, Afib (No A/c due to UGIB, AVM) s/p Medtronic PPM, DM, HTN, Grade 4 Diastolic HF, Glaucoma, Grave's disease, HTN, SBO (s/p intestinal resection) and Vertigo and PSHx of Oophorectomy came to hospital after 2 episodes of fall today. Pt lives alone and had new commode installation recently to prevent falls. According to patient, the new commode is too low for her and it is hard for her to stand up from that. While getting up she had a fall. Later she was about to sit on her bed when she was feeling dizzy leading to fall. She called her neighbors who came for her help. Pt has been feeling very weak now and it is hard for her to stand up. No focal weakness, hip pain, chest pain, headache, problems with lower back.    Pt. seen and examined, noted alert/oriented x 1, in NAD, no pain or bruises noted, REESE with no difficulty, denies CP, SOB, palpitations, dizziness or other discomfort.

## 2019-05-30 NOTE — PHYSICAL THERAPY INITIAL EVALUATION ADULT - ADDITIONAL COMMENTS
Per pts son, pt was previously independent with device (rollator walker or tripod walker). Pt also has a cane at home. In the last two weeks patient has gotten very weak, poor appetite. Pt has had home health aid for the past two weeks and the health aid assists her with all ADLs including dressing, cleaning, bathing and household chores. Pt was ambulating independently with tripod walking in the home.

## 2019-05-30 NOTE — PROGRESS NOTE ADULT - PROBLEM SELECTOR PLAN 8
Pt takes lantus 8 and afrezza at home  Started HSS  F/u Hab1c HgnA1C 7.7  -Cont FS with HSS  -Pt takes lantus 8 and afrezza at home

## 2019-05-30 NOTE — PHYSICAL THERAPY INITIAL EVALUATION ADULT - LIVES WITH, PROFILE
Pt has home health aide 5 hours a day, 7 days a week according to son, home health aide only recently hired (about 2 weeks ago), pts son and daughter in law live across the street, pt has no stairs in the home/alone

## 2019-05-30 NOTE — PROGRESS NOTE ADULT - PROBLEM SELECTOR PLAN 1
Recurrent falls with use of walker  Denies loss of consciousness   Pt is too weak to stand up and refuses Orthostatic BP  Unable to give IV fluids due to Grade 4 Diastolic HF  PT evaluation WBC trending up, now 15.36, pt. afebrile  -UA many bacteria  -f/u UCx  -CXR->New 2.6 cm focal opacification in the right lower lung zone;   if clinically indicated, chest CT may be pursued for further evaluation.   This finding is communicated with the emergency department via the PACS   communication system.  No evidence for pleural effusion or pneumothorax. Skinfold on the left.  -Cont Rocephin for now  -May transition to PO prior to d/c to rehab per attending  -f/u WBC, fever

## 2019-05-30 NOTE — PROGRESS NOTE ADULT - PROBLEM SELECTOR PLAN 4
EKG showed Paced @ 65 bpm  C/w Rhythm control with Digoxin and Amiodarone  Rate control with metoprolol   No Anticoagulation due to AVM and UGIB -EKG showed Paced @ 65 bpm  -C/w Rhythm control with Digoxin and Amiodarone  -Rate control with metoprolol   -No Anticoagulation due to AVM and UGIB  -Card Dr. Boo

## 2019-05-30 NOTE — PHYSICAL THERAPY INITIAL EVALUATION ADULT - CRITERIA FOR SKILLED THERAPEUTIC INTERVENTIONS
impairments found/anticipated discharge recommendation/predicted duration of therapy intervention/functional limitations in following categories/therapy frequency/risk reduction/prevention/rehab potential

## 2019-05-30 NOTE — PROGRESS NOTE ADULT - SUBJECTIVE AND OBJECTIVE BOX
NP Note discussed with  Primary Attending    Patient is a 88y old  Female who presents with a chief complaint of Fall (30 May 2019 08:14)      INTERVAL HPI/OVERNIGHT EVENTS: no new complaints    MEDICATIONS  (STANDING):  amiodarone    Tablet 200 milliGRAM(s) Oral daily  amLODIPine   Tablet 2.5 milliGRAM(s) Oral at bedtime  aspirin enteric coated 81 milliGRAM(s) Oral daily  cefTRIAXone   IVPB      digoxin     Tablet 0.125 milliGRAM(s) Oral daily  enoxaparin Injectable 40 milliGRAM(s) SubCutaneous daily  insulin lispro (HumaLOG) corrective regimen sliding scale   SubCutaneous Before meals and at bedtime  metoprolol succinate ER 25 milliGRAM(s) Oral daily    MEDICATIONS  (PRN):  acetaminophen   Tablet .. 650 milliGRAM(s) Oral every 6 hours PRN Mild Pain (1 - 3)      __________________________________________________  REVIEW OF SYSTEMS:    CONSTITUTIONAL: No fever,   EYES: no acute visual disturbances  NECK: No pain or stiffness  RESPIRATORY: No cough; No shortness of breath  CARDIOVASCULAR: No chest pain, no palpitations  GASTROINTESTINAL: No pain. No nausea or vomiting; No diarrhea   NEUROLOGICAL: No headache or numbness, no tremors  MUSCULOSKELETAL: No joint pain, no muscle pain  GENITOURINARY: no dysuria, no frequency, no hesitancy  PSYCHIATRY: no depression , no anxiety  ALL OTHER  ROS negative        Vital Signs Last 24 Hrs  T(C): 37.4 (30 May 2019 05:39), Max: 38 (29 May 2019 21:32)  T(F): 99.3 (30 May 2019 05:39), Max: 100.4 (29 May 2019 21:32)  HR: 60 (30 May 2019 12:15) (59 - 66)  BP: 124/58 (30 May 2019 12:15) (96/75 - 133/50)  BP(mean): 71 (29 May 2019 15:00) (71 - 71)  RR: 18 (30 May 2019 05:39) (16 - 18)  SpO2: 96% (30 May 2019 12:15) (94% - 97%)    ________________________________________________  PHYSICAL EXAM:  GENERAL: NAD  HEENT: Normocephalic;  conjunctivae and sclerae clear; moist mucous membranes;   NECK : supple  CHEST/LUNG: Clear to auscultation bilaterally with good air entry   HEART: S1 S2  regular; no murmurs, gallops or rubs  ABDOMEN: Soft, Nontender, Nondistended; Bowel sounds present  EXTREMITIES: no cyanosis; no edema; no calf tenderness  SKIN: warm and dry; no rash  NERVOUS SYSTEM:  Awake and alert; Oriented  to place, person and time ; no new deficits    _________________________________________________  LABS:                        11.0   15.36 )-----------( 127      ( 30 May 2019 08:02 )             33.3     -    132<L>  |  99  |  21<H>  ----------------------------<  188<H>  4.6   |  26  |  0.91    Ca    8.2<L>      30 May 2019 08:02  Phos  2.1       Mg     2.0         TPro  6.5  /  Alb  2.0<L>  /  TBili  0.8  /  DBili  x   /  AST  29  /  ALT  35  /  AlkPhos  175<H>        Urinalysis Basic - ( 29 May 2019 14:21 )    Color: Yellow / Appearance: Clear / S.010 / pH: x  Gluc: x / Ketone: Negative  / Bili: Negative / Urobili: 1   Blood: x / Protein: 100 / Nitrite: Negative   Leuk Esterase: Negative / RBC: 0-2 /HPF / WBC 0-2 /HPF   Sq Epi: x / Non Sq Epi: Occasional /HPF / Bacteria: Many /HPF      CAPILLARY BLOOD GLUCOSE      POCT Blood Glucose.: 302 mg/dL (30 May 2019 11:27)  POCT Blood Glucose.: 186 mg/dL (30 May 2019 07:52)  POCT Blood Glucose.: 219 mg/dL (29 May 2019 21:12)  POCT Blood Glucose.: 256 mg/dL (29 May 2019 16:32)    RADIOLOGY & ADDITIONAL TESTS:    Xray Chest 1 View AP/PA (19 @ 20:41) >  EXAM:  XR CHEST AP OR PA 1V                            PROCEDURE DATE:  2019          INTERPRETATION:  Portable chest x-ray    Indication: The patient fell. Chest pain.    Portable chest x-ray is compared to a previous examination dated   4/10/2019.    Impression: New 2.6 cm focal opacification in the right lower lung zone;   if clinically indicated, chest CT may be pursued for further evaluation.   This finding is communicated with the emergency department via the PACS   communication system.    No evidence for pleural effusion or pneumothorax. Skinfold on the left.    The trachea is midline.    Stable cardiac silhouette and left cardiac device. Stable mitral annular   calcifications.    No gross acute bony fracture is identified.    < end of copied text >      CT Head No Cont (19 @ 20:51) >  EXAM:  CT BRAIN                            PROCEDURE DATE:  2019          INTERPRETATION:  CLINICAL INFORMATION: fall. fall. .    TECHNIQUE: Sequential axial images were obtained from the vertex to the   skull base without intravenous contrast. Coronal and sagittal   reformations were obtained.     COMPARISON: Prior CT dated 2017    FINDINGS:    There is no acute intracranial hemorrhage or mass effect. There are areas   of hypodensity in the bilateral hemispheric white matter suggesting   chronic white matter microvascular ischemic change. There is cerebral   volume loss.    There is no extraaxial fluid collection.     There is no displaced calvarial fracture. Status post bilateral   intraocular lens implants. The visualized portions of the paranasal   sinuses are well aerated. The mastoid air cells are well aerated.    IMPRESSION: No acute intracranial hemorrhage or mass effect.     < end of copied text >    CT Abdomen and Pelvis w/ IV Cont (19 @ 09:36) >  EXAM:  CT ABDOMEN AND PELVIS IC                            PROCEDURE DATE:  2019          INTERPRETATION:  CT ABDOMEN AND PELVIS WITH IV CONTRAST    CLINICAL INFORMATION: epigastric pain and left lower quadrant pain      COMPARISON: CT abdomen pelvis 4/10/2019    PROCEDURE:   CT of the Abdomen and Pelvis was performed with intravenous contrast.  Intravenous Contrast: 90 cc Omnipaque 350  Oral contrast: None.  Sagittal and coronal reformats were performed.    FINDINGS:    LOWER CHEST: Trace bilateral pleural effusions. Right lower lobe   atelectasis versus consolidation.    LIVER: Normal.  BILE DUCTS: Stable caliber.  GALLBLADDER: Not seen  SPLEEN: Normal.  PANCREAS: Normal.  ADRENALS: Normal.  KIDNEYS/URETERS: Symmetric nephrograms. No hydronephrosis. Small right   renal calculus.    BLADDER: Normal.  REPRODUCTIVE ORGANS: No masses.    BOWEL: No bowel-related abnormality. Specifically, no evidence of acute   diverticulitis. Normal appendix and ileocecal region. No bowel   obstruction or bowel inflammation.  PERITONEUM: Small ascites. No free air.  VESSELS:  Aortoiliac atherosclerosis without aneurysm.  RETROPERITONEUM: No adenopathy.    ABDOMINAL WALL: Diffuse body wall edema.  BONES: No acute bony abnormality.    IMPRESSION:     No acute pathology in the abdomen.    Stable degree of mild biliary dilatation. Correlate with LFTs to   determine the clinical significance.    Right lower lobe atelectasis versus consolidation.    < end of copied text >    US Duplex Venous Lower Ext Complete, Bilateral (19 @ 15:10) >  EXAM:  US DPLX LWR EXT VEINS COMPL BI                            PROCEDURE DATE:  2019          INTERPRETATION:  CLINICAL STATEMENT: Swelling leg.    TECHNIQUE: Ultrasound of bilateral lower extremity deep venous system.    COMPARISON: None.    FINDINGS:  There is color and spectral flow, compression and augmentation of the   common femoral, superficial femoral and popliteal veins.    There is flow in the posterior tibial vein.    IMPRESSION:  No evidence of DVT.      < end of copied text >      Imaging Personally Reviewed:  YES/NO    Consultant(s) Notes Reviewed:   YES/ No    Care Discussed with Consultants :     Plan of care was discussed with patient and /or primary care giver; all questions and concerns were addressed and care was aligned with patient's wishes.

## 2019-05-30 NOTE — PHYSICAL THERAPY INITIAL EVALUATION ADULT - ORIENTATION, REHAB EVAL
pt A&Ox4 at the moment, but son reports pt gets confused at times/oriented to person, place, time and situation

## 2019-05-30 NOTE — PHYSICAL THERAPY INITIAL EVALUATION ADULT - DIAGNOSIS, PT EVAL
Pt presents with generalized weakness affecting her functional mobility. Additionally, pt has orthostatic hypotension affecting mobility.

## 2019-05-30 NOTE — CONSULT NOTE ADULT - SUBJECTIVE AND OBJECTIVE BOX
Patient is a 88y old  Female who presents with a chief complaint of Fall (30 May 2019 13:51)      HPI:  88 Female with PMH of Multicystic complex pancreatic head lesion, Afib (No A/c due to UGIB, AVM) s/p Medtronic PPM, DM, HTN, Grade 4 Diastolic HF, Glaucoma, Grave's disease, HTN, SBO (s/p intestinal resection) and Vertigo and PSHx of Oophorectomy came to hospital after 2 episodes of fall today. Pt lives alone and had new commode installation recently to prevent falls. According to patient, the new commode is too low for her and it is hard for her to stand up from that. While getting up she had a fall. Later she was about to sit on her bed when she was feeling dizzy leading to fall. She called her neighbors who came for her help. Pt has been feeling very weak now and it is hard for her to stand up. No focal weakness, hip pain, chest pain, headache, problems with lower back.    SH: Lives alone, walks with walker. No smoking    ED Course: Hemodynamically stable. Labs showed Leukocytosis and serum na of 132. Cardiac enzymes and Ct head was normal. EKG showed Paced @ 65 bpm. (28 May 2019 23:56)      PAST MEDICAL & SURGICAL HISTORY:  Multiple falls  GIB (gastrointestinal bleeding): 2019 requiring 2 PRBCs  Gastric AVM: EGD 2019 - cauterized  PNA (pneumonia): 2019   treated with ABX while in [facility]  T2DM (type 2 diabetes mellitus): last A1c 7.8   19  Hepatitis C virus: not treated  A-fib: no Eliquis since 2019  Constipation  Glaucoma  Vertigo  Graves disease: no treatment as per patient and family  Pacemaker: medtronic ADDRL1    HTN (hypertension)  S/P cardiac pacemaker procedure: Medtronic ADDRL1    S/P cholecystectomy  S/P partial resection of colon: &gt; 5 yrs ago  H/O oophorectomy         MEDICATIONS  (STANDING):  amiodarone    Tablet 200 milliGRAM(s) Oral daily  amLODIPine   Tablet 2.5 milliGRAM(s) Oral at bedtime  aspirin enteric coated 81 milliGRAM(s) Oral daily  cefTRIAXone   IVPB      digoxin     Tablet 0.125 milliGRAM(s) Oral daily  enoxaparin Injectable 40 milliGRAM(s) SubCutaneous daily  insulin lispro (HumaLOG) corrective regimen sliding scale   SubCutaneous Before meals and at bedtime  metoprolol succinate ER 25 milliGRAM(s) Oral daily    MEDICATIONS  (PRN):  acetaminophen   Tablet .. 650 milliGRAM(s) Oral every 6 hours PRN Mild Pain (1 - 3)      FAMILY HISTORY:  Family history of hypertension  Family history of diabetes mellitus      SOCIAL HISTORY:      REVIEW OF SYSTEMS:  CONSTITUTIONAL: No fever, weight loss, or fatigue  EYES: No eye pain, visual disturbances, or discharge  ENT:  No difficulty hearing, tinnitus, vertigo; No sinus or throat pain  NECK: No pain or stiffness  RESPIRATORY: No cough, wheezing, chills or hemoptysis; No Shortness of Breath  CARDIOVASCULAR: No chest pain, palpitations, passing out, dizziness, or leg swelling  GASTROINTESTINAL: No abdominal or epigastric pain. No nausea, vomiting, or hematemesis; No diarrhea or constipation. No melena or hematochezia.  GENITOURINARY: No dysuria, frequency, hematuria, or incontinence  NEUROLOGICAL: No headaches, memory loss, loss of strength, numbness, or tremors  SKIN: No itching, burning, rashes, or lesions   LYMPH Nodes: No enlarged glands  ENDOCRINE: No heat or cold intolerance; No hair loss  MUSCULOSKELETAL: No joint pain or swelling; No muscle, back, or extremity pain  PSYCHIATRIC: No depression, anxiety, mood swings, or difficulty sleeping  HEME/LYMPH: No easy bruising, or bleeding gums  ALLERGY AND IMMUNOLOGIC: No hives or eczema	        Vital Signs Last 24 Hrs  T(C): 36.9 (30 May 2019 14:21), Max: 38 (29 May 2019 21:32)  T(F): 98.4 (30 May 2019 14:21), Max: 100.4 (29 May 2019 21:32)  HR: 61 (30 May 2019 14:21) (59 - 66)  BP: 142/50 (30 May 2019 14:21) (96/75 - 142/50)  BP(mean): --  RR: 17 (30 May 2019 14:21) (16 - 18)  SpO2: 96% (30 May 2019 14:21) (94% - 97%)      Constitutional:    NC/AT:    HEENT:    Neck:  No JVD, bruits or thyromegaly    Respiratory:  Clear without rales or rhonchi    Cardiovascular:  RR without murmur, rub or gallop.    Gastrointestinal: Soft without hepatosplenomegaly.    Extremities: without cyanosis, clubbing or edema.    Neurological:  Oriented   x      . No gross sensory or motor defects.        LABS:                        11.0   15.36 )-----------( 127      ( 30 May 2019 08:02 )             33.3     05-30    132<L>  |  99  |  21<H>  ----------------------------<  188<H>  4.6   |  26  |  0.91    Ca    8.2<L>      30 May 2019 08:02  Phos  2.1     05-30  Mg     2.0     05-29    TPro  x   /  Alb  2.6<L>  /  TBili  x   /  DBili  x   /  AST  x   /  ALT  x   /  AlkPhos  x   05-30    CARDIAC MARKERS ( 28 May 2019 21:09 )  0.037 ng/mL / x     / 99 U/L / x     / 1.9 ng/mL        Urinalysis Basic - ( 29 May 2019 14:21 )    Color: Yellow / Appearance: Clear / S.010 / pH: x  Gluc: x / Ketone: Negative  / Bili: Negative / Urobili: 1   Blood: x / Protein: 100 / Nitrite: Negative   Leuk Esterase: Negative / RBC: 0-2 /HPF / WBC 0-2 /HPF   Sq Epi: x / Non Sq Epi: Occasional /HPF / Bacteria: Many /HPF      CAPILLARY BLOOD GLUCOSE      POCT Blood Glucose.: 238 mg/dL (30 May 2019 16:21)  POCT Blood Glucose.: 302 mg/dL (30 May 2019 11:27)  POCT Blood Glucose.: 186 mg/dL (30 May 2019 07:52)  POCT Blood Glucose.: 219 mg/dL (29 May 2019 21:12)      RADIOLOGY & ADDITIONAL STUDIES: Patient is a 88y old  Female who presents with a chief complaint of Fall (30 May 2019 13:51)      HPI:  88 Female with PMH of Multicystic complex pancreatic head lesion, Afib (No A/c due to UGIB, AVM) s/p Medtronic PPM, DM, HTN, Grade 4 Diastolic HF, Glaucoma, Grave's disease, HTN, SBO (s/p intestinal resection) and Vertigo and PSHx of Oophorectomy came to hospital after 2 episodes of fall today. Pt lives alone and had new commode installation recently to prevent falls. According to patient, the new commode is too low for her and it is hard for her to stand up from that. While getting up she had a fall. Later she was about to sit on her bed when she was feeling dizzy leading to fall. She called her neighbors who came for her help. Pt has been feeling very weak now and it is hard for her to stand up. No focal weakness, hip pain, chest pain, headache, problems with lower back. Found to have un cont dm. Pt takes latus 8 units at home with occ am hypos and afrezza pre meals to be started.     SH: Lives alone, walks with walker. No smoking    ED Course: Hemodynamically stable. Labs showed Leukocytosis and serum na of 132. Cardiac enzymes and Ct head was normal. EKG showed Paced @ 65 bpm. (28 May 2019 23:56)      PAST MEDICAL & SURGICAL HISTORY:  Multiple falls  GIB (gastrointestinal bleeding): 2019 requiring 2 PRBCs  Gastric AVM: EGD 2019 - cauterized  PNA (pneumonia): 2019   treated with ABX while in [facility]  T2DM (type 2 diabetes mellitus): last A1c 7.8   19  Hepatitis C virus: not treated  A-fib: no Eliquis since 2019  Constipation  Glaucoma  Vertigo  Graves disease: no treatment as per patient and family  Pacemaker: medtronic ADDRL1    HTN (hypertension)  S/P cardiac pacemaker procedure: Medtronic ADDRL1    S/P cholecystectomy  S/P partial resection of colon: &gt; 5 yrs ago  H/O oophorectomy         MEDICATIONS  (STANDING):  amiodarone    Tablet 200 milliGRAM(s) Oral daily  amLODIPine   Tablet 2.5 milliGRAM(s) Oral at bedtime  aspirin enteric coated 81 milliGRAM(s) Oral daily  cefTRIAXone   IVPB      digoxin     Tablet 0.125 milliGRAM(s) Oral daily  enoxaparin Injectable 40 milliGRAM(s) SubCutaneous daily  insulin lispro (HumaLOG) corrective regimen sliding scale   SubCutaneous Before meals and at bedtime  metoprolol succinate ER 25 milliGRAM(s) Oral daily    MEDICATIONS  (PRN):  acetaminophen   Tablet .. 650 milliGRAM(s) Oral every 6 hours PRN Mild Pain (1 - 3)      FAMILY HISTORY:  Family history of hypertension  Family history of diabetes mellitus      SOCIAL HISTORY:      REVIEW OF SYSTEMS:  CONSTITUTIONAL: No fever, weight loss, or fatigue  EYES: No eye pain, visual disturbances, or discharge  ENT:  No difficulty hearing, tinnitus, vertigo; No sinus or throat pain  NECK: No pain or stiffness  RESPIRATORY: No cough, wheezing, chills or hemoptysis; No Shortness of Breath  CARDIOVASCULAR: No chest pain, palpitations, passing out, dizziness, or leg swelling  GASTROINTESTINAL: No abdominal or epigastric pain. No nausea, vomiting, or hematemesis; No diarrhea or constipation. No melena or hematochezia.  GENITOURINARY: No dysuria, frequency, hematuria, or incontinence  NEUROLOGICAL: No headaches, memory loss, loss of strength, numbness, or tremors  SKIN: No itching, burning, rashes, or lesions   LYMPH Nodes: No enlarged glands  ENDOCRINE: No heat or cold intolerance; No hair loss  MUSCULOSKELETAL: No joint pain or swelling; No muscle, back, or extremity pain  PSYCHIATRIC: No depression, anxiety, mood swings, or difficulty sleeping  HEME/LYMPH: No easy bruising, or bleeding gums  ALLERGY AND IMMUNOLOGIC: No hives or eczema	        Vital Signs Last 24 Hrs  T(C): 36.9 (30 May 2019 14:21), Max: 38 (29 May 2019 21:32)  T(F): 98.4 (30 May 2019 14:21), Max: 100.4 (29 May 2019 21:32)  HR: 61 (30 May 2019 14:21) (59 - 66)  BP: 142/50 (30 May 2019 14:21) (96/75 - 142/50)  BP(mean): --  RR: 17 (30 May 2019 14:21) (16 - 18)  SpO2: 96% (30 May 2019 14:21) (94% - 97%)      Constitutional:    HEENT: nad    Neck:  No JVD, bruits or thyromegaly    Respiratory:  Clear without rales or rhonchi    Cardiovascular:  RR without murmur, rub or gallop.    Gastrointestinal: Soft without hepatosplenomegaly.    Extremities: without cyanosis, clubbing or edema.    Neurological:  Oriented   x  3    . No gross sensory or motor defects.        LABS:                        11.0   15.36 )-----------( 127      ( 30 May 2019 08:02 )             33.3     05-30    132<L>  |  99  |  21<H>  ----------------------------<  188<H>  4.6   |  26  |  0.91    Ca    8.2<L>      30 May 2019 08:02  Phos  2.1     -  Mg     2.0     -    TPro  x   /  Alb  2.6<L>  /  TBili  x   /  DBili  x   /  AST  x   /  ALT  x   /  AlkPhos  x       CARDIAC MARKERS ( 28 May 2019 21:09 )  0.037 ng/mL / x     / 99 U/L / x     / 1.9 ng/mL        Urinalysis Basic - ( 29 May 2019 14:21 )    Color: Yellow / Appearance: Clear / S.010 / pH: x  Gluc: x / Ketone: Negative  / Bili: Negative / Urobili: 1   Blood: x / Protein: 100 / Nitrite: Negative   Leuk Esterase: Negative / RBC: 0-2 /HPF / WBC 0-2 /HPF   Sq Epi: x / Non Sq Epi: Occasional /HPF / Bacteria: Many /HPF      CAPILLARY BLOOD GLUCOSE      POCT Blood Glucose.: 238 mg/dL (30 May 2019 16:21)  POCT Blood Glucose.: 302 mg/dL (30 May 2019 11:27)  POCT Blood Glucose.: 186 mg/dL (30 May 2019 07:52)  POCT Blood Glucose.: 219 mg/dL (29 May 2019 21:12)      RADIOLOGY & ADDITIONAL STUDIES:

## 2019-05-30 NOTE — PROGRESS NOTE ADULT - PROBLEM SELECTOR PLAN 2
Chronic history of vertigo  Denies focal weakness and vomiting  Ct head: No acute pathology  PT evaluation  Consulted Dr Boo -[facility]-> No acute intracranial hemorrhage or mass effect.   -Pt. with no s&s of injury  -PT eval appreciated, recc [facility]  -CM for arrangements  -Orthostatics 3x/day  -Fall precautions

## 2019-05-30 NOTE — PROGRESS NOTE ADULT - SUBJECTIVE AND OBJECTIVE BOX
CHIEF COMPLAINT:Patient is a 88y old  Female who presents with a chief complaint of Fall.Pt appears comfortable.    	  REVIEW OF SYSTEMS:  CONSTITUTIONAL: No fever, weight loss, or fatigue  EYES: No eye pain, visual disturbances, or discharge  ENT:  No difficulty hearing, tinnitus, vertigo; No sinus or throat pain  NECK: No pain or stiffness  RESPIRATORY: No cough, wheezing, chills or hemoptysis; No Shortness of Breath  CARDIOVASCULAR: No chest pain, palpitations, passing out, dizziness, or leg swelling  GASTROINTESTINAL: No abdominal or epigastric pain. No nausea, vomiting, or hematemesis; No diarrhea or constipation. No melena or hematochezia.  GENITOURINARY: No dysuria, frequency, hematuria, or incontinence  NEUROLOGICAL: No headaches, memory loss, loss of strength, numbness, or tremors  SKIN: No itching, burning, rashes, or lesions   LYMPH Nodes: No enlarged glands  ENDOCRINE: No heat or cold intolerance; No hair loss  MUSCULOSKELETAL: No joint pain or swelling; No muscle, back, or extremity pain  PSYCHIATRIC: No depression, anxiety, mood swings, or difficulty sleeping  HEME/LYMPH: No easy bruising, or bleeding gums  ALLERGY AND IMMUNOLOGIC: No hives or eczema	      PHYSICAL EXAM:  T(C): 37.4 (05-30-19 @ 05:39), Max: 38 (05-29-19 @ 21:32)  HR: 60 (05-29-19 @ 21:32) (59 - 65)  BP: 125/63 (05-30-19 @ 05:39) (121/62 - 133/50)  RR: 18 (05-30-19 @ 05:39) (16 - 18)  SpO2: 95% (05-30-19 @ 05:39) (94% - 97%)      Appearance: Normal	  HEENT:   Normal oral mucosa, PERRL, EOMI	  Lymphatic: No lymphadenopathy  Cardiovascular: Normal S1 S2, No JVD, No murmurs, No edema  Respiratory: Lungs clear to auscultation	  Psychiatry: A & O x 3, Mood & affect appropriate  Gastrointestinal:  Soft, Non-tender, + BS	  Skin: No rashes, No ecchymoses, No cyanosis	  Neurologic: Non-focal  Extremities: Normal range of motion, No clubbing, cyanosis or edema  Vascular: Peripheral pulses palpable 2+ bilaterally    MEDICATIONS  (STANDING):  amiodarone    Tablet 200 milliGRAM(s) Oral daily  amLODIPine   Tablet 2.5 milliGRAM(s) Oral at bedtime  aspirin enteric coated 81 milliGRAM(s) Oral daily  cefTRIAXone   IVPB 1 Gram(s) IV Intermittent once  cefTRIAXone   IVPB      digoxin     Tablet 0.125 milliGRAM(s) Oral daily  enoxaparin Injectable 40 milliGRAM(s) SubCutaneous daily  insulin lispro (HumaLOG) corrective regimen sliding scale   SubCutaneous Before meals and at bedtime  metoprolol succinate ER 25 milliGRAM(s) Oral daily        	  LABS:	 	    CARDIAC MARKERS:  CARDIAC MARKERS ( 28 May 2019 21:09 )  0.037 ng/mL / x     / 99 U/L / x     / 1.9 ng/mL                         11.0   15.36 )-----------( 127      ( 30 May 2019 08:02 )             33.3     05-29    133<L>  |  99  |  18  ----------------------------<  171<H>  4.4   |  29  |  0.79    Ca    8.6      29 May 2019 05:31  Phos  2.0     05-29  Mg     2.0     05-29    TPro  6.5  /  Alb  2.0<L>  /  TBili  0.8  /  DBili  x   /  AST  29  /  ALT  35  /  AlkPhos  175<H>  05-29      Lipid Profile: Cholesterol 203    HDL 35      HgA1c: Hemoglobin A1C, Whole Blood: 7.7 % (05-29 @ 09:20)    TSH: Thyroid Stimulating Hormone, Serum: 1.47 uU/mL (05-29 @ 05:31)      Urine Microscopic-Add On (NC) (05.29.19 @ 14:21)    Bacteria: Many /HPF    Comment - Urine: Moderate Amorphous Urates    Epithelial Cells: Occasional /HPF    Red Blood Cell - Urine: 0-2 /HPF    White Blood Cell - Urine: 0-2 /HPF    Sedimentation Rate, Erythrocyte: 62 mm/Hr (05.29.19 @ 14:38)

## 2019-05-31 ENCOUNTER — TRANSCRIPTION ENCOUNTER (OUTPATIENT)
Age: 84
End: 2019-05-31

## 2019-05-31 ENCOUNTER — CHART COPY (OUTPATIENT)
Age: 84
End: 2019-05-31

## 2019-05-31 LAB
ANION GAP SERPL CALC-SCNC: 7 MMOL/L — SIGNIFICANT CHANGE UP (ref 5–17)
BUN SERPL-MCNC: 26 MG/DL — HIGH (ref 7–18)
CALCIUM SERPL-MCNC: 8.5 MG/DL — SIGNIFICANT CHANGE UP (ref 8.4–10.5)
CHLORIDE SERPL-SCNC: 97 MMOL/L — SIGNIFICANT CHANGE UP (ref 96–108)
CO2 SERPL-SCNC: 25 MMOL/L — SIGNIFICANT CHANGE UP (ref 22–31)
CREAT SERPL-MCNC: 1.15 MG/DL — SIGNIFICANT CHANGE UP (ref 0.5–1.3)
GLUCOSE BLDC GLUCOMTR-MCNC: 158 MG/DL — HIGH (ref 70–99)
GLUCOSE BLDC GLUCOMTR-MCNC: 162 MG/DL — HIGH (ref 70–99)
GLUCOSE BLDC GLUCOMTR-MCNC: 181 MG/DL — HIGH (ref 70–99)
GLUCOSE BLDC GLUCOMTR-MCNC: 205 MG/DL — HIGH (ref 70–99)
GLUCOSE SERPL-MCNC: 205 MG/DL — HIGH (ref 70–99)
HCT VFR BLD CALC: 34.9 % — SIGNIFICANT CHANGE UP (ref 34.5–45)
HGB BLD-MCNC: 11.6 G/DL — SIGNIFICANT CHANGE UP (ref 11.5–15.5)
MAGNESIUM SERPL-MCNC: 2.1 MG/DL — SIGNIFICANT CHANGE UP (ref 1.6–2.6)
MCHC RBC-ENTMCNC: 27.8 PG — SIGNIFICANT CHANGE UP (ref 27–34)
MCHC RBC-ENTMCNC: 33.2 GM/DL — SIGNIFICANT CHANGE UP (ref 32–36)
MCV RBC AUTO: 83.7 FL — SIGNIFICANT CHANGE UP (ref 80–100)
NRBC # BLD: 0 /100 WBCS — SIGNIFICANT CHANGE UP (ref 0–0)
PHOSPHATE SERPL-MCNC: 2.3 MG/DL — LOW (ref 2.5–4.5)
PLATELET # BLD AUTO: 147 K/UL — LOW (ref 150–400)
POTASSIUM SERPL-MCNC: 4.7 MMOL/L — SIGNIFICANT CHANGE UP (ref 3.5–5.3)
POTASSIUM SERPL-SCNC: 4.7 MMOL/L — SIGNIFICANT CHANGE UP (ref 3.5–5.3)
RBC # BLD: 4.17 M/UL — SIGNIFICANT CHANGE UP (ref 3.8–5.2)
RBC # FLD: 16.6 % — HIGH (ref 10.3–14.5)
SODIUM SERPL-SCNC: 129 MMOL/L — LOW (ref 135–145)
WBC # BLD: 20.33 K/UL — HIGH (ref 3.8–10.5)
WBC # FLD AUTO: 20.33 K/UL — HIGH (ref 3.8–10.5)

## 2019-05-31 RX ORDER — LANOLIN ALCOHOL/MO/W.PET/CERES
3 CREAM (GRAM) TOPICAL AT BEDTIME
Refills: 0 | Status: DISCONTINUED | OUTPATIENT
Start: 2019-05-31 | End: 2019-06-05

## 2019-05-31 RX ORDER — CEFTRIAXONE 500 MG/1
1 INJECTION, POWDER, FOR SOLUTION INTRAMUSCULAR; INTRAVENOUS EVERY 24 HOURS
Refills: 0 | Status: DISCONTINUED | OUTPATIENT
Start: 2019-06-01 | End: 2019-06-01

## 2019-05-31 RX ORDER — ACETAMINOPHEN 500 MG
650 TABLET ORAL EVERY 6 HOURS
Refills: 0 | Status: DISCONTINUED | OUTPATIENT
Start: 2019-05-31 | End: 2019-06-05

## 2019-05-31 RX ORDER — CEFTRIAXONE 500 MG/1
1 INJECTION, POWDER, FOR SOLUTION INTRAMUSCULAR; INTRAVENOUS ONCE
Refills: 0 | Status: COMPLETED | OUTPATIENT
Start: 2019-05-31 | End: 2019-05-31

## 2019-05-31 RX ORDER — CEFTRIAXONE 500 MG/1
INJECTION, POWDER, FOR SOLUTION INTRAMUSCULAR; INTRAVENOUS
Refills: 0 | Status: DISCONTINUED | OUTPATIENT
Start: 2019-05-31 | End: 2019-06-01

## 2019-05-31 RX ADMIN — Medication 650 MILLIGRAM(S): at 00:35

## 2019-05-31 RX ADMIN — Medication 0.12 MILLIGRAM(S): at 05:53

## 2019-05-31 RX ADMIN — Medication 3 UNIT(S): at 16:49

## 2019-05-31 RX ADMIN — AMIODARONE HYDROCHLORIDE 200 MILLIGRAM(S): 400 TABLET ORAL at 05:53

## 2019-05-31 RX ADMIN — Medication 1: at 21:34

## 2019-05-31 RX ADMIN — Medication 650 MILLIGRAM(S): at 00:01

## 2019-05-31 RX ADMIN — CEFTRIAXONE 100 GRAM(S): 500 INJECTION, POWDER, FOR SOLUTION INTRAMUSCULAR; INTRAVENOUS at 22:36

## 2019-05-31 RX ADMIN — Medication 81 MILLIGRAM(S): at 11:55

## 2019-05-31 RX ADMIN — Medication 650 MILLIGRAM(S): at 22:37

## 2019-05-31 RX ADMIN — Medication 3 UNIT(S): at 11:53

## 2019-05-31 RX ADMIN — Medication 25 MILLIGRAM(S): at 05:53

## 2019-05-31 RX ADMIN — Medication 1: at 16:48

## 2019-05-31 RX ADMIN — AMLODIPINE BESYLATE 2.5 MILLIGRAM(S): 2.5 TABLET ORAL at 22:37

## 2019-05-31 RX ADMIN — Medication 1: at 11:53

## 2019-05-31 RX ADMIN — Medication 3 MILLIGRAM(S): at 22:37

## 2019-05-31 RX ADMIN — CEFTRIAXONE 100 GRAM(S): 500 INJECTION, POWDER, FOR SOLUTION INTRAMUSCULAR; INTRAVENOUS at 11:59

## 2019-05-31 RX ADMIN — Medication 650 MILLIGRAM(S): at 23:10

## 2019-05-31 RX ADMIN — ENOXAPARIN SODIUM 40 MILLIGRAM(S): 100 INJECTION SUBCUTANEOUS at 11:55

## 2019-05-31 RX ADMIN — Medication 2: at 07:59

## 2019-05-31 RX ADMIN — Medication 3 UNIT(S): at 08:01

## 2019-05-31 NOTE — PROGRESS NOTE ADULT - SUBJECTIVE AND OBJECTIVE BOX
CHIEF COMPLAINT:Patient is a 88y old  Female who presents with a chief complaint of Fall.Pt appears comfortable.    	  REVIEW OF SYSTEMS:  CONSTITUTIONAL: No fever, weight loss, or fatigue  EYES: No eye pain, visual disturbances, or discharge  ENT:  No difficulty hearing, tinnitus, vertigo; No sinus or throat pain  NECK: No pain or stiffness  RESPIRATORY: No cough, wheezing, chills or hemoptysis; No Shortness of Breath  CARDIOVASCULAR: No chest pain, palpitations, passing out, dizziness, or leg swelling  GASTROINTESTINAL: No abdominal or epigastric pain. No nausea, vomiting, or hematemesis; No diarrhea or constipation. No melena or hematochezia.  GENITOURINARY: No dysuria, frequency, hematuria, or incontinence  NEUROLOGICAL: No headaches, memory loss, loss of strength, numbness, or tremors  SKIN: No itching, burning, rashes, or lesions   LYMPH Nodes: No enlarged glands  ENDOCRINE: No heat or cold intolerance; No hair loss  MUSCULOSKELETAL: No joint pain or swelling; No muscle, back, or extremity pain  PSYCHIATRIC: No depression, anxiety, mood swings, or difficulty sleeping  HEME/LYMPH: No easy bruising, or bleeding gums  ALLERGY AND IMMUNOLOGIC: No hives or eczema	      PHYSICAL EXAM:  T(C): 36.8 (05-31-19 @ 05:49), Max: 38.1 (05-30-19 @ 23:56)  HR: 59 (05-31-19 @ 05:49) (59 - 64)  BP: 152/69 (05-31-19 @ 05:49) (142/50 - 156/56)  RR: 18 (05-31-19 @ 05:49) (17 - 18)  SpO2: 93% (05-31-19 @ 05:49) (93% - 97%)      Appearance: Normal	  HEENT:   Normal oral mucosa, PERRL, EOMI	  Lymphatic: No lymphadenopathy  Cardiovascular: Normal S1 S2, No JVD, No murmurs, No edema  Respiratory: Lungs clear to auscultation	  Psychiatry: A & O x 3, Mood & affect appropriate  Gastrointestinal:  Soft, Non-tender, + BS	  Skin: No rashes, No ecchymoses, No cyanosis	  Neurologic: Non-focal  Extremities: Normal range of motion, No clubbing, cyanosis or edema  Vascular: Peripheral pulses palpable 2+ bilaterally    MEDICATIONS  (STANDING):  amiodarone    Tablet 200 milliGRAM(s) Oral daily  amLODIPine   Tablet 2.5 milliGRAM(s) Oral at bedtime  aspirin enteric coated 81 milliGRAM(s) Oral daily  cefTRIAXone   IVPB      cefTRIAXone   IVPB 1 Gram(s) IV Intermittent every 24 hours  digoxin     Tablet 0.125 milliGRAM(s) Oral daily  enoxaparin Injectable 40 milliGRAM(s) SubCutaneous daily  insulin lispro (HumaLOG) corrective regimen sliding scale   SubCutaneous Before meals and at bedtime  insulin lispro Injectable (HumaLOG) 3 Unit(s) SubCutaneous three times a day before meals  melatonin 3 milliGRAM(s) Oral at bedtime  metoprolol succinate ER 25 milliGRAM(s) Oral daily      	  LABS:	 	                       11.6   20.33 )-----------( 147      ( 31 May 2019 07:22 )             34.9     05-31    129<L>  |  97  |  26<H>  ----------------------------<  205<H>  4.7   |  25  |  1.15    Ca    8.5      31 May 2019 07:22  Phos  2.3     05-31  Mg     2.1     05-31    TPro  x   /  Alb  2.6<L>  /  TBili  x   /  DBili  x   /  AST  x   /  ALT  x   /  AlkPhos  x   05-30      Lipid Profile: Cholesterol 203    HDL 35      HgA1c: Hemoglobin A1C, Whole Blood: 7.7 % (05-29 @ 09:20)    TSH: Thyroid Stimulating Hormone, Serum: 1.47 uU/mL (05-29 @ 05:31)      	  Culture - Urine (05.29.19 @ 20:21)    Specimen Source: .Urine    Culture Results:   No growth    Protein Electrophoresis, Serum (05.30.19 @ 00:16)    Total Protein, Serum: 6.5 g/dL    Albumin, Serum: 2.6 g/dL    Alpha 1: 0.4 g/dL    Alpha 2: 1.1 g/dL    Beta Globulin: 0.8 g/dL    Gamma Globulin: 1.6 g/dL    M-Ted: See Note    % Albumin: 39.6 %    % Alpha 1: 5.4 %    % Alpha 2: 16.8 %    % Beta: 12.9 %    % Gamma: 25.3 %    % M Ted: See Note: M-1&2 Ted= 5.5%= 0.4 g/dl= Two IgG Kappa  M-3 Ted= Unable to quantitate= IgG Lambda    Albumin/Globulin Ratio: 0.7 Ratio    Serum Protein Electrophoresis Interp: Three Gamma-Migrating Paraproteins Identified

## 2019-05-31 NOTE — PROGRESS NOTE ADULT - PROBLEM SELECTOR PLAN 4
C/w Rhythm control with Digoxin and Amiodarone  -Rate control with metoprolol   -No Anticoagulation due to AVM and UGIB  -Card Dr. Boo.

## 2019-05-31 NOTE — PROGRESS NOTE ADULT - PROBLEM SELECTOR PLAN 1
WBC trending up, now 15.36, pt. afebrile  -UA many bacteria  -UCx negative  -CXR->New 2.6 cm focal opacification in the right lower lung zone;   if clinically indicated, chest CT may be pursued for further evaluation.   This finding is communicated with the emergency department via the PACS   communication system.  No evidence for pleural effusion or pneumothorax. Skinfold on the left.  -Cont Rocephin for now  -May transition to PO prior to d/c to rehab per attending  -f/u WBC, fever.   - Dr. Munoz consulted

## 2019-05-31 NOTE — PROGRESS NOTE ADULT - SUBJECTIVE AND OBJECTIVE BOX
Interval Events:      Allergies    No Known Allergies    Intolerances      Endocrine/Metabolic Medications:  insulin lispro (HumaLOG) corrective regimen sliding scale   SubCutaneous Before meals and at bedtime  insulin lispro Injectable (HumaLOG) 3 Unit(s) SubCutaneous three times a day before meals      Vital Signs Last 24 Hrs  T(C): 36.7 (31 May 2019 13:52), Max: 38.1 (30 May 2019 23:56)  T(F): 98 (31 May 2019 13:52), Max: 100.5 (30 May 2019 23:56)  HR: 89 (31 May 2019 13:52) (59 - 89)  BP: 130/47 (31 May 2019 13:52) (130/47 - 156/56)  BP(mean): --  RR: 18 (31 May 2019 13:52) (17 - 18)  SpO2: 95% (31 May 2019 13:52) (93% - 97%)      PHYSICAL EXAM  All physical exam findings normal, except those marked:  General:	Alert, active, cooperative, NAD, well hydrated  .		[] Abnormal:  Neck		Normal: supple, no cervical adenopathy, no palpable thyroid  .		[] Abnormal:  Cardiovascular	Normal: regular rate, normal S1, S2, no murmurs  .		[] Abnormal:  Respiratory	Normal: no chest wall deformity, normal respiratory pattern, CTA B/L  .		[] Abnormal:  Abdominal	Normal: soft, ND, NT, bowel sounds present, no masses, no organomegaly  .		[] Abnormal:  		Normal normal genitalia, testes descended, circumcised/uncircumcised  .		Sonja stage:			Breast sonja:  .		Menstrual history:  .		[] Abnormal:  Extremities	Normal: FROM x4  .		[] Abnormal:  Skin		Normal: intact and not indurated, no rash, no acanthosis nigricans  .		[] Abnormal:  Neurologic	Normal: grossly intact  .		[] Abnormal:    LABS                        11.6   20.33 )-----------( 147      ( 31 May 2019 07:22 )             34.9                               129    |  97     |  26                  Calcium: 8.5   / iCa: x      (05-31 @ 07:22)    ----------------------------<  205       Magnesium: 2.1                              4.7     |  25     |  1.15             Phosphorous: 2.3        CAPILLARY BLOOD GLUCOSE      POCT Blood Glucose.: 158 mg/dL (31 May 2019 11:07)  POCT Blood Glucose.: 205 mg/dL (31 May 2019 07:35)  POCT Blood Glucose.: 273 mg/dL (30 May 2019 21:01)  POCT Blood Glucose.: 238 mg/dL (30 May 2019 16:21)        Assesment/plan Interval Events:  pt in nad    Allergies    No Known Allergies    Intolerances      Endocrine/Metabolic Medications:  insulin lispro (HumaLOG) corrective regimen sliding scale   SubCutaneous Before meals and at bedtime  insulin lispro Injectable (HumaLOG) 3 Unit(s) SubCutaneous three times a day before meals      Vital Signs Last 24 Hrs  T(C): 36.7 (31 May 2019 13:52), Max: 38.1 (30 May 2019 23:56)  T(F): 98 (31 May 2019 13:52), Max: 100.5 (30 May 2019 23:56)  HR: 89 (31 May 2019 13:52) (59 - 89)  BP: 130/47 (31 May 2019 13:52) (130/47 - 156/56)  BP(mean): --  RR: 18 (31 May 2019 13:52) (17 - 18)  SpO2: 95% (31 May 2019 13:52) (93% - 97%)      PHYSICAL EXAM  All physical exam findings normal, except those marked:  General:	Alert, active, cooperative, NAD, well hydrated  .		[] Abnormal:  Neck		Normal: supple, no cervical adenopathy, no palpable thyroid  .		[] Abnormal:  Cardiovascular	Normal: regular rate, normal S1, S2, no murmurs  .		[] Abnormal:  Respiratory	Normal: no chest wall deformity, normal respiratory pattern, CTA B/L  .		[] Abnormal:  Abdominal	Normal: soft, ND, NT, bowel sounds present, no masses, no organomegaly  .		[] Abnormal:  		Normal normal genitalia, testes descended, circumcised/uncircumcised  .		Sonja stage:			Breast sonja:  .		Menstrual history:  .		[] Abnormal:  Extremities	Normal: FROM x4  .		[] Abnormal:  Skin		Normal: intact and not indurated, no rash, no acanthosis nigricans  .		[] Abnormal:  Neurologic	Normal: grossly intact  .		[] Abnormal:    LABS                        11.6   20.33 )-----------( 147      ( 31 May 2019 07:22 )             34.9                               129    |  97     |  26                  Calcium: 8.5   / iCa: x      (05-31 @ 07:22)    ----------------------------<  205       Magnesium: 2.1                              4.7     |  25     |  1.15             Phosphorous: 2.3        CAPILLARY BLOOD GLUCOSE      POCT Blood Glucose.: 158 mg/dL (31 May 2019 11:07)  POCT Blood Glucose.: 205 mg/dL (31 May 2019 07:35)  POCT Blood Glucose.: 273 mg/dL (30 May 2019 21:01)  POCT Blood Glucose.: 238 mg/dL (30 May 2019 16:21)        Assesment/plan   Dm- better controlled   cont humalog 3 ac tid  ?need for lantus   fsg ac and hs  d/w pts family No

## 2019-05-31 NOTE — PROGRESS NOTE ADULT - SUBJECTIVE AND OBJECTIVE BOX
NP Note discussed with  Primary Attending    Patient is a 88y old  Female who presents with a chief complaint of Fall (31 May 2019 13:55)      INTERVAL HPI/OVERNIGHT EVENTS: no new complaints    MEDICATIONS  (STANDING):  amiodarone    Tablet 200 milliGRAM(s) Oral daily  amLODIPine   Tablet 2.5 milliGRAM(s) Oral at bedtime  aspirin enteric coated 81 milliGRAM(s) Oral daily  digoxin     Tablet 0.125 milliGRAM(s) Oral daily  enoxaparin Injectable 40 milliGRAM(s) SubCutaneous daily  insulin lispro (HumaLOG) corrective regimen sliding scale   SubCutaneous Before meals and at bedtime  insulin lispro Injectable (HumaLOG) 3 Unit(s) SubCutaneous three times a day before meals  melatonin 3 milliGRAM(s) Oral at bedtime  metoprolol succinate ER 25 milliGRAM(s) Oral daily    MEDICATIONS  (PRN):  acetaminophen   Tablet .. 650 milliGRAM(s) Oral every 6 hours PRN Mild Pain (1 - 3)      __________________________________________________  REVIEW OF SYSTEMS:    CONSTITUTIONAL: No fever,   EYES: no acute visual disturbances  NECK: No pain or stiffness  RESPIRATORY: No cough; No shortness of breath  CARDIOVASCULAR: No chest pain, no palpitations  GASTROINTESTINAL: No pain. No nausea or vomiting; No diarrhea   NEUROLOGICAL: No headache or numbness, no tremors  MUSCULOSKELETAL: No joint pain, no muscle pain  GENITOURINARY: no dysuria, no frequency, no hesitancy  PSYCHIATRY: no depression , no anxiety  ALL OTHER  ROS negative        Vital Signs Last 24 Hrs  T(C): 36.7 (31 May 2019 13:52), Max: 38.1 (30 May 2019 23:56)  T(F): 98 (31 May 2019 13:52), Max: 100.5 (30 May 2019 23:56)  HR: 89 (31 May 2019 13:52) (59 - 89)  BP: 130/47 (31 May 2019 13:52) (130/47 - 156/56)  BP(mean): --  RR: 18 (31 May 2019 13:52) (17 - 18)  SpO2: 95% (31 May 2019 13:52) (93% - 97%)    ________________________________________________  PHYSICAL EXAM:  GENERAL: NAD  HEENT: Normocephalic;  conjunctivae and sclerae clear; moist mucous membranes;   NECK : supple  CHEST/LUNG: Clear to auscultation bilaterally with good air entry   HEART: S1 S2  regular; no murmurs, gallops or rubs  ABDOMEN: Soft, Nontender, Nondistended; Bowel sounds present  EXTREMITIES: no cyanosis; no edema; no calf tenderness  SKIN: warm and dry; no rash  NERVOUS SYSTEM:  Awake and alert; Oriented  to place, person, forgetful     _________________________________________________  LABS:                        11.6   20.33 )-----------( 147      ( 31 May 2019 07:22 )             34.9     05-31    129<L>  |  97  |  26<H>  ----------------------------<  205<H>  4.7   |  25  |  1.15    Ca    8.5      31 May 2019 07:22  Phos  2.3     05-31  Mg     2.1     05-31    TPro  x   /  Alb  2.6<L>  /  TBili  x   /  DBili  x   /  AST  x   /  ALT  x   /  AlkPhos  x   05-30        CAPILLARY BLOOD GLUCOSE      POCT Blood Glucose.: 158 mg/dL (31 May 2019 11:07)  POCT Blood Glucose.: 205 mg/dL (31 May 2019 07:35)  POCT Blood Glucose.: 273 mg/dL (30 May 2019 21:01)  POCT Blood Glucose.: 238 mg/dL (30 May 2019 16:21)        RADIOLOGY & ADDITIONAL TESTS:    Imaging Personally Reviewed:  YES/NO    Consultant(s) Notes Reviewed:   YES/ No    Care Discussed with Consultants :     Plan of care was discussed with patient and /or primary care giver; all questions and concerns were addressed and care was aligned with patient's wishes.

## 2019-05-31 NOTE — PROGRESS NOTE ADULT - ASSESSMENT
88 Female with PMH of Multicystic complex pancreatic head lesion, Afib (No A/c due to UGIB, AVM) s/p [employer] PPM, DM, HTN, Grade 4 Diastolic HF, Glaucoma, Grave's disease, HTN, SBO (s/p intestinal resection) and Vertigo and PSHx of Oophorectomy came to hospital after 2 episodes of fall today. Pt lives alone and had new commode installation recently to prevent falls. According to patient, the new commode is too low for her and it is hard for her to stand up from that. While getting up she had a fall. Later she was about to sit on her bed when she was feeling dizzy leading to fall. She called her neighbors who came for her help. Pt has been feeling very weak now and it is hard for her to stand up. No focal weakness, hip pain, chest pain, headache, problems with lower back.    Pt. seen and examined, noted alert/oriented x 1-2, in NAD, no pain or bruises noted, denies CP, SOB, palpitations, dizziness or other discomfort.

## 2019-05-31 NOTE — CHART NOTE - NSCHARTNOTEFT_GEN_A_CORE
paged by the nurse that the patient had fever of 101.5    sent blood cultures   ua negative and no obvious source infection   will start  ceftriaxone and tylenol  for fever and primary team to follow

## 2019-05-31 NOTE — PROGRESS NOTE ADULT - ASSESSMENT
88 Female with PMH of Multicystic complex pancreatic head lesion, Afib (No A/c due to UGIB, AVM) s/p [employer] PPM, DM, HTN, Grade 4 Diastolic HF, Glaucoma, Grave's disease, HTN, SBO (s/p intestinal resection) and Vertigo and PSHx of Oophorectomy came to hospital after 2 episodes of fall today,+SPEP.  1.D/C ABX.  2.PAF-asa,amiodarone, dig,toproloff NOAC due to recurrent GI bleed.  3.DM-insulin.  4.HTN and pulm htn-cont bp medication.  5. Spep +-Heme eval called.  6.GI and DVT prophylaxis.

## 2019-05-31 NOTE — PROGRESS NOTE ADULT - PROBLEM SELECTOR PLAN 2
-Pt. with no s&s of injury  -PT eval appreciated, recc [facility]  -CM for arrangements  -Orthostatics 3x/day  -Fall precautions.

## 2019-06-01 DIAGNOSIS — D47.2 MONOCLONAL GAMMOPATHY: ICD-10-CM

## 2019-06-01 DIAGNOSIS — R50.9 FEVER, UNSPECIFIED: ICD-10-CM

## 2019-06-01 LAB
ALBUMIN SERPL ELPH-MCNC: 1.5 G/DL — LOW (ref 3.5–5)
ALP SERPL-CCNC: 163 U/L — HIGH (ref 40–120)
ALT FLD-CCNC: 24 U/L DA — SIGNIFICANT CHANGE UP (ref 10–60)
ANION GAP SERPL CALC-SCNC: 9 MMOL/L — SIGNIFICANT CHANGE UP (ref 5–17)
APPEARANCE UR: ABNORMAL
AST SERPL-CCNC: 23 U/L — SIGNIFICANT CHANGE UP (ref 10–40)
BILIRUB SERPL-MCNC: 0.9 MG/DL — SIGNIFICANT CHANGE UP (ref 0.2–1.2)
BILIRUB UR-MCNC: NEGATIVE — SIGNIFICANT CHANGE UP
BUN SERPL-MCNC: 26 MG/DL — HIGH (ref 7–18)
CALCIUM SERPL-MCNC: 8.4 MG/DL — SIGNIFICANT CHANGE UP (ref 8.4–10.5)
CHLORIDE SERPL-SCNC: 97 MMOL/L — SIGNIFICANT CHANGE UP (ref 96–108)
CO2 SERPL-SCNC: 25 MMOL/L — SIGNIFICANT CHANGE UP (ref 22–31)
COLOR SPEC: YELLOW — SIGNIFICANT CHANGE UP
CREAT SERPL-MCNC: 1.08 MG/DL — SIGNIFICANT CHANGE UP (ref 0.5–1.3)
CRP SERPL-MCNC: 12.27 MG/DL — HIGH (ref 0–0.4)
DIFF PNL FLD: ABNORMAL
GLUCOSE BLDC GLUCOMTR-MCNC: 136 MG/DL — HIGH (ref 70–99)
GLUCOSE BLDC GLUCOMTR-MCNC: 210 MG/DL — HIGH (ref 70–99)
GLUCOSE BLDC GLUCOMTR-MCNC: 225 MG/DL — HIGH (ref 70–99)
GLUCOSE BLDC GLUCOMTR-MCNC: 298 MG/DL — HIGH (ref 70–99)
GLUCOSE SERPL-MCNC: 182 MG/DL — HIGH (ref 70–99)
GLUCOSE UR QL: 50 MG/DL
HCT VFR BLD CALC: 37.2 % — SIGNIFICANT CHANGE UP (ref 34.5–45)
HGB BLD-MCNC: 12.5 G/DL — SIGNIFICANT CHANGE UP (ref 11.5–15.5)
KETONES UR-MCNC: ABNORMAL
LEUKOCYTE ESTERASE UR-ACNC: ABNORMAL
MCHC RBC-ENTMCNC: 28.2 PG — SIGNIFICANT CHANGE UP (ref 27–34)
MCHC RBC-ENTMCNC: 33.6 GM/DL — SIGNIFICANT CHANGE UP (ref 32–36)
MCV RBC AUTO: 83.8 FL — SIGNIFICANT CHANGE UP (ref 80–100)
NITRITE UR-MCNC: NEGATIVE — SIGNIFICANT CHANGE UP
NRBC # BLD: 0 /100 WBCS — SIGNIFICANT CHANGE UP (ref 0–0)
PH UR: 5 — SIGNIFICANT CHANGE UP (ref 5–8)
PLATELET # BLD AUTO: 145 K/UL — LOW (ref 150–400)
POTASSIUM SERPL-MCNC: 4.7 MMOL/L — SIGNIFICANT CHANGE UP (ref 3.5–5.3)
POTASSIUM SERPL-SCNC: 4.7 MMOL/L — SIGNIFICANT CHANGE UP (ref 3.5–5.3)
PROCALCITONIN SERPL-MCNC: 0.39 NG/ML — HIGH (ref 0.02–0.1)
PROT SERPL-MCNC: 6.2 G/DL — SIGNIFICANT CHANGE UP (ref 6–8.3)
PROT UR-MCNC: 100
RBC # BLD: 4.44 M/UL — SIGNIFICANT CHANGE UP (ref 3.8–5.2)
RBC # FLD: 16.2 % — HIGH (ref 10.3–14.5)
SODIUM SERPL-SCNC: 131 MMOL/L — LOW (ref 135–145)
SP GR SPEC: 1.01 — SIGNIFICANT CHANGE UP (ref 1.01–1.02)
UROBILINOGEN FLD QL: NEGATIVE — SIGNIFICANT CHANGE UP
WBC # BLD: 22.96 K/UL — HIGH (ref 3.8–10.5)
WBC # FLD AUTO: 22.96 K/UL — HIGH (ref 3.8–10.5)

## 2019-06-01 PROCEDURE — 71045 X-RAY EXAM CHEST 1 VIEW: CPT | Mod: 26

## 2019-06-01 RX ORDER — CEFEPIME 1 G/1
1000 INJECTION, POWDER, FOR SOLUTION INTRAMUSCULAR; INTRAVENOUS EVERY 12 HOURS
Refills: 0 | Status: DISCONTINUED | OUTPATIENT
Start: 2019-06-02 | End: 2019-06-05

## 2019-06-01 RX ORDER — SODIUM CHLORIDE 9 MG/ML
1000 INJECTION, SOLUTION INTRAVENOUS
Refills: 0 | Status: DISCONTINUED | OUTPATIENT
Start: 2019-06-01 | End: 2019-06-03

## 2019-06-01 RX ORDER — AZITHROMYCIN 500 MG/1
500 TABLET, FILM COATED ORAL ONCE
Refills: 0 | Status: COMPLETED | OUTPATIENT
Start: 2019-06-01 | End: 2019-06-01

## 2019-06-01 RX ORDER — CEFEPIME 1 G/1
1000 INJECTION, POWDER, FOR SOLUTION INTRAMUSCULAR; INTRAVENOUS ONCE
Refills: 0 | Status: COMPLETED | OUTPATIENT
Start: 2019-06-01 | End: 2019-06-01

## 2019-06-01 RX ORDER — AZITHROMYCIN 500 MG/1
500 TABLET, FILM COATED ORAL EVERY 24 HOURS
Refills: 0 | Status: DISCONTINUED | OUTPATIENT
Start: 2019-06-02 | End: 2019-06-05

## 2019-06-01 RX ORDER — CEFEPIME 1 G/1
INJECTION, POWDER, FOR SOLUTION INTRAMUSCULAR; INTRAVENOUS
Refills: 0 | Status: DISCONTINUED | OUTPATIENT
Start: 2019-06-01 | End: 2019-06-05

## 2019-06-01 RX ORDER — AZITHROMYCIN 500 MG/1
TABLET, FILM COATED ORAL
Refills: 0 | Status: DISCONTINUED | OUTPATIENT
Start: 2019-06-01 | End: 2019-06-05

## 2019-06-01 RX ADMIN — CEFEPIME 100 MILLIGRAM(S): 1 INJECTION, POWDER, FOR SOLUTION INTRAMUSCULAR; INTRAVENOUS at 18:38

## 2019-06-01 RX ADMIN — Medication 3 MILLIGRAM(S): at 21:58

## 2019-06-01 RX ADMIN — AMLODIPINE BESYLATE 2.5 MILLIGRAM(S): 2.5 TABLET ORAL at 21:58

## 2019-06-01 RX ADMIN — Medication 3: at 21:58

## 2019-06-01 RX ADMIN — SODIUM CHLORIDE 60 MILLILITER(S): 9 INJECTION, SOLUTION INTRAVENOUS at 11:33

## 2019-06-01 RX ADMIN — Medication 2: at 08:13

## 2019-06-01 RX ADMIN — AMIODARONE HYDROCHLORIDE 200 MILLIGRAM(S): 400 TABLET ORAL at 06:09

## 2019-06-01 RX ADMIN — Medication 3 UNIT(S): at 08:14

## 2019-06-01 RX ADMIN — Medication 2: at 16:32

## 2019-06-01 RX ADMIN — Medication 3 UNIT(S): at 16:35

## 2019-06-01 RX ADMIN — ENOXAPARIN SODIUM 40 MILLIGRAM(S): 100 INJECTION SUBCUTANEOUS at 11:30

## 2019-06-01 RX ADMIN — SODIUM CHLORIDE 60 MILLILITER(S): 9 INJECTION, SOLUTION INTRAVENOUS at 16:36

## 2019-06-01 RX ADMIN — Medication 81 MILLIGRAM(S): at 11:30

## 2019-06-01 RX ADMIN — AZITHROMYCIN 250 MILLIGRAM(S): 500 TABLET, FILM COATED ORAL at 20:13

## 2019-06-01 RX ADMIN — Medication 0.12 MILLIGRAM(S): at 06:09

## 2019-06-01 RX ADMIN — Medication 25 MILLIGRAM(S): at 06:09

## 2019-06-01 NOTE — CONSULT NOTE ADULT - SUBJECTIVE AND OBJECTIVE BOX
Patient is a 88y old  Female who presents with a chief complaint of Fall (01 Jun 2019 12:13)      HPI:  88 Female with PMH of Multicystic complex pancreatic head lesion, Afib (No A/c due to UGIB, AVM) s/p Medtronic PPM, DM, HTN, Grade 4 Diastolic HF, Glaucoma, Grave's disease, HTN, SBO (s/p intestinal resection) and Vertigo and PSHx of Oophorectomy came to hospital after 2 episodes of fall today. Pt lives alone and had new commode installation recently to prevent falls. According to patient, the new commode is too low for her and it is hard for her to stand up from that. While getting up she had a fall. Later she was about to sit on her bed when she was feeling dizzy leading to fall. She called her neighbors who came for her help. Pt has been feeling very weak now and it is hard for her to stand up. No focal weakness, hip pain, chest pain, headache, problems with lower back.  CT chest showed RLL consolidation.  there are also 3 small spikes in ROSALINA.    SH: Lives alone, walks with walker. No smoking    ED Course: Hemodynamically stable. Labs showed Leukocytosis and serum na of 132. Cardiac enzymes and Ct head was normal. EKG showed Paced @ 65 bpm. (28 May 2019 23:56)       ROS:  Negative except for:    PAST MEDICAL & SURGICAL HISTORY:  Multiple falls  GIB (gastrointestinal bleeding): 2/2019 requiring 2 PRBCs  Gastric AVM: EGD 2/2019 - cauterized  PNA (pneumonia): 2/2019   treated with ABX while in [facility]  T2DM (type 2 diabetes mellitus): last A1c 7.8   4/11/19  Hepatitis C virus: not treated  A-fib: no Eliquis since 1/2019  Constipation  Glaucoma  Vertigo  Graves disease: no treatment as per patient and family  Pacemaker: medtronic ADDRL1  2011  HTN (hypertension)  S/P cardiac pacemaker procedure: Medtronic ADDRL1  2011  S/P cholecystectomy  S/P partial resection of colon: &gt; 5 yrs ago  H/O oophorectomy      SOCIAL HISTORY:    FAMILY HISTORY:  Family history of hypertension  Family history of diabetes mellitus      MEDICATIONS  (STANDING):  amiodarone    Tablet 200 milliGRAM(s) Oral daily  amLODIPine   Tablet 2.5 milliGRAM(s) Oral at bedtime  aspirin enteric coated 81 milliGRAM(s) Oral daily  cefTRIAXone   IVPB      cefTRIAXone   IVPB 1 Gram(s) IV Intermittent every 24 hours  dextrose 5% + sodium chloride 0.9%. 1000 milliLiter(s) (60 mL/Hr) IV Continuous <Continuous>  digoxin     Tablet 0.125 milliGRAM(s) Oral daily  enoxaparin Injectable 40 milliGRAM(s) SubCutaneous daily  insulin lispro (HumaLOG) corrective regimen sliding scale   SubCutaneous Before meals and at bedtime  insulin lispro Injectable (HumaLOG) 3 Unit(s) SubCutaneous three times a day before meals  melatonin 3 milliGRAM(s) Oral at bedtime  metoprolol succinate ER 25 milliGRAM(s) Oral daily    MEDICATIONS  (PRN):  acetaminophen   Tablet .. 650 milliGRAM(s) Oral every 6 hours PRN Mild Pain (1 - 3)  acetaminophen   Tablet .. 650 milliGRAM(s) Oral every 6 hours PRN Temp greater or equal to 38C (100.4F)      Allergies    No Known Allergies    Intolerances        Vital Signs Last 24 Hrs  T(C): 36.7 (01 Jun 2019 13:43), Max: 38.6 (31 May 2019 20:23)  T(F): 98 (01 Jun 2019 13:43), Max: 101.5 (31 May 2019 20:23)  HR: 80 (01 Jun 2019 13:43) (64 - 89)  BP: 130/60 (01 Jun 2019 13:43) (124/63 - 157/52)  BP(mean): --  RR: 18 (01 Jun 2019 13:43) (16 - 18)  SpO2: 97% (01 Jun 2019 13:43) (95% - 98%)    PHYSICAL EXAM  General: adult in NAD  HEENT: clear oropharynx, anicteric sclera, pink conjunctiva  Neck: supple  CV: normal S1/S2 with no murmur rubs or gallops  Lungs: positive air movement b/l ant lungs,clear to auscultation, no wheezes, no rales  Abdomen: soft non-tender non-distended, no hepatosplenomegaly  Ext: no clubbing cyanosis or edema  Skin: no rashes and no petechiae  Neuro: alert and oriented X 4, no focal deficits      LABS:                          12.5   22.96 )-----------( 145      ( 01 Jun 2019 07:19 )             37.2         Mean Cell Volume : 83.8 fl  Mean Cell Hemoglobin : 28.2 pg  Mean Cell Hemoglobin Concentration : 33.6 gm/dL  Auto Neutrophil # : x  Auto Lymphocyte # : x  Auto Monocyte # : x  Auto Eosinophil # : x  Auto Basophil # : x  Auto Neutrophil % : x  Auto Lymphocyte % : x  Auto Monocyte % : x  Auto Eosinophil % : x  Auto Basophil % : x      Serial CBC's  06-01 @ 07:19  Hct-37.2 / Hgb-12.5 / Plat-145 / RBC-4.44 / WBC-22.96  Serial CBC's  05-31 @ 07:22  Hct-34.9 / Hgb-11.6 / Plat-147 / RBC-4.17 / WBC-20.33  Serial CBC's  05-30 @ 08:02  Hct-33.3 / Hgb-11.0 / Plat-127 / RBC-3.96 / WBC-15.36  Serial CBC's  05-29 @ 05:31  Hct-33.7 / Hgb-11.1 / Plat-125 / RBC-3.98 / WBC-11.11  Serial CBC's  05-28 @ 21:09  Hct-35.0 / Hgb-11.5 / Plat-115 / RBC-4.10 / WBC-10.57      06-01    131<L>  |  97  |  26<H>  ----------------------------<  182<H>  4.7   |  25  |  1.08    Ca    8.4      01 Jun 2019 07:19  Phos  2.3     05-31  Mg     2.1     05-31    TPro  6.2  /  Alb  1.5<L>  /  TBili  0.9  /  DBili  x   /  AST  23  /  ALT  24  /  AlkPhos  163<H>  06-01          Folate, Serum: 7.8 ng/mL (05-29 @ 09:24)  Vitamin B12, Serum: 953 pg/mL (05-29 @ 09:24)      Serum Protein Electrophoresis Interp: Three Gamma-Migrating Paraproteins Identified (05-30 @ 00:16)  Immunofixation, Serum:   Two IgG Kappa and One Weak IgG Lambda Bands Identified    Reference Range: None Detected (05-30 @ 00:16)          BLOOD SMEAR INTERPRETATION:       RADIOLOGY & ADDITIONAL STUDIES:    < from: CT Abdomen and Pelvis w/ IV Cont (05.29.19 @ 09:36) >  PROCEDURE:   CT of the Abdomen and Pelvis was performed with intravenous contrast.  Intravenous Contrast: 90 cc Omnipaque 350  Oral contrast: None.  Sagittal and coronal reformats were performed.    FINDINGS:    LOWER CHEST: Trace bilateral pleural effusions. Right lower lobe   atelectasis versus consolidation.    LIVER: Normal.  BILE DUCTS: Stable caliber.  GALLBLADDER: Not seen  SPLEEN: Normal.  PANCREAS: Normal.  ADRENALS: Normal.  KIDNEYS/URETERS: Symmetric nephrograms. No hydronephrosis. Small right   renal calculus.    BLADDER: Normal.  REPRODUCTIVE ORGANS: No masses.    BOWEL: No bowel-related abnormality. Specifically, no evidence of acute   diverticulitis. Normal appendix and ileocecal region. No bowel   obstruction or bowel inflammation.  PERITONEUM: Small ascites. No free air.  VESSELS:  Aortoiliac atherosclerosis without aneurysm.  RETROPERITONEUM: No adenopathy.    ABDOMINAL WALL: Diffuse body wall edema.  BONES: No acute bony abnormality.    IMPRESSION:     No acute pathology in the abdomen.      < end of copied text >  < from: CT Abdomen and Pelvis w/ IV Cont (05.29.19 @ 09:36) >  Stable degree of mild biliary dilatation. Correlate with LFTs to   determine the clinical significance.    Right lower lobe atelectasis versus consolidation.    < end of copied text >

## 2019-06-01 NOTE — CONSULT NOTE ADULT - ASSESSMENT
88 Female with PMH of Multicystic complex pancreatic head lesion, Afib (No A/c due to UGIB, AVM) s/p Medtronic PPM, DM, HTN, Grade 4 Diastolic HF, Glaucoma, Grave's disease, HTN, SBO (s/p intestinal resection) and Vertigo and PSHx of Oophorectomy came to hospital after 2 episodes of fall today.  1.Orthostatic BP q shift.  2.Check ua and u cx.  3.PAF-asa,amiodarone, dig,toproloff NOAC due to recurrent GI bleed.  4.DM-insulin.  5.HTN and pulm htn-cont bp medication.  6.Check ESR and Spep.  7.GI and DVT prophylaxis.
88 year old lady with multiple issues, including a 2 cm complex cystic mass at pancreatic head pressing on bile duct, Afib not on  AC d for GIB, DM, CHF, wasa dmited for fall and weakness.  WBC was rising and a RLL pneumonia was found.  # small spikes were found in ROSALINA.
88 Female with PMH of Multicystic complex pancreatic head lesion, Afib (No A/c due to UGIB, AVM) s/p Medtronic PPM, DM, HTN, Grade 4 Diastolic HF, Glaucoma, Grave's disease, HTN, SBO (s/p intestinal resection) and Vertigo and PSHx of Oophorectomy came to hospital after 2 episodes of fall. Found to have un cont dm. Pt takes latus 8 units at home with occ am hypos and afrezza pre meals to be started.
Pneumonia  Fevers  Leukocytosis - increasing    plan - dc rocephin  start Maxipime 1gm iv q12hrs  start Azithromycin 500mgs iv q24hrs

## 2019-06-01 NOTE — PROGRESS NOTE ADULT - PROBLEM SELECTOR PLAN 4
-c/w metoprolol , digoxin and amiodarone   -No Anticoagulation due to AVM and UGIB  -Cardio Dr. Boo.

## 2019-06-01 NOTE — PROGRESS NOTE ADULT - SUBJECTIVE AND OBJECTIVE BOX
Resident Note discussed with  primary attending    Patient is a 88y old  Female who presents with a chief complaint of Fall (31 May 2019 16:11)      INTERVAL HPI/ OVERNIGHT EVENTS: says feeling weak, not eating well.     MEDICATIONS  (STANDING):  amiodarone    Tablet 200 milliGRAM(s) Oral daily  amLODIPine   Tablet 2.5 milliGRAM(s) Oral at bedtime  aspirin enteric coated 81 milliGRAM(s) Oral daily  cefTRIAXone   IVPB      cefTRIAXone   IVPB 1 Gram(s) IV Intermittent every 24 hours  dextrose 5% + sodium chloride 0.9%. 1000 milliLiter(s) (60 mL/Hr) IV Continuous <Continuous>  digoxin     Tablet 0.125 milliGRAM(s) Oral daily  enoxaparin Injectable 40 milliGRAM(s) SubCutaneous daily  insulin lispro (HumaLOG) corrective regimen sliding scale   SubCutaneous Before meals and at bedtime  insulin lispro Injectable (HumaLOG) 3 Unit(s) SubCutaneous three times a day before meals  melatonin 3 milliGRAM(s) Oral at bedtime  metoprolol succinate ER 25 milliGRAM(s) Oral daily    MEDICATIONS  (PRN):  acetaminophen   Tablet .. 650 milliGRAM(s) Oral every 6 hours PRN Mild Pain (1 - 3)  acetaminophen   Tablet .. 650 milliGRAM(s) Oral every 6 hours PRN Temp greater or equal to 38C (100.4F)      __________________________________________________  REVIEW OF SYSTEMS:    CONSTITUTIONAL: No fever,   EYES: no acute visual disturbances  NECK: No pain or stiffness  RESPIRATORY: No cough; No shortness of breath  CARDIOVASCULAR: No chest pain, no palpitations  GASTROINTESTINAL: No pain. No nausea or vomiting; No diarrhea   NEUROLOGICAL: No headache or numbness, no tremors  MUSCULOSKELETAL: No joint pain, no muscle pain  GENITOURINARY: no dysuria, no frequency, no hesitancy  PSYCHIATRY: no depression , no anxiety  ALL OTHER  ROS negative        Vital Signs Last 24 Hrs  T(C): 36.7 (2019 09:36), Max: 38.6 (31 May 2019 20:23)  T(F): 98 (2019 09:36), Max: 101.5 (31 May 2019 20:23)  HR: 80 (2019 09:36) (64 - 89)  BP: 124/63 (2019 05:47) (124/63 - 157/52)  BP(mean): --  RR: 16 (2019 05:47) (16 - 18)  SpO2: 98% (2019 05:47) (95% - 98%)    ________________________________________________  PHYSICAL EXAM:  GENERAL: NAD  HEENT: Normocephalic;  conjunctivae and sclerae clear; moist mucous membranes;   NECK : supple  CHEST/LUNG: Clear to auscultation bilaterally with good air entry   HEART: S1 S2  regular; no murmurs, gallops or rubs  ABDOMEN: Soft, Nontender, Nondistended; Bowel sounds present  EXTREMITIES: no cyanosis; no edema; no calf tenderness  NERVOUS SYSTEM:  Awake and alert; Oriented  to place, person and time ; no new deficits    _________________________________________________  LABS:                        12.5   22.96 )-----------( 145      ( 2019 07:19 )             37.2     06-01    131<L>  |  97  |  26<H>  ----------------------------<  182<H>  4.7   |  25  |  1.08    Ca    8.4      2019 07:19  Phos  2.3     05-31  Mg     2.1     05-31    TPro  6.2  /  Alb  1.5<L>  /  TBili  0.9  /  DBili  x   /  AST  23  /  ALT  24  /  AlkPhos  163<H>  0601      Urinalysis Basic - ( 2019 11:20 )    Color: Yellow / Appearance: Slightly Turbid / S.015 / pH: x  Gluc: x / Ketone: Trace  / Bili: Negative / Urobili: Negative   Blood: x / Protein: 100 / Nitrite: Negative   Leuk Esterase: Moderate / RBC: x / WBC x   Sq Epi: x / Non Sq Epi: x / Bacteria: x      CAPILLARY BLOOD GLUCOSE      POCT Blood Glucose.: 136 mg/dL (2019 11:08)  POCT Blood Glucose.: 225 mg/dL (2019 08:00)  POCT Blood Glucose.: 162 mg/dL (31 May 2019 21:10)  POCT Blood Glucose.: 181 mg/dL (31 May 2019 16:21)        Consultant(s) Notes Reviewed:   YES      Plan of care was discussed with patient and /or primary care giver; all questions and concerns were addressed and care was aligned with patient's wishes.

## 2019-06-01 NOTE — CONSULT NOTE ADULT - SUBJECTIVE AND OBJECTIVE BOX
HPI:  88 Female with PMH of Multicystic complex pancreatic head lesion, Afib (No A/c due to UGIB, AVM) s/p Medtronic PPM, DM, HTN, Grade 4 Diastolic HF, Glaucoma, Grave's disease, HTN, SBO (s/p intestinal resection) and Vertigo and PSHx of Oophorectomy came to hospital after 2 episodes of fall today. Pt lives alone and had new commode installation recently to prevent falls. According to patient, the new commode is too low for her and it is hard for her to stand up from that. While getting up she had a fall. Later she was about to sit on her bed when she was feeling dizzy leading to fall. She called her neighbors who came for her help. Pt has been feeling very weak now and it is hard for her to stand up. No focal weakness, hip pain, chest pain, headache, problems with lower back.                SH: Lives alone, walks with walker. No smoking      PAST MEDICAL & SURGICAL HISTORY:  Multiple falls  GIB (gastrointestinal bleeding): 2019 requiring 2 PRBCs  Gastric AVM: EGD 2019 - cauterized  PNA (pneumonia): 2019   treated with ABX while in [facility]  T2DM (type 2 diabetes mellitus): last A1c 7.8   19  Hepatitis C virus: not treated  A-fib: no Eliquis since 2019  Constipation  Glaucoma  Vertigo  Graves disease: no treatment as per patient and family  Pacemaker: medtronic ADDRL1    HTN (hypertension)  S/P cardiac pacemaker procedure: Medtronic ADDRL1    S/P cholecystectomy  S/P partial resection of colon: &gt; 5 yrs ago  H/O oophorectomy      No Known Allergies      Meds:  acetaminophen   Tablet .. 650 milliGRAM(s) Oral every 6 hours PRN  acetaminophen   Tablet .. 650 milliGRAM(s) Oral every 6 hours PRN  amiodarone    Tablet 200 milliGRAM(s) Oral daily  amLODIPine   Tablet 2.5 milliGRAM(s) Oral at bedtime  aspirin enteric coated 81 milliGRAM(s) Oral daily  cefTRIAXone   IVPB      cefTRIAXone   IVPB 1 Gram(s) IV Intermittent every 24 hours  dextrose 5% + sodium chloride 0.9%. 1000 milliLiter(s) IV Continuous <Continuous>  digoxin     Tablet 0.125 milliGRAM(s) Oral daily  enoxaparin Injectable 40 milliGRAM(s) SubCutaneous daily  insulin lispro (HumaLOG) corrective regimen sliding scale   SubCutaneous Before meals and at bedtime  insulin lispro Injectable (HumaLOG) 3 Unit(s) SubCutaneous three times a day before meals  melatonin 3 milliGRAM(s) Oral at bedtime  metoprolol succinate ER 25 milliGRAM(s) Oral daily        FAMILY HISTORY:  Family history of hypertension  Family history of diabetes mellitus      VITALS:  Vital Signs Last 24 Hrs  T(C): 36.7 (2019 13:43), Max: 38.6 (31 May 2019 20:23)  T(F): 98 (2019 13:43), Max: 101.5 (31 May 2019 20:23)  HR: 80 (2019 13:43) (64 - 89)  BP: 130/60 (2019 13:43) (124/63 - 157/52)  BP(mean): --  RR: 18 (2019 13:43) (16 - 18)  SpO2: 97% (2019 13:43) (95% - 98%)    LABS/DIAGNOSTIC TESTS:                          12.5   22.96 )-----------( 145      ( 2019 07:19 )             37.2     WBC Count: 22.96 K/uL ( @ 07:19)  WBC Count: 20.33 K/uL ( @ 07:22)  WBC Count: 15.36 K/uL ( @ 08:02)          131<L>  |  97  |  26<H>  ----------------------------<  182<H>  4.7   |  25  |  1.08    Ca    8.4      2019 07:19  Phos  2.3       Mg     2.1         TPro  6.2  /  Alb  1.5<L>  /  TBili  0.9  /  DBili  x   /  AST  23  /  ALT  24  /  AlkPhos  163<H>        Urinalysis Basic - ( 2019 11:20 )    Color: Yellow / Appearance: Slightly Turbid / S.015 / pH: x  Gluc: x / Ketone: Trace  / Bili: Negative / Urobili: Negative   Blood: x / Protein: 100 / Nitrite: Negative   Leuk Esterase: Moderate / RBC: 2-5 /HPF / WBC 11-25 /HPF   Sq Epi: x / Non Sq Epi: Few /HPF / Bacteria: Few /HPF        LIVER FUNCTIONS - ( 2019 07:19 )  Alb: 1.5 g/dL / Pro: 6.2 g/dL / ALK PHOS: 163 U/L / ALT: 24 U/L DA / AST: 23 U/L / GGT: x                 LACTATE:    ABG -     CULTURES:   .Urine   @ 20:21   No growth  --  --      .Stool   @ 22:27   No enteric pathogens isolated.  (Stool culture examined for Salmonella,  Shigella, Campylobacter, Aeromonas, Plesiomonas,  Vibrio, E.coli O157 and Yersinia)  No enteric gram negative rods isolated  --  --      .Urine  04-10 @ 19:36   <10,000 CFU/mL Normal Urogenital Dianne  --  --      .Blood  04-10 @ 18:32   No growth at 5 days.  --  --          RADIOLOGY: < from: Xray Chest 1 View- PORTABLE-Routine (19 @ 09:36) >  EXAM:  XR CHEST PORTABLE ROUTINE 1V                            PROCEDURE DATE:  2019          INTERPRETATION:  HISTORY: Pneumonia. Dizziness. Patient is unable to   communicate    Portable chest radiograph    TECHNIQUE:  Single portable frontal AP view of the chest was obtained.    FINDINGS:   Comparison radiographs including from 528 are  available for review.    There is a right lower lobe consolidation, markedly increased compared   the prior study. The rest of the lung appears unchanged. The heart and   mediastinum are unchanged.  The bones are unchanged. Left-sided dual-lead pacemaker is unchanged.    IMPRESSION: Right lower lobe consolidation, markedly increased as   compared the prior study      < end of copied text >        ROS  [  ] UNABLE TO ELICIT HPI:  88 Female with PMH of Multicystic complex pancreatic head lesion, Afib (No A/c due to UGIB, AVM) s/p Medtronic PPM, DM, HTN, Grade 4 Diastolic HF, Glaucoma, Grave's disease, HTN, SBO (s/p intestinal resection) and Vertigo and PSHx of Oophorectomy came to hospital after 2 episodes of fall today. Pt lives alone and had new commode installation recently to prevent falls. According to patient, the new commode is too low for her and it is hard for her to stand up from that. While getting up she had a fall. Later she was about to sit on her bed when she was feeling dizzy leading to fall. She called her neighbors who came for her help. Pt has been feeling very weak now and it is hard for her to stand up. No focal weakness, hip pain, chest pain, headache, problems with lower back.      History as above, pt is lethargic and a poor historian, she was admitted with syncope and found to have  fevers and now an increasing wbc count, and has a RLL infiltrate on her CXR. she has apparently had multiple hospital admissions recently but unable to give me details and is confused. she does c/o of a cough and some SOB.      SH: Lives alone, walks with walker. No smoking      PAST MEDICAL & SURGICAL HISTORY:  Multiple falls  GIB (gastrointestinal bleeding): 2019 requiring 2 PRBCs  Gastric AVM: EGD 2019 - cauterized  PNA (pneumonia): 2019   treated with ABX while in [facility]  T2DM (type 2 diabetes mellitus): last A1c 7.8   19  Hepatitis C virus: not treated  A-fib: no Eliquis since 2019  Constipation  Glaucoma  Vertigo  Graves disease: no treatment as per patient and family  Pacemaker: medtronic ADDRL1    HTN (hypertension)  S/P cardiac pacemaker procedure: Medtronic ADDRL1    S/P cholecystectomy  S/P partial resection of colon: &gt; 5 yrs ago  H/O oophorectomy      No Known Allergies      Meds:  acetaminophen   Tablet .. 650 milliGRAM(s) Oral every 6 hours PRN  acetaminophen   Tablet .. 650 milliGRAM(s) Oral every 6 hours PRN  amiodarone    Tablet 200 milliGRAM(s) Oral daily  amLODIPine   Tablet 2.5 milliGRAM(s) Oral at bedtime  aspirin enteric coated 81 milliGRAM(s) Oral daily  cefTRIAXone   IVPB      cefTRIAXone   IVPB 1 Gram(s) IV Intermittent every 24 hours  dextrose 5% + sodium chloride 0.9%. 1000 milliLiter(s) IV Continuous <Continuous>  digoxin     Tablet 0.125 milliGRAM(s) Oral daily  enoxaparin Injectable 40 milliGRAM(s) SubCutaneous daily  insulin lispro (HumaLOG) corrective regimen sliding scale   SubCutaneous Before meals and at bedtime  insulin lispro Injectable (HumaLOG) 3 Unit(s) SubCutaneous three times a day before meals  melatonin 3 milliGRAM(s) Oral at bedtime  metoprolol succinate ER 25 milliGRAM(s) Oral daily        FAMILY HISTORY:  Family history of hypertension  Family history of diabetes mellitus      VITALS:  Vital Signs Last 24 Hrs  T(C): 36.7 (2019 13:43), Max: 38.6 (31 May 2019 20:23)  T(F): 98 (2019 13:43), Max: 101.5 (31 May 2019 20:23)  HR: 80 (2019 13:43) (64 - 89)  BP: 130/60 (2019 13:43) (124/63 - 157/52)  BP(mean): --  RR: 18 (2019 13:43) (16 - 18)  SpO2: 97% (2019 13:43) (95% - 98%)    LABS/DIAGNOSTIC TESTS:                          12.5   22.96 )-----------( 145      ( 2019 07:19 )             37.2     WBC Count: 22.96 K/uL ( @ 07:19)  WBC Count: 20.33 K/uL ( @ 07:22)  WBC Count: 15.36 K/uL ( @ 08:02)          131<L>  |  97  |  26<H>  ----------------------------<  182<H>  4.7   |  25  |  1.08    Ca    8.4      2019 07:19  Phos  2.3       Mg     2.1         TPro  6.2  /  Alb  1.5<L>  /  TBili  0.9  /  DBili  x   /  AST  23  /  ALT  24  /  AlkPhos  163<H>        Urinalysis Basic - ( 2019 11:20 )    Color: Yellow / Appearance: Slightly Turbid / S.015 / pH: x  Gluc: x / Ketone: Trace  / Bili: Negative / Urobili: Negative   Blood: x / Protein: 100 / Nitrite: Negative   Leuk Esterase: Moderate / RBC: 2-5 /HPF / WBC 11-25 /HPF   Sq Epi: x / Non Sq Epi: Few /HPF / Bacteria: Few /HPF        LIVER FUNCTIONS - ( 2019 07:19 )  Alb: 1.5 g/dL / Pro: 6.2 g/dL / ALK PHOS: 163 U/L / ALT: 24 U/L DA / AST: 23 U/L / GGT: x                 LACTATE:    ABG -     CULTURES:   .Urine  - @ 20:21   No growth  --  --      .Stool  04- @ 22:27   No enteric pathogens isolated.  (Stool culture examined for Salmonella,  Shigella, Campylobacter, Aeromonas, Plesiomonas,  Vibrio, E.coli O157 and Yersinia)  No enteric gram negative rods isolated  --  --      .Urine  04-10 @ 19:36   <10,000 CFU/mL Normal Urogenital Dianne  --  --      .Blood  04-10 @ 18:32   No growth at 5 days.  --  --          RADIOLOGY: < from: Xray Chest 1 View- PORTABLE-Routine (19 @ 09:36) >  EXAM:  XR CHEST PORTABLE ROUTINE 1V                            PROCEDURE DATE:  2019          INTERPRETATION:  HISTORY: Pneumonia. Dizziness. Patient is unable to   communicate    Portable chest radiograph    TECHNIQUE:  Single portable frontal AP view of the chest was obtained.    FINDINGS:   Comparison radiographs including from 528 are  available for review.    There is a right lower lobe consolidation, markedly increased compared   the prior study. The rest of the lung appears unchanged. The heart and   mediastinum are unchanged.  The bones are unchanged. Left-sided dual-lead pacemaker is unchanged.    IMPRESSION: Right lower lobe consolidation, markedly increased as   compared the prior study      < end of copied text >        ROS  [  ] UNABLE TO ELICIT, limited

## 2019-06-01 NOTE — PROGRESS NOTE ADULT - ASSESSMENT
88 Female with PMH of Multicystic complex pancreatic head lesion, Afib (No A/c due to UGIB, AVM) s/p Medtronic PPM, DM, HTN, Grade 4 Diastolic HF, Glaucoma, Grave's disease, HTN, SBO (s/p intestinal resection) and Vertigo and PSHx of Oophorectomy came to hospital after 2 episodes of fall. Also noted to have leukocytosis, no clear source identified. Fever noted last night.

## 2019-06-01 NOTE — CONSULT NOTE ADULT - PROBLEM SELECTOR RECOMMENDATION 2
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
tx per prim team

## 2019-06-01 NOTE — CONSULT NOTE ADULT - PROBLEM SELECTOR RECOMMENDATION 9
it increased from 10 to 22  most likely due to pneumonia
un cont dm  start humalog 3 units pre meals  hold lantus home dose  fsg ac and hs  consider afrezza as out pt

## 2019-06-01 NOTE — PROGRESS NOTE ADULT - SUBJECTIVE AND OBJECTIVE BOX
CHIEF COMPLAINT:Patient is a 88y old  Female who presents with a chief complaint of Fall .Pt appears comfortable, s/p temp spike.    	  REVIEW OF SYSTEMS:  CONSTITUTIONAL: No fever, weight loss, or fatigue  EYES: No eye pain, visual disturbances, or discharge  ENT:  No difficulty hearing, tinnitus, vertigo; No sinus or throat pain  NECK: No pain or stiffness  RESPIRATORY: No cough, wheezing, chills or hemoptysis; No Shortness of Breath  CARDIOVASCULAR: No chest pain, palpitations, passing out, dizziness, or leg swelling  GASTROINTESTINAL: No abdominal or epigastric pain. No nausea, vomiting, or hematemesis; No diarrhea or constipation. No melena or hematochezia.  GENITOURINARY: No dysuria, frequency, hematuria, or incontinence  NEUROLOGICAL: No headaches, memory loss, loss of strength, numbness, or tremors  SKIN: No itching, burning, rashes, or lesions   LYMPH Nodes: No enlarged glands  ENDOCRINE: No heat or cold intolerance; No hair loss  MUSCULOSKELETAL: No joint pain or swelling; No muscle, back, or extremity pain  PSYCHIATRIC: No depression, anxiety, mood swings, or difficulty sleeping  HEME/LYMPH: No easy bruising, or bleeding gums  ALLERGY AND IMMUNOLOGIC: No hives or eczema	      PHYSICAL EXAM:  T(C): 36.7 (06-01-19 @ 09:36), Max: 38.6 (05-31-19 @ 20:23)  HR: 80 (06-01-19 @ 09:36) (64 - 89)  BP: 124/63 (06-01-19 @ 05:47) (124/63 - 157/52)  RR: 16 (06-01-19 @ 05:47) (16 - 18)  SpO2: 98% (06-01-19 @ 05:47) (95% - 98%)  Wt(kg): --  I&O's Summary      Appearance: Normal	  HEENT:   Normal oral mucosa, PERRL, EOMI	  Lymphatic: No lymphadenopathy  Cardiovascular: Normal S1 S2, No JVD, No murmurs, No edema  Respiratory: Dec BS at bases  Psychiatry: A & O x 3, Mood & affect appropriate  Gastrointestinal:  Soft, Non-tender, + BS	  Skin: No rashes, No ecchymoses, No cyanosis	  Neurologic: Non-focal  Extremities: Normal range of motion, No clubbing, cyanosis or edema  Vascular: Peripheral pulses palpable 2+ bilaterally    MEDICATIONS  (STANDING):  amiodarone    Tablet 200 milliGRAM(s) Oral daily  amLODIPine   Tablet 2.5 milliGRAM(s) Oral at bedtime  aspirin enteric coated 81 milliGRAM(s) Oral daily  cefTRIAXone   IVPB      cefTRIAXone   IVPB 1 Gram(s) IV Intermittent every 24 hours  dextrose 5% + sodium chloride 0.9%. 1000 milliLiter(s) (60 mL/Hr) IV Continuous <Continuous>  digoxin     Tablet 0.125 milliGRAM(s) Oral daily  enoxaparin Injectable 40 milliGRAM(s) SubCutaneous daily  insulin lispro (HumaLOG) corrective regimen sliding scale   SubCutaneous Before meals and at bedtime  insulin lispro Injectable (HumaLOG) 3 Unit(s) SubCutaneous three times a day before meals  melatonin 3 milliGRAM(s) Oral at bedtime  metoprolol succinate ER 25 milliGRAM(s) Oral daily        	  LABS:	 	                       12.5   22.96 )-----------( 145      ( 01 Jun 2019 07:19 )             37.2     06-01    131<L>  |  97  |  26<H>  ----------------------------<  182<H>  4.7   |  25  |  1.08    Ca    8.4      01 Jun 2019 07:19  Phos  2.3     05-31  Mg     2.1     05-31    TPro  6.2  /  Alb  1.5<L>  /  TBili  0.9  /  DBili  x   /  AST  23  /  ALT  24  /  AlkPhos  163<H>  06-01  Lipid Profile: Cholesterol 203    HDL 35      HgA1c: Hemoglobin A1C, Whole Blood: 7.7 % (05-29 @ 09:20)    TSH: Thyroid Stimulating Hormone, Serum: 1.47 uU/mL (05-29 @ 05:31)      Culture - Urine (05.29.19 @ 20:21)    Specimen Source: .Urine    Culture Results:   No growth      EXAM:  XR CHEST PORTABLE ROUTINE 1V                            PROCEDURE DATE:  06/01/2019          INTERPRETATION:  HISTORY: Pneumonia. Dizziness. Patient is unable to   communicate    Portable chest radiograph    TECHNIQUE:  Single portable frontal AP view of the chest was obtained.    FINDINGS:   Comparison radiographs including from 528 are  available for review.    There is a right lower lobe consolidation, markedly increased compared   the prior study. The rest of the lung appears unchanged. The heart and   mediastinum are unchanged.  The bones are unchanged. Left-sided dual-lead pacemaker is unchanged.    IMPRESSION: Right lower lobe consolidation, markedly increased as   compared the prior study

## 2019-06-01 NOTE — PROGRESS NOTE ADULT - PROBLEM SELECTOR PLAN 3
-Pt takes Lantus 8 and Afrezza at home.   -HbA1C 7.7  -Cont HSS , monitor FS and adjust as needed  -Dr. Sierra following

## 2019-06-01 NOTE — PROGRESS NOTE ADULT - PROBLEM SELECTOR PLAN 1
- patient with worsening leukocytosis since admission and now with fever 101.5  - Initial UA showed many bacteria but no leukocytes, urine cultures were negative, repeat UA showed leukocyte 11-25 with few bacteria.  -c/w Rocephin  -f/u Blood cultures   -f/u CXR

## 2019-06-01 NOTE — PROGRESS NOTE ADULT - ASSESSMENT
88 Female with PMH of Multicystic complex pancreatic head lesion, Afib (No A/c due to UGIB, AVM) s/p Medtronic PPM, DM, HTN, Grade 4 Diastolic HF, Glaucoma, Grave's disease, HTN, SBO (s/p intestinal resection) and Vertigo and PSHx of Oophorectomy came to hospital after 2 episodes of fall today,+SPEP, now RLL pneumonia.  1.ID eval called, ABX, blood cx-p.  2.PAF-asa,amiodarone, dig,toproloff NOAC due to recurrent GI bleed.  3.DM-insulin.  4.HTN and pulm htn-cont bp medication.  5. Spep +-Heme eval called.  6.CT chest ordered.  7.GI and DVT prophylaxis.

## 2019-06-02 LAB
GLUCOSE BLDC GLUCOMTR-MCNC: 199 MG/DL — HIGH (ref 70–99)
GLUCOSE BLDC GLUCOMTR-MCNC: 233 MG/DL — HIGH (ref 70–99)
GLUCOSE BLDC GLUCOMTR-MCNC: 255 MG/DL — HIGH (ref 70–99)
GLUCOSE BLDC GLUCOMTR-MCNC: 297 MG/DL — HIGH (ref 70–99)

## 2019-06-02 PROCEDURE — 71250 CT THORAX DX C-: CPT | Mod: 26

## 2019-06-02 RX ORDER — FUROSEMIDE 40 MG
20 TABLET ORAL ONCE
Refills: 0 | Status: COMPLETED | OUTPATIENT
Start: 2019-06-02 | End: 2019-06-02

## 2019-06-02 RX ADMIN — AMLODIPINE BESYLATE 2.5 MILLIGRAM(S): 2.5 TABLET ORAL at 21:15

## 2019-06-02 RX ADMIN — Medication 81 MILLIGRAM(S): at 12:22

## 2019-06-02 RX ADMIN — Medication 2: at 21:15

## 2019-06-02 RX ADMIN — AZITHROMYCIN 250 MILLIGRAM(S): 500 TABLET, FILM COATED ORAL at 18:08

## 2019-06-02 RX ADMIN — Medication 0.12 MILLIGRAM(S): at 06:44

## 2019-06-02 RX ADMIN — Medication 3 MILLIGRAM(S): at 21:15

## 2019-06-02 RX ADMIN — CEFEPIME 100 MILLIGRAM(S): 1 INJECTION, POWDER, FOR SOLUTION INTRAMUSCULAR; INTRAVENOUS at 07:09

## 2019-06-02 RX ADMIN — Medication 3 UNIT(S): at 16:48

## 2019-06-02 RX ADMIN — Medication 3: at 12:22

## 2019-06-02 RX ADMIN — AMIODARONE HYDROCHLORIDE 200 MILLIGRAM(S): 400 TABLET ORAL at 07:08

## 2019-06-02 RX ADMIN — ENOXAPARIN SODIUM 40 MILLIGRAM(S): 100 INJECTION SUBCUTANEOUS at 12:22

## 2019-06-02 RX ADMIN — Medication 20 MILLIGRAM(S): at 13:24

## 2019-06-02 RX ADMIN — Medication 3: at 08:21

## 2019-06-02 RX ADMIN — CEFEPIME 100 MILLIGRAM(S): 1 INJECTION, POWDER, FOR SOLUTION INTRAMUSCULAR; INTRAVENOUS at 19:01

## 2019-06-02 RX ADMIN — Medication 3 UNIT(S): at 12:22

## 2019-06-02 RX ADMIN — Medication 1: at 16:49

## 2019-06-02 RX ADMIN — Medication 3 UNIT(S): at 08:22

## 2019-06-02 RX ADMIN — Medication 25 MILLIGRAM(S): at 06:44

## 2019-06-02 NOTE — PROGRESS NOTE ADULT - SUBJECTIVE AND OBJECTIVE BOX
CHIEF COMPLAINT:Patient is a 88y old  Female who presents with a chief complaint of Fall (2019 12:51)    	  REVIEW OF SYSTEMS:  CONSTITUTIONAL: No fever, weight loss, or fatigue  EYES: No eye pain, visual disturbances, or discharge  ENMT:  No difficulty hearing, tinnitus, vertigo; No sinus or throat pain  NECK: No pain or stiffness  RESPIRATORY: No cough, wheezing, chills or hemoptysis; No Shortness of Breath  CARDIOVASCULAR: No chest pain, palpitations, passing out, dizziness, or leg swelling  GASTROINTESTINAL: No abdominal or epigastric pain. No nausea, vomiting, or hematemesis; No diarrhea or constipation. No melena or hematochezia.  GENITOURINARY: No dysuria, frequency, hematuria, or incontinence  NEUROLOGICAL: No headaches, memory loss, loss of strength, numbness, or tremors  SKIN: No itching, burning, rashes, or lesions   LYMPH Nodes: No enlarged glands  ENDOCRINE: No heat or cold intolerance; No hair loss  MUSCULOSKELETAL: No joint pain or swelling; No muscle, back, or extremity pain  PSYCHIATRIC: No depression, anxiety, mood swings, or difficulty sleeping  HEME/LYMPH: No easy bruising, or bleeding gums  ALLERY AND IMMUNOLOGIC: No hives or eczema	    [ ] All others negative	  [ ] Unable to obtain    PHYSICAL EXAM:  T(C): 36.9 (19 @ 20:44), Max: 37.8 (19 @ 21:41)  HR: 94 (19 @ 20:44) (91 - 94)  BP: 112/58 (19 @ 20:44) (112/58 - 122/61)  RR: 18 (19 @ 20:44) (16 - 18)  SpO2: 100% (19 @ 20:44) (95% - 100%)  Wt(kg): --  I&O's Summary      Appearance: Normal	  HEENT:   Normal oral mucosa, PERRL, EOMI	  Lymphatic: No lymphadenopathy  Cardiovascular: Normal S1 S2, No JVD, No murmurs, No edema  Respiratory: Lungs clear to auscultation	  Psychiatry: A & O x 3, Mood & affect appropriate  Gastrointestinal:  Soft, Non-tender, + BS	  Skin: No rashes, No ecchymoses, No cyanosis	  Neurologic: Non-focal  Extremities: Normal range of motion, No clubbing, cyanosis or edema  Vascular: Peripheral pulses palpable 2+ bilaterally    MEDICATIONS  (STANDING):  amiodarone    Tablet 200 milliGRAM(s) Oral daily  amLODIPine   Tablet 2.5 milliGRAM(s) Oral at bedtime  aspirin enteric coated 81 milliGRAM(s) Oral daily  azithromycin  IVPB      azithromycin  IVPB 500 milliGRAM(s) IV Intermittent every 24 hours  cefepime   IVPB 1000 milliGRAM(s) IV Intermittent every 12 hours  cefepime   IVPB      dextrose 5% + sodium chloride 0.9%. 1000 milliLiter(s) (60 mL/Hr) IV Continuous <Continuous>  digoxin     Tablet 0.125 milliGRAM(s) Oral daily  enoxaparin Injectable 40 milliGRAM(s) SubCutaneous daily  insulin lispro (HumaLOG) corrective regimen sliding scale   SubCutaneous Before meals and at bedtime  insulin lispro Injectable (HumaLOG) 3 Unit(s) SubCutaneous three times a day before meals  melatonin 3 milliGRAM(s) Oral at bedtime  metoprolol succinate ER 25 milliGRAM(s) Oral daily      TELEMETRY: 	    ECG:  	  RADIOLOGY:  OTHER: 	  	  CBC Full  -  ( 2019 07:19 )  WBC Count : 22.96 K/uL  Hemoglobin : 12.5 g/dL  Hematocrit : 37.2 %  Platelet Count - Automated : 145 K/uL  Mean Cell Volume : 83.8 fl  Mean Cell Hemoglobin : 28.2 pg  Mean Cell Hemoglobin Concentration : 33.6 gm/dL  Auto Neutrophil # : x  Auto Lymphocyte # : x  Auto Monocyte # : x  Auto Eosinophil # : x  Auto Basophil # : x  Auto Neutrophil % : x  Auto Lymphocyte % : x  Auto Monocyte % : x  Auto Eosinophil % : x  Auto Basophil % : x        CARDIAC MARKERS:                              12.5   22.96 )-----------( 145      ( 2019 07:19 )             37.2       06-    131<L>  |  97  |  26<H>  ----------------------------<  182<H>  4.7   |  25  |  1.08    Ca    8.4      2019 07:19    TPro  6.2  /  Alb  1.5<L>  /  TBili  0.9  /  DBili  x   /  AST  23  /  ALT  24  /  AlkPhos  163<H>  06-01          Urinalysis Basic - ( 2019 11:20 )    Color: Yellow / Appearance: Slightly Turbid / S.015 / pH: x  Gluc: x / Ketone: Trace  / Bili: Negative / Urobili: Negative   Blood: x / Protein: 100 / Nitrite: Negative   Leuk Esterase: Moderate / RBC: 2-5 /HPF / WBC 11-25 /HPF   Sq Epi: x / Non Sq Epi: Few /HPF / Bacteria: Few /HPF      proBNP:   Lipid Profile: Cholesterol 203    HDL 35      HgA1c: Hemoglobin A1C, Whole Blood: 7.7 % ( @ 09:20)    TSH: Thyroid Stimulating Hormone, Serum: 1.47 uU/mL ( @ 05:31)    < from: CT Chest No Cont (19 @ 09:05) >  EXAM:  CT CHEST                            PROCEDURE DATE:  2019          INTERPRETATION:  CLINICAL INFORMATION: Follow-up pneumonia    COMPARISON: 2017    PROCEDURE:   CT of the Chest was performed without intravenous contrast.  Sagittal and coronal reformats were performed.      FINDINGS:    LUNGS AND AIRWAYS: Partial opacification of right middle and lower lobe   bronchi.  Right middle and lower lobe consolidations. Bilateral dependent   and basilar atelectasis.    PLEURA: Small bilateral pleural effusions.    MEDIASTINUM AND ROLF: Mild adenopathy measuring up to 1.8 cm.    VESSELS: Atherosclerotic calcifications of thoracic aorta and coronary   arteries.    HEART: Mildly enlarged. Mitral annulus calcifications. No pericardial  effusion.    CHEST WALL AND LOWER NECK: Left upper anterior chest wall cardiac   conduction device with leads to the heart. Subcutaneous soft tissue edema.    VISUALIZED UPPER ABDOMEN: Mild ascites. Right renal calculus. Nonspecific   perinephric stranding.    BONES: Spinal degenerative changes.    IMPRESSION:     Right middle and lower lobe pneumonia. Follow-up to resolution is   recommended.  Small bilateral pleural effusions.                    RORO CORADO M.D., ATTENDING RADIOLOGIST  This document has been electronically signed. 2019 11:03AM        < end of copied text >

## 2019-06-02 NOTE — PROGRESS NOTE ADULT - SUBJECTIVE AND OBJECTIVE BOX
CHIEF COMPLAINT:Patient is a 88y old  Female who presents with a chief complaint of Fall .Pt appears comfortable.    	  REVIEW OF SYSTEMS:  CONSTITUTIONAL: No fever, weight loss, or fatigue  EYES: No eye pain, visual disturbances, or discharge  ENT:  No difficulty hearing, tinnitus, vertigo; No sinus or throat pain  NECK: No pain or stiffness  RESPIRATORY: No cough, wheezing, chills or hemoptysis; No Shortness of Breath  CARDIOVASCULAR: No chest pain, palpitations, passing out, dizziness, or leg swelling  GASTROINTESTINAL: No abdominal or epigastric pain. No nausea, vomiting, or hematemesis; No diarrhea or constipation. No melena or hematochezia.  GENITOURINARY: No dysuria, frequency, hematuria, or incontinence  NEUROLOGICAL: No headaches, memory loss, loss of strength, numbness, or tremors  SKIN: No itching, burning, rashes, or lesions   LYMPH Nodes: No enlarged glands  ENDOCRINE: No heat or cold intolerance; No hair loss  MUSCULOSKELETAL: No joint pain or swelling; No muscle, back, or extremity pain  PSYCHIATRIC: No depression, anxiety, mood swings, or difficulty sleeping  HEME/LYMPH: No easy bruising, or bleeding gums  ALLERGY AND IMMUNOLOGIC: No hives or eczema	      PHYSICAL EXAM:  T(C): 37.3 (06-01-19 @ 22:35), Max: 37.8 (06-01-19 @ 21:41)  HR: 93 (06-01-19 @ 21:41) (80 - 93)  BP: 122/61 (06-01-19 @ 21:41) (122/61 - 130/60)  RR: 16 (06-02-19 @ 04:30) (16 - 18)  SpO2: 95% (06-02-19 @ 04:30) (95% - 97%)  Wt(kg): --  I&O's Summary      Appearance: Normal	  HEENT:   Normal oral mucosa, PERRL, EOMI	  Lymphatic: No lymphadenopathy  Cardiovascular: Normal S1 S2, No JVD, No murmurs, No edema  Respiratory: Dec BS at bases  Psychiatry: A & O x 3, Mood & affect appropriate  Gastrointestinal:  Soft, Non-tender, + BS	  Skin: No rashes, No ecchymoses, No cyanosis	  Neurologic: Non-focal  Extremities: Normal range of motion, No clubbing, cyanosis or edema  Vascular: Peripheral pulses palpable 2+ bilaterally    MEDICATIONS  (STANDING):  amiodarone    Tablet 200 milliGRAM(s) Oral daily  amLODIPine   Tablet 2.5 milliGRAM(s) Oral at bedtime  aspirin enteric coated 81 milliGRAM(s) Oral daily  azithromycin  IVPB      azithromycin  IVPB 500 milliGRAM(s) IV Intermittent every 24 hours  cefepime   IVPB 1000 milliGRAM(s) IV Intermittent every 12 hours  cefepime   IVPB      dextrose 5% + sodium chloride 0.9%. 1000 milliLiter(s) (60 mL/Hr) IV Continuous <Continuous>  digoxin     Tablet 0.125 milliGRAM(s) Oral daily  enoxaparin Injectable 40 milliGRAM(s) SubCutaneous daily  insulin lispro (HumaLOG) corrective regimen sliding scale   SubCutaneous Before meals and at bedtime  insulin lispro Injectable (HumaLOG) 3 Unit(s) SubCutaneous three times a day before meals  melatonin 3 milliGRAM(s) Oral at bedtime  metoprolol succinate ER 25 milliGRAM(s) Oral daily        	  LABS:	 	                       12.5   22.96 )-----------( 145      ( 01 Jun 2019 07:19 )             37.2     06-01    131<L>  |  97  |  26<H>  ----------------------------<  182<H>  4.7   |  25  |  1.08    Ca    8.4      01 Jun 2019 07:19    TPro  6.2  /  Alb  1.5<L>  /  TBili  0.9  /  DBili  x   /  AST  23  /  ALT  24  /  AlkPhos  163<H>  06-01      Lipid Profile: Cholesterol 203    HDL 35      HgA1c: Hemoglobin A1C, Whole Blood: 7.7 % (05-29 @ 09:20)    TSH: Thyroid Stimulating Hormone, Serum: 1.47 uU/mL (05-29 @ 05:31)      EXAM:  CT CHEST                            PROCEDURE DATE:  06/02/2019          INTERPRETATION:  CLINICAL INFORMATION: Follow-up pneumonia    COMPARISON: 1/27/2017    PROCEDURE:   CT of the Chest was performed without intravenous contrast.  Sagittal and coronal reformats were performed.      FINDINGS:    LUNGS AND AIRWAYS: Partial opacification of right middle and lower lobe   bronchi.  Right middle and lower lobe consolidations. Bilateral dependent   and basilar atelectasis.    PLEURA: Small bilateral pleural effusions.    MEDIASTINUM AND ROLF: Mild adenopathy measuring up to 1.8 cm.    VESSELS: Atherosclerotic calcifications of thoracic aorta and coronary   arteries.    HEART: Mildly enlarged. Mitral annulus calcifications. No pericardial  effusion.    CHEST WALL AND LOWER NECK: Left upper anterior chest wall cardiac   conduction device with leads to the heart. Subcutaneous soft tissue edema.    VISUALIZED UPPER ABDOMEN: Mild ascites. Right renal calculus. Nonspecific   perinephric stranding.    BONES: Spinal degenerative changes.    IMPRESSION:     Right middle and lower lobe pneumonia. Follow-up to resolution is   recommended.  Small bilateral pleural effusions.

## 2019-06-02 NOTE — PROGRESS NOTE ADULT - ASSESSMENT
88 Female with PMH of Multicystic complex pancreatic head lesion, Afib (No A/c due to UGIB, AVM) s/p Medtr[employer] PPM, DM, HTN, Grade 4 Diastolic HF, Glaucoma, Grave's disease, HTN, SBO (s/p intestinal resection) and Vertigo and PSHx of Oophorectomy came to hospital after 2 episodes of fall today,+SPEP, now pneumonia.  1.ID eval noted, ABX, blood cx-p.  2.PAF-asa,amiodarone, dig,toprol, off NOAC due to recurrent GI bleed.  3.DM-insulin.  4.HTN and pulm htn-cont bp medication.  5. Spep +-Heme eval noted.  6.Lasix 20mg ivx1.  7.GI and DVT prophylaxis.

## 2019-06-02 NOTE — PROGRESS NOTE ADULT - PROBLEM SELECTOR PLAN 2
-Pt. with no s&s of injury  -PT eval appreciated, recc [facility]  -CM for arrangements  -Fall precautions.

## 2019-06-02 NOTE — PROGRESS NOTE ADULT - PROBLEM SELECTOR PLAN 1
No fever in the past 24 hours. Patient with pneumonia.   - Initial UA showed many bacteria but no leukocytes, urine cultures were negative, repeat UA showed leukocyte 11-25 with few bacteria.  on zithromax and maxipime  -f/u Blood cultures

## 2019-06-03 LAB
ANION GAP SERPL CALC-SCNC: 8 MMOL/L — SIGNIFICANT CHANGE UP (ref 5–17)
BUN SERPL-MCNC: 30 MG/DL — HIGH (ref 7–18)
CALCIUM SERPL-MCNC: 8.4 MG/DL — SIGNIFICANT CHANGE UP (ref 8.4–10.5)
CHLORIDE SERPL-SCNC: 97 MMOL/L — SIGNIFICANT CHANGE UP (ref 96–108)
CO2 SERPL-SCNC: 24 MMOL/L — SIGNIFICANT CHANGE UP (ref 22–31)
CREAT SERPL-MCNC: 0.98 MG/DL — SIGNIFICANT CHANGE UP (ref 0.5–1.3)
GLUCOSE BLDC GLUCOMTR-MCNC: 131 MG/DL — HIGH (ref 70–99)
GLUCOSE BLDC GLUCOMTR-MCNC: 162 MG/DL — HIGH (ref 70–99)
GLUCOSE BLDC GLUCOMTR-MCNC: 218 MG/DL — HIGH (ref 70–99)
GLUCOSE BLDC GLUCOMTR-MCNC: 250 MG/DL — HIGH (ref 70–99)
GLUCOSE SERPL-MCNC: 245 MG/DL — HIGH (ref 70–99)
HCT VFR BLD CALC: 36 % — SIGNIFICANT CHANGE UP (ref 34.5–45)
HGB BLD-MCNC: 12.1 G/DL — SIGNIFICANT CHANGE UP (ref 11.5–15.5)
KAPPA LC SER QL IFE: 8.14 MG/DL — HIGH (ref 0.33–1.94)
KAPPA/LAMBDA FREE LIGHT CHAIN RATIO, SERUM: 1.24 RATIO — SIGNIFICANT CHANGE UP (ref 0.26–1.65)
LAMBDA LC SER QL IFE: 6.59 MG/DL — HIGH (ref 0.57–2.63)
MCHC RBC-ENTMCNC: 28.1 PG — SIGNIFICANT CHANGE UP (ref 27–34)
MCHC RBC-ENTMCNC: 33.6 GM/DL — SIGNIFICANT CHANGE UP (ref 32–36)
MCV RBC AUTO: 83.7 FL — SIGNIFICANT CHANGE UP (ref 80–100)
NRBC # BLD: 0 /100 WBCS — SIGNIFICANT CHANGE UP (ref 0–0)
PLATELET # BLD AUTO: 163 K/UL — SIGNIFICANT CHANGE UP (ref 150–400)
POTASSIUM SERPL-MCNC: 4.5 MMOL/L — SIGNIFICANT CHANGE UP (ref 3.5–5.3)
POTASSIUM SERPL-SCNC: 4.5 MMOL/L — SIGNIFICANT CHANGE UP (ref 3.5–5.3)
RBC # BLD: 4.3 M/UL — SIGNIFICANT CHANGE UP (ref 3.8–5.2)
RBC # FLD: 16.4 % — HIGH (ref 10.3–14.5)
SODIUM SERPL-SCNC: 129 MMOL/L — LOW (ref 135–145)
WBC # BLD: 18.94 K/UL — HIGH (ref 3.8–10.5)
WBC # FLD AUTO: 18.94 K/UL — HIGH (ref 3.8–10.5)

## 2019-06-03 RX ORDER — SODIUM CHLORIDE 9 MG/ML
1000 INJECTION INTRAMUSCULAR; INTRAVENOUS; SUBCUTANEOUS
Refills: 0 | Status: DISCONTINUED | OUTPATIENT
Start: 2019-06-03 | End: 2019-06-05

## 2019-06-03 RX ADMIN — Medication 1: at 16:33

## 2019-06-03 RX ADMIN — Medication 0.12 MILLIGRAM(S): at 05:25

## 2019-06-03 RX ADMIN — Medication 81 MILLIGRAM(S): at 12:05

## 2019-06-03 RX ADMIN — AMIODARONE HYDROCHLORIDE 200 MILLIGRAM(S): 400 TABLET ORAL at 05:25

## 2019-06-03 RX ADMIN — CEFEPIME 100 MILLIGRAM(S): 1 INJECTION, POWDER, FOR SOLUTION INTRAMUSCULAR; INTRAVENOUS at 17:56

## 2019-06-03 RX ADMIN — Medication 2: at 08:32

## 2019-06-03 RX ADMIN — AZITHROMYCIN 250 MILLIGRAM(S): 500 TABLET, FILM COATED ORAL at 17:56

## 2019-06-03 RX ADMIN — Medication 3 UNIT(S): at 12:05

## 2019-06-03 RX ADMIN — AMLODIPINE BESYLATE 2.5 MILLIGRAM(S): 2.5 TABLET ORAL at 22:12

## 2019-06-03 RX ADMIN — CEFEPIME 100 MILLIGRAM(S): 1 INJECTION, POWDER, FOR SOLUTION INTRAMUSCULAR; INTRAVENOUS at 05:24

## 2019-06-03 RX ADMIN — Medication 25 MILLIGRAM(S): at 05:25

## 2019-06-03 RX ADMIN — ENOXAPARIN SODIUM 40 MILLIGRAM(S): 100 INJECTION SUBCUTANEOUS at 12:05

## 2019-06-03 RX ADMIN — Medication 2: at 12:05

## 2019-06-03 RX ADMIN — Medication 3 UNIT(S): at 16:34

## 2019-06-03 RX ADMIN — Medication 3 MILLIGRAM(S): at 22:12

## 2019-06-03 RX ADMIN — Medication 3 UNIT(S): at 08:33

## 2019-06-03 NOTE — PROGRESS NOTE ADULT - SUBJECTIVE AND OBJECTIVE BOX
feel very weak, has intermittent headache at right temoporal area  no fever or sob  no other pain     MEDICATIONS  (STANDING):  amiodarone    Tablet 200 milliGRAM(s) Oral daily  amLODIPine   Tablet 2.5 milliGRAM(s) Oral at bedtime  aspirin enteric coated 81 milliGRAM(s) Oral daily  azithromycin  IVPB 500 milliGRAM(s) IV Intermittent every 24 hours  azithromycin  IVPB      cefepime   IVPB 1000 milliGRAM(s) IV Intermittent every 12 hours  cefepime   IVPB      digoxin     Tablet 0.125 milliGRAM(s) Oral daily  enoxaparin Injectable 40 milliGRAM(s) SubCutaneous daily  insulin lispro (HumaLOG) corrective regimen sliding scale   SubCutaneous Before meals and at bedtime  insulin lispro Injectable (HumaLOG) 3 Unit(s) SubCutaneous three times a day before meals  melatonin 3 milliGRAM(s) Oral at bedtime  metoprolol succinate ER 25 milliGRAM(s) Oral daily  sodium chloride 0.9%. 1000 milliLiter(s) (60 mL/Hr) IV Continuous <Continuous>    MEDICATIONS  (PRN):  acetaminophen   Tablet .. 650 milliGRAM(s) Oral every 6 hours PRN Mild Pain (1 - 3)  acetaminophen   Tablet .. 650 milliGRAM(s) Oral every 6 hours PRN Temp greater or equal to 38C (100.4F)      Allergies    No Known Allergies    Intolerances        Vital Signs Last 24 Hrs  T(C): 36.6 (03 Jun 2019 05:05), Max: 37 (02 Jun 2019 14:07)  T(F): 97.8 (03 Jun 2019 05:05), Max: 98.6 (02 Jun 2019 14:07)  HR: 92 (03 Jun 2019 05:05) (91 - 94)  BP: 127/74 (03 Jun 2019 05:05) (112/58 - 127/74)  BP(mean): --  RR: 16 (03 Jun 2019 05:05) (16 - 18)  SpO2: 95% (03 Jun 2019 05:05) (95% - 100%)    PHYSICAL EXAM  General: adult in NAD  HEENT: clear oropharynx, anicteric sclera, pink conjunctiva  Neck: supple  CV: normal S1/S2 with no murmur rubs or gallops  Lungs: positive air movement b/l ant lungs,clear to auscultation, no wheezes, no rales  Abdomen: soft non-tender non-distended, no hepatosplenomegaly  Ext: no clubbing cyanosis or edema  Skin: no rashes and no petechiae  Neuro: alert and oriented X 4, no focal deficits  LABS:                          12.1   18.94 )-----------( 163      ( 03 Jun 2019 06:37 )             36.0         Mean Cell Volume : 83.7 fl  Mean Cell Hemoglobin : 28.1 pg  Mean Cell Hemoglobin Concentration : 33.6 gm/dL  Auto Neutrophil # : x  Auto Lymphocyte # : x  Auto Monocyte # : x  Auto Eosinophil # : x  Auto Basophil # : x  Auto Neutrophil % : x  Auto Lymphocyte % : x  Auto Monocyte % : x  Auto Eosinophil % : x  Auto Basophil % : x    Serial CBC  Hematocrit 36.0  Hemoglobin 12.1  Plat 163  RBC 4.30  WBC 18.94  Serial CBC  Hematocrit 37.2  Hemoglobin 12.5  Plat 145  RBC 4.44  WBC 22.96  Serial CBC  Hematocrit 34.9  Hemoglobin 11.6  Plat 147  RBC 4.17  WBC 20.33    06-03    129<L>  |  97  |  30<H>  ----------------------------<  245<H>  4.5   |  24  |  0.98    Ca    8.4      03 Jun 2019 06:37            Folate, Serum: 7.8 ng/mL (05-29 @ 09:24)  Vitamin B12, Serum: 953 pg/mL (05-29 @ 09:24)      Serum Protein Electrophoresis Interp: Three Gamma-Migrating Paraproteins Identified (05-30 @ 00:16)  Immunofixation, Serum:   Two IgG Kappa and One Weak IgG Lambda Bands Identified    Reference Range: None Detected (05-30 @ 00:16)        BLOOD SMEAR INTERPRETATION:       RADIOLOGY & ADDITIONAL STUDIES:

## 2019-06-03 NOTE — PROGRESS NOTE ADULT - ASSESSMENT
88 Female with PMH of Multicystic complex pancreatic head lesion, Afib (No A/c due to UGIB, AVM) s/p Medtronic PPM, DM, HTN, Grade 4 Diastolic HF, Glaucoma, Grave's disease, HTN, SBO (s/p intestinal resection) and Vertigo and PSHx of Oophorectomy came to hospital after 2 episodes of fall today. Pt lives alone and had new commode installation recently to prevent falls. According to patient, the new commode is too low for her and it is hard for her to stand up from that. While getting up she had a fall. Later she was about to sit on her bed when she was feeling dizzy leading to fall. She called her neighbors who came for her help. Pt has been feeling very weak now and it is hard for her to stand up. No focal weakness, hip pain, chest pain, headache, problems with lower back. admitted to medicine due to weakness. spoke with son in AM and daughter in law this afternoon, daughter in law brought HCP and living will form with goal of care choices, DNR/ DNI/ No artificial feeding or ABT use. HCP is son. went over MOLST together, continue DNR/ DNI/ No artificial feeding / determine use of ABT.  Pt reported poor appetite for few weeks and wt loss of 20lbs x several months. Claimed that her dentures need  adjustment for better fitting 2/2 wt. loss. Pt and family agree rehab if she needs. Afebrile VSs.

## 2019-06-03 NOTE — DIETITIAN INITIAL EVALUATION ADULT. - PROBLEM SELECTOR PLAN 2
Chronic history of vertigo  Denies focal weakness and vomiting  Ct head: No acute pathology  PT evaluation  Consulted Dr Boo

## 2019-06-03 NOTE — PROGRESS NOTE ADULT - PROBLEM SELECTOR PLAN 7
MOLST done with family and pt -  DNR/ DNI/ No artificial feeding/  determine use of ABD  family will discuss MOLST  again with pt and until then continue current wishes  Per family pt has poor appetite for a while, resulting in wt loss of 20lbs, dentures  adjustment needed in the future  IV gentle hydration started

## 2019-06-03 NOTE — CHART NOTE - NSCHARTNOTEFT_GEN_A_CORE
Upon Nutritional Assessment by the Registered Dietitian your patient was determined to meet criteria / has evidence of the following diagnosis/diagnoses:          [ ]  Mild Protein Calorie Malnutrition        [ ]  Moderate Protein Calorie Malnutrition        [x ] Severe Protein Calorie Malnutrition        [ ] Unspecified Protein Calorie Malnutrition        [ ] Underweight / BMI <19        [ ] Morbid Obesity / BMI > 40      Findings as based on:  •  Comprehensive nutrition assessment and consultation  •  Calorie counts (nutrient intake analysis)  •  Food acceptance and intake status from observations by staff  •  Follow up  •  Patient education  •  Intervention secondary to interdisciplinary rounds  •   concerns      Treatment:    The following diet has been recommended:  Add Sara krause BID    PROVIDER Section:     By signing this assessment you are acknowledging and agree with the diagnosis/diagnoses assigned by the Registered Dietitian    Comments:

## 2019-06-03 NOTE — DIETITIAN INITIAL EVALUATION ADULT. - PROBLEM SELECTOR PLAN 3
Pt underwent Upper EUS last week and was found to have A 20mm x 19mm multicystic complex lesion was seen in the pancrease head calcifications  Currently having epigastric and LLQ tenderness  Ordered Ct abdomen

## 2019-06-03 NOTE — PROGRESS NOTE ADULT - SUBJECTIVE AND OBJECTIVE BOX
CHIEF COMPLAINT:Patient is a 88y old  Female who presents with a chief complaint of Fall.Pt appears comfortable.    	  REVIEW OF SYSTEMS:  CONSTITUTIONAL: No fever, weight loss, or fatigue  EYES: No eye pain, visual disturbances, or discharge  ENT:  No difficulty hearing, tinnitus, vertigo; No sinus or throat pain  NECK: No pain or stiffness  RESPIRATORY: No cough, wheezing, chills or hemoptysis; No Shortness of Breath  CARDIOVASCULAR: No chest pain, palpitations, passing out, dizziness, or leg swelling  GASTROINTESTINAL: No abdominal or epigastric pain. No nausea, vomiting, or hematemesis; No diarrhea or constipation. No melena or hematochezia.  GENITOURINARY: No dysuria, frequency, hematuria, or incontinence  NEUROLOGICAL: No headaches, memory loss, loss of strength, numbness, or tremors  SKIN: No itching, burning, rashes, or lesions   LYMPH Nodes: No enlarged glands  ENDOCRINE: No heat or cold intolerance; No hair loss  MUSCULOSKELETAL: No joint pain or swelling; No muscle, back, or extremity pain  PSYCHIATRIC: No depression, anxiety, mood swings, or difficulty sleeping  HEME/LYMPH: No easy bruising, or bleeding gums  ALLERGY AND IMMUNOLOGIC: No hives or eczema	      PHYSICAL EXAM:  T(C): 36.6 (06-03-19 @ 05:05), Max: 37 (06-02-19 @ 14:07)  HR: 92 (06-03-19 @ 05:05) (91 - 94)  BP: 127/74 (06-03-19 @ 05:05) (112/58 - 127/74)  RR: 16 (06-03-19 @ 05:05) (16 - 18)  SpO2: 95% (06-03-19 @ 05:05) (95% - 100%)      Appearance: Normal	  HEENT:   Normal oral mucosa, PERRL, EOMI	  Lymphatic: No lymphadenopathy  Cardiovascular: Normal S1 S2, No JVD, No murmurs, No edema  Respiratory: B/L ronchi	  Psychiatry: A & O x 3, Mood & affect appropriate  Gastrointestinal:  Soft, Non-tender, + BS	  Skin: No rashes, No ecchymoses, No cyanosis	  Neurologic: Non-focal  Extremities: Normal range of motion, No clubbing, cyanosis or edema  Vascular: Peripheral pulses palpable 2+ bilaterally    MEDICATIONS  (STANDING):  amiodarone    Tablet 200 milliGRAM(s) Oral daily  amLODIPine   Tablet 2.5 milliGRAM(s) Oral at bedtime  aspirin enteric coated 81 milliGRAM(s) Oral daily  azithromycin  IVPB 500 milliGRAM(s) IV Intermittent every 24 hours  azithromycin  IVPB      cefepime   IVPB 1000 milliGRAM(s) IV Intermittent every 12 hours  cefepime   IVPB      digoxin     Tablet 0.125 milliGRAM(s) Oral daily  enoxaparin Injectable 40 milliGRAM(s) SubCutaneous daily  insulin lispro (HumaLOG) corrective regimen sliding scale   SubCutaneous Before meals and at bedtime  insulin lispro Injectable (HumaLOG) 3 Unit(s) SubCutaneous three times a day before meals  melatonin 3 milliGRAM(s) Oral at bedtime  metoprolol succinate ER 25 milliGRAM(s) Oral daily  sodium chloride 0.9%. 1000 milliLiter(s) (60 mL/Hr) IV Continuous <Continuous>        	  LABS:	 	                        12.1   18.94 )-----------( 163      ( 03 Jun 2019 06:37 )             36.0     06-03    129<L>  |  97  |  30<H>  ----------------------------<  245<H>  4.5   |  24  |  0.98    Ca    8.4      03 Jun 2019 06:37        Lipid Profile: Cholesterol 203    HDL 35      HgA1c: Hemoglobin A1C, Whole Blood: 7.7 % (05-29 @ 09:20)    TSH: Thyroid Stimulating Hormone, Serum: 1.47 uU/mL (05-29 @ 05:31)      	  Culture - Blood (05.31.19 @ 22:36)    Specimen Source: .Blood    Culture Results:   No growth to date.

## 2019-06-03 NOTE — DIETITIAN INITIAL EVALUATION ADULT. - PROBLEM SELECTOR PLAN 4
EKG showed Paced @ 65 bpm  C/w Rhythm control with Digoxin and Amiodarone  Rate control with metoprolol   No Anticoagulation due to AVM and UGIB

## 2019-06-03 NOTE — PROGRESS NOTE ADULT - ASSESSMENT
88 Female with PMH of Multicystic complex pancreatic head lesion, Afib (No A/c due to UGIB, AVM) s/p Medtronic PPM, DM, HTN, Grade 4 Diastolic HF, Glaucoma, Grave's disease, HTN, SBO (s/p intestinal resection) and Vertigo and PSHx of Oophorectomy came to hospital after 2 episodes of fall today,+SPEP, now pneumonia.  1.ID f/u, ABX.  2.PAF-asa,amiodarone, dig,toprol, off NOAC due to recurrent GI bleed.  3.DM-insulin.  4.HTN and pulm htn-cont bp medication.  5. Spep +-Heme f/u noted.  6.GI and DVT prophylaxis.

## 2019-06-03 NOTE — PROGRESS NOTE ADULT - PROBLEM SELECTOR PLAN 2
Not in exacerbation  -Appears euvolemic  -CXR->Right lower lobe consolidation, markedly increased as   compared the prior study  -Cont ASA, Metoprolol, Digoxing, Amldipine  -LE Doppler neg for DVT

## 2019-06-03 NOTE — DIETITIAN INITIAL EVALUATION ADULT. - DIET TYPE
dysphagia 3, soft, thin liquids/consistent carbohydrate (evening snack)/DASH/TLC (sodium and cholesterol restricted diet)

## 2019-06-03 NOTE — PROGRESS NOTE ADULT - SUBJECTIVE AND OBJECTIVE BOX
NP Note discussed with  Primary Attending    Patient is a 88y old  Female who presents with a chief complaint of Fall (03 Jun 2019 12:50)      INTERVAL HPI/OVERNIGHT EVENTS: no new complaints    MEDICATIONS  (STANDING):  amiodarone    Tablet 200 milliGRAM(s) Oral daily  amLODIPine   Tablet 2.5 milliGRAM(s) Oral at bedtime  aspirin enteric coated 81 milliGRAM(s) Oral daily  azithromycin  IVPB 500 milliGRAM(s) IV Intermittent every 24 hours  azithromycin  IVPB      cefepime   IVPB 1000 milliGRAM(s) IV Intermittent every 12 hours  cefepime   IVPB      digoxin     Tablet 0.125 milliGRAM(s) Oral daily  enoxaparin Injectable 40 milliGRAM(s) SubCutaneous daily  insulin lispro (HumaLOG) corrective regimen sliding scale   SubCutaneous Before meals and at bedtime  insulin lispro Injectable (HumaLOG) 3 Unit(s) SubCutaneous three times a day before meals  melatonin 3 milliGRAM(s) Oral at bedtime  metoprolol succinate ER 25 milliGRAM(s) Oral daily  sodium chloride 0.9%. 1000 milliLiter(s) (60 mL/Hr) IV Continuous <Continuous>    MEDICATIONS  (PRN):  acetaminophen   Tablet .. 650 milliGRAM(s) Oral every 6 hours PRN Mild Pain (1 - 3)  acetaminophen   Tablet .. 650 milliGRAM(s) Oral every 6 hours PRN Temp greater or equal to 38C (100.4F)      __________________________________________________  REVIEW OF SYSTEMS:    CONSTITUTIONAL: No fever,   EYES: no acute visual disturbances  NECK: No pain or stiffness  RESPIRATORY: No cough; No shortness of breath  CARDIOVASCULAR: No chest pain, no palpitations  GASTROINTESTINAL: No pain. No nausea or vomiting; No diarrhea   NEUROLOGICAL: No headache or numbness, no tremors  MUSCULOSKELETAL: No joint pain, no muscle pain  GENITOURINARY: no dysuria, no frequency, no hesitancy  PSYCHIATRY: no depression , no anxiety  ALL OTHER  ROS negative        Vital Signs Last 24 Hrs  T(C): 36.8 (03 Jun 2019 14:58), Max: 36.9 (02 Jun 2019 20:44)  T(F): 98.3 (03 Jun 2019 14:58), Max: 98.4 (02 Jun 2019 20:44)  HR: 77 (03 Jun 2019 14:58) (77 - 94)  BP: 109/59 (03 Jun 2019 14:58) (109/59 - 127/74)  BP(mean): --  RR: 17 (03 Jun 2019 14:58) (16 - 18)  SpO2: 100% (03 Jun 2019 14:58) (95% - 100%)    ________________________________________________  PHYSICAL EXAM:  GENERAL: NAD  HEENT: Normocephalic;  conjunctivae and sclerae clear; moist mucous membranes;   NECK : supple  CHEST/LUNG: Clear to auscultation bilaterally with good air entry   HEART: S1 S2  regular; no murmurs, gallops or rubs  ABDOMEN: Soft, Nontender, Nondistended; Bowel sounds present  EXTREMITIES: no cyanosis; no edema; no calf tenderness  SKIN: warm and dry; no rash  NERVOUS SYSTEM:  Awake and alert; Oriented  to place, person and time ; no new deficits    _________________________________________________  LABS:                        12.1   18.94 )-----------( 163      ( 03 Jun 2019 06:37 )             36.0     06-03    129<L>  |  97  |  30<H>  ----------------------------<  245<H>  4.5   |  24  |  0.98    Ca    8.4      03 Jun 2019 06:37          CAPILLARY BLOOD GLUCOSE      POCT Blood Glucose.: 218 mg/dL (03 Jun 2019 11:36)  POCT Blood Glucose.: 250 mg/dL (03 Jun 2019 08:10)  POCT Blood Glucose.: 233 mg/dL (02 Jun 2019 21:00)  POCT Blood Glucose.: 199 mg/dL (02 Jun 2019 16:24)    RADIOLOGY & ADDITIONAL TESTS:      CT Chest No Cont (06.02.19 @ 09:05) >  EXAM:  CT CHEST                            PROCEDURE DATE:  06/02/2019          INTERPRETATION:  CLINICAL INFORMATION: Follow-up pneumonia    COMPARISON: 1/27/2017    PROCEDURE:   CT of the Chest was performed without intravenous contrast.  Sagittal and coronal reformats were performed.      FINDINGS:    LUNGS AND AIRWAYS: Partial opacification of right middle and lower lobe   bronchi.  Right middle and lower lobe consolidations. Bilateral dependent   and basilar atelectasis.    PLEURA: Small bilateral pleural effusions.    MEDIASTINUM AND ROLF: Mild adenopathy measuring up to 1.8 cm.    VESSELS: Atherosclerotic calcifications of thoracic aorta and coronary   arteries.    HEART: Mildly enlarged. Mitral annulus calcifications. No pericardial  effusion.    CHEST WALL AND LOWER NECK: Left upper anterior chest wall cardiac   conduction device with leads to the heart. Subcutaneous soft tissue edema.    VISUALIZED UPPER ABDOMEN: Mild ascites. Right renal calculus. Nonspecific   perinephric stranding.    BONES: Spinal degenerative changes.    IMPRESSION:     Right middle and lower lobe pneumonia. Follow-up to resolution is   recommended.  Small bilateral pleural effusions.    < end of copied text >      Xray Chest 1 View- PORTABLE-Routine (06.01.19 @ 09:36) >  EXAM:  XR CHEST PORTABLE ROUTINE 1V                            PROCEDURE DATE:  06/01/2019          INTERPRETATION:  HISTORY: Pneumonia. Dizziness. Patient is unable to   communicate    Portable chest radiograph    TECHNIQUE:  Single portable frontal AP view of the chest was obtained.    FINDINGS:   Comparison radiographs including from 528 are  available for review.    There is a right lower lobe consolidation, markedly increased compared   the prior study. The rest of the lung appears unchanged. The heart and   mediastinum are unchanged.  The bones are unchanged. Left-sided dual-lead pacemaker is unchanged.    IMPRESSION: Right lower lobe consolidation, markedly increased as   compared the prior study    < end of copied text >        Imaging Personally Reviewed:  YES/NO    Consultant(s) Notes Reviewed:   YES/ No    Care Discussed with Consultants :     Plan of care was discussed with patient and /or primary care giver; all questions and concerns were addressed and care was aligned with patient's wishes.

## 2019-06-03 NOTE — PROGRESS NOTE ADULT - PROBLEM SELECTOR PLAN 1
Leukocytosis 2/2 RLL and RML PNA  WBC slowly trending down, pt. afebrile  -ID Dr. Munoz following  -Cont Maxipime and Azithrp

## 2019-06-03 NOTE — PROGRESS NOTE ADULT - PROBLEM SELECTOR PLAN 4
EKG showed Paced @ 65 bpm  -C/w Rhythm control with Digoxin and Amiodarone  -Rate control with metoprolol   -No Anticoagulation due to AVM and UGIB.  -Card Dr. Boo following

## 2019-06-03 NOTE — DIETITIAN INITIAL EVALUATION ADULT. - PERTINENT LABORATORY DATA
06-03 Na129 mmol/L<L> Glu 245 mg/dL<H> K+ 4.5 mmol/L Cr  0.98 mg/dL BUN 30 mg/dL<H> 05-31 Phos 2.3 mg/dL<L> 06-01 Alb 1.5 g/dL<L> 05-29 [id number] 7.7 %<H> 05-29 Chol 203 mg/dL<H>  mg/dL HDL 35 mg/dL<L> Trig 147 mg/dL

## 2019-06-04 LAB
ANION GAP SERPL CALC-SCNC: 6 MMOL/L — SIGNIFICANT CHANGE UP (ref 5–17)
BUN SERPL-MCNC: 31 MG/DL — HIGH (ref 7–18)
CALCIUM SERPL-MCNC: 8.6 MG/DL — SIGNIFICANT CHANGE UP (ref 8.4–10.5)
CHLORIDE SERPL-SCNC: 100 MMOL/L — SIGNIFICANT CHANGE UP (ref 96–108)
CO2 SERPL-SCNC: 24 MMOL/L — SIGNIFICANT CHANGE UP (ref 22–31)
CREAT SERPL-MCNC: 0.82 MG/DL — SIGNIFICANT CHANGE UP (ref 0.5–1.3)
GLUCOSE BLDC GLUCOMTR-MCNC: 157 MG/DL — HIGH (ref 70–99)
GLUCOSE BLDC GLUCOMTR-MCNC: 170 MG/DL — HIGH (ref 70–99)
GLUCOSE BLDC GLUCOMTR-MCNC: 172 MG/DL — HIGH (ref 70–99)
GLUCOSE BLDC GLUCOMTR-MCNC: 187 MG/DL — HIGH (ref 70–99)
GLUCOSE SERPL-MCNC: 173 MG/DL — HIGH (ref 70–99)
HCT VFR BLD CALC: 35 % — SIGNIFICANT CHANGE UP (ref 34.5–45)
HGB BLD-MCNC: 11.7 G/DL — SIGNIFICANT CHANGE UP (ref 11.5–15.5)
MCHC RBC-ENTMCNC: 27.6 PG — SIGNIFICANT CHANGE UP (ref 27–34)
MCHC RBC-ENTMCNC: 33.4 GM/DL — SIGNIFICANT CHANGE UP (ref 32–36)
MCV RBC AUTO: 82.5 FL — SIGNIFICANT CHANGE UP (ref 80–100)
NRBC # BLD: 0 /100 WBCS — SIGNIFICANT CHANGE UP (ref 0–0)
PLATELET # BLD AUTO: 131 K/UL — LOW (ref 150–400)
POTASSIUM SERPL-MCNC: 4.5 MMOL/L — SIGNIFICANT CHANGE UP (ref 3.5–5.3)
POTASSIUM SERPL-SCNC: 4.5 MMOL/L — SIGNIFICANT CHANGE UP (ref 3.5–5.3)
RBC # BLD: 4.24 M/UL — SIGNIFICANT CHANGE UP (ref 3.8–5.2)
RBC # FLD: 15.9 % — HIGH (ref 10.3–14.5)
SODIUM SERPL-SCNC: 130 MMOL/L — LOW (ref 135–145)
WBC # BLD: 15.07 K/UL — HIGH (ref 3.8–10.5)
WBC # FLD AUTO: 15.07 K/UL — HIGH (ref 3.8–10.5)

## 2019-06-04 RX ORDER — FUROSEMIDE 40 MG
20 TABLET ORAL ONCE
Refills: 0 | Status: COMPLETED | OUTPATIENT
Start: 2019-06-04 | End: 2019-06-04

## 2019-06-04 RX ADMIN — AMLODIPINE BESYLATE 2.5 MILLIGRAM(S): 2.5 TABLET ORAL at 22:39

## 2019-06-04 RX ADMIN — AMIODARONE HYDROCHLORIDE 200 MILLIGRAM(S): 400 TABLET ORAL at 05:37

## 2019-06-04 RX ADMIN — Medication 0.12 MILLIGRAM(S): at 05:37

## 2019-06-04 RX ADMIN — Medication 81 MILLIGRAM(S): at 13:10

## 2019-06-04 RX ADMIN — Medication 1: at 22:04

## 2019-06-04 RX ADMIN — SODIUM CHLORIDE 60 MILLILITER(S): 9 INJECTION INTRAMUSCULAR; INTRAVENOUS; SUBCUTANEOUS at 22:39

## 2019-06-04 RX ADMIN — Medication 20 MILLIGRAM(S): at 13:11

## 2019-06-04 RX ADMIN — Medication 3 MILLIGRAM(S): at 22:39

## 2019-06-04 RX ADMIN — SODIUM CHLORIDE 60 MILLILITER(S): 9 INJECTION INTRAMUSCULAR; INTRAVENOUS; SUBCUTANEOUS at 05:37

## 2019-06-04 RX ADMIN — Medication 1: at 16:48

## 2019-06-04 RX ADMIN — Medication 25 MILLIGRAM(S): at 06:31

## 2019-06-04 RX ADMIN — AZITHROMYCIN 250 MILLIGRAM(S): 500 TABLET, FILM COATED ORAL at 16:48

## 2019-06-04 RX ADMIN — CEFEPIME 100 MILLIGRAM(S): 1 INJECTION, POWDER, FOR SOLUTION INTRAMUSCULAR; INTRAVENOUS at 05:36

## 2019-06-04 RX ADMIN — ENOXAPARIN SODIUM 40 MILLIGRAM(S): 100 INJECTION SUBCUTANEOUS at 13:10

## 2019-06-04 RX ADMIN — Medication 3 UNIT(S): at 16:47

## 2019-06-04 RX ADMIN — CEFEPIME 100 MILLIGRAM(S): 1 INJECTION, POWDER, FOR SOLUTION INTRAMUSCULAR; INTRAVENOUS at 17:31

## 2019-06-04 RX ADMIN — Medication 3 UNIT(S): at 08:14

## 2019-06-04 NOTE — PROGRESS NOTE ADULT - ASSESSMENT
88 Female with PMH of Multicystic complex pancreatic head lesion, Afib (No A/c due to UGIB, AVM) s/p [employer] PPM, DM, HTN, Grade 4 Diastolic HF, Glaucoma, Grave's disease, HTN, SBO (s/p intestinal resection) and Vertigo and PSHx of Oophorectomy came to hospital after 2 episodes of fall today,+SPEP, now pneumonia.  1.ID f/u, ABX.  2.PAF-asa,amiodarone, dig,toprol, off NOAC due to recurrent GI bleed.  3.DM-insulin.  4.HTN and pulm htn-cont bp medication.  5. Spep +-Heme.  6.GI and DVT prophylaxis.  7.Lasix 20mg ivx1.

## 2019-06-04 NOTE — PROGRESS NOTE ADULT - PROBLEM SELECTOR PLAN 1
Leukocytosis 2/2 RLL and RML PNA  WBC slowly trending down, pt. afebrile  -ID Dr. Munoz following  -Cont Maxipime and Azithro  6/4  -WBC continues to trend down, now 15K, pt. afebrile

## 2019-06-04 NOTE — PROGRESS NOTE ADULT - SUBJECTIVE AND OBJECTIVE BOX
NP Note discussed with  Primary Attending    Patient is a 88y old  Female who presents with a chief complaint of Fall (04 Jun 2019 11:44)      INTERVAL HPI/OVERNIGHT EVENTS: no new complaints    MEDICATIONS  (STANDING):  amiodarone    Tablet 200 milliGRAM(s) Oral daily  amLODIPine   Tablet 2.5 milliGRAM(s) Oral at bedtime  aspirin enteric coated 81 milliGRAM(s) Oral daily  azithromycin  IVPB 500 milliGRAM(s) IV Intermittent every 24 hours  azithromycin  IVPB      cefepime   IVPB 1000 milliGRAM(s) IV Intermittent every 12 hours  cefepime   IVPB      digoxin     Tablet 0.125 milliGRAM(s) Oral daily  enoxaparin Injectable 40 milliGRAM(s) SubCutaneous daily  insulin lispro (HumaLOG) corrective regimen sliding scale   SubCutaneous Before meals and at bedtime  insulin lispro Injectable (HumaLOG) 3 Unit(s) SubCutaneous three times a day before meals  melatonin 3 milliGRAM(s) Oral at bedtime  metoprolol succinate ER 25 milliGRAM(s) Oral daily  sodium chloride 0.9%. 1000 milliLiter(s) (60 mL/Hr) IV Continuous <Continuous>    MEDICATIONS  (PRN):  acetaminophen   Tablet .. 650 milliGRAM(s) Oral every 6 hours PRN Mild Pain (1 - 3)  acetaminophen   Tablet .. 650 milliGRAM(s) Oral every 6 hours PRN Temp greater or equal to 38C (100.4F)      __________________________________________________  REVIEW OF SYSTEMS:    CONSTITUTIONAL: No fever,   EYES: no acute visual disturbances  NECK: No pain or stiffness  RESPIRATORY: No cough; No shortness of breath  CARDIOVASCULAR: No chest pain, no palpitations  GASTROINTESTINAL: No pain. No nausea or vomiting; No diarrhea   NEUROLOGICAL: No headache or numbness, no tremors  MUSCULOSKELETAL: No joint pain, no muscle pain  GENITOURINARY: no dysuria, no frequency, no hesitancy  PSYCHIATRY: no depression , no anxiety  ALL OTHER  ROS negative        Vital Signs Last 24 Hrs  T(C): 36.9 (04 Jun 2019 14:19), Max: 37 (03 Jun 2019 20:22)  T(F): 98.5 (04 Jun 2019 14:19), Max: 98.6 (03 Jun 2019 20:22)  HR: 78 (04 Jun 2019 14:19) (71 - 96)  BP: 124/63 (04 Jun 2019 14:19) (112/49 - 130/67)  BP(mean): --  RR: 18 (04 Jun 2019 14:19) (17 - 18)  SpO2: 99% (04 Jun 2019 14:19) (97% - 99%)    ________________________________________________  PHYSICAL EXAM:  GENERAL: NAD  HEENT: Normocephalic;  conjunctivae and sclerae clear; moist mucous membranes;   NECK : supple  CHEST/LUNG: Clear to auscultation bilaterally with good air entry   HEART: S1 S2  regular; no murmurs, gallops or rubs  ABDOMEN: Soft, Nontender, Nondistended; Bowel sounds present  EXTREMITIES: no cyanosis; no edema; no calf tenderness  SKIN: warm and dry; no rash  NERVOUS SYSTEM:  Awake and alert; Oriented  to place, person and time ; no new deficits    _________________________________________________  LABS:                        11.7   15.07 )-----------( 131      ( 04 Jun 2019 07:29 )             35.0     06-04    130<L>  |  100  |  31<H>  ----------------------------<  173<H>  4.5   |  24  |  0.82    Ca    8.6      04 Jun 2019 07:29          CAPILLARY BLOOD GLUCOSE      POCT Blood Glucose.: 187 mg/dL (04 Jun 2019 16:19)  POCT Blood Glucose.: 172 mg/dL (04 Jun 2019 11:27)  POCT Blood Glucose.: 157 mg/dL (04 Jun 2019 07:44)  POCT Blood Glucose.: 131 mg/dL (03 Jun 2019 21:43)    RADIOLOGY & ADDITIONAL TESTS:    Imaging Personally Reviewed:  YES/NO    Consultant(s) Notes Reviewed:   YES/ No    Care Discussed with Consultants :     Plan of care was discussed with patient and /or primary care giver; all questions and concerns were addressed and care was aligned with patient's wishes.

## 2019-06-04 NOTE — PROGRESS NOTE ADULT - PROBLEM SELECTOR PLAN 5
HgA1c 7.7%  c/w HISS
HgA1c 7.7%  c/w HISS
C/w Amlodipine  Holding Lasix due to risk of hypovolemia.
HgA1c 7.7%  c/w HISS
C/w Amlodipine  Holding Lasix due to risk of hypovolemia

## 2019-06-04 NOTE — PROGRESS NOTE ADULT - PROBLEM SELECTOR PROBLEM 5
Diabetes
HTN (hypertension)

## 2019-06-04 NOTE — PROGRESS NOTE ADULT - PROBLEM SELECTOR PLAN 1
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

## 2019-06-04 NOTE — PROGRESS NOTE ADULT - SUBJECTIVE AND OBJECTIVE BOX
feel very weak  and dizzy  no sob  no fever or pain    MEDICATIONS  (STANDING):  amiodarone    Tablet 200 milliGRAM(s) Oral daily  amLODIPine   Tablet 2.5 milliGRAM(s) Oral at bedtime  aspirin enteric coated 81 milliGRAM(s) Oral daily  azithromycin  IVPB 500 milliGRAM(s) IV Intermittent every 24 hours  azithromycin  IVPB      cefepime   IVPB 1000 milliGRAM(s) IV Intermittent every 12 hours  cefepime   IVPB      digoxin     Tablet 0.125 milliGRAM(s) Oral daily  enoxaparin Injectable 40 milliGRAM(s) SubCutaneous daily  insulin lispro (HumaLOG) corrective regimen sliding scale   SubCutaneous Before meals and at bedtime  insulin lispro Injectable (HumaLOG) 3 Unit(s) SubCutaneous three times a day before meals  melatonin 3 milliGRAM(s) Oral at bedtime  metoprolol succinate ER 25 milliGRAM(s) Oral daily  sodium chloride 0.9%. 1000 milliLiter(s) (60 mL/Hr) IV Continuous <Continuous>    MEDICATIONS  (PRN):  acetaminophen   Tablet .. 650 milliGRAM(s) Oral every 6 hours PRN Mild Pain (1 - 3)  acetaminophen   Tablet .. 650 milliGRAM(s) Oral every 6 hours PRN Temp greater or equal to 38C (100.4F)      Allergies    No Known Allergies    Intolerances        Vital Signs Last 24 Hrs  T(C): 36.9 (04 Jun 2019 20:06), Max: 36.9 (04 Jun 2019 14:19)  T(F): 98.5 (04 Jun 2019 20:06), Max: 98.5 (04 Jun 2019 14:19)  HR: 93 (04 Jun 2019 20:06) (78 - 96)  BP: 122/61 (04 Jun 2019 20:06) (116/60 - 130/67)  BP(mean): --  RR: 18 (04 Jun 2019 20:06) (18 - 18)  SpO2: 98% (04 Jun 2019 20:06) (97% - 99%)    PHYSICAL EXAM  General: adult in NAD  HEENT: clear oropharynx, anicteric sclera, pink conjunctiva  Neck: supple  CV: normal S1/S2 with no murmur rubs or gallops  Lungs: positive air movement b/l ant lungs,clear to auscultation, no wheezes, no rales  Abdomen: soft non-tender non-distended, no hepatosplenomegaly  Ext: no clubbing cyanosis or edema  Skin: no rashes and no petechiae  Neuro: alert and oriented X 4, no focal deficits  LABS:                          11.7   15.07 )-----------( 131      ( 04 Jun 2019 07:29 )             35.0         Mean Cell Volume : 82.5 fl  Mean Cell Hemoglobin : 27.6 pg  Mean Cell Hemoglobin Concentration : 33.4 gm/dL  Auto Neutrophil # : x  Auto Lymphocyte # : x  Auto Monocyte # : x  Auto Eosinophil # : x  Auto Basophil # : x  Auto Neutrophil % : x  Auto Lymphocyte % : x  Auto Monocyte % : x  Auto Eosinophil % : x  Auto Basophil % : x    Serial CBC  Hematocrit 35.0  Hemoglobin 11.7  Plat 131  RBC 4.24  WBC 15.07  Serial CBC  Hematocrit 36.0  Hemoglobin 12.1  Plat 163  RBC 4.30  WBC 18.94  Serial CBC  Hematocrit 37.2  Hemoglobin 12.5  Plat 145  RBC 4.44  WBC 22.96    06-04    130<L>  |  100  |  31<H>  ----------------------------<  173<H>  4.5   |  24  |  0.82    Ca    8.6      04 Jun 2019 07:29            Folate, Serum: 7.8 ng/mL (05-29 @ 09:24)  Vitamin B12, Serum: 953 pg/mL (05-29 @ 09:24)      ROSALINA Kappa: 8.14 mg/dL (06-02 @ 13:34)  ROSALINA Lambda: 6.59 mg/dL (06-02 @ 13:34)        BLOOD SMEAR INTERPRETATION:       RADIOLOGY & ADDITIONAL STUDIES:

## 2019-06-04 NOTE — PROGRESS NOTE ADULT - PROBLEM SELECTOR PLAN 6
s/p EUS EGD- path result to f/u  no pain
s/p EUS EGD- path result to f/u  no pain
Lovenox sq.
s/p EUS EGD- path result to f/u  no pain
1+ bilateral pitting edema  Normal clear CXR  F/u LE Doppler

## 2019-06-04 NOTE — PROGRESS NOTE ADULT - PROBLEM SELECTOR PROBLEM 7
Goals of care, counseling/discussion
PUD (peptic ulcer disease)

## 2019-06-04 NOTE — PROGRESS NOTE ADULT - SUBJECTIVE AND OBJECTIVE BOX
CHIEF COMPLAINT:Patient is a 88y old  Female who presents with a chief complaint of Fall .Pt appears comfortable.    	  REVIEW OF SYSTEMS:  CONSTITUTIONAL: No fever, weight loss, or fatigue  EYES: No eye pain, visual disturbances, or discharge  ENT:  No difficulty hearing, tinnitus, vertigo; No sinus or throat pain  NECK: No pain or stiffness  RESPIRATORY: No cough, wheezing, chills or hemoptysis; No Shortness of Breath  CARDIOVASCULAR: No chest pain, palpitations, passing out, dizziness, or leg swelling  GASTROINTESTINAL: No abdominal or epigastric pain. No nausea, vomiting, or hematemesis; No diarrhea or constipation. No melena or hematochezia.  GENITOURINARY: No dysuria, frequency, hematuria, or incontinence  NEUROLOGICAL: No headaches, memory loss, loss of strength, numbness, or tremors  SKIN: No itching, burning, rashes, or lesions   LYMPH Nodes: No enlarged glands  ENDOCRINE: No heat or cold intolerance; No hair loss  MUSCULOSKELETAL: No joint pain or swelling; No muscle, back, or extremity pain  PSYCHIATRIC: No depression, anxiety, mood swings, or difficulty sleeping  HEME/LYMPH: No easy bruising, or bleeding gums  ALLERGY AND IMMUNOLOGIC: No hives or eczema	    PHYSICAL EXAM:  T(C): 36.6 (06-04-19 @ 05:37), Max: 37 (06-03-19 @ 20:22)  HR: 96 (06-04-19 @ 10:48) (71 - 96)  BP: 130/67 (06-04-19 @ 10:48) (109/59 - 130/67)  RR: 18 (06-04-19 @ 05:37) (17 - 18)  SpO2: 99% (06-04-19 @ 10:48) (97% - 100%)      Appearance: Normal	  HEENT:   Normal oral mucosa, PERRL, EOMI	  Lymphatic: No lymphadenopathy  Cardiovascular: Normal S1 S2, No JVD, No murmurs, No edema  Respiratory: Dec BS at bases	  Psychiatry: A & O x 3, Mood & affect appropriate  Gastrointestinal:  Soft, Non-tender, + BS	  Skin: No rashes, No ecchymoses, No cyanosis	  Neurologic: Non-focal  Extremities: Normal range of motion, No clubbing, cyanosis or edema  Vascular: Peripheral pulses palpable 2+ bilaterally    MEDICATIONS  (STANDING):  amiodarone    Tablet 200 milliGRAM(s) Oral daily  amLODIPine   Tablet 2.5 milliGRAM(s) Oral at bedtime  aspirin enteric coated 81 milliGRAM(s) Oral daily  azithromycin  IVPB 500 milliGRAM(s) IV Intermittent every 24 hours  azithromycin  IVPB      cefepime   IVPB 1000 milliGRAM(s) IV Intermittent every 12 hours  cefepime   IVPB      digoxin     Tablet 0.125 milliGRAM(s) Oral daily  enoxaparin Injectable 40 milliGRAM(s) SubCutaneous daily  furosemide   Injectable 20 milliGRAM(s) IV Push once  insulin lispro (HumaLOG) corrective regimen sliding scale   SubCutaneous Before meals and at bedtime  insulin lispro Injectable (HumaLOG) 3 Unit(s) SubCutaneous three times a day before meals  melatonin 3 milliGRAM(s) Oral at bedtime  metoprolol succinate ER 25 milliGRAM(s) Oral daily  sodium chloride 0.9%. 1000 milliLiter(s) (60 mL/Hr) IV Continuous <Continuous>      	  LABS:	 	                       11.7   15.07 )-----------( 131      ( 04 Jun 2019 07:29 )             35.0     06-04    130<L>  |  100  |  31<H>  ----------------------------<  173<H>  4.5   |  24  |  0.82    Ca    8.6      04 Jun 2019 07:29        Lipid Profile: Cholesterol 203    HDL 35      HgA1c: Hemoglobin A1C, Whole Blood: 7.7 % (05-29 @ 09:20)    TSH: Thyroid Stimulating Hormone, Serum: 1.47 uU/mL (05-29 @ 05:31)

## 2019-06-04 NOTE — PROGRESS NOTE ADULT - PROBLEM SELECTOR PROBLEM 6
Pancreatic cyst
Pancreatic cyst
Need for prophylactic measure
Pancreatic cyst
Chronic diastolic congestive heart failure

## 2019-06-04 NOTE — PROGRESS NOTE ADULT - SUBJECTIVE AND OBJECTIVE BOX
Interval Events:      Allergies    No Known Allergies    Intolerances      Endocrine/Metabolic Medications:  insulin lispro (HumaLOG) corrective regimen sliding scale   SubCutaneous Before meals and at bedtime  insulin lispro Injectable (HumaLOG) 3 Unit(s) SubCutaneous three times a day before meals      Vital Signs Last 24 Hrs  T(C): 36.9 (04 Jun 2019 14:19), Max: 37 (03 Jun 2019 20:22)  T(F): 98.5 (04 Jun 2019 14:19), Max: 98.6 (03 Jun 2019 20:22)  HR: 78 (04 Jun 2019 14:19) (71 - 96)  BP: 124/63 (04 Jun 2019 14:19) (112/49 - 130/67)  BP(mean): --  RR: 18 (04 Jun 2019 14:19) (17 - 18)  SpO2: 99% (04 Jun 2019 14:19) (97% - 99%)      PHYSICAL EXAM  All physical exam findings normal, except those marked:  General:	Alert, active, cooperative, NAD, well hydrated  .		[] Abnormal:  Neck		Normal: supple, no cervical adenopathy, no palpable thyroid  .		[] Abnormal:  Cardiovascular	Normal: regular rate, normal S1, S2, no murmurs  .		[] Abnormal:  Respiratory	Normal: no chest wall deformity, normal respiratory pattern, CTA B/L  .		[] Abnormal:  Abdominal	Normal: soft, ND, NT, bowel sounds present, no masses, no organomegaly  .		[] Abnormal:  		Normal normal genitalia, testes descended, circumcised/uncircumcised  .		Sonja stage:			Breast osnja:  .		Menstrual history:  .		[] Abnormal:  Extremities	Normal: FROM x4  .		[] Abnormal:  Skin		Normal: intact and not indurated, no rash, no acanthosis nigricans  .		[] Abnormal:  Neurologic	Normal: grossly intact  .		[] Abnormal:    LABS                        11.7   15.07 )-----------( 131      ( 04 Jun 2019 07:29 )             35.0                               130    |  100    |  31                  Calcium: 8.6   / iCa: x      (06-04 @ 07:29)    ----------------------------<  173       Magnesium: x                                4.5     |  24     |  0.82             Phosphorous: x          CAPILLARY BLOOD GLUCOSE      POCT Blood Glucose.: 187 mg/dL (04 Jun 2019 16:19)  POCT Blood Glucose.: 172 mg/dL (04 Jun 2019 11:27)  POCT Blood Glucose.: 157 mg/dL (04 Jun 2019 07:44)  POCT Blood Glucose.: 131 mg/dL (03 Jun 2019 21:43)        Assesment/plan Interval Events:  pt in nad    Allergies    No Known Allergies    Intolerances      Endocrine/Metabolic Medications:  insulin lispro (HumaLOG) corrective regimen sliding scale   SubCutaneous Before meals and at bedtime  insulin lispro Injectable (HumaLOG) 3 Unit(s) SubCutaneous three times a day before meals      Vital Signs Last 24 Hrs  T(C): 36.9 (04 Jun 2019 14:19), Max: 37 (03 Jun 2019 20:22)  T(F): 98.5 (04 Jun 2019 14:19), Max: 98.6 (03 Jun 2019 20:22)  HR: 78 (04 Jun 2019 14:19) (71 - 96)  BP: 124/63 (04 Jun 2019 14:19) (112/49 - 130/67)  BP(mean): --  RR: 18 (04 Jun 2019 14:19) (17 - 18)  SpO2: 99% (04 Jun 2019 14:19) (97% - 99%)      PHYSICAL EXAM  All physical exam findings normal, except those marked:  General:	Alert, active, cooperative, NAD, well hydrated  .		[] Abnormal:  Neck		Normal: supple, no cervical adenopathy, no palpable thyroid  .		[] Abnormal:  Cardiovascular	Normal: regular rate, normal S1, S2, no murmurs  .		[] Abnormal:  Respiratory	Normal: no chest wall deformity, normal respiratory pattern, CTA B/L  .		[] Abnormal:  Abdominal	Normal: soft, ND, NT, bowel sounds present, no masses, no organomegaly  .		[] Abnormal:  		Normal normal genitalia, testes descended, circumcised/uncircumcised  .		Sonja stage:			Breast sonja:  .		Menstrual history:  .		[] Abnormal:  Extremities	Normal: FROM x4  .		[] Abnormal:  Skin		Normal: intact and not indurated, no rash, no acanthosis nigricans  .		[] Abnormal:  Neurologic	Normal: grossly intact  .		[] Abnormal:    LABS                        11.7   15.07 )-----------( 131      ( 04 Jun 2019 07:29 )             35.0                               130    |  100    |  31                  Calcium: 8.6   / iCa: x      (06-04 @ 07:29)    ----------------------------<  173       Magnesium: x                                4.5     |  24     |  0.82             Phosphorous: x          CAPILLARY BLOOD GLUCOSE      POCT Blood Glucose.: 187 mg/dL (04 Jun 2019 16:19)  POCT Blood Glucose.: 172 mg/dL (04 Jun 2019 11:27)  POCT Blood Glucose.: 157 mg/dL (04 Jun 2019 07:44)  POCT Blood Glucose.: 131 mg/dL (03 Jun 2019 21:43)        Assesment/plan    Dm- overall good control  cont pre meal humalog 3 units tid  fsg ac and hs  d/c planning per prim team   d/w NP

## 2019-06-04 NOTE — PROGRESS NOTE ADULT - ATTENDING COMMENTS
Patient is seen and examined. Case reviewed with the medical team. Above note is appreciated. Will follow up clinically. Continue DVT prophylaxis. Case discussed with Cardiology, ID and hematology. Will continue Maxipime and zithromax.
Patient is seen and examined. Case reviewed with the medical team. Above note is appreciated. Will follow up clinically. Continue DVT prophylaxis. Case discussed NP. persistent leukocytosis. Will follow up with ID. No clear source of infection.
Patient is seen and examined. Case reviewed with the medical team. Above note is appreciated. Will follow up clinically. Continue DVT prophylaxis. Case discussed with Cardiology, ID and hematology. Will continue Maxipime and zithromax.
Patient is seen and examined. Case reviewed with the medical team. Above note is appreciated. Will follow up clinically. Continue DVT prophylaxis. Case discussed with Cardiology.
Patient is seen and examined. Case reviewed with the medical team. Above note is appreciated. Will follow up clinically. Continue DVT prophylaxis. Case discussed with Cardiology, ID. Continue antibiotics.
Patient is seen and examined. Case reviewed with the medical team. Above note is appreciated. Will follow up clinically. Continue DVT prophylaxis. Case discussed with Cardiology. Doing well. Continue antibiotics. Likely discharge to rehab in am.
Patient is seen and examined. Case reviewed with the medical team. Above note is appreciated. Will follow up clinically. Continue DVT prophylaxis. Case discussed with Cardiology, Endocrine. Spoke with daughter in law in detail. No clear source of infection for the leukocytosis. Follow up UA. Follow up CBC in AM.

## 2019-06-05 ENCOUNTER — TRANSCRIPTION ENCOUNTER (OUTPATIENT)
Age: 84
End: 2019-06-05

## 2019-06-05 ENCOUNTER — APPOINTMENT (OUTPATIENT)
Dept: GASTROENTEROLOGY | Facility: CLINIC | Age: 84
End: 2019-06-05

## 2019-06-05 VITALS
HEART RATE: 94 BPM | SYSTOLIC BLOOD PRESSURE: 120 MMHG | DIASTOLIC BLOOD PRESSURE: 75 MMHG | TEMPERATURE: 98 F | OXYGEN SATURATION: 99 % | RESPIRATION RATE: 18 BRPM

## 2019-06-05 LAB
ANION GAP SERPL CALC-SCNC: 5 MMOL/L — SIGNIFICANT CHANGE UP (ref 5–17)
BUN SERPL-MCNC: 31 MG/DL — HIGH (ref 7–18)
CALCIUM SERPL-MCNC: 8.3 MG/DL — LOW (ref 8.4–10.5)
CHLORIDE SERPL-SCNC: 102 MMOL/L — SIGNIFICANT CHANGE UP (ref 96–108)
CO2 SERPL-SCNC: 24 MMOL/L — SIGNIFICANT CHANGE UP (ref 22–31)
CREAT SERPL-MCNC: 0.91 MG/DL — SIGNIFICANT CHANGE UP (ref 0.5–1.3)
CULTURE RESULTS: SIGNIFICANT CHANGE UP
CULTURE RESULTS: SIGNIFICANT CHANGE UP
GLUCOSE BLDC GLUCOMTR-MCNC: 164 MG/DL — HIGH (ref 70–99)
GLUCOSE BLDC GLUCOMTR-MCNC: 218 MG/DL — HIGH (ref 70–99)
GLUCOSE SERPL-MCNC: 201 MG/DL — HIGH (ref 70–99)
HCT VFR BLD CALC: 35.4 % — SIGNIFICANT CHANGE UP (ref 34.5–45)
HGB BLD-MCNC: 11.7 G/DL — SIGNIFICANT CHANGE UP (ref 11.5–15.5)
MAGNESIUM SERPL-MCNC: 2.1 MG/DL — SIGNIFICANT CHANGE UP (ref 1.6–2.6)
MCHC RBC-ENTMCNC: 27.7 PG — SIGNIFICANT CHANGE UP (ref 27–34)
MCHC RBC-ENTMCNC: 33.1 GM/DL — SIGNIFICANT CHANGE UP (ref 32–36)
MCV RBC AUTO: 83.9 FL — SIGNIFICANT CHANGE UP (ref 80–100)
NRBC # BLD: 0 /100 WBCS — SIGNIFICANT CHANGE UP (ref 0–0)
PHOSPHATE SERPL-MCNC: 2.3 MG/DL — LOW (ref 2.5–4.5)
PLATELET # BLD AUTO: 86 K/UL — LOW (ref 150–400)
POTASSIUM SERPL-MCNC: 4.5 MMOL/L — SIGNIFICANT CHANGE UP (ref 3.5–5.3)
POTASSIUM SERPL-SCNC: 4.5 MMOL/L — SIGNIFICANT CHANGE UP (ref 3.5–5.3)
RBC # BLD: 4.22 M/UL — SIGNIFICANT CHANGE UP (ref 3.8–5.2)
RBC # FLD: 16.1 % — HIGH (ref 10.3–14.5)
SODIUM SERPL-SCNC: 131 MMOL/L — LOW (ref 135–145)
SPECIMEN SOURCE: SIGNIFICANT CHANGE UP
SPECIMEN SOURCE: SIGNIFICANT CHANGE UP
WBC # BLD: 11.23 K/UL — HIGH (ref 3.8–10.5)
WBC # FLD AUTO: 11.23 K/UL — HIGH (ref 3.8–10.5)

## 2019-06-05 PROCEDURE — 83036 HEMOGLOBIN GLYCOSYLATED A1C: CPT

## 2019-06-05 PROCEDURE — 83735 ASSAY OF MAGNESIUM: CPT

## 2019-06-05 PROCEDURE — 71045 X-RAY EXAM CHEST 1 VIEW: CPT

## 2019-06-05 PROCEDURE — 85652 RBC SED RATE AUTOMATED: CPT

## 2019-06-05 PROCEDURE — 84484 ASSAY OF TROPONIN QUANT: CPT

## 2019-06-05 PROCEDURE — 99285 EMERGENCY DEPT VISIT HI MDM: CPT | Mod: 25

## 2019-06-05 PROCEDURE — 84443 ASSAY THYROID STIM HORMONE: CPT

## 2019-06-05 PROCEDURE — 87086 URINE CULTURE/COLONY COUNT: CPT

## 2019-06-05 PROCEDURE — 93970 EXTREMITY STUDY: CPT

## 2019-06-05 PROCEDURE — 97116 GAIT TRAINING THERAPY: CPT

## 2019-06-05 PROCEDURE — 84100 ASSAY OF PHOSPHORUS: CPT

## 2019-06-05 PROCEDURE — 81001 URINALYSIS AUTO W/SCOPE: CPT

## 2019-06-05 PROCEDURE — 70450 CT HEAD/BRAIN W/O DYE: CPT

## 2019-06-05 PROCEDURE — 86334 IMMUNOFIX E-PHORESIS SERUM: CPT

## 2019-06-05 PROCEDURE — 82550 ASSAY OF CK (CPK): CPT

## 2019-06-05 PROCEDURE — 82746 ASSAY OF FOLIC ACID SERUM: CPT

## 2019-06-05 PROCEDURE — 71250 CT THORAX DX C-: CPT

## 2019-06-05 PROCEDURE — 82962 GLUCOSE BLOOD TEST: CPT

## 2019-06-05 PROCEDURE — 84145 PROCALCITONIN (PCT): CPT

## 2019-06-05 PROCEDURE — 80061 LIPID PANEL: CPT

## 2019-06-05 PROCEDURE — 97110 THERAPEUTIC EXERCISES: CPT

## 2019-06-05 PROCEDURE — 74177 CT ABD & PELVIS W/CONTRAST: CPT

## 2019-06-05 PROCEDURE — 86140 C-REACTIVE PROTEIN: CPT

## 2019-06-05 PROCEDURE — 84165 PROTEIN E-PHORESIS SERUM: CPT

## 2019-06-05 PROCEDURE — 83521 IG LIGHT CHAINS FREE EACH: CPT

## 2019-06-05 PROCEDURE — 93005 ELECTROCARDIOGRAM TRACING: CPT

## 2019-06-05 PROCEDURE — 36415 COLL VENOUS BLD VENIPUNCTURE: CPT

## 2019-06-05 PROCEDURE — 82607 VITAMIN B-12: CPT

## 2019-06-05 PROCEDURE — 84155 ASSAY OF PROTEIN SERUM: CPT

## 2019-06-05 PROCEDURE — 80053 COMPREHEN METABOLIC PANEL: CPT

## 2019-06-05 PROCEDURE — 84436 ASSAY OF TOTAL THYROXINE: CPT

## 2019-06-05 PROCEDURE — 97530 THERAPEUTIC ACTIVITIES: CPT

## 2019-06-05 PROCEDURE — 87040 BLOOD CULTURE FOR BACTERIA: CPT

## 2019-06-05 PROCEDURE — 82553 CREATINE MB FRACTION: CPT

## 2019-06-05 PROCEDURE — 85027 COMPLETE CBC AUTOMATED: CPT

## 2019-06-05 PROCEDURE — 80048 BASIC METABOLIC PNL TOTAL CA: CPT

## 2019-06-05 PROCEDURE — 97162 PT EVAL MOD COMPLEX 30 MIN: CPT

## 2019-06-05 RX ORDER — CEFEPIME 1 G/1
1000 INJECTION, POWDER, FOR SOLUTION INTRAMUSCULAR; INTRAVENOUS
Qty: 0 | Refills: 0 | DISCHARGE
Start: 2019-06-05 | End: 2019-06-09

## 2019-06-05 RX ORDER — ENOXAPARIN SODIUM 100 MG/ML
40 INJECTION SUBCUTANEOUS DAILY
Refills: 0 | Status: DISCONTINUED | OUTPATIENT
Start: 2019-06-05 | End: 2019-06-05

## 2019-06-05 RX ORDER — INSULIN LISPRO 100/ML
3 VIAL (ML) SUBCUTANEOUS
Qty: 1 | Refills: 0
Start: 2019-06-05

## 2019-06-05 RX ORDER — INSULIN HUMAN 4-8-12(60)
4 KIT INHALATION
Qty: 0 | Refills: 0 | DISCHARGE

## 2019-06-05 RX ORDER — FUROSEMIDE 40 MG
20 TABLET ORAL ONCE
Refills: 0 | Status: COMPLETED | OUTPATIENT
Start: 2019-06-05 | End: 2019-06-05

## 2019-06-05 RX ORDER — FUROSEMIDE 40 MG
1 TABLET ORAL
Qty: 30 | Refills: 0
Start: 2019-06-05 | End: 2019-07-04

## 2019-06-05 RX ORDER — FUROSEMIDE 40 MG
1 TABLET ORAL
Qty: 0 | Refills: 0 | DISCHARGE

## 2019-06-05 RX ADMIN — Medication 20 MILLIGRAM(S): at 16:04

## 2019-06-05 RX ADMIN — AMIODARONE HYDROCHLORIDE 200 MILLIGRAM(S): 400 TABLET ORAL at 05:56

## 2019-06-05 RX ADMIN — Medication 3 UNIT(S): at 08:37

## 2019-06-05 RX ADMIN — Medication 1: at 12:27

## 2019-06-05 RX ADMIN — Medication 2: at 08:36

## 2019-06-05 RX ADMIN — Medication 81 MILLIGRAM(S): at 11:55

## 2019-06-05 RX ADMIN — CEFEPIME 100 MILLIGRAM(S): 1 INJECTION, POWDER, FOR SOLUTION INTRAMUSCULAR; INTRAVENOUS at 05:56

## 2019-06-05 RX ADMIN — ENOXAPARIN SODIUM 40 MILLIGRAM(S): 100 INJECTION SUBCUTANEOUS at 11:56

## 2019-06-05 RX ADMIN — Medication 0.12 MILLIGRAM(S): at 05:56

## 2019-06-05 RX ADMIN — Medication 3 UNIT(S): at 12:27

## 2019-06-05 NOTE — DISCHARGE NOTE PROVIDER - HOSPITAL COURSE
88 Female with PMH of Multicystic complex pancreatic head lesion, Afib (No A/c due to UGIB, AVM) s/p Medtronic PPM, DM, HTN, Grade 4 Diastolic HF, Glaucoma, Grave's disease, HTN, SBO (s/p intestinal resection) and Vertigo and PSHx of Oophorectomy came to hospital after 2 episodes of fall today. Pt lives alone and had new commode installation recently to prevent falls. According to patient, the new commode is too low for her and it is hard for her to stand up from that. While getting up she had a fall. Later she was about to sit on her bed when she was feeling dizzy leading to fall. She called her neighbors who came for her help. Pt has been feeling very weak now and it is hard for her to stand up. No focal weakness, hip pain, chest pain, headache, problems with lower back.        CT Brain 5/28/19->No acute intracranial hemorrhage or mass effect.     CT Abdomen and Pelvis->No acute pathology in the abdomen. Stable degree of mild biliary dilatation. Correlate with LFTs to determine the clinical significance.    Right lower lobe atelectasis versus consolidation.    CT Chest 6/2/19->Right middle and lower lobe pneumonia. Follow-up to resolution is recommended. Small bilateral pleural effusions.    CXR 6/1/19->Right lower lobe consolidation, markedly increased as compared the prior study        Pt. admitted to medicine for s/p fall, noted with no injury however with generalized weakness and debility.  Pt. with gradually increasing leukocytosis and PNA on CXR, followed by ID Dr. Munoz and treated with IV Maxipime and Azithro.  Leukocytosis gradually trended down, now almost resolved, pt. afebrile.      Pt. evaluated by PT, subacute rehab recommended.    Discussed with attending, pt. is medically stable for discharge to [facility].

## 2019-06-05 NOTE — PROGRESS NOTE ADULT - SUBJECTIVE AND OBJECTIVE BOX
CHIEF COMPLAINT:Patient is a 88y old  Female who presents with a chief complaint of Fall.Pt appears comfortable.    	  REVIEW OF SYSTEMS:  CONSTITUTIONAL: No fever, weight loss, or fatigue  EYES: No eye pain, visual disturbances, or discharge  ENT:  No difficulty hearing, tinnitus, vertigo; No sinus or throat pain  NECK: No pain or stiffness  RESPIRATORY: No cough, wheezing, chills or hemoptysis; No Shortness of Breath  CARDIOVASCULAR: No chest pain, palpitations, passing out, dizziness, or leg swelling  GASTROINTESTINAL: No abdominal or epigastric pain. No nausea, vomiting, or hematemesis; No diarrhea or constipation. No melena or hematochezia.  GENITOURINARY: No dysuria, frequency, hematuria, or incontinence  NEUROLOGICAL: No headaches, memory loss, loss of strength, numbness, or tremors  SKIN: No itching, burning, rashes, or lesions   LYMPH Nodes: No enlarged glands  ENDOCRINE: No heat or cold intolerance; No hair loss  MUSCULOSKELETAL: No joint pain or swelling; No muscle, back, or extremity pain  PSYCHIATRIC: No depression, anxiety, mood swings, or difficulty sleeping  HEME/LYMPH: No easy bruising, or bleeding gums  ALLERGY AND IMMUNOLOGIC: No hives or eczema	      PHYSICAL EXAM:  T(C): 36.4 (06-05-19 @ 10:20), Max: 36.9 (06-04-19 @ 14:19)  HR: 109 (06-05-19 @ 10:20) (78 - 109)  BP: 116/76 (06-05-19 @ 10:20) (116/76 - 124/63)  RR: 17 (06-05-19 @ 10:20) (16 - 18)  SpO2: 99% (06-05-19 @ 10:20) (97% - 99%)      Appearance: Normal	  HEENT:   Normal oral mucosa, PERRL, EOMI	  Lymphatic: No lymphadenopathy  Cardiovascular: Normal S1 S2, No JVD, No murmurs, No edema  Respiratory: Lungs clear to auscultation	  Psychiatry: A & O x 3, Mood & affect appropriate  Gastrointestinal:  Soft, Non-tender, + BS	  Skin: No rashes, No ecchymoses, No cyanosis	  Neurologic: Non-focal  Extremities: Normal range of motion, No clubbing, cyanosis or edema  Vascular: Peripheral pulses palpable 2+ bilaterally    MEDICATIONS  (STANDING):  amiodarone    Tablet 200 milliGRAM(s) Oral daily  amLODIPine   Tablet 2.5 milliGRAM(s) Oral at bedtime  aspirin enteric coated 81 milliGRAM(s) Oral daily  azithromycin  IVPB 500 milliGRAM(s) IV Intermittent every 24 hours  azithromycin  IVPB      cefepime   IVPB 1000 milliGRAM(s) IV Intermittent every 12 hours  cefepime   IVPB      digoxin     Tablet 0.125 milliGRAM(s) Oral daily  enoxaparin Injectable 40 milliGRAM(s) SubCutaneous daily  furosemide   Injectable 20 milliGRAM(s) IV Push once  insulin lispro (HumaLOG) corrective regimen sliding scale   SubCutaneous Before meals and at bedtime  insulin lispro Injectable (HumaLOG) 3 Unit(s) SubCutaneous three times a day before meals  melatonin 3 milliGRAM(s) Oral at bedtime  metoprolol succinate ER 25 milliGRAM(s) Oral daily  sodium chloride 0.9%. 1000 milliLiter(s) (60 mL/Hr) IV Continuous <Continuous>      	  LABS:	 	                         11.7   11.23 )-----------( 86       ( 05 Jun 2019 07:31 )             35.4     06-05    131<L>  |  102  |  31<H>  ----------------------------<  201<H>  4.5   |  24  |  0.91    Ca    8.3<L>      05 Jun 2019 07:31  Phos  2.3     06-05  Mg     2.1     06-05        Lipid Profile: Cholesterol 203    HDL 35      HgA1c: Hemoglobin A1C, Whole Blood: 7.7 % (05-29 @ 09:20)    TSH: Thyroid Stimulating Hormone, Serum: 1.47 uU/mL (05-29 @ 05:31)

## 2019-06-05 NOTE — DISCHARGE NOTE NURSING/CASE MANAGEMENT/SOCIAL WORK - NSDCDPATPORTLINK_GEN_ALL_CORE
You can access the [facility] Patient Portal, offered by [facility], by registering with the following website: [email or web address]

## 2019-06-05 NOTE — PROGRESS NOTE ADULT - PROVIDER SPECIALTY LIST ADULT
Cardiology
Endocrinology
Hospice
Internal Medicine
Endocrinology
Heme/Onc
Cardiology
Internal Medicine
Heme/Onc
Internal Medicine

## 2019-06-05 NOTE — PROGRESS NOTE ADULT - SUBJECTIVE AND OBJECTIVE BOX
feel better today  no more dizziness  no fever, a little sob      MEDICATIONS  (STANDING):  amiodarone    Tablet 200 milliGRAM(s) Oral daily  amLODIPine   Tablet 2.5 milliGRAM(s) Oral at bedtime  aspirin enteric coated 81 milliGRAM(s) Oral daily  azithromycin  IVPB 500 milliGRAM(s) IV Intermittent every 24 hours  azithromycin  IVPB      cefepime   IVPB 1000 milliGRAM(s) IV Intermittent every 12 hours  cefepime   IVPB      digoxin     Tablet 0.125 milliGRAM(s) Oral daily  enoxaparin Injectable 40 milliGRAM(s) SubCutaneous daily  insulin lispro (HumaLOG) corrective regimen sliding scale   SubCutaneous Before meals and at bedtime  insulin lispro Injectable (HumaLOG) 3 Unit(s) SubCutaneous three times a day before meals  melatonin 3 milliGRAM(s) Oral at bedtime  metoprolol succinate ER 25 milliGRAM(s) Oral daily  sodium chloride 0.9%. 1000 milliLiter(s) (60 mL/Hr) IV Continuous <Continuous>    MEDICATIONS  (PRN):  acetaminophen   Tablet .. 650 milliGRAM(s) Oral every 6 hours PRN Mild Pain (1 - 3)  acetaminophen   Tablet .. 650 milliGRAM(s) Oral every 6 hours PRN Temp greater or equal to 38C (100.4F)      Allergies    No Known Allergies    Intolerances        Vital Signs Last 24 Hrs  T(C): 36.4 (05 Jun 2019 10:20), Max: 36.9 (04 Jun 2019 14:19)  T(F): 97.6 (05 Jun 2019 10:20), Max: 98.5 (04 Jun 2019 14:19)  HR: 109 (05 Jun 2019 10:20) (78 - 109)  BP: 116/76 (05 Jun 2019 10:20) (116/76 - 124/63)  BP(mean): --  RR: 17 (05 Jun 2019 10:20) (16 - 18)  SpO2: 99% (05 Jun 2019 10:20) (97% - 99%)    PHYSICAL EXAM  General: adult in NAD  HEENT: clear oropharynx, anicteric sclera, pink conjunctiva  Neck: supple  CV: normal S1/S2 with no murmur rubs or gallops  Lungs: positive air movement b/l ant lungs,clear to auscultation, no wheezes, no rales  Abdomen: soft non-tender non-distended, no hepatosplenomegaly  Ext: no clubbing cyanosis or edema  Skin: no rashes and no petechiae  Neuro: alert and oriented X 4, no focal deficits  LABS:                          11.7   11.23 )-----------( 86       ( 05 Jun 2019 07:31 )             35.4         Mean Cell Volume : 83.9 fl  Mean Cell Hemoglobin : 27.7 pg  Mean Cell Hemoglobin Concentration : 33.1 gm/dL  Auto Neutrophil # : x  Auto Lymphocyte # : x  Auto Monocyte # : x  Auto Eosinophil # : x  Auto Basophil # : x  Auto Neutrophil % : x  Auto Lymphocyte % : x  Auto Monocyte % : x  Auto Eosinophil % : x  Auto Basophil % : x    Serial CBC  Hematocrit 35.4  Hemoglobin 11.7  Plat 86  RBC 4.22  WBC 11.23  Serial CBC  Hematocrit 35.0  Hemoglobin 11.7  Plat 131  RBC 4.24  WBC 15.07  Serial CBC  Hematocrit 36.0  Hemoglobin 12.1  Plat 163  RBC 4.30  WBC 18.94    06-05    131<L>  |  102  |  31<H>  ----------------------------<  201<H>  4.5   |  24  |  0.91    Ca    8.3<L>      05 Jun 2019 07:31  Phos  2.3     06-05  Mg     2.1     06-05                ROSALINA Kappa: 8.14 mg/dL (06-02 @ 13:34)  ROSALINA Lambda: 6.59 mg/dL (06-02 @ 13:34)        BLOOD SMEAR INTERPRETATION:       RADIOLOGY & ADDITIONAL STUDIES:

## 2019-06-05 NOTE — DISCHARGE NOTE PROVIDER - NSDCCPCAREPLAN_GEN_ALL_CORE_FT
PRINCIPAL DISCHARGE DIAGNOSIS  Diagnosis: Falls  Assessment and Plan of Treatment: You were admitted after you sustained a fall and reported multiple falls.  You noted with no injury on x-rays.  -As per PT recommendation you are discharged to rehab to regain your strenghth and acheive optimal level of function  -Please follow physical therapy as tolerated      SECONDARY DISCHARGE DIAGNOSES  Diagnosis: PUD (peptic ulcer disease)  Assessment and Plan of Treatment: PUD (peptic ulcer disease)    Diagnosis: Diabetes  Assessment and Plan of Treatment: Your Hemoglobin A1C was 7.7.  You were followed by endocrine MD Dr. Sierra who adjsted your Insulin with good control  -Monitor FS 1hr AC&HS  -Pre meal Humalog 3 units 3x/day  -Repeat HgnA1C in 3 months.    Diagnosis: Leukocytosis  Assessment and Plan of Treatment: You were noted with gradually increasing white blood count and pneumonia on CXR.  You were treated with intravenous antibiotics which will be completed in rehab for another 3 days.  Your white blood count is almost normal now.  -Please complete course of antibiotics for another 3 days  -Please follow up with rehab doctor  -Repeat CXR in 1 week

## 2019-07-09 NOTE — PROGRESS NOTE ADULT - SUBJECTIVE AND OBJECTIVE BOX
Interval Events:  pt in nad      Allergies    No Known Allergies    Intolerances      Endocrine/Metabolic Medications:  insulin glargine Injectable (LANTUS) 8 Unit(s) SubCutaneous at bedtime  insulin lispro (HumaLOG) corrective regimen sliding scale   SubCutaneous three times a day before meals      Vital Signs Last 24 Hrs  T(C): 36.9 (12 Jul 2018 14:35), Max: 37.2 (11 Jul 2018 20:45)  T(F): 98.4 (12 Jul 2018 14:35), Max: 99 (11 Jul 2018 20:45)  HR: 60 (12 Jul 2018 14:35) (60 - 65)  BP: 134/66 (12 Jul 2018 14:35) (114/53 - 136/67)  BP(mean): --  RR: 16 (12 Jul 2018 14:35) (16 - 17)  SpO2: 100% (12 Jul 2018 14:35) (97% - 100%)      PHYSICAL EXAM  All physical exam findings normal, except those marked:  General:	Alert, active, cooperative, NAD, well hydrated  .		[] Abnormal:  Neck		Normal: supple, no cervical adenopathy, no palpable thyroid  .		[] Abnormal:  Cardiovascular	Normal: regular rate, normal S1, S2, no murmurs  .		[] Abnormal:  Respiratory	Normal: no chest wall deformity, normal respiratory pattern, CTA B/L  .		[] Abnormal:  Abdominal	Normal: soft, ND, NT, bowel sounds present, no masses, no organomegaly  .		[] Abnormal:  		Normal normal genitalia, testes descended, circumcised/uncircumcised  .		Sonja stage:			Breast sonja:  .		Menstrual history:  .		[] Abnormal:  Extremities	Normal: FROM x4  .		[] Abnormal:  Skin		Normal: intact and not indurated, no rash, no acanthosis nigricans  .		[] Abnormal:  Neurologic	Normal: grossly intact  .		[] Abnormal:    LABS                        8.5    6.2   )-----------( 72       ( 12 Jul 2018 15:45 )             26.1         CAPILLARY BLOOD GLUCOSE      POCT Blood Glucose.: 94 mg/dL (12 Jul 2018 16:40)  POCT Blood Glucose.: 226 mg/dL (12 Jul 2018 11:55)  POCT Blood Glucose.: 123 mg/dL (12 Jul 2018 08:03)  POCT Blood Glucose.: 203 mg/dL (11 Jul 2018 21:04)        Assesment/plan    Dm- better controlled  cont low dose lantus  consider low dose starlix when po intake resumes  fsg ac and hs  d/w family VTE: Hold in setting of preop but restart after surgery    FULL CODE    Dispo: Admit to [facility] for multiple fractures pending ortho procedure, pending PT evaluation

## 2019-07-15 ENCOUNTER — INPATIENT (INPATIENT)
Facility: HOSPITAL | Age: 84
LOS: 7 days | Discharge: NURSING, REHAB OR LONG-TERM CARE | DRG: 840 | End: 2019-07-23
Attending: FAMILY MEDICINE | Admitting: FAMILY MEDICINE
Payer: MEDICARE

## 2019-07-15 VITALS
DIASTOLIC BLOOD PRESSURE: 78 MMHG | RESPIRATION RATE: 22 BRPM | SYSTOLIC BLOOD PRESSURE: 123 MMHG | TEMPERATURE: 98 F | HEART RATE: 93 BPM | OXYGEN SATURATION: 98 % | WEIGHT: 89.95 LBS

## 2019-07-15 DIAGNOSIS — I48.91 UNSPECIFIED ATRIAL FIBRILLATION: ICD-10-CM

## 2019-07-15 DIAGNOSIS — E11.9 TYPE 2 DIABETES MELLITUS WITHOUT COMPLICATIONS: ICD-10-CM

## 2019-07-15 DIAGNOSIS — I50.9 HEART FAILURE, UNSPECIFIED: ICD-10-CM

## 2019-07-15 DIAGNOSIS — R18.8 OTHER ASCITES: ICD-10-CM

## 2019-07-15 DIAGNOSIS — Z90.721 ACQUIRED ABSENCE OF OVARIES, UNILATERAL: Chronic | ICD-10-CM

## 2019-07-15 DIAGNOSIS — Z90.49 ACQUIRED ABSENCE OF OTHER SPECIFIED PARTS OF DIGESTIVE TRACT: Chronic | ICD-10-CM

## 2019-07-15 DIAGNOSIS — E87.1 HYPO-OSMOLALITY AND HYPONATREMIA: ICD-10-CM

## 2019-07-15 DIAGNOSIS — I10 ESSENTIAL (PRIMARY) HYPERTENSION: ICD-10-CM

## 2019-07-15 DIAGNOSIS — Z95.0 PRESENCE OF CARDIAC PACEMAKER: Chronic | ICD-10-CM

## 2019-07-15 DIAGNOSIS — J86.9 PYOTHORAX WITHOUT FISTULA: ICD-10-CM

## 2019-07-15 DIAGNOSIS — Z29.9 ENCOUNTER FOR PROPHYLACTIC MEASURES, UNSPECIFIED: ICD-10-CM

## 2019-07-15 PROBLEM — R29.6 REPEATED FALLS: Chronic | Status: ACTIVE | Noted: 2019-05-28

## 2019-07-15 LAB
ALBUMIN SERPL ELPH-MCNC: 1.7 G/DL — LOW (ref 3.5–5)
ALP SERPL-CCNC: 239 U/L — HIGH (ref 40–120)
ALT FLD-CCNC: 26 U/L DA — SIGNIFICANT CHANGE UP (ref 10–60)
ANION GAP SERPL CALC-SCNC: 6 MMOL/L — SIGNIFICANT CHANGE UP (ref 5–17)
AST SERPL-CCNC: 45 U/L — HIGH (ref 10–40)
BILIRUB SERPL-MCNC: 0.6 MG/DL — SIGNIFICANT CHANGE UP (ref 0.2–1.2)
BUN SERPL-MCNC: 18 MG/DL — SIGNIFICANT CHANGE UP (ref 7–18)
CALCIUM SERPL-MCNC: 8.6 MG/DL — SIGNIFICANT CHANGE UP (ref 8.4–10.5)
CHLORIDE SERPL-SCNC: 95 MMOL/L — LOW (ref 96–108)
CO2 SERPL-SCNC: 29 MMOL/L — SIGNIFICANT CHANGE UP (ref 22–31)
CREAT SERPL-MCNC: 0.73 MG/DL — SIGNIFICANT CHANGE UP (ref 0.5–1.3)
GLUCOSE BLDC GLUCOMTR-MCNC: 201 MG/DL — HIGH (ref 70–99)
GLUCOSE SERPL-MCNC: 165 MG/DL — HIGH (ref 70–99)
HCT VFR BLD CALC: 34.1 % — LOW (ref 34.5–45)
HGB BLD-MCNC: 11 G/DL — LOW (ref 11.5–15.5)
LIDOCAIN IGE QN: 98 U/L — SIGNIFICANT CHANGE UP (ref 73–393)
MCHC RBC-ENTMCNC: 29.3 PG — SIGNIFICANT CHANGE UP (ref 27–34)
MCHC RBC-ENTMCNC: 32.3 GM/DL — SIGNIFICANT CHANGE UP (ref 32–36)
MCV RBC AUTO: 90.7 FL — SIGNIFICANT CHANGE UP (ref 80–100)
NRBC # BLD: 0 /100 WBCS — SIGNIFICANT CHANGE UP (ref 0–0)
NT-PROBNP SERPL-SCNC: 6781 PG/ML — HIGH (ref 0–450)
PLATELET # BLD AUTO: 158 K/UL — SIGNIFICANT CHANGE UP (ref 150–400)
POTASSIUM SERPL-MCNC: 4.5 MMOL/L — SIGNIFICANT CHANGE UP (ref 3.5–5.3)
POTASSIUM SERPL-SCNC: 4.5 MMOL/L — SIGNIFICANT CHANGE UP (ref 3.5–5.3)
PROT SERPL-MCNC: 7.2 G/DL — SIGNIFICANT CHANGE UP (ref 6–8.3)
RBC # BLD: 3.76 M/UL — LOW (ref 3.8–5.2)
RBC # FLD: 17.2 % — HIGH (ref 10.3–14.5)
SODIUM SERPL-SCNC: 130 MMOL/L — LOW (ref 135–145)
TROPONIN I SERPL-MCNC: 0.03 NG/ML — SIGNIFICANT CHANGE UP (ref 0–0.04)
TROPONIN I SERPL-MCNC: <0.015 NG/ML — SIGNIFICANT CHANGE UP (ref 0–0.04)
WBC # BLD: 9.62 K/UL — SIGNIFICANT CHANGE UP (ref 3.8–10.5)
WBC # FLD AUTO: 9.62 K/UL — SIGNIFICANT CHANGE UP (ref 3.8–10.5)

## 2019-07-15 PROCEDURE — 71250 CT THORAX DX C-: CPT | Mod: 26

## 2019-07-15 PROCEDURE — 71045 X-RAY EXAM CHEST 1 VIEW: CPT | Mod: 26

## 2019-07-15 PROCEDURE — 99285 EMERGENCY DEPT VISIT HI MDM: CPT

## 2019-07-15 PROCEDURE — 74177 CT ABD & PELVIS W/CONTRAST: CPT | Mod: 26

## 2019-07-15 RX ORDER — AZITHROMYCIN 500 MG/1
500 TABLET, FILM COATED ORAL EVERY 24 HOURS
Refills: 0 | Status: DISCONTINUED | OUTPATIENT
Start: 2019-07-16 | End: 2019-07-17

## 2019-07-15 RX ORDER — CEFTRIAXONE 500 MG/1
1000 INJECTION, POWDER, FOR SOLUTION INTRAMUSCULAR; INTRAVENOUS EVERY 24 HOURS
Refills: 0 | Status: DISCONTINUED | OUTPATIENT
Start: 2019-07-15 | End: 2019-07-17

## 2019-07-15 RX ORDER — ASPIRIN/CALCIUM CARB/MAGNESIUM 324 MG
81 TABLET ORAL DAILY
Refills: 0 | Status: DISCONTINUED | OUTPATIENT
Start: 2019-07-15 | End: 2019-07-23

## 2019-07-15 RX ORDER — ACETAMINOPHEN 500 MG
650 TABLET ORAL EVERY 6 HOURS
Refills: 0 | Status: DISCONTINUED | OUTPATIENT
Start: 2019-07-15 | End: 2019-07-23

## 2019-07-15 RX ORDER — DEXTROSE 50 % IN WATER 50 %
12.5 SYRINGE (ML) INTRAVENOUS ONCE
Refills: 0 | Status: DISCONTINUED | OUTPATIENT
Start: 2019-07-15 | End: 2019-07-23

## 2019-07-15 RX ORDER — AZITHROMYCIN 500 MG/1
500 TABLET, FILM COATED ORAL ONCE
Refills: 0 | Status: COMPLETED | OUTPATIENT
Start: 2019-07-15 | End: 2019-07-15

## 2019-07-15 RX ORDER — DIGOXIN 250 MCG
0.12 TABLET ORAL DAILY
Refills: 0 | Status: DISCONTINUED | OUTPATIENT
Start: 2019-07-15 | End: 2019-07-23

## 2019-07-15 RX ORDER — INSULIN LISPRO 100/ML
VIAL (ML) SUBCUTANEOUS
Refills: 0 | Status: DISCONTINUED | OUTPATIENT
Start: 2019-07-15 | End: 2019-07-23

## 2019-07-15 RX ORDER — DEXTROSE 50 % IN WATER 50 %
15 SYRINGE (ML) INTRAVENOUS ONCE
Refills: 0 | Status: DISCONTINUED | OUTPATIENT
Start: 2019-07-15 | End: 2019-07-23

## 2019-07-15 RX ORDER — HEPARIN SODIUM 5000 [USP'U]/ML
5000 INJECTION INTRAVENOUS; SUBCUTANEOUS EVERY 12 HOURS
Refills: 0 | Status: DISCONTINUED | OUTPATIENT
Start: 2019-07-15 | End: 2019-07-23

## 2019-07-15 RX ORDER — AZITHROMYCIN 500 MG/1
TABLET, FILM COATED ORAL
Refills: 0 | Status: DISCONTINUED | OUTPATIENT
Start: 2019-07-15 | End: 2019-07-17

## 2019-07-15 RX ORDER — METOPROLOL TARTRATE 50 MG
25 TABLET ORAL DAILY
Refills: 0 | Status: DISCONTINUED | OUTPATIENT
Start: 2019-07-15 | End: 2019-07-23

## 2019-07-15 RX ORDER — DOCUSATE SODIUM 100 MG
100 CAPSULE ORAL
Refills: 0 | Status: DISCONTINUED | OUTPATIENT
Start: 2019-07-15 | End: 2019-07-18

## 2019-07-15 RX ORDER — SODIUM CHLORIDE 9 MG/ML
1000 INJECTION, SOLUTION INTRAVENOUS
Refills: 0 | Status: DISCONTINUED | OUTPATIENT
Start: 2019-07-15 | End: 2019-07-22

## 2019-07-15 RX ORDER — FUROSEMIDE 40 MG
40 TABLET ORAL
Refills: 0 | Status: DISCONTINUED | OUTPATIENT
Start: 2019-07-15 | End: 2019-07-18

## 2019-07-15 RX ORDER — FUROSEMIDE 40 MG
20 TABLET ORAL DAILY
Refills: 0 | Status: DISCONTINUED | OUTPATIENT
Start: 2019-07-15 | End: 2019-07-15

## 2019-07-15 RX ORDER — GLUCAGON INJECTION, SOLUTION 0.5 MG/.1ML
1 INJECTION, SOLUTION SUBCUTANEOUS ONCE
Refills: 0 | Status: DISCONTINUED | OUTPATIENT
Start: 2019-07-15 | End: 2019-07-23

## 2019-07-15 RX ORDER — SENNA PLUS 8.6 MG/1
2 TABLET ORAL AT BEDTIME
Refills: 0 | Status: DISCONTINUED | OUTPATIENT
Start: 2019-07-15 | End: 2019-07-23

## 2019-07-15 RX ORDER — INSULIN LISPRO 100/ML
3 VIAL (ML) SUBCUTANEOUS
Refills: 0 | Status: DISCONTINUED | OUTPATIENT
Start: 2019-07-15 | End: 2019-07-23

## 2019-07-15 RX ORDER — AMIODARONE HYDROCHLORIDE 400 MG/1
200 TABLET ORAL DAILY
Refills: 0 | Status: DISCONTINUED | OUTPATIENT
Start: 2019-07-15 | End: 2019-07-23

## 2019-07-15 RX ORDER — SPIRONOLACTONE 25 MG/1
50 TABLET, FILM COATED ORAL DAILY
Refills: 0 | Status: DISCONTINUED | OUTPATIENT
Start: 2019-07-15 | End: 2019-07-15

## 2019-07-15 RX ORDER — DEXTROSE 50 % IN WATER 50 %
25 SYRINGE (ML) INTRAVENOUS ONCE
Refills: 0 | Status: DISCONTINUED | OUTPATIENT
Start: 2019-07-15 | End: 2019-07-23

## 2019-07-15 RX ORDER — LATANOPROST 0.05 MG/ML
1 SOLUTION/ DROPS OPHTHALMIC; TOPICAL AT BEDTIME
Refills: 0 | Status: DISCONTINUED | OUTPATIENT
Start: 2019-07-15 | End: 2019-07-23

## 2019-07-15 RX ORDER — OMEPRAZOLE 10 MG/1
1 CAPSULE, DELAYED RELEASE ORAL
Qty: 0 | Refills: 0 | DISCHARGE

## 2019-07-15 RX ADMIN — SENNA PLUS 2 TABLET(S): 8.6 TABLET ORAL at 23:34

## 2019-07-15 RX ADMIN — Medication 650 MILLIGRAM(S): at 19:54

## 2019-07-15 RX ADMIN — Medication 40 MILLIGRAM(S): at 19:11

## 2019-07-15 RX ADMIN — Medication 650 MILLIGRAM(S): at 21:05

## 2019-07-15 RX ADMIN — Medication 2: at 22:13

## 2019-07-15 RX ADMIN — CEFTRIAXONE 100 MILLIGRAM(S): 500 INJECTION, POWDER, FOR SOLUTION INTRAMUSCULAR; INTRAVENOUS at 23:14

## 2019-07-15 NOTE — ED PROVIDER NOTE - CLINICAL SUMMARY MEDICAL DECISION MAKING FREE TEXT BOX
89 yo female was seen in the ED for worsening abdominal swelling for 2 weeks.     labs  abd CT  ekg  reassess   will admit to Dr. Quintana

## 2019-07-15 NOTE — H&P ADULT - HISTORY OF PRESENT ILLNESS
Patient is 88 year old female has medical history of HTN, A fib (not on AC), CHF, Gastric ulcer, Hepatitis C ,DM, glaucoma, anemia, SBO, surgical history significant for Intestinal resection, oophorectomy cholecystectomy. Pt was sent by Dr. Quintana for worsening abdominal swelling     Patient was in her usual health 2 weeks ago, when she was noticed abdominal swelling, insidious onset, slowly worsening, pt was given lasix but it did not help, pt has on and off abdominal pain, anorexia, weakness, lethargy, loss of energy, orthopnea and leg swelling Pt denies nausea, vomiting, constipation, diarrhea, fever, dysuria. Patient is also complaining of cough with white sputum.

## 2019-07-15 NOTE — ED ADULT NURSE NOTE - NSIMPLEMENTINTERV_GEN_ALL_ED
Implemented All Universal Safety Interventions:  [place] to call system. Call bell, personal items and telephone within reach. Instruct patient to call for assistance. Room bathroom lighting operational. Non-slip footwear when patient is off stretcher. Physically safe environment: no spills, clutter or unnecessary equipment. Stretcher in lowest position, wheels locked, appropriate side rails in place.

## 2019-07-15 NOTE — H&P ADULT - PROBLEM SELECTOR PLAN 1
orthopnea, leg swelling, abdominal swelling   bnp 6781  troponin 1 negative   echo feb 2019- pEF with severe grade 3-4 DD  will do echocardiogram   c/w furosemide 40 bid for now  f/u cardiologist in AM orthopnea, leg swelling, abdominal swelling   bnp 6781  troponin 1 negative   echo feb 2019- pEF with severe grade 3-4 DD  will do echocardiogram   c/w furosemide 40 bid for now  f/u cardiologist in AM  Dr. Rajeev CARTER

## 2019-07-15 NOTE — H&P ADULT - MUSCULOSKELETAL
detailed exam no joint erythema/no joint warmth/no calf tenderness/normal strength/no joint swelling/ROM intact

## 2019-07-15 NOTE — ED PROVIDER NOTE - OBJECTIVE STATEMENT
87 yo female with pmh of HTN, A fib (not on AC), CHF, Gastric ulcer, Hepatitis C ,DM, glaucoma, anemia, SBO, surgical history significant for Intestinal resection, oophorectomy cholecystectomy presents to the ED for worsening abdominal swelling. History was obtained from pts daughter at bedside. Daughter states that pt has been started having worsening abd swelling 2 weeks ago, slow onset.  Pt was given lasix which did not improve symptoms. Daughter states that pts PMd Dr. Quintana sent pt to the ED for further evaluation of abd swelling. Daughter states pt never had a history of ascites. Denies chest pain, sob, fevers, abd pain, N/V/D/C, dysuria.

## 2019-07-15 NOTE — PATIENT PROFILE ADULT - STATED REASON FOR ADMISSION
abdominal pain and swelling Patient says she was on the rehab and her doctor sent her to the hospital.

## 2019-07-15 NOTE — H&P ADULT - PROBLEM SELECTOR PLAN 2
productive cough + nasal congestion + CT chest concerning for empyema vs hydropneumothorax   will consult Pulmonary and ID  will start rocephine and zithromax productive cough + nasal congestion + CT chest concerning for empyema vs hydropneumothorax   will consult Pulmonary and ID  Dr. Vinod Munoz  will start rocephine and zithromax

## 2019-07-15 NOTE — H&P ADULT - NSICDXPASTMEDICALHX_GEN_ALL_CORE_FT
PAST MEDICAL HISTORY:  A-fib no Eliquis since 1/2019    Constipation     Gastric AVM EGD 2/2019 - cauterized    GIB (gastrointestinal bleeding) 2/2019 requiring 2 PRBCs    Glaucoma     Graves disease no treatment as per patient and family    Hepatitis C virus not treated    HTN (hypertension)     Multiple falls     Pacemaker medtronic ADDRL1  2011    PNA (pneumonia) 2/2019   treated with ABX while in [facility]    T2DM (type 2 diabetes mellitus) last A1c 7.8   4/11/19    Vertigo

## 2019-07-15 NOTE — ED ADULT NURSE NOTE - OBJECTIVE STATEMENT
As per daughter pt is here for distended abdomen. Pt. denies any pain/ n/v/d. Pt. noticed distention 2 weeks ago and was sent by PCP for drainage.

## 2019-07-15 NOTE — H&P ADULT - PROBLEM SELECTOR PLAN 6
c/w heparin sq for ppx likely coming form chf as pt is fluid overloaded   will send urine lytes and will do fluid restriction

## 2019-07-15 NOTE — H&P ADULT - ASSESSMENT
Patient is 88 year old female has medical history of HTN, A fib (not on AC), CHF, Gastric ulcer, Hepatitis C ,DM, glaucoma, anemia, SBO, surgical history significant for Intestinal resection, oophorectomy cholecystectomy. Pt was sent by Dr. Quintana for worsening abdominal swelling, with leg swelling and orthopnea, Ct abdomen concerning for anasarca, CT chest concerning for hydropneumothorax vs empyema.

## 2019-07-15 NOTE — H&P ADULT - ATTENDING COMMENTS
Patient seen and examined. Case fully discussed with  ER attending and medical resident. Reviewed chief complaint. Reviewed review of systems. Reviewed list of medications.  Reviewed physical exam.  Reviewed assessment and plan. agree with full H and P. Will follow up clinically. Will follow up with cardiology, pulmonary and GI. Would consider a diagnostic paracentesis. Spoke with daughter in law in detail.

## 2019-07-16 LAB
ALBUMIN SERPL ELPH-MCNC: 1.7 G/DL — LOW (ref 3.5–5)
ALP SERPL-CCNC: 236 U/L — HIGH (ref 40–120)
ALT FLD-CCNC: 23 U/L DA — SIGNIFICANT CHANGE UP (ref 10–60)
ANION GAP SERPL CALC-SCNC: 4 MMOL/L — LOW (ref 5–17)
AST SERPL-CCNC: 31 U/L — SIGNIFICANT CHANGE UP (ref 10–40)
BILIRUB SERPL-MCNC: 0.6 MG/DL — SIGNIFICANT CHANGE UP (ref 0.2–1.2)
BUN SERPL-MCNC: 16 MG/DL — SIGNIFICANT CHANGE UP (ref 7–18)
CALCIUM SERPL-MCNC: 8.6 MG/DL — SIGNIFICANT CHANGE UP (ref 8.4–10.5)
CHLORIDE SERPL-SCNC: 96 MMOL/L — SIGNIFICANT CHANGE UP (ref 96–108)
CHOLEST SERPL-MCNC: 173 MG/DL — SIGNIFICANT CHANGE UP (ref 10–199)
CO2 SERPL-SCNC: 32 MMOL/L — HIGH (ref 22–31)
CREAT SERPL-MCNC: 0.83 MG/DL — SIGNIFICANT CHANGE UP (ref 0.5–1.3)
FOLATE SERPL-MCNC: 8.5 NG/ML — SIGNIFICANT CHANGE UP
GLUCOSE BLDC GLUCOMTR-MCNC: 133 MG/DL — HIGH (ref 70–99)
GLUCOSE BLDC GLUCOMTR-MCNC: 135 MG/DL — HIGH (ref 70–99)
GLUCOSE BLDC GLUCOMTR-MCNC: 155 MG/DL — HIGH (ref 70–99)
GLUCOSE BLDC GLUCOMTR-MCNC: 160 MG/DL — HIGH (ref 70–99)
GLUCOSE SERPL-MCNC: 139 MG/DL — HIGH (ref 70–99)
HBA1C BLD-MCNC: 6.9 % — HIGH (ref 4–5.6)
HBA1C BLD-MCNC: 7.1 % — HIGH (ref 4–5.6)
HCT VFR BLD CALC: 35.4 % — SIGNIFICANT CHANGE UP (ref 34.5–45)
HDLC SERPL-MCNC: 32 MG/DL — LOW
HGB BLD-MCNC: 11.3 G/DL — LOW (ref 11.5–15.5)
IRON SATN MFR SERPL: 15 % — SIGNIFICANT CHANGE UP (ref 15–50)
IRON SATN MFR SERPL: 25 UG/DL — LOW (ref 40–150)
LIPID PNL WITH DIRECT LDL SERPL: 114 MG/DL — SIGNIFICANT CHANGE UP
MAGNESIUM SERPL-MCNC: 1.8 MG/DL — SIGNIFICANT CHANGE UP (ref 1.6–2.6)
MCHC RBC-ENTMCNC: 29.6 PG — SIGNIFICANT CHANGE UP (ref 27–34)
MCHC RBC-ENTMCNC: 31.9 GM/DL — LOW (ref 32–36)
MCV RBC AUTO: 92.7 FL — SIGNIFICANT CHANGE UP (ref 80–100)
NRBC # BLD: 0 /100 WBCS — SIGNIFICANT CHANGE UP (ref 0–0)
PHOSPHATE SERPL-MCNC: 3 MG/DL — SIGNIFICANT CHANGE UP (ref 2.5–4.5)
PLATELET # BLD AUTO: 151 K/UL — SIGNIFICANT CHANGE UP (ref 150–400)
POTASSIUM SERPL-MCNC: 4.2 MMOL/L — SIGNIFICANT CHANGE UP (ref 3.5–5.3)
POTASSIUM SERPL-SCNC: 4.2 MMOL/L — SIGNIFICANT CHANGE UP (ref 3.5–5.3)
PROT SERPL-MCNC: 7.2 G/DL — SIGNIFICANT CHANGE UP (ref 6–8.3)
RBC # BLD: 3.82 M/UL — SIGNIFICANT CHANGE UP (ref 3.8–5.2)
RBC # FLD: 17.3 % — HIGH (ref 10.3–14.5)
SODIUM SERPL-SCNC: 132 MMOL/L — LOW (ref 135–145)
TIBC SERPL-MCNC: 166 UG/DL — LOW (ref 250–450)
TOTAL CHOLESTEROL/HDL RATIO MEASUREMENT: 5.4 RATIO — SIGNIFICANT CHANGE UP (ref 3.3–7.1)
TRIGL SERPL-MCNC: 137 MG/DL — SIGNIFICANT CHANGE UP (ref 10–149)
TROPONIN I SERPL-MCNC: 0.02 NG/ML — SIGNIFICANT CHANGE UP (ref 0–0.04)
TSH SERPL-MCNC: 3.61 UU/ML — SIGNIFICANT CHANGE UP (ref 0.34–4.82)
UIBC SERPL-MCNC: 141 UG/DL — SIGNIFICANT CHANGE UP (ref 110–370)
VIT B12 SERPL-MCNC: 1449 PG/ML — HIGH (ref 232–1245)
WBC # BLD: 9.38 K/UL — SIGNIFICANT CHANGE UP (ref 3.8–10.5)
WBC # FLD AUTO: 9.38 K/UL — SIGNIFICANT CHANGE UP (ref 3.8–10.5)

## 2019-07-16 RX ORDER — SPIRONOLACTONE 25 MG/1
25 TABLET, FILM COATED ORAL DAILY
Refills: 0 | Status: DISCONTINUED | OUTPATIENT
Start: 2019-07-16 | End: 2019-07-16

## 2019-07-16 RX ORDER — KETOROLAC TROMETHAMINE 30 MG/ML
15 SYRINGE (ML) INJECTION ONCE
Refills: 0 | Status: DISCONTINUED | OUTPATIENT
Start: 2019-07-16 | End: 2019-07-16

## 2019-07-16 RX ORDER — SPIRONOLACTONE 25 MG/1
25 TABLET, FILM COATED ORAL DAILY
Refills: 0 | Status: DISCONTINUED | OUTPATIENT
Start: 2019-07-16 | End: 2019-07-23

## 2019-07-16 RX ADMIN — AMIODARONE HYDROCHLORIDE 200 MILLIGRAM(S): 400 TABLET ORAL at 06:00

## 2019-07-16 RX ADMIN — Medication 3 UNIT(S): at 17:30

## 2019-07-16 RX ADMIN — HEPARIN SODIUM 5000 UNIT(S): 5000 INJECTION INTRAVENOUS; SUBCUTANEOUS at 17:30

## 2019-07-16 RX ADMIN — Medication 15 MILLIGRAM(S): at 00:29

## 2019-07-16 RX ADMIN — Medication 25 MILLIGRAM(S): at 08:15

## 2019-07-16 RX ADMIN — Medication 1: at 17:30

## 2019-07-16 RX ADMIN — SENNA PLUS 2 TABLET(S): 8.6 TABLET ORAL at 21:29

## 2019-07-16 RX ADMIN — Medication 81 MILLIGRAM(S): at 12:29

## 2019-07-16 RX ADMIN — AZITHROMYCIN 250 MILLIGRAM(S): 500 TABLET, FILM COATED ORAL at 23:22

## 2019-07-16 RX ADMIN — Medication 40 MILLIGRAM(S): at 06:01

## 2019-07-16 RX ADMIN — Medication 1 TABLET(S): at 12:29

## 2019-07-16 RX ADMIN — Medication 3 UNIT(S): at 12:29

## 2019-07-16 RX ADMIN — Medication 100 MILLIGRAM(S): at 06:01

## 2019-07-16 RX ADMIN — CEFTRIAXONE 100 MILLIGRAM(S): 500 INJECTION, POWDER, FOR SOLUTION INTRAMUSCULAR; INTRAVENOUS at 21:29

## 2019-07-16 RX ADMIN — HEPARIN SODIUM 5000 UNIT(S): 5000 INJECTION INTRAVENOUS; SUBCUTANEOUS at 06:01

## 2019-07-16 RX ADMIN — Medication 15 MILLIGRAM(S): at 01:29

## 2019-07-16 RX ADMIN — Medication 0.12 MILLIGRAM(S): at 06:00

## 2019-07-16 RX ADMIN — AZITHROMYCIN 250 MILLIGRAM(S): 500 TABLET, FILM COATED ORAL at 00:04

## 2019-07-16 RX ADMIN — Medication 40 MILLIGRAM(S): at 17:30

## 2019-07-16 RX ADMIN — LATANOPROST 1 DROP(S): 0.05 SOLUTION/ DROPS OPHTHALMIC; TOPICAL at 21:29

## 2019-07-16 RX ADMIN — Medication 1: at 12:28

## 2019-07-16 NOTE — CHART NOTE - NSCHARTNOTEFT_GEN_A_CORE
Upon Nutritional Assessment by the Registered Dietitian your patient was determined to meet criteria / has evidence of the following diagnosis/diagnoses:          [ ]  Mild Protein Calorie Malnutrition        [ ]  Moderate Protein Calorie Malnutrition        [ X ] Severe Protein Calorie Malnutrition        [ ] Unspecified Protein Calorie Malnutrition        [ ] Underweight / BMI <19        [ ] Morbid Obesity / BMI > 40      Findings as based on:  •  Comprehensive nutrition assessment and consultation  •  Calorie counts (nutrient intake analysis)  •  Food acceptance and intake status from observations by staff  •  Follow up  •  Patient education  •  Intervention secondary to interdisciplinary rounds  •   concerns      Treatment:    The following diet has been recommended: Ensure Compact 1 can ( 4 oz or 120 ml ) x bid daily ( 440 kcal, 18 g protein)       PROVIDER Section:     By signing this assessment you are acknowledging and agree with the diagnosis/diagnoses assigned by the Registered Dietitian    Comments:

## 2019-07-16 NOTE — CONSULT NOTE ADULT - NEGATIVE NEUROLOGICAL SYMPTOMS
no difficulty walking/no syncope/no vertigo/no loss of consciousness/no tremors/no loss of sensation/no headache/no confusion

## 2019-07-16 NOTE — DIETITIAN INITIAL EVALUATION ADULT. - PHYSICAL APPEARANCE
cachectic/other (specify)/emaciated pressure ulcer stage II x 1  Nutrition focused physical exam ( NFPE ) conducted with permission:    Subcutaneous fat loss: [ SEVERE ] Orbital fat pads region, [ SEVERE ]Buccal fat region, [ SEVERE ]Triceps region,  [  ]Ribs region.  Muscle wasting: [ SEVERE ]Temples region, [ SEVERE ]Clavicle region, [ SEVERE ]Shoulder region, [ ]Scapula region, [ SEVERE ]Interosseous region,  [  MODERATE  ]thigh region, [  SEVERE ]Calf region

## 2019-07-16 NOTE — DIETITIAN INITIAL EVALUATION ADULT. - NS FNS REASON FOR WEIGHT CHANG
decreased po intake/acute on chronic comorbidities, part of wt changes may also be affected by ascites

## 2019-07-16 NOTE — CONSULT NOTE ADULT - SUBJECTIVE AND OBJECTIVE BOX
CHIEF COMPLAINT:Patient is a 88y old  Female who presents with a chief complaint of Abdominal swelling.      HPI:  88 year old female has medical history of HTN, A fib (not on AC), CHF, Gastric ulcer, Hepatitis C ,DM, glaucoma, anemia, SBO, surgical history significant for Intestinal resection, oophorectomy cholecystectomy. Pt was sent by Dr. Quintana for worsening abdominal swelling   Patient was in her usual health 2 weeks ago, when she was noticed abdominal swelling, insidious onset, slowly worsening, pt was given lasix but it did not help, pt has on and off abdominal pain, anorexia, weakness, lethargy, loss of energy, orthopnea and leg swelling Pt denies nausea, vomiting, constipation, diarrhea, fever, dysuria. Patient is also complaining of cough with white sputum. 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  PNA (pneumonia): 2/2019   treated with ABX while in [facility]  T2DM (type 2 diabetes mellitus): last A1c 7.8   4/11/19  Hepatitis C virus: not treated  A-fib: no Eliquis since 1/2019  Constipation  Glaucoma  Vertigo  Graves disease: no treatment as per patient and family  Pacemaker: medtronic ADDRL1  2011  HTN (hypertension)  S/P cardiac pacemaker procedure: Medtronic ADDRL1  2011  S/P cholecystectomy  S/P partial resection of colon: &gt; 5 yrs ago  H/O oophorectomy      MEDICATIONS  (STANDING):  amiodarone    Tablet 200 milliGRAM(s) Oral daily  aspirin enteric coated 81 milliGRAM(s) Oral daily  azithromycin  IVPB 500 milliGRAM(s) IV Intermittent every 24 hours  azithromycin  IVPB      cefTRIAXone   IVPB 1000 milliGRAM(s) IV Intermittent every 24 hours  dextrose 5%. 1000 milliLiter(s) (50 mL/Hr) IV Continuous <Continuous>  dextrose 50% Injectable 12.5 Gram(s) IV Push once  dextrose 50% Injectable 25 Gram(s) IV Push once  dextrose 50% Injectable 25 Gram(s) IV Push once  digoxin     Tablet 0.125 milliGRAM(s) Oral daily  docusate sodium 100 milliGRAM(s) Oral two times a day  furosemide   Injectable 40 milliGRAM(s) IV Push two times a day  heparin  Injectable 5000 Unit(s) SubCutaneous every 12 hours  insulin lispro (HumaLOG) corrective regimen sliding scale   SubCutaneous Before meals and at bedtime  insulin lispro Injectable (HumaLOG) 3 Unit(s) SubCutaneous three times a day before meals  latanoprost 0.005% Ophthalmic Solution 1 Drop(s) Both EYES at bedtime  metoprolol succinate ER 25 milliGRAM(s) Oral daily  multivitamin 1 Tablet(s) Oral daily  senna 2 Tablet(s) Oral at bedtime    MEDICATIONS  (PRN):  acetaminophen   Tablet .. 650 milliGRAM(s) Oral every 6 hours PRN Mild Pain (1 - 3)  dextrose 40% Gel 15 Gram(s) Oral once PRN Blood Glucose LESS THAN 70 milliGRAM(s)/deciLiter  glucagon  Injectable 1 milliGRAM(s) IntraMuscular once PRN Glucose <70 milliGRAM(s)/deciLiter      FAMILY HISTORY:  Family history of hypertension  Family history of diabetes mellitus      SOCIAL HISTORY:    [x ] Non-smoker    [x ] Alcohol-denies    Allergies    No Known Allergies    Intolerances    	    REVIEW OF SYSTEMS:  CONSTITUTIONAL: No fever, weight loss, or fatigue  EYES: No eye pain, visual disturbances, or discharge  ENT:  No difficulty hearing, tinnitus, vertigo; No sinus or throat pain  NECK: No pain or stiffness  RESPIRATORY: No cough, wheezing, chills or hemoptysis; + Shortness of Breath  CARDIOVASCULAR: No chest pain, palpitations, passing out, dizziness, or leg swelling  GASTROINTESTINAL: No abdominal or epigastric pain. No nausea, vomiting, or hematemesis; No diarrhea or constipation. No melena or hematochezia.  GENITOURINARY: No dysuria, frequency, hematuria, or incontinence  NEUROLOGICAL: No headaches, memory loss, loss of strength, numbness, or tremors  SKIN: No itching, burning, rashes, or lesions   LYMPH Nodes: No enlarged glands  ENDOCRINE: No heat or cold intolerance; No hair loss  MUSCULOSKELETAL: No joint pain or swelling; No muscle, back, or extremity pain  PSYCHIATRIC: No depression, anxiety, mood swings, or difficulty sleeping  HEME/LYMPH: No easy bruising, or bleeding gums  ALLERGY AND IMMUNOLOGIC: No hives or eczema	      PHYSICAL EXAM:  T(C): 36.4 (07-16-19 @ 07:54), Max: 37.3 (07-15-19 @ 22:22)  HR: 87 (07-16-19 @ 07:54) (61 - 93)  BP: 114/70 (07-16-19 @ 07:54) (114/70 - 150/83)  RR: 18 (07-16-19 @ 07:54) (18 - 22)  SpO2: 100% (07-16-19 @ 07:54) (98% - 100%)    I&O's Summary    16 Jul 2019 07:01  -  16 Jul 2019 10:49  --------------------------------------------------------  IN: 118 mL / OUT: 0 mL / NET: 118 mL        Appearance: Normal	  HEENT:   Normal oral mucosa, PERRL, EOMI	  Lymphatic: No lymphadenopathy  Cardiovascular: Normal S1 S2, No JVD, No murmurs, No edema  Respiratory: Dec BS at bases  Psychiatry: A & O x 3, Mood & affect appropriate  Gastrointestinal:  Soft, Non-tender, + BS	  Skin: No rashes, No ecchymoses, No cyanosis	  Neurologic: Non-focal  Extremities: Normal range of motion, No clubbing, cyanosis or edema  Vascular: Peripheral pulses palpable 2+ bilaterally        ECG: afib with intermittent v paced 	    	  LABS:	 	      CARDIAC MARKERS ( 16 Jul 2019 07:09 )  0.018 ng/mL / x     / x     / x     / x      CARDIAC MARKERS ( 15 Jul 2019 23:09 )  0.027 ng/mL / x     / x     / x     / x      CARDIAC MARKERS ( 15 Jul 2019 14:10 )  <0.015 ng/mL / x     / x     / x     / x                              11.3   9.38  )-----------( 151      ( 16 Jul 2019 07:09 )             35.4     07-16    132<L>  |  96  |  16  ----------------------------<  139<H>  4.2   |  32<H>  |  0.83    Ca    8.6      16 Jul 2019 07:09  Phos  3.0     07-16  Mg     1.8     07-16    TPro  7.2  /  Alb  1.7<L>  /  TBili  0.6  /  DBili  x   /  AST  31  /  ALT  23  /  AlkPhos  236<H>  07-16    proBNP: Serum Pro-Brain Natriuretic Peptide: 6781 pg/mL (07-15 @ 14:10)    Lipid Profile: Cholesterol 173    HDL 32      HgA1c: Hemoglobin A1C, Whole Blood: 7.1 % (07-16 @ 03:47)    TSH: Thyroid Stimulating Hormone, Serum: 3.61 uU/mL (07-16 @ 07:09)    Iron with Total Binding Capacity (07.16.19 @ 07:09)    % Saturation, Iron: 15 %    Iron - Total Binding Capacity.: 166 ug/dL    Iron Total, Serum: 25 ug/dL    Unsaturated Iron Binding Capacity: 141 ug/dL      OBSERVATIONS:  Mitral Valve: Mitral annular calcification. Mild mitral  regurgitation.  Aortic Root: Normal aortic root.  Aortic Valve: Normal trileaflet aortic valve.  Left Atrium: Severely dilated left atrium.  LA volume index  = 54 cc/m2.  Left Ventricle: Endocardium not well visualized; grossly  normal left ventricular systolic function. Severe diastolic  dysfunction (stage III -IV). Mild concentric left  ventricular hypertrophy.  Right Heart: Normal right atrium. Normal right ventricular  size and function. There is mild-moderate tricuspid  regurgitation. There is mild pulmonic regurgitation.  Pericardium/PleuraNormal pericardium with no pericardial  effusion.  Hemodynamic: RA Pressure is 10 mm Hg. RV systolic pressure  is 59 mm Hg. Moderate pulmonary hypertension.  ------------------------------------------------------------------------  CONCLUSIONS:  1. Mitral annular calcification. Mild mitral regurgitation.    2. Severely dilated left atrium.  LA volume index = 54  cc/m2.  3. Mild concentric left ventricular hypertrophy.  4. Endocardium not well visualized; grossly normal left  ventricular systolic function. Severe diastolic dysfunction  (stage III -IV).  5. Normal right ventricular size and function.  6. RV systolic pressure is 59 mm Hg. Moderate pulmonary  hypertension.    ------------------------------------------------------------------------  Confirmed on  2/7/2019 - 11:57:54 by Austin Horn MD    INTERPRETATION:  CLINICAL INFORMATION: Lung abscess    COMPARISON: CT abdomen pelvis 7/15/2019. CT chest 6/2/2019.    PROCEDURE:   CT of the Chest was performed withoutintravenous contrast.  Sagittal and coronal reformats were performed.      FINDINGS:    LUNGS AND AIRWAYS: Patent central airways.  Subcentimeter calcified   granuloma in the right lower lobe. Scattered mild dependent atelectatic   changes. Tree-in-bud opacities in the bilateral lower lungs.    PLEURA: Within the right major fissure there is loculated fluid and air   measuring 5.0 x 3.7 x 3.8 cm. Evaluation is limited without intravenous   contrast. Findings may represent loculated hydropneumothorax versus   empyema with air producing organism There is a small right effusion at   the lung base with mild associated atelectasis.    MEDIASTINUM AND ROLF: Several mildly prominent mediastinal lymph nodes   the largest is a right paratracheal node measuring 1.4 x 1.0 cm,   unchanged.    VESSELS: Within normal limits.    HEART: Cardiomegaly. Cardiac pacemaker. Coronary artery and valvular   calcifications. No pericardial effusion.    CHEST WALL AND LOWER NECK: Multinodular thyroid gland with scattered   areas of coarse calcium. This would be better evaluated with ultrasound.    VISUALIZED UPPER ABDOMEN: Abdominal ascites. Anasarca. For evaluation of   the abdominal contents see CT abdomen pelvis from 7/15/2019.    BONES: Within normal limits.    IMPRESSION:     Loculated fluid with air in the right major fissure may represent small   loculated hydropneumothorax versus empyema with air producing organism.   Evaluation is limited without intravenous contrast.

## 2019-07-16 NOTE — CONSULT NOTE ADULT - ASSESSMENT
1. R/O Empyema  - CXR noted.   - CT chest notes - Loculated fluid with air in the right major fissure may represent small loculated hydropneumothorax versus empyema with air producing organism.   - Thoracic surgery consult  - O2 supplement  - Bronchodilators   - F/U CXR     2. Atelectasis  - O2 Supp  - Bronchodilators  - Incentive spirometry     3. CHF  - Echo  - Tele monitoring  - Cardio eval  - Diuretics PRN  - DVT and GI PPX 1. R/O Empyema  - CXR noted.   - CT chest notes - Loculated fluid with air in the right major fissure may represent small loculated hydropneumothorax versus empyema with air producing organism.   - Thoracic surgery consult  - O2 supplement  - Bronchodilators   - F/U CXR   - On ABX; ID f/u    2. Atelectasis  - O2 Supp  - Bronchodilators  - Incentive spirometry     3. CHF  - Echo  - Tele monitoring  - Cardio eval  - Diuretics PRN  - DVT and GI PPX

## 2019-07-16 NOTE — CONSULT NOTE ADULT - NEGATIVE ENMT SYMPTOMS
no hearing difficulty/no recurrent cold sores/no nose bleeds/no ear pain/no nasal obstruction/no dry mouth/no throat pain/no dysphagia/no gum bleeding

## 2019-07-16 NOTE — DIETITIAN INITIAL EVALUATION ADULT. - FACTORS AFF FOOD INTAKE
persistent lack of appetite/change in mental status persistent lack of appetite/difficulty chewing/poor dentition

## 2019-07-16 NOTE — CONSULT NOTE ADULT - ASSESSMENT
88 year old female has medical history of HTN, A fib (not on AC), CHF, Gastric ulcer, Hepatitis C ,DM, glaucoma, anemia, SBO,surgical history significant for Intestinal resection, oophorectomy cholecystectomy, worsening abdominal swelling.  1.Check PPM.  2.ABX.  3.Afib-no AC,asa,amiodarone,lopressor.  4.DM-Insulin.  5.IV lasix.  6.PPI.  7.Echocardiogram.

## 2019-07-16 NOTE — CONSULT NOTE ADULT - SUBJECTIVE AND OBJECTIVE BOX
PULMONARY CONSULT NOTE      DERRICK ELIAS  [id number]    Patient is a 88y old  Female who presents with a chief complaint of Abdominal swelling (16 Jul 2019 10:49)      HISTORY OF PRESENT ILLNESS:    History of Present Illness:  Reason for Admission: Abdominal swelling	  History of Present Illness: 	  Patient is 88 year old female has medical history of HTN, A fib (not on AC), CHF, Gastric ulcer, Hepatitis C ,DM, glaucoma, anemia, SBO, surgical history significant for Intestinal resection, oophorectomy cholecystectomy. Pt was sent by Dr. Quintana for worsening abdominal swelling     Patient was in her usual health 2 weeks ago, when she was noticed abdominal swelling, insidious onset, slowly worsening, pt was given lasix but it did not help, pt has on and off abdominal pain, anorexia, weakness, lethargy, loss of energy, orthopnea and leg swelling Pt denies nausea, vomiting, constipation, diarrhea, fever, dysuria. Patient is also complaining of cough with white sputum.     MEDICATIONS  (STANDING):  amiodarone    Tablet 200 milliGRAM(s) Oral daily  aspirin enteric coated 81 milliGRAM(s) Oral daily  azithromycin  IVPB 500 milliGRAM(s) IV Intermittent every 24 hours  azithromycin  IVPB      cefTRIAXone   IVPB 1000 milliGRAM(s) IV Intermittent every 24 hours  dextrose 5%. 1000 milliLiter(s) (50 mL/Hr) IV Continuous <Continuous>  dextrose 50% Injectable 12.5 Gram(s) IV Push once  dextrose 50% Injectable 25 Gram(s) IV Push once  dextrose 50% Injectable 25 Gram(s) IV Push once  digoxin     Tablet 0.125 milliGRAM(s) Oral daily  docusate sodium 100 milliGRAM(s) Oral two times a day  furosemide   Injectable 40 milliGRAM(s) IV Push two times a day  heparin  Injectable 5000 Unit(s) SubCutaneous every 12 hours  insulin lispro (HumaLOG) corrective regimen sliding scale   SubCutaneous Before meals and at bedtime  insulin lispro Injectable (HumaLOG) 3 Unit(s) SubCutaneous three times a day before meals  latanoprost 0.005% Ophthalmic Solution 1 Drop(s) Both EYES at bedtime  metoprolol succinate ER 25 milliGRAM(s) Oral daily  multivitamin 1 Tablet(s) Oral daily  senna 2 Tablet(s) Oral at bedtime      MEDICATIONS  (PRN):  acetaminophen   Tablet .. 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  PNA (pneumonia): 2/2019   treated with ABX while in [facility]  T2DM (type 2 diabetes mellitus): last A1c 7.8   4/11/19  Hepatitis C virus: not treated  A-fib: no Eliquis since 1/2019  Constipation  Glaucoma  Vertigo  Graves disease: no treatment as per patient and family  Pacemaker: medtronic ADDRL1  2011  HTN (hypertension)  S/P cardiac pacemaker procedure: Medtronic ADDRL1  2011  S/P cholecystectomy  S/P partial resection of colon: &gt; 5 yrs ago  H/O oophorectomy      FAMILY HISTORY:  Family history of hypertension  Family history of diabetes mellitus      SOCIAL HISTORY  Smoking History:     REVIEW OF SYSTEMS:    CONSTITUTIONAL:  No fevers, chills, sweats    HEENT:  Eyes:  No diplopia or blurred vision. ENT:  No earache, sore throat or runny nose.    CARDIOVASCULAR:  No pressure, squeezing, tightness, or heaviness about the chest; no palpitations.    RESPIRATORY:  Per HPI    GASTROINTESTINAL:  No abdominal pain, nausea, vomiting or diarrhea.    GENITOURINARY:  No dysuria, frequency or urgency.    NEUROLOGIC:  No paresthesias, fasciculations, seizures or weakness.    PSYCHIATRIC:  No disorder of thought or mood.    Vital Signs Last 24 Hrs  T(C): 36.8 (16 Jul 2019 12:08), Max: 37.3 (15 Jul 2019 22:22)  T(F): 98.2 (16 Jul 2019 12:08), Max: 99.1 (15 Jul 2019 22:22)  HR: 67 (16 Jul 2019 12:08) (61 - 90)  BP: 110/54 (16 Jul 2019 12:08) (110/54 - 150/83)  BP(mean): 79 (15 Jul 2019 19:30) (79 - 79)  RR: 18 (16 Jul 2019 12:08) (18 - 19)  SpO2: 100% (16 Jul 2019 12:08) (100% - 100%)  I&O's Detail    16 Jul 2019 07:01  -  16 Jul 2019 13:10  --------------------------------------------------------  IN:    Oral Fluid: 118 mL  Total IN: 118 mL    OUT:  Total OUT: 0 mL    Total NET: 118 mL          PHYSICAL EXAMINATION:    GENERAL: The patient is a well-developed, well-nourished _____in no apparent distress.     HEENT: Head is normocephalic and atraumatic. Extraocular muscles are intact. Mucous membranes are moist.     NECK: Supple.     LUNGS: Clear to auscultation without wheezing, rales, or rhonchi. Respirations unlabored    HEART: Regular rate and rhythm without murmur.    ABDOMEN: Soft, nontender, and nondistended.  No hepatosplenomegaly is noted.    EXTREMITIES: Without any cyanosis, clubbing, rash, lesions or edema.    NEUROLOGIC: Grossly intact.      LABS:                        11.3   9.38  )-----------( 151      ( 16 Jul 2019 07:09 )             35.4     07-16    132<L>  |  96  |  16  ----------------------------<  139<H>  4.2   |  32<H>  |  0.83    Ca    8.6      16 Jul 2019 07:09  Phos  3.0     07-16  Mg     1.8     07-16    TPro  7.2  /  Alb  1.7<L>  /  TBili  0.6  /  DBili  x   /  AST  31  /  ALT  23  /  AlkPhos  236<H>  07-16          CARDIAC MARKERS ( 16 Jul 2019 07:09 )  0.018 ng/mL / x     / x     / x     / x      CARDIAC MARKERS ( 15 Jul 2019 23:09 )  0.027 ng/mL / x     / x     / x     / x      CARDIAC MARKERS ( 15 Jul 2019 14:10 )  <0.015 ng/mL / x     / x     / x     / x            Serum Pro-Brain Natriuretic Peptide: 6781 pg/mL (07-15-19 @ 14:10)          MICROBIOLOGY:    RADIOLOGY & ADDITIONAL STUDIES:    CXR:    Ct scan chest:    ekg;    echo: PULMONARY CONSULT NOTE      DERRICK ELIAS  [id number]    Patient is a 88y old  Female who presents with a chief complaint of Abdominal swelling (16 Jul 2019 10:49)      HISTORY OF PRESENT ILLNESS:    History of Present Illness:  Reason for Admission: Abdominal swelling	  History of Present Illness: 	  Patient is 88 year old female has medical history of HTN, A fib (not on AC), CHF, Gastric ulcer, Hepatitis C ,DM, glaucoma, anemia, SBO, surgical history significant for Intestinal resection, oophorectomy cholecystectomy. Pt was sent by Dr. Quintana for worsening abdominal swelling     Patient was in her usual health 2 weeks ago, when she was noticed abdominal swelling, insidious onset, slowly worsening, pt was given lasix but it did not help, pt has on and off abdominal pain, anorexia, weakness, lethargy, loss of energy, orthopnea and leg swelling Pt denies nausea, vomiting, constipation, diarrhea, fever, dysuria. Patient is also complaining of cough with white sputum.     Pt is awake, alert, lying in bed in NAD. Denies hx of Asthma, COPD. No smoking hx or environmental exposure.     MEDICATIONS  (STANDING):  amiodarone    Tablet 200 milliGRAM(s) Oral daily  aspirin enteric coated 81 milliGRAM(s) Oral daily  azithromycin  IVPB 500 milliGRAM(s) IV Intermittent every 24 hours  azithromycin  IVPB      cefTRIAXone   IVPB 1000 milliGRAM(s) IV Intermittent every 24 hours  dextrose 5%. 1000 milliLiter(s) (50 mL/Hr) IV Continuous <Continuous>  dextrose 50% Injectable 12.5 Gram(s) IV Push once  dextrose 50% Injectable 25 Gram(s) IV Push once  dextrose 50% Injectable 25 Gram(s) IV Push once  digoxin     Tablet 0.125 milliGRAM(s) Oral daily  docusate sodium 100 milliGRAM(s) Oral two times a day  furosemide   Injectable 40 milliGRAM(s) IV Push two times a day  heparin  Injectable 5000 Unit(s) SubCutaneous every 12 hours  insulin lispro (HumaLOG) corrective regimen sliding scale   SubCutaneous Before meals and at bedtime  insulin lispro Injectable (HumaLOG) 3 Unit(s) SubCutaneous three times a day before meals  latanoprost 0.005% Ophthalmic Solution 1 Drop(s) Both EYES at bedtime  metoprolol succinate ER 25 milliGRAM(s) Oral daily  multivitamin 1 Tablet(s) Oral daily  senna 2 Tablet(s) Oral at bedtime      MEDICATIONS  (PRN):  acetaminophen   Tablet .. 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  PNA (pneumonia): 2/2019   treated with ABX while in [facility]  T2DM (type 2 diabetes mellitus): last A1c 7.8   4/11/19  Hepatitis C virus: not treated  A-fib: no Eliquis since 1/2019  Constipation  Glaucoma  Vertigo  Graves disease: no treatment as per patient and family  Pacemaker: medtronic ADDRL1  2011  HTN (hypertension)  S/P cardiac pacemaker procedure: Medtronic ADDRL1  2011  S/P cholecystectomy  S/P partial resection of colon: &gt; 5 yrs ago  H/O oophorectomy      FAMILY HISTORY:  Family history of hypertension  Family history of diabetes mellitus      SOCIAL HISTORY  Smoking History:     REVIEW OF SYSTEMS:    CONSTITUTIONAL:  No fevers, chills, sweats    HEENT:  Eyes:  No diplopia or blurred vision. ENT:  No earache, sore throat or runny nose.    CARDIOVASCULAR:  No pressure, squeezing, tightness, or heaviness about the chest; no palpitations.    RESPIRATORY:  Per HPI    GASTROINTESTINAL:  No abdominal pain, nausea, vomiting or diarrhea.    GENITOURINARY:  No dysuria, frequency or urgency.    NEUROLOGIC:  No paresthesias, fasciculations, seizures or weakness.    PSYCHIATRIC:  No disorder of thought or mood.    Vital Signs Last 24 Hrs  T(C): 36.8 (16 Jul 2019 12:08), Max: 37.3 (15 Jul 2019 22:22)  T(F): 98.2 (16 Jul 2019 12:08), Max: 99.1 (15 Jul 2019 22:22)  HR: 67 (16 Jul 2019 12:08) (61 - 90)  BP: 110/54 (16 Jul 2019 12:08) (110/54 - 150/83)  BP(mean): 79 (15 Jul 2019 19:30) (79 - 79)  RR: 18 (16 Jul 2019 12:08) (18 - 19)  SpO2: 100% (16 Jul 2019 12:08) (100% - 100%)  I&O's Detail    16 Jul 2019 07:01  -  16 Jul 2019 13:10  --------------------------------------------------------  IN:    Oral Fluid: 118 mL  Total IN: 118 mL    OUT:  Total OUT: 0 mL    Total NET: 118 mL          PHYSICAL EXAMINATION:    GENERAL: The patient is a well-developed, well-nourished _____in no apparent distress.     HEENT: Head is normocephalic and atraumatic. Extraocular muscles are intact. Mucous membranes are moist.     NECK: Supple.     LUNGS: Clear to auscultation without wheezing, rales, or rhonchi. Respirations unlabored    HEART: Regular rate and rhythm without murmur.    ABDOMEN: Soft, nontender, and nondistended.  No hepatosplenomegaly is noted.    EXTREMITIES: Without any cyanosis, clubbing, rash, lesions or edema.    NEUROLOGIC: Grossly intact.      LABS:                        11.3   9.38  )-----------( 151      ( 16 Jul 2019 07:09 )             35.4     07-16    132<L>  |  96  |  16  ----------------------------<  139<H>  4.2   |  32<H>  |  0.83    Ca    8.6      16 Jul 2019 07:09  Phos  3.0     07-16  Mg     1.8     07-16    TPro  7.2  /  Alb  1.7<L>  /  TBili  0.6  /  DBili  x   /  AST  31  /  ALT  23  /  AlkPhos  236<H>  07-16          CARDIAC MARKERS ( 16 Jul 2019 07:09 )  0.018 ng/mL / x     / x     / x     / x      CARDIAC MARKERS ( 15 Jul 2019 23:09 )  0.027 ng/mL / x     / x     / x     / x      CARDIAC MARKERS ( 15 Jul 2019 14:10 )  <0.015 ng/mL / x     / x     / x     / x            Serum Pro-Brain Natriuretic Peptide: 6781 pg/mL (07-15-19 @ 14:10)          MICROBIOLOGY:    RADIOLOGY & ADDITIONAL STUDIES:    CXR:  < from: Xray Chest 1 View- PORTABLE-Urgent (07.15.19 @ 17:25) >    The mediastinal cardiac silhouette is unremarkable. Cardiac pacemaker.    Patchy opacity at right base is improved when compared to the previous   exam. Continued follow-up is recommended.    No acute osseous finding.     IMPRESSION:    As above.    < end of copied text >    Ct scan chest:  < from: CT Chest No Cont (07.15.19 @ 19:28) >  IMPRESSION:     Loculated fluid with air in the right major fissure may represent small   loculated hydropneumothorax versus empyema with air producing organism.   Evaluation is limited without intravenous contrast.    < end of copied text Pt has no cardiological or pulmonary contraindications for pulmonary rehabilitation after discharge. Pt will benefit from pulmonary rehabilitation as outpatient.    < from: CT Abdomen and Pelvis w/ IV Cont (07.15.19 @ 17:18) >  IMPRESSION:       Small right pleural effusion and associated atelectasis. 3.5 x 2.7 cm   focus of loculated fluid and air along the minor fissure. This appeared   as a more solid favian the CT from 5/29/2019. This could represent a   small hydropneumothorax or developing lung abscess. Correlate clinically.    Small to moderate amount of abdominal ascites and anasarca which have   progressed since the previous exam.    2.6 x 1.6 cm probable periceliac lymph node, series 2 image 28 unchanged   since 4/10/2019. This is indeterminant. Other nearby smaller nodes   present. This would be better evaluated with PET/CT.    < end of copied text >    ekg;    echo:

## 2019-07-16 NOTE — CHART NOTE - NSCHARTNOTEFT_GEN_A_CORE
Patient complaining of severe pain. Assessed patient at bedside along with PGY 3, Dr. Mcdaniel.  Patient reports sharp pain on left side, severe in intensity, aggravated by palpation.   Patient's T2 is 0.027, still negative. T1 was 0.015. EKG reviewed, no ST elevation.   In view of negative trops and no ekg changes, pain unlikely to be cardiac in origin.    IV Toradol 15 mg IV push given for pain.   Continue to follow troponin in am.

## 2019-07-16 NOTE — CONSULT NOTE ADULT - ASSESSMENT
The etiology for ascites in this patient can be due to  1. Malignant ascites  2. Cirrhosis  3. R/o SBP    Suggestions:    1. Consider therapeutic and diagnostic paracentesis  2. [facility] Ascitic fluid for Total cell count, gram stain, culture and cytology and albumin  3. Monitor electrolytes  4. Consider lasix and aldactone  5. Low salt diet  6. Check CEA, , AFP

## 2019-07-16 NOTE — PROGRESS NOTE ADULT - SUBJECTIVE AND OBJECTIVE BOX
Dimension 6  D) Left message for clients therapist to call   PGY 1 Note discussed with supervising resident and primary attending    Patient is a 88y old  Female who presents with a chief complaint of Abdominal swelling (16 Jul 2019 15:59)      INTERVAL HPI/OVERNIGHT EVENTS: offers no new complaints; current symptoms resolving    MEDICATIONS  (STANDING):  amiodarone    Tablet 200 milliGRAM(s) Oral daily  aspirin enteric coated 81 milliGRAM(s) Oral daily  azithromycin  IVPB 500 milliGRAM(s) IV Intermittent every 24 hours  azithromycin  IVPB      cefTRIAXone   IVPB 1000 milliGRAM(s) IV Intermittent every 24 hours  dextrose 5%. 1000 milliLiter(s) (50 mL/Hr) IV Continuous <Continuous>  dextrose 50% Injectable 12.5 Gram(s) IV Push once  dextrose 50% Injectable 25 Gram(s) IV Push once  dextrose 50% Injectable 25 Gram(s) IV Push once  digoxin     Tablet 0.125 milliGRAM(s) Oral daily  docusate sodium 100 milliGRAM(s) Oral two times a day  furosemide   Injectable 40 milliGRAM(s) IV Push two times a day  heparin  Injectable 5000 Unit(s) SubCutaneous every 12 hours  insulin lispro (HumaLOG) corrective regimen sliding scale   SubCutaneous Before meals and at bedtime  insulin lispro Injectable (HumaLOG) 3 Unit(s) SubCutaneous three times a day before meals  latanoprost 0.005% Ophthalmic Solution 1 Drop(s) Both EYES at bedtime  metoprolol succinate ER 25 milliGRAM(s) Oral daily  multivitamin 1 Tablet(s) Oral daily  senna 2 Tablet(s) Oral at bedtime    MEDICATIONS  (PRN):  acetaminophen   Tablet .. 650 milliGRAM(s) Oral every 6 hours PRN Mild Pain (1 - 3)  dextrose 40% Gel 15 Gram(s) Oral once PRN Blood Glucose LESS THAN 70 milliGRAM(s)/deciLiter  glucagon  Injectable 1 milliGRAM(s) IntraMuscular once PRN Glucose <70 milliGRAM(s)/deciLiter      __________________________________________________  REVIEW OF SYSTEMS:    CONSTITUTIONAL: No fever,   EYES: no acute visual disturbances  NECK: No pain or stiffness  RESPIRATORY: No cough; No shortness of breath  CARDIOVASCULAR: No chest pain, no palpitations  GASTROINTESTINAL: No pain. No nausea or vomiting; No diarrhea   NEUROLOGICAL: No headache or numbness, no tremors  MUSCULOSKELETAL: No joint pain, no muscle pain  GENITOURINARY: no dysuria, no frequency, no hesitancy  PSYCHIATRY: no depression , no anxiety  ALL OTHER  ROS negative        Vital Signs Last 24 Hrs  T(C): 36.3 (16 Jul 2019 15:40), Max: 37.3 (15 Jul 2019 22:22)  T(F): 97.4 (16 Jul 2019 15:40), Max: 99.1 (15 Jul 2019 22:22)  HR: 88 (16 Jul 2019 15:40) (61 - 89)  BP: 129/62 (16 Jul 2019 15:40) (110/54 - 134/74)  BP(mean): 79 (15 Jul 2019 19:30) (79 - 79)  RR: 18 (16 Jul 2019 15:40) (18 - 19)  SpO2: 100% (16 Jul 2019 15:40) (100% - 100%)    ________________________________________________  PHYSICAL EXAM:  GENERAL: NAD  HEENT: Normocephalic;  conjunctivae and sclerae clear; moist mucous membranes;   NECK : supple  CHEST/LUNG: Clear to auscultation bilaterally with good air entry   HEART: S1 S2  regular; no murmurs, gallops or rubs  ABDOMEN: Soft, Nontender, distended; Bowel sounds present  EXTREMITIES: no cyanosis;lower extremity edema; no calf tenderness  SKIN: warm and dry; no rash  NERVOUS SYSTEM:  Awake and alert; Oriented  to place, person and time ; no new deficits    _________________________________________________  LABS:                        11.3   9.38  )-----------( 151      ( 16 Jul 2019 07:09 )             35.4     07-16    132<L>  |  96  |  16  ----------------------------<  139<H>  4.2   |  32<H>  |  0.83    Ca    8.6      16 Jul 2019 07:09  Phos  3.0     07-16  Mg     1.8     07-16    TPro  7.2  /  Alb  1.7<L>  /  TBili  0.6  /  DBili  x   /  AST  31  /  ALT  23  /  AlkPhos  236<H>  07-16        CAPILLARY BLOOD GLUCOSE      POCT Blood Glucose.: 155 mg/dL (16 Jul 2019 16:48)  POCT Blood Glucose.: 160 mg/dL (16 Jul 2019 11:53)  POCT Blood Glucose.: 135 mg/dL (16 Jul 2019 07:41)  POCT Blood Glucose.: 201 mg/dL (15 Jul 2019 21:58)        RADIOLOGY & ADDITIONAL TESTS:    Imaging Personally Reviewed:  YES/NO    Consultant(s) Notes Reviewed:   YES/ No    Care Discussed with Consultants :     Plan of care was discussed with patient and /or primary care giver; all questions and concerns were addressed and care was aligned with patient's wishes.

## 2019-07-16 NOTE — CONSULT NOTE ADULT - SUBJECTIVE AND OBJECTIVE BOX
[  ] STAT REQUEST              [ X ] ROUTINE REQUEST    Patient is a 88 year old female with abdominal distention and ascites. GI consulted to evaluate.       HPI:  Patient is 88 year old female with multiple medical problems including chronic hepatitis C presented with increase abdominal girth associated with intermittent abdominal pain. Patient also c/o lethargy but denies hematemesis, hematochezia, melena, fever, chills, chest pain, SOB, palpitation, cough, epistaxis, hemoptysis, hematuria, dysuria or diarrhea.             PAIN MANAGEMENT:  Pain Scale:                2-3 /10  Pain Location:  Intermittent diffuse sharp abdominal pain    Prior Colonoscopy:    PAST MEDICAL HISTORY  Gastrointestinal bleeding  Gastric AVM  Intracranial hemorrhage   PNA   DM   Chronic hepatitis C  A-fib   Glaucoma  Vertigo  Graves disease  Pacemaker   HTN           PAST SURGICAL HISTORY  Cardiac pacemaker procedure  Cholecystectomy  Partial resection of colon  Oophorectomy          Allergies    No Known Allergies          MEDICATIONS  (STANDING):  amiodarone    Tablet 200 milliGRAM(s) Oral daily  aspirin enteric coated 81 milliGRAM(s) Oral daily  azithromycin  IVPB 500 milliGRAM(s) IV Intermittent every 24 hours  azithromycin  IVPB      cefTRIAXone   IVPB 1000 milliGRAM(s) IV Intermittent every 24 hours  dextrose 5%. 1000 milliLiter(s) (50 mL/Hr) IV Continuous <Continuous>  dextrose 50% Injectable 12.5 Gram(s) IV Push once  dextrose 50% Injectable 25 Gram(s) IV Push once  dextrose 50% Injectable 25 Gram(s) IV Push once  digoxin     Tablet 0.125 milliGRAM(s) Oral daily  docusate sodium 100 milliGRAM(s) Oral two times a day  furosemide   Injectable 40 milliGRAM(s) IV Push two times a day  heparin  Injectable 5000 Unit(s) SubCutaneous every 12 hours  insulin lispro (HumaLOG) corrective regimen sliding scale   SubCutaneous Before meals and at bedtime  insulin lispro Injectable (HumaLOG) 3 Unit(s) SubCutaneous three times a day before meals  latanoprost 0.005% Ophthalmic Solution 1 Drop(s) Both EYES at bedtime  metoprolol succinate ER 25 milliGRAM(s) Oral daily  multivitamin 1 Tablet(s) Oral daily  senna 2 Tablet(s) Oral at bedtime    MEDICATIONS  (PRN):  acetaminophen   Tablet .. 650 milliGRAM(s) Oral every 6 hours PRN Mild Pain (1 - 3)  dextrose 40% Gel 15 Gram(s) Oral once PRN Blood Glucose LESS THAN 70 milliGRAM(s)/deciLiter  glucagon  Injectable 1 milliGRAM(s) IntraMuscular once PRN Glucose <70 milliGRAM(s)/deciLiter      SOCIAL HISTORY  Advanced Directives:       [x  ] Full Code       [  ] DNR  Marital Status:         [  ] M      [x  ] S      [  ] D       [  ] W  Children:       [ x ] Yes      [  ] No  Occupation:        [  ] Employed       [ x ] Unemployed       [  ] Retired  Diet:       [ x ] Regular       [  ] PEG feeding          [  ] NG tube feeding  Drug Use:           [ x ] Patient denied          [  ] Yes  Alcohol:           [ x ] No             [  ] Yes (socially)         [  ] Yes (chronic)  Tobacco:           [  ] Yes           [ x ] No    FAMILY HISTORY  [x  ] Heart Disease            [  ] Diabetes             [  ] HTN             [  ] Colon Cancer             [  ] Stomach Cancer              [  ] Pancreatic Cancer        VITAL SIGNS   Vital Signs Last 24 Hrs  T(C): 36.3 (2019 15:40), Max: 37.3 (15 Jul 2019 22:22)  T(F): 97.4 (2019 15:40), Max: 99.1 (15 Jul 2019 22:22)  HR: 88 (2019 15:40) (61 - 89)  BP: 129/62 (2019 15:40) (110/54 - 134/74)  BP(mean): 79 (15 Jul 2019 19:30) (79 - 79)  RR: 18 (2019 15:40) (18 - 19)  SpO2: 100% (2019 15:40) (100% - 100%)   Daily Weight in k.9 (2019 12:20)           CBC Full  -  ( 2019 07:09 )  WBC Count : 9.38 K/uL  RBC Count : 3.82 M/uL  Hemoglobin : 11.3 g/dL  Hematocrit : 35.4 %  Platelet Count - Automated : 151 K/uL  Mean Cell Volume : 92.7 fl  Mean Cell Hemoglobin : 29.6 pg  Mean Cell Hemoglobin Concentration : 31.9 gm/dL  Auto Neutrophil # : x  Auto Lymphocyte # : x  Auto Monocyte # : x  Auto Eosinophil # : x  Auto Basophil # : x  Auto Neutrophil % : x  Auto Lymphocyte % : x  Auto Monocyte % : x  Auto Eosinophil % : x  Auto Basophil % : x      07-16    132<L>  |  96  |  16  ----------------------------<  139<H>  4.2   |  32<H>  |  0.83    Ca    8.6      2019 07:09  Phos  3.0     07-16  Mg     1.8     07-16    TPro  7.2  /  Alb  1.7<L>  /  TBili  0.6  /  DBili  x   /  AST  31  /  ALT  23  /  AlkPhos  236<H>  07-16     Iron with Total Binding Capacity (19 @ 07:09)    Iron - Total Binding Capacity.: 166 ug/dL    % Saturation, Iron: 15 %    Iron Total, Serum: 25 ug/dL    Unsaturated Iron Binding Capacity: 141 ug/dL    Urinalysis (19 @ 11:20)    pH Urine: 5.0    Glucose Qualitative, Urine: 50 mg/dL    Blood, Urine: Small    Color: Yellow    Urine Appearance: Slightly Turbid    Bilirubin: Negative    Ketone - Urine: Trace    Specific Gravity: 1.015    Protein, Urine: 100    Urobilinogen: Negative    Nitrite: Negative    Leukocyte Esterase Concentration: Moderate      ECG  Ventricular Rate 65 BPM    Atrial Rate 65 BPM    P-R Interval 330 ms    QRS Duration 84 ms    Q-T Interval 350 ms    QTC Calculation(Bezet) 364 ms    P Axis 75 degrees    R Axis -1 degrees    T Axis 245 degrees    Diagnosis Line Atrial-paced rhythm  Nonspecific ST abnormality  Abnormal ECG      RADIOLOGY/IMAGING      EXAM:  CT ABDOMEN AND PELVIS IC                            PROCEDURE DATE:  07/15/2019          INTERPRETATION:  CLINICAL INFORMATION: Abdominal swelling    COMPARISON: Chest CT 2019. CT abdomen pelvis 2019 and 4/10/2019.      PROCEDURE:   CT of the Abdomen and Pelvis was performed with intravenous contrast.   Intravenous contrast: 90 ml Omnipaque 350. 10 ml discarded.  Oral contrast: positive contrast was administered.  Sagittal and coronal reformats were performed.    FINDINGS:    LOWER CHEST:   Cardiomegaly.  Small right pleural effusion and associated atelectasis. 3.5 x 2.7 cm   focus of loculated fluid and air along the minor fissure. This appeared   as a more solid mass on the CT from 2019. This could represent a   smallhydropneumothorax or developing lung abscess. Correlate clinically.         LIVER: Within normal limits.  BILE DUCTS: Normal caliber.  GALLBLADDER: Not visualized  SPLEEN: Within normal limits.  PANCREAS: Within normal limits.  ADRENALS: Within normal limits.  KIDNEYS/URETERS: Small left renal cyst. 3 mm nonobstructing right renal   calculus.      BLADDER: Within normal limits.  REPRODUCTIVE ORGANS: Calcified fibroid    BOWEL: No bowel obstruction.   PERITONEUM: Small to moderate amount of abdominal ascites and anasarca   which have progressed since the previous exam.  VESSELS:  Atherosclerotic changes  RETROPERITONEUM: 2.6 x 1.6 cm probable periceliac lymph node, series 2   image 28 unchanged since 4/10/2019. This is indeterminant.    ABDOMINAL WALL: Within normal limits.  BONES: Within normal limits.      IMPRESSION:       Small right pleural effusion and associated atelectasis. 3.5 x 2.7 cm   focus of loculated fluid and air along the minor fissure. This appeared   as a more solid favian the CT from 2019. This could represent a   small hydropneumothorax or developing lung abscess. Correlate clinically.    Small to moderate amount of abdominal ascites and anasarca which have   progressed since the previous exam.    2.6 x 1.6 cm probable periceliac lymph node, series 2 image 28 unchanged   since 4/10/2019. This is indeterminant. Other nearby smaller nodes   present. This would be better evaluated with PET/CT.                  EXAM:  US ABDOMEN COMPLETE                            PROCEDURE DATE:  2018          INTERPRETATION:  EXAM: US ABDOMEN COMPLETE   INDICATION: liver cirrhosis/ splenomegaly. Low platelet count.  COMPARISON: None.    TECHNIQUE: Grayscale ultrasound of the abdomen was performed.    FINDINGS:  Liver: Liver contour appears noncirrhotic. Normal echogenicity and   echotexture. No focal hepatic mass is demonstrated. Measures 14.6 cm in   craniocaudal span. Portal and hepatic veins are patent.    Bile ducts: No intra or extrahepatic biliary ductal dilatation. Common   duct = 6 mm.    Gallbladder: Cholecystectomy.    Pancreas: Visualized pancreatic head and body are unremarkable. The   pancreatic tail is obscured by bowel gas.     Right kidney:No hydronephrosis. Measures 10.0 x 4.5 x 4.7 cm.  Left kidney: No hydronephrosis. Measures 10.5 x 3.4 x 4.7 cm. There is a   1.5 cm cyst in the lower pole.    Spleen: Normal size, 8.7 cm.    Peritoneum: No ascites.    Proximal Aorta & IVC: Visualizedabdominal aorta and IVC are grossly   unremarkable.    IMPRESSION:  Liver contour appears noncirrhotic. No splenomegaly.

## 2019-07-17 DIAGNOSIS — R14.0 ABDOMINAL DISTENSION (GASEOUS): ICD-10-CM

## 2019-07-17 LAB
ALBUMIN SERPL ELPH-MCNC: 1.6 G/DL — LOW (ref 3.5–5)
ALP SERPL-CCNC: 222 U/L — HIGH (ref 40–120)
ALT FLD-CCNC: 20 U/L DA — SIGNIFICANT CHANGE UP (ref 10–60)
ANION GAP SERPL CALC-SCNC: 6 MMOL/L — SIGNIFICANT CHANGE UP (ref 5–17)
AST SERPL-CCNC: 31 U/L — SIGNIFICANT CHANGE UP (ref 10–40)
BASOPHILS # BLD AUTO: 0.03 K/UL — SIGNIFICANT CHANGE UP (ref 0–0.2)
BASOPHILS NFR BLD AUTO: 0.4 % — SIGNIFICANT CHANGE UP (ref 0–2)
BILIRUB SERPL-MCNC: 0.4 MG/DL — SIGNIFICANT CHANGE UP (ref 0.2–1.2)
BUN SERPL-MCNC: 18 MG/DL — SIGNIFICANT CHANGE UP (ref 7–18)
CALCIUM SERPL-MCNC: 8 MG/DL — LOW (ref 8.4–10.5)
CANCER AG19-9 SERPL-ACNC: 186 U/ML — HIGH
CEA SERPL-MCNC: 6.2 NG/ML — HIGH (ref 0–3.8)
CHLORIDE SERPL-SCNC: 96 MMOL/L — SIGNIFICANT CHANGE UP (ref 96–108)
CO2 SERPL-SCNC: 30 MMOL/L — SIGNIFICANT CHANGE UP (ref 22–31)
CREAT SERPL-MCNC: 0.78 MG/DL — SIGNIFICANT CHANGE UP (ref 0.5–1.3)
EOSINOPHIL # BLD AUTO: 0.15 K/UL — SIGNIFICANT CHANGE UP (ref 0–0.5)
EOSINOPHIL NFR BLD AUTO: 1.9 % — SIGNIFICANT CHANGE UP (ref 0–6)
GLUCOSE BLDC GLUCOMTR-MCNC: 136 MG/DL — HIGH (ref 70–99)
GLUCOSE BLDC GLUCOMTR-MCNC: 154 MG/DL — HIGH (ref 70–99)
GLUCOSE BLDC GLUCOMTR-MCNC: 164 MG/DL — HIGH (ref 70–99)
GLUCOSE BLDC GLUCOMTR-MCNC: 184 MG/DL — HIGH (ref 70–99)
GLUCOSE SERPL-MCNC: 122 MG/DL — HIGH (ref 70–99)
HCT VFR BLD CALC: 33 % — LOW (ref 34.5–45)
HGB BLD-MCNC: 10.8 G/DL — LOW (ref 11.5–15.5)
IMM GRANULOCYTES NFR BLD AUTO: 0.5 % — SIGNIFICANT CHANGE UP (ref 0–1.5)
LYMPHOCYTES # BLD AUTO: 2.19 K/UL — SIGNIFICANT CHANGE UP (ref 1–3.3)
LYMPHOCYTES # BLD AUTO: 27.1 % — SIGNIFICANT CHANGE UP (ref 13–44)
MAGNESIUM SERPL-MCNC: 1.8 MG/DL — SIGNIFICANT CHANGE UP (ref 1.6–2.6)
MCHC RBC-ENTMCNC: 29.8 PG — SIGNIFICANT CHANGE UP (ref 27–34)
MCHC RBC-ENTMCNC: 32.7 GM/DL — SIGNIFICANT CHANGE UP (ref 32–36)
MCV RBC AUTO: 90.9 FL — SIGNIFICANT CHANGE UP (ref 80–100)
MONOCYTES # BLD AUTO: 0.48 K/UL — SIGNIFICANT CHANGE UP (ref 0–0.9)
MONOCYTES NFR BLD AUTO: 5.9 % — SIGNIFICANT CHANGE UP (ref 2–14)
NEUTROPHILS # BLD AUTO: 5.18 K/UL — SIGNIFICANT CHANGE UP (ref 1.8–7.4)
NEUTROPHILS NFR BLD AUTO: 64.2 % — SIGNIFICANT CHANGE UP (ref 43–77)
NRBC # BLD: 0 /100 WBCS — SIGNIFICANT CHANGE UP (ref 0–0)
PHOSPHATE SERPL-MCNC: 2.5 MG/DL — SIGNIFICANT CHANGE UP (ref 2.5–4.5)
PLATELET # BLD AUTO: 154 K/UL — SIGNIFICANT CHANGE UP (ref 150–400)
POTASSIUM SERPL-MCNC: 3.6 MMOL/L — SIGNIFICANT CHANGE UP (ref 3.5–5.3)
POTASSIUM SERPL-SCNC: 3.6 MMOL/L — SIGNIFICANT CHANGE UP (ref 3.5–5.3)
PROT SERPL-MCNC: 6.8 G/DL — SIGNIFICANT CHANGE UP (ref 6–8.3)
RBC # BLD: 3.63 M/UL — LOW (ref 3.8–5.2)
RBC # FLD: 16.9 % — HIGH (ref 10.3–14.5)
SODIUM SERPL-SCNC: 132 MMOL/L — LOW (ref 135–145)
WBC # BLD: 8.07 K/UL — SIGNIFICANT CHANGE UP (ref 3.8–10.5)
WBC # FLD AUTO: 8.07 K/UL — SIGNIFICANT CHANGE UP (ref 3.8–10.5)

## 2019-07-17 PROCEDURE — 99223 1ST HOSP IP/OBS HIGH 75: CPT | Mod: 57

## 2019-07-17 PROCEDURE — 71045 X-RAY EXAM CHEST 1 VIEW: CPT | Mod: 26

## 2019-07-17 RX ORDER — POTASSIUM CHLORIDE 20 MEQ
40 PACKET (EA) ORAL ONCE
Refills: 0 | Status: COMPLETED | OUTPATIENT
Start: 2019-07-17 | End: 2019-07-17

## 2019-07-17 RX ADMIN — Medication 3 UNIT(S): at 12:09

## 2019-07-17 RX ADMIN — Medication 40 MILLIEQUIVALENT(S): at 13:52

## 2019-07-17 RX ADMIN — Medication 25 MILLIGRAM(S): at 05:15

## 2019-07-17 RX ADMIN — SPIRONOLACTONE 25 MILLIGRAM(S): 25 TABLET, FILM COATED ORAL at 05:15

## 2019-07-17 RX ADMIN — Medication 1 TABLET(S): at 12:08

## 2019-07-17 RX ADMIN — Medication 0.12 MILLIGRAM(S): at 05:15

## 2019-07-17 RX ADMIN — Medication 40 MILLIGRAM(S): at 05:15

## 2019-07-17 RX ADMIN — Medication 100 MILLIGRAM(S): at 05:15

## 2019-07-17 RX ADMIN — HEPARIN SODIUM 5000 UNIT(S): 5000 INJECTION INTRAVENOUS; SUBCUTANEOUS at 17:13

## 2019-07-17 RX ADMIN — AMIODARONE HYDROCHLORIDE 200 MILLIGRAM(S): 400 TABLET ORAL at 05:15

## 2019-07-17 RX ADMIN — Medication 1: at 12:08

## 2019-07-17 RX ADMIN — Medication 40 MILLIGRAM(S): at 17:13

## 2019-07-17 RX ADMIN — Medication 1: at 17:12

## 2019-07-17 RX ADMIN — HEPARIN SODIUM 5000 UNIT(S): 5000 INJECTION INTRAVENOUS; SUBCUTANEOUS at 05:15

## 2019-07-17 RX ADMIN — Medication 1: at 21:16

## 2019-07-17 RX ADMIN — Medication 100 MILLIGRAM(S): at 13:53

## 2019-07-17 RX ADMIN — Medication 3 UNIT(S): at 08:40

## 2019-07-17 RX ADMIN — SENNA PLUS 2 TABLET(S): 8.6 TABLET ORAL at 21:16

## 2019-07-17 RX ADMIN — LATANOPROST 1 DROP(S): 0.05 SOLUTION/ DROPS OPHTHALMIC; TOPICAL at 21:18

## 2019-07-17 RX ADMIN — Medication 81 MILLIGRAM(S): at 12:11

## 2019-07-17 RX ADMIN — Medication 3 UNIT(S): at 17:13

## 2019-07-17 NOTE — CONSULT NOTE ADULT - NEGATIVE GASTROINTESTINAL SYMPTOMS
no abdominal pain/no vomiting/no diarrhea/no nausea
no diarrhea/no melena/no hematochezia/no jaundice/no abdominal pain/no nausea/no vomiting

## 2019-07-17 NOTE — CONSULT NOTE ADULT - ASSESSMENT
88 y.o. F with improved PNA    -No acute thoracic intervention indicated at present time as pt is asymptomatic, afebrile, saturating above 90% on RA without leukocytosis.  -Continue conservative management

## 2019-07-17 NOTE — PROGRESS NOTE ADULT - SUBJECTIVE AND OBJECTIVE BOX
Patient is a 88y old  Female who presents with a chief complaint of Abdominal swelling (17 Jul 2019 10:42)        Awake, alert, lying in bed in NAD.     INTERVAL HPI/OVERNIGHT EVENTS:      VITAL SIGNS:  T(F): 98.7 (07-17-19 @ 11:02)  HR: 70 (07-17-19 @ 11:59)  BP: 119/66 (07-17-19 @ 11:59)  RR: 18 (07-17-19 @ 11:02)  SpO2: 96% (07-17-19 @ 11:59)  Wt(kg): --  I&O's Detail    16 Jul 2019 07:01  -  17 Jul 2019 07:00  --------------------------------------------------------  IN:    Oral Fluid: 354 mL  Total IN: 354 mL    OUT:    Voided: 300 mL  Total OUT: 300 mL    Total NET: 54 mL      17 Jul 2019 07:01  -  17 Jul 2019 12:35  --------------------------------------------------------  IN:    Oral Fluid: 200 mL  Total IN: 200 mL    OUT:    Voided: 200 mL  Total OUT: 200 mL    Total NET: 0 mL              REVIEW OF SYSTEMS:    CONSTITUTIONAL:  No fevers, chills, sweats    HEENT:  Eyes:  No diplopia or blurred vision. ENT:  No earache, sore throat or runny nose.    CARDIOVASCULAR:  No pressure, squeezing, tightness, or heaviness about the chest; no palpitations.    RESPIRATORY:  Per HPI    GASTROINTESTINAL:  No abdominal pain, nausea, vomiting or diarrhea.    GENITOURINARY:  No dysuria, frequency or urgency.    NEUROLOGIC:  No paresthesias, fasciculations, seizures or weakness.    PSYCHIATRIC:  No disorder of thought or mood.      PHYSICAL EXAM:    Constitutional: Well developed and nourished  Eyes:Perrla  ENMT: normal  Neck:supple  Respiratory: good air entry  Cardiovascular: S1 S2 regular  Gastrointestinal: Soft, Non tender  Extremities: No edema  Vascular:normal  Neurological:Awake, alert,Ox3  Musculoskeletal:Normal      MEDICATIONS  (STANDING):  amiodarone    Tablet 200 milliGRAM(s) Oral daily  aspirin enteric coated 81 milliGRAM(s) Oral daily  azithromycin  IVPB 500 milliGRAM(s) IV Intermittent every 24 hours  azithromycin  IVPB      cefTRIAXone   IVPB 1000 milliGRAM(s) IV Intermittent every 24 hours  dextrose 5%. 1000 milliLiter(s) (50 mL/Hr) IV Continuous <Continuous>  dextrose 50% Injectable 12.5 Gram(s) IV Push once  dextrose 50% Injectable 25 Gram(s) IV Push once  dextrose 50% Injectable 25 Gram(s) IV Push once  digoxin     Tablet 0.125 milliGRAM(s) Oral daily  docusate sodium 100 milliGRAM(s) Oral two times a day  furosemide   Injectable 40 milliGRAM(s) IV Push two times a day  heparin  Injectable 5000 Unit(s) SubCutaneous every 12 hours  insulin lispro (HumaLOG) corrective regimen sliding scale   SubCutaneous Before meals and at bedtime  insulin lispro Injectable (HumaLOG) 3 Unit(s) SubCutaneous three times a day before meals  latanoprost 0.005% Ophthalmic Solution 1 Drop(s) Both EYES at bedtime  metoprolol succinate ER 25 milliGRAM(s) Oral daily  multivitamin 1 Tablet(s) Oral daily  senna 2 Tablet(s) Oral at bedtime  spironolactone 25 milliGRAM(s) Oral daily    MEDICATIONS  (PRN):  acetaminophen   Tablet .. 650 milliGRAM(s) Oral every 6 hours PRN Mild Pain (1 - 3)  dextrose 40% Gel 15 Gram(s) Oral once PRN Blood Glucose LESS THAN 70 milliGRAM(s)/deciLiter  glucagon  Injectable 1 milliGRAM(s) IntraMuscular once PRN Glucose <70 milliGRAM(s)/deciLiter  guaiFENesin   Syrup  (Sugar-Free) 100 milliGRAM(s) Oral every 6 hours PRN Cough      Allergies    No Known Allergies    Intolerances        LABS:                        10.8   8.07  )-----------( 154      ( 17 Jul 2019 05:58 )             33.0     07-17    132<L>  |  96  |  18  ----------------------------<  122<H>  3.6   |  30  |  0.78    Ca    8.0<L>      17 Jul 2019 05:58  Phos  2.5     07-17  Mg     1.8     07-17    TPro  6.8  /  Alb  1.6<L>  /  TBili  0.4  /  DBili  x   /  AST  31  /  ALT  20  /  AlkPhos  222<H>  07-17          CARDIAC MARKERS ( 16 Jul 2019 07:09 )  0.018 ng/mL / x     / x     / x     / x      CARDIAC MARKERS ( 15 Jul 2019 23:09 )  0.027 ng/mL / x     / x     / x     / x      CARDIAC MARKERS ( 15 Jul 2019 14:10 )  <0.015 ng/mL / x     / x     / x     / x          CAPILLARY BLOOD GLUCOSE      POCT Blood Glucose.: 184 mg/dL (17 Jul 2019 11:15)  POCT Blood Glucose.: 136 mg/dL (17 Jul 2019 07:53)  POCT Blood Glucose.: 133 mg/dL (16 Jul 2019 21:00)  POCT Blood Glucose.: 155 mg/dL (16 Jul 2019 16:48)    pro-bnp [id number] 07-15 @ 14:10     d-dimer --  07-15 @ 14:10      RADIOLOGY & ADDITIONAL TESTS:    CXR:  < from: Xray Chest 1 View- PORTABLE-Urgent (07.15.19 @ 17:25) >    The mediastinal cardiac silhouette is unremarkable. Cardiac pacemaker.    Patchy opacity at right base is improved when compared to the previous   exam. Continued follow-up is recommended.    No acute osseous finding.     IMPRESSION:    As above.    < end of copied text >    Ct scan chest:  < from: CT Chest No Cont (07.15.19 @ 19:28) >  IMPRESSION:     Loculated fluid with air in the right major fissure may represent small   loculated hydropneumothorax versus empyema with air producing organism.   Evaluation is limited without intravenous contrast.    < end of copied text Pt has no cardiological or pulmonary contraindications for pulmonary rehabilitation after discharge. Pt will benefit from pulmonary rehabilitation as outpatient.    < from: CT Abdomen and Pelvis w/ IV Cont (07.15.19 @ 17:18) >  IMPRESSION:       Small right pleural effusion and associated atelectasis. 3.5 x 2.7 cm   focus of loculated fluid and air along the minor fissure. This appeared   as a more solid favian the CT from 5/29/2019. This could represent a   small hydropneumothorax or developing lung abscess. Correlate clinically.    Small to moderate amount of abdominal ascites and anasarca which have   progressed since the previous exam.    2.6 x 1.6 cm probable periceliac lymph node, series 2 image 28 unchanged   since 4/10/2019. This is indeterminant. Other nearby smaller nodes   present. This would be better evaluated with PET/CT.    < end of copied text >    ekg;      echo: Pt is awake, alert, lying in bed in NAD. Having cough and chest congestion.     INTERVAL HPI/OVERNIGHT EVENTS:      VITAL SIGNS:  T(F): 98.7 (07-17-19 @ 11:02)  HR: 70 (07-17-19 @ 11:59)  BP: 119/66 (07-17-19 @ 11:59)  RR: 18 (07-17-19 @ 11:02)  SpO2: 96% (07-17-19 @ 11:59)  Wt(kg): --  I&O's Detail    16 Jul 2019 07:01  -  17 Jul 2019 07:00  --------------------------------------------------------  IN:    Oral Fluid: 354 mL  Total IN: 354 mL    OUT:    Voided: 300 mL  Total OUT: 300 mL    Total NET: 54 mL      17 Jul 2019 07:01  -  17 Jul 2019 12:35  --------------------------------------------------------  IN:    Oral Fluid: 200 mL  Total IN: 200 mL    OUT:    Voided: 200 mL  Total OUT: 200 mL    Total NET: 0 mL              REVIEW OF SYSTEMS:    CONSTITUTIONAL:  No fevers, chills, sweats    HEENT:  Eyes:  No diplopia or blurred vision. ENT:  No earache, sore throat or runny nose.    CARDIOVASCULAR:  No pressure, squeezing, tightness, or heaviness about the chest; no palpitations.    RESPIRATORY:  Per HPI    GASTROINTESTINAL:  No abdominal pain, nausea, vomiting or diarrhea.    GENITOURINARY:  No dysuria, frequency or urgency.    NEUROLOGIC:  No paresthesias, fasciculations, seizures or weakness.    PSYCHIATRIC:  No disorder of thought or mood.      PHYSICAL EXAM:    Constitutional: Well developed and nourished  Eyes:Perrla  ENMT: normal  Neck:supple  Respiratory: rales, posterior left base.   Cardiovascular: S1 S2 regular  Gastrointestinal: Soft, Non tender  Extremities: No edema  Vascular:normal  Neurological:Awake, alert,Ox3  Musculoskeletal:Normal      MEDICATIONS  (STANDING):  amiodarone    Tablet 200 milliGRAM(s) Oral daily  aspirin enteric coated 81 milliGRAM(s) Oral daily  azithromycin  IVPB 500 milliGRAM(s) IV Intermittent every 24 hours  azithromycin  IVPB      cefTRIAXone   IVPB 1000 milliGRAM(s) IV Intermittent every 24 hours  dextrose 5%. 1000 milliLiter(s) (50 mL/Hr) IV Continuous <Continuous>  dextrose 50% Injectable 12.5 Gram(s) IV Push once  dextrose 50% Injectable 25 Gram(s) IV Push once  dextrose 50% Injectable 25 Gram(s) IV Push once  digoxin     Tablet 0.125 milliGRAM(s) Oral daily  docusate sodium 100 milliGRAM(s) Oral two times a day  furosemide   Injectable 40 milliGRAM(s) IV Push two times a day  heparin  Injectable 5000 Unit(s) SubCutaneous every 12 hours  insulin lispro (HumaLOG) corrective regimen sliding scale   SubCutaneous Before meals and at bedtime  insulin lispro Injectable (HumaLOG) 3 Unit(s) SubCutaneous three times a day before meals  latanoprost 0.005% Ophthalmic Solution 1 Drop(s) Both EYES at bedtime  metoprolol succinate ER 25 milliGRAM(s) Oral daily  multivitamin 1 Tablet(s) Oral daily  senna 2 Tablet(s) Oral at bedtime  spironolactone 25 milliGRAM(s) Oral daily    MEDICATIONS  (PRN):  acetaminophen   Tablet .. 650 milliGRAM(s) Oral every 6 hours PRN Mild Pain (1 - 3)  dextrose 40% Gel 15 Gram(s) Oral once PRN Blood Glucose LESS THAN 70 milliGRAM(s)/deciLiter  glucagon  Injectable 1 milliGRAM(s) IntraMuscular once PRN Glucose <70 milliGRAM(s)/deciLiter  guaiFENesin   Syrup  (Sugar-Free) 100 milliGRAM(s) Oral every 6 hours PRN Cough      Allergies    No Known Allergies    Intolerances        LABS:                        10.8   8.07  )-----------( 154      ( 17 Jul 2019 05:58 )             33.0     07-17    132<L>  |  96  |  18  ----------------------------<  122<H>  3.6   |  30  |  0.78    Ca    8.0<L>      17 Jul 2019 05:58  Phos  2.5     07-17  Mg     1.8     07-17    TPro  6.8  /  Alb  1.6<L>  /  TBili  0.4  /  DBili  x   /  AST  31  /  ALT  20  /  AlkPhos  222<H>  07-17          CARDIAC MARKERS ( 16 Jul 2019 07:09 )  0.018 ng/mL / x     / x     / x     / x      CARDIAC MARKERS ( 15 Jul 2019 23:09 )  0.027 ng/mL / x     / x     / x     / x      CARDIAC MARKERS ( 15 Jul 2019 14:10 )  <0.015 ng/mL / x     / x     / x     / x          CAPILLARY BLOOD GLUCOSE      POCT Blood Glucose.: 184 mg/dL (17 Jul 2019 11:15)  POCT Blood Glucose.: 136 mg/dL (17 Jul 2019 07:53)  POCT Blood Glucose.: 133 mg/dL (16 Jul 2019 21:00)  POCT Blood Glucose.: 155 mg/dL (16 Jul 2019 16:48)    pro-bnp 6781 07-15 @ 14:10     d-dimer --  07-15 @ 14:10      RADIOLOGY & ADDITIONAL TESTS:    CXR:  < from: Xray Chest 1 View- PORTABLE-Urgent (07.15.19 @ 17:25) >    The mediastinal cardiac silhouette is unremarkable. Cardiac pacemaker.    Patchy opacity at right base is improved when compared to the previous   exam. Continued follow-up is recommended.    No acute osseous finding.     IMPRESSION:    As above.    < end of copied text >    Ct scan chest:  < from: CT Chest No Cont (07.15.19 @ 19:28) >  IMPRESSION:     Loculated fluid with air in the right major fissure may represent small   loculated hydropneumothorax versus empyema with air producing organism.   Evaluation is limited without intravenous contrast.    < end of copied text Pt has no cardiological or pulmonary contraindications for pulmonary rehabilitation after discharge. Pt will benefit from pulmonary rehabilitation as outpatient.    < from: CT Abdomen and Pelvis w/ IV Cont (07.15.19 @ 17:18) >  IMPRESSION:       Small right pleural effusion and associated atelectasis. 3.5 x 2.7 cm   focus of loculated fluid and air along the minor fissure. This appeared   as a more solid mass on the CT from 5/29/2019. This could represent a   small hydropneumothorax or developing lung abscess. Correlate clinically.    Small to moderate amount of abdominal ascites and anasarca which have   progressed since the previous exam.    2.6 x 1.6 cm probable periceliac lymph node, series 2 image 28 unchanged   since 4/10/2019. This is indeterminant. Other nearby smaller nodes   present. This would be better evaluated with PET/CT.    ekg;      echo:

## 2019-07-17 NOTE — CONSULT NOTE ADULT - ATTENDING COMMENTS
patient with prior scan demonstrating pneumonia, now with small effusion - likely loculated in fissure. no leukocytosis, no respiratory distress. no increased work of breathing. no fevers. would not recommend drainage or surgery for this fluid as it is likely related to resolving pneumonia and does not appear to be affecting her respiratory status.   can repeat CXR in 6-8 weeks to assess for resolution.   no surgical intervention at this time.

## 2019-07-17 NOTE — PROGRESS NOTE ADULT - SUBJECTIVE AND OBJECTIVE BOX
PGY 1 Note discussed with supervising resident and primary attending    Patient is a 88y old  Female who presents with a chief complaint of Abdominal swelling (16 Jul 2019 17:28)      INTERVAL HPI/OVERNIGHT EVENTS: patient is doing better, examined at bedside, complained of cough.  MEDICATIONS  (STANDING):  amiodarone    Tablet 200 milliGRAM(s) Oral daily  aspirin enteric coated 81 milliGRAM(s) Oral daily  azithromycin  IVPB 500 milliGRAM(s) IV Intermittent every 24 hours  azithromycin  IVPB      cefTRIAXone   IVPB 1000 milliGRAM(s) IV Intermittent every 24 hours  dextrose 5%. 1000 milliLiter(s) (50 mL/Hr) IV Continuous <Continuous>  dextrose 50% Injectable 12.5 Gram(s) IV Push once  dextrose 50% Injectable 25 Gram(s) IV Push once  dextrose 50% Injectable 25 Gram(s) IV Push once  digoxin     Tablet 0.125 milliGRAM(s) Oral daily  docusate sodium 100 milliGRAM(s) Oral two times a day  furosemide   Injectable 40 milliGRAM(s) IV Push two times a day  heparin  Injectable 5000 Unit(s) SubCutaneous every 12 hours  insulin lispro (HumaLOG) corrective regimen sliding scale   SubCutaneous Before meals and at bedtime  insulin lispro Injectable (HumaLOG) 3 Unit(s) SubCutaneous three times a day before meals  latanoprost 0.005% Ophthalmic Solution 1 Drop(s) Both EYES at bedtime  metoprolol succinate ER 25 milliGRAM(s) Oral daily  multivitamin 1 Tablet(s) Oral daily  senna 2 Tablet(s) Oral at bedtime  spironolactone 25 milliGRAM(s) Oral daily    MEDICATIONS  (PRN):  acetaminophen   Tablet .. 650 milliGRAM(s) Oral every 6 hours PRN Mild Pain (1 - 3)  dextrose 40% Gel 15 Gram(s) Oral once PRN Blood Glucose LESS THAN 70 milliGRAM(s)/deciLiter  glucagon  Injectable 1 milliGRAM(s) IntraMuscular once PRN Glucose <70 milliGRAM(s)/deciLiter      __________________________________________________  REVIEW OF SYSTEMS:    CONSTITUTIONAL: No fever,   EYES: no acute visual disturbances  NECK: No pain or stiffness  RESPIRATORY: No cough; No shortness of breath  CARDIOVASCULAR: No chest pain, no palpitations  GASTROINTESTINAL: No pain. No nausea or vomiting; No diarrhea   NEUROLOGICAL: No headache or numbness, no tremors  MUSCULOSKELETAL: No joint pain, no muscle pain  GENITOURINARY: no dysuria, no frequency, no hesitancy  PSYCHIATRY: no depression , no anxiety  ALL OTHER  ROS negative        Vital Signs Last 24 Hrs  T(C): 36.9 (17 Jul 2019 07:37), Max: 37.1 (16 Jul 2019 20:47)  T(F): 98.4 (17 Jul 2019 07:37), Max: 98.8 (16 Jul 2019 20:47)  HR: 88 (17 Jul 2019 07:37) (67 - 99)  BP: 141/71 (17 Jul 2019 07:37) (110/54 - 141/71)  BP(mean): --  RR: 18 (17 Jul 2019 07:37) (18 - 18)  SpO2: 98% (17 Jul 2019 07:37) (98% - 100%)    ________________________________________________  PHYSICAL EXAM:  GENERAL: NAD  HEENT: Normocephalic;  conjunctivae and sclerae clear; moist mucous membranes;   NECK : supple  CHEST/LUNG: Clear to auscultation bilaterally with good air entry   HEART: S1 S2  regular; no murmurs, gallops or rubs  ABDOMEN: Distended adomen. BS +VE.  EXTREMITIES: no cyanosis; no edema; no calf tenderness  SKIN: warm and dry; no rash  _________________________________________________  LABS:                        10.8   8.07  )-----------( 154      ( 17 Jul 2019 05:58 )             33.0     07-17    132<L>  |  96  |  18  ----------------------------<  122<H>  3.6   |  30  |  0.78    Ca    8.0<L>      17 Jul 2019 05:58  Phos  2.5     07-17  Mg     1.8     07-17    TPro  6.8  /  Alb  1.6<L>  /  TBili  0.4  /  DBili  x   /  AST  31  /  ALT  20  /  AlkPhos  222<H>  07-17        CAPILLARY BLOOD GLUCOSE      POCT Blood Glucose.: 136 mg/dL (17 Jul 2019 07:53)  POCT Blood Glucose.: 133 mg/dL (16 Jul 2019 21:00)  POCT Blood Glucose.: 155 mg/dL (16 Jul 2019 16:48)  POCT Blood Glucose.: 160 mg/dL (16 Jul 2019 11:53)        RADIOLOGY & ADDITIONAL TESTS:    Imaging Personally Reviewed:  YES/NO    Consultant(s) Notes Reviewed:   YES/ No    Care Discussed with Consultants :     Plan of care was discussed with patient and /or primary care giver; all questions and concerns were addressed and care was aligned with patient's wishes.

## 2019-07-17 NOTE — CONSULT NOTE ADULT - RS GEN PE MLT RESP DETAILS PC
airway patent/breath sounds equal/no rales/airway obstructed/no rhonchi/respirations non-labored
breath sounds equal/good air movement/no rhonchi/no wheezes/rales

## 2019-07-17 NOTE — PROGRESS NOTE ADULT - ASSESSMENT
88 year old female has medical history of HTN, A fib (not on AC), CHF, Gastric ulcer, Hepatitis C ,DM, glaucoma, anemia, SBO,surgical history significant for Intestinal resection, oophorectomy cholecystectomy, worsening abdominal swelling.  1.Check PPM.  2.ABX.  3.Afib-no AC,asa,amiodarone,lopressor.  4.DM-Insulin.  5.IV lasix and aldactone.Anasarca from low albumin.  6.PPI.  7.Echocardiogram.  8.GI eval noted.

## 2019-07-17 NOTE — CONSULT NOTE ADULT - SUBJECTIVE AND OBJECTIVE BOX
Patient is a 88y old  Female who presents with a chief complaint of Abdominal swelling (17 Jul 2019 12:34)      HPI  Called see and eval 88y.o. Female w/PMH significant for PNA in February 2019 as below for left hydropneumothorax. Pt sent in my PMD Dr. Quintana for worsening abdominal scanning. CT abd/pelvis found lower lung loculation. Follow up CT chest performed, showing loculated fluid with air in right major fissure. Currently pt resting comfortably without SOB on room air.     PAST MEDICAL & SURGICAL HISTORY:  Multiple falls  GIB (gastrointestinal bleeding): 2/2019 requiring 2 PRBCs  Gastric AVM: EGD 2/2019 - cauterized  PNA (pneumonia): 2/2019   treated with ABX while in [facility]  T2DM (type 2 diabetes mellitus): last A1c 7.8   4/11/19  Hepatitis C virus: not treated  A-fib: no Eliquis since 1/2019  Constipation  Glaucoma  Vertigo  Graves disease: no treatment as per patient and family  Pacemaker: medtronic ADDRL1  2011  HTN (hypertension)  S/P cardiac pacemaker procedure: Medtronic ADDRL1  2011  S/P cholecystectomy  S/P partial resection of colon: &gt; 5 yrs ago  H/O oophorectomy      MEDICATIONS  (STANDING):  amiodarone    Tablet 200 milliGRAM(s) Oral daily  aspirin enteric coated 81 milliGRAM(s) Oral daily  azithromycin  IVPB 500 milliGRAM(s) IV Intermittent every 24 hours  azithromycin  IVPB      cefTRIAXone   IVPB 1000 milliGRAM(s) IV Intermittent every 24 hours  dextrose 5%. 1000 milliLiter(s) (50 mL/Hr) IV Continuous <Continuous>  dextrose 50% Injectable 12.5 Gram(s) IV Push once  dextrose 50% Injectable 25 Gram(s) IV Push once  dextrose 50% Injectable 25 Gram(s) IV Push once  digoxin     Tablet 0.125 milliGRAM(s) Oral daily  docusate sodium 100 milliGRAM(s) Oral two times a day  furosemide   Injectable 40 milliGRAM(s) IV Push two times a day  heparin  Injectable 5000 Unit(s) SubCutaneous every 12 hours  insulin lispro (HumaLOG) corrective regimen sliding scale   SubCutaneous Before meals and at bedtime  insulin lispro Injectable (HumaLOG) 3 Unit(s) SubCutaneous three times a day before meals  latanoprost 0.005% Ophthalmic Solution 1 Drop(s) Both EYES at bedtime  metoprolol succinate ER 25 milliGRAM(s) Oral daily  multivitamin 1 Tablet(s) Oral daily  senna 2 Tablet(s) Oral at bedtime  spironolactone 25 milliGRAM(s) Oral daily    MEDICATIONS  (PRN):  acetaminophen   Tablet .. 650 milliGRAM(s) Oral every 6 hours PRN Mild Pain (1 - 3)  dextrose 40% Gel 15 Gram(s) Oral once PRN Blood Glucose LESS THAN 70 milliGRAM(s)/deciLiter  glucagon  Injectable 1 milliGRAM(s) IntraMuscular once PRN Glucose <70 milliGRAM(s)/deciLiter  guaiFENesin   Syrup  (Sugar-Free) 100 milliGRAM(s) Oral every 6 hours PRN Cough      Allergies    No Known Allergies    Vital Signs Last 24 Hrs  T(C): 36.7 (17 Jul 2019 15:53), Max: 37.1 (16 Jul 2019 20:47)  T(F): 98.1 (17 Jul 2019 15:53), Max: 98.8 (16 Jul 2019 20:47)  HR: 61 (17 Jul 2019 15:53) (61 - 99)  BP: 133/65 (17 Jul 2019 15:53) (119/66 - 141/71)  BP(mean): --  RR: 18 (17 Jul 2019 15:53) (18 - 18)  SpO2: 97% (17 Jul 2019 15:53) (96% - 100%)    Physical:  Gen: NAD  Chest: Symmetrical rise of chest without use of accessory muscles.    I&O's Detail    16 Jul 2019 07:01  -  17 Jul 2019 07:00  --------------------------------------------------------  IN:    Oral Fluid: 354 mL  Total IN: 354 mL    OUT:    Voided: 300 mL  Total OUT: 300 mL    Total NET: 54 mL      17 Jul 2019 07:01  -  17 Jul 2019 16:40  --------------------------------------------------------  IN:    Oral Fluid: 430 mL  Total IN: 430 mL    OUT:    Voided: 200 mL  Total OUT: 200 mL    Total NET: 230 mL    LABS:                        10.8   8.07  )-----------( 154      ( 17 Jul 2019 05:58 )             33.0              07-17    132<L>  |  96  |  18  ----------------------------<  122<H>  3.6   |  30  |  0.78    Ca    8.0<L>      17 Jul 2019 05:58  Phos  2.5     07-17  Mg     1.8     07-17    TPro  6.8  /  Alb  1.6<L>  /  TBili  0.4  /  DBili  x   /  AST  31  /  ALT  20  /  AlkPhos  222<H>  07-17              RADIOLOGY & ADDITIONAL STUDIES:  < from: CT Chest No Cont (07.15.19 @ 19:28) >  IMPRESSION:     Loculated fluid with air in the right major fissure may represent small   loculated hydropneumothorax versus empyema with air producing organism.   Evaluation is limited without intravenous contrast.    < end of copied text >    < from: CT Chest No Cont (06.02.19 @ 09:05) >  IMPRESSION:     Right middle and lower lobe pneumonia. Follow-up to resolution is   recommended.  Small bilateral pleural effusions.    < end of copied text >

## 2019-07-17 NOTE — CONSULT NOTE ADULT - ASSESSMENT
Hydropneumothorax - does not appear to have an Empyema, given lack of pleuritic chest pain, no fevers or leukocytosis and her lung symptoms are not sig either    Plan - will DC all abxs and watch  agree with ECHO as she likely has some CHF

## 2019-07-17 NOTE — PHYSICAL THERAPY INITIAL EVALUATION ADULT - LEVEL OF INDEPENDENCE: SCOOT/BRIDGE, REHAB EVAL
Met with patient and daughter at bedside in the ED. Patient is an 85 year old, , [employer] male, aox 4. Pt lives in an apartment with his wife. Pt is independent in his ADLs. Pt ambulates without assistive equipment and reports enjoying taking walks with his wife. Pt's daughter is supportive and involved. She reports recently obtaining the patient [employer], a grocery delivery service. Pt is waiting to have symptoms stabilized for DC. Provided pt and daughter with information about [facility] for future reference, as the daughter had questions.     Written by JOSSELIN Rizo
minimum assist (75% patients effort)

## 2019-07-17 NOTE — PROGRESS NOTE ADULT - SUBJECTIVE AND OBJECTIVE BOX
CHIEF COMPLAINT:Patient is a 88y old  Female who presents with a chief complaint of Abdominal swelling.Pt appears comfortable.    	  REVIEW OF SYSTEMS:  CONSTITUTIONAL: No fever, weight loss, or fatigue  EYES: No eye pain, visual disturbances, or discharge  ENT:  No difficulty hearing, tinnitus, vertigo; No sinus or throat pain  NECK: No pain or stiffness  RESPIRATORY: No cough, wheezing, chills or hemoptysis; No Shortness of Breath  CARDIOVASCULAR: No chest pain, palpitations, passing out, dizziness, or leg swelling  GASTROINTESTINAL: No abdominal or epigastric pain. No nausea, vomiting, or hematemesis; No diarrhea or constipation. No melena or hematochezia.  GENITOURINARY: No dysuria, frequency, hematuria, or incontinence  NEUROLOGICAL: No headaches, memory loss, loss of strength, numbness, or tremors  SKIN: No itching, burning, rashes, or lesions   LYMPH Nodes: No enlarged glands  ENDOCRINE: No heat or cold intolerance; No hair loss  MUSCULOSKELETAL: No joint pain or swelling; No muscle, back, or extremity pain  PSYCHIATRIC: No depression, anxiety, mood swings, or difficulty sleeping  HEME/LYMPH: No easy bruising, or bleeding gums  ALLERGY AND IMMUNOLOGIC: No hives or eczema	      PHYSICAL EXAM:  T(C): 36.9 (07-17-19 @ 07:37), Max: 37.1 (07-16-19 @ 20:47)  HR: 88 (07-17-19 @ 07:37) (67 - 99)  BP: 141/71 (07-17-19 @ 07:37) (110/54 - 141/71)  RR: 18 (07-17-19 @ 07:37) (18 - 18)  SpO2: 98% (07-17-19 @ 07:37) (98% - 100%)  Wt(kg): --  I&O's Summary    16 Jul 2019 07:01  -  17 Jul 2019 07:00  --------------------------------------------------------  IN: 354 mL / OUT: 300 mL / NET: 54 mL    17 Jul 2019 07:01  -  17 Jul 2019 10:42  --------------------------------------------------------  IN: 200 mL / OUT: 200 mL / NET: 0 mL        Appearance: Normal	  HEENT:   Normal oral mucosa, PERRL, EOMI	  Lymphatic: No lymphadenopathy  Cardiovascular: Normal S1 S2, No JVD, No murmurs, No edema  Respiratory: Lungs clear to auscultation	  Psychiatry: A & O x 3, Mood & affect appropriate  Gastrointestinal:  Soft, Non-tender, + BS	  Skin: No rashes, No ecchymoses, No cyanosis	  Neurologic: Non-focal  Extremities: Normal range of motion, No clubbing, cyanosis or edema  Vascular: Peripheral pulses palpable 2+ bilaterally    MEDICATIONS  (STANDING):  amiodarone    Tablet 200 milliGRAM(s) Oral daily  aspirin enteric coated 81 milliGRAM(s) Oral daily  azithromycin  IVPB 500 milliGRAM(s) IV Intermittent every 24 hours  azithromycin  IVPB      cefTRIAXone   IVPB 1000 milliGRAM(s) IV Intermittent every 24 hours  dextrose 5%. 1000 milliLiter(s) (50 mL/Hr) IV Continuous <Continuous>  dextrose 50% Injectable 12.5 Gram(s) IV Push once  dextrose 50% Injectable 25 Gram(s) IV Push once  dextrose 50% Injectable 25 Gram(s) IV Push once  digoxin     Tablet 0.125 milliGRAM(s) Oral daily  docusate sodium 100 milliGRAM(s) Oral two times a day  furosemide   Injectable 40 milliGRAM(s) IV Push two times a day  heparin  Injectable 5000 Unit(s) SubCutaneous every 12 hours  insulin lispro (HumaLOG) corrective regimen sliding scale   SubCutaneous Before meals and at bedtime  insulin lispro Injectable (HumaLOG) 3 Unit(s) SubCutaneous three times a day before meals  latanoprost 0.005% Ophthalmic Solution 1 Drop(s) Both EYES at bedtime  metoprolol succinate ER 25 milliGRAM(s) Oral daily  multivitamin 1 Tablet(s) Oral daily  senna 2 Tablet(s) Oral at bedtime  spironolactone 25 milliGRAM(s) Oral daily        	  LABS:	 	    CARDIAC MARKERS:  CARDIAC MARKERS ( 16 Jul 2019 07:09 )  0.018 ng/mL / x     / x     / x     / x      CARDIAC MARKERS ( 15 Jul 2019 23:09 )  0.027 ng/mL / x     / x     / x     / x      CARDIAC MARKERS ( 15 Jul 2019 14:10 )  <0.015 ng/mL / x     / x     / x     / x                                    10.8   8.07  )-----------( 154      ( 17 Jul 2019 05:58 )             33.0     07-17    132<L>  |  96  |  18  ----------------------------<  122<H>  3.6   |  30  |  0.78    Ca    8.0<L>      17 Jul 2019 05:58  Phos  2.5     07-17  Mg     1.8     07-17    TPro  6.8  /  Alb  1.6<L>  /  TBili  0.4  /  DBili  x   /  AST  31  /  ALT  20  /  AlkPhos  222<H>  07-17    proBNP: Serum Pro-Brain Natriuretic Peptide: 6781 pg/mL (07-15 @ 14:10)    Lipid Profile: Cholesterol 173    HDL 32      HgA1c: Hemoglobin A1C, Whole Blood: 6.9 % (07-16 @ 10:19)  Hemoglobin A1C, Whole Blood: 7.1 % (07-16 @ 03:47)    TSH: Thyroid Stimulating Hormone, Serum: 3.61 uU/mL (07-16 @ 07:09)      Carcinoembryonic Antigen (07.17.19 @ 03:06)    Carcinoembryonic Antigen: 6.2: Method: Roche Electrochemiluminescence Immuno Assay  Values obtained with different assay methods or kits cannot be  used interchangeably.  Results cannot be interpreted as absolute evidence of the presence  or absence of malignant disease.   CEA Normal Ranges   _________________   Non-smoker: less than 3.9 ng/mL       Smoker: less than 5.5 ng/mL ng/mL    Carcinoembryonic Antigen (07.11.18 @ 09:26)    Carcinoembryonic Antigen: 2.8: METHOD: Roche EIA   The CEA assay should not be used as a cancer screening test. Serum CEA  concentrations should only be used in conjunction with   information available from the clinical evaluation of the patien and  from other diagnostic procedures.   CEA Normal Ranges   _________________   Non-smoker: less than 3.9 ng/mL       Smoker: less than 5.5 ng/mL ng/mL

## 2019-07-17 NOTE — CONSULT NOTE ADULT - GASTROINTESTINAL DETAILS
no guarding/no distention/soft/no rigidity/bowel sounds normal/no rebound tenderness
soft/no masses palpable/bowel sounds normal/no rebound tenderness/no rigidity/nontender/no organomegaly/no guarding

## 2019-07-17 NOTE — PROGRESS NOTE ADULT - ASSESSMENT
1. R/O Empyema  - CXR noted.   - CT chest notes - Loculated fluid with air in the right major fissure may represent small loculated hydropneumothorax versus empyema with air producing organism.   - Thoracic surgery consult  - O2 supplement  - Bronchodilators   - F/U CXR   - On ABX; ID f/u    2. Atelectasis  - O2 Supp  - Bronchodilators  - Incentive spirometry     3. CHF  - Echo  - Tele monitoring  - Cardio eval  - Diuretics PRN  - DVT and GI PPX 1. R/O Empyema  - CXR noted.   - CT chest notes - Loculated fluid with air in the right major fissure may represent small loculated hydropneumothorax versus empyema with air producing organism.   - Thoracic surgery consult  - O2 supplement  - Bronchodilators   - F/U CXR   - On ABX; ID f/u    2. Atelectasis  - O2 Supp  - Bronchodilators  - Incentive spirometry     3. CHF  - Echo  - Tele monitoring  - Cardio eval  - Diuretics PRN  - DVT and GI PPX     4. Ascites  - GI Eval noted  - Recc considering therapeutic/diagnostic paracentesis with cytology.  - Tumor markers   - May need diuretics.

## 2019-07-17 NOTE — PHYSICAL THERAPY INITIAL EVALUATION ADULT - LIVES WITH, PROFILE
Patient stated that she lives in an apt and has no stairs to negotiate. Patient is currently from [facility]

## 2019-07-17 NOTE — CONSULT NOTE ADULT - SUBJECTIVE AND OBJECTIVE BOX
HPI:  Patient is 88 year old female has medical history of HTN, A fib (not on AC), CHF, Gastric ulcer, Hepatitis C ,DM, glaucoma, anemia, SBO, surgical history significant for Intestinal resection, oophorectomy cholecystectomy. Pt was sent by Dr. Quintana for worsening abdominal swelling     Patient was in her usual health 2 weeks ago, when she was noticed abdominal swelling, insidious onset, slowly worsening, pt was given lasix but it did not help, pt has on and off abdominal pain, anorexia, weakness, lethargy, loss of energy, orthopnea and leg swelling Pt denies nausea, vomiting, constipation, diarrhea, fever, dysuria. Patient is also complaining of cough with white sputum. 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 650 milliGRAM(s) Oral every 6 hours PRN  amiodarone    Tablet 200 milliGRAM(s) Oral daily  aspirin enteric coated 81 milliGRAM(s) Oral daily  azithromycin  IVPB 500 milliGRAM(s) IV Intermittent every 24 hours  azithromycin  IVPB      cefTRIAXone   IVPB 1000 milliGRAM(s) IV Intermittent every 24 hours  dextrose 40% Gel 15 Gram(s) Oral once PRN  dextrose 5%. 1000 milliLiter(s) IV Continuous <Continuous>  dextrose 50% Injectable 12.5 Gram(s) IV Push once  dextrose 50% Injectable 25 Gram(s) IV Push once  dextrose 50% Injectable 25 Gram(s) IV Push once  digoxin     Tablet 0.125 milliGRAM(s) Oral daily  docusate sodium 100 milliGRAM(s) Oral two times a day  furosemide   Injectable 40 milliGRAM(s) IV Push two times a day  glucagon  Injectable 1 milliGRAM(s) IntraMuscular once PRN  guaiFENesin   Syrup  (Sugar-Free) 100 milliGRAM(s) Oral every 6 hours PRN  heparin  Injectable 5000 Unit(s) SubCutaneous every 12 hours  insulin lispro (HumaLOG) corrective regimen sliding scale   SubCutaneous Before meals and at bedtime  insulin lispro Injectable (HumaLOG) 3 Unit(s) SubCutaneous three times a day before meals  latanoprost 0.005% Ophthalmic Solution 1 Drop(s) Both EYES at bedtime  metoprolol succinate ER 25 milliGRAM(s) Oral daily  multivitamin 1 Tablet(s) Oral daily  senna 2 Tablet(s) Oral at bedtime  spironolactone 25 milliGRAM(s) Oral daily      SOCIAL HISTORY:  Smoker:  YES / NO        PACK YEARS:                         WHEN QUIT?  ETOH use:  YES / NO               FREQUENCY / QUANTITY:  Ilicit Drug use:  YES / NO  Occupation:  Assisted device use (Cane / Walker):  Live with:    FAMILY HISTORY:  Family history of hypertension  Family history of diabetes mellitus      VITALS:  Vital Signs Last 24 Hrs  T(C): 36.7 (17 Jul 2019 15:53), Max: 37.1 (16 Jul 2019 20:47)  T(F): 98.1 (17 Jul 2019 15:53), Max: 98.8 (16 Jul 2019 20:47)  HR: 61 (17 Jul 2019 15:53) (61 - 99)  BP: 133/65 (17 Jul 2019 15:53) (119/66 - 141/71)  BP(mean): --  RR: 18 (17 Jul 2019 15:53) (18 - 18)  SpO2: 97% (17 Jul 2019 15:53) (96% - 100%)    LABS/DIAGNOSTIC TESTS:                          10.8   8.07  )-----------( 154      ( 17 Jul 2019 05:58 )             33.0     WBC Count: 8.07 K/uL (07-17 @ 05:58)  WBC Count: 9.38 K/uL (07-16 @ 07:09)  WBC Count: 9.62 K/uL (07-15 @ 14:10)      07-17    132<L>  |  96  |  18  ----------------------------<  122<H>  3.6   |  30  |  0.78    Ca    8.0<L>      17 Jul 2019 05:58  Phos  2.5     07-17  Mg     1.8     07-17    TPro  6.8  /  Alb  1.6<L>  /  TBili  0.4  /  DBili  x   /  AST  31  /  ALT  20  /  AlkPhos  222<H>  07-17          LIVER FUNCTIONS - ( 17 Jul 2019 05:58 )  Alb: 1.6 g/dL / Pro: 6.8 g/dL / ALK PHOS: 222 U/L / ALT: 20 U/L DA / AST: 31 U/L / GGT: x                 LACTATE:    ABG -     CULTURES:   .Blood  05-31 @ 22:36   No growth at 5 days.  --  --      .Urine  05-29 @ 20:21   No growth  --  --          RADIOLOGY:< from: CT Chest No Cont (07.15.19 @ 19:28) >  EXAM:  CT CHEST                            PROCEDURE DATE:  07/15/2019          INTERPRETATION:  CLINICAL INFORMATION: Lung abscess    COMPARISON: CT abdomen pelvis 7/15/2019. CT chest 6/2/2019.    PROCEDURE:   CT of the Chest was performed withoutintravenous contrast.  Sagittal and coronal reformats were performed.      FINDINGS:    LUNGS AND AIRWAYS: Patent central airways.  Subcentimeter calcified   granuloma in the right lower lobe. Scattered mild dependent atelectatic   changes. Tree-in-bud opacities in the bilateral lower lungs.    PLEURA: Within the right major fissure there is loculated fluid and air   measuring 5.0 x 3.7 x 3.8 cm. Evaluation is limited without intravenous   contrast. Findings may represent loculated hydropneumothorax versus   empyema with air producing organism There is a small right effusion at   the lung base with mild associated atelectasis.    MEDIASTINUM AND ROLF: Several mildly prominent mediastinal lymph nodes   the largest is a right paratracheal node measuring 1.4 x 1.0 cm,   unchanged.    VESSELS: Within normal limits.    HEART: Cardiomegaly. Cardiac pacemaker. Coronary artery and valvular   calcifications. No pericardial effusion.    CHEST WALL AND LOWER NECK: Multinodular thyroid gland with scattered   areas of coarse calcium. This would be better evaluated with ultrasound.    VISUALIZED UPPER ABDOMEN: Abdominal ascites. Anasarca. For evaluation of   the abdominal contents see CT abdomen pelvis from 7/15/2019.    BONES: Within normal limits.    IMPRESSION:     Loculated fluid with air in the right major fissure may represent small   loculated hydropneumothorax versus empyema with air producing organism.   Evaluation is limited without intravenous contrast.                    CARISSA LOPEZ M.D., ATTENDING RADIOLOGIST  This document has been electronically signed. Jul 15 2019  7:41PM                < end of copied text >        ROS  [  ] UNABLE TO ELICIT HPI:  Patient is 88 year old female has medical history of HTN, A fib (not on AC), CHF, Gastric ulcer, Hepatitis C ,DM, glaucoma, anemia, SBO, surgical history significant for Intestinal resection, oophorectomy cholecystectomy. Pt was sent by Dr. Quintana for worsening abdominal swelling   Patient was in her usual health 2 weeks ago, when she was noticed abdominal swelling, insidious onset, slowly worsening, pt was given lasix but it did not help, pt has on and off abdominal pain, anorexia, weakness, lethargy, loss of energy, orthopnea and leg swelling Pt denies nausea, vomiting, constipation, diarrhea, fever, dysuria. Patient is also complaining of cough with white sputum. 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  PNA (pneumonia): 2/2019   treated with ABX while in [facility]  T2DM (type 2 diabetes mellitus): last A1c 7.8   4/11/19  Hepatitis C virus: not treated  A-fib: no Eliquis since 1/2019  Constipation  Glaucoma  Vertigo  Graves disease: no treatment as per patient and family  Pacemaker: medtronic ADDRL1  2011  HTN (hypertension)  S/P cardiac pacemaker procedure: Medtronic ADDRL1  2011  S/P cholecystectomy  S/P partial resection of colon: &gt; 5 yrs ago  H/O oophorectomy      No Known Allergies      Meds:  acetaminophen   Tablet .. 650 milliGRAM(s) Oral every 6 hours PRN  amiodarone    Tablet 200 milliGRAM(s) Oral daily  aspirin enteric coated 81 milliGRAM(s) Oral daily  azithromycin  IVPB 500 milliGRAM(s) IV Intermittent every 24 hours  azithromycin  IVPB      cefTRIAXone   IVPB 1000 milliGRAM(s) IV Intermittent every 24 hours  dextrose 40% Gel 15 Gram(s) Oral once PRN  dextrose 5%. 1000 milliLiter(s) IV Continuous <Continuous>  dextrose 50% Injectable 12.5 Gram(s) IV Push once  dextrose 50% Injectable 25 Gram(s) IV Push once  dextrose 50% Injectable 25 Gram(s) IV Push once  digoxin     Tablet 0.125 milliGRAM(s) Oral daily  docusate sodium 100 milliGRAM(s) Oral two times a day  furosemide   Injectable 40 milliGRAM(s) IV Push two times a day  glucagon  Injectable 1 milliGRAM(s) IntraMuscular once PRN  guaiFENesin   Syrup  (Sugar-Free) 100 milliGRAM(s) Oral every 6 hours PRN  heparin  Injectable 5000 Unit(s) SubCutaneous every 12 hours  insulin lispro (HumaLOG) corrective regimen sliding scale   SubCutaneous Before meals and at bedtime  insulin lispro Injectable (HumaLOG) 3 Unit(s) SubCutaneous three times a day before meals  latanoprost 0.005% Ophthalmic Solution 1 Drop(s) Both EYES at bedtime  metoprolol succinate ER 25 milliGRAM(s) Oral daily  multivitamin 1 Tablet(s) Oral daily  senna 2 Tablet(s) Oral at bedtime  spironolactone 25 milliGRAM(s) Oral daily      SOCIAL HISTORY:  Smoker:  YES / NO        PACK YEARS:                         WHEN QUIT?  ETOH use:  YES / NO               FREQUENCY / QUANTITY:  Ilicit Drug use:  YES / NO  Occupation:  Assisted device use (Cane / Walker):  Live with:    FAMILY HISTORY:  Family history of hypertension  Family history of diabetes mellitus      VITALS:  Vital Signs Last 24 Hrs  T(C): 36.7 (17 Jul 2019 15:53), Max: 37.1 (16 Jul 2019 20:47)  T(F): 98.1 (17 Jul 2019 15:53), Max: 98.8 (16 Jul 2019 20:47)  HR: 61 (17 Jul 2019 15:53) (61 - 99)  BP: 133/65 (17 Jul 2019 15:53) (119/66 - 141/71)  BP(mean): --  RR: 18 (17 Jul 2019 15:53) (18 - 18)  SpO2: 97% (17 Jul 2019 15:53) (96% - 100%)    LABS/DIAGNOSTIC TESTS:                          10.8   8.07  )-----------( 154      ( 17 Jul 2019 05:58 )             33.0     WBC Count: 8.07 K/uL (07-17 @ 05:58)  WBC Count: 9.38 K/uL (07-16 @ 07:09)  WBC Count: 9.62 K/uL (07-15 @ 14:10)      07-17    132<L>  |  96  |  18  ----------------------------<  122<H>  3.6   |  30  |  0.78    Ca    8.0<L>      17 Jul 2019 05:58  Phos  2.5     07-17  Mg     1.8     07-17    TPro  6.8  /  Alb  1.6<L>  /  TBili  0.4  /  DBili  x   /  AST  31  /  ALT  20  /  AlkPhos  222<H>  07-17          LIVER FUNCTIONS - ( 17 Jul 2019 05:58 )  Alb: 1.6 g/dL / Pro: 6.8 g/dL / ALK PHOS: 222 U/L / ALT: 20 U/L DA / AST: 31 U/L / GGT: x                 LACTATE:    ABG -     CULTURES:   .Blood  05-31 @ 22:36   No growth at 5 days.  --  --      .Urine  05-29 @ 20:21   No growth  --  --          RADIOLOGY:< from: CT Chest No Cont (07.15.19 @ 19:28) >  EXAM:  CT CHEST                            PROCEDURE DATE:  07/15/2019          INTERPRETATION:  CLINICAL INFORMATION: Lung abscess    COMPARISON: CT abdomen pelvis 7/15/2019. CT chest 6/2/2019.    PROCEDURE:   CT of the Chest was performed withoutintravenous contrast.  Sagittal and coronal reformats were performed.      FINDINGS:    LUNGS AND AIRWAYS: Patent central airways.  Subcentimeter calcified   granuloma in the right lower lobe. Scattered mild dependent atelectatic   changes. Tree-in-bud opacities in the bilateral lower lungs.    PLEURA: Within the right major fissure there is loculated fluid and air   measuring 5.0 x 3.7 x 3.8 cm. Evaluation is limited without intravenous   contrast. Findings may represent loculated hydropneumothorax versus   empyema with air producing organism There is a small right effusion at   the lung base with mild associated atelectasis.    MEDIASTINUM AND ROLF: Several mildly prominent mediastinal lymph nodes   the largest is a right paratracheal node measuring 1.4 x 1.0 cm,   unchanged.    VESSELS: Within normal limits.    HEART: Cardiomegaly. Cardiac pacemaker. Coronary artery and valvular   calcifications. No pericardial effusion.    CHEST WALL AND LOWER NECK: Multinodular thyroid gland with scattered   areas of coarse calcium. This would be better evaluated with ultrasound.    VISUALIZED UPPER ABDOMEN: Abdominal ascites. Anasarca. For evaluation of   the abdominal contents see CT abdomen pelvis from 7/15/2019.    BONES: Within normal limits.    IMPRESSION:     Loculated fluid with air in the right major fissure may represent small   loculated hydropneumothorax versus empyema with air producing organism.   Evaluation is limited without intravenous contrast.                    CARISSA LOPEZ M.D., ATTENDING RADIOLOGIST  This document has been electronically signed. Jul 15 2019  7:41PM                < end of copied text >        ROS  [  ] UNABLE TO ELICIT HPI:  Patient is 88 year old female has medical history of HTN, A fib (not on AC), CHF, Gastric ulcer, Hepatitis C ,DM, glaucoma, anemia, SBO, surgical history significant for Intestinal resection, oophorectomy cholecystectomy. Pt was sent by Dr. Quintana for worsening abdominal swelling   Patient was in her usual health 2 weeks ago, when she was noticed abdominal swelling, insidious onset, slowly worsening, pt was given lasix but it did not help, pt has on and off abdominal pain, anorexia, weakness, lethargy, loss of energy, orthopnea and leg swelling Pt denies nausea, vomiting, constipation, diarrhea, fever, dysuria. Patient is also complaining of cough with white sputum. (15 Jul 2019 16:55)    History as above, this pt was sent in because of abdominal distention but on questing she c/o a chronic cough with some yellowish sputum production, she has no fevers or chills, she has no pleuritic chest pain, her cough is not worsening, she has no SOB on RA , no fevers or chills and no leukocytosis. she was here 6 weeks ago with a right sided pneumonia and treated with maxipime and azithromycin, she did well and was discharged 5 days later, she had fevers and an elevated wbc count then.   Asked to see her as she was found to have a either a right hydropneumothorax vs a right sided empyema on CXR and CT chest.      PAST MEDICAL & SURGICAL HISTORY:  Multiple falls  GIB (gastrointestinal bleeding): 2/2019 requiring 2 PRBCs  Gastric AVM: EGD 2/2019 - cauterized  PNA (pneumonia): 2/2019   treated with ABX while in [facility]  T2DM (type 2 diabetes mellitus): last A1c 7.8   4/11/19  Hepatitis C virus: not treated  A-fib: no Eliquis since 1/2019  Constipation  Glaucoma  Vertigo  Graves disease: no treatment as per patient and family  Pacemaker: medtronic ADDRL1  2011  HTN (hypertension)  S/P cardiac pacemaker procedure: Medtronic ADDRL1  2011  S/P cholecystectomy  S/P partial resection of colon: &gt; 5 yrs ago  H/O oophorectomy      No Known Allergies      Meds:  acetaminophen   Tablet .. 650 milliGRAM(s) Oral every 6 hours PRN  amiodarone    Tablet 200 milliGRAM(s) Oral daily  aspirin enteric coated 81 milliGRAM(s) Oral daily  azithromycin  IVPB 500 milliGRAM(s) IV Intermittent every 24 hours  azithromycin  IVPB      cefTRIAXone   IVPB 1000 milliGRAM(s) IV Intermittent every 24 hours  dextrose 40% Gel 15 Gram(s) Oral once PRN  dextrose 5%. 1000 milliLiter(s) IV Continuous <Continuous>  dextrose 50% Injectable 12.5 Gram(s) IV Push once  dextrose 50% Injectable 25 Gram(s) IV Push once  dextrose 50% Injectable 25 Gram(s) IV Push once  digoxin     Tablet 0.125 milliGRAM(s) Oral daily  docusate sodium 100 milliGRAM(s) Oral two times a day  furosemide   Injectable 40 milliGRAM(s) IV Push two times a day  glucagon  Injectable 1 milliGRAM(s) IntraMuscular once PRN  guaiFENesin   Syrup  (Sugar-Free) 100 milliGRAM(s) Oral every 6 hours PRN  heparin  Injectable 5000 Unit(s) SubCutaneous every 12 hours  insulin lispro (HumaLOG) corrective regimen sliding scale   SubCutaneous Before meals and at bedtime  insulin lispro Injectable (HumaLOG) 3 Unit(s) SubCutaneous three times a day before meals  latanoprost 0.005% Ophthalmic Solution 1 Drop(s) Both EYES at bedtime  metoprolol succinate ER 25 milliGRAM(s) Oral daily  multivitamin 1 Tablet(s) Oral daily  senna 2 Tablet(s) Oral at bedtime  spironolactone 25 milliGRAM(s) Oral daily      SOCIAL HISTORY:  Smoker:  never  ETOH use: no    FAMILY HISTORY:  Family history of hypertension  Family history of diabetes mellitus      VITALS:  Vital Signs Last 24 Hrs  T(C): 36.7 (17 Jul 2019 15:53), Max: 37.1 (16 Jul 2019 20:47)  T(F): 98.1 (17 Jul 2019 15:53), Max: 98.8 (16 Jul 2019 20:47)  HR: 61 (17 Jul 2019 15:53) (61 - 99)  BP: 133/65 (17 Jul 2019 15:53) (119/66 - 141/71)  BP(mean): --  RR: 18 (17 Jul 2019 15:53) (18 - 18)  SpO2: 97% (17 Jul 2019 15:53) (96% - 100%)    LABS/DIAGNOSTIC TESTS:                          10.8   8.07  )-----------( 154      ( 17 Jul 2019 05:58 )             33.0     WBC Count: 8.07 K/uL (07-17 @ 05:58)  WBC Count: 9.38 K/uL (07-16 @ 07:09)  WBC Count: 9.62 K/uL (07-15 @ 14:10)      07-17    132<L>  |  96  |  18  ----------------------------<  122<H>  3.6   |  30  |  0.78    Ca    8.0<L>      17 Jul 2019 05:58  Phos  2.5     07-17  Mg     1.8     07-17    TPro  6.8  /  Alb  1.6<L>  /  TBili  0.4  /  DBili  x   /  AST  31  /  ALT  20  /  AlkPhos  222<H>  07-17          LIVER FUNCTIONS - ( 17 Jul 2019 05:58 )  Alb: 1.6 g/dL / Pro: 6.8 g/dL / ALK PHOS: 222 U/L / ALT: 20 U/L DA / AST: 31 U/L / GGT: x                 LACTATE:          RADIOLOGY:< from: CT Chest No Cont (07.15.19 @ 19:28) >  EXAM:  CT CHEST                            PROCEDURE DATE:  07/15/2019          INTERPRETATION:  CLINICAL INFORMATION: Lung abscess    COMPARISON: CT abdomen pelvis 7/15/2019. CT chest 6/2/2019.    PROCEDURE:   CT of the Chest was performed withoutintravenous contrast.  Sagittal and coronal reformats were performed.      FINDINGS:    LUNGS AND AIRWAYS: Patent central airways.  Subcentimeter calcified   granuloma in the right lower lobe. Scattered mild dependent atelectatic   changes. Tree-in-bud opacities in the bilateral lower lungs.    PLEURA: Within the right major fissure there is loculated fluid and air   measuring 5.0 x 3.7 x 3.8 cm. Evaluation is limited without intravenous   contrast. Findings may represent loculated hydropneumothorax versus   empyema with air producing organism There is a small right effusion at   the lung base with mild associated atelectasis.    MEDIASTINUM AND ROLF: Several mildly prominent mediastinal lymph nodes   the largest is a right paratracheal node measuring 1.4 x 1.0 cm,   unchanged.    VESSELS: Within normal limits.    HEART: Cardiomegaly. Cardiac pacemaker. Coronary artery and valvular   calcifications. No pericardial effusion.    CHEST WALL AND LOWER NECK: Multinodular thyroid gland with scattered   areas of coarse calcium. This would be better evaluated with ultrasound.    VISUALIZED UPPER ABDOMEN: Abdominal ascites. Anasarca. For evaluation of   the abdominal contents see CT abdomen pelvis from 7/15/2019.    BONES: Within normal limits.    IMPRESSION:     Loculated fluid with air in the right major fissure may represent small   loculated hydropneumothorax versus empyema with air producing organism.   Evaluation is limited without intravenous contrast.                    CARISSA LOPEZ M.D., ATTENDING RADIOLOGIST  This document has been electronically signed. Jul 15 2019  7:41PM                < end of copied text >        ROS  [  ] UNABLE TO ELICIT

## 2019-07-17 NOTE — PROGRESS NOTE ADULT - SUBJECTIVE AND OBJECTIVE BOX
[   ] ICU                                          [   ] CCU                                      [ X  ] Medical Floor    Patient is comfortable. No new complaints reported, No abdominal pain, N/V, hematemesis, hematochezia, melena, fever, chills, chest pain, SOB, cough or diarrhea reported.    VITALS  Vital Signs Last 24 Hrs  T(C): 36.7 (17 Jul 2019 15:53), Max: 37.1 (16 Jul 2019 20:47)  T(F): 98.1 (17 Jul 2019 15:53), Max: 98.8 (16 Jul 2019 20:47)  HR: 61 (17 Jul 2019 15:53) (61 - 99)  BP: 133/65 (17 Jul 2019 15:53) (119/66 - 141/71)   RR: 18 (17 Jul 2019 15:53) (18 - 18)  SpO2: 97% (17 Jul 2019 15:53) (96% - 100%)       MEDICATIONS  (STANDING):  amiodarone    Tablet 200 milliGRAM(s) Oral daily  aspirin enteric coated 81 milliGRAM(s) Oral daily  dextrose 5%. 1000 milliLiter(s) (50 mL/Hr) IV Continuous <Continuous>  dextrose 50% Injectable 12.5 Gram(s) IV Push once  dextrose 50% Injectable 25 Gram(s) IV Push once  dextrose 50% Injectable 25 Gram(s) IV Push once  digoxin     Tablet 0.125 milliGRAM(s) Oral daily  docusate sodium 100 milliGRAM(s) Oral two times a day  furosemide   Injectable 40 milliGRAM(s) IV Push two times a day  heparin  Injectable 5000 Unit(s) SubCutaneous every 12 hours  insulin lispro (HumaLOG) corrective regimen sliding scale   SubCutaneous Before meals and at bedtime  insulin lispro Injectable (HumaLOG) 3 Unit(s) SubCutaneous three times a day before meals  latanoprost 0.005% Ophthalmic Solution 1 Drop(s) Both EYES at bedtime  metoprolol succinate ER 25 milliGRAM(s) Oral daily  multivitamin 1 Tablet(s) Oral daily  senna 2 Tablet(s) Oral at bedtime  spironolactone 25 milliGRAM(s) Oral daily    MEDICATIONS  (PRN):  acetaminophen   Tablet .. 650 milliGRAM(s) Oral every 6 hours PRN Mild Pain (1 - 3)  dextrose 40% Gel 15 Gram(s) Oral once PRN Blood Glucose LESS THAN 70 milliGRAM(s)/deciLiter  glucagon  Injectable 1 milliGRAM(s) IntraMuscular once PRN Glucose <70 milliGRAM(s)/deciLiter  guaiFENesin   Syrup  (Sugar-Free) 100 milliGRAM(s) Oral every 6 hours PRN Cough                            10.8   8.07  )-----------( 154      ( 17 Jul 2019 05:58 )             33.0       07-17    132<L>  |  96  |  18  ----------------------------<  122<H>  3.6   |  30  |  0.78    Ca    8.0<L>      17 Jul 2019 05:58  Phos  2.5     07-17  Mg     1.8     07-17    TPro  6.8  /  Alb  1.6<L>  /  TBili  0.4  /  DBili  x   /  AST  31  /  ALT  20  /  AlkPhos  222<H>  07-17

## 2019-07-17 NOTE — ADVANCED PRACTICE NURSE CONSULT - ASSESSMENT
This is a 88yr old female patient admitted for Ascites, to which upon assessment, the patient is without pressure injury

## 2019-07-18 DIAGNOSIS — R18.8 OTHER ASCITES: ICD-10-CM

## 2019-07-18 LAB
% ALBUMIN: 34 % — SIGNIFICANT CHANGE UP
% ALPHA 1: 4.3 % — SIGNIFICANT CHANGE UP
% ALPHA 2: 12.9 % — SIGNIFICANT CHANGE UP
% BETA: 13.6 % — SIGNIFICANT CHANGE UP
% GAMMA: 35.2 % — SIGNIFICANT CHANGE UP
% M SPIKE: SIGNIFICANT CHANGE UP
AFP-TM SERPL-MCNC: 3.7 NG/ML — SIGNIFICANT CHANGE UP
ALBUMIN SERPL ELPH-MCNC: 1.7 G/DL — LOW (ref 3.5–5)
ALBUMIN SERPL ELPH-MCNC: 2.2 G/DL — LOW (ref 3.6–5.5)
ALBUMIN/GLOB SERPL ELPH: 0.5 RATIO — SIGNIFICANT CHANGE UP
ALP SERPL-CCNC: 229 U/L — HIGH (ref 40–120)
ALPHA1 GLOB SERPL ELPH-MCNC: 0.3 G/DL — SIGNIFICANT CHANGE UP (ref 0.1–0.4)
ALPHA2 GLOB SERPL ELPH-MCNC: 0.9 G/DL — SIGNIFICANT CHANGE UP (ref 0.5–1)
ALT FLD-CCNC: 23 U/L DA — SIGNIFICANT CHANGE UP (ref 10–60)
ANION GAP SERPL CALC-SCNC: 6 MMOL/L — SIGNIFICANT CHANGE UP (ref 5–17)
APTT BLD: 37.1 SEC — HIGH (ref 27.5–36.3)
AST SERPL-CCNC: 37 U/L — SIGNIFICANT CHANGE UP (ref 10–40)
B-GLOBULIN SERPL ELPH-MCNC: 0.9 G/DL — SIGNIFICANT CHANGE UP (ref 0.5–1)
BASOPHILS # BLD AUTO: 0.03 K/UL — SIGNIFICANT CHANGE UP (ref 0–0.2)
BASOPHILS NFR BLD AUTO: 0.4 % — SIGNIFICANT CHANGE UP (ref 0–2)
BILIRUB SERPL-MCNC: 0.5 MG/DL — SIGNIFICANT CHANGE UP (ref 0.2–1.2)
BUN SERPL-MCNC: 16 MG/DL — SIGNIFICANT CHANGE UP (ref 7–18)
CALCIUM SERPL-MCNC: 8.6 MG/DL — SIGNIFICANT CHANGE UP (ref 8.4–10.5)
CHLORIDE SERPL-SCNC: 98 MMOL/L — SIGNIFICANT CHANGE UP (ref 96–108)
CO2 SERPL-SCNC: 29 MMOL/L — SIGNIFICANT CHANGE UP (ref 22–31)
CREAT SERPL-MCNC: 0.73 MG/DL — SIGNIFICANT CHANGE UP (ref 0.5–1.3)
EOSINOPHIL # BLD AUTO: 0.15 K/UL — SIGNIFICANT CHANGE UP (ref 0–0.5)
EOSINOPHIL NFR BLD AUTO: 2.1 % — SIGNIFICANT CHANGE UP (ref 0–6)
GAMMA GLOBULIN: 2.3 G/DL — HIGH (ref 0.6–1.6)
GLUCOSE BLDC GLUCOMTR-MCNC: 136 MG/DL — HIGH (ref 70–99)
GLUCOSE BLDC GLUCOMTR-MCNC: 184 MG/DL — HIGH (ref 70–99)
GLUCOSE BLDC GLUCOMTR-MCNC: 193 MG/DL — HIGH (ref 70–99)
GLUCOSE BLDC GLUCOMTR-MCNC: 215 MG/DL — HIGH (ref 70–99)
GLUCOSE SERPL-MCNC: 123 MG/DL — HIGH (ref 70–99)
HCT VFR BLD CALC: 37.5 % — SIGNIFICANT CHANGE UP (ref 34.5–45)
HGB BLD-MCNC: 12.1 G/DL — SIGNIFICANT CHANGE UP (ref 11.5–15.5)
IMM GRANULOCYTES NFR BLD AUTO: 0.6 % — SIGNIFICANT CHANGE UP (ref 0–1.5)
INR BLD: 1.14 RATIO — SIGNIFICANT CHANGE UP (ref 0.88–1.16)
INTERPRETATION SERPL IFE-IMP: SIGNIFICANT CHANGE UP
LYMPHOCYTES # BLD AUTO: 2.16 K/UL — SIGNIFICANT CHANGE UP (ref 1–3.3)
LYMPHOCYTES # BLD AUTO: 30.9 % — SIGNIFICANT CHANGE UP (ref 13–44)
M-SPIKE: SIGNIFICANT CHANGE UP (ref 0–0)
MAGNESIUM SERPL-MCNC: 1.9 MG/DL — SIGNIFICANT CHANGE UP (ref 1.6–2.6)
MCHC RBC-ENTMCNC: 29.2 PG — SIGNIFICANT CHANGE UP (ref 27–34)
MCHC RBC-ENTMCNC: 32.3 GM/DL — SIGNIFICANT CHANGE UP (ref 32–36)
MCV RBC AUTO: 90.6 FL — SIGNIFICANT CHANGE UP (ref 80–100)
MONOCYTES # BLD AUTO: 0.54 K/UL — SIGNIFICANT CHANGE UP (ref 0–0.9)
MONOCYTES NFR BLD AUTO: 7.7 % — SIGNIFICANT CHANGE UP (ref 2–14)
NEUTROPHILS # BLD AUTO: 4.07 K/UL — SIGNIFICANT CHANGE UP (ref 1.8–7.4)
NEUTROPHILS NFR BLD AUTO: 58.3 % — SIGNIFICANT CHANGE UP (ref 43–77)
NRBC # BLD: 0 /100 WBCS — SIGNIFICANT CHANGE UP (ref 0–0)
PHOSPHATE SERPL-MCNC: 2.3 MG/DL — LOW (ref 2.5–4.5)
PLATELET # BLD AUTO: 178 K/UL — SIGNIFICANT CHANGE UP (ref 150–400)
POTASSIUM SERPL-MCNC: 4 MMOL/L — SIGNIFICANT CHANGE UP (ref 3.5–5.3)
POTASSIUM SERPL-SCNC: 4 MMOL/L — SIGNIFICANT CHANGE UP (ref 3.5–5.3)
PROT ?TM UR-MCNC: 6 MG/DL — SIGNIFICANT CHANGE UP (ref 0–12)
PROT PATTERN SERPL ELPH-IMP: SIGNIFICANT CHANGE UP
PROT SERPL-MCNC: 6.6 G/DL — SIGNIFICANT CHANGE UP (ref 6–8.3)
PROT SERPL-MCNC: 6.6 G/DL — SIGNIFICANT CHANGE UP (ref 6–8.3)
PROT SERPL-MCNC: 7.4 G/DL — SIGNIFICANT CHANGE UP (ref 6–8.3)
PROTHROM AB SERPL-ACNC: 12.7 SEC — SIGNIFICANT CHANGE UP (ref 10–12.9)
RBC # BLD: 4.14 M/UL — SIGNIFICANT CHANGE UP (ref 3.8–5.2)
RBC # FLD: 16.8 % — HIGH (ref 10.3–14.5)
SODIUM SERPL-SCNC: 133 MMOL/L — LOW (ref 135–145)
WBC # BLD: 6.99 K/UL — SIGNIFICANT CHANGE UP (ref 3.8–10.5)
WBC # FLD AUTO: 6.99 K/UL — SIGNIFICANT CHANGE UP (ref 3.8–10.5)

## 2019-07-18 RX ORDER — FUROSEMIDE 40 MG
20 TABLET ORAL DAILY
Refills: 0 | Status: DISCONTINUED | OUTPATIENT
Start: 2019-07-18 | End: 2019-07-19

## 2019-07-18 RX ADMIN — SENNA PLUS 2 TABLET(S): 8.6 TABLET ORAL at 21:34

## 2019-07-18 RX ADMIN — Medication 650 MILLIGRAM(S): at 16:03

## 2019-07-18 RX ADMIN — Medication 62.5 MILLIMOLE(S): at 14:43

## 2019-07-18 RX ADMIN — HEPARIN SODIUM 5000 UNIT(S): 5000 INJECTION INTRAVENOUS; SUBCUTANEOUS at 18:02

## 2019-07-18 RX ADMIN — Medication 1: at 22:03

## 2019-07-18 RX ADMIN — Medication 3 UNIT(S): at 08:32

## 2019-07-18 RX ADMIN — Medication 25 MILLIGRAM(S): at 05:57

## 2019-07-18 RX ADMIN — Medication 1 TABLET(S): at 11:35

## 2019-07-18 RX ADMIN — Medication 40 MILLIGRAM(S): at 05:58

## 2019-07-18 RX ADMIN — Medication 100 MILLIGRAM(S): at 05:57

## 2019-07-18 RX ADMIN — Medication 2: at 16:54

## 2019-07-18 RX ADMIN — LATANOPROST 1 DROP(S): 0.05 SOLUTION/ DROPS OPHTHALMIC; TOPICAL at 21:34

## 2019-07-18 RX ADMIN — Medication 20 MILLIGRAM(S): at 17:59

## 2019-07-18 RX ADMIN — HEPARIN SODIUM 5000 UNIT(S): 5000 INJECTION INTRAVENOUS; SUBCUTANEOUS at 05:58

## 2019-07-18 RX ADMIN — Medication 3 UNIT(S): at 11:34

## 2019-07-18 RX ADMIN — Medication 3 UNIT(S): at 16:55

## 2019-07-18 RX ADMIN — Medication 81 MILLIGRAM(S): at 11:35

## 2019-07-18 RX ADMIN — AMIODARONE HYDROCHLORIDE 200 MILLIGRAM(S): 400 TABLET ORAL at 05:57

## 2019-07-18 RX ADMIN — Medication 1: at 11:33

## 2019-07-18 RX ADMIN — Medication 0.12 MILLIGRAM(S): at 05:57

## 2019-07-18 RX ADMIN — SPIRONOLACTONE 25 MILLIGRAM(S): 25 TABLET, FILM COATED ORAL at 05:57

## 2019-07-18 RX ADMIN — Medication 650 MILLIGRAM(S): at 15:02

## 2019-07-18 NOTE — PROGRESS NOTE ADULT - SUBJECTIVE AND OBJECTIVE BOX
Patient is a 88y old  Female who presents with a chief complaint of Abdominal swelling (17 Jul 2019 18:48)    Pt is awake, alert, lying in bed in NAD. Having cough and chest congestion.     INTERVAL HPI/OVERNIGHT EVENTS:      VITAL SIGNS:  T(F): 98 (07-18-19 @ 07:27)  HR: 85 (07-18-19 @ 07:27)  BP: 137/66 (07-18-19 @ 07:27)  RR: 18 (07-18-19 @ 07:27)  SpO2: 97% (07-18-19 @ 07:27)  Wt(kg): --  I&O's Detail    17 Jul 2019 07:01  -  18 Jul 2019 07:00  --------------------------------------------------------  IN:    Oral Fluid: 430 mL  Total IN: 430 mL    OUT:    Voided: 200 mL  Total OUT: 200 mL    Total NET: 230 mL      18 Jul 2019 07:01  -  18 Jul 2019 08:47  --------------------------------------------------------  IN:    Oral Fluid: 200 mL  Total IN: 200 mL    OUT:  Total OUT: 0 mL    Total NET: 200 mL              REVIEW OF SYSTEMS:    CONSTITUTIONAL:  No fevers, chills, sweats    HEENT:  Eyes:  No diplopia or blurred vision. ENT:  No earache, sore throat or runny nose.    CARDIOVASCULAR:  No pressure, squeezing, tightness, or heaviness about the chest; no palpitations.    RESPIRATORY:  Per HPI    GASTROINTESTINAL:  No abdominal pain, nausea, vomiting or diarrhea.    GENITOURINARY:  No dysuria, frequency or urgency.    NEUROLOGIC:  No paresthesias, fasciculations, seizures or weakness.    PSYCHIATRIC:  No disorder of thought or mood.      PHYSICAL EXAM:    Constitutional: Well developed and nourished  Eyes:Perrla  ENMT: normal  Neck:supple  Respiratory: +rales, posterior left base.   Cardiovascular: S1 S2 regular  Gastrointestinal: Soft, Non tender  Extremities: No edema  Vascular:normal  Neurological:Awake, alert,Ox3  Musculoskeletal:Normal      MEDICATIONS  (STANDING):  amiodarone    Tablet 200 milliGRAM(s) Oral daily  aspirin enteric coated 81 milliGRAM(s) Oral daily  dextrose 5%. 1000 milliLiter(s) (50 mL/Hr) IV Continuous <Continuous>  dextrose 50% Injectable 12.5 Gram(s) IV Push once  dextrose 50% Injectable 25 Gram(s) IV Push once  dextrose 50% Injectable 25 Gram(s) IV Push once  digoxin     Tablet 0.125 milliGRAM(s) Oral daily  docusate sodium 100 milliGRAM(s) Oral two times a day  furosemide   Injectable 40 milliGRAM(s) IV Push two times a day  heparin  Injectable 5000 Unit(s) SubCutaneous every 12 hours  insulin lispro (HumaLOG) corrective regimen sliding scale   SubCutaneous Before meals and at bedtime  insulin lispro Injectable (HumaLOG) 3 Unit(s) SubCutaneous three times a day before meals  latanoprost 0.005% Ophthalmic Solution 1 Drop(s) Both EYES at bedtime  metoprolol succinate ER 25 milliGRAM(s) Oral daily  multivitamin 1 Tablet(s) Oral daily  senna 2 Tablet(s) Oral at bedtime  spironolactone 25 milliGRAM(s) Oral daily    MEDICATIONS  (PRN):  acetaminophen   Tablet .. 650 milliGRAM(s) Oral every 6 hours PRN Mild Pain (1 - 3)  dextrose 40% Gel 15 Gram(s) Oral once PRN Blood Glucose LESS THAN 70 milliGRAM(s)/deciLiter  glucagon  Injectable 1 milliGRAM(s) IntraMuscular once PRN Glucose <70 milliGRAM(s)/deciLiter  guaiFENesin   Syrup  (Sugar-Free) 100 milliGRAM(s) Oral every 6 hours PRN Cough      Allergies    No Known Allergies    Intolerances        LABS:                        12.1   6.99  )-----------( 178      ( 18 Jul 2019 07:41 )             37.5     07-18    133<L>  |  98  |  16  ----------------------------<  123<H>  4.0   |  29  |  0.73    Ca    8.6      18 Jul 2019 07:41  Phos  2.3     07-18  Mg     1.9     07-18    TPro  7.4  /  Alb  1.7<L>  /  TBili  0.5  /  DBili  x   /  AST  37  /  ALT  23  /  AlkPhos  229<H>  07-18              CAPILLARY BLOOD GLUCOSE      POCT Blood Glucose.: 136 mg/dL (18 Jul 2019 07:50)  POCT Blood Glucose.: 164 mg/dL (17 Jul 2019 21:16)  POCT Blood Glucose.: 154 mg/dL (17 Jul 2019 16:28)  POCT Blood Glucose.: 184 mg/dL (17 Jul 2019 11:15)    pro-bnp 6781 07-15 @ 14:10     d-dimer --  07-15 @ 14:10      RADIOLOGY & ADDITIONAL TESTS:    CXR:  < from: Xray Chest 1 View- PORTABLE-Urgent (07.15.19 @ 17:25) >    The mediastinal cardiac silhouette is unremarkable. Cardiac pacemaker.    Patchy opacity at right base is improved when compared to the previous   exam. Continued follow-up is recommended.    No acute osseous finding.     IMPRESSION:    As above.    < end of copied text >    Ct scan chest:  < from: CT Chest No Cont (07.15.19 @ 19:28) >  IMPRESSION:     Loculated fluid with air in the right major fissure may represent small   loculated hydropneumothorax versus empyema with air producing organism.   Evaluation is limited without intravenous contrast.    < end of copied text Pt has no cardiological or pulmonary contraindications for pulmonary rehabilitation after discharge. Pt will benefit from pulmonary rehabilitation as outpatient.    < from: CT Abdomen and Pelvis w/ IV Cont (07.15.19 @ 17:18) >  IMPRESSION:       Small right pleural effusion and associated atelectasis. 3.5 x 2.7 cm   focus of loculated fluid and air along the minor fissure. This appeared   as a more solid mass on the CT from 5/29/2019. This could represent a   small hydropneumothorax or developing lung abscess. Correlate clinically.    Small to moderate amount of abdominal ascites and anasarca which have   progressed since the previous exam.    2.6 x 1.6 cm probable periceliac lymph node, series 2 image 28 unchanged   since 4/10/2019. This is indeterminant. Other nearby smaller nodes   present. This would be better evaluated with PET/CT.    ekg;      echo: Pt is awake, alert, lying in bed in NAD. Has improvement of cough/congestion.     INTERVAL HPI/OVERNIGHT EVENTS:      VITAL SIGNS:  T(F): 98 (07-18-19 @ 07:27)  HR: 85 (07-18-19 @ 07:27)  BP: 137/66 (07-18-19 @ 07:27)  RR: 18 (07-18-19 @ 07:27)  SpO2: 97% (07-18-19 @ 07:27)  Wt(kg): --  I&O's Detail    17 Jul 2019 07:01  -  18 Jul 2019 07:00  --------------------------------------------------------  IN:    Oral Fluid: 430 mL  Total IN: 430 mL    OUT:    Voided: 200 mL  Total OUT: 200 mL    Total NET: 230 mL      18 Jul 2019 07:01  -  18 Jul 2019 08:47  --------------------------------------------------------  IN:    Oral Fluid: 200 mL  Total IN: 200 mL    OUT:  Total OUT: 0 mL    Total NET: 200 mL      REVIEW OF SYSTEMS:    CONSTITUTIONAL:  No fevers, chills, sweats    HEENT:  Eyes:  No diplopia or blurred vision. ENT:  No earache, sore throat or runny nose.    CARDIOVASCULAR:  No pressure, squeezing, tightness, or heaviness about the chest; no palpitations.    RESPIRATORY:  Per HPI    GASTROINTESTINAL:  No abdominal pain, nausea, vomiting or diarrhea.    GENITOURINARY:  No dysuria, frequency or urgency.    NEUROLOGIC:  No paresthesias, fasciculations, seizures or weakness.    PSYCHIATRIC:  No disorder of thought or mood.      PHYSICAL EXAM:    Constitutional: Well developed and nourished  Eyes: Perrla  ENMT: normal  Neck: supple  Respiratory: +rales, posterior left base.   Cardiovascular: S1 S2 regular  Gastrointestinal: Soft, Non tender  Extremities: No edema  Vascular: normal  Neurological: Awake, alert,Ox3  Musculoskeletal: Normal      MEDICATIONS  (STANDING):  amiodarone    Tablet 200 milliGRAM(s) Oral daily  aspirin enteric coated 81 milliGRAM(s) Oral daily  dextrose 5%. 1000 milliLiter(s) (50 mL/Hr) IV Continuous <Continuous>  dextrose 50% Injectable 12.5 Gram(s) IV Push once  dextrose 50% Injectable 25 Gram(s) IV Push once  dextrose 50% Injectable 25 Gram(s) IV Push once  digoxin     Tablet 0.125 milliGRAM(s) Oral daily  docusate sodium 100 milliGRAM(s) Oral two times a day  furosemide   Injectable 40 milliGRAM(s) IV Push two times a day  heparin  Injectable 5000 Unit(s) SubCutaneous every 12 hours  insulin lispro (HumaLOG) corrective regimen sliding scale   SubCutaneous Before meals and at bedtime  insulin lispro Injectable (HumaLOG) 3 Unit(s) SubCutaneous three times a day before meals  latanoprost 0.005% Ophthalmic Solution 1 Drop(s) Both EYES at bedtime  metoprolol succinate ER 25 milliGRAM(s) Oral daily  multivitamin 1 Tablet(s) Oral daily  senna 2 Tablet(s) Oral at bedtime  spironolactone 25 milliGRAM(s) Oral daily    MEDICATIONS  (PRN):  acetaminophen   Tablet .. 650 milliGRAM(s) Oral every 6 hours PRN Mild Pain (1 - 3)  dextrose 40% Gel 15 Gram(s) Oral once PRN Blood Glucose LESS THAN 70 milliGRAM(s)/deciLiter  glucagon  Injectable 1 milliGRAM(s) IntraMuscular once PRN Glucose <70 milliGRAM(s)/deciLiter  guaiFENesin   Syrup  (Sugar-Free) 100 milliGRAM(s) Oral every 6 hours PRN Cough      Allergies    No Known Allergies    Intolerances        LABS:                        12.1   6.99  )-----------( 178      ( 18 Jul 2019 07:41 )             37.5     07-18    133<L>  |  98  |  16  ----------------------------<  123<H>  4.0   |  29  |  0.73    Ca    8.6      18 Jul 2019 07:41  Phos  2.3     07-18  Mg     1.9     07-18    TPro  7.4  /  Alb  1.7<L>  /  TBili  0.5  /  DBili  x   /  AST  37  /  ALT  23  /  AlkPhos  229<H>  07-18              CAPILLARY BLOOD GLUCOSE      POCT Blood Glucose.: 136 mg/dL (18 Jul 2019 07:50)  POCT Blood Glucose.: 164 mg/dL (17 Jul 2019 21:16)  POCT Blood Glucose.: 154 mg/dL (17 Jul 2019 16:28)  POCT Blood Glucose.: 184 mg/dL (17 Jul 2019 11:15)    pro-bnp 6781 07-15 @ 14:10     d-dimer --  07-15 @ 14:10      RADIOLOGY & ADDITIONAL TESTS:    CXR:  < from: Xray Chest 1 View- PORTABLE-Urgent (07.15.19 @ 17:25) >    The mediastinal cardiac silhouette is unremarkable. Cardiac pacemaker.    Patchy opacity at right base is improved when compared to the previous   exam. Continued follow-up is recommended.    No acute osseous finding.     IMPRESSION:    As above.    < end of copied text >    Ct scan chest:  < from: CT Chest No Cont (07.15.19 @ 19:28) >  IMPRESSION:     Loculated fluid with air in the right major fissure may represent small   loculated hydropneumothorax versus empyema with air producing organism.   Evaluation is limited without intravenous contrast.    < end of copied text Pt has no cardiological or pulmonary contraindications for pulmonary rehabilitation after discharge. Pt will benefit from pulmonary rehabilitation as outpatient.    < from: CT Abdomen and Pelvis w/ IV Cont (07.15.19 @ 17:18) >  IMPRESSION:       Small right pleural effusion and associated atelectasis. 3.5 x 2.7 cm   focus of loculated fluid and air along the minor fissure. This appeared   as a more solid mass on the CT from 5/29/2019. This could represent a   small hydropneumothorax or developing lung abscess. Correlate clinically.    Small to moderate amount of abdominal ascites and anasarca which have   progressed since the previous exam.    2.6 x 1.6 cm probable periceliac lymph node, series 2 image 28 unchanged   since 4/10/2019. This is indeterminant. Other nearby smaller nodes   present. This would be better evaluated with PET/CT.    ekg;      echo:  < from: Transthoracic Echocardiogram (07.17.19 @ 07:52) >  CONCLUSIONS:  1. Mitral annular calcification. Mild to moderate mitral  regurgitation.  2. Severely dilated left atrium.  LA volume index = 60  cc/m2.  3. Normal left ventricular internal dimensions and wall  thicknesses.  4. Normal left ventricular systolic function.  5. Right ventricular enlargement with normal RV systolic  function.   A device lead is visualized in the right heart.  6. RV systolic pressure is 49 mm Hg. Mild pulmonary  hypertension.  7. There is severe tricuspid regurgitation.    < end of copied text >

## 2019-07-18 NOTE — PROGRESS NOTE ADULT - SUBJECTIVE AND OBJECTIVE BOX
[   ] ICU                                          [   ] CCU                                      [ x  ] Medical Floor    Patient is comfortable. No new complaints reported, No abdominal pain, N/V, hematemesis, hematochezia, melena, fever, chills, chest pain, SOB, cough or diarrhea reported.    VITALS  Vital Signs Last 24 Hrs  T(C): 36.6 (18 Jul 2019 15:03), Max: 36.9 (18 Jul 2019 11:05)  T(F): 97.9 (18 Jul 2019 15:03), Max: 98.5 (18 Jul 2019 11:05)  HR: 77 (18 Jul 2019 15:03) (67 - 86)  BP: 131/70 (18 Jul 2019 15:03) (111/53 - 152/87)   RR: 18 (18 Jul 2019 15:03) (18 - 18)  SpO2: 99% (18 Jul 2019 15:03) (97% - 99%)       MEDICATIONS  (STANDING):  amiodarone    Tablet 200 milliGRAM(s) Oral daily  aspirin enteric coated 81 milliGRAM(s) Oral daily  dextrose 5%. 1000 milliLiter(s) (50 mL/Hr) IV Continuous <Continuous>  dextrose 50% Injectable 12.5 Gram(s) IV Push once  dextrose 50% Injectable 25 Gram(s) IV Push once  dextrose 50% Injectable 25 Gram(s) IV Push once  digoxin     Tablet 0.125 milliGRAM(s) Oral daily  furosemide   Injectable 20 milliGRAM(s) IV Push daily  heparin  Injectable 5000 Unit(s) SubCutaneous every 12 hours  insulin lispro (HumaLOG) corrective regimen sliding scale   SubCutaneous Before meals and at bedtime  insulin lispro Injectable (HumaLOG) 3 Unit(s) SubCutaneous three times a day before meals  latanoprost 0.005% Ophthalmic Solution 1 Drop(s) Both EYES at bedtime  metoprolol succinate ER 25 milliGRAM(s) Oral daily  multivitamin 1 Tablet(s) Oral daily  senna 2 Tablet(s) Oral at bedtime  spironolactone 25 milliGRAM(s) Oral daily    MEDICATIONS  (PRN):  acetaminophen   Tablet .. 650 milliGRAM(s) Oral every 6 hours PRN Mild Pain (1 - 3)  dextrose 40% Gel 15 Gram(s) Oral once PRN Blood Glucose LESS THAN 70 milliGRAM(s)/deciLiter  glucagon  Injectable 1 milliGRAM(s) IntraMuscular once PRN Glucose <70 milliGRAM(s)/deciLiter  guaiFENesin   Syrup  (Sugar-Free) 100 milliGRAM(s) Oral every 6 hours PRN Cough                            12.1   6.99  )-----------( 178      ( 18 Jul 2019 07:41 )             37.5       07-18    133<L>  |  98  |  16  ----------------------------<  123<H>  4.0   |  29  |  0.73    Ca    8.6      18 Jul 2019 07:41  Phos  2.3     07-18  Mg     1.9     07-18    TPro  7.4  /  Alb  1.7<L>  /  TBili  0.5  /  DBili  x   /  AST  37  /  ALT  23  /  AlkPhos  229<H>  07-18      PT/INR - ( 18 Jul 2019 10:00 )   PT: 12.7 sec;   INR: 1.14 ratio         PTT - ( 18 Jul 2019 10:00 )  PTT:37.1 sec

## 2019-07-18 NOTE — PROGRESS NOTE ADULT - ASSESSMENT
1. R/O Empyema  - CXR noted.   - CT chest notes - Loculated fluid with air in the right major fissure may represent small loculated hydropneumothorax versus empyema with air producing organism.   - Thoracic surgery consult  - O2 supplement  - Bronchodilators   - F/U CXR   - On ABX; ID f/u    2. Atelectasis  - O2 Supp  - Bronchodilators  - Incentive spirometry     3. CHF  - Echo  - Tele monitoring  - Cardio eval  - Diuretics PRN  - DVT and GI PPX     4. Ascites  - GI Eval noted  - Recc considering therapeutic/diagnostic paracentesis with cytology.  - Tumor markers   - May need diuretics. 1. R/O Empyema  - CXR noted.   - CT chest notes - Loculated fluid with air in the right major fissure may represent small loculated hydropneumothorax versus empyema with air producing organism.   - Thoracic surgery consult - conservative management with abx.   - O2 supplement  - Bronchodilators   - F/U CXR   - On ABX; ID f/u    2. Atelectasis  - O2 Supp  - Bronchodilators  - Incentive spirometry     3. CHF  - Echo noted   - Tele monitoring  - Cardio eval  - Diuretics PRN  - DVT and GI PPX     4. Ascites  - GI Eval noted  - Recc considering therapeutic/diagnostic paracentesis with cytology.  - Tumor markers   - May need diuretics.     5. Pulmonary HTN  - RVP 49  - Bronchodilators  - CCB 1. R/O Empyema  - CXR noted.   - CT chest notes - Loculated fluid with air in the right major fissure may represent small loculated hydropneumothorax versus empyema with air producing organism.   - Thoracic surgery consult - conservative management with abx.   - O2 supplement  - Bronchodilators   - F/U CXR   - ID Recc no ABX    2. Atelectasis  - O2 Supp  - Bronchodilators  - Incentive spirometry     3. CHF  - Echo noted   - Tele monitoring  - Cardio eval  - Diuretics PRN  - DVT and GI PPX     4. Ascites  - GI Eval noted  - Recc considering therapeutic/diagnostic paracentesis with cytology.  - Tumor markers   - May need diuretics.     5. Pulmonary HTN  - RVP 49  - Bronchodilators  - CCB

## 2019-07-18 NOTE — PROGRESS NOTE ADULT - SUBJECTIVE AND OBJECTIVE BOX
PGY 1 Note discussed with supervising resident and primary attending    Patient is a 88y old  Female who presents with a chief complaint of Abdominal swelling (18 Jul 2019 10:26)      INTERVAL HPI/OVERNIGHT EVENTS: Patient complained of chest pain, ordered stat EKG and cardiac enzymes.  MEDICATIONS  (STANDING):  amiodarone    Tablet 200 milliGRAM(s) Oral daily  aspirin enteric coated 81 milliGRAM(s) Oral daily  dextrose 5%. 1000 milliLiter(s) (50 mL/Hr) IV Continuous <Continuous>  dextrose 50% Injectable 12.5 Gram(s) IV Push once  dextrose 50% Injectable 25 Gram(s) IV Push once  dextrose 50% Injectable 25 Gram(s) IV Push once  digoxin     Tablet 0.125 milliGRAM(s) Oral daily  furosemide   Injectable 20 milliGRAM(s) IV Push daily  heparin  Injectable 5000 Unit(s) SubCutaneous every 12 hours  insulin lispro (HumaLOG) corrective regimen sliding scale   SubCutaneous Before meals and at bedtime  insulin lispro Injectable (HumaLOG) 3 Unit(s) SubCutaneous three times a day before meals  latanoprost 0.005% Ophthalmic Solution 1 Drop(s) Both EYES at bedtime  metoprolol succinate ER 25 milliGRAM(s) Oral daily  multivitamin 1 Tablet(s) Oral daily  senna 2 Tablet(s) Oral at bedtime  spironolactone 25 milliGRAM(s) Oral daily    MEDICATIONS  (PRN):  acetaminophen   Tablet .. 650 milliGRAM(s) Oral every 6 hours PRN Mild Pain (1 - 3)  dextrose 40% Gel 15 Gram(s) Oral once PRN Blood Glucose LESS THAN 70 milliGRAM(s)/deciLiter  glucagon  Injectable 1 milliGRAM(s) IntraMuscular once PRN Glucose <70 milliGRAM(s)/deciLiter  guaiFENesin   Syrup  (Sugar-Free) 100 milliGRAM(s) Oral every 6 hours PRN Cough      __________________________________________________  REVIEW OF SYSTEMS:    CONSTITUTIONAL: No fever,   EYES: no acute visual disturbances  NECK: No pain or stiffness  RESPIRATORY: No cough; No shortness of breath  CARDIOVASCULAR: No chest pain, no palpitations  GASTROINTESTINAL: No pain. No nausea or vomiting; No diarrhea   NEUROLOGICAL: No headache or numbness, no tremors  MUSCULOSKELETAL: No joint pain, no muscle pain  GENITOURINARY: no dysuria, no frequency, no hesitancy  PSYCHIATRY: no depression , no anxiety  ALL OTHER  ROS negative        Vital Signs Last 24 Hrs  T(C): 36.6 (18 Jul 2019 15:03), Max: 36.9 (17 Jul 2019 20:09)  T(F): 97.9 (18 Jul 2019 15:03), Max: 98.5 (17 Jul 2019 20:09)  HR: 77 (18 Jul 2019 15:03) (62 - 86)  BP: 131/70 (18 Jul 2019 15:03) (111/53 - 152/87)  BP(mean): --  RR: 18 (18 Jul 2019 15:03) (18 - 18)  SpO2: 99% (18 Jul 2019 15:03) (97% - 100%)    ________________________________________________  PHYSICAL EXAM:  GENERAL: NAD  HEENT: Normocephalic;  conjunctivae and sclerae clear; moist mucous membranes;   NECK : supple  CHEST/LUNG: Clear to auscultation bilaterally with good air entry   HEART: S1 S2  regular; no murmurs, gallops or rubs  ABDOMEN: Soft, Nontender, Nondistended; Bowel sounds present  EXTREMITIES: no cyanosis; no edema; no calf tenderness  SKIN: warm and dry; no rash  NERVOUS SYSTEM:  Awake and alert; Oriented  to place, person and time ; no new deficits    _________________________________________________  LABS:                        12.1   6.99  )-----------( 178      ( 18 Jul 2019 07:41 )             37.5     07-18    133<L>  |  98  |  16  ----------------------------<  123<H>  4.0   |  29  |  0.73    Ca    8.6      18 Jul 2019 07:41  Phos  2.3     07-18  Mg     1.9     07-18    TPro  7.4  /  Alb  1.7<L>  /  TBili  0.5  /  DBili  x   /  AST  37  /  ALT  23  /  AlkPhos  229<H>  07-18    PT/INR - ( 18 Jul 2019 10:00 )   PT: 12.7 sec;   INR: 1.14 ratio         PTT - ( 18 Jul 2019 10:00 )  PTT:37.1 sec    CAPILLARY BLOOD GLUCOSE      POCT Blood Glucose.: 184 mg/dL (18 Jul 2019 11:27)  POCT Blood Glucose.: 136 mg/dL (18 Jul 2019 07:50)  POCT Blood Glucose.: 164 mg/dL (17 Jul 2019 21:16)  POCT Blood Glucose.: 154 mg/dL (17 Jul 2019 16:28)        RADIOLOGY & ADDITIONAL TESTS:    Imaging Personally Reviewed:  YES/NO    Consultant(s) Notes Reviewed:   YES/ No    Care Discussed with Consultants :     Plan of care was discussed with patient and /or primary care giver; all questions and concerns were addressed and care was aligned with patient's wishes.

## 2019-07-18 NOTE — CONSULT NOTE ADULT - SUBJECTIVE AND OBJECTIVE BOX
Patient is a 88y old  Female who presents with a chief complaint of Abdominal swelling (18 Jul 2019 16:21)      HPI:  Patient is 88 year old female has medical history of HTN, A fib (not on AC), CHF, Gastric ulcer, Hepatitis C ,DM, glaucoma, anemia, SBO, surgical history significant for Intestinal resection, oophorectomy cholecystectomy. Pt was sent by Dr. Quintana for worsening abdominal swelling     Patient was in her usual health 2 weeks ago, when she was noticed abdominal swelling, insidious onset, slowly worsening, pt was given lasix but it did not help, pt has on and off abdominal pain, anorexia, weakness, lethargy, loss of energy, orthopnea and leg swelling Pt denies nausea, vomiting, constipation, diarrhea, fever, dysuria. Patient is also complaining of cough with white sputum. (15 Jul 2019 16:55). Found to have un cont dm. Pt known to me as out pt. On humalog 3 ac tid off of lantus       PAST MEDICAL & SURGICAL HISTORY:  Multiple falls  GIB (gastrointestinal bleeding): 2/2019 requiring 2 PRBCs  Gastric AVM: EGD 2/2019 - cauterized  PNA (pneumonia): 2/2019   treated with ABX while in [facility]  T2DM (type 2 diabetes mellitus): last A1c 7.8   4/11/19  Hepatitis C virus: not treated  A-fib: no Eliquis since 1/2019  Constipation  Glaucoma  Vertigo  Graves disease: no treatment as per patient and family  Pacemaker: medtronic ADDRL1  2011  HTN (hypertension)  S/P cardiac pacemaker procedure: Medtronic ADDRL1  2011  S/P cholecystectomy  S/P partial resection of colon: &gt; 5 yrs ago  H/O oophorectomy         MEDICATIONS  (STANDING):  amiodarone    Tablet 200 milliGRAM(s) Oral daily  aspirin enteric coated 81 milliGRAM(s) Oral daily  dextrose 5%. 1000 milliLiter(s) (50 mL/Hr) IV Continuous <Continuous>  dextrose 50% Injectable 12.5 Gram(s) IV Push once  dextrose 50% Injectable 25 Gram(s) IV Push once  dextrose 50% Injectable 25 Gram(s) IV Push once  digoxin     Tablet 0.125 milliGRAM(s) Oral daily  furosemide   Injectable 20 milliGRAM(s) IV Push daily  heparin  Injectable 5000 Unit(s) SubCutaneous every 12 hours  insulin lispro (HumaLOG) corrective regimen sliding scale   SubCutaneous Before meals and at bedtime  insulin lispro Injectable (HumaLOG) 3 Unit(s) SubCutaneous three times a day before meals  latanoprost 0.005% Ophthalmic Solution 1 Drop(s) Both EYES at bedtime  metoprolol succinate ER 25 milliGRAM(s) Oral daily  multivitamin 1 Tablet(s) Oral daily  senna 2 Tablet(s) Oral at bedtime  spironolactone 25 milliGRAM(s) Oral daily    MEDICATIONS  (PRN):  acetaminophen   Tablet .. 650 milliGRAM(s) Oral every 6 hours PRN Mild Pain (1 - 3)  dextrose 40% Gel 15 Gram(s) Oral once PRN Blood Glucose LESS THAN 70 milliGRAM(s)/deciLiter  glucagon  Injectable 1 milliGRAM(s) IntraMuscular once PRN Glucose <70 milliGRAM(s)/deciLiter  guaiFENesin   Syrup  (Sugar-Free) 100 milliGRAM(s) Oral every 6 hours PRN Cough      FAMILY HISTORY:  Family history of hypertension  Family history of diabetes mellitus      SOCIAL HISTORY:      REVIEW OF SYSTEMS:  CONSTITUTIONAL: No fever, weight loss, or fatigue  EYES: No eye pain, visual disturbances, or discharge  ENT:  No difficulty hearing, tinnitus, vertigo; No sinus or throat pain  NECK: No pain or stiffness  RESPIRATORY: No cough, wheezing, chills or hemoptysis; No Shortness of Breath  CARDIOVASCULAR: No chest pain, palpitations, passing out, dizziness, or leg swelling  GASTROINTESTINAL: No abdominal or epigastric pain. No nausea, vomiting, or hematemesis; No diarrhea or constipation. No melena or hematochezia.  GENITOURINARY: No dysuria, frequency, hematuria, or incontinence  NEUROLOGICAL: No headaches, memory loss, loss of strength, numbness, or tremors  SKIN: No itching, burning, rashes, or lesions   LYMPH Nodes: No enlarged glands  ENDOCRINE: No heat or cold intolerance; No hair loss  MUSCULOSKELETAL: No joint pain or swelling; No muscle, back, or extremity pain  PSYCHIATRIC: No depression, anxiety, mood swings, or difficulty sleeping  HEME/LYMPH: No easy bruising, or bleeding gums  ALLERGY AND IMMUNOLOGIC: No hives or eczema	        Vital Signs Last 24 Hrs  T(C): 36.6 (18 Jul 2019 15:03), Max: 36.9 (17 Jul 2019 20:09)  T(F): 97.9 (18 Jul 2019 15:03), Max: 98.5 (17 Jul 2019 20:09)  HR: 77 (18 Jul 2019 15:03) (62 - 86)  BP: 131/70 (18 Jul 2019 15:03) (111/53 - 152/87)  BP(mean): --  RR: 18 (18 Jul 2019 15:03) (18 - 18)  SpO2: 99% (18 Jul 2019 15:03) (97% - 100%)      Constitutional:      HEENT: nad    Neck:  No JVD, bruits or thyromegaly    Respiratory:  Clear without rales or rhonchi    Cardiovascular:  RR without murmur, rub or gallop.    Gastrointestinal: Soft without hepatosplenomegaly.    Extremities: without cyanosis, clubbing or edema.    Neurological:  Oriented   x  3    . No gross sensory or motor defects.        LABS:                        12.1   6.99  )-----------( 178      ( 18 Jul 2019 07:41 )             37.5     07-18    133<L>  |  98  |  16  ----------------------------<  123<H>  4.0   |  29  |  0.73    Ca    8.6      18 Jul 2019 07:41  Phos  2.3     07-18  Mg     1.9     07-18    TPro  7.4  /  Alb  1.7<L>  /  TBili  0.5  /  DBili  x   /  AST  37  /  ALT  23  /  AlkPhos  229<H>  07-18        PT/INR - ( 18 Jul 2019 10:00 )   PT: 12.7 sec;   INR: 1.14 ratio         PTT - ( 18 Jul 2019 10:00 )  PTT:37.1 sec    CAPILLARY BLOOD GLUCOSE      POCT Blood Glucose.: 215 mg/dL (18 Jul 2019 16:36)  POCT Blood Glucose.: 184 mg/dL (18 Jul 2019 11:27)  POCT Blood Glucose.: 136 mg/dL (18 Jul 2019 07:50)  POCT Blood Glucose.: 164 mg/dL (17 Jul 2019 21:16)      RADIOLOGY & ADDITIONAL STUDIES:

## 2019-07-18 NOTE — PROGRESS NOTE ADULT - SUBJECTIVE AND OBJECTIVE BOX
CHIEF COMPLAINT:Patient is a 88y old  Female who presents with a chief complaint of Abdominal swelling.Pt appears comfortable.    	  REVIEW OF SYSTEMS:  CONSTITUTIONAL: No fever, weight loss, or fatigue  EYES: No eye pain, visual disturbances, or discharge  ENT:  No difficulty hearing, tinnitus, vertigo; No sinus or throat pain  NECK: No pain or stiffness  RESPIRATORY: No cough, wheezing, chills or hemoptysis; No Shortness of Breath  CARDIOVASCULAR: No chest pain, palpitations, passing out, dizziness, or leg swelling  GASTROINTESTINAL: No abdominal or epigastric pain. No nausea, vomiting, or hematemesis; No diarrhea or constipation. No melena or hematochezia.  GENITOURINARY: No dysuria, frequency, hematuria, or incontinence  NEUROLOGICAL: No headaches, memory loss, loss of strength, numbness, or tremors  SKIN: No itching, burning, rashes, or lesions   LYMPH Nodes: No enlarged glands  ENDOCRINE: No heat or cold intolerance; No hair loss  MUSCULOSKELETAL: No joint pain or swelling; No muscle, back, or extremity pain  PSYCHIATRIC: No depression, anxiety, mood swings, or difficulty sleeping  HEME/LYMPH: No easy bruising, or bleeding gums  ALLERGY AND IMMUNOLOGIC: No hives or eczema	      PHYSICAL EXAM:  T(C): 36.7 (07-18-19 @ 07:27), Max: 37.1 (07-17-19 @ 11:02)  HR: 85 (07-18-19 @ 07:27) (61 - 86)  BP: 137/66 (07-18-19 @ 07:27) (117/45 - 152/87)  RR: 18 (07-18-19 @ 07:27) (18 - 18)  SpO2: 97% (07-18-19 @ 07:27) (96% - 100%)    I&O's Summary    17 Jul 2019 07:01  -  18 Jul 2019 07:00  --------------------------------------------------------  IN: 430 mL / OUT: 200 mL / NET: 230 mL    18 Jul 2019 07:01  -  18 Jul 2019 10:27  --------------------------------------------------------  IN: 200 mL / OUT: 0 mL / NET: 200 mL        Appearance: Normal	  HEENT:   Normal oral mucosa, PERRL, EOMI	  Lymphatic: No lymphadenopathy  Cardiovascular: Normal S1 S2, No JVD, No murmurs, No edema  Respiratory: Dec BS at bases  Psychiatry: A & O x 3, Mood & affect appropriate  Gastrointestinal:  Soft, Non-tender, + BS	  Skin: No rashes, No ecchymoses, No cyanosis	  Neurologic: Non-focal  Extremities: Normal range of motion, No clubbing, cyanosis or edema  Vascular: Peripheral pulses palpable 2+ bilaterally    MEDICATIONS  (STANDING):  amiodarone    Tablet 200 milliGRAM(s) Oral daily  aspirin enteric coated 81 milliGRAM(s) Oral daily  dextrose 5%. 1000 milliLiter(s) (50 mL/Hr) IV Continuous <Continuous>  dextrose 50% Injectable 12.5 Gram(s) IV Push once  dextrose 50% Injectable 25 Gram(s) IV Push once  dextrose 50% Injectable 25 Gram(s) IV Push once  digoxin     Tablet 0.125 milliGRAM(s) Oral daily  furosemide   Injectable 40 milliGRAM(s) IV Push two times a day  heparin  Injectable 5000 Unit(s) SubCutaneous every 12 hours  insulin lispro (HumaLOG) corrective regimen sliding scale   SubCutaneous Before meals and at bedtime  insulin lispro Injectable (HumaLOG) 3 Unit(s) SubCutaneous three times a day before meals  latanoprost 0.005% Ophthalmic Solution 1 Drop(s) Both EYES at bedtime  metoprolol succinate ER 25 milliGRAM(s) Oral daily  multivitamin 1 Tablet(s) Oral daily  senna 2 Tablet(s) Oral at bedtime  spironolactone 25 milliGRAM(s) Oral daily        	  LABS:	 	                       12.1   6.99  )-----------( 178      ( 18 Jul 2019 07:41 )             37.5     07-18    133<L>  |  98  |  16  ----------------------------<  123<H>  4.0   |  29  |  0.73    Ca    8.6      18 Jul 2019 07:41  Phos  2.3     07-18  Mg     1.9     07-18    TPro  7.4  /  Alb  1.7<L>  /  TBili  0.5  /  DBili  x   /  AST  37  /  ALT  23  /  AlkPhos  229<H>  07-18    proBNP: Serum Pro-Brain Natriuretic Peptide: 6781 pg/mL (07-15 @ 14:10)    Lipid Profile: Cholesterol 173    HDL 32      HgA1c: Hemoglobin A1C, Whole Blood: 6.9 % (07-16 @ 10:19)  Hemoglobin A1C, Whole Blood: 7.1 % (07-16 @ 03:47)    TSH: Thyroid Stimulating Hormone, Serum: 3.61 uU/mL (07-16 @ 07:09)      EXAM:  XR CHEST PORTABLE ROUTINE 1V                            PROCEDURE DATE:  07/17/2019          INTERPRETATION:  AP semisupine chest on July 17, 2019 at 9:54 AM. Patient   being followed for positive lung findings.    CAT scan of July 15 showeda loculated effusion possibly a   hydropneumothorax in the right major fissure.    Heart is magnified by technique. Calcified mitral area left-sided   pacemaker again noted.    There is an increasing density in the right lower lung field consistent   with increase in size of the aforementioned process compared to July 15.    IMPRESSION: Increasing right base process.

## 2019-07-18 NOTE — PROGRESS NOTE ADULT - ASSESSMENT
88 year old female has medical history of HTN, A fib (not on AC), CHF, Gastric ulcer, Hepatitis C ,DM, glaucoma, anemia, SBO,surgical history significant for Intestinal resection, oophorectomy cholecystectomy, worsening abdominal swelling.  1.Check PPM.  2.ABX.  3.Afib-no AC,asa,amiodarone,lopressor.  4.DM-Insulin.  5.IV lasix and aldactone.Anasarca from low albumin.  6.PPI.  7.Echocardiogram.  8.GI eval noted.  9.SPEP-p.

## 2019-07-18 NOTE — PROGRESS NOTE ADULT - ASSESSMENT
The etiology for ascites in this patient can be due to  1. Malignant ascites  2. Cirrhosis  3. R/o SBP    Suggestions:    1. Consider increase Lasix to 40mg daily and Aldactone 25mg daily  2. Monitor electrolytes and BUN/ Creatinine  3. Low salt diet  4. Oncology evaluation   5.  Protonix daily  6. Check CEA, , AFP  7. DVT prophylaxis The etiology for ascites in this patient can be due to  1. Malignant ascites  2. Cirrhosis  3. IR reported paracentesis is not an option    Suggestions:    1. Consider increase Lasix to 40mg daily and Aldactone 25mg daily  2. Monitor electrolytes and BUN/ Creatinine  3. Low salt diet  4. Oncology evaluation   5.  Protonix daily  6. Check CEA, , AFP  7. DVT prophylaxis

## 2019-07-18 NOTE — CONSULT NOTE ADULT - ASSESSMENT
Patient is 88 year old female has medical history of HTN, A fib (not on AC), CHF, Gastric ulcer, Hepatitis C ,DM, glaucoma, anemia, SBO, surgical history significant for Intestinal resection, oophorectomy cholecystectomy. Pt was sent by Dr. Quintana for worsening abdominal swelling. Found to have un cont dm. Pt known to me as out pt. On humalog 3 ac tid off of lantus

## 2019-07-19 DIAGNOSIS — R97.8 OTHER ABNORMAL TUMOR MARKERS: ICD-10-CM

## 2019-07-19 DIAGNOSIS — E88.09 OTHER DISORDERS OF PLASMA-PROTEIN METABOLISM, NOT ELSEWHERE CLASSIFIED: ICD-10-CM

## 2019-07-19 LAB
ALBUMIN SERPL ELPH-MCNC: 1.7 G/DL — LOW (ref 3.5–5)
ALP SERPL-CCNC: 231 U/L — HIGH (ref 40–120)
ALT FLD-CCNC: 24 U/L DA — SIGNIFICANT CHANGE UP (ref 10–60)
ANION GAP SERPL CALC-SCNC: 5 MMOL/L — SIGNIFICANT CHANGE UP (ref 5–17)
AST SERPL-CCNC: 35 U/L — SIGNIFICANT CHANGE UP (ref 10–40)
BASOPHILS # BLD AUTO: 0.06 K/UL — SIGNIFICANT CHANGE UP (ref 0–0.2)
BASOPHILS NFR BLD AUTO: 1 % — SIGNIFICANT CHANGE UP (ref 0–2)
BILIRUB SERPL-MCNC: 0.4 MG/DL — SIGNIFICANT CHANGE UP (ref 0.2–1.2)
BUN SERPL-MCNC: 15 MG/DL — SIGNIFICANT CHANGE UP (ref 7–18)
CALCIUM SERPL-MCNC: 8.7 MG/DL — SIGNIFICANT CHANGE UP (ref 8.4–10.5)
CHLORIDE SERPL-SCNC: 98 MMOL/L — SIGNIFICANT CHANGE UP (ref 96–108)
CO2 SERPL-SCNC: 31 MMOL/L — SIGNIFICANT CHANGE UP (ref 22–31)
CREAT SERPL-MCNC: 0.83 MG/DL — SIGNIFICANT CHANGE UP (ref 0.5–1.3)
EOSINOPHIL # BLD AUTO: 0.17 K/UL — SIGNIFICANT CHANGE UP (ref 0–0.5)
EOSINOPHIL NFR BLD AUTO: 2.8 % — SIGNIFICANT CHANGE UP (ref 0–6)
GLUCOSE BLDC GLUCOMTR-MCNC: 145 MG/DL — HIGH (ref 70–99)
GLUCOSE BLDC GLUCOMTR-MCNC: 149 MG/DL — HIGH (ref 70–99)
GLUCOSE BLDC GLUCOMTR-MCNC: 194 MG/DL — HIGH (ref 70–99)
GLUCOSE BLDC GLUCOMTR-MCNC: 200 MG/DL — HIGH (ref 70–99)
GLUCOSE SERPL-MCNC: 156 MG/DL — HIGH (ref 70–99)
HCT VFR BLD CALC: 37.6 % — SIGNIFICANT CHANGE UP (ref 34.5–45)
HGB BLD-MCNC: 12 G/DL — SIGNIFICANT CHANGE UP (ref 11.5–15.5)
IMM GRANULOCYTES NFR BLD AUTO: 0.8 % — SIGNIFICANT CHANGE UP (ref 0–1.5)
LYMPHOCYTES # BLD AUTO: 1.86 K/UL — SIGNIFICANT CHANGE UP (ref 1–3.3)
LYMPHOCYTES # BLD AUTO: 30.1 % — SIGNIFICANT CHANGE UP (ref 13–44)
MAGNESIUM SERPL-MCNC: 1.7 MG/DL — SIGNIFICANT CHANGE UP (ref 1.6–2.6)
MCHC RBC-ENTMCNC: 29.2 PG — SIGNIFICANT CHANGE UP (ref 27–34)
MCHC RBC-ENTMCNC: 31.9 GM/DL — LOW (ref 32–36)
MCV RBC AUTO: 91.5 FL — SIGNIFICANT CHANGE UP (ref 80–100)
MONOCYTES # BLD AUTO: 0.53 K/UL — SIGNIFICANT CHANGE UP (ref 0–0.9)
MONOCYTES NFR BLD AUTO: 8.6 % — SIGNIFICANT CHANGE UP (ref 2–14)
NEUTROPHILS # BLD AUTO: 3.5 K/UL — SIGNIFICANT CHANGE UP (ref 1.8–7.4)
NEUTROPHILS NFR BLD AUTO: 56.7 % — SIGNIFICANT CHANGE UP (ref 43–77)
NRBC # BLD: 0 /100 WBCS — SIGNIFICANT CHANGE UP (ref 0–0)
PHOSPHATE SERPL-MCNC: 2.8 MG/DL — SIGNIFICANT CHANGE UP (ref 2.5–4.5)
PLATELET # BLD AUTO: 180 K/UL — SIGNIFICANT CHANGE UP (ref 150–400)
POTASSIUM SERPL-MCNC: 3.7 MMOL/L — SIGNIFICANT CHANGE UP (ref 3.5–5.3)
POTASSIUM SERPL-SCNC: 3.7 MMOL/L — SIGNIFICANT CHANGE UP (ref 3.5–5.3)
PROT SERPL-MCNC: 7.4 G/DL — SIGNIFICANT CHANGE UP (ref 6–8.3)
RBC # BLD: 4.11 M/UL — SIGNIFICANT CHANGE UP (ref 3.8–5.2)
RBC # FLD: 16.8 % — HIGH (ref 10.3–14.5)
SODIUM SERPL-SCNC: 134 MMOL/L — LOW (ref 135–145)
WBC # BLD: 6.17 K/UL — SIGNIFICANT CHANGE UP (ref 3.8–10.5)
WBC # FLD AUTO: 6.17 K/UL — SIGNIFICANT CHANGE UP (ref 3.8–10.5)

## 2019-07-19 RX ORDER — POTASSIUM CHLORIDE 20 MEQ
20 PACKET (EA) ORAL ONCE
Refills: 0 | Status: COMPLETED | OUTPATIENT
Start: 2019-07-19 | End: 2019-07-19

## 2019-07-19 RX ORDER — FUROSEMIDE 40 MG
40 TABLET ORAL DAILY
Refills: 0 | Status: DISCONTINUED | OUTPATIENT
Start: 2019-07-19 | End: 2019-07-23

## 2019-07-19 RX ORDER — MAGNESIUM SULFATE 500 MG/ML
1 VIAL (ML) INJECTION ONCE
Refills: 0 | Status: COMPLETED | OUTPATIENT
Start: 2019-07-19 | End: 2019-07-19

## 2019-07-19 RX ADMIN — HEPARIN SODIUM 5000 UNIT(S): 5000 INJECTION INTRAVENOUS; SUBCUTANEOUS at 17:16

## 2019-07-19 RX ADMIN — Medication 1: at 11:51

## 2019-07-19 RX ADMIN — AMIODARONE HYDROCHLORIDE 200 MILLIGRAM(S): 400 TABLET ORAL at 05:27

## 2019-07-19 RX ADMIN — Medication 3 UNIT(S): at 08:11

## 2019-07-19 RX ADMIN — Medication 1: at 22:04

## 2019-07-19 RX ADMIN — Medication 100 GRAM(S): at 12:19

## 2019-07-19 RX ADMIN — HEPARIN SODIUM 5000 UNIT(S): 5000 INJECTION INTRAVENOUS; SUBCUTANEOUS at 05:27

## 2019-07-19 RX ADMIN — Medication 1 TABLET(S): at 11:50

## 2019-07-19 RX ADMIN — Medication 650 MILLIGRAM(S): at 18:06

## 2019-07-19 RX ADMIN — Medication 650 MILLIGRAM(S): at 19:17

## 2019-07-19 RX ADMIN — Medication 3 UNIT(S): at 11:51

## 2019-07-19 RX ADMIN — Medication 81 MILLIGRAM(S): at 11:50

## 2019-07-19 RX ADMIN — Medication 20 MILLIEQUIVALENT(S): at 12:18

## 2019-07-19 RX ADMIN — Medication 25 MILLIGRAM(S): at 05:28

## 2019-07-19 RX ADMIN — Medication 100 MILLIGRAM(S): at 18:07

## 2019-07-19 RX ADMIN — Medication 20 MILLIGRAM(S): at 05:31

## 2019-07-19 RX ADMIN — Medication 3 UNIT(S): at 17:16

## 2019-07-19 RX ADMIN — SENNA PLUS 2 TABLET(S): 8.6 TABLET ORAL at 22:05

## 2019-07-19 RX ADMIN — Medication 0.12 MILLIGRAM(S): at 05:27

## 2019-07-19 RX ADMIN — SPIRONOLACTONE 25 MILLIGRAM(S): 25 TABLET, FILM COATED ORAL at 05:28

## 2019-07-19 RX ADMIN — LATANOPROST 1 DROP(S): 0.05 SOLUTION/ DROPS OPHTHALMIC; TOPICAL at 22:05

## 2019-07-19 NOTE — CONSULT NOTE ADULT - ASSESSMENT
88 year old female with a severe pneumonia 2 m ago was admitted for increasing abdominal girth in the last 2 weeks.  She had IgG spikes found in the last admission.  her CEA and Ca 19.9 are elevated too.

## 2019-07-19 NOTE — PROGRESS NOTE ADULT - SUBJECTIVE AND OBJECTIVE BOX
[   ] ICU                                          [   ] CCU                                      [ X  ] Medical Floor    Patient is comfortable. No new complaints reported, No abdominal pain, N/V, hematemesis, hematochezia, melena, fever, chills, chest pain, SOB, cough or diarrhea reported.      VITALS  Vital Signs Last 24 Hrs  T(C): 37.1 (19 Jul 2019 16:04), Max: 37.1 (19 Jul 2019 07:43)  T(F): 98.7 (19 Jul 2019 16:04), Max: 98.7 (19 Jul 2019 07:43)  HR: 73 (19 Jul 2019 16:04) (65 - 92)  BP: 141/80 (19 Jul 2019 16:04) (112/47 - 165/80)   RR: 18 (19 Jul 2019 16:04) (18 - 18)  SpO2: 100% (19 Jul 2019 16:04) (99% - 100%)         MEDICATIONS  (STANDING):  amiodarone    Tablet 200 milliGRAM(s) Oral daily  aspirin enteric coated 81 milliGRAM(s) Oral daily  dextrose 5%. 1000 milliLiter(s) (50 mL/Hr) IV Continuous <Continuous>  dextrose 50% Injectable 12.5 Gram(s) IV Push once  dextrose 50% Injectable 25 Gram(s) IV Push once  dextrose 50% Injectable 25 Gram(s) IV Push once  digoxin     Tablet 0.125 milliGRAM(s) Oral daily  furosemide    Tablet 40 milliGRAM(s) Oral daily  heparin  Injectable 5000 Unit(s) SubCutaneous every 12 hours  insulin lispro (HumaLOG) corrective regimen sliding scale   SubCutaneous Before meals and at bedtime  insulin lispro Injectable (HumaLOG) 3 Unit(s) SubCutaneous three times a day before meals  latanoprost 0.005% Ophthalmic Solution 1 Drop(s) Both EYES at bedtime  metoprolol succinate ER 25 milliGRAM(s) Oral daily  multivitamin 1 Tablet(s) Oral daily  senna 2 Tablet(s) Oral at bedtime  spironolactone 25 milliGRAM(s) Oral daily    MEDICATIONS  (PRN):  acetaminophen   Tablet .. 650 milliGRAM(s) Oral every 6 hours PRN Mild Pain (1 - 3)  dextrose 40% Gel 15 Gram(s) Oral once PRN Blood Glucose LESS THAN 70 milliGRAM(s)/deciLiter  glucagon  Injectable 1 milliGRAM(s) IntraMuscular once PRN Glucose <70 milliGRAM(s)/deciLiter  guaiFENesin   Syrup  (Sugar-Free) 100 milliGRAM(s) Oral every 6 hours PRN Cough                            12.0   6.17  )-----------( 180      ( 19 Jul 2019 07:48 )             37.6       07-19    134<L>  |  98  |  15  ----------------------------<  156<H>  3.7   |  31  |  0.83    Ca    8.7      19 Jul 2019 07:48  Phos  2.8     07-19  Mg     1.7     07-19    TPro  7.4  /  Alb  1.7<L>  /  TBili  0.4  /  DBili  x   /  AST  35  /  ALT  24  /  AlkPhos  231<H>  07-19      PT/INR - ( 18 Jul 2019 10:00 )   PT: 12.7 sec;   INR: 1.14 ratio         PTT - ( 18 Jul 2019 10:00 )  PTT:37.1 sec

## 2019-07-19 NOTE — CONSULT NOTE ADULT - CONSULT REASON
Abdominal distension with ascites
M spike, elevated tumor markers
SOB
r/o right sided empyema
L hydropneumothorax
Empyema
un cont dm

## 2019-07-19 NOTE — PROGRESS NOTE ADULT - ASSESSMENT
88 year old female has medical history of HTN, A fib (not on AC), CHF, Gastric ulcer, Hepatitis C ,DM, glaucoma, anemia, SBO,surgical history significant for Intestinal resection, oophorectomy cholecystectomy, worsening abdominal swelling.  1.SPEP+-Heme eval.  2.ABX.  3.Afib-no AC,asa,amiodarone,lopressor.  4.DM-Insulin.  5.Change to po lasix and aldactone.Anasarca from low albumin.  6.PPI.  7.GI f/u.

## 2019-07-19 NOTE — CONSULT NOTE ADULT - REASON FOR ADMISSION
Abdominal swelling

## 2019-07-19 NOTE — PROGRESS NOTE ADULT - SUBJECTIVE AND OBJECTIVE BOX
Pt is awake, alert, lying in bed in NAD.     INTERVAL HPI/OVERNIGHT EVENTS:      VITAL SIGNS:  T(F): 98.7 (07-19-19 @ 07:43)  HR: 75 (07-19-19 @ 07:43)  BP: 130/76 (07-19-19 @ 07:43)  RR: 18 (07-19-19 @ 07:43)  SpO2: 100% (07-19-19 @ 07:43)  Wt(kg): --  I&O's Detail    18 Jul 2019 07:01  -  19 Jul 2019 07:00  --------------------------------------------------------  IN:    Oral Fluid: 430 mL  Total IN: 430 mL    OUT:  Total OUT: 0 mL    Total NET: 430 mL      19 Jul 2019 07:01  -  19 Jul 2019 10:54  --------------------------------------------------------  IN:    Oral Fluid: 200 mL  Total IN: 200 mL    OUT:  Total OUT: 0 mL    Total NET: 200 mL      REVIEW OF SYSTEMS:    CONSTITUTIONAL:  No fevers, chills, sweats    HEENT:  Eyes:  No diplopia or blurred vision. ENT:  No earache, sore throat or runny nose.    CARDIOVASCULAR:  No pressure, squeezing, tightness, or heaviness about the chest; no palpitations.    RESPIRATORY:  Per HPI    GASTROINTESTINAL:  No abdominal pain, nausea, vomiting or diarrhea.    GENITOURINARY:  No dysuria, frequency or urgency.    NEUROLOGIC:  No paresthesias, fasciculations, seizures or weakness.    PSYCHIATRIC:  No disorder of thought or mood.      PHYSICAL EXAM:  Constitutional: Well developed and nourished  Eyes: Perrla  ENMT: normal  Neck: supple  Respiratory: +rales, posterior left base.   Cardiovascular: S1 S2 regular  Gastrointestinal: Soft, Non tender  Extremities: No edema  Vascular: normal  Neurological: Awake, alert,Ox3  Musculoskeletal: Normal        MEDICATIONS  (STANDING):  amiodarone    Tablet 200 milliGRAM(s) Oral daily  aspirin enteric coated 81 milliGRAM(s) Oral daily  dextrose 5%. 1000 milliLiter(s) (50 mL/Hr) IV Continuous <Continuous>  dextrose 50% Injectable 12.5 Gram(s) IV Push once  dextrose 50% Injectable 25 Gram(s) IV Push once  dextrose 50% Injectable 25 Gram(s) IV Push once  digoxin     Tablet 0.125 milliGRAM(s) Oral daily  furosemide   Injectable 20 milliGRAM(s) IV Push daily  heparin  Injectable 5000 Unit(s) SubCutaneous every 12 hours  insulin lispro (HumaLOG) corrective regimen sliding scale   SubCutaneous Before meals and at bedtime  insulin lispro Injectable (HumaLOG) 3 Unit(s) SubCutaneous three times a day before meals  latanoprost 0.005% Ophthalmic Solution 1 Drop(s) Both EYES at bedtime  metoprolol succinate ER 25 milliGRAM(s) Oral daily  multivitamin 1 Tablet(s) Oral daily  senna 2 Tablet(s) Oral at bedtime  spironolactone 25 milliGRAM(s) Oral daily    MEDICATIONS  (PRN):  acetaminophen   Tablet .. 650 milliGRAM(s) Oral every 6 hours PRN Mild Pain (1 - 3)  dextrose 40% Gel 15 Gram(s) Oral once PRN Blood Glucose LESS THAN 70 milliGRAM(s)/deciLiter  glucagon  Injectable 1 milliGRAM(s) IntraMuscular once PRN Glucose <70 milliGRAM(s)/deciLiter  guaiFENesin   Syrup  (Sugar-Free) 100 milliGRAM(s) Oral every 6 hours PRN Cough      Allergies    No Known Allergies    Intolerances        LABS:                        12.0   6.17  )-----------( 180      ( 19 Jul 2019 07:48 )             37.6     07-19    134<L>  |  98  |  15  ----------------------------<  156<H>  3.7   |  31  |  0.83    Ca    8.7      19 Jul 2019 07:48  Phos  2.8     07-19  Mg     1.7     07-19    TPro  7.4  /  Alb  1.7<L>  /  TBili  0.4  /  DBili  x   /  AST  35  /  ALT  24  /  AlkPhos  231<H>  07-19    PT/INR - ( 18 Jul 2019 10:00 )   PT: 12.7 sec;   INR: 1.14 ratio         PTT - ( 18 Jul 2019 10:00 )  PTT:37.1 sec          CAPILLARY BLOOD GLUCOSE      POCT Blood Glucose.: 145 mg/dL (19 Jul 2019 07:52)  POCT Blood Glucose.: 193 mg/dL (18 Jul 2019 21:47)  POCT Blood Glucose.: 215 mg/dL (18 Jul 2019 16:36)  POCT Blood Glucose.: 184 mg/dL (18 Jul 2019 11:27)    pro-bnp [id number] 07-15 @ 14:10     d-dimer --  07-15 @ 14:10      RADIOLOGY & ADDITIONAL TESTS:    CXR:    Ct scan chest:    ekg;    echo:

## 2019-07-19 NOTE — PROGRESS NOTE ADULT - SUBJECTIVE AND OBJECTIVE BOX
CHIEF COMPLAINT:Patient is a 88y old  Female who presents with a chief complaint of Abdominal swelling .Pt appears comfortable.    	  REVIEW OF SYSTEMS:  CONSTITUTIONAL: No fever, weight loss, or fatigue  EYES: No eye pain, visual disturbances, or discharge  ENT:  No difficulty hearing, tinnitus, vertigo; No sinus or throat pain  NECK: No pain or stiffness  RESPIRATORY: No cough, wheezing, chills or hemoptysis; No Shortness of Breath  CARDIOVASCULAR: No chest pain, palpitations, passing out, dizziness, or leg swelling  GASTROINTESTINAL: No abdominal or epigastric pain. No nausea, vomiting, or hematemesis; No diarrhea or constipation. No melena or hematochezia.  GENITOURINARY: No dysuria, frequency, hematuria, or incontinence  NEUROLOGICAL: No headaches, memory loss, loss of strength, numbness, or tremors  SKIN: No itching, burning, rashes, or lesions   LYMPH Nodes: No enlarged glands  ENDOCRINE: No heat or cold intolerance; No hair loss  MUSCULOSKELETAL: No joint pain or swelling; No muscle, back, or extremity pain  PSYCHIATRIC: No depression, anxiety, mood swings, or difficulty sleeping  HEME/LYMPH: No easy bruising, or bleeding gums  ALLERGY AND IMMUNOLOGIC: No hives or eczema	      PHYSICAL EXAM:  T(C): 37.1 (07-19-19 @ 07:43), Max: 37.1 (07-19-19 @ 07:43)  HR: 75 (07-19-19 @ 07:43) (67 - 92)  BP: 130/76 (07-19-19 @ 07:43) (111/53 - 165/80)  RR: 18 (07-19-19 @ 07:43) (18 - 18)  SpO2: 100% (07-19-19 @ 07:43) (97% - 100%)    I&O's Summary    18 Jul 2019 07:01  -  19 Jul 2019 07:00  --------------------------------------------------------  IN: 430 mL / OUT: 0 mL / NET: 430 mL    19 Jul 2019 07:01  -  19 Jul 2019 10:31  --------------------------------------------------------  IN: 200 mL / OUT: 0 mL / NET: 200 mL        Appearance: Normal	  HEENT:   Normal oral mucosa, PERRL, EOMI	  Lymphatic: No lymphadenopathy  Cardiovascular: Normal S1 S2, No JVD, No murmurs, No edema  Respiratory: Lungs clear to auscultation	  Psychiatry: A & O x 3, Mood & affect appropriate  Gastrointestinal:  Soft, Non-tender, + BS	  Skin: No rashes, No ecchymoses, No cyanosis	  Neurologic: Non-focal  Extremities: Normal range of motion, No clubbing, cyanosis or edema  Vascular: Peripheral pulses palpable 2+ bilaterally    MEDICATIONS  (STANDING):  amiodarone    Tablet 200 milliGRAM(s) Oral daily  aspirin enteric coated 81 milliGRAM(s) Oral daily  dextrose 5%. 1000 milliLiter(s) (50 mL/Hr) IV Continuous <Continuous>  dextrose 50% Injectable 12.5 Gram(s) IV Push once  dextrose 50% Injectable 25 Gram(s) IV Push once  dextrose 50% Injectable 25 Gram(s) IV Push once  digoxin     Tablet 0.125 milliGRAM(s) Oral daily  furosemide   Injectable 20 milliGRAM(s) IV Push daily  heparin  Injectable 5000 Unit(s) SubCutaneous every 12 hours  insulin lispro (HumaLOG) corrective regimen sliding scale   SubCutaneous Before meals and at bedtime  insulin lispro Injectable (HumaLOG) 3 Unit(s) SubCutaneous three times a day before meals  latanoprost 0.005% Ophthalmic Solution 1 Drop(s) Both EYES at bedtime  metoprolol succinate ER 25 milliGRAM(s) Oral daily  multivitamin 1 Tablet(s) Oral daily  senna 2 Tablet(s) Oral at bedtime  spironolactone 25 milliGRAM(s) Oral daily      TELEMETRY: 	afib      	  LABS:	 	                       12.0   6.17  )-----------( 180      ( 19 Jul 2019 07:48 )             37.6     07-19    134<L>  |  98  |  15  ----------------------------<  156<H>  3.7   |  31  |  0.83    Ca    8.7      19 Jul 2019 07:48  Phos  2.8     07-19  Mg     1.7     07-19    TPro  7.4  /  Alb  1.7<L>  /  TBili  0.4  /  DBili  x   /  AST  35  /  ALT  24  /  AlkPhos  231<H>  07-19    proBNP: Serum Pro-Brain Natriuretic Peptide: 6781 pg/mL (07-15 @ 14:10)    Lipid Profile: Cholesterol 173    HDL 32      HgA1c: Hemoglobin A1C, Whole Blood: 6.9 % (07-16 @ 10:19)  Hemoglobin A1C, Whole Blood: 7.1 % (07-16 @ 03:47)    TSH: Thyroid Stimulating Hormone, Serum: 3.61 uU/mL (07-16 @ 07:09)      Protein Electrophoresis, Serum (07.17.19 @ 20:27)    Protein Total, Serum: 6.6 g/dL    Total Protein, Serum: 6.6 g/dL    Albumin, Serum: 2.2 g/dL    Alpha 1: 0.3 g/dL    Alpha 2: 0.9 g/dL    Beta Globulin: 0.9 g/dL    Gamma Globulin: 2.3 g/dL    M-Ted: See Note    % Albumin: 34.0 %    % Alpha 1: 4.3 %    % Alpha 2: 12.9 %    % Beta: 13.6 %    % Gamma: 35.2 %    % M Ted: See Note: M-1 Ted= 13.4%= 0.9 g/dl= IgG Kappa  M-2 Ted= 5.4%= 0.4 g/dl= IgG Lambda  M-3 Ted= Unable to quantitate= IgG Lambda    Albumin/Globulin Ratio: 0.5 Ratio    Serum Protein Electrophoresis Interp: Three Gamma-Migrating Paraproteins Identified

## 2019-07-19 NOTE — PROGRESS NOTE ADULT - ASSESSMENT
1. R/O Empyema  - CXR noted.   - CT chest notes - Loculated fluid with air in the right major fissure may represent small loculated hydropneumothorax versus empyema with air producing organism.   - Thoracic surgery consult - conservative management  - O2 supplement  - Bronchodilators   - F/U CXR   - ID Recc no ABX    2. Atelectasis  - O2 Supp  - Bronchodilators  - Incentive spirometry     3. CHF  - Echo noted   - Tele monitoring  - Cardio eval  - Diuretics PRN  - DVT and GI PPX     4. Ascites  - GI Eval noted  - Recc considering therapeutic/diagnostic paracentesis with cytology.  - Tumor markers   - May need diuretics.     5. Pulmonary HTN  - RVP 49  - Bronchodilators  - CCB

## 2019-07-19 NOTE — CONSULT NOTE ADULT - PROBLEM SELECTOR RECOMMENDATION 9
overall decent control  hold lantus   cont with pre meal humalog 3 units  aim fsg 140- 180  d/w pts family at bedside
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

## 2019-07-19 NOTE — PROGRESS NOTE ADULT - SUBJECTIVE AND OBJECTIVE BOX
PGY 1 Note discussed with supervising resident and primary attending    Patient is a 88y old  Female who presents with a chief complaint of Abdominal swelling (18 Jul 2019 20:11)      INTERVAL HPI/OVERNIGHT EVENTS: offers no new complaints; current symptoms resolving    MEDICATIONS  (STANDING):  amiodarone    Tablet 200 milliGRAM(s) Oral daily  aspirin enteric coated 81 milliGRAM(s) Oral daily  dextrose 5%. 1000 milliLiter(s) (50 mL/Hr) IV Continuous <Continuous>  dextrose 50% Injectable 12.5 Gram(s) IV Push once  dextrose 50% Injectable 25 Gram(s) IV Push once  dextrose 50% Injectable 25 Gram(s) IV Push once  digoxin     Tablet 0.125 milliGRAM(s) Oral daily  furosemide   Injectable 20 milliGRAM(s) IV Push daily  heparin  Injectable 5000 Unit(s) SubCutaneous every 12 hours  insulin lispro (HumaLOG) corrective regimen sliding scale   SubCutaneous Before meals and at bedtime  insulin lispro Injectable (HumaLOG) 3 Unit(s) SubCutaneous three times a day before meals  latanoprost 0.005% Ophthalmic Solution 1 Drop(s) Both EYES at bedtime  metoprolol succinate ER 25 milliGRAM(s) Oral daily  multivitamin 1 Tablet(s) Oral daily  senna 2 Tablet(s) Oral at bedtime  spironolactone 25 milliGRAM(s) Oral daily    MEDICATIONS  (PRN):  acetaminophen   Tablet .. 650 milliGRAM(s) Oral every 6 hours PRN Mild Pain (1 - 3)  dextrose 40% Gel 15 Gram(s) Oral once PRN Blood Glucose LESS THAN 70 milliGRAM(s)/deciLiter  glucagon  Injectable 1 milliGRAM(s) IntraMuscular once PRN Glucose <70 milliGRAM(s)/deciLiter  guaiFENesin   Syrup  (Sugar-Free) 100 milliGRAM(s) Oral every 6 hours PRN Cough      __________________________________________________  REVIEW OF SYSTEMS:    CONSTITUTIONAL: No fever,   EYES: no acute visual disturbances  NECK: No pain or stiffness  RESPIRATORY: No cough; No shortness of breath  CARDIOVASCULAR: No chest pain, no palpitations  GASTROINTESTINAL: No pain. No nausea or vomiting; No diarrhea   NEUROLOGICAL: No headache or numbness, no tremors  MUSCULOSKELETAL: No joint pain, no muscle pain  GENITOURINARY: no dysuria, no frequency, no hesitancy  PSYCHIATRY: no depression , no anxiety  ALL OTHER  ROS negative        Vital Signs Last 24 Hrs  T(C): 37.1 (19 Jul 2019 07:43), Max: 37.1 (19 Jul 2019 07:43)  T(F): 98.7 (19 Jul 2019 07:43), Max: 98.7 (19 Jul 2019 07:43)  HR: 75 (19 Jul 2019 07:43) (67 - 92)  BP: 130/76 (19 Jul 2019 07:43) (111/53 - 165/80)  BP(mean): --  RR: 18 (19 Jul 2019 07:43) (18 - 18)  SpO2: 100% (19 Jul 2019 07:43) (97% - 100%)    ________________________________________________  PHYSICAL EXAM:  GENERAL: NAD  HEENT: Normocephalic;  conjunctivae and sclerae clear; moist mucous membranes;   NECK : supple  CHEST/LUNG: Clear to auscultation bilaterally with good air entry   HEART: S1 S2  regular; no murmurs, gallops or rubs  ABDOMEN: Soft, Nontender, Nondistended; Bowel sounds present  EXTREMITIES: no cyanosis; no edema; no calf tenderness  SKIN: warm and dry; no rash  NERVOUS SYSTEM:  Awake and alert; Oriented  to place, person and time ; no new deficits    _________________________________________________  LABS:                        12.0   6.17  )-----------( 180      ( 19 Jul 2019 07:48 )             37.6     07-19    134<L>  |  98  |  15  ----------------------------<  156<H>  3.7   |  31  |  0.83    Ca    8.7      19 Jul 2019 07:48  Phos  2.8     07-19  Mg     1.7     07-19    TPro  7.4  /  Alb  1.7<L>  /  TBili  0.4  /  DBili  x   /  AST  35  /  ALT  24  /  AlkPhos  231<H>  07-19    PT/INR - ( 18 Jul 2019 10:00 )   PT: 12.7 sec;   INR: 1.14 ratio         PTT - ( 18 Jul 2019 10:00 )  PTT:37.1 sec    CAPILLARY BLOOD GLUCOSE      POCT Blood Glucose.: 145 mg/dL (19 Jul 2019 07:52)  POCT Blood Glucose.: 193 mg/dL (18 Jul 2019 21:47)  POCT Blood Glucose.: 215 mg/dL (18 Jul 2019 16:36)  POCT Blood Glucose.: 184 mg/dL (18 Jul 2019 11:27)        RADIOLOGY & ADDITIONAL TESTS:    Imaging Personally Reviewed:  YES/NO    Consultant(s) Notes Reviewed:   YES/ No    Care Discussed with Consultants :     Plan of care was discussed with patient and /or primary care giver; all questions and concerns were addressed and care was aligned with patient's wishes.

## 2019-07-19 NOTE — CONSULT NOTE ADULT - CONSULT REQUESTED DATE/TIME
16-Jul-2019 10:49
16-Jul-2019 15:59
17-Jul-2019 17:25
19-Jul-2019 20:19
17-Jul-2019
16-Jul-2019 13:10
18-Jul-2019 18:10

## 2019-07-19 NOTE — CONSULT NOTE ADULT - SUBJECTIVE AND OBJECTIVE BOX
Patient is a 88y old  Female who presents with a chief complaint of Abdominal swelling (19 Jul 2019 18:13)      HPI:  Patient is 88 year old female has medical history of HTN, A fib (not on AC), CHF, Gastric ulcer, Hepatitis C ,DM, glaucoma, anemia, SBO, surgical history significant for Intestinal resection, oophorectomy cholecystectomy. Pt was sent by Dr. Quintana for worsening abdominal swelling     Patient was in her usual health 2 weeks ago, when she was noticed abdominal swelling, insidious onset, slowly worsening, pt was given lasix but it did not help, pt has on and off abdominal pain, anorexia, weakness, lethargy, loss of energy, orthopnea and leg swelling Pt denies nausea, vomiting, constipation, diarrhea, fever, dysuria. Patient is also complaining of cough with white sputum. (15 Jul 2019 16:55) She was here 2 m ago for a severe pneumonia.  M spikes were found.       ROS:  Negative except for:    PAST MEDICAL & SURGICAL HISTORY:  Multiple falls  GIB (gastrointestinal bleeding): 2/2019 requiring 2 PRBCs  Gastric AVM: EGD 2/2019 - cauterized  PNA (pneumonia): 2/2019   treated with ABX while in [facility]  T2DM (type 2 diabetes mellitus): last A1c 7.8   4/11/19  Hepatitis C virus: not treated  A-fib: no Eliquis since 1/2019  Constipation  Glaucoma  Vertigo  Graves disease: no treatment as per patient and family  Pacemaker: medtronic ADDRL1  2011  HTN (hypertension)  S/P cardiac pacemaker procedure: Medtronic ADDRL1  2011  S/P cholecystectomy  S/P partial resection of colon: &gt; 5 yrs ago  H/O oophorectomy      SOCIAL HISTORY:    FAMILY HISTORY:  Family history of hypertension  Family history of diabetes mellitus      MEDICATIONS  (STANDING):  amiodarone    Tablet 200 milliGRAM(s) Oral daily  aspirin enteric coated 81 milliGRAM(s) Oral daily  dextrose 5%. 1000 milliLiter(s) (50 mL/Hr) IV Continuous <Continuous>  dextrose 50% Injectable 12.5 Gram(s) IV Push once  dextrose 50% Injectable 25 Gram(s) IV Push once  dextrose 50% Injectable 25 Gram(s) IV Push once  digoxin     Tablet 0.125 milliGRAM(s) Oral daily  furosemide    Tablet 40 milliGRAM(s) Oral daily  heparin  Injectable 5000 Unit(s) SubCutaneous every 12 hours  insulin lispro (HumaLOG) corrective regimen sliding scale   SubCutaneous Before meals and at bedtime  insulin lispro Injectable (HumaLOG) 3 Unit(s) SubCutaneous three times a day before meals  latanoprost 0.005% Ophthalmic Solution 1 Drop(s) Both EYES at bedtime  metoprolol succinate ER 25 milliGRAM(s) Oral daily  multivitamin 1 Tablet(s) Oral daily  senna 2 Tablet(s) Oral at bedtime  spironolactone 25 milliGRAM(s) Oral daily    MEDICATIONS  (PRN):  acetaminophen   Tablet .. 650 milliGRAM(s) Oral every 6 hours PRN Mild Pain (1 - 3)  dextrose 40% Gel 15 Gram(s) Oral once PRN Blood Glucose LESS THAN 70 milliGRAM(s)/deciLiter  glucagon  Injectable 1 milliGRAM(s) IntraMuscular once PRN Glucose <70 milliGRAM(s)/deciLiter  guaiFENesin   Syrup  (Sugar-Free) 100 milliGRAM(s) Oral every 6 hours PRN Cough      Allergies    No Known Allergies    Intolerances        Vital Signs Last 24 Hrs  T(C): 37.1 (19 Jul 2019 20:13), Max: 37.1 (19 Jul 2019 07:43)  T(F): 98.8 (19 Jul 2019 20:13), Max: 98.8 (19 Jul 2019 20:13)  HR: 84 (19 Jul 2019 20:13) (65 - 92)  BP: 131/64 (19 Jul 2019 20:13) (112/47 - 165/80)  BP(mean): --  RR: 18 (19 Jul 2019 20:13) (18 - 18)  SpO2: 100% (19 Jul 2019 20:13) (99% - 100%)    PHYSICAL EXAM  General: adult in NAD  HEENT: clear oropharynx, anicteric sclera, pink conjunctiva  Neck: supple  CV: normal S1/S2 with no murmur rubs or gallops  Lungs: positive air movement b/l ant lungs,clear to auscultation, no wheezes, no rales  Abdomen: soft non-tender non-distended, no hepatosplenomegaly  Ext: no clubbing cyanosis or edema  Skin: no rashes and no petechiae  Neuro: alert and oriented X 4, no focal deficits      LABS:                          12.0   6.17  )-----------( 180      ( 19 Jul 2019 07:48 )             37.6         Mean Cell Volume : 91.5 fl  Mean Cell Hemoglobin : 29.2 pg  Mean Cell Hemoglobin Concentration : 31.9 gm/dL  Auto Neutrophil # : 3.50 K/uL  Auto Lymphocyte # : 1.86 K/uL  Auto Monocyte # : 0.53 K/uL  Auto Eosinophil # : 0.17 K/uL  Auto Basophil # : 0.06 K/uL  Auto Neutrophil % : 56.7 %  Auto Lymphocyte % : 30.1 %  Auto Monocyte % : 8.6 %  Auto Eosinophil % : 2.8 %  Auto Basophil % : 1.0 %      Serial CBC's  07-19 @ 07:48  Hct-37.6 / Hgb-12.0 / Plat-180 / RBC-4.11 / WBC-6.17  Serial CBC's  07-18 @ 07:41  Hct-37.5 / Hgb-12.1 / Plat-178 / RBC-4.14 / WBC-6.99  Serial CBC's  07-17 @ 05:58  Hct-33.0 / Hgb-10.8 / Plat-154 / RBC-3.63 / WBC-8.07  Serial CBC's  07-16 @ 07:09  Hct-35.4 / Hgb-11.3 / Plat-151 / RBC-3.82 / WBC-9.38      07-19    134<L>  |  98  |  15  ----------------------------<  156<H>  3.7   |  31  |  0.83    Ca    8.7      19 Jul 2019 07:48  Phos  2.8     07-19  Mg     1.7     07-19    TPro  7.4  /  Alb  1.7<L>  /  TBili  0.4  /  DBili  x   /  AST  35  /  ALT  24  /  AlkPhos  231<H>  07-19      PT/INR - ( 18 Jul 2019 10:00 )   PT: 12.7 sec;   INR: 1.14 ratio         PTT - ( 18 Jul 2019 10:00 )  PTT:37.1 sec    Folate, Serum: 8.5 ng/mL (07-16 @ 10:20)  Vitamin B12, Serum: 1449 pg/mL (07-16 @ 10:20)  Iron - Total Binding Capacity.: 166 ug/dL (07-16 @ 07:09)      Serum Protein Electrophoresis Interp: Three Gamma-Migrating Paraproteins Identified (07-17 @ 20:27)  Immunofixation, Serum:   One IgG Kappa and Two IgG Lambda Bands Identified    Reference Range: None Detected (07-17 @ 20:27)          BLOOD SMEAR INTERPRETATION:       RADIOLOGY & ADDITIONAL STUDIES:  < from: CT Chest No Cont (07.15.19 @ 19:28) >  INTERPRETATION:  CLINICAL INFORMATION: Lung abscess    COMPARISON: CT abdomen pelvis 7/15/2019. CT chest 6/2/2019.    PROCEDURE:   CT of the Chest was performed withoutintravenous contrast.  Sagittal and coronal reformats were performed.      FINDINGS:    LUNGS AND AIRWAYS: Patent central airways.  Subcentimeter calcified   granuloma in the right lower lobe. Scattered mild dependent atelectatic   changes. Tree-in-bud opacities in the bilateral lower lungs.    PLEURA: Within the right major fissure there is loculated fluid and air   measuring 5.0 x 3.7 x 3.8 cm. Evaluation is limited without intravenous   contrast. Findings may represent loculated hydropneumothorax versus   empyema with air producing organism There is a small right effusion at   the lung base with mild associated atelectasis.    MEDIASTINUM AND ROLF: Several mildly prominent mediastinal lymph nodes   the largest is a right paratracheal node measuring 1.4 x 1.0 cm,   unchanged.    VESSELS: Within normal limits.    HEART: Cardiomegaly. Cardiac pacemaker. Coronary artery and valvular   calcifications. No pericardial effusion.    CHEST WALL AND LOWER NECK: Multinodular thyroid gland with scattered   areas of coarse calcium. This would be better evaluated with ultrasound.    VISUALIZED UPPER ABDOMEN: Abdominal ascites. Anasarca. For evaluation of   the abdominal contents see CT abdomen pelvis from 7/15/2019.    BONES: Within normal limits.    < end of copied text >  < from: CT Chest No Cont (07.15.19 @ 19:28) >  IMPRESSION:     Loculated fluid with air in the right major fissure may represent small   loculated hydropneumothorax versus empyema with air producing organism.   Evaluation is limited without intravenous contrast.    < end of copied text >

## 2019-07-19 NOTE — PROGRESS NOTE ADULT - ASSESSMENT
1. Ascites  2. Cirrhosis  3. IR reported paracentesis is not an option    Suggestions:    1. Consider increase Lasix to 40mg daily and Aldactone 25mg daily  2. Monitor electrolytes and BUN/ Creatinine  3. Low salt diet  4. Oncology evaluation   5.  Protonix daily  6. Check CEA, , AFP  7. DVT prophylaxis

## 2019-07-19 NOTE — PROGRESS NOTE ADULT - SUBJECTIVE AND OBJECTIVE BOX
Interval Events:  pt c/o body aches  eating about 50%     Allergies    No Known Allergies    Intolerances      Endocrine/Metabolic Medications:  dextrose 40% Gel 15 Gram(s) Oral once PRN  dextrose 50% Injectable 12.5 Gram(s) IV Push once  dextrose 50% Injectable 25 Gram(s) IV Push once  dextrose 50% Injectable 25 Gram(s) IV Push once  glucagon  Injectable 1 milliGRAM(s) IntraMuscular once PRN  insulin lispro (HumaLOG) corrective regimen sliding scale   SubCutaneous Before meals and at bedtime  insulin lispro Injectable (HumaLOG) 3 Unit(s) SubCutaneous three times a day before meals      Vital Signs Last 24 Hrs  T(C): 37.1 (19 Jul 2019 16:04), Max: 37.1 (19 Jul 2019 07:43)  T(F): 98.7 (19 Jul 2019 16:04), Max: 98.7 (19 Jul 2019 07:43)  HR: 73 (19 Jul 2019 16:04) (65 - 92)  BP: 141/80 (19 Jul 2019 16:04) (112/47 - 165/80)  BP(mean): --  RR: 18 (19 Jul 2019 16:04) (18 - 18)  SpO2: 100% (19 Jul 2019 16:04) (99% - 100%)      PHYSICAL EXAM  All physical exam findings normal, except those marked:  General:	Alert, active, cooperative, NAD, well hydrated  .		[] Abnormal:  Neck		Normal: supple, no cervical adenopathy, no palpable thyroid  .		[] Abnormal:  Cardiovascular	Normal: regular rate, normal S1, S2, no murmurs  .		[] Abnormal:  Respiratory	Normal: no chest wall deformity, normal respiratory pattern, CTA B/L  .		[] Abnormal:  Abdominal	Normal: soft, ND, NT, bowel sounds present, no masses, no organomegaly  .		[] Abnormal:  		Normal normal genitalia, testes descended, circumcised/uncircumcised  .		Sonja stage:			Breast sonja:  .		Menstrual history:  .		[] Abnormal:  Extremities	Normal: FROM x4  .		[] Abnormal:  Skin		Normal: intact and not indurated, no rash, no acanthosis nigricans  .		[] Abnormal:  Neurologic	Normal: grossly intact  .		[] Abnormal:    LABS                        12.0   6.17  )-----------( 180      ( 19 Jul 2019 07:48 )             37.6                               134    |  98     |  15                  Calcium: 8.7   / iCa: x      (07-19 @ 07:48)    ----------------------------<  156       Magnesium: 1.7                              3.7     |  31     |  0.83             Phosphorous: 2.8      TPro  7.4    /  Alb  1.7    /  TBili  0.4    /  DBili  x      /  AST  35     /  ALT  24     /  AlkPhos  231    19 Jul 2019 07:48    CAPILLARY BLOOD GLUCOSE      POCT Blood Glucose.: 149 mg/dL (19 Jul 2019 16:36)  POCT Blood Glucose.: 200 mg/dL (19 Jul 2019 11:20)  POCT Blood Glucose.: 145 mg/dL (19 Jul 2019 07:52)  POCT Blood Glucose.: 193 mg/dL (18 Jul 2019 21:47)        Assesment/plan    Dm- over all good control  cont humalog 3 ac tid  fsg ac and hs  Ascites -await paracenteses  CHF exac- per cardio recs  d/w family  d/w family

## 2019-07-19 NOTE — CONSULT NOTE ADULT - PROBLEM SELECTOR RECOMMENDATION 2
w/u per prim team  await paracentesis
CEA and ca 19.9 are elevated  there is a 2.6 cm celiac lymph node  plan for outpt PET scan  but in view of her poor condition  she is not a candidate for surgery or chemo or RT

## 2019-07-19 NOTE — CONSULT NOTE ADULT - PROBLEM SELECTOR RECOMMENDATION 3
stable   tx per cardio
residue density in right lung with sir  is the left over from previous pneumonia  will watch

## 2019-07-20 LAB
ALBUMIN SERPL ELPH-MCNC: 1.8 G/DL — LOW (ref 3.5–5)
ALP SERPL-CCNC: 230 U/L — HIGH (ref 40–120)
ALT FLD-CCNC: 25 U/L DA — SIGNIFICANT CHANGE UP (ref 10–60)
ANION GAP SERPL CALC-SCNC: 4 MMOL/L — LOW (ref 5–17)
AST SERPL-CCNC: 36 U/L — SIGNIFICANT CHANGE UP (ref 10–40)
BASOPHILS # BLD AUTO: 0.05 K/UL — SIGNIFICANT CHANGE UP (ref 0–0.2)
BASOPHILS NFR BLD AUTO: 0.8 % — SIGNIFICANT CHANGE UP (ref 0–2)
BILIRUB SERPL-MCNC: 0.4 MG/DL — SIGNIFICANT CHANGE UP (ref 0.2–1.2)
BUN SERPL-MCNC: 14 MG/DL — SIGNIFICANT CHANGE UP (ref 7–18)
CALCIUM SERPL-MCNC: 8.8 MG/DL — SIGNIFICANT CHANGE UP (ref 8.4–10.5)
CHLORIDE SERPL-SCNC: 101 MMOL/L — SIGNIFICANT CHANGE UP (ref 96–108)
CO2 SERPL-SCNC: 30 MMOL/L — SIGNIFICANT CHANGE UP (ref 22–31)
CREAT SERPL-MCNC: 0.78 MG/DL — SIGNIFICANT CHANGE UP (ref 0.5–1.3)
EOSINOPHIL # BLD AUTO: 0.21 K/UL — SIGNIFICANT CHANGE UP (ref 0–0.5)
EOSINOPHIL NFR BLD AUTO: 3.3 % — SIGNIFICANT CHANGE UP (ref 0–6)
GLUCOSE BLDC GLUCOMTR-MCNC: 149 MG/DL — HIGH (ref 70–99)
GLUCOSE BLDC GLUCOMTR-MCNC: 210 MG/DL — HIGH (ref 70–99)
GLUCOSE BLDC GLUCOMTR-MCNC: 231 MG/DL — HIGH (ref 70–99)
GLUCOSE BLDC GLUCOMTR-MCNC: 234 MG/DL — HIGH (ref 70–99)
GLUCOSE SERPL-MCNC: 156 MG/DL — HIGH (ref 70–99)
HCT VFR BLD CALC: 35.8 % — SIGNIFICANT CHANGE UP (ref 34.5–45)
HGB BLD-MCNC: 11.5 G/DL — SIGNIFICANT CHANGE UP (ref 11.5–15.5)
IMM GRANULOCYTES NFR BLD AUTO: 0.6 % — SIGNIFICANT CHANGE UP (ref 0–1.5)
LYMPHOCYTES # BLD AUTO: 2.58 K/UL — SIGNIFICANT CHANGE UP (ref 1–3.3)
LYMPHOCYTES # BLD AUTO: 41 % — SIGNIFICANT CHANGE UP (ref 13–44)
MAGNESIUM SERPL-MCNC: 2.1 MG/DL — SIGNIFICANT CHANGE UP (ref 1.6–2.6)
MCHC RBC-ENTMCNC: 29.4 PG — SIGNIFICANT CHANGE UP (ref 27–34)
MCHC RBC-ENTMCNC: 32.1 GM/DL — SIGNIFICANT CHANGE UP (ref 32–36)
MCV RBC AUTO: 91.6 FL — SIGNIFICANT CHANGE UP (ref 80–100)
MONOCYTES # BLD AUTO: 0.49 K/UL — SIGNIFICANT CHANGE UP (ref 0–0.9)
MONOCYTES NFR BLD AUTO: 7.8 % — SIGNIFICANT CHANGE UP (ref 2–14)
NEUTROPHILS # BLD AUTO: 2.92 K/UL — SIGNIFICANT CHANGE UP (ref 1.8–7.4)
NEUTROPHILS NFR BLD AUTO: 46.5 % — SIGNIFICANT CHANGE UP (ref 43–77)
NRBC # BLD: 0 /100 WBCS — SIGNIFICANT CHANGE UP (ref 0–0)
PHOSPHATE SERPL-MCNC: 2.4 MG/DL — LOW (ref 2.5–4.5)
PLATELET # BLD AUTO: 173 K/UL — SIGNIFICANT CHANGE UP (ref 150–400)
POTASSIUM SERPL-MCNC: 4 MMOL/L — SIGNIFICANT CHANGE UP (ref 3.5–5.3)
POTASSIUM SERPL-SCNC: 4 MMOL/L — SIGNIFICANT CHANGE UP (ref 3.5–5.3)
PROT SERPL-MCNC: 7.3 G/DL — SIGNIFICANT CHANGE UP (ref 6–8.3)
RBC # BLD: 3.91 M/UL — SIGNIFICANT CHANGE UP (ref 3.8–5.2)
RBC # FLD: 16.9 % — HIGH (ref 10.3–14.5)
SODIUM SERPL-SCNC: 135 MMOL/L — SIGNIFICANT CHANGE UP (ref 135–145)
WBC # BLD: 6.29 K/UL — SIGNIFICANT CHANGE UP (ref 3.8–10.5)
WBC # FLD AUTO: 6.29 K/UL — SIGNIFICANT CHANGE UP (ref 3.8–10.5)

## 2019-07-20 RX ORDER — LIDOCAINE 4 G/100G
1 CREAM TOPICAL EVERY 24 HOURS
Refills: 0 | Status: DISCONTINUED | OUTPATIENT
Start: 2019-07-20 | End: 2019-07-23

## 2019-07-20 RX ADMIN — Medication 3 UNIT(S): at 12:06

## 2019-07-20 RX ADMIN — LIDOCAINE 1 PATCH: 4 CREAM TOPICAL at 17:39

## 2019-07-20 RX ADMIN — HEPARIN SODIUM 5000 UNIT(S): 5000 INJECTION INTRAVENOUS; SUBCUTANEOUS at 06:27

## 2019-07-20 RX ADMIN — Medication 2: at 17:37

## 2019-07-20 RX ADMIN — Medication 2: at 21:25

## 2019-07-20 RX ADMIN — LIDOCAINE 1 PATCH: 4 CREAM TOPICAL at 12:02

## 2019-07-20 RX ADMIN — Medication 2: at 12:05

## 2019-07-20 RX ADMIN — Medication 81 MILLIGRAM(S): at 12:07

## 2019-07-20 RX ADMIN — Medication 650 MILLIGRAM(S): at 09:08

## 2019-07-20 RX ADMIN — Medication 40 MILLIGRAM(S): at 06:27

## 2019-07-20 RX ADMIN — LATANOPROST 1 DROP(S): 0.05 SOLUTION/ DROPS OPHTHALMIC; TOPICAL at 21:26

## 2019-07-20 RX ADMIN — Medication 0.12 MILLIGRAM(S): at 06:27

## 2019-07-20 RX ADMIN — Medication 3 UNIT(S): at 17:38

## 2019-07-20 RX ADMIN — AMIODARONE HYDROCHLORIDE 200 MILLIGRAM(S): 400 TABLET ORAL at 06:27

## 2019-07-20 RX ADMIN — Medication 650 MILLIGRAM(S): at 07:48

## 2019-07-20 RX ADMIN — Medication 25 MILLIGRAM(S): at 06:27

## 2019-07-20 RX ADMIN — HEPARIN SODIUM 5000 UNIT(S): 5000 INJECTION INTRAVENOUS; SUBCUTANEOUS at 17:38

## 2019-07-20 RX ADMIN — SPIRONOLACTONE 25 MILLIGRAM(S): 25 TABLET, FILM COATED ORAL at 06:27

## 2019-07-20 RX ADMIN — Medication 1 TABLET(S): at 12:07

## 2019-07-20 NOTE — PROGRESS NOTE ADULT - SUBJECTIVE AND OBJECTIVE BOX
CHIEF COMPLAINT:Patient is a 88y old  Female who presents with a chief complaint of Abdominal swelling .Pt appears comfortable.    	  REVIEW OF SYSTEMS:  CONSTITUTIONAL: No fever, weight loss, or fatigue  EYES: No eye pain, visual disturbances, or discharge  ENT:  No difficulty hearing, tinnitus, vertigo; No sinus or throat pain  NECK: No pain or stiffness  RESPIRATORY: No cough, wheezing, chills or hemoptysis; No Shortness of Breath  CARDIOVASCULAR: No chest pain, palpitations, passing out, dizziness, or leg swelling  GASTROINTESTINAL: No abdominal or epigastric pain. No nausea, vomiting, or hematemesis; No diarrhea or constipation. No melena or hematochezia.  GENITOURINARY: No dysuria, frequency, hematuria, or incontinence  NEUROLOGICAL: No headaches, memory loss, loss of strength, numbness, or tremors  SKIN: No itching, burning, rashes, or lesions   LYMPH Nodes: No enlarged glands  ENDOCRINE: No heat or cold intolerance; No hair loss  MUSCULOSKELETAL: No joint pain or swelling; No muscle, back, or extremity pain  PSYCHIATRIC: No depression, anxiety, mood swings, or difficulty sleeping  HEME/LYMPH: No easy bruising, or bleeding gums  ALLERGY AND IMMUNOLOGIC: No hives or eczema	      PHYSICAL EXAM:  T(C): 36.4 (07-20-19 @ 07:20), Max: 37.1 (07-19-19 @ 16:04)  HR: 93 (07-20-19 @ 07:20) (65 - 93)  BP: 126/73 (07-20-19 @ 07:20) (112/47 - 149/86)  RR: 20 (07-20-19 @ 07:20) (17 - 20)  SpO2: 99% (07-20-19 @ 07:20) (97% - 100%)  Wt(kg): --  I&O's Summary    19 Jul 2019 07:01  -  20 Jul 2019 07:00  --------------------------------------------------------  IN: 505 mL / OUT: 500 mL / NET: 5 mL        Appearance: Normal	  HEENT:   Normal oral mucosa, PERRL, EOMI	  Lymphatic: No lymphadenopathy  Cardiovascular: Normal S1 S2, No JVD, No murmurs, No edema  Respiratory: Lungs clear to auscultation	  Psychiatry: A & O x 3, Mood & affect appropriate  Gastrointestinal:  Soft, Non-tender, + BS	  Skin: No rashes, No ecchymoses, No cyanosis	  Neurologic: Non-focal  Extremities: Normal range of motion, No clubbing, cyanosis or edema  Vascular: Peripheral pulses palpable 2+ bilaterally    MEDICATIONS  (STANDING):  amiodarone    Tablet 200 milliGRAM(s) Oral daily  aspirin enteric coated 81 milliGRAM(s) Oral daily  dextrose 5%. 1000 milliLiter(s) (50 mL/Hr) IV Continuous <Continuous>  dextrose 50% Injectable 12.5 Gram(s) IV Push once  dextrose 50% Injectable 25 Gram(s) IV Push once  dextrose 50% Injectable 25 Gram(s) IV Push once  digoxin     Tablet 0.125 milliGRAM(s) Oral daily  furosemide    Tablet 40 milliGRAM(s) Oral daily  heparin  Injectable 5000 Unit(s) SubCutaneous every 12 hours  insulin lispro (HumaLOG) corrective regimen sliding scale   SubCutaneous Before meals and at bedtime  insulin lispro Injectable (HumaLOG) 3 Unit(s) SubCutaneous three times a day before meals  latanoprost 0.005% Ophthalmic Solution 1 Drop(s) Both EYES at bedtime  lidocaine   Patch 1 Patch Transdermal every 24 hours  metoprolol succinate ER 25 milliGRAM(s) Oral daily  multivitamin 1 Tablet(s) Oral daily  senna 2 Tablet(s) Oral at bedtime  spironolactone 25 milliGRAM(s) Oral daily      LABS:	 	                       11.5   6.29  )-----------( 173      ( 20 Jul 2019 08:01 )             35.8     07-20    135  |  101  |  14  ----------------------------<  156<H>  4.0   |  30  |  0.78    Ca    8.8      20 Jul 2019 08:01  Phos  2.4     07-20  Mg     2.1     07-20    TPro  7.3  /  Alb  1.8<L>  /  TBili  0.4  /  DBili  x   /  AST  36  /  ALT  25  /  AlkPhos  230<H>  07-20    proBNP: Serum Pro-Brain Natriuretic Peptide: 6781 pg/mL (07-15 @ 14:10)    Lipid Profile: Cholesterol 173    HDL 32      HgA1c: Hemoglobin A1C, Whole Blood: 6.9 % (07-16 @ 10:19)  Hemoglobin A1C, Whole Blood: 7.1 % (07-16 @ 03:47)    TSH: Thyroid Stimulating Hormone, Serum: 3.61 uU/mL (07-16 @ 07:09)

## 2019-07-20 NOTE — PROGRESS NOTE ADULT - ASSESSMENT
1. R/O Empyema  - CXR noted.   - CT chest notes - Loculated fluid with air in the right major fissure may represent small loculated hydropneumothorax versus empyema with air producing organism.   - Thoracic surgery consult - conservative management  - O2 supplement  - Bronchodilators   - F/U CXR   - ID Recc no ABX    2. Atelectasis  - O2 Supp  - Bronchodilators  - Incentive spirometry     3. CHF  - Echo noted   - Tele monitoring  - Cardio eval  - Diuretics PRN  - DVT and GI PPX     4. Ascites  - GI Eval noted  - Recc considering therapeutic/diagnostic paracentesis with cytology.  - Tumor markers   - May need diuretics.     5. Pulmonary HTN  - RVP 49  - Bronchodilators  - CCB  -Oxygen supp 1. R/O Empyema  - CXR noted.   - CT chest notes - Loculated fluid with air in the right major fissure may represent small loculated hydropneumothorax versus empyema with air producing organism.   - Thoracic surgery consult - conservative management  - O2 supplement  - Bronchodilators   - F/U CXR   - ID Recc no ABX    2. Atelectasis  - O2 Supp  - Bronchodilators  - Incentive spirometry     3. CHF  - Echo noted   - Tele monitoring  - Cardio follow up  - Diuretics PRN  - DVT and GI PPX     4. Ascites  - GI Eval noted  - Recc considering therapeutic/diagnostic paracentesis with cytology.  - Tumor markers   - May need diuretics.     5. Pulmonary HTN  - RVP 49  - Bronchodilators  - CCB  -Oxygen supp

## 2019-07-20 NOTE — PROGRESS NOTE ADULT - SUBJECTIVE AND OBJECTIVE BOX
Patient is a 88y old  Female who presents with a chief complaint of Abdominal swelling (20 Jul 2019 09:53)    Awake , alert , lying in bed in NAD. Denies cough or sob at this present. Pt has no new complaints.       INTERVAL HPI/OVERNIGHT EVENTS:      VITAL SIGNS:  T(F): 97.6 (07-20-19 @ 07:20)  HR: 93 (07-20-19 @ 07:20)  BP: 126/73 (07-20-19 @ 07:20)  RR: 20 (07-20-19 @ 07:20)  SpO2: 99% (07-20-19 @ 07:20)  Wt(kg): --  I&O's Detail    19 Jul 2019 07:01  -  20 Jul 2019 07:00  --------------------------------------------------------  IN:    Oral Fluid: 505 mL  Total IN: 505 mL    OUT:    Voided: 500 mL  Total OUT: 500 mL    Total NET: 5 mL              REVIEW OF SYSTEMS:    CONSTITUTIONAL:  No fevers, chills, sweats    HEENT:  Eyes:  No diplopia or blurred vision. ENT:  No earache, sore throat or runny nose.    CARDIOVASCULAR:  No pressure, squeezing, tightness, or heaviness about the chest; no palpitations.    RESPIRATORY:  Per HPI    GASTROINTESTINAL:  No abdominal pain, nausea, vomiting or diarrhea.    GENITOURINARY:  No dysuria, frequency or urgency.    NEUROLOGIC:  No paresthesias, fasciculations, seizures or weakness.    PSYCHIATRIC:  No disorder of thought or mood.      PHYSICAL EXAM:    Constitutional: Well developed and nourished  Eyes:Perrla  ENMT: normal  Neck:supple  Respiratory: + rales posterior left base   Cardiovascular: S1 S2 regular  Gastrointestinal: Soft, Non tender  Extremities: No edema  Vascular:normal  Neurological:Awake, alert,Ox3  Musculoskeletal:Normal      MEDICATIONS  (STANDING):  amiodarone    Tablet 200 milliGRAM(s) Oral daily  aspirin enteric coated 81 milliGRAM(s) Oral daily  dextrose 5%. 1000 milliLiter(s) (50 mL/Hr) IV Continuous <Continuous>  dextrose 50% Injectable 12.5 Gram(s) IV Push once  dextrose 50% Injectable 25 Gram(s) IV Push once  dextrose 50% Injectable 25 Gram(s) IV Push once  digoxin     Tablet 0.125 milliGRAM(s) Oral daily  furosemide    Tablet 40 milliGRAM(s) Oral daily  heparin  Injectable 5000 Unit(s) SubCutaneous every 12 hours  insulin lispro (HumaLOG) corrective regimen sliding scale   SubCutaneous Before meals and at bedtime  insulin lispro Injectable (HumaLOG) 3 Unit(s) SubCutaneous three times a day before meals  latanoprost 0.005% Ophthalmic Solution 1 Drop(s) Both EYES at bedtime  lidocaine   Patch 1 Patch Transdermal every 24 hours  metoprolol succinate ER 25 milliGRAM(s) Oral daily  multivitamin 1 Tablet(s) Oral daily  senna 2 Tablet(s) Oral at bedtime  spironolactone 25 milliGRAM(s) Oral daily    MEDICATIONS  (PRN):  acetaminophen   Tablet .. 650 milliGRAM(s) Oral every 6 hours PRN Mild Pain (1 - 3)  dextrose 40% Gel 15 Gram(s) Oral once PRN Blood Glucose LESS THAN 70 milliGRAM(s)/deciLiter  glucagon  Injectable 1 milliGRAM(s) IntraMuscular once PRN Glucose <70 milliGRAM(s)/deciLiter  guaiFENesin   Syrup  (Sugar-Free) 100 milliGRAM(s) Oral every 6 hours PRN Cough      Allergies    No Known Allergies    Intolerances        LABS:                        11.5   6.29  )-----------( 173      ( 20 Jul 2019 08:01 )             35.8     07-20    135  |  101  |  14  ----------------------------<  156<H>  4.0   |  30  |  0.78    Ca    8.8      20 Jul 2019 08:01  Phos  2.4     07-20  Mg     2.1     07-20    TPro  7.3  /  Alb  1.8<L>  /  TBili  0.4  /  DBili  x   /  AST  36  /  ALT  25  /  AlkPhos  230<H>  07-20              CAPILLARY BLOOD GLUCOSE      POCT Blood Glucose.: 149 mg/dL (20 Jul 2019 08:02)  POCT Blood Glucose.: 194 mg/dL (19 Jul 2019 21:18)  POCT Blood Glucose.: 149 mg/dL (19 Jul 2019 16:36)  POCT Blood Glucose.: 200 mg/dL (19 Jul 2019 11:20)    pro-bnp [id number] 07-15 @ 14:10     d-dimer --  07-15 @ 14:10      RADIOLOGY & ADDITIONAL TESTS:    CXR:  < from: Xray Chest 1 View- PORTABLE-Routine (07.17.19 @ 11:03) >  IMPRESSION: Increasing right base process.    < end of copied text >    Ct scan chest:  < from: CT Chest No Cont (07.15.19 @ 19:28) >    IMPRESSION:     Loculated fluid with air in the right major fissure may represent small   loculated hydropneumothorax versus empyema with air producing organism.   Evaluation is limited without intravenous contrast.    < end of copied text >    ekg;    echo:  < from: Transthoracic Echocardiogram (07.17.19 @ 07:52) >  CONCLUSIONS:  1. Mitral annular calcification. Mild to moderate mitral  regurgitation.  2. Severely dilated left atrium.  LA volume index = 60  cc/m2.  3. Normal left ventricular internal dimensions and wall  thicknesses.  4. Normal left ventricular systolic function.  5. Right ventricular enlargement with normal RV systolic  function.   A device lead is visualized in the right heart.  6. RV systolic pressure is 49 mm Hg. Mild pulmonary  hypertension.  7. There is severe tricuspid regurgitation.    < end of copied text >

## 2019-07-20 NOTE — PROGRESS NOTE ADULT - SUBJECTIVE AND OBJECTIVE BOX
[   ] ICU                                          [   ] CCU                                      [  X ] Medical Floor    Patient is comfortable. No new complaints reported, No abdominal pain, N/V, hematemesis, hematochezia, melena, fever, chills, chest pain, SOB, cough or diarrhea reported.    VITALS  Vital Signs Last 24 Hrs  T(C): 36.6 (20 Jul 2019 11:13), Max: 37.1 (19 Jul 2019 16:04)  T(F): 97.8 (20 Jul 2019 11:13), Max: 98.8 (19 Jul 2019 20:13)  HR: 73 (20 Jul 2019 11:13) (73 - 93)  BP: 138/79 (20 Jul 2019 11:13) (126/73 - 149/86)   RR: 18 (20 Jul 2019 11:13) (17 - 20)  SpO2: 100% (20 Jul 2019 11:13) (97% - 100%)       MEDICATIONS  (STANDING):  amiodarone    Tablet 200 milliGRAM(s) Oral daily  aspirin enteric coated 81 milliGRAM(s) Oral daily  dextrose 5%. 1000 milliLiter(s) (50 mL/Hr) IV Continuous <Continuous>  dextrose 50% Injectable 12.5 Gram(s) IV Push once  dextrose 50% Injectable 25 Gram(s) IV Push once  dextrose 50% Injectable 25 Gram(s) IV Push once  digoxin     Tablet 0.125 milliGRAM(s) Oral daily  furosemide    Tablet 40 milliGRAM(s) Oral daily  heparin  Injectable 5000 Unit(s) SubCutaneous every 12 hours  insulin lispro (HumaLOG) corrective regimen sliding scale   SubCutaneous Before meals and at bedtime  insulin lispro Injectable (HumaLOG) 3 Unit(s) SubCutaneous three times a day before meals  latanoprost 0.005% Ophthalmic Solution 1 Drop(s) Both EYES at bedtime  lidocaine   Patch 1 Patch Transdermal every 24 hours  metoprolol succinate ER 25 milliGRAM(s) Oral daily  multivitamin 1 Tablet(s) Oral daily  senna 2 Tablet(s) Oral at bedtime  spironolactone 25 milliGRAM(s) Oral daily    MEDICATIONS  (PRN):  acetaminophen   Tablet .. 650 milliGRAM(s) Oral every 6 hours PRN Mild Pain (1 - 3)  dextrose 40% Gel 15 Gram(s) Oral once PRN Blood Glucose LESS THAN 70 milliGRAM(s)/deciLiter  glucagon  Injectable 1 milliGRAM(s) IntraMuscular once PRN Glucose <70 milliGRAM(s)/deciLiter  guaiFENesin   Syrup  (Sugar-Free) 100 milliGRAM(s) Oral every 6 hours PRN Cough                            11.5   6.29  )-----------( 173      ( 20 Jul 2019 08:01 )             35.8       07-20    135  |  101  |  14  ----------------------------<  156<H>  4.0   |  30  |  0.78    Ca    8.8      20 Jul 2019 08:01  Phos  2.4     07-20  Mg     2.1     07-20    TPro  7.3  /  Alb  1.8<L>  /  TBili  0.4  /  DBili  x   /  AST  36  /  ALT  25  /  AlkPhos  230<H>  07-20

## 2019-07-20 NOTE — PROGRESS NOTE ADULT - SUBJECTIVE AND OBJECTIVE BOX
CHIEF COMPLAINT:Patient is a 88y old  Female who presents with a chief complaint of Abdominal swelling (20 Jul 2019 19:22)    	  REVIEW OF SYSTEMS:  CONSTITUTIONAL: No fever, weight loss, or fatigue  EYES: No eye pain, visual disturbances, or discharge  ENMT:  No difficulty hearing, tinnitus, vertigo; No sinus or throat pain  NECK: No pain or stiffness  RESPIRATORY: No cough, wheezing, chills or hemoptysis; No Shortness of Breath  CARDIOVASCULAR: No chest pain, palpitations, passing out, dizziness, or leg swelling  GASTROINTESTINAL: No abdominal or epigastric pain. No nausea, vomiting, or hematemesis; No diarrhea or constipation. No melena or hematochezia.  GENITOURINARY: No dysuria, frequency, hematuria, or incontinence  NEUROLOGICAL: No headaches, memory loss, loss of strength, numbness, or tremors  SKIN: No itching, burning, rashes, or lesions   LYMPH Nodes: No enlarged glands  ENDOCRINE: No heat or cold intolerance; No hair loss  MUSCULOSKELETAL: No joint pain or swelling; No muscle, back, or extremity pain  PSYCHIATRIC: No depression, anxiety, mood swings, or difficulty sleeping  HEME/LYMPH: No easy bruising, or bleeding gums  ALLERY AND IMMUNOLOGIC: No hives or eczema	    [ ] All others negative	  [ ] Unable to obtain    PHYSICAL EXAM:  T(C): 37.2 (07-20-19 @ 19:35), Max: 37.3 (07-20-19 @ 15:03)  HR: 62 (07-20-19 @ 19:35) (60 - 93)  BP: 123/59 (07-20-19 @ 19:35) (122/48 - 149/86)  RR: 18 (07-20-19 @ 19:35) (17 - 20)  SpO2: 100% (07-20-19 @ 19:35) (97% - 100%)  Wt(kg): --  I&O's Summary    19 Jul 2019 07:01  -  20 Jul 2019 07:00  --------------------------------------------------------  IN: 505 mL / OUT: 500 mL / NET: 5 mL        Appearance: Normal	  HEENT:   Normal oral mucosa, PERRL, EOMI	  Lymphatic: No lymphadenopathy  Cardiovascular: Normal S1 S2, No JVD, No murmurs, No edema  Respiratory: Lungs clear to auscultation	  Psychiatry: A & O x 3, Mood & affect appropriate  Gastrointestinal:  Soft, Non-tender, + BS	  Skin: No rashes, No ecchymoses, No cyanosis	  Neurologic: Non-focal  Extremities: Normal range of motion, No clubbing, cyanosis or edema  Vascular: Peripheral pulses palpable 2+ bilaterally    MEDICATIONS  (STANDING):  amiodarone    Tablet 200 milliGRAM(s) Oral daily  aspirin enteric coated 81 milliGRAM(s) Oral daily  dextrose 5%. 1000 milliLiter(s) (50 mL/Hr) IV Continuous <Continuous>  dextrose 50% Injectable 12.5 Gram(s) IV Push once  dextrose 50% Injectable 25 Gram(s) IV Push once  dextrose 50% Injectable 25 Gram(s) IV Push once  digoxin     Tablet 0.125 milliGRAM(s) Oral daily  furosemide    Tablet 40 milliGRAM(s) Oral daily  heparin  Injectable 5000 Unit(s) SubCutaneous every 12 hours  insulin lispro (HumaLOG) corrective regimen sliding scale   SubCutaneous Before meals and at bedtime  insulin lispro Injectable (HumaLOG) 3 Unit(s) SubCutaneous three times a day before meals  latanoprost 0.005% Ophthalmic Solution 1 Drop(s) Both EYES at bedtime  lidocaine   Patch 1 Patch Transdermal every 24 hours  metoprolol succinate ER 25 milliGRAM(s) Oral daily  multivitamin 1 Tablet(s) Oral daily  senna 2 Tablet(s) Oral at bedtime  spironolactone 25 milliGRAM(s) Oral daily      TELEMETRY: 	    ECG:  	  RADIOLOGY:  OTHER: 	  	  CBC Full  -  ( 20 Jul 2019 08:01 )  WBC Count : 6.29 K/uL  Hemoglobin : 11.5 g/dL  Hematocrit : 35.8 %  Platelet Count - Automated : 173 K/uL  Mean Cell Volume : 91.6 fl  Mean Cell Hemoglobin : 29.4 pg  Mean Cell Hemoglobin Concentration : 32.1 gm/dL  Auto Neutrophil # : 2.92 K/uL  Auto Lymphocyte # : 2.58 K/uL  Auto Monocyte # : 0.49 K/uL  Auto Eosinophil # : 0.21 K/uL  Auto Basophil # : 0.05 K/uL  Auto Neutrophil % : 46.5 %  Auto Lymphocyte % : 41.0 %  Auto Monocyte % : 7.8 %  Auto Eosinophil % : 3.3 %  Auto Basophil % : 0.8 %        CARDIAC MARKERS:                              11.5   6.29  )-----------( 173      ( 20 Jul 2019 08:01 )             35.8       07-20    135  |  101  |  14  ----------------------------<  156<H>  4.0   |  30  |  0.78    Ca    8.8      20 Jul 2019 08:01  Phos  2.4     07-20  Mg     2.1     07-20    TPro  7.3  /  Alb  1.8<L>  /  TBili  0.4  /  DBili  x   /  AST  36  /  ALT  25  /  AlkPhos  230<H>  07-20            proBNP: Serum Pro-Brain Natriuretic Peptide: 6781 pg/mL (07-15 @ 14:10)    Lipid Profile: Cholesterol 173    HDL 32      HgA1c: Hemoglobin A1C, Whole Blood: 6.9 % (07-16 @ 10:19)  Hemoglobin A1C, Whole Blood: 7.1 % (07-16 @ 03:47)    TSH: Thyroid Stimulating Hormone, Serum: 3.61 uU/mL (07-16 @ 07:09)

## 2019-07-20 NOTE — PROGRESS NOTE ADULT - SUBJECTIVE AND OBJECTIVE BOX
88y Female seen at the bedside, no distress, no new complains. Pt reports that she has a cough with white sputum, reports that the cough is getting better. Pt denies having nausea or vomiting, no abdominal pain, no diarrhea. Pt is off antibiotics for now, will continue observing.     MEDS:    No Known Allergies      VITALS:  Vital Signs Last 24 Hrs  T(C): 36.4 (20 Jul 2019 07:20), Max: 37.1 (19 Jul 2019 16:04)  T(F): 97.6 (20 Jul 2019 07:20), Max: 98.8 (19 Jul 2019 20:13)  HR: 93 (20 Jul 2019 07:20) (65 - 93)  BP: 126/73 (20 Jul 2019 07:20) (112/47 - 149/86)  BP(mean): --  RR: 20 (20 Jul 2019 07:20) (17 - 20)  SpO2: 99% (20 Jul 2019 07:20) (97% - 100%)    LABS/DIAGNOSTIC TESTS:                          11.5   6.29  )-----------( 173      ( 20 Jul 2019 08:01 )             35.8         07-20    135  |  101  |  14  ----------------------------<  156<H>  4.0   |  30  |  0.78    Ca    8.8      20 Jul 2019 08:01  Phos  2.4     07-20  Mg     2.1     07-20    TPro  7.3  /  Alb  1.8<L>  /  TBili  0.4  /  DBili  x   /  AST  36  /  ALT  25  /  AlkPhos  230<H>  07-20      CULTURES:   .Blood  05-31 @ 22:36   No growth at 5 days.  --  --      .Urine  05-29 @ 20:21   No growth  --  --    < from: Transthoracic Echocardiogram (07.17.19 @ 07:52) >  Patient name: DERRICK ELIAS  YOB: 1931   Age: 88 (F)   MR#: [id number]  Study Date: 7/17/2019  [facility] ASonographer: Jonathan London RDCS  Study quality: Fair  Referring Physician:  GRETCHEN FERRER MD  Blood Pressure: 133/65 mmHg  Height: 165 cm  Weight: 40 kg  BSA: 1.4 m2  ------------------------------------------------------------------------    PROCEDURE: Transthoracic echocardiogram with 2-D, M-Mode  and complete spectral and color flow Doppler.  INDICATION: Unspecified diastolic (congestive) heart  failure (I50.30)  HISTORY:  ------------------------------------------------------------------------  DIMENSIONS:  Dimensions:     Normal Values:  LA:     4.2 cm    2.0 - 4.0 cm  Ao:     3.1 cm    2.0 - 3.8 cm  SEPTUM: 1.0 cm   0.6 - 1.2 cm  PWT:    0.7 cm    0.6 - 1.1 cm  LVIDd:  3.6 cm    3.0 - 5.6 cm  LVIDs:  2.4 cm    1.8 - 4.0 cm      Derived Variables:  LVMI: 61 g/m2  RWT: 0.38  Ejection Fraction Visual Estimate: >55 %    ------------------------------------------------------------------------  OBSERVATIONS:  Mitral Valve: Mitral annular calcification. Mild to  moderate mitral regurgitation.  Aortic Root: Normal aortic root.  Aortic Valve: Normal trileaflet aortic valve.  Left Atrium: Severely dilated left atrium.  LA volume index  = 60 cc/m2.  Left Ventricle: Normal left ventricular systolic function.  Normal left ventricular internal dimensions and wall  thicknesses.  Right Heart: Normal right atrium. Right ventricular  enlargement with normal RV systolicfunction.   A device  lead is visualized in the right heart. There is severe  tricuspid regurgitation. There is mild pulmonic  regurgitation.  Pericardium/PleuraNormal pericardium with no pericardial  effusion.  Hemodynamic: RA Pressure is 8 mm Hg. RV systolic pressure  is 49 mm Hg. Mild pulmonary hypertension.  ------------------------------------------------------------------------  CONCLUSIONS:  1. Mitral annular calcification. Mild to moderate mitral  regurgitation.  2. Severely dilated left atrium.  LA volume index = 60  cc/m2.  3. Normal left ventricular internal dimensions and wall  thicknesses.  4. Normal left ventricular systolic function.  5. Right ventricular enlargement with normal RV systolic  function.   A device lead is visualized in the right heart.  6. RV systolic pressure is 49 mm Hg. Mild pulmonary  hypertension.  7. There is severe tricuspid regurgitation.    ------------------------------------------------------------------------  Confirmed on  7/18/2019 - 11:44:16 rTung Horn MD  ------------------------------------------------------------------------    < end of copied text >

## 2019-07-20 NOTE — PROGRESS NOTE ADULT - ASSESSMENT
Hydropneumothorax - does not appear to have an Empyema, given lack of pleuritic chest pain, no fevers or leukocytosis and her lung symptoms are not sig either    Plan -   off all abxs and watch

## 2019-07-20 NOTE — PROGRESS NOTE ADULT - SUBJECTIVE AND OBJECTIVE BOX
Interval Events:  pt in nad  etaing better today    Allergies    No Known Allergies    Intolerances      Endocrine/Metabolic Medications:  dextrose 40% Gel 15 Gram(s) Oral once PRN  dextrose 50% Injectable 12.5 Gram(s) IV Push once  dextrose 50% Injectable 25 Gram(s) IV Push once  dextrose 50% Injectable 25 Gram(s) IV Push once  glucagon  Injectable 1 milliGRAM(s) IntraMuscular once PRN  insulin lispro (HumaLOG) corrective regimen sliding scale   SubCutaneous Before meals and at bedtime  insulin lispro Injectable (HumaLOG) 3 Unit(s) SubCutaneous three times a day before meals      Vital Signs Last 24 Hrs  T(C): 37.2 (20 Jul 2019 19:35), Max: 37.3 (20 Jul 2019 15:03)  T(F): 99 (20 Jul 2019 19:35), Max: 99.1 (20 Jul 2019 15:03)  HR: 62 (20 Jul 2019 19:35) (60 - 93)  BP: 123/59 (20 Jul 2019 19:35) (122/48 - 149/86)  BP(mean): --  RR: 18 (20 Jul 2019 19:35) (17 - 20)  SpO2: 100% (20 Jul 2019 19:35) (97% - 100%)      PHYSICAL EXAM  All physical exam findings normal, except those marked:  General:	Alert, active, cooperative, NAD, well hydrated  .		[] Abnormal:  Neck		Normal: supple, no cervical adenopathy, no palpable thyroid  .		[] Abnormal:  Cardiovascular	Normal: regular rate, normal S1, S2, no murmurs  .		[] Abnormal:  Respiratory	Normal: no chest wall deformity, normal respiratory pattern, CTA B/L  .		[] Abnormal:  Abdominal	Normal: soft, ND, NT, bowel sounds present, no masses, no organomegaly  .		[] Abnormal:  		Normal normal genitalia, testes descended, circumcised/uncircumcised  .		Sonja stage:			Breast sonja:  .		Menstrual history:  .		[] Abnormal:  Extremities	Normal: FROM x4  .		[] Abnormal:  Skin		Normal: intact and not indurated, no rash, no acanthosis nigricans  .		[] Abnormal:  Neurologic	Normal: grossly intact  .		[] Abnormal:    LABS                        11.5   6.29  )-----------( 173      ( 20 Jul 2019 08:01 )             35.8                               135    |  101    |  14                  Calcium: 8.8   / iCa: x      (07-20 @ 08:01)    ----------------------------<  156       Magnesium: 2.1                              4.0     |  30     |  0.78             Phosphorous: 2.4      TPro  7.3    /  Alb  1.8    /  TBili  0.4    /  DBili  x      /  AST  36     /  ALT  25     /  AlkPhos  230    20 Jul 2019 08:01    CAPILLARY BLOOD GLUCOSE      POCT Blood Glucose.: 231 mg/dL (20 Jul 2019 21:14)  POCT Blood Glucose.: 234 mg/dL (20 Jul 2019 17:01)  POCT Blood Glucose.: 210 mg/dL (20 Jul 2019 11:30)  POCT Blood Glucose.: 149 mg/dL (20 Jul 2019 08:02)        Assesment/plan    Dm- overall decent control  cont humalog 3 ac tid  fsg ac and hs    Hem/oncology and cardio f/u noted

## 2019-07-20 NOTE — PROGRESS NOTE ADULT - SUBJECTIVE AND OBJECTIVE BOX
feel ok  no pain  sob, or fever. has spme cough  able to eat  but weak    MEDICATIONS  (STANDING):  amiodarone    Tablet 200 milliGRAM(s) Oral daily  aspirin enteric coated 81 milliGRAM(s) Oral daily  dextrose 5%. 1000 milliLiter(s) (50 mL/Hr) IV Continuous <Continuous>  dextrose 50% Injectable 12.5 Gram(s) IV Push once  dextrose 50% Injectable 25 Gram(s) IV Push once  dextrose 50% Injectable 25 Gram(s) IV Push once  digoxin     Tablet 0.125 milliGRAM(s) Oral daily  furosemide    Tablet 40 milliGRAM(s) Oral daily  heparin  Injectable 5000 Unit(s) SubCutaneous every 12 hours  insulin lispro (HumaLOG) corrective regimen sliding scale   SubCutaneous Before meals and at bedtime  insulin lispro Injectable (HumaLOG) 3 Unit(s) SubCutaneous three times a day before meals  latanoprost 0.005% Ophthalmic Solution 1 Drop(s) Both EYES at bedtime  lidocaine   Patch 1 Patch Transdermal every 24 hours  metoprolol succinate ER 25 milliGRAM(s) Oral daily  multivitamin 1 Tablet(s) Oral daily  senna 2 Tablet(s) Oral at bedtime  spironolactone 25 milliGRAM(s) Oral daily    MEDICATIONS  (PRN):  acetaminophen   Tablet .. 650 milliGRAM(s) Oral every 6 hours PRN Mild Pain (1 - 3)  dextrose 40% Gel 15 Gram(s) Oral once PRN Blood Glucose LESS THAN 70 milliGRAM(s)/deciLiter  glucagon  Injectable 1 milliGRAM(s) IntraMuscular once PRN Glucose <70 milliGRAM(s)/deciLiter  guaiFENesin   Syrup  (Sugar-Free) 100 milliGRAM(s) Oral every 6 hours PRN Cough      Allergies    No Known Allergies    Intolerances        Vital Signs Last 24 Hrs  T(C): 37.3 (20 Jul 2019 15:03), Max: 37.3 (20 Jul 2019 15:03)  T(F): 99.1 (20 Jul 2019 15:03), Max: 99.1 (20 Jul 2019 15:03)  HR: 60 (20 Jul 2019 15:03) (60 - 93)  BP: 122/48 (20 Jul 2019 15:03) (122/48 - 149/86)  BP(mean): --  RR: 18 (20 Jul 2019 15:03) (17 - 20)  SpO2: 100% (20 Jul 2019 15:03) (97% - 100%)    PHYSICAL EXAM  General: adult in NAD  HEENT: clear oropharynx, anicteric sclera, pink conjunctiva  Neck: supple  CV: normal S1/S2 with no murmur rubs or gallops  Lungs: positive air movement b/l ant lungs,clear to auscultation, no wheezes, no rales  Abdomen: soft non-tender non-distended, no hepatosplenomegaly  Ext: no clubbing cyanosis or edema  Skin: no rashes and no petechiae  Neuro: alert and oriented X 4, no focal deficits  LABS:                          11.5   6.29  )-----------( 173      ( 20 Jul 2019 08:01 )             35.8         Mean Cell Volume : 91.6 fl  Mean Cell Hemoglobin : 29.4 pg  Mean Cell Hemoglobin Concentration : 32.1 gm/dL  Auto Neutrophil # : 2.92 K/uL  Auto Lymphocyte # : 2.58 K/uL  Auto Monocyte # : 0.49 K/uL  Auto Eosinophil # : 0.21 K/uL  Auto Basophil # : 0.05 K/uL  Auto Neutrophil % : 46.5 %  Auto Lymphocyte % : 41.0 %  Auto Monocyte % : 7.8 %  Auto Eosinophil % : 3.3 %  Auto Basophil % : 0.8 %    Serial CBC  Hematocrit 35.8  Hemoglobin 11.5  Plat 173  RBC 3.91  WBC 6.29  Serial CBC  Hematocrit 37.6  Hemoglobin 12.0  Plat 180  RBC 4.11  WBC 6.17  Serial CBC  Hematocrit 37.5  Hemoglobin 12.1  Plat 178  RBC 4.14  WBC 6.99  Serial CBC  Hematocrit 33.0  Hemoglobin 10.8  Plat 154  RBC 3.63  WBC 8.07    07-20    135  |  101  |  14  ----------------------------<  156<H>  4.0   |  30  |  0.78    Ca    8.8      20 Jul 2019 08:01  Phos  2.4     07-20  Mg     2.1     07-20    TPro  7.3  /  Alb  1.8<L>  /  TBili  0.4  /  DBili  x   /  AST  36  /  ALT  25  /  AlkPhos  230<H>  07-20          Folate, Serum: 8.5 ng/mL (07-16 @ 10:20)  Vitamin B12, Serum: 1449 pg/mL (07-16 @ 10:20)  Iron - Total Binding Capacity.: 166 ug/dL (07-16 @ 07:09)      Serum Protein Electrophoresis Interp: Three Gamma-Migrating Paraproteins Identified (07-17 @ 20:27)  Immunofixation, Serum:   One IgG Kappa and Two IgG Lambda Bands Identified    Reference Range: None Detected (07-17 @ 20:27)        BLOOD SMEAR INTERPRETATION:       RADIOLOGY & ADDITIONAL STUDIES:

## 2019-07-20 NOTE — PROGRESS NOTE ADULT - ASSESSMENT
1. Ascites  2. Cirrhosis  3. IR reported paracentesis is not an option    Suggestions:    1. Continue diuretics  2. Monitor electrolytes and BUN/ Creatinine  3. Low salt diet  4. Oncology evaluation   5.  Protonix daily  6. DVT prophylaxis

## 2019-07-20 NOTE — PROGRESS NOTE ADULT - ASSESSMENT
88 year old female has medical history of HTN, A fib (not on AC), CHF, Gastric ulcer, Hepatitis C ,DM, glaucoma, anemia, SBO,surgical history significant for Intestinal resection, oophorectomy cholecystectomy, worsening abdominal swelling.  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  2.ABX.  3.Afib-no AC,asa,amiodarone,lopressor.  4.DM-Insulin.  5.Cont to lasix and aldactone for Anasarca from low albumin.  6.PPI.  7.GI f/u.

## 2019-07-21 LAB
ALBUMIN SERPL ELPH-MCNC: 1.7 G/DL — LOW (ref 3.5–5)
ALP SERPL-CCNC: 236 U/L — HIGH (ref 40–120)
ALT FLD-CCNC: 25 U/L DA — SIGNIFICANT CHANGE UP (ref 10–60)
ANION GAP SERPL CALC-SCNC: 6 MMOL/L — SIGNIFICANT CHANGE UP (ref 5–17)
AST SERPL-CCNC: 41 U/L — HIGH (ref 10–40)
BASOPHILS # BLD AUTO: 0.06 K/UL — SIGNIFICANT CHANGE UP (ref 0–0.2)
BASOPHILS NFR BLD AUTO: 0.8 % — SIGNIFICANT CHANGE UP (ref 0–2)
BILIRUB SERPL-MCNC: 0.4 MG/DL — SIGNIFICANT CHANGE UP (ref 0.2–1.2)
BUN SERPL-MCNC: 21 MG/DL — HIGH (ref 7–18)
CALCIUM SERPL-MCNC: 8.5 MG/DL — SIGNIFICANT CHANGE UP (ref 8.4–10.5)
CHLORIDE SERPL-SCNC: 100 MMOL/L — SIGNIFICANT CHANGE UP (ref 96–108)
CO2 SERPL-SCNC: 30 MMOL/L — SIGNIFICANT CHANGE UP (ref 22–31)
CREAT SERPL-MCNC: 0.83 MG/DL — SIGNIFICANT CHANGE UP (ref 0.5–1.3)
EOSINOPHIL # BLD AUTO: 0.2 K/UL — SIGNIFICANT CHANGE UP (ref 0–0.5)
EOSINOPHIL NFR BLD AUTO: 2.8 % — SIGNIFICANT CHANGE UP (ref 0–6)
GLUCOSE BLDC GLUCOMTR-MCNC: 160 MG/DL — HIGH (ref 70–99)
GLUCOSE BLDC GLUCOMTR-MCNC: 176 MG/DL — HIGH (ref 70–99)
GLUCOSE BLDC GLUCOMTR-MCNC: 258 MG/DL — HIGH (ref 70–99)
GLUCOSE BLDC GLUCOMTR-MCNC: 291 MG/DL — HIGH (ref 70–99)
GLUCOSE SERPL-MCNC: 166 MG/DL — HIGH (ref 70–99)
HCT VFR BLD CALC: 34.1 % — LOW (ref 34.5–45)
HGB BLD-MCNC: 10.8 G/DL — LOW (ref 11.5–15.5)
IMM GRANULOCYTES NFR BLD AUTO: 0.7 % — SIGNIFICANT CHANGE UP (ref 0–1.5)
LYMPHOCYTES # BLD AUTO: 3.03 K/UL — SIGNIFICANT CHANGE UP (ref 1–3.3)
LYMPHOCYTES # BLD AUTO: 42.6 % — SIGNIFICANT CHANGE UP (ref 13–44)
MAGNESIUM SERPL-MCNC: 2 MG/DL — SIGNIFICANT CHANGE UP (ref 1.6–2.6)
MCHC RBC-ENTMCNC: 29.3 PG — SIGNIFICANT CHANGE UP (ref 27–34)
MCHC RBC-ENTMCNC: 31.7 GM/DL — LOW (ref 32–36)
MCV RBC AUTO: 92.7 FL — SIGNIFICANT CHANGE UP (ref 80–100)
MONOCYTES # BLD AUTO: 0.61 K/UL — SIGNIFICANT CHANGE UP (ref 0–0.9)
MONOCYTES NFR BLD AUTO: 8.6 % — SIGNIFICANT CHANGE UP (ref 2–14)
NEUTROPHILS # BLD AUTO: 3.17 K/UL — SIGNIFICANT CHANGE UP (ref 1.8–7.4)
NEUTROPHILS NFR BLD AUTO: 44.5 % — SIGNIFICANT CHANGE UP (ref 43–77)
NRBC # BLD: 0 /100 WBCS — SIGNIFICANT CHANGE UP (ref 0–0)
PHOSPHATE SERPL-MCNC: 2.4 MG/DL — LOW (ref 2.5–4.5)
PLATELET # BLD AUTO: 175 K/UL — SIGNIFICANT CHANGE UP (ref 150–400)
POTASSIUM SERPL-MCNC: 4.2 MMOL/L — SIGNIFICANT CHANGE UP (ref 3.5–5.3)
POTASSIUM SERPL-SCNC: 4.2 MMOL/L — SIGNIFICANT CHANGE UP (ref 3.5–5.3)
PROT SERPL-MCNC: 7 G/DL — SIGNIFICANT CHANGE UP (ref 6–8.3)
RBC # BLD: 3.68 M/UL — LOW (ref 3.8–5.2)
RBC # FLD: 16.8 % — HIGH (ref 10.3–14.5)
SODIUM SERPL-SCNC: 136 MMOL/L — SIGNIFICANT CHANGE UP (ref 135–145)
WBC # BLD: 7.12 K/UL — SIGNIFICANT CHANGE UP (ref 3.8–10.5)
WBC # FLD AUTO: 7.12 K/UL — SIGNIFICANT CHANGE UP (ref 3.8–10.5)

## 2019-07-21 RX ORDER — TRAMADOL HYDROCHLORIDE 50 MG/1
25 TABLET ORAL EVERY 12 HOURS
Refills: 0 | Status: DISCONTINUED | OUTPATIENT
Start: 2019-07-21 | End: 2019-07-23

## 2019-07-21 RX ORDER — LIDOCAINE 4 G/100G
1 CREAM TOPICAL EVERY 24 HOURS
Refills: 0 | Status: DISCONTINUED | OUTPATIENT
Start: 2019-07-21 | End: 2019-07-23

## 2019-07-21 RX ADMIN — Medication 25 MILLIGRAM(S): at 05:29

## 2019-07-21 RX ADMIN — LIDOCAINE 1 PATCH: 4 CREAM TOPICAL at 01:08

## 2019-07-21 RX ADMIN — Medication 81 MILLIGRAM(S): at 12:06

## 2019-07-21 RX ADMIN — TRAMADOL HYDROCHLORIDE 25 MILLIGRAM(S): 50 TABLET ORAL at 11:22

## 2019-07-21 RX ADMIN — HEPARIN SODIUM 5000 UNIT(S): 5000 INJECTION INTRAVENOUS; SUBCUTANEOUS at 17:22

## 2019-07-21 RX ADMIN — TRAMADOL HYDROCHLORIDE 25 MILLIGRAM(S): 50 TABLET ORAL at 12:14

## 2019-07-21 RX ADMIN — LIDOCAINE 1 PATCH: 4 CREAM TOPICAL at 19:32

## 2019-07-21 RX ADMIN — LIDOCAINE 1 PATCH: 4 CREAM TOPICAL at 10:27

## 2019-07-21 RX ADMIN — Medication 3: at 12:05

## 2019-07-21 RX ADMIN — Medication 3 UNIT(S): at 17:20

## 2019-07-21 RX ADMIN — LIDOCAINE 1 PATCH: 4 CREAM TOPICAL at 09:30

## 2019-07-21 RX ADMIN — Medication 3: at 17:20

## 2019-07-21 RX ADMIN — Medication 1: at 08:21

## 2019-07-21 RX ADMIN — Medication 1: at 21:15

## 2019-07-21 RX ADMIN — Medication 3 UNIT(S): at 12:05

## 2019-07-21 RX ADMIN — SPIRONOLACTONE 25 MILLIGRAM(S): 25 TABLET, FILM COATED ORAL at 05:30

## 2019-07-21 RX ADMIN — Medication 3 UNIT(S): at 08:22

## 2019-07-21 RX ADMIN — LIDOCAINE 1 PATCH: 4 CREAM TOPICAL at 00:36

## 2019-07-21 RX ADMIN — LIDOCAINE 1 PATCH: 4 CREAM TOPICAL at 19:54

## 2019-07-21 RX ADMIN — Medication 650 MILLIGRAM(S): at 23:06

## 2019-07-21 RX ADMIN — LATANOPROST 1 DROP(S): 0.05 SOLUTION/ DROPS OPHTHALMIC; TOPICAL at 21:16

## 2019-07-21 RX ADMIN — Medication 0.12 MILLIGRAM(S): at 05:30

## 2019-07-21 RX ADMIN — AMIODARONE HYDROCHLORIDE 200 MILLIGRAM(S): 400 TABLET ORAL at 05:30

## 2019-07-21 RX ADMIN — LIDOCAINE 1 PATCH: 4 CREAM TOPICAL at 23:13

## 2019-07-21 RX ADMIN — HEPARIN SODIUM 5000 UNIT(S): 5000 INJECTION INTRAVENOUS; SUBCUTANEOUS at 05:30

## 2019-07-21 RX ADMIN — Medication 650 MILLIGRAM(S): at 21:14

## 2019-07-21 RX ADMIN — Medication 1 TABLET(S): at 12:06

## 2019-07-21 RX ADMIN — Medication 40 MILLIGRAM(S): at 05:30

## 2019-07-21 NOTE — PROGRESS NOTE ADULT - SUBJECTIVE AND OBJECTIVE BOX
88y Female seen at the bedside, no distress, no new complains, no overnight events. Pt states that her cough is improving, cough is productive with white sputum. Pt remains afebrile, no leukocytosis, remains off antibiotics, will continue observing. Pt denies having shortness of breath, no nausea or vomiting, no abdominal pain, no diarrhea.       MEDS:    No Known Allergies    Intolerances        VITALS:  Vital Signs Last 24 Hrs  T(C): 36.5 (21 Jul 2019 07:05), Max: 37.3 (20 Jul 2019 15:03)  T(F): 97.7 (21 Jul 2019 07:05), Max: 99.1 (20 Jul 2019 15:03)  HR: 61 (21 Jul 2019 07:05) (60 - 84)  BP: 132/72 (21 Jul 2019 07:05) (117/67 - 138/79)  BP(mean): --  RR: 18 (21 Jul 2019 07:05) (18 - 18)  SpO2: 100% (21 Jul 2019 07:05) (99% - 100%)    LABS/DIAGNOSTIC TESTS:                          10.8   7.12  )-----------( 175      ( 21 Jul 2019 05:51 )             34.1         07-21    136  |  100  |  21<H>  ----------------------------<  166<H>  4.2   |  30  |  0.83    Ca    8.5      21 Jul 2019 05:51  Phos  2.4     07-21  Mg     2.0     07-21    TPro  7.0  /  Alb  1.7<L>  /  TBili  0.4  /  DBili  x   /  AST  41<H>  /  ALT  25  /  AlkPhos  236<H>  07-21      CULTURES:   .Blood  05-31 @ 22:36   No growth at 5 days.  --  --      .Urine  05-29 @ 20:21   No growth  --  --

## 2019-07-21 NOTE — PROGRESS NOTE ADULT - SUBJECTIVE AND OBJECTIVE BOX
PGY 1 Note discussed with supervising resident and primary attending    Patient is a 88y old  Female who presents with a chief complaint of Abdominal swelling (20 Jul 2019 21:23)      INTERVAL HPI/OVERNIGHT EVENTS: offers no new complaints; current symptoms resolving    MEDICATIONS  (STANDING):  amiodarone    Tablet 200 milliGRAM(s) Oral daily  aspirin enteric coated 81 milliGRAM(s) Oral daily  dextrose 5%. 1000 milliLiter(s) (50 mL/Hr) IV Continuous <Continuous>  dextrose 50% Injectable 12.5 Gram(s) IV Push once  dextrose 50% Injectable 25 Gram(s) IV Push once  dextrose 50% Injectable 25 Gram(s) IV Push once  digoxin     Tablet 0.125 milliGRAM(s) Oral daily  furosemide    Tablet 40 milliGRAM(s) Oral daily  heparin  Injectable 5000 Unit(s) SubCutaneous every 12 hours  insulin lispro (HumaLOG) corrective regimen sliding scale   SubCutaneous Before meals and at bedtime  insulin lispro Injectable (HumaLOG) 3 Unit(s) SubCutaneous three times a day before meals  latanoprost 0.005% Ophthalmic Solution 1 Drop(s) Both EYES at bedtime  lidocaine   Patch 1 Patch Transdermal every 24 hours  lidocaine   Patch 1 Patch Transdermal every 24 hours  metoprolol succinate ER 25 milliGRAM(s) Oral daily  multivitamin 1 Tablet(s) Oral daily  senna 2 Tablet(s) Oral at bedtime  spironolactone 25 milliGRAM(s) Oral daily    MEDICATIONS  (PRN):  acetaminophen   Tablet .. 650 milliGRAM(s) Oral every 6 hours PRN Mild Pain (1 - 3)  dextrose 40% Gel 15 Gram(s) Oral once PRN Blood Glucose LESS THAN 70 milliGRAM(s)/deciLiter  glucagon  Injectable 1 milliGRAM(s) IntraMuscular once PRN Glucose <70 milliGRAM(s)/deciLiter  guaiFENesin   Syrup  (Sugar-Free) 100 milliGRAM(s) Oral every 6 hours PRN Cough      __________________________________________________  REVIEW OF SYSTEMS:    CONSTITUTIONAL: No fever,   EYES: no acute visual disturbances  NECK: No pain or stiffness  RESPIRATORY: No cough; No shortness of breath  CARDIOVASCULAR: No chest pain, no palpitations  GASTROINTESTINAL: No pain. No nausea or vomiting; No diarrhea   NEUROLOGICAL: No headache or numbness, no tremors  MUSCULOSKELETAL: No joint pain, no muscle pain  GENITOURINARY: no dysuria, no frequency, no hesitancy  PSYCHIATRY: no depression , no anxiety  ALL OTHER  ROS negative        Vital Signs Last 24 Hrs  T(C): 36.8 (21 Jul 2019 05:06), Max: 37.3 (20 Jul 2019 15:03)  T(F): 98.2 (21 Jul 2019 05:06), Max: 99.1 (20 Jul 2019 15:03)  HR: 77 (21 Jul 2019 05:06) (60 - 84)  BP: 137/69 (21 Jul 2019 05:06) (117/67 - 138/79)  BP(mean): --  RR: 18 (21 Jul 2019 05:06) (18 - 18)  SpO2: 100% (21 Jul 2019 05:06) (99% - 100%)    ________________________________________________  PHYSICAL EXAM:  GENERAL: NAD  HEENT: Normocephalic;  conjunctivae and sclerae clear; moist mucous membranes;   NECK : supple  CHEST/LUNG: Clear to auscultation bilaterally with good air entry   HEART: S1 S2  regular; no murmurs, gallops or rubs  ABDOMEN: Soft, Nontender, Nondistended; Bowel sounds present  EXTREMITIES: no cyanosis; no edema; no calf tenderness  SKIN: warm and dry; no rash  NERVOUS SYSTEM:  Awake and alert; Oriented  to place, person and time ; no new deficits    _________________________________________________  LABS:                        10.8   7.12  )-----------( 175      ( 21 Jul 2019 05:51 )             34.1     07-21    136  |  100  |  21<H>  ----------------------------<  166<H>  4.2   |  30  |  0.83    Ca    8.5      21 Jul 2019 05:51  Phos  2.4     07-21  Mg     2.0     07-21    TPro  7.0  /  Alb  1.7<L>  /  TBili  0.4  /  DBili  x   /  AST  41<H>  /  ALT  25  /  AlkPhos  236<H>  07-21        CAPILLARY BLOOD GLUCOSE      POCT Blood Glucose.: 176 mg/dL (21 Jul 2019 07:49)  POCT Blood Glucose.: 231 mg/dL (20 Jul 2019 21:14)  POCT Blood Glucose.: 234 mg/dL (20 Jul 2019 17:01)  POCT Blood Glucose.: 210 mg/dL (20 Jul 2019 11:30)  POCT Blood Glucose.: 149 mg/dL (20 Jul 2019 08:02)        RADIOLOGY & ADDITIONAL TESTS:    Imaging Personally Reviewed:  YES/NO    Consultant(s) Notes Reviewed:   YES/ No    Care Discussed with Consultants :     Plan of care was discussed with patient and /or primary care giver; all questions and concerns were addressed and care was aligned with patient's wishes.

## 2019-07-21 NOTE — PROGRESS NOTE ADULT - NEUROLOGICAL DETAILS
responds to pain/responds to verbal commands/sensation intact
alert and oriented x 3
alert and oriented x 3
responds to verbal commands/responds to pain/sensation intact
responds to verbal commands/sensation intact/responds to pain
sensation intact/responds to verbal commands/responds to pain

## 2019-07-21 NOTE — PROGRESS NOTE ADULT - ASSESSMENT
88 year old female has medical history of HTN, A fib (not on AC), CHF, Gastric ulcer, Hepatitis C ,DM, glaucoma, anemia, SBO,surgical history significant for Intestinal resection, oophorectomy cholecystectomy, worsening abdominal swelling.  1.SPEP+-Heme eval noted, MGUS, Rec PET as outpatient..  2.GI f/u.  3.Afib-no AC,asa,amiodarone,lopressor.  4.DM-Insulin.  5.Cont to lasix and aldactone for Anasarca from low albumin.  6.PPI.

## 2019-07-21 NOTE — PROGRESS NOTE ADULT - ASSESSMENT
1. R/O Empyema  - CXR noted.   - CT chest notes - Loculated fluid with air in the right major fissure may represent small loculated hydropneumothorax versus empyema with air producing organism.   - Thoracic surgery consult - conservative management  - O2 supplement  - Bronchodilators   - F/U CXR   - ID Recc no ABX    2. Atelectasis  - O2 Supp  - Bronchodilators  - Incentive spirometry     3. CHF  - Echo noted   - Tele monitoring  - Cardio follow up  - Diuretics PRN  - DVT and GI PPX     4. Ascites  - GI Eval noted  - Recc considering therapeutic/diagnostic paracentesis with cytology.  - Tumor markers   - May need diuretics.     5. Pulmonary HTN  - RVP 49  - Bronchodilators  - CCB  -Oxygen supp 1. R/O Empyema  - CXR noted.   - CT chest notes - Loculated fluid with air in the right major fissure may represent small loculated hydropneumothorax versus empyema with air producing organism.   - Thoracic surgery follow up - conservative management  - O2 supplement  - Bronchodilators   - F/U CXR   - ID Recc no ABX    2. Atelectasis  - O2 Supp  - Bronchodilators  - Incentive spirometry     3. CHF  - Echo noted   - Tele monitoring  - Cardio follow up  - Diuretics PRN  - DVT and GI PPX     4. Ascites  - GI Eval noted  - Recc considering therapeutic/diagnostic paracentesis with cytology.  - Tumor markers   - Cont diuretics.     5. Pulmonary HTN  - RVP 49  - Bronchodilators  - CCB  -Oxygen supp

## 2019-07-21 NOTE — PROGRESS NOTE ADULT - SUBJECTIVE AND OBJECTIVE BOX
Patient is a 88y old  Female who presents with a chief complaint of Abdominal swelling (21 Jul 2019 09:27)    Awake , alert , lying in bed in NAD. Denies cough or sob. Pt has no new complaints.         INTERVAL HPI/OVERNIGHT EVENTS:      VITAL SIGNS:  T(F): 97.7 (07-21-19 @ 07:05)  HR: 61 (07-21-19 @ 07:05)  BP: 132/72 (07-21-19 @ 07:05)  RR: 18 (07-21-19 @ 07:05)  SpO2: 100% (07-21-19 @ 07:05)  Wt(kg): --  I&O's Detail          REVIEW OF SYSTEMS:    CONSTITUTIONAL:  No fevers, chills, sweats    HEENT:  Eyes:  No diplopia or blurred vision. ENT:  No earache, sore throat or runny nose.    CARDIOVASCULAR:  No pressure, squeezing, tightness, or heaviness about the chest; no palpitations.    RESPIRATORY:  Per HPI    GASTROINTESTINAL:  No abdominal pain, nausea, vomiting or diarrhea.    GENITOURINARY:  No dysuria, frequency or urgency.    NEUROLOGIC:  No paresthesias, fasciculations, seizures or weakness.    PSYCHIATRIC:  No disorder of thought or mood.      PHYSICAL EXAM:    Constitutional: Well developed and nourished  Eyes:Perrla  ENMT: normal  Neck:supple  Respiratory: + rales posterior left base   Cardiovascular: S1 S2 regular  Gastrointestinal: Soft, Non tender  Extremities: No edema  Vascular:normal  Neurological:Awake, alert,Ox3  Musculoskeletal:Normal      MEDICATIONS  (STANDING):  amiodarone    Tablet 200 milliGRAM(s) Oral daily  aspirin enteric coated 81 milliGRAM(s) Oral daily  dextrose 5%. 1000 milliLiter(s) (50 mL/Hr) IV Continuous <Continuous>  dextrose 50% Injectable 12.5 Gram(s) IV Push once  dextrose 50% Injectable 25 Gram(s) IV Push once  dextrose 50% Injectable 25 Gram(s) IV Push once  digoxin     Tablet 0.125 milliGRAM(s) Oral daily  furosemide    Tablet 40 milliGRAM(s) Oral daily  heparin  Injectable 5000 Unit(s) SubCutaneous every 12 hours  insulin lispro (HumaLOG) corrective regimen sliding scale   SubCutaneous Before meals and at bedtime  insulin lispro Injectable (HumaLOG) 3 Unit(s) SubCutaneous three times a day before meals  latanoprost 0.005% Ophthalmic Solution 1 Drop(s) Both EYES at bedtime  lidocaine   Patch 1 Patch Transdermal every 24 hours  lidocaine   Patch 1 Patch Transdermal every 24 hours  metoprolol succinate ER 25 milliGRAM(s) Oral daily  multivitamin 1 Tablet(s) Oral daily  senna 2 Tablet(s) Oral at bedtime  spironolactone 25 milliGRAM(s) Oral daily    MEDICATIONS  (PRN):  acetaminophen   Tablet .. 650 milliGRAM(s) Oral every 6 hours PRN Mild Pain (1 - 3)  dextrose 40% Gel 15 Gram(s) Oral once PRN Blood Glucose LESS THAN 70 milliGRAM(s)/deciLiter  glucagon  Injectable 1 milliGRAM(s) IntraMuscular once PRN Glucose <70 milliGRAM(s)/deciLiter  guaiFENesin   Syrup  (Sugar-Free) 100 milliGRAM(s) Oral every 6 hours PRN Cough      Allergies    No Known Allergies    Intolerances        LABS:                        10.8   7.12  )-----------( 175      ( 21 Jul 2019 05:51 )             34.1     07-21    136  |  100  |  21<H>  ----------------------------<  166<H>  4.2   |  30  |  0.83    Ca    8.5      21 Jul 2019 05:51  Phos  2.4     07-21  Mg     2.0     07-21    TPro  7.0  /  Alb  1.7<L>  /  TBili  0.4  /  DBili  x   /  AST  41<H>  /  ALT  25  /  AlkPhos  236<H>  07-21              CAPILLARY BLOOD GLUCOSE      POCT Blood Glucose.: 176 mg/dL (21 Jul 2019 07:49)  POCT Blood Glucose.: 231 mg/dL (20 Jul 2019 21:14)  POCT Blood Glucose.: 234 mg/dL (20 Jul 2019 17:01)  POCT Blood Glucose.: 210 mg/dL (20 Jul 2019 11:30)    pro-bnp 6781 07-15 @ 14:10     d-dimer --  07-15 @ 14:10      RADIOLOGY & ADDITIONAL TESTS:    CXR:  < from: Xray Chest 1 View- PORTABLE-Routine (07.17.19 @ 11:03) >  IMPRESSION: Increasing right base process.    < end of copied text >    Ct scan chest:  < from: CT Chest No Cont (07.15.19 @ 19:28) >    IMPRESSION:     Loculated fluid with air in the right major fissure may represent small   loculated hydropneumothorax versus empyema with air producing organism.   Evaluation is limited without intravenous contrast.    < end of copied text >    ekg;    echo:  < from: Transthoracic Echocardiogram (07.17.19 @ 07:52) >  CONCLUSIONS:  1. Mitral annular calcification. Mild to moderate mitral  regurgitation.  2. Severely dilated left atrium.  LA volume index = 60  cc/m2.  3. Normal left ventricular internal dimensions and wall  thicknesses.  4. Normal left ventricular systolic function.  5. Right ventricular enlargement with normal RV systolic  function.   A device lead is visualized in the right heart.  6. RV systolic pressure is 49 mm Hg. Mild pulmonary  hypertension.  7. There is severe tricuspid regurgitation.    < end of copied text > Patient is a 88y old  Female who presents with a chief complaint of Abdominal swelling (21 Jul 2019 09:27)    Awake , alert , lying in bed in NAD. Has occasional cough but no sob. Pt has no new complaints.         INTERVAL HPI/OVERNIGHT EVENTS:      VITAL SIGNS:  T(F): 97.7 (07-21-19 @ 07:05)  HR: 61 (07-21-19 @ 07:05)  BP: 132/72 (07-21-19 @ 07:05)  RR: 18 (07-21-19 @ 07:05)  SpO2: 100% (07-21-19 @ 07:05)  Wt(kg): --  I&O's Detail          REVIEW OF SYSTEMS:    CONSTITUTIONAL:  No fevers, chills, sweats    HEENT:  Eyes:  No diplopia or blurred vision. ENT:  No earache, sore throat or runny nose.    CARDIOVASCULAR:  No pressure, squeezing, tightness, or heaviness about the chest; no palpitations.    RESPIRATORY:  Per HPI    GASTROINTESTINAL:  No abdominal pain, nausea, vomiting or diarrhea.    GENITOURINARY:  No dysuria, frequency or urgency.    NEUROLOGIC:  No paresthesias, fasciculations, seizures or weakness.    PSYCHIATRIC:  No disorder of thought or mood.      PHYSICAL EXAM:    Constitutional: Well developed and nourished  Eyes:Perrla  ENMT: normal  Neck:supple  Respiratory: + rales posterior left base   Cardiovascular: S1 S2 regular  Gastrointestinal: Soft, Non tender  Extremities: No edema  Vascular:normal  Neurological:Awake, alert,Ox3  Musculoskeletal:Normal      MEDICATIONS  (STANDING):  amiodarone    Tablet 200 milliGRAM(s) Oral daily  aspirin enteric coated 81 milliGRAM(s) Oral daily  dextrose 5%. 1000 milliLiter(s) (50 mL/Hr) IV Continuous <Continuous>  dextrose 50% Injectable 12.5 Gram(s) IV Push once  dextrose 50% Injectable 25 Gram(s) IV Push once  dextrose 50% Injectable 25 Gram(s) IV Push once  digoxin     Tablet 0.125 milliGRAM(s) Oral daily  furosemide    Tablet 40 milliGRAM(s) Oral daily  heparin  Injectable 5000 Unit(s) SubCutaneous every 12 hours  insulin lispro (HumaLOG) corrective regimen sliding scale   SubCutaneous Before meals and at bedtime  insulin lispro Injectable (HumaLOG) 3 Unit(s) SubCutaneous three times a day before meals  latanoprost 0.005% Ophthalmic Solution 1 Drop(s) Both EYES at bedtime  lidocaine   Patch 1 Patch Transdermal every 24 hours  lidocaine   Patch 1 Patch Transdermal every 24 hours  metoprolol succinate ER 25 milliGRAM(s) Oral daily  multivitamin 1 Tablet(s) Oral daily  senna 2 Tablet(s) Oral at bedtime  spironolactone 25 milliGRAM(s) Oral daily    MEDICATIONS  (PRN):  acetaminophen   Tablet .. 650 milliGRAM(s) Oral every 6 hours PRN Mild Pain (1 - 3)  dextrose 40% Gel 15 Gram(s) Oral once PRN Blood Glucose LESS THAN 70 milliGRAM(s)/deciLiter  glucagon  Injectable 1 milliGRAM(s) IntraMuscular once PRN Glucose <70 milliGRAM(s)/deciLiter  guaiFENesin   Syrup  (Sugar-Free) 100 milliGRAM(s) Oral every 6 hours PRN Cough      Allergies    No Known Allergies    Intolerances        LABS:                        10.8   7.12  )-----------( 175      ( 21 Jul 2019 05:51 )             34.1     07-21    136  |  100  |  21<H>  ----------------------------<  166<H>  4.2   |  30  |  0.83    Ca    8.5      21 Jul 2019 05:51  Phos  2.4     07-21  Mg     2.0     07-21    TPro  7.0  /  Alb  1.7<L>  /  TBili  0.4  /  DBili  x   /  AST  41<H>  /  ALT  25  /  AlkPhos  236<H>  07-21              CAPILLARY BLOOD GLUCOSE      POCT Blood Glucose.: 176 mg/dL (21 Jul 2019 07:49)  POCT Blood Glucose.: 231 mg/dL (20 Jul 2019 21:14)  POCT Blood Glucose.: 234 mg/dL (20 Jul 2019 17:01)  POCT Blood Glucose.: 210 mg/dL (20 Jul 2019 11:30)    pro-bnp 6781 07-15 @ 14:10     d-dimer --  07-15 @ 14:10      RADIOLOGY & ADDITIONAL TESTS:    CXR:  < from: Xray Chest 1 View- PORTABLE-Routine (07.17.19 @ 11:03) >  IMPRESSION: Increasing right base process.    < end of copied text >    Ct scan chest:  < from: CT Chest No Cont (07.15.19 @ 19:28) >    IMPRESSION:     Loculated fluid with air in the right major fissure may represent small   loculated hydropneumothorax versus empyema with air producing organism.   Evaluation is limited without intravenous contrast.    < end of copied text >    ekg;    echo:  < from: Transthoracic Echocardiogram (07.17.19 @ 07:52) >  CONCLUSIONS:  1. Mitral annular calcification. Mild to moderate mitral  regurgitation.  2. Severely dilated left atrium.  LA volume index = 60  cc/m2.  3. Normal left ventricular internal dimensions and wall  thicknesses.  4. Normal left ventricular systolic function.  5. Right ventricular enlargement with normal RV systolic  function.   A device lead is visualized in the right heart.  6. RV systolic pressure is 49 mm Hg. Mild pulmonary  hypertension.  7. There is severe tricuspid regurgitation.    < end of copied text >

## 2019-07-21 NOTE — PROGRESS NOTE ADULT - ASSESSMENT
Hydropneumothorax - does not appear to have an Empyema, given lack of pleuritic chest pain, no fevers or leukocytosis and her lung symptoms are not sig either    Plan -   off all abxs and watch Hydropneumothorax - does not appear to have an Empyema, given lack of pleuritic chest pain, no fevers or leukocytosis and her lung symptoms are not sig either    Plan -   off all abxs and watch  re-consult as needed

## 2019-07-21 NOTE — PROGRESS NOTE ADULT - SUBJECTIVE AND OBJECTIVE BOX
CHIEF COMPLAINT:Patient is a 88y old  Female who presents with a chief complaint of Abdominal swelling.Pt appears comfortable.    	  REVIEW OF SYSTEMS:  CONSTITUTIONAL: No fever, weight loss, or fatigue  EYES: No eye pain, visual disturbances, or discharge  ENT:  No difficulty hearing, tinnitus, vertigo; No sinus or throat pain  NECK: No pain or stiffness  RESPIRATORY: No cough, wheezing, chills or hemoptysis; No Shortness of Breath  CARDIOVASCULAR: No chest pain, palpitations, passing out, dizziness, or leg swelling  GASTROINTESTINAL: No abdominal or epigastric pain. No nausea, vomiting, or hematemesis; No diarrhea or constipation. No melena or hematochezia.  GENITOURINARY: No dysuria, frequency, hematuria, or incontinence  NEUROLOGICAL: No headaches, memory loss, loss of strength, numbness, or tremors  SKIN: No itching, burning, rashes, or lesions   LYMPH Nodes: No enlarged glands  ENDOCRINE: No heat or cold intolerance; No hair loss  MUSCULOSKELETAL: No joint pain or swelling; No muscle, back, or extremity pain  PSYCHIATRIC: No depression, anxiety, mood swings, or difficulty sleeping  HEME/LYMPH: No easy bruising, or bleeding gums  ALLERGY AND IMMUNOLOGIC: No hives or eczema	      PHYSICAL EXAM:  T(C): 36.5 (07-21-19 @ 07:05), Max: 37.3 (07-20-19 @ 15:03)  HR: 61 (07-21-19 @ 07:05) (60 - 84)  BP: 132/72 (07-21-19 @ 07:05) (117/67 - 138/79)  RR: 18 (07-21-19 @ 07:05) (18 - 18)  SpO2: 100% (07-21-19 @ 07:05) (99% - 100%)    Appearance: Normal	  HEENT:   Normal oral mucosa, PERRL, EOMI	  Lymphatic: No lymphadenopathy  Cardiovascular: Normal S1 S2, No JVD, No murmurs, No edema  Respiratory: Lungs clear to auscultation	  Psychiatry: A & O x 3, Mood & affect appropriate  Gastrointestinal:  Soft, Non-tender, + BS	  Skin: No rashes, No ecchymoses, No cyanosis	  Neurologic: Non-focal  Extremities: Normal range of motion, No clubbing, cyanosis or edema  Vascular: Peripheral pulses palpable 2+ bilaterally    MEDICATIONS  (STANDING):  amiodarone    Tablet 200 milliGRAM(s) Oral daily  aspirin enteric coated 81 milliGRAM(s) Oral daily  dextrose 5%. 1000 milliLiter(s) (50 mL/Hr) IV Continuous <Continuous>  dextrose 50% Injectable 12.5 Gram(s) IV Push once  dextrose 50% Injectable 25 Gram(s) IV Push once  dextrose 50% Injectable 25 Gram(s) IV Push once  digoxin     Tablet 0.125 milliGRAM(s) Oral daily  furosemide    Tablet 40 milliGRAM(s) Oral daily  heparin  Injectable 5000 Unit(s) SubCutaneous every 12 hours  insulin lispro (HumaLOG) corrective regimen sliding scale   SubCutaneous Before meals and at bedtime  insulin lispro Injectable (HumaLOG) 3 Unit(s) SubCutaneous three times a day before meals  latanoprost 0.005% Ophthalmic Solution 1 Drop(s) Both EYES at bedtime  lidocaine   Patch 1 Patch Transdermal every 24 hours  lidocaine   Patch 1 Patch Transdermal every 24 hours  metoprolol succinate ER 25 milliGRAM(s) Oral daily  multivitamin 1 Tablet(s) Oral daily  senna 2 Tablet(s) Oral at bedtime  spironolactone 25 milliGRAM(s) Oral daily        	  LABS:	 	                    10.8   7.12  )-----------( 175      ( 21 Jul 2019 05:51 )             34.1     07-21    136  |  100  |  21<H>  ----------------------------<  166<H>  4.2   |  30  |  0.83    Ca    8.5      21 Jul 2019 05:51  Phos  2.4     07-21  Mg     2.0     07-21    TPro  7.0  /  Alb  1.7<L>  /  TBili  0.4  /  DBili  x   /  AST  41<H>  /  ALT  25  /  AlkPhos  236<H>  07-21    proBNP: Serum Pro-Brain Natriuretic Peptide: 6781 pg/mL (07-15 @ 14:10)    Lipid Profile: Cholesterol 173    HDL 32      HgA1c: Hemoglobin A1C, Whole Blood: 6.9 % (07-16 @ 10:19)  Hemoglobin A1C, Whole Blood: 7.1 % (07-16 @ 03:47)    TSH: Thyroid Stimulating Hormone, Serum: 3.61 uU/mL (07-16 @ 07:09)

## 2019-07-22 LAB
ALBUMIN SERPL ELPH-MCNC: 1.7 G/DL — LOW (ref 3.5–5)
ALP SERPL-CCNC: 226 U/L — HIGH (ref 40–120)
ALT FLD-CCNC: 26 U/L DA — SIGNIFICANT CHANGE UP (ref 10–60)
ANION GAP SERPL CALC-SCNC: 4 MMOL/L — LOW (ref 5–17)
AST SERPL-CCNC: 38 U/L — SIGNIFICANT CHANGE UP (ref 10–40)
BASOPHILS # BLD AUTO: 0.05 K/UL — SIGNIFICANT CHANGE UP (ref 0–0.2)
BASOPHILS NFR BLD AUTO: 0.8 % — SIGNIFICANT CHANGE UP (ref 0–2)
BILIRUB SERPL-MCNC: 0.4 MG/DL — SIGNIFICANT CHANGE UP (ref 0.2–1.2)
BUN SERPL-MCNC: 22 MG/DL — HIGH (ref 7–18)
CALCIUM SERPL-MCNC: 8.4 MG/DL — SIGNIFICANT CHANGE UP (ref 8.4–10.5)
CHLORIDE SERPL-SCNC: 100 MMOL/L — SIGNIFICANT CHANGE UP (ref 96–108)
CO2 SERPL-SCNC: 30 MMOL/L — SIGNIFICANT CHANGE UP (ref 22–31)
CREAT SERPL-MCNC: 0.73 MG/DL — SIGNIFICANT CHANGE UP (ref 0.5–1.3)
EOSINOPHIL # BLD AUTO: 0.2 K/UL — SIGNIFICANT CHANGE UP (ref 0–0.5)
EOSINOPHIL NFR BLD AUTO: 3 % — SIGNIFICANT CHANGE UP (ref 0–6)
GLUCOSE BLDC GLUCOMTR-MCNC: 135 MG/DL — HIGH (ref 70–99)
GLUCOSE BLDC GLUCOMTR-MCNC: 158 MG/DL — HIGH (ref 70–99)
GLUCOSE BLDC GLUCOMTR-MCNC: 160 MG/DL — HIGH (ref 70–99)
GLUCOSE BLDC GLUCOMTR-MCNC: 170 MG/DL — HIGH (ref 70–99)
GLUCOSE BLDC GLUCOMTR-MCNC: 250 MG/DL — HIGH (ref 70–99)
GLUCOSE SERPL-MCNC: 136 MG/DL — HIGH (ref 70–99)
HCT VFR BLD CALC: 34.3 % — LOW (ref 34.5–45)
HGB BLD-MCNC: 10.9 G/DL — LOW (ref 11.5–15.5)
IMM GRANULOCYTES NFR BLD AUTO: 0.6 % — SIGNIFICANT CHANGE UP (ref 0–1.5)
LYMPHOCYTES # BLD AUTO: 2.84 K/UL — SIGNIFICANT CHANGE UP (ref 1–3.3)
LYMPHOCYTES # BLD AUTO: 43 % — SIGNIFICANT CHANGE UP (ref 13–44)
M PROTEIN 24H UR ELPH-MRATE: SIGNIFICANT CHANGE UP
MAGNESIUM SERPL-MCNC: 2 MG/DL — SIGNIFICANT CHANGE UP (ref 1.6–2.6)
MCHC RBC-ENTMCNC: 29.4 PG — SIGNIFICANT CHANGE UP (ref 27–34)
MCHC RBC-ENTMCNC: 31.8 GM/DL — LOW (ref 32–36)
MCV RBC AUTO: 92.5 FL — SIGNIFICANT CHANGE UP (ref 80–100)
MONOCYTES # BLD AUTO: 0.54 K/UL — SIGNIFICANT CHANGE UP (ref 0–0.9)
MONOCYTES NFR BLD AUTO: 8.2 % — SIGNIFICANT CHANGE UP (ref 2–14)
NEUTROPHILS # BLD AUTO: 2.93 K/UL — SIGNIFICANT CHANGE UP (ref 1.8–7.4)
NEUTROPHILS NFR BLD AUTO: 44.4 % — SIGNIFICANT CHANGE UP (ref 43–77)
NRBC # BLD: 0 /100 WBCS — SIGNIFICANT CHANGE UP (ref 0–0)
PHOSPHATE SERPL-MCNC: 2.6 MG/DL — SIGNIFICANT CHANGE UP (ref 2.5–4.5)
PLATELET # BLD AUTO: 170 K/UL — SIGNIFICANT CHANGE UP (ref 150–400)
POTASSIUM SERPL-MCNC: 4.3 MMOL/L — SIGNIFICANT CHANGE UP (ref 3.5–5.3)
POTASSIUM SERPL-SCNC: 4.3 MMOL/L — SIGNIFICANT CHANGE UP (ref 3.5–5.3)
PROT SERPL-MCNC: 7 G/DL — SIGNIFICANT CHANGE UP (ref 6–8.3)
RBC # BLD: 3.71 M/UL — LOW (ref 3.8–5.2)
RBC # FLD: 16.6 % — HIGH (ref 10.3–14.5)
SODIUM SERPL-SCNC: 134 MMOL/L — LOW (ref 135–145)
WBC # BLD: 6.6 K/UL — SIGNIFICANT CHANGE UP (ref 3.8–10.5)
WBC # FLD AUTO: 6.6 K/UL — SIGNIFICANT CHANGE UP (ref 3.8–10.5)

## 2019-07-22 RX ADMIN — Medication 81 MILLIGRAM(S): at 11:30

## 2019-07-22 RX ADMIN — Medication 3 UNIT(S): at 12:28

## 2019-07-22 RX ADMIN — HEPARIN SODIUM 5000 UNIT(S): 5000 INJECTION INTRAVENOUS; SUBCUTANEOUS at 05:02

## 2019-07-22 RX ADMIN — LIDOCAINE 1 PATCH: 4 CREAM TOPICAL at 17:14

## 2019-07-22 RX ADMIN — Medication 1: at 22:15

## 2019-07-22 RX ADMIN — Medication 0.12 MILLIGRAM(S): at 05:03

## 2019-07-22 RX ADMIN — LIDOCAINE 1 PATCH: 4 CREAM TOPICAL at 00:00

## 2019-07-22 RX ADMIN — SPIRONOLACTONE 25 MILLIGRAM(S): 25 TABLET, FILM COATED ORAL at 05:03

## 2019-07-22 RX ADMIN — HEPARIN SODIUM 5000 UNIT(S): 5000 INJECTION INTRAVENOUS; SUBCUTANEOUS at 17:19

## 2019-07-22 RX ADMIN — Medication 1: at 16:47

## 2019-07-22 RX ADMIN — Medication 2: at 12:27

## 2019-07-22 RX ADMIN — Medication 1 TABLET(S): at 11:29

## 2019-07-22 RX ADMIN — Medication 3 UNIT(S): at 08:23

## 2019-07-22 RX ADMIN — Medication 3 UNIT(S): at 16:47

## 2019-07-22 RX ADMIN — LIDOCAINE 1 PATCH: 4 CREAM TOPICAL at 08:24

## 2019-07-22 RX ADMIN — LATANOPROST 1 DROP(S): 0.05 SOLUTION/ DROPS OPHTHALMIC; TOPICAL at 22:16

## 2019-07-22 RX ADMIN — Medication 25 MILLIGRAM(S): at 05:02

## 2019-07-22 RX ADMIN — Medication 100 MILLIGRAM(S): at 22:24

## 2019-07-22 RX ADMIN — LIDOCAINE 1 PATCH: 4 CREAM TOPICAL at 11:30

## 2019-07-22 RX ADMIN — AMIODARONE HYDROCHLORIDE 200 MILLIGRAM(S): 400 TABLET ORAL at 05:02

## 2019-07-22 RX ADMIN — Medication 40 MILLIGRAM(S): at 05:03

## 2019-07-22 NOTE — PROGRESS NOTE ADULT - PROBLEM SELECTOR PLAN 1
MGUS  will watch
orthopnea, leg swelling, abdominal swelling   bnp 6781  troponin negative.  echo feb 2019- pEF with severe grade 3-4 DD  echocardiogram showed Normal left ventricular systolic function.  5. Right ventricular enlargement with normal RV systolic  function.   A device lead is visualized in the right heart.  6. RV systolic pressure is 49 mm Hg. Mild pulmonary  hypertension.  c/w furosemide 20 mg IV daily.  Dr. Boo
orthopnea, leg swelling, abdominal swelling   bnp 6781  troponin negative.  echo feb 2019- pEF with severe grade 3-4 DD  f/u echocardiogram   c/w furosemide 20 mg IV daily.  Dr. Boo
orthopnea, leg swelling, abdominal swelling   bnp 6781  troponin negative.  echo feb 2019- pEF with severe grade 3-4 DD  f/u echocardiogram   c/w furosemide 20 mg IV daily.  Dr. Boo
with celiac node  will do PET as outpt  but she is not a candidate for surgery, RT, or chemo
orthopnea, leg swelling, abdominal swelling   bnp 6781  troponin negative.  echo feb 2019- pEF with severe grade 3-4 DD  echocardiogram showed Normal left ventricular systolic function.  5. Right ventricular enlargement with normal RV systolic  function.   A device lead is visualized in the right heart.  6. RV systolic pressure is 49 mm Hg. Mild pulmonary  hypertension.  furosemide 40mg oral daily.  Dr. Boo
orthopnea, leg swelling, abdominal swelling   bnp 6781  troponin negative.  echo feb 2019- pEF with severe grade 3-4 DD  f/u echocardiogram   c/w furosemide 20 mg IV daily.  Dr. Boo
orthopnea, leg swelling, abdominal swelling   bnp 6781  troponin negative.  echo feb 2019- pEF with severe grade 3-4 DD  f/u echocardiogram   c/w furosemide 40 bid.  Dr. Boo
orthopnea, leg swelling, abdominal swelling   bnp 6781.  echo feb 2019- pEF with severe grade 3-4 DD  Echo showed   grossly normal left  ventricular systolic function. Severe diastolic dysfunction  (stage III -IV).  - Normal right ventricular size and function.  -RV systolic pressure is 59 mm Hg. Moderate pulmonary  hypertension.    c/w furosemide 40 bid for now  f/u cardiologist in AM  Dr. Rajeev CARTER

## 2019-07-22 NOTE — PROGRESS NOTE ADULT - NEGATIVE PSYCHIATRIC SYMPTOMS
no agitation/no anxiety/no suicidal ideation/no depression/no insomnia
no depression/no suicidal ideation/no insomnia/no agitation/no anxiety
no anxiety/no suicidal ideation/no depression/no agitation/no insomnia
no agitation/no suicidal ideation/no depression/no insomnia/no anxiety
no agitation/no depression/no insomnia/no anxiety/no suicidal ideation

## 2019-07-22 NOTE — PROGRESS NOTE ADULT - PROBLEM SELECTOR PROBLEM 7
Hyponatremia
Need for prophylactic measure

## 2019-07-22 NOTE — PROGRESS NOTE ADULT - NEGATIVE CARDIOVASCULAR SYMPTOMS
no palpitations/no orthopnea/no chest pain
no chest pain/no palpitations/no orthopnea
no chest pain
no chest pain
no chest pain/no palpitations/no orthopnea
no palpitations/no orthopnea/no chest pain
no palpitations/no orthopnea/no chest pain

## 2019-07-22 NOTE — PROGRESS NOTE ADULT - ASSESSMENT
1. Ascites  2. Cirrhosis       Suggestions:    1. Continue diuretics  2. Monitor electrolytes and BUN/ Creatinine  3. Low salt diet   4.  Protonix daily  5. DVT prophylaxis  6. Lactulose daily

## 2019-07-22 NOTE — PROGRESS NOTE ADULT - PROBLEM SELECTOR PLAN 7
Proteinuria in urine ( 100)  Urine glucose is 1000.
F?u with urine lytes.
F?u with urine lytes.
Proteinuria in urine ( 100)  Urine glucose is 1000.
c/w heparin sq for ppx

## 2019-07-22 NOTE — PROGRESS NOTE ADULT - SUBJECTIVE AND OBJECTIVE BOX
PGY 1 Note discussed with supervising resident and primary attending    Patient is a 88y old  Female who presents with a chief complaint of Abdominal swelling (22 Jul 2019 12:47)      INTERVAL HPI/OVERNIGHT EVENTS: offers no new complaints; current symptoms resolving    MEDICATIONS  (STANDING):  amiodarone    Tablet 200 milliGRAM(s) Oral daily  aspirin enteric coated 81 milliGRAM(s) Oral daily  dextrose 50% Injectable 12.5 Gram(s) IV Push once  dextrose 50% Injectable 25 Gram(s) IV Push once  dextrose 50% Injectable 25 Gram(s) IV Push once  digoxin     Tablet 0.125 milliGRAM(s) Oral daily  furosemide    Tablet 40 milliGRAM(s) Oral daily  heparin  Injectable 5000 Unit(s) SubCutaneous every 12 hours  insulin lispro (HumaLOG) corrective regimen sliding scale   SubCutaneous Before meals and at bedtime  insulin lispro Injectable (HumaLOG) 3 Unit(s) SubCutaneous three times a day before meals  latanoprost 0.005% Ophthalmic Solution 1 Drop(s) Both EYES at bedtime  lidocaine   Patch 1 Patch Transdermal every 24 hours  lidocaine   Patch 1 Patch Transdermal every 24 hours  metoprolol succinate ER 25 milliGRAM(s) Oral daily  multivitamin 1 Tablet(s) Oral daily  senna 2 Tablet(s) Oral at bedtime  spironolactone 25 milliGRAM(s) Oral daily    MEDICATIONS  (PRN):  acetaminophen   Tablet .. 650 milliGRAM(s) Oral every 6 hours PRN Mild Pain (1 - 3)  dextrose 40% Gel 15 Gram(s) Oral once PRN Blood Glucose LESS THAN 70 milliGRAM(s)/deciLiter  glucagon  Injectable 1 milliGRAM(s) IntraMuscular once PRN Glucose <70 milliGRAM(s)/deciLiter  guaiFENesin   Syrup  (Sugar-Free) 100 milliGRAM(s) Oral every 6 hours PRN Cough  traMADol 25 milliGRAM(s) Oral every 12 hours PRN Moderate Pain (4 - 6)      __________________________________________________  REVIEW OF SYSTEMS:    CONSTITUTIONAL: No fever,   EYES: no acute visual disturbances  NECK: No pain or stiffness  RESPIRATORY: No cough; No shortness of breath  CARDIOVASCULAR: No chest pain, no palpitations  GASTROINTESTINAL: No pain. No nausea or vomiting; No diarrhea   NEUROLOGICAL: No headache or numbness, no tremors  MUSCULOSKELETAL: No joint pain, no muscle pain  GENITOURINARY: no dysuria, no frequency, no hesitancy  PSYCHIATRY: no depression , no anxiety  ALL OTHER  ROS negative        Vital Signs Last 24 Hrs  T(C): 36.3 (22 Jul 2019 12:21), Max: 37.2 (21 Jul 2019 16:29)  T(F): 97.3 (22 Jul 2019 12:21), Max: 98.9 (21 Jul 2019 16:29)  HR: 60 (22 Jul 2019 12:21) (59 - 91)  BP: 111/53 (22 Jul 2019 12:21) (111/53 - 135/59)  BP(mean): --  RR: 17 (22 Jul 2019 12:21) (16 - 18)  SpO2: 100% (22 Jul 2019 12:21) (100% - 100%)    ________________________________________________  PHYSICAL EXAM:  GENERAL: NAD  HEENT: Normocephalic;  conjunctivae and sclerae clear; moist mucous membranes;   NECK : supple  CHEST/LUNG: Clear to auscultation bilaterally with good air entry   HEART: S1 S2  regular; no murmurs, gallops or rubs  ABDOMEN: Soft, Nontender, distended; Bowel sounds present  EXTREMITIES: no cyanosis; no edema; no calf tenderness  SKIN: warm and dry; no rash  NERVOUS SYSTEM:  Awake and alert; Oriented  to place, person and time ; no new deficits    _________________________________________________  LABS:                        10.9   6.60  )-----------( 170      ( 22 Jul 2019 05:53 )             34.3     07-22    134<L>  |  100  |  22<H>  ----------------------------<  136<H>  4.3   |  30  |  0.73    Ca    8.4      22 Jul 2019 05:53  Phos  2.6     07-22  Mg     2.0     07-22    TPro  7.0  /  Alb  1.7<L>  /  TBili  0.4  /  DBili  x   /  AST  38  /  ALT  26  /  AlkPhos  226<H>  07-22        CAPILLARY BLOOD GLUCOSE      POCT Blood Glucose.: 250 mg/dL (22 Jul 2019 11:41)  POCT Blood Glucose.: 135 mg/dL (22 Jul 2019 07:38)  POCT Blood Glucose.: 160 mg/dL (21 Jul 2019 20:48)  POCT Blood Glucose.: 258 mg/dL (21 Jul 2019 16:43)        RADIOLOGY & ADDITIONAL TESTS:    Imaging Personally Reviewed:  YES/NO    Consultant(s) Notes Reviewed:   YES/ No    Care Discussed with Consultants :     Plan of care was discussed with patient and /or primary care giver; all questions and concerns were addressed and care was aligned with patient's wishes.

## 2019-07-22 NOTE — PROGRESS NOTE ADULT - SUBJECTIVE AND OBJECTIVE BOX
Pt is awake, alert, lying in bed in NAD.     INTERVAL HPI/OVERNIGHT EVENTS:      VITAL SIGNS:  T(F): 97.3 (07-22-19 @ 12:21)  HR: 60 (07-22-19 @ 12:21)  BP: 111/53 (07-22-19 @ 12:21)  RR: 17 (07-22-19 @ 12:21)  SpO2: 100% (07-22-19 @ 12:21)  Wt(kg): --  I&O's Detail          REVIEW OF SYSTEMS:    CONSTITUTIONAL:  No fevers, chills, sweats    HEENT:  Eyes:  No diplopia or blurred vision. ENT:  No earache, sore throat or runny nose.    CARDIOVASCULAR:  No pressure, squeezing, tightness, or heaviness about the chest; no palpitations.    RESPIRATORY:  Per HPI    GASTROINTESTINAL:  No abdominal pain, nausea, vomiting or diarrhea.    GENITOURINARY:  No dysuria, frequency or urgency.    NEUROLOGIC:  No paresthesias, fasciculations, seizures or weakness.    PSYCHIATRIC:  No disorder of thought or mood.      PHYSICAL EXAM:    Constitutional: Well developed and nourished  Eyes:Perrla  ENMT: normal  Neck:supple  Respiratory: good air entry  Cardiovascular: S1 S2 regular  Gastrointestinal: Soft, Non tender  Extremities: No edema  Vascular:normal  Neurological:Awake, alert,Ox3  Musculoskeletal:Normal      MEDICATIONS  (STANDING):  amiodarone    Tablet 200 milliGRAM(s) Oral daily  aspirin enteric coated 81 milliGRAM(s) Oral daily  dextrose 50% Injectable 12.5 Gram(s) IV Push once  dextrose 50% Injectable 25 Gram(s) IV Push once  dextrose 50% Injectable 25 Gram(s) IV Push once  digoxin     Tablet 0.125 milliGRAM(s) Oral daily  furosemide    Tablet 40 milliGRAM(s) Oral daily  heparin  Injectable 5000 Unit(s) SubCutaneous every 12 hours  insulin lispro (HumaLOG) corrective regimen sliding scale   SubCutaneous Before meals and at bedtime  insulin lispro Injectable (HumaLOG) 3 Unit(s) SubCutaneous three times a day before meals  latanoprost 0.005% Ophthalmic Solution 1 Drop(s) Both EYES at bedtime  lidocaine   Patch 1 Patch Transdermal every 24 hours  lidocaine   Patch 1 Patch Transdermal every 24 hours  metoprolol succinate ER 25 milliGRAM(s) Oral daily  multivitamin 1 Tablet(s) Oral daily  senna 2 Tablet(s) Oral at bedtime  spironolactone 25 milliGRAM(s) Oral daily    MEDICATIONS  (PRN):  acetaminophen   Tablet .. 650 milliGRAM(s) Oral every 6 hours PRN Mild Pain (1 - 3)  dextrose 40% Gel 15 Gram(s) Oral once PRN Blood Glucose LESS THAN 70 milliGRAM(s)/deciLiter  glucagon  Injectable 1 milliGRAM(s) IntraMuscular once PRN Glucose <70 milliGRAM(s)/deciLiter  guaiFENesin   Syrup  (Sugar-Free) 100 milliGRAM(s) Oral every 6 hours PRN Cough  traMADol 25 milliGRAM(s) Oral every 12 hours PRN Moderate Pain (4 - 6)      Allergies    No Known Allergies    Intolerances        LABS:                        10.9   6.60  )-----------( 170      ( 22 Jul 2019 05:53 )             34.3     07-22    134<L>  |  100  |  22<H>  ----------------------------<  136<H>  4.3   |  30  |  0.73    Ca    8.4      22 Jul 2019 05:53  Phos  2.6     07-22  Mg     2.0     07-22    TPro  7.0  /  Alb  1.7<L>  /  TBili  0.4  /  DBili  x   /  AST  38  /  ALT  26  /  AlkPhos  226<H>  07-22        CAPILLARY BLOOD GLUCOSE      POCT Blood Glucose.: 250 mg/dL (22 Jul 2019 11:41)  POCT Blood Glucose.: 135 mg/dL (22 Jul 2019 07:38)  POCT Blood Glucose.: 160 mg/dL (21 Jul 2019 20:48)  POCT Blood Glucose.: 258 mg/dL (21 Jul 2019 16:43)    pro-bnp [id number] 07-15 @ 14:10     d-dimer --  07-15 @ 14:10      RADIOLOGY & ADDITIONAL TESTS:    CXR:    Ct scan chest:    ekg;    echo: independent

## 2019-07-22 NOTE — PROGRESS NOTE ADULT - PROBLEM SELECTOR PROBLEM 3
Abdominal distention
A-fib
Abdominal distention

## 2019-07-22 NOTE — PROGRESS NOTE ADULT - CARDIOVASCULAR DETAILS
positive S2/positive S1
positive S2/positive S1
positive S1/positive S2
positive S2/positive S1
positive S1/positive S2
positive S1/positive S2

## 2019-07-22 NOTE — PROGRESS NOTE ADULT - NEGATIVE GASTROINTESTINAL SYMPTOMS
no hematochezia/no abdominal pain/no melena/no diarrhea/no jaundice/no nausea/no vomiting
no vomiting/no abdominal pain/no hematochezia/no diarrhea/no melena/no jaundice/no nausea
no abdominal pain/no nausea/no vomiting/no diarrhea
no nausea/no vomiting/no diarrhea/no abdominal pain
no vomiting/no diarrhea/no abdominal pain/no hematochezia/no melena/no jaundice/no nausea
no abdominal pain/no melena/no hematochezia/no jaundice/no vomiting/no diarrhea/no nausea
no diarrhea/no abdominal pain/no nausea/no melena/no jaundice/no vomiting/no hematochezia

## 2019-07-22 NOTE — PROGRESS NOTE ADULT - SUBJECTIVE AND OBJECTIVE BOX
CHIEF COMPLAINT:Patient is a 88y old  Female who presents with a chief complaint of Abdominal swelling. Pt appears comfortable.    	  REVIEW OF SYSTEMS:  CONSTITUTIONAL: No fever, weight loss, or fatigue  EYES: No eye pain, visual disturbances, or discharge  ENT:  No difficulty hearing, tinnitus, vertigo; No sinus or throat pain  NECK: No pain or stiffness  RESPIRATORY: No cough, wheezing, chills or hemoptysis; No Shortness of Breath  CARDIOVASCULAR: No chest pain, palpitations, passing out, dizziness, or leg swelling  GASTROINTESTINAL: No abdominal or epigastric pain. No nausea, vomiting, or hematemesis; No diarrhea or constipation. No melena or hematochezia.  GENITOURINARY: No dysuria, frequency, hematuria, or incontinence  NEUROLOGICAL: No headaches, memory loss, loss of strength, numbness, or tremors  SKIN: No itching, burning, rashes, or lesions   LYMPH Nodes: No enlarged glands  ENDOCRINE: No heat or cold intolerance; No hair loss  MUSCULOSKELETAL: No joint pain or swelling; No muscle, back, or extremity pain  PSYCHIATRIC: No depression, anxiety, mood swings, or difficulty sleeping  HEME/LYMPH: No easy bruising, or bleeding gums  ALLERGY AND IMMUNOLOGIC: No hives or eczema	      PHYSICAL EXAM:  T(C): 36.3 (07-22-19 @ 12:21), Max: 37.2 (07-21-19 @ 16:29)  HR: 71 (07-22-19 @ 14:45) (59 - 91)  BP: 121/61 (07-22-19 @ 14:45) (111/53 - 135/59)  RR: 17 (07-22-19 @ 12:21) (16 - 18)  SpO2: 100% (07-22-19 @ 14:45) (100% - 100%)      Appearance: Normal	  HEENT:   Normal oral mucosa, PERRL, EOMI	  Lymphatic: No lymphadenopathy  Cardiovascular: Normal S1 S2, No JVD, No murmurs, No edema  Respiratory: Lungs clear to auscultation	  Psychiatry: A & O x 3, Mood & affect appropriate  Gastrointestinal:  Soft, Non-tender, + BS	  Skin: No rashes, No ecchymoses, No cyanosis	  Neurologic: Non-focal  Extremities: Normal range of motion, No clubbing, cyanosis or edema  Vascular: Peripheral pulses palpable 2+ bilaterally    MEDICATIONS  (STANDING):  amiodarone    Tablet 200 milliGRAM(s) Oral daily  aspirin enteric coated 81 milliGRAM(s) Oral daily  dextrose 50% Injectable 12.5 Gram(s) IV Push once  dextrose 50% Injectable 25 Gram(s) IV Push once  dextrose 50% Injectable 25 Gram(s) IV Push once  digoxin     Tablet 0.125 milliGRAM(s) Oral daily  furosemide    Tablet 40 milliGRAM(s) Oral daily  heparin  Injectable 5000 Unit(s) SubCutaneous every 12 hours  insulin lispro (HumaLOG) corrective regimen sliding scale   SubCutaneous Before meals and at bedtime  insulin lispro Injectable (HumaLOG) 3 Unit(s) SubCutaneous three times a day before meals  latanoprost 0.005% Ophthalmic Solution 1 Drop(s) Both EYES at bedtime  lidocaine   Patch 1 Patch Transdermal every 24 hours  lidocaine   Patch 1 Patch Transdermal every 24 hours  metoprolol succinate ER 25 milliGRAM(s) Oral daily  multivitamin 1 Tablet(s) Oral daily  senna 2 Tablet(s) Oral at bedtime  spironolactone 25 milliGRAM(s) Oral daily        	  LABS:	 	                        10.9   6.60  )-----------( 170      ( 22 Jul 2019 05:53 )             34.3     07-22    134<L>  |  100  |  22<H>  ----------------------------<  136<H>  4.3   |  30  |  0.73    Ca    8.4      22 Jul 2019 05:53  Phos  2.6     07-22  Mg     2.0     07-22    TPro  7.0  /  Alb  1.7<L>  /  TBili  0.4  /  DBili  x   /  AST  38  /  ALT  26  /  AlkPhos  226<H>  07-22    proBNP: Serum Pro-Brain Natriuretic Peptide: 6781 pg/mL (07-15 @ 14:10)    Lipid Profile: Cholesterol 173    HDL 32      HgA1c: Hemoglobin A1C, Whole Blood: 6.9 % (07-16 @ 10:19)  Hemoglobin A1C, Whole Blood: 7.1 % (07-16 @ 03:47)    TSH: Thyroid Stimulating Hormone, Serum: 3.61 uU/mL (07-16 @ 07:09)

## 2019-07-22 NOTE — PROGRESS NOTE ADULT - NEGATIVE RESPIRATORY AND THORAX SYMPTOMS
no cough/no wheezing/no hemoptysis/no dyspnea
no wheezing/no dyspnea/no hemoptysis/no cough
no dyspnea/no wheezing
no wheezing/no dyspnea
no dyspnea/no cough/no hemoptysis/no wheezing
no dyspnea/no hemoptysis/no cough/no wheezing
no dyspnea/no cough/no hemoptysis/no wheezing

## 2019-07-22 NOTE — PROGRESS NOTE ADULT - MOUTH
normal mouth and gums/moist
normal mouth and gums/dry
normal mouth and gums/moist
moist/normal mouth and gums
normal mouth and gums/moist

## 2019-07-22 NOTE — PROGRESS NOTE ADULT - PROBLEM SELECTOR PROBLEM 5
HTN (hypertension)
Diabetes
HTN (hypertension)

## 2019-07-22 NOTE — PROGRESS NOTE ADULT - SUBJECTIVE AND OBJECTIVE BOX
doing better  no more abd discomfort  swelling is down  eat better  no fever but still has cough    MEDICATIONS  (STANDING):  amiodarone    Tablet 200 milliGRAM(s) Oral daily  aspirin enteric coated 81 milliGRAM(s) Oral daily  dextrose 5%. 1000 milliLiter(s) (50 mL/Hr) IV Continuous <Continuous>  dextrose 50% Injectable 12.5 Gram(s) IV Push once  dextrose 50% Injectable 25 Gram(s) IV Push once  dextrose 50% Injectable 25 Gram(s) IV Push once  digoxin     Tablet 0.125 milliGRAM(s) Oral daily  furosemide    Tablet 40 milliGRAM(s) Oral daily  heparin  Injectable 5000 Unit(s) SubCutaneous every 12 hours  insulin lispro (HumaLOG) corrective regimen sliding scale   SubCutaneous Before meals and at bedtime  insulin lispro Injectable (HumaLOG) 3 Unit(s) SubCutaneous three times a day before meals  latanoprost 0.005% Ophthalmic Solution 1 Drop(s) Both EYES at bedtime  lidocaine   Patch 1 Patch Transdermal every 24 hours  lidocaine   Patch 1 Patch Transdermal every 24 hours  metoprolol succinate ER 25 milliGRAM(s) Oral daily  multivitamin 1 Tablet(s) Oral daily  senna 2 Tablet(s) Oral at bedtime  spironolactone 25 milliGRAM(s) Oral daily    MEDICATIONS  (PRN):  acetaminophen   Tablet .. 650 milliGRAM(s) Oral every 6 hours PRN Mild Pain (1 - 3)  dextrose 40% Gel 15 Gram(s) Oral once PRN Blood Glucose LESS THAN 70 milliGRAM(s)/deciLiter  glucagon  Injectable 1 milliGRAM(s) IntraMuscular once PRN Glucose <70 milliGRAM(s)/deciLiter  guaiFENesin   Syrup  (Sugar-Free) 100 milliGRAM(s) Oral every 6 hours PRN Cough  traMADol 25 milliGRAM(s) Oral every 12 hours PRN Moderate Pain (4 - 6)      Allergies    No Known Allergies    Intolerances        Vital Signs Last 24 Hrs  T(C): 36.4 (22 Jul 2019 08:36), Max: 37.2 (21 Jul 2019 16:29)  T(F): 97.6 (22 Jul 2019 08:36), Max: 98.9 (21 Jul 2019 16:29)  HR: 59 (22 Jul 2019 08:36) (59 - 91)  BP: 135/59 (22 Jul 2019 08:36) (115/66 - 135/59)  BP(mean): --  RR: 17 (22 Jul 2019 08:36) (16 - 18)  SpO2: 100% (22 Jul 2019 08:36) (99% - 100%)    PHYSICAL EXAM  General: adult in NAD  HEENT: clear oropharynx, anicteric sclera, pink conjunctiva  Neck: supple  CV: normal S1/S2 with no murmur rubs or gallops  Lungs: positive air movement b/l ant lungs,clear to auscultation, no wheezes, no rales  Abdomen: soft non-tender non-distended, no hepatosplenomegaly  Ext: no clubbing cyanosis or edema  Skin: no rashes and no petechiae  Neuro: alert and oriented X 4, no focal deficits  LABS:                          10.9   6.60  )-----------( 170      ( 22 Jul 2019 05:53 )             34.3         Mean Cell Volume : 92.5 fl  Mean Cell Hemoglobin : 29.4 pg  Mean Cell Hemoglobin Concentration : 31.8 gm/dL  Auto Neutrophil # : 2.93 K/uL  Auto Lymphocyte # : 2.84 K/uL  Auto Monocyte # : 0.54 K/uL  Auto Eosinophil # : 0.20 K/uL  Auto Basophil # : 0.05 K/uL  Auto Neutrophil % : 44.4 %  Auto Lymphocyte % : 43.0 %  Auto Monocyte % : 8.2 %  Auto Eosinophil % : 3.0 %  Auto Basophil % : 0.8 %    Serial CBC  Hematocrit 34.3  Hemoglobin 10.9  Plat 170  RBC 3.71  WBC 6.60  Serial CBC  Hematocrit 34.1  Hemoglobin 10.8  Plat 175  RBC 3.68  WBC 7.12  Serial CBC  Hematocrit 35.8  Hemoglobin 11.5  Plat 173  RBC 3.91  WBC 6.29  Serial CBC  Hematocrit 37.6  Hemoglobin 12.0  Plat 180  RBC 4.11  WBC 6.17    07-22    134<L>  |  100  |  22<H>  ----------------------------<  136<H>  4.3   |  30  |  0.73    Ca    8.4      22 Jul 2019 05:53  Phos  2.6     07-22  Mg     2.0     07-22    TPro  7.0  /  Alb  1.7<L>  /  TBili  0.4  /  DBili  x   /  AST  38  /  ALT  26  /  AlkPhos  226<H>  07-22          Folate, Serum: 8.5 ng/mL (07-16 @ 10:20)  Vitamin B12, Serum: 1449 pg/mL (07-16 @ 10:20)  Iron - Total Binding Capacity.: 166 ug/dL (07-16 @ 07:09)      Serum Protein Electrophoresis Interp: Three Gamma-Migrating Paraproteins Identified (07-17 @ 20:27)  Immunofixation, Serum:   One IgG Kappa and Two IgG Lambda Bands Identified    Reference Range: None Detected (07-17 @ 20:27)        BLOOD SMEAR INTERPRETATION:       RADIOLOGY & ADDITIONAL STUDIES:

## 2019-07-22 NOTE — PROGRESS NOTE ADULT - SUBJECTIVE AND OBJECTIVE BOX
Interval Events:      Allergies    No Known Allergies    Intolerances      Endocrine/Metabolic Medications:  dextrose 40% Gel 15 Gram(s) Oral once PRN  dextrose 50% Injectable 12.5 Gram(s) IV Push once  dextrose 50% Injectable 25 Gram(s) IV Push once  dextrose 50% Injectable 25 Gram(s) IV Push once  glucagon  Injectable 1 milliGRAM(s) IntraMuscular once PRN  insulin lispro (HumaLOG) corrective regimen sliding scale   SubCutaneous Before meals and at bedtime  insulin lispro Injectable (HumaLOG) 3 Unit(s) SubCutaneous three times a day before meals      Vital Signs Last 24 Hrs  T(C): 36.4 (22 Jul 2019 08:36), Max: 37.2 (21 Jul 2019 16:29)  T(F): 97.6 (22 Jul 2019 08:36), Max: 98.9 (21 Jul 2019 16:29)  HR: 59 (22 Jul 2019 08:36) (59 - 91)  BP: 135/59 (22 Jul 2019 08:36) (115/66 - 135/59)  BP(mean): --  RR: 17 (22 Jul 2019 08:36) (16 - 18)  SpO2: 100% (22 Jul 2019 08:36) (99% - 100%)      PHYSICAL EXAM  All physical exam findings normal, except those marked:  General:	Alert, active, cooperative, NAD, well hydrated  .		[] Abnormal:  Neck		Normal: supple, no cervical adenopathy, no palpable thyroid  .		[] Abnormal:  Cardiovascular	Normal: regular rate, normal S1, S2, no murmurs  .		[] Abnormal:  Respiratory	Normal: no chest wall deformity, normal respiratory pattern, CTA B/L  .		[] Abnormal:  Abdominal	Normal: soft, ND, NT, bowel sounds present, no masses, no organomegaly  .		[] Abnormal:  		Normal normal genitalia, testes descended, circumcised/uncircumcised  .		Sonja stage:			Breast sonja:  .		Menstrual history:  .		[] Abnormal:  Extremities	Normal: FROM x4  .		[] Abnormal:  Skin		Normal: intact and not indurated, no rash, no acanthosis nigricans  .		[] Abnormal:  Neurologic	Normal: grossly intact  .		[] Abnormal:    LABS                        10.9   6.60  )-----------( 170      ( 22 Jul 2019 05:53 )             34.3                               134    |  100    |  22                  Calcium: 8.4   / iCa: x      (07-22 @ 05:53)    ----------------------------<  136       Magnesium: 2.0                              4.3     |  30     |  0.73             Phosphorous: 2.6      TPro  7.0    /  Alb  1.7    /  TBili  0.4    /  DBili  x      /  AST  38     /  ALT  26     /  AlkPhos  226    22 Jul 2019 05:53    CAPILLARY BLOOD GLUCOSE      POCT Blood Glucose.: 135 mg/dL (22 Jul 2019 07:38)  POCT Blood Glucose.: 160 mg/dL (21 Jul 2019 20:48)  POCT Blood Glucose.: 258 mg/dL (21 Jul 2019 16:43)  POCT Blood Glucose.: 291 mg/dL (21 Jul 2019 11:41)        Assesment/plan Interval Events:  pt in nad    Allergies    No Known Allergies    Intolerances      Endocrine/Metabolic Medications:  dextrose 40% Gel 15 Gram(s) Oral once PRN  dextrose 50% Injectable 12.5 Gram(s) IV Push once  dextrose 50% Injectable 25 Gram(s) IV Push once  dextrose 50% Injectable 25 Gram(s) IV Push once  glucagon  Injectable 1 milliGRAM(s) IntraMuscular once PRN  insulin lispro (HumaLOG) corrective regimen sliding scale   SubCutaneous Before meals and at bedtime  insulin lispro Injectable (HumaLOG) 3 Unit(s) SubCutaneous three times a day before meals      Vital Signs Last 24 Hrs  T(C): 36.4 (22 Jul 2019 08:36), Max: 37.2 (21 Jul 2019 16:29)  T(F): 97.6 (22 Jul 2019 08:36), Max: 98.9 (21 Jul 2019 16:29)  HR: 59 (22 Jul 2019 08:36) (59 - 91)  BP: 135/59 (22 Jul 2019 08:36) (115/66 - 135/59)  BP(mean): --  RR: 17 (22 Jul 2019 08:36) (16 - 18)  SpO2: 100% (22 Jul 2019 08:36) (99% - 100%)      PHYSICAL EXAM  All physical exam findings normal, except those marked:  General:	Alert, active, cooperative, NAD, well hydrated  .		[] Abnormal:  Neck		Normal: supple, no cervical adenopathy, no palpable thyroid  .		[] Abnormal:  Cardiovascular	Normal: regular rate, normal S1, S2, no murmurs  .		[] Abnormal:  Respiratory	Normal: no chest wall deformity, normal respiratory pattern, CTA B/L  .		[] Abnormal:  Abdominal	Normal: soft, ND, NT, bowel sounds present, no masses, no organomegaly  .		[] Abnormal:  		Normal normal genitalia, testes descended, circumcised/uncircumcised  .		Sonja stage:			Breast sonja:  .		Menstrual history:  .		[] Abnormal:  Extremities	Normal: FROM x4  .		[] Abnormal:  Skin		Normal: intact and not indurated, no rash, no acanthosis nigricans  .		[] Abnormal:  Neurologic	Normal: grossly intact  .		[] Abnormal:    LABS                        10.9   6.60  )-----------( 170      ( 22 Jul 2019 05:53 )             34.3                               134    |  100    |  22                  Calcium: 8.4   / iCa: x      (07-22 @ 05:53)    ----------------------------<  136       Magnesium: 2.0                              4.3     |  30     |  0.73             Phosphorous: 2.6      TPro  7.0    /  Alb  1.7    /  TBili  0.4    /  DBili  x      /  AST  38     /  ALT  26     /  AlkPhos  226    22 Jul 2019 05:53    CAPILLARY BLOOD GLUCOSE      POCT Blood Glucose.: 135 mg/dL (22 Jul 2019 07:38)  POCT Blood Glucose.: 160 mg/dL (21 Jul 2019 20:48)  POCT Blood Glucose.: 258 mg/dL (21 Jul 2019 16:43)  POCT Blood Glucose.: 291 mg/dL (21 Jul 2019 11:41)        Assesment/plan    Dm- good control  cont humalog 3 ac tid  fsg ac and hs  aim fsg 140-<200  hematology f/u noted

## 2019-07-22 NOTE — PROGRESS NOTE ADULT - NSHPATTENDINGPLANDISCUSS_GEN_ALL_CORE
will follow up with GI.
house staff covering

## 2019-07-22 NOTE — PROGRESS NOTE ADULT - RS GEN PE MLT RESP DETAILS PC
good air movement/respirations non-labored/breath sounds equal/airway patent
breath sounds equal/good air movement/airway patent
airway patent/breath sounds equal/clear to auscultation bilaterally/respirations non-labored/good air movement
respirations non-labored/airway patent/no chest wall tenderness/breath sounds equal/clear to auscultation bilaterally/good air movement
good air movement/airway patent/breath sounds equal/respirations non-labored/no rhonchi/no rales
respirations non-labored/no rales/no rhonchi/airway patent/breath sounds equal/good air movement
breath sounds equal/good air movement/airway patent

## 2019-07-22 NOTE — PROGRESS NOTE ADULT - CVS HE PE MLT D E PC
regular rate and rhythm/no rub
regular rate and rhythm/no rub
regular rate and rhythm
regular rate and rhythm
regular rate and rhythm/no rub
no rub/regular rate and rhythm
regular rate and rhythm

## 2019-07-22 NOTE — PROGRESS NOTE ADULT - TONSILS
no redness/no swelling
no redness
no swelling/no redness
no redness/no swelling
no redness/no swelling

## 2019-07-22 NOTE — PROGRESS NOTE ADULT - ATTENDING COMMENTS
Patient is seen and examined. Case reviewed with the medical team. Above note is appreciated. Will follow up clinically. Continue DVT prophylaxis. Case discussed with Cardiology, and GI. Will continue diuretics. Spoke with family in detail.
Patient is seen and examined. Case reviewed with the medical team. Above note is appreciated. Will follow up clinically. Continue DVT prophylaxis. Case discussed with Cardiology, and GI. Will follow up with oncology, Dr. Cohn.
Patient is seen and examined. Case reviewed with the medical team. Above note is appreciated. Will follow up clinically. Continue DVT prophylaxis. Case discussed with Cardiology, GI, Hem/onc. spoke with daughter in law in detail. For discharge to [facility] in am. Will do PET SCAN as out patient. Over all prognosis is very poor despite any medical intervention. Family fully aware.
Patient is seen and examined. Case reviewed with the medical team. Above note is appreciated. Will follow up clinically. Continue DVT prophylaxis. Case discussed with Cardiology, GI, pulmonary  and Thorasic surgery.  No surgical intervention as per surgical attending for empyema. Will continue antibiotics and follow up clinically. Spoke with daughter in law in detail. Need to rule out cancer. For diagnostic paracentesis by KYLE.
Patient is seen and examined. Case reviewed with the medical team. Above note is appreciated. Will follow up clinically. Continue DVT prophylaxis. Case discussed with Cardiology, and GI and oncology. Spoke with family in detail.
Patient is seen and examined. Case reviewed with the medical team. Above note is appreciated. Will follow up clinically. Continue DVT prophylaxis. Case discussed with Cardiology, and GI. Will follow up with IR and GI regarding paracentesis as need to rule out malignancy. Will get oncology consult from Dr. HARVEY.  Spoke with daughter in law in detail.
Patient is seen and examined. Case reviewed with the medical team. Above note is appreciated. Will follow up clinically. Continue DVT prophylaxis. Case discussed with Cardiology, and GI. Will follow up with oncology, Dr. Cohn.
I agree with above
I agree with above

## 2019-07-22 NOTE — PROGRESS NOTE ADULT - LYMPH NODES
No lymphadedenopathy
detailed exam
No lymphadedenopathy

## 2019-07-22 NOTE — PROGRESS NOTE ADULT - ASSESSMENT
88 year old female has medical history of HTN, A fib (not on AC), CHF, Gastric ulcer, Hepatitis C ,DM, glaucoma, anemia, SBO,surgical history significant for Intestinal resection, oophorectomy cholecystectomy, worsening abdominal swelling.  1.SPEP+-Heme eval noted, MGUS, Rec PET as outpatient.  2.GI f/u.  3.Afib-no AC,asa,amiodarone,lopressor.  4.DM-Insulin.  5.Cont to lasix and aldactone for Anasarca from low albumin.  6.PPI.

## 2019-07-22 NOTE — PROGRESS NOTE ADULT - MS EXT PE MLT D E PC
no clubbing/no cyanosis
no cyanosis/no clubbing/pedal edema
no pedal edema
no pedal edema
no cyanosis/no clubbing
no cyanosis/no clubbing
no clubbing/no cyanosis

## 2019-07-22 NOTE — PROGRESS NOTE ADULT - PROBLEM SELECTOR PLAN 5
c/w metoprolol  succinate 25mg and lasix 40 bid. Aldactone added.
c/w HSS and humalog 3 units tid
c/w metoprolol  succinate 25mg and lasix 40 bid. Aldactone added.
c/w metoprolol  succinate 25mg and lasix 40 mg . Aldactone added.

## 2019-07-22 NOTE — PROGRESS NOTE ADULT - NEGATIVE HEMATOLOGY SYMPTOMS
no gum bleeding/no nose bleeding
no nose bleeding/no gum bleeding
no gum bleeding/no nose bleeding

## 2019-07-22 NOTE — PROGRESS NOTE ADULT - PROBLEM SELECTOR PLAN 3
Dr Huffman consulted.  recommended diagnostic and therapeutic Paracentesis. IR said there is no window Paracentesis.
Dr Huffman consulted.  recommended diagnostic and therapeutic Paracentesis. IR said there is no window Paracentesis.   Dr Cohn consulted, suggested outpatient PET scan.
Dr Huffman consulted.  recommended diagnostic and therapeutic Paracentesis. IR said there is no window Paracentesis.  Will consult Dr Cohn for possible malignant ascites.
Dr Huffman consulted.  recommended diagnostic and therapeutic Paracentesis.  CEA And CA 19-9 is elevated.  f/u with AFP
Dr Huffman consulted.  recommended diagnostic and therapeutic Paracentesis. IR said there is no window Paracentesis.   Dr Cohn consulted, suggested outpatient PET scan.
Dr Huffman consulted.  recommended diagnostic and therapeutic Paracentesis. IR said there is no window Paracentesis.  Dr HARVEY consulted for oncology. Patient likely with MGUS. Can follow up pet scan as out patient. Also with plasma cell dyscrasia. will monitor. Overall prognosis is poor despite any medical intervention. Not a candidate for chemo, RT or surgery.
c/w amiodarone, digoxin and metoprolol   pt is not on AC 2/2 hx of GI bleed

## 2019-07-22 NOTE — PROGRESS NOTE ADULT - NEGATIVE GENERAL GENITOURINARY SYMPTOMS
no hematuria/no renal colic/no nocturia
no hematuria/no nocturia/no renal colic
no renal colic/no hematuria/no nocturia
no hematuria/no renal colic/no nocturia
no nocturia/no hematuria/no renal colic

## 2019-07-22 NOTE — PROGRESS NOTE ADULT - CONSTITUTIONAL DETAILS
well-developed/no distress
well-developed/no distress/well-groomed
no distress
no distress
well-developed/no distress/well-groomed
well-developed/well-groomed/well-nourished/no distress
no distress/well-groomed/well-developed

## 2019-07-22 NOTE — PROGRESS NOTE ADULT - SUBJECTIVE AND OBJECTIVE BOX
[   ] ICU                                          [   ] CCU                                      [  X ] Medical Floor    Patient is comfortable. No new complaints reported, No abdominal pain, N/V, hematemesis, hematochezia, melena, fever, chills, chest pain, SOB, cough or diarrhea reported.    VITALS  Vital Signs Last 24 Hrs  T(C): 36.2 (22 Jul 2019 15:10), Max: 36.9 (22 Jul 2019 00:15)  T(F): 97.2 (22 Jul 2019 15:10), Max: 98.4 (22 Jul 2019 00:15)  HR: 69 (22 Jul 2019 15:10) (59 - 91)  BP: 114/59 (22 Jul 2019 15:10) (111/53 - 135/59)   RR: 17 (22 Jul 2019 15:10) (16 - 18)  SpO2: 100% (22 Jul 2019 15:10) (100% - 100%)       MEDICATIONS  (STANDING):  amiodarone    Tablet 200 milliGRAM(s) Oral daily  aspirin enteric coated 81 milliGRAM(s) Oral daily  dextrose 50% Injectable 12.5 Gram(s) IV Push once  dextrose 50% Injectable 25 Gram(s) IV Push once  dextrose 50% Injectable 25 Gram(s) IV Push once  digoxin     Tablet 0.125 milliGRAM(s) Oral daily  furosemide    Tablet 40 milliGRAM(s) Oral daily  heparin  Injectable 5000 Unit(s) SubCutaneous every 12 hours  insulin lispro (HumaLOG) corrective regimen sliding scale   SubCutaneous Before meals and at bedtime  insulin lispro Injectable (HumaLOG) 3 Unit(s) SubCutaneous three times a day before meals  latanoprost 0.005% Ophthalmic Solution 1 Drop(s) Both EYES at bedtime  lidocaine   Patch 1 Patch Transdermal every 24 hours  lidocaine   Patch 1 Patch Transdermal every 24 hours  metoprolol succinate ER 25 milliGRAM(s) Oral daily  multivitamin 1 Tablet(s) Oral daily  senna 2 Tablet(s) Oral at bedtime  spironolactone 25 milliGRAM(s) Oral daily    MEDICATIONS  (PRN):  acetaminophen   Tablet .. 650 milliGRAM(s) Oral every 6 hours PRN Mild Pain (1 - 3)  dextrose 40% Gel 15 Gram(s) Oral once PRN Blood Glucose LESS THAN 70 milliGRAM(s)/deciLiter  glucagon  Injectable 1 milliGRAM(s) IntraMuscular once PRN Glucose <70 milliGRAM(s)/deciLiter  guaiFENesin   Syrup  (Sugar-Free) 100 milliGRAM(s) Oral every 6 hours PRN Cough  traMADol 25 milliGRAM(s) Oral every 12 hours PRN Moderate Pain (4 - 6)                            10.9   6.60  )-----------( 170      ( 22 Jul 2019 05:53 )             34.3       07-22    134<L>  |  100  |  22<H>  ----------------------------<  136<H>  4.3   |  30  |  0.73    Ca    8.4      22 Jul 2019 05:53  Phos  2.6     07-22  Mg     2.0     07-22    TPro  7.0  /  Alb  1.7<L>  /  TBili  0.4  /  DBili  x   /  AST  38  /  ALT  26  /  AlkPhos  226<H>  07-22

## 2019-07-22 NOTE — PROGRESS NOTE ADULT - GASTROINTESTINAL DETAILS
bowel sounds normal/soft/nontender/no guarding/no distention/no rigidity/no rebound tenderness
no guarding/no rebound tenderness/no rigidity/soft
no distention/nontender/soft/bowel sounds normal
soft/no guarding/nontender/bowel sounds normal/no distention/no rebound tenderness
no guarding/bowel sounds normal/no rigidity/soft/no rebound tenderness
soft/nontender/no rebound tenderness/no rigidity/no guarding/bowel sounds normal
soft/nontender/bowel sounds normal/no guarding/no distention/no rigidity/no rebound tenderness

## 2019-07-22 NOTE — PROGRESS NOTE ADULT - PROBLEM SELECTOR PLAN 4
c/w amiodarone, digoxin and metoprolol   pt is not on AC 2/2 hx of GI bleed
c/w metoprolol and lasix

## 2019-07-22 NOTE — PROGRESS NOTE ADULT - NEGATIVE GENERAL SYMPTOMS
no chills/no sweating/no fever
no fever/no chills/no sweating
no fever/no chills/no sweating/no anorexia
no chills/no sweating/no anorexia/no fever
no anorexia/no chills/no sweating/no fever
no fever/no chills/no sweating/no anorexia
no fever/no chills/no sweating/no anorexia

## 2019-07-22 NOTE — PROGRESS NOTE ADULT - NEGATIVE OPHTHALMOLOGIC SYMPTOMS
no photophobia/no loss of vision L/no diplopia
no diplopia/no photophobia/no loss of vision L
no photophobia/no loss of vision L/no diplopia
no loss of vision L/no diplopia/no photophobia
no photophobia/no diplopia/no loss of vision L

## 2019-07-22 NOTE — PROGRESS NOTE ADULT - PROBLEM SELECTOR PLAN 6
Dr landers consulted,  She held Lantus.  Humalog Continue.
c/w HSS and humalog 3 units tid
c/w HSS and humalog 3 units tid
likely coming form chf as pt is fluid overloaded   will send urine lytes and will do fluid restriction

## 2019-07-22 NOTE — PROGRESS NOTE ADULT - NEGATIVE SKIN SYMPTOMS
no rash/no itching
no rash
no rash
no itching/no rash
no rash/no itching

## 2019-07-22 NOTE — PROGRESS NOTE ADULT - ASSESSMENT
1. R/O Empyema  - CXR noted.   - CT chest notes - Loculated fluid with air in the right major fissure may represent small loculated hydropneumothorax versus empyema with air producing organism.   - Thoracic surgery follow up - conservative management  - O2 supplement  - Bronchodilators   - F/U CXR   - ID Recc no ABX    2. Atelectasis  - O2 Supp  - Bronchodilators  - Incentive spirometry     3. CHF  - Echo noted   - Tele monitoring  - Cardio follow up  - Diuretics PRN  - DVT and GI PPX     4. Ascites  - GI Eval noted  - Recc considering therapeutic/diagnostic paracentesis with cytology.  - Tumor markers   - Cont diuretics.     5. Pulmonary HTN  - RVP 49  - Bronchodilators  - CCB  -Oxygen supp     6. Plasma Cell Dyscrasia  - Heme/Onc F/U  - May need PET scan as OP. 1. R/O Empyema  - CXR noted.   - CT chest notes - Loculated fluid with air in the right major fissure may represent small loculated hydropneumothorax versus empyema with air producing organism.   - Thoracic surgery follow up - conservative management  - O2 supplement  - Bronchodilators   - F/U CXR   - ID Recc no ABX    2. Atelectasis  - O2 Supp  - Bronchodilators  - Incentive spirometry     3. CHF  - Echo noted   - Tele monitoring  - Cardio follow up  - Diuretics PRN  - DVT and GI PPX     4. Ascites  - GI Eval noted  - Recc considering therapeutic/diagnostic paracentesis with cytology.  - Tumor markers elevated - PET Scan as OP   - Cont diuretics.     5. Pulmonary HTN  - RVP 49  - Bronchodilators  - CCB  -Oxygen supp     6. Plasma Cell Dyscrasia  - MGUS  - Heme/Onc F/U

## 2019-07-22 NOTE — PROGRESS NOTE ADULT - PROBLEM SELECTOR PLAN 2
for PET scan as outpt
probably MGUS  will watch
productive cough + nasal congestion + CT chest concerning for empyema vs hydropneumothorax    consult Pulmonary and ID  Dr. Munoz  Antibiotics stopped.
productive cough + nasal congestion + CT chest concerning for empyema vs hydropneumothorax    consult Pulmonary and ID  Dr. Munoz  will start rocephine and zithromax.
productive cough + nasal congestion + CT chest concerning for empyema vs hydropneumothorax   consult Pulmonary and ID  Dr. Munoz  Antibiotics stopped.
productive cough + nasal congestion + CT chest concerning for empyema vs hydropneumothorax   will consult Pulmonary and ID  Dr. Vinod Munoz   start rocephine and zithromax
productive cough + nasal congestion + CT chest concerning for empyema vs hydropneumothorax . No surgical intervention as per surgery.   consult Pulmonary and ID  Dr. Munoz  Antibiotics stopped.

## 2019-07-22 NOTE — PROGRESS NOTE ADULT - PROBLEM SELECTOR PROBLEM 1
CHF exacerbation
Elevated tumor markers
Plasma cell dyscrasia
CHF exacerbation

## 2019-07-22 NOTE — PROGRESS NOTE ADULT - NEGATIVE NEUROLOGICAL SYMPTOMS
no syncope/no difficulty walking/no loss of consciousness/no tremors/no headache/no vertigo/no loss of sensation/no confusion
no confusion/no loss of consciousness/no vertigo/no loss of sensation/no difficulty walking/no headache/no syncope/no tremors
no tremors/no vertigo/no loss of sensation/no syncope/no loss of consciousness/no difficulty walking/no confusion/no headache
no loss of sensation/no tremors/no headache/no confusion/no syncope/no vertigo/no loss of consciousness/no difficulty walking
no tremors/no loss of sensation/no loss of consciousness/no syncope/no difficulty walking/no vertigo/no headache/no confusion

## 2019-07-22 NOTE — PROGRESS NOTE ADULT - NEGATIVE MUSCULOSKELETAL SYMPTOMS
no myalgia/no stiffness/no muscle cramps/no muscle weakness/no neck pain
no stiffness/no muscle weakness/no myalgia/no muscle cramps/no neck pain
no muscle weakness/no muscle cramps/no myalgia/no stiffness/no neck pain
no arthralgia
no arthralgia
no myalgia/no stiffness/no muscle weakness/no neck pain/no muscle cramps
no myalgia/no stiffness/no neck pain/no muscle cramps/no muscle weakness

## 2019-07-22 NOTE — PROGRESS NOTE ADULT - PROBLEM SELECTOR PLAN 8
c/w heparin sq for ppx

## 2019-07-22 NOTE — PROGRESS NOTE ADULT - NEGATIVE ENMT SYMPTOMS
no recurrent cold sores/no dysphagia/no hearing difficulty/no ear pain/no nasal obstruction/no gum bleeding/no throat pain/no nose bleeds/no dry mouth
no throat pain/no dysphagia/no nose bleeds/no gum bleeding/no hearing difficulty/no dry mouth/no recurrent cold sores/no ear pain/no nasal obstruction
no hearing difficulty/no ear pain/no nasal obstruction/no gum bleeding/no nose bleeds/no recurrent cold sores/no dry mouth/no throat pain/no dysphagia
no gum bleeding/no throat pain/no nasal obstruction/no nose bleeds/no recurrent cold sores/no dry mouth/no dysphagia/no ear pain/no hearing difficulty
no gum bleeding/no dry mouth/no throat pain/no dysphagia/no hearing difficulty/no nasal obstruction/no ear pain/no nose bleeds/no recurrent cold sores

## 2019-07-22 NOTE — PROGRESS NOTE ADULT - EXTREMITIES
Hpi Title: Evaluation of Skin Lesions
How Severe Are Your Spot(S)?: mild
Have Your Spot(S) Been Treated In The Past?: has not been treated
detailed exam

## 2019-07-23 ENCOUNTER — TRANSCRIPTION ENCOUNTER (OUTPATIENT)
Age: 84
End: 2019-07-23

## 2019-07-23 VITALS
HEART RATE: 60 BPM | OXYGEN SATURATION: 100 % | RESPIRATION RATE: 18 BRPM | DIASTOLIC BLOOD PRESSURE: 52 MMHG | TEMPERATURE: 99 F | SYSTOLIC BLOOD PRESSURE: 117 MMHG

## 2019-07-23 LAB
ALBUMIN SERPL ELPH-MCNC: 1.9 G/DL — LOW (ref 3.5–5)
ALP SERPL-CCNC: 251 U/L — HIGH (ref 40–120)
ALT FLD-CCNC: 29 U/L DA — SIGNIFICANT CHANGE UP (ref 10–60)
ANION GAP SERPL CALC-SCNC: 4 MMOL/L — LOW (ref 5–17)
AST SERPL-CCNC: 42 U/L — HIGH (ref 10–40)
BASOPHILS # BLD AUTO: 0.05 K/UL — SIGNIFICANT CHANGE UP (ref 0–0.2)
BASOPHILS NFR BLD AUTO: 0.8 % — SIGNIFICANT CHANGE UP (ref 0–2)
BILIRUB SERPL-MCNC: 0.4 MG/DL — SIGNIFICANT CHANGE UP (ref 0.2–1.2)
BUN SERPL-MCNC: 23 MG/DL — HIGH (ref 7–18)
CALCIUM SERPL-MCNC: 8.9 MG/DL — SIGNIFICANT CHANGE UP (ref 8.4–10.5)
CHLORIDE SERPL-SCNC: 98 MMOL/L — SIGNIFICANT CHANGE UP (ref 96–108)
CO2 SERPL-SCNC: 32 MMOL/L — HIGH (ref 22–31)
CREAT SERPL-MCNC: 0.87 MG/DL — SIGNIFICANT CHANGE UP (ref 0.5–1.3)
EOSINOPHIL # BLD AUTO: 0.12 K/UL — SIGNIFICANT CHANGE UP (ref 0–0.5)
EOSINOPHIL NFR BLD AUTO: 1.9 % — SIGNIFICANT CHANGE UP (ref 0–6)
GLUCOSE BLDC GLUCOMTR-MCNC: 131 MG/DL — HIGH (ref 70–99)
GLUCOSE BLDC GLUCOMTR-MCNC: 164 MG/DL — HIGH (ref 70–99)
GLUCOSE SERPL-MCNC: 155 MG/DL — HIGH (ref 70–99)
HCT VFR BLD CALC: 36.3 % — SIGNIFICANT CHANGE UP (ref 34.5–45)
HGB BLD-MCNC: 11.5 G/DL — SIGNIFICANT CHANGE UP (ref 11.5–15.5)
IMM GRANULOCYTES NFR BLD AUTO: 0.6 % — SIGNIFICANT CHANGE UP (ref 0–1.5)
LYMPHOCYTES # BLD AUTO: 2.28 K/UL — SIGNIFICANT CHANGE UP (ref 1–3.3)
LYMPHOCYTES # BLD AUTO: 36.4 % — SIGNIFICANT CHANGE UP (ref 13–44)
MAGNESIUM SERPL-MCNC: 1.9 MG/DL — SIGNIFICANT CHANGE UP (ref 1.6–2.6)
MCHC RBC-ENTMCNC: 29.4 PG — SIGNIFICANT CHANGE UP (ref 27–34)
MCHC RBC-ENTMCNC: 31.7 GM/DL — LOW (ref 32–36)
MCV RBC AUTO: 92.8 FL — SIGNIFICANT CHANGE UP (ref 80–100)
MONOCYTES # BLD AUTO: 0.5 K/UL — SIGNIFICANT CHANGE UP (ref 0–0.9)
MONOCYTES NFR BLD AUTO: 8 % — SIGNIFICANT CHANGE UP (ref 2–14)
NEUTROPHILS # BLD AUTO: 3.27 K/UL — SIGNIFICANT CHANGE UP (ref 1.8–7.4)
NEUTROPHILS NFR BLD AUTO: 52.3 % — SIGNIFICANT CHANGE UP (ref 43–77)
NRBC # BLD: 0 /100 WBCS — SIGNIFICANT CHANGE UP (ref 0–0)
PHOSPHATE SERPL-MCNC: 2.8 MG/DL — SIGNIFICANT CHANGE UP (ref 2.5–4.5)
PLATELET # BLD AUTO: 166 K/UL — SIGNIFICANT CHANGE UP (ref 150–400)
POTASSIUM SERPL-MCNC: 4.4 MMOL/L — SIGNIFICANT CHANGE UP (ref 3.5–5.3)
POTASSIUM SERPL-SCNC: 4.4 MMOL/L — SIGNIFICANT CHANGE UP (ref 3.5–5.3)
PROT SERPL-MCNC: 7.4 G/DL — SIGNIFICANT CHANGE UP (ref 6–8.3)
RBC # BLD: 3.91 M/UL — SIGNIFICANT CHANGE UP (ref 3.8–5.2)
RBC # FLD: 17 % — HIGH (ref 10.3–14.5)
SODIUM SERPL-SCNC: 134 MMOL/L — LOW (ref 135–145)
WBC # BLD: 6.26 K/UL — SIGNIFICANT CHANGE UP (ref 3.8–10.5)
WBC # FLD AUTO: 6.26 K/UL — SIGNIFICANT CHANGE UP (ref 3.8–10.5)

## 2019-07-23 PROCEDURE — 85730 THROMBOPLASTIN TIME PARTIAL: CPT

## 2019-07-23 PROCEDURE — 71045 X-RAY EXAM CHEST 1 VIEW: CPT

## 2019-07-23 PROCEDURE — 84165 PROTEIN E-PHORESIS SERUM: CPT

## 2019-07-23 PROCEDURE — 86301 IMMUNOASSAY TUMOR CA 19-9: CPT

## 2019-07-23 PROCEDURE — 82105 ALPHA-FETOPROTEIN SERUM: CPT

## 2019-07-23 PROCEDURE — 84100 ASSAY OF PHOSPHORUS: CPT

## 2019-07-23 PROCEDURE — 85610 PROTHROMBIN TIME: CPT

## 2019-07-23 PROCEDURE — 82378 CARCINOEMBRYONIC ANTIGEN: CPT

## 2019-07-23 PROCEDURE — 84484 ASSAY OF TROPONIN QUANT: CPT

## 2019-07-23 PROCEDURE — 82607 VITAMIN B-12: CPT

## 2019-07-23 PROCEDURE — 93306 TTE W/DOPPLER COMPLETE: CPT

## 2019-07-23 PROCEDURE — 93005 ELECTROCARDIOGRAM TRACING: CPT

## 2019-07-23 PROCEDURE — 83690 ASSAY OF LIPASE: CPT

## 2019-07-23 PROCEDURE — 83550 IRON BINDING TEST: CPT

## 2019-07-23 PROCEDURE — 84443 ASSAY THYROID STIM HORMONE: CPT

## 2019-07-23 PROCEDURE — 71250 CT THORAX DX C-: CPT

## 2019-07-23 PROCEDURE — 83540 ASSAY OF IRON: CPT

## 2019-07-23 PROCEDURE — 86334 IMMUNOFIX E-PHORESIS SERUM: CPT

## 2019-07-23 PROCEDURE — 84155 ASSAY OF PROTEIN SERUM: CPT

## 2019-07-23 PROCEDURE — 82746 ASSAY OF FOLIC ACID SERUM: CPT

## 2019-07-23 PROCEDURE — 86325 OTHER IMMUNOELECTROPHORESIS: CPT

## 2019-07-23 PROCEDURE — 84156 ASSAY OF PROTEIN URINE: CPT

## 2019-07-23 PROCEDURE — 82962 GLUCOSE BLOOD TEST: CPT

## 2019-07-23 PROCEDURE — 83036 HEMOGLOBIN GLYCOSYLATED A1C: CPT

## 2019-07-23 PROCEDURE — 80061 LIPID PANEL: CPT

## 2019-07-23 PROCEDURE — 80053 COMPREHEN METABOLIC PANEL: CPT

## 2019-07-23 PROCEDURE — 85027 COMPLETE CBC AUTOMATED: CPT

## 2019-07-23 PROCEDURE — 83880 ASSAY OF NATRIURETIC PEPTIDE: CPT

## 2019-07-23 PROCEDURE — 36415 COLL VENOUS BLD VENIPUNCTURE: CPT

## 2019-07-23 PROCEDURE — 99285 EMERGENCY DEPT VISIT HI MDM: CPT | Mod: 25

## 2019-07-23 PROCEDURE — 74177 CT ABD & PELVIS W/CONTRAST: CPT

## 2019-07-23 PROCEDURE — 83735 ASSAY OF MAGNESIUM: CPT

## 2019-07-23 RX ORDER — SPIRONOLACTONE 25 MG/1
1 TABLET, FILM COATED ORAL
Qty: 0 | Refills: 0 | DISCHARGE

## 2019-07-23 RX ORDER — AMIODARONE HYDROCHLORIDE 400 MG/1
1 TABLET ORAL
Qty: 0 | Refills: 0 | DISCHARGE

## 2019-07-23 RX ORDER — LACTULOSE 10 G/15ML
15 SOLUTION ORAL DAILY
Refills: 0 | Status: DISCONTINUED | OUTPATIENT
Start: 2019-07-23 | End: 2019-07-23

## 2019-07-23 RX ORDER — LACTULOSE 10 G/15ML
1 SOLUTION ORAL
Qty: 5 | Refills: 0
Start: 2019-07-23 | End: 2019-07-27

## 2019-07-23 RX ORDER — ACETAMINOPHEN 500 MG
2 TABLET ORAL
Qty: 0 | Refills: 0 | DISCHARGE

## 2019-07-23 RX ORDER — FUROSEMIDE 40 MG
1 TABLET ORAL
Qty: 0 | Refills: 0 | DISCHARGE
Start: 2019-07-23

## 2019-07-23 RX ORDER — SPIRONOLACTONE 25 MG/1
1 TABLET, FILM COATED ORAL
Qty: 0 | Refills: 0 | DISCHARGE
Start: 2019-07-23

## 2019-07-23 RX ORDER — METOPROLOL TARTRATE 50 MG
1 TABLET ORAL
Qty: 0 | Refills: 0 | DISCHARGE
Start: 2019-07-23

## 2019-07-23 RX ORDER — ACETAMINOPHEN 500 MG
2 TABLET ORAL
Qty: 0 | Refills: 0 | DISCHARGE
Start: 2019-07-23

## 2019-07-23 RX ORDER — METOPROLOL TARTRATE 50 MG
1 TABLET ORAL
Qty: 0 | Refills: 0 | DISCHARGE

## 2019-07-23 RX ORDER — AMIODARONE HYDROCHLORIDE 400 MG/1
1 TABLET ORAL
Qty: 0 | Refills: 0 | DISCHARGE
Start: 2019-07-23

## 2019-07-23 RX ADMIN — Medication 0.12 MILLIGRAM(S): at 05:21

## 2019-07-23 RX ADMIN — LIDOCAINE 1 PATCH: 4 CREAM TOPICAL at 00:27

## 2019-07-23 RX ADMIN — Medication 3 UNIT(S): at 08:23

## 2019-07-23 RX ADMIN — Medication 81 MILLIGRAM(S): at 11:09

## 2019-07-23 RX ADMIN — LIDOCAINE 1 PATCH: 4 CREAM TOPICAL at 09:28

## 2019-07-23 RX ADMIN — Medication 25 MILLIGRAM(S): at 05:22

## 2019-07-23 RX ADMIN — Medication 3 UNIT(S): at 11:21

## 2019-07-23 RX ADMIN — AMIODARONE HYDROCHLORIDE 200 MILLIGRAM(S): 400 TABLET ORAL at 05:21

## 2019-07-23 RX ADMIN — LIDOCAINE 1 PATCH: 4 CREAM TOPICAL at 09:27

## 2019-07-23 RX ADMIN — Medication 1: at 08:23

## 2019-07-23 RX ADMIN — HEPARIN SODIUM 5000 UNIT(S): 5000 INJECTION INTRAVENOUS; SUBCUTANEOUS at 05:22

## 2019-07-23 RX ADMIN — Medication 40 MILLIGRAM(S): at 05:22

## 2019-07-23 RX ADMIN — LIDOCAINE 1 PATCH: 4 CREAM TOPICAL at 09:25

## 2019-07-23 RX ADMIN — LACTULOSE 15 GRAM(S): 10 SOLUTION ORAL at 11:09

## 2019-07-23 RX ADMIN — LIDOCAINE 1 PATCH: 4 CREAM TOPICAL at 00:24

## 2019-07-23 RX ADMIN — Medication 1 TABLET(S): at 11:09

## 2019-07-23 RX ADMIN — SPIRONOLACTONE 25 MILLIGRAM(S): 25 TABLET, FILM COATED ORAL at 05:22

## 2019-07-23 NOTE — PROGRESS NOTE ADULT - REASON FOR ADMISSION
Abdominal swelling

## 2019-07-23 NOTE — PROGRESS NOTE ADULT - ASSESSMENT
88 year old female has medical history of HTN, A fib (not on AC), CHF, Gastric ulcer, Hepatitis C ,DM, glaucoma, anemia, SBO,surgical history significant for Intestinal resection, oophorectomy cholecystectomy, worsening abdominal swelling.  1.SPEP+-Heme f/u-Rec PET as outpatient.  2.GI f/u.  3.Afib-no AC,asa,amiodarone,lopressor.  4.DM-Insulin.  5.Cont to lasix and aldactone for Anasarca from low albumin.  6.PPI.

## 2019-07-23 NOTE — PROGRESS NOTE ADULT - PROVIDER SPECIALTY LIST ADULT
Cardiology
Endocrinology
Heme/Onc
Hepatology
Infectious Disease
Infectious Disease
Internal Medicine
Pulmonology
Endocrinology
Pulmonology
Internal Medicine
Gastroenterology

## 2019-07-23 NOTE — PROGRESS NOTE ADULT - ASSESSMENT
1. R/O Empyema  - CXR noted.   - CT chest notes - Loculated fluid with air in the right major fissure may represent small loculated hydropneumothorax versus empyema with air producing organism.   - Thoracic surgery follow up - conservative management  - O2 supplement  - Bronchodilators   - F/U CXR   - ID Recc no ABX    2. Atelectasis  - O2 Supp  - Bronchodilators  - Incentive spirometry     3. CHF  - Echo noted   - Cardio follow up  - Diuretics PRN  - DVT and GI PPX     4. Ascites  - GI Eval noted  - Recc considering therapeutic/diagnostic paracentesis with cytology.  - Tumor markers elevated - PET Scan as OP   - Cont diuretics.     5. Pulmonary HTN  - RVP 49  - Bronchodilators  - CCB  -Oxygen supp     6. Plasma Cell Dyscrasia  - MGUS  - Heme/Onc F/U

## 2019-07-23 NOTE — DISCHARGE NOTE NURSING/CASE MANAGEMENT/SOCIAL WORK - NSDCDPATPORTLINK_GEN_ALL_CORE
You can access the [facility], offered by [facility], by registering with the following website: [email or web address]

## 2019-07-23 NOTE — PROGRESS NOTE ADULT - SUBJECTIVE AND OBJECTIVE BOX
CHIEF COMPLAINT:Patient is a 88y old  Female who presents with a chief complaint of Abdominal swelling.Pt appears comfortable.    	  REVIEW OF SYSTEMS:  CONSTITUTIONAL: No fever, weight loss, or fatigue  EYES: No eye pain, visual disturbances, or discharge  ENT:  No difficulty hearing, tinnitus, vertigo; No sinus or throat pain  NECK: No pain or stiffness  RESPIRATORY: No cough, wheezing, chills or hemoptysis; No Shortness of Breath  CARDIOVASCULAR: No chest pain, palpitations, passing out, dizziness, or leg swelling  GASTROINTESTINAL: No abdominal or epigastric pain. No nausea, vomiting, or hematemesis; No diarrhea or constipation. No melena or hematochezia.  GENITOURINARY: No dysuria, frequency, hematuria, or incontinence  NEUROLOGICAL: No headaches, memory loss, loss of strength, numbness, or tremors  SKIN: No itching, burning, rashes, or lesions   LYMPH Nodes: No enlarged glands  ENDOCRINE: No heat or cold intolerance; No hair loss  MUSCULOSKELETAL: No joint pain or swelling; No muscle, back, or extremity pain  PSYCHIATRIC: No depression, anxiety, mood swings, or difficulty sleeping  HEME/LYMPH: No easy bruising, or bleeding gums  ALLERGY AND IMMUNOLOGIC: No hives or eczema	      PHYSICAL EXAM:  T(C): 36.8 (07-23-19 @ 07:31), Max: 36.9 (07-23-19 @ 00:17)  HR: 64 (07-23-19 @ 07:31) (60 - 80)  BP: 130/60 (07-23-19 @ 07:31) (111/53 - 140/75)  RR: 16 (07-23-19 @ 07:31) (16 - 17)  SpO2: 100% (07-23-19 @ 07:31) (100% - 100%)    I&O's Summary    22 Jul 2019 07:01  -  23 Jul 2019 07:00  --------------------------------------------------------  IN: 200 mL / OUT: 220 mL / NET: -20 mL        Appearance: Normal	  HEENT:   Normal oral mucosa, PERRL, EOMI	  Lymphatic: No lymphadenopathy  Cardiovascular: Normal S1 S2, No JVD, No murmurs, No edema  Respiratory: Lungs clear to auscultation	  Psychiatry: A & O x 3, Mood & affect appropriate  Gastrointestinal:  Soft, Non-tender, + BS	  Skin: No rashes, No ecchymoses, No cyanosis	  Neurologic: Non-focal  Extremities: Normal range of motion, No clubbing, cyanosis or edema  Vascular: Peripheral pulses palpable 2+ bilaterally    MEDICATIONS  (STANDING):  amiodarone    Tablet 200 milliGRAM(s) Oral daily  aspirin enteric coated 81 milliGRAM(s) Oral daily  dextrose 50% Injectable 12.5 Gram(s) IV Push once  dextrose 50% Injectable 25 Gram(s) IV Push once  dextrose 50% Injectable 25 Gram(s) IV Push once  digoxin     Tablet 0.125 milliGRAM(s) Oral daily  furosemide    Tablet 40 milliGRAM(s) Oral daily  heparin  Injectable 5000 Unit(s) SubCutaneous every 12 hours  insulin lispro (HumaLOG) corrective regimen sliding scale   SubCutaneous Before meals and at bedtime  insulin lispro Injectable (HumaLOG) 3 Unit(s) SubCutaneous three times a day before meals  lactulose Syrup 15 Gram(s) Oral daily  latanoprost 0.005% Ophthalmic Solution 1 Drop(s) Both EYES at bedtime  lidocaine   Patch 1 Patch Transdermal every 24 hours  lidocaine   Patch 1 Patch Transdermal every 24 hours  metoprolol succinate ER 25 milliGRAM(s) Oral daily  multivitamin 1 Tablet(s) Oral daily  senna 2 Tablet(s) Oral at bedtime  spironolactone 25 milliGRAM(s) Oral daily        	  LABS:	 	                      11.5   6.26  )-----------( 166      ( 23 Jul 2019 08:21 )             36.3     07-23    134<L>  |  98  |  23<H>  ----------------------------<  155<H>  4.4   |  32<H>  |  0.87    Ca    8.9      23 Jul 2019 08:21  Phos  2.8     07-23  Mg     1.9     07-23    TPro  7.4  /  Alb  1.9<L>  /  TBili  0.4  /  DBili  x   /  AST  42<H>  /  ALT  29  /  AlkPhos  251<H>  07-23    proBNP: Serum Pro-Brain Natriuretic Peptide: 6781 pg/mL (07-15 @ 14:10)    Lipid Profile: Cholesterol 173    HDL 32      HgA1c: Hemoglobin A1C, Whole Blood: 6.9 % (07-16 @ 10:19)  Hemoglobin A1C, Whole Blood: 7.1 % (07-16 @ 03:47)    TSH: Thyroid Stimulating Hormone, Serum: 3.61 uU/mL (07-16 @ 07:09)

## 2019-07-23 NOTE — PROGRESS NOTE ADULT - SUBJECTIVE AND OBJECTIVE BOX
Pt is awake, alert, lying in bed in NAD. Notes improvement of cough.     INTERVAL HPI/OVERNIGHT EVENTS:      VITAL SIGNS:  T(F): 98 (07-23-19 @ 11:03)  HR: 66 (07-23-19 @ 11:03)  BP: 113/57 (07-23-19 @ 11:03)  RR: 16 (07-23-19 @ 11:03)  SpO2: 100% (07-23-19 @ 11:03)  Wt(kg): --  I&O's Detail    22 Jul 2019 07:01  -  23 Jul 2019 07:00  --------------------------------------------------------  IN:    Oral Fluid: 200 mL  Total IN: 200 mL    OUT:    Voided: 220 mL  Total OUT: 220 mL    Total NET: -20 mL      23 Jul 2019 07:01  -  23 Jul 2019 13:29  --------------------------------------------------------  IN:    Oral Fluid: 200 mL  Total IN: 200 mL    OUT:  Total OUT: 0 mL    Total NET: 200 mL      REVIEW OF SYSTEMS:    CONSTITUTIONAL:  No fevers, chills, sweats    HEENT:  Eyes:  No diplopia or blurred vision. ENT:  No earache, sore throat or runny nose.    CARDIOVASCULAR:  No pressure, squeezing, tightness, or heaviness about the chest; no palpitations.    RESPIRATORY:  Per HPI    GASTROINTESTINAL:  No abdominal pain, nausea, vomiting or diarrhea.    GENITOURINARY:  No dysuria, frequency or urgency.    NEUROLOGIC:  No paresthesias, fasciculations, seizures or weakness.    PSYCHIATRIC:  No disorder of thought or mood.      PHYSICAL EXAM:    Constitutional: Well developed and nourished  Eyes: Perrla  ENMT: normal  Neck: supple  Respiratory: good air entry  Cardiovascular: S1 S2 regular  Gastrointestinal: Soft, Non tender  Extremities: No edema  Vascular: normal  Neurological: Awake, alert,Ox3  Musculoskeletal: Normal      MEDICATIONS  (STANDING):  amiodarone    Tablet 200 milliGRAM(s) Oral daily  aspirin enteric coated 81 milliGRAM(s) Oral daily  dextrose 50% Injectable 12.5 Gram(s) IV Push once  dextrose 50% Injectable 25 Gram(s) IV Push once  dextrose 50% Injectable 25 Gram(s) IV Push once  digoxin     Tablet 0.125 milliGRAM(s) Oral daily  furosemide    Tablet 40 milliGRAM(s) Oral daily  heparin  Injectable 5000 Unit(s) SubCutaneous every 12 hours  insulin lispro (HumaLOG) corrective regimen sliding scale   SubCutaneous Before meals and at bedtime  insulin lispro Injectable (HumaLOG) 3 Unit(s) SubCutaneous three times a day before meals  lactulose Syrup 15 Gram(s) Oral daily  latanoprost 0.005% Ophthalmic Solution 1 Drop(s) Both EYES at bedtime  lidocaine   Patch 1 Patch Transdermal every 24 hours  metoprolol succinate ER 25 milliGRAM(s) Oral daily  multivitamin 1 Tablet(s) Oral daily  senna 2 Tablet(s) Oral at bedtime  spironolactone 25 milliGRAM(s) Oral daily    MEDICATIONS  (PRN):  acetaminophen   Tablet .. 650 milliGRAM(s) Oral every 6 hours PRN Mild Pain (1 - 3)  dextrose 40% Gel 15 Gram(s) Oral once PRN Blood Glucose LESS THAN 70 milliGRAM(s)/deciLiter  glucagon  Injectable 1 milliGRAM(s) IntraMuscular once PRN Glucose <70 milliGRAM(s)/deciLiter  guaiFENesin   Syrup  (Sugar-Free) 100 milliGRAM(s) Oral every 6 hours PRN Cough  traMADol 25 milliGRAM(s) Oral every 12 hours PRN Moderate Pain (4 - 6)      Allergies    No Known Allergies    Intolerances        LABS:                        11.5   6.26  )-----------( 166      ( 23 Jul 2019 08:21 )             36.3     07-23    134<L>  |  98  |  23<H>  ----------------------------<  155<H>  4.4   |  32<H>  |  0.87    Ca    8.9      23 Jul 2019 08:21  Phos  2.8     07-23  Mg     1.9     07-23    TPro  7.4  /  Alb  1.9<L>  /  TBili  0.4  /  DBili  x   /  AST  42<H>  /  ALT  29  /  AlkPhos  251<H>  07-23              CAPILLARY BLOOD GLUCOSE      POCT Blood Glucose.: 131 mg/dL (23 Jul 2019 11:15)  POCT Blood Glucose.: 164 mg/dL (23 Jul 2019 07:39)  POCT Blood Glucose.: 158 mg/dL (22 Jul 2019 22:10)  POCT Blood Glucose.: 160 mg/dL (22 Jul 2019 21:01)  POCT Blood Glucose.: 170 mg/dL (22 Jul 2019 16:36)        RADIOLOGY & ADDITIONAL TESTS:    CXR:  < from: Xray Chest 1 View- PORTABLE-Routine (07.17.19 @ 11:03) >  IMPRESSION: Increasing right base process.    < end of copied text >    Ct scan chest:    ekg;    echo:

## 2019-07-23 NOTE — DISCHARGE NOTE PROVIDER - NSDCCPCAREPLAN_GEN_ALL_CORE_FT
PRINCIPAL DISCHARGE DIAGNOSIS  Diagnosis: Other ascites  Assessment and Plan of Treatment: You presented with abdominal distension, CT abdomen was done , it showed small to moderate amount of abdominal ascites, Gastroenterlogy was consulted , You were given lactulose. CA 19-9 and CEA were elevated. Your distension got better , you are recommended to follow uo with PCP in outpatient in 1 week.      SECONDARY DISCHARGE DIAGNOSES  Diagnosis: Plasma cell dyscrasia  Assessment and Plan of Treatment: Your test were positive for multiple myeloma,Dr lujan the [facility] oncologist was consulted, he recommended , out patient Petscan and outpatient management. You are recommened to follow up with oncologist and PCP after your discharge.    Diagnosis: Diabetes  Assessment and Plan of Treatment: Maintaining blood glucose level within normal range.  - You have a history of diabetes  - Your HbA1c is 6.9  - You should continue to take your medication regimen regularly as prescribed  - Please follow up with your primary care provider/endocrinologist within a week of discharge.  - You need to continue monitoring your blood sugar levels closely.  - Please maintain healthy lifestyle by eating healthy diabetic regimen, weight loss and exercise regularly as tolerated.  Make sure you get your HgA1c checked every three months.  If you take oral diabetes medications, check your blood glucose two times a day.  If you take insulin, check your blood glucose before meals and at bedtime.  It's important not to skip any meals.  Keep a log of your blood glucose results and always take it with you to your doctor appointments.  Keep a list of your current medications including injectables and over the counter medications and bring this medication list with you to all your doctor appointments.  If you have not seen your ophthalmologist this year call for appointment.  Check your feet daily for redness, sores, or openings. Do not self treat. If no improvement in two days call your primary care physician for an appointment.  Low blood sugar (hypoglycemia) is a blood sugar below 70mg/dl. Check your blood sugar if you feel signs/symptoms of hypoglycemia. If your blood sugar is below 70 take 15 grams of carbohydrates (ex 4 oz of apple juice, 3-4 glucose tablets, or 4-6 oz of regular soda) wait 15 minutes and repeat blood sugar to make sure it comes up above 70.  If your blood sugar is above 70 and you are due for a meal, have a meal.  If you are not due for a meal have a snack.  This snack helps keeps your blood sugar at a safe range.      Diagnosis: Elevated tumor markers  Assessment and Plan of Treatment: You were found to have elevated tumor markers, Ca 19-9 and CEA , Dr lujan the [facility] oncologist was consulted, he recommended , out patient Petscan and outpatient management. You are recommened to follow up with oncologist and PCP after your discharge.    Diagnosis: HTN (hypertension)  Assessment and Plan of Treatment: Blood Pressure Control , Please continue current medication regimen, and follow up with your PCP  - You have a history of Hypertension.   - You should continue on the current antihypertensive regimen regularly.  - You blood pressure should be within 140-120/80-90.  - You should follow-up with your PCP within 1 week of your discharge for routine blood pressure monitoring at your next visit.  - Notify your doctor if you have any of the following symptoms:   (Dizziness, Lightheadedness, Blurry vision, Headache, Chest pain, Shortness of breath.)  - You should maintain healthy lifestyle by eating healthy low salt diet, avoid fatty food, weight loss, exercise regularly as tolerated 30 mins X 3 time per week.      Diagnosis: A-fib  Assessment and Plan of Treatment: You were diagnosed with/have history of atrial fibrillation.   . You were treated with aspirin , amiodarone and lopressor.  Continue with your medications.   Follow up with PCP within 2 weeks of discharge. PRINCIPAL DISCHARGE DIAGNOSIS  Diagnosis: Other ascites  Assessment and Plan of Treatment: You presented with abdominal distension, CT abdomen was done , it showed small to moderate amount of abdominal ascites, Gastroenterlogy was consulted , You were given lactulose. CA 19-9 and CEA were elevated. Your distension got better , you are recommended to follow up with PCP in outpatient in 1 week.      SECONDARY DISCHARGE DIAGNOSES  Diagnosis: Plasma cell dyscrasia  Assessment and Plan of Treatment: Your test were positive for multiple myeloma,Dr lujan the [facility] oncologist was consulted, he recommended , out patient Petscan and outpatient management. You are recommened to follow up with oncologist and PCP after your discharge.    Diagnosis: Diabetes  Assessment and Plan of Treatment: Maintaining blood glucose level within normal range.  - You have a history of diabetes  - Your HbA1c is 6.9  - You should continue to take your medication regimen regularly as prescribed  - Please follow up with your primary care provider/endocrinologist within a week of discharge.  - You need to continue monitoring your blood sugar levels closely.  - Please maintain healthy lifestyle by eating healthy diabetic regimen, weight loss and exercise regularly as tolerated.  Make sure you get your HgA1c checked every three months.  If you take oral diabetes medications, check your blood glucose two times a day.  If you take insulin, check your blood glucose before meals and at bedtime.  It's important not to skip any meals.  Keep a log of your blood glucose results and always take it with you to your doctor appointments.  Keep a list of your current medications including injectables and over the counter medications and bring this medication list with you to all your doctor appointments.  If you have not seen your ophthalmologist this year call for appointment.  Check your feet daily for redness, sores, or openings. Do not self treat. If no improvement in two days call your primary care physician for an appointment.  Low blood sugar (hypoglycemia) is a blood sugar below 70mg/dl. Check your blood sugar if you feel signs/symptoms of hypoglycemia. If your blood sugar is below 70 take 15 grams of carbohydrates (ex 4 oz of apple juice, 3-4 glucose tablets, or 4-6 oz of regular soda) wait 15 minutes and repeat blood sugar to make sure it comes up above 70.  If your blood sugar is above 70 and you are due for a meal, have a meal.  If you are not due for a meal have a snack.  This snack helps keeps your blood sugar at a safe range.      Diagnosis: Elevated tumor markers  Assessment and Plan of Treatment: You were found to have elevated tumor markers, Ca 19-9 and CEA , Dr lujan the [facility] oncologist was consulted, he recommended , out patient Petscan and outpatient management. You are recommened to follow up with oncologist and PCP after your discharge.    Diagnosis: HTN (hypertension)  Assessment and Plan of Treatment: Blood Pressure Control , Please continue current medication regimen, and follow up with your PCP  - You have a history of Hypertension.   - You should continue on the current antihypertensive regimen regularly.  - You blood pressure should be within 140-120/80-90.  - You should follow-up with your PCP within 1 week of your discharge for routine blood pressure monitoring at your next visit.  - Notify your doctor if you have any of the following symptoms:   (Dizziness, Lightheadedness, Blurry vision, Headache, Chest pain, Shortness of breath.)  - You should maintain healthy lifestyle by eating healthy low salt diet, avoid fatty food, weight loss, exercise regularly as tolerated 30 mins X 3 time per week.      Diagnosis: A-fib  Assessment and Plan of Treatment: You were diagnosed with/have history of atrial fibrillation.   . You were treated with aspirin , amiodarone and lopressor.  Continue with your medications.   Follow up with PCP within 2 weeks of discharge.

## 2019-07-23 NOTE — DISCHARGE NOTE PROVIDER - HOSPITAL COURSE
Patient is 88 year old female has medical history of HTN, A fib (not on AC), CHF, Gastric ulcer, Hepatitis C ,DM, glaucoma, anemia, SBO, surgical history significant for Intestinal resection, oophorectomy cholecystectomy. Pt was sent by Dr. Quintana for worsening abdominal swelling         Patient was in her usual health 2 weeks ago, when she was noticed abdominal swelling, insidious onset, slowly worsening, pt was given lasix but it did not help, pt has on and off abdominal pain, anorexia, weakness, lethargy, loss of energy, orthopnea and leg swelling Pt denies nausea, vomiting, constipation, diarrhea, fever, dysuria. Patient is also complaining of cough with white sputum.         Echo was done it showed     Left Atrium: Severely dilated left atrium.  LA volume index    = 60 cc/m2.    Left Ventricle: Normal left ventricular systolic function.    Normal left ventricular internal dimensions and wall    thicknesses.    Right Heart: Normal right atrium. Right ventricular    enlargement with normal RV systolicfunction.   A device    lead is visualized in the right heart. There is severe    tricuspid regurgitation. There is mild pulmonic    regurgitation.        She was initially given IV lasix later switched to PO.        Initially there was a concern was pneumonia ID was consulted. they discontinued the antibiotics.        CT abdomen was done for abdominal swelling, it showed     Small right pleural effusion and associated atelectasis. 3.5 x 2.7 cm     focus of loculated fluid and air along the minor fissure. This appeared     as a more solid favian the CT from 5/29/2019. This could represent a     small hydropneumothorax or developing lung abscess. Correlate clinically.        Small to moderate amount of abdominal ascites and anasarca which have     progressed since the previous exam.        2.6 x 1.6 cm probable periceliac lymph node, series 2 image 28 unchanged     since 4/10/2019. This is indeterminant. Other nearby smaller nodes     present. This would be better evaluated with PET/CT.        . Gastroenterology was consulted, they suggested protonix and lactulose daily.        She was positive for multiple myeloma and she had alevated tumor ,markers CEA, CA 19-9.    Dr Cohn was consulted , he suggested outpatient management.         Pateint is medically stable for discharge, Case discussed with attending.

## 2019-07-23 NOTE — CHART NOTE - NSCHARTNOTEFT_GEN_A_CORE
Assessment:     Factors impacting intake: [ ] none [ ] nausea  [ ] vomiting [ ] diarrhea [ ] constipation  [ ]chewing problems [ ] swallowing issues  [ ] other:     Diet Presciption: Diet, Pureed:   DASH/TLC {Sodium & Cholesterol Restricted}  1000mL Fluid Restriction ([id number])  Supplement Feeding Modality:  Oral  Ensure Compact Cans or Servings Per Day:  1       Frequency:  Two Times a day (19 @ 13:10)    Intake:     Daily Weight in k.5 (2019 00:17)  Weight in k (2019 05:06)  Weight in k.3 (2019 04:52)  Weight in k.1 (2019 04:30)  Weight in k.4 (2019 04:35)    % Weight Change    Pertinent Medications: MEDICATIONS  (STANDING):  amiodarone    Tablet 200 milliGRAM(s) Oral daily  aspirin enteric coated 81 milliGRAM(s) Oral daily  dextrose 50% Injectable 12.5 Gram(s) IV Push once  dextrose 50% Injectable 25 Gram(s) IV Push once  dextrose 50% Injectable 25 Gram(s) IV Push once  digoxin     Tablet 0.125 milliGRAM(s) Oral daily  furosemide    Tablet 40 milliGRAM(s) Oral daily  heparin  Injectable 5000 Unit(s) SubCutaneous every 12 hours  insulin lispro (HumaLOG) corrective regimen sliding scale   SubCutaneous Before meals and at bedtime  insulin lispro Injectable (HumaLOG) 3 Unit(s) SubCutaneous three times a day before meals  lactulose Syrup 15 Gram(s) Oral daily  latanoprost 0.005% Ophthalmic Solution 1 Drop(s) Both EYES at bedtime  lidocaine   Patch 1 Patch Transdermal every 24 hours  metoprolol succinate ER 25 milliGRAM(s) Oral daily  multivitamin 1 Tablet(s) Oral daily  senna 2 Tablet(s) Oral at bedtime  spironolactone 25 milliGRAM(s) Oral daily    MEDICATIONS  (PRN):  acetaminophen   Tablet .. 650 milliGRAM(s) Oral every 6 hours PRN Mild Pain (1 - 3)  dextrose 40% Gel 15 Gram(s) Oral once PRN Blood Glucose LESS THAN 70 milliGRAM(s)/deciLiter  glucagon  Injectable 1 milliGRAM(s) IntraMuscular once PRN Glucose <70 milliGRAM(s)/deciLiter  guaiFENesin   Syrup  (Sugar-Free) 100 milliGRAM(s) Oral every 6 hours PRN Cough  traMADol 25 milliGRAM(s) Oral every 12 hours PRN Moderate Pain (4 - 6)    Pertinent Labs:  Na134 mmol/L<L> Glu 155 mg/dL<H> K+ 4.4 mmol/L Cr  0.87 mg/dL BUN 23 mg/dL<H>  Phos 2.8 mg/dL  Alb 1.9 g/dL<L>  [id number] 6.9 %<H>  Chol 173 mg/dL  mg/dL HDL 32 mg/dL<L> Trig 137 mg/dL     CAPILLARY BLOOD GLUCOSE      POCT Blood Glucose.: 131 mg/dL (2019 11:15)  POCT Blood Glucose.: 164 mg/dL (2019 07:39)  POCT Blood Glucose.: 158 mg/dL (2019 22:10)  POCT Blood Glucose.: 160 mg/dL (2019 21:01)  POCT Blood Glucose.: 170 mg/dL (2019 16:36)      Skin:     Estimated Needs:   [ ] no change since previous assessment  [ ] recalculated:     Previous Nutrition Diagnosis:   [ ] Inadequate Energy Intake [ ]Inadequate Oral Intake [ ] Excessive Energy Intake   [ ] Underweight [ ] Increased Nutrient Needs [ ] Overweight/Obesity  [ ] Swallowing Difficult   [ ] Altered GI Function [ ] Unintended Weight Loss [ ] Food & Nutrition Related Knowledge Deficit [ ] Malnutrition   [ ] Not Ready for Diet/Life Style Changes     Nutrition Diagnosis is [ ] ongoing  [ ] Improving   [ ] resolved [ ] not applicable     New Nutrition Diagnosis: [ ] not applicable       Interventions:   Recommend  [ ] Change Diet To:  [ ] Nutrition Supplement  [ ] Nutrition Support  [ ] Other:     Monitoring and Evaluation:   [ ] PO intake [ x ] Tolerance to diet prescription [ x ] weights [ x ] labs[ x ] follow up per protocol  [ ] other: Assessment:      Nutrition reassessment for follow-up. Chart reviewed, pt visited, spoke with pt and son at bedside; no nutrition related concerns obtained; Pending discharge planning to skilled nursing facility for rehab when medically ready     Factors impacting intake: [ ] none [ ] nausea  [ ] vomiting [ ] diarrhea [ ] constipation  [ ]chewing problems [ ] swallowing issues  [ X ] other: acute on chronic comorbidities; difficulty chewing; persistent lack of appetite; poor dentition    Diet Presciption: Diet, Pureed:   DASH/TLC {Sodium & Cholesterol Restricted}  1000mL Fluid Restriction ([id number])  Supplement Feeding Modality:  Oral  Ensure Compact Cans or Servings Per Day:  1       Frequency:  Two Times a day (19 @ 13:10)    Intake: tolerating meals well, 50% intake at lunch today, no GI distress/swallowing problem reported at present; likes Ensure Compact, tolerating well, likes it with ices     Daily Weight in k.5 (2019 00:17)  Weight in k (2019 05:06)  Weight in k.3 (2019 04:52)  Weight in k.1 (2019 04:30)  Weight in k.4 (2019 04:35)    % Weight Change: a bit fluctuated, may due to fluid change/diuretic Rx     Pertinent Medications: MEDICATIONS  (STANDING):  amiodarone    Tablet 200 milliGRAM(s) Oral daily  aspirin enteric coated 81 milliGRAM(s) Oral daily  dextrose 50% Injectable 12.5 Gram(s) IV Push once  dextrose 50% Injectable 25 Gram(s) IV Push once  dextrose 50% Injectable 25 Gram(s) IV Push once  digoxin     Tablet 0.125 milliGRAM(s) Oral daily  furosemide    Tablet 40 milliGRAM(s) Oral daily  heparin  Injectable 5000 Unit(s) SubCutaneous every 12 hours  insulin lispro (HumaLOG) corrective regimen sliding scale   SubCutaneous Before meals and at bedtime  insulin lispro Injectable (HumaLOG) 3 Unit(s) SubCutaneous three times a day before meals  lactulose Syrup 15 Gram(s) Oral daily  latanoprost 0.005% Ophthalmic Solution 1 Drop(s) Both EYES at bedtime  lidocaine   Patch 1 Patch Transdermal every 24 hours  metoprolol succinate ER 25 milliGRAM(s) Oral daily  multivitamin 1 Tablet(s) Oral daily  senna 2 Tablet(s) Oral at bedtime  spironolactone 25 milliGRAM(s) Oral daily    MEDICATIONS  (PRN):  acetaminophen   Tablet .. 650 milliGRAM(s) Oral every 6 hours PRN Mild Pain (1 - 3)  dextrose 40% Gel 15 Gram(s) Oral once PRN Blood Glucose LESS THAN 70 milliGRAM(s)/deciLiter  glucagon  Injectable 1 milliGRAM(s) IntraMuscular once PRN Glucose <70 milliGRAM(s)/deciLiter  guaiFENesin   Syrup  (Sugar-Free) 100 milliGRAM(s) Oral every 6 hours PRN Cough  traMADol 25 milliGRAM(s) Oral every 12 hours PRN Moderate Pain (4 - 6)    Pertinent Labs:  Na134 mmol/L<L> Glu 155 mg/dL<H> K+ 4.4 mmol/L Cr  0.87 mg/dL BUN 23 mg/dL<H>  Phos 2.8 mg/dL  Alb 1.9 g/dL<L>  [id number] 6.9 %<H>  Chol 173 mg/dL  mg/dL HDL 32 mg/dL<L> Trig 137 mg/dL     CAPILLARY BLOOD GLUCOSE      POCT Blood Glucose.: 131 mg/dL (2019 11:15)  POCT Blood Glucose.: 164 mg/dL (2019 07:39)  POCT Blood Glucose.: 158 mg/dL (2019 22:10)  POCT Blood Glucose.: 160 mg/dL (2019 21:01)  POCT Blood Glucose.: 170 mg/dL (2019 16:36)      Skin: intact     Estimated Needs:   [ X ] no change since previous assessment  [ ] recalculated:     Previous Nutrition Diagnosis:   [ X ] Malnutrition ( Severe )      Nutrition Diagnosis is [ X ] ongoing  [ ] Improving   [ ] resolved [ ] not applicable     New Nutrition Diagnosis: [ X ] not applicable       Interventions:   Recommend  [ X ] Change Diet To: puree, DASH, Consistent CHO, 1000 ml fluid as medically feasible   [ X ] Nutrition Supplement: Ensure Compact 1 can ( 4 oz or 120 ml ) x bid daily ( 440 kcal, 18 g protein)   [ ] Nutrition Support  [ X ] Other: Discussed with RN;                    Provide food choices within diet Rx as available/updated;                    Nursing to continue feeding assistance and encouragement, aspiration precaution                    Pt to be followed by dietitian at skilled nursing facility when medically ready to be discharged     Monitoring and Evaluation:   [ X ] PO intake [ x ] Tolerance to diet prescription [ x ] weights [ x ] labs[ x ] follow up per protocol  [ ] other: Assessment:      Nutrition reassessment for follow-up. Chart reviewed, pt visited, spoke with pt and son at bedside; no nutrition related concerns obtained; Pending discharge planning to skilled nursing facility for rehab when medically ready     Factors impacting intake: [ ] none [ ] nausea  [ ] vomiting [ ] diarrhea [ ] constipation  [ ]chewing problems [ ] swallowing issues  [ X ] other: acute on chronic comorbidities; difficulty chewing; persistent lack of appetite; poor dentition    Diet Presciption: Diet, Pureed:   DASH/TLC {Sodium & Cholesterol Restricted}  1000mL Fluid Restriction ([id number])  Supplement Feeding Modality:  Oral  Ensure Compact Cans or Servings Per Day:  1       Frequency:  Two Times a day (19 @ 13:10)    Intake: tolerating meals well, 50% intake at lunch today, no GI distress/swallowing problem reported at present; prefers Ensure Compact, tolerating well, likes it with ices     Daily Weight in k.5 (2019 00:17)  Weight in k (2019 05:06)  Weight in k.3 (2019 04:52)  Weight in k.1 (2019 04:30)  Weight in k.4 (2019 04:35)    % Weight Change: a bit fluctuated, may due to fluid change/diuretic Rx     Pertinent Medications: MEDICATIONS  (STANDING):  amiodarone    Tablet 200 milliGRAM(s) Oral daily  aspirin enteric coated 81 milliGRAM(s) Oral daily  dextrose 50% Injectable 12.5 Gram(s) IV Push once  dextrose 50% Injectable 25 Gram(s) IV Push once  dextrose 50% Injectable 25 Gram(s) IV Push once  digoxin     Tablet 0.125 milliGRAM(s) Oral daily  furosemide    Tablet 40 milliGRAM(s) Oral daily  heparin  Injectable 5000 Unit(s) SubCutaneous every 12 hours  insulin lispro (HumaLOG) corrective regimen sliding scale   SubCutaneous Before meals and at bedtime  insulin lispro Injectable (HumaLOG) 3 Unit(s) SubCutaneous three times a day before meals  lactulose Syrup 15 Gram(s) Oral daily  latanoprost 0.005% Ophthalmic Solution 1 Drop(s) Both EYES at bedtime  lidocaine   Patch 1 Patch Transdermal every 24 hours  metoprolol succinate ER 25 milliGRAM(s) Oral daily  multivitamin 1 Tablet(s) Oral daily  senna 2 Tablet(s) Oral at bedtime  spironolactone 25 milliGRAM(s) Oral daily    MEDICATIONS  (PRN):  acetaminophen   Tablet .. 650 milliGRAM(s) Oral every 6 hours PRN Mild Pain (1 - 3)  dextrose 40% Gel 15 Gram(s) Oral once PRN Blood Glucose LESS THAN 70 milliGRAM(s)/deciLiter  glucagon  Injectable 1 milliGRAM(s) IntraMuscular once PRN Glucose <70 milliGRAM(s)/deciLiter  guaiFENesin   Syrup  (Sugar-Free) 100 milliGRAM(s) Oral every 6 hours PRN Cough  traMADol 25 milliGRAM(s) Oral every 12 hours PRN Moderate Pain (4 - 6)    Pertinent Labs:  Na134 mmol/L<L> Glu 155 mg/dL<H> K+ 4.4 mmol/L Cr  0.87 mg/dL BUN 23 mg/dL<H>  Phos 2.8 mg/dL  Alb 1.9 g/dL<L>  [id number] 6.9 %<H>  Chol 173 mg/dL  mg/dL HDL 32 mg/dL<L> Trig 137 mg/dL     CAPILLARY BLOOD GLUCOSE      POCT Blood Glucose.: 131 mg/dL (2019 11:15)  POCT Blood Glucose.: 164 mg/dL (2019 07:39)  POCT Blood Glucose.: 158 mg/dL (2019 22:10)  POCT Blood Glucose.: 160 mg/dL (2019 21:01)  POCT Blood Glucose.: 170 mg/dL (2019 16:36)      Skin: intact     Estimated Needs:   [ X ] no change since previous assessment  [ ] recalculated:     Previous Nutrition Diagnosis:   [ X ] Malnutrition ( Severe )      Nutrition Diagnosis is [ X ] ongoing  [ ] Improving   [ ] resolved [ ] not applicable     New Nutrition Diagnosis: [ X ] not applicable       Interventions:   Recommend  [ X ] Change Diet To: puree, DASH, Consistent CHO, 1000 ml fluid as medically feasible   [ X ] Nutrition Supplement: Ensure Compact 1 can ( 4 oz or 120 ml ) x bid daily ( 440 kcal, 18 g protein)   [ ] Nutrition Support  [ X ] Other: Discussed with RN;                    Provide food choices within diet Rx as available/updated;                    Nursing to continue feeding assistance and encouragement, aspiration precaution                    Pt to be followed by dietitian at skilled nursing facility when medically ready to be discharged     Monitoring and Evaluation:   [ X ] PO intake [ x ] Tolerance to diet prescription [ x ] weights [ x ] labs[ x ] follow up per protocol  [ ] other:

## 2019-07-23 NOTE — PROGRESS NOTE ADULT - SUBJECTIVE AND OBJECTIVE BOX
Interval Events:      Allergies    No Known Allergies    Intolerances      Endocrine/Metabolic Medications:  dextrose 40% Gel 15 Gram(s) Oral once PRN  dextrose 50% Injectable 12.5 Gram(s) IV Push once  dextrose 50% Injectable 25 Gram(s) IV Push once  dextrose 50% Injectable 25 Gram(s) IV Push once  glucagon  Injectable 1 milliGRAM(s) IntraMuscular once PRN  insulin lispro (HumaLOG) corrective regimen sliding scale   SubCutaneous Before meals and at bedtime  insulin lispro Injectable (HumaLOG) 3 Unit(s) SubCutaneous three times a day before meals      Vital Signs Last 24 Hrs  T(C): 36.8 (23 Jul 2019 07:31), Max: 36.9 (23 Jul 2019 00:17)  T(F): 98.2 (23 Jul 2019 07:31), Max: 98.4 (23 Jul 2019 00:17)  HR: 64 (23 Jul 2019 07:31) (60 - 80)  BP: 130/60 (23 Jul 2019 07:31) (111/53 - 140/75)  BP(mean): --  RR: 16 (23 Jul 2019 07:31) (16 - 17)  SpO2: 100% (23 Jul 2019 07:31) (100% - 100%)      PHYSICAL EXAM  All physical exam findings normal, except those marked:  General:	Alert, active, cooperative, NAD, well hydrated  .		[] Abnormal:  Neck		Normal: supple, no cervical adenopathy, no palpable thyroid  .		[] Abnormal:  Cardiovascular	Normal: regular rate, normal S1, S2, no murmurs  .		[] Abnormal:  Respiratory	Normal: no chest wall deformity, normal respiratory pattern, CTA B/L  .		[] Abnormal:  Abdominal	Normal: soft, ND, NT, bowel sounds present, no masses, no organomegaly  .		[] Abnormal:  		Normal normal genitalia, testes descended, circumcised/uncircumcised  .		Sonja stage:			Breast sonja:  .		Menstrual history:  .		[] Abnormal:  Extremities	Normal: FROM x4  .		[] Abnormal:  Skin		Normal: intact and not indurated, no rash, no acanthosis nigricans  .		[] Abnormal:  Neurologic	Normal: grossly intact  .		[] Abnormal:    LABS                        11.5   6.26  )-----------( 166      ( 23 Jul 2019 08:21 )             36.3                               134    |  98     |  23                  Calcium: 8.9   / iCa: x      (07-23 @ 08:21)    ----------------------------<  155       Magnesium: 1.9                              4.4     |  32     |  0.87             Phosphorous: 2.8      TPro  7.4    /  Alb  1.9    /  TBili  0.4    /  DBili  x      /  AST  42     /  ALT  29     /  AlkPhos  251    23 Jul 2019 08:21    CAPILLARY BLOOD GLUCOSE      POCT Blood Glucose.: 164 mg/dL (23 Jul 2019 07:39)  POCT Blood Glucose.: 158 mg/dL (22 Jul 2019 22:10)  POCT Blood Glucose.: 160 mg/dL (22 Jul 2019 21:01)  POCT Blood Glucose.: 170 mg/dL (22 Jul 2019 16:36)  POCT Blood Glucose.: 250 mg/dL (22 Jul 2019 11:41)        Assesment/plan Interval Events:  pt in nad    Allergies    No Known Allergies    Intolerances      Endocrine/Metabolic Medications:  dextrose 40% Gel 15 Gram(s) Oral once PRN  dextrose 50% Injectable 12.5 Gram(s) IV Push once  dextrose 50% Injectable 25 Gram(s) IV Push once  dextrose 50% Injectable 25 Gram(s) IV Push once  glucagon  Injectable 1 milliGRAM(s) IntraMuscular once PRN  insulin lispro (HumaLOG) corrective regimen sliding scale   SubCutaneous Before meals and at bedtime  insulin lispro Injectable (HumaLOG) 3 Unit(s) SubCutaneous three times a day before meals      Vital Signs Last 24 Hrs  T(C): 36.8 (23 Jul 2019 07:31), Max: 36.9 (23 Jul 2019 00:17)  T(F): 98.2 (23 Jul 2019 07:31), Max: 98.4 (23 Jul 2019 00:17)  HR: 64 (23 Jul 2019 07:31) (60 - 80)  BP: 130/60 (23 Jul 2019 07:31) (111/53 - 140/75)  BP(mean): --  RR: 16 (23 Jul 2019 07:31) (16 - 17)  SpO2: 100% (23 Jul 2019 07:31) (100% - 100%)      PHYSICAL EXAM  All physical exam findings normal, except those marked:  General:	Alert, active, cooperative, NAD, well hydrated  .		[] Abnormal:  Neck		Normal: supple, no cervical adenopathy, no palpable thyroid  .		[] Abnormal:  Cardiovascular	Normal: regular rate, normal S1, S2, no murmurs  .		[] Abnormal:  Respiratory	Normal: no chest wall deformity, normal respiratory pattern, CTA B/L  .		[] Abnormal:  Abdominal	Normal: soft, ND, NT, bowel sounds present, no masses, no organomegaly  .		[] Abnormal:  		Normal normal genitalia, testes descended, circumcised/uncircumcised  .		Sonja stage:			Breast sonja:  .		Menstrual history:  .		[] Abnormal:  Extremities	Normal: FROM x4  .		[] Abnormal:  Skin		Normal: intact and not indurated, no rash, no acanthosis nigricans  .		[] Abnormal:  Neurologic	Normal: grossly intact  .		[] Abnormal:    LABS                        11.5   6.26  )-----------( 166      ( 23 Jul 2019 08:21 )             36.3                               134    |  98     |  23                  Calcium: 8.9   / iCa: x      (07-23 @ 08:21)    ----------------------------<  155       Magnesium: 1.9                              4.4     |  32     |  0.87             Phosphorous: 2.8      TPro  7.4    /  Alb  1.9    /  TBili  0.4    /  DBili  x      /  AST  42     /  ALT  29     /  AlkPhos  251    23 Jul 2019 08:21    CAPILLARY BLOOD GLUCOSE      POCT Blood Glucose.: 164 mg/dL (23 Jul 2019 07:39)  POCT Blood Glucose.: 158 mg/dL (22 Jul 2019 22:10)  POCT Blood Glucose.: 160 mg/dL (22 Jul 2019 21:01)  POCT Blood Glucose.: 170 mg/dL (22 Jul 2019 16:36)  POCT Blood Glucose.: 250 mg/dL (22 Jul 2019 11:41)        Assesment/plan  Dm- good control  cont humalog 3 ac tid  fsg ac and hs  d/c planning to rehab  d/w pts son

## 2019-07-23 NOTE — DISCHARGE NOTE PROVIDER - CARE PROVIDER_API CALL
Santos Quintana (DO)  Medicine  [address] [facility], [address] [place] [address]  Phone: [phone]  Fax: [phone]  Follow Up Time:     Ovidio Cohn)  Internal Medicine; Medical Oncology  [address] [place] [address]  Phone: [phone]  Fax: [phone]  Follow Up Time:

## 2019-08-19 NOTE — ED PROVIDER NOTE - PRINCIPAL DIAGNOSIS
CC: f/u HTN, HLD    HPI: Pt is a 70 y.o. female who presents for f/u HTN, HLD. She would really like to come off the lisinopril so she is only on one medication. She had been on just HCTZ in the past but her BP was not well-controlled. She is not having any bad side effects to the lisinopril, she just does not want to be on 2 medications. She is also not interested in trying a new medication or titrating up on lisinopril with the hopes of getting rid of HCTZ. She also reports today that she stopped taking the atorvastatin a long time ago. She says that her Cardiologist told her she did not need it. Last Cardiology note personally reviewed, and I do not see that mentioned in there. HTN:  Checking BPs at home?: YES  Logs today?: NO  Range of BPs?: 135-225'K systolic with one value of 160. Not sure about diastolics. Adding salt to foods?: YES, working on cutting back  Headaches?: NO  Blurry vision?: NO  Lower extremity edema?: NO  Smoking?: NO        Past Medical History:   Diagnosis Date    Essential hypertension 11/2/2016    Seizure ([facility] [address]     Seizures ([facility] [address]        Family History   Problem Relation Age of Onset    Stroke Father     Heart Disease Sister        Social History     Tobacco Use    Smoking status: Never Smoker    Smokeless tobacco: Never Used   Substance Use Topics    Alcohol use:  Yes    Drug use: No       ROS:  Per HPI    PE:  Visit Vitals  /72 (BP 1 Location: Left arm, BP Patient Position: Sitting)   Pulse 68   Temp 99.3 °F (37.4 °C) (Oral)   Resp 16   Ht 5' 3\" (1.6 m)   Wt 129 lb (58.5 kg)   SpO2 98%   BMI 22.85 kg/m²     Gen: Pt sitting in chair, in NAD  Head: Normocephalic, atraumatic  Eyes: Sclera anicteric, EOM grossly intact, PERRL  Throat: MMM, normal lips, tongue and gums  Neck: Supple, no LAD, no thyromegaly or carotid bruits  CVS: Normal S1, S2, no m/r/g  Resp: CTAB, no wheezes or rales  Extrem: Atraumatic, no cyanosis or edema  Pulses: 2+   Skin: Warm, dry  Neuro: Alert, oriented, appropriate      A/P: Pt is a 70 y.o. female who presents for f/u HTN and HLD. Both uncontrolled. Unfortunately she does not have a good understanding of her medical problems. Long discussion today about goals for her BP and cholesterol and why we would like to keep these problems controlled, as well as risks of leaving them uncontrolled, including heart attacks, stroke and death. Discussed all [place] options for changing her BP medication, however at the end she decided she would like to keep things how they are if she cannot get rid of lisinopril. Advised that her BP is still not well controlled and she would benefit from an increase in lisinopril, however she is not willing to consider that at this time. - Lipid panel. Will calculate ASCVD risk and call pt with information  - [facility]  - RTC in 3 months for f/u chronic conditions, or sooner prn      Discussed diagnoses in detail with patient. Medication risks/benefits/side effects discussed with patient. All of the patient's questions were addressed. The patient understands and agrees with our plan of care. The patient knows to call back if they are unsure of or forget any changes we discussed today or if the symptoms change. The patient received an After-Visit Summary which contains VS, orders, medication list and allergy list. This can be used as a \"mini-medical record\" should they have to seek medical care while out of town.     Current Outpatient Medications on File Prior to Visit   Medication Sig Dispense Refill    latanoprost (XALATAN) 0.005 % ophthalmic solution INSTILL 1 DROP INTO EACH EYE AT BEDTIME  3    hydroCHLOROthiazide (HYDRODIURIL) 25 mg tablet TAKE 1 TABLET BY MOUTH ONCE DAILY *MUST  MAKE  APPOINTMENT  PRIOR  TO  FURTHER  REFILLS* 30 Tab 0    lisinopril (PRINIVIL, ZESTRIL) 10 mg tablet TAKE 1 TABLET BY MOUTH ONCE DAILY 30 Tab 0    phenytoin ER (DILANTIN ER) 100 mg ER capsule TAKE 3 CAPSULES BY MOUTH AT BEDTIME 90 Cap 0    atorvastatin (LIPITOR) 20 mg tablet Take 1 Tab by mouth daily. 30 Tab 2    timolol (TIMOPTIC) 0.5 % ophthalmic solution 1 Drop two (2) times a day.  aspirin delayed-release 81 mg tablet Take 1 Tab by mouth daily. 100 Tab 3     No current facility-administered medications on file prior to visit. Palpitations

## 2019-09-01 ENCOUNTER — INPATIENT (INPATIENT)
Facility: HOSPITAL | Age: 84
LOS: 7 days | Discharge: HOME | DRG: 682 | End: 2019-09-09
Attending: INTERNAL MEDICINE | Admitting: INTERNAL MEDICINE
Payer: MEDICARE

## 2019-09-01 ENCOUNTER — EMERGENCY (EMERGENCY)
Facility: HOSPITAL | Age: 84
LOS: 1 days | Discharge: ANOTHER HOSPITAL | End: 2019-09-01
Attending: EMERGENCY MEDICINE
Payer: MEDICARE

## 2019-09-01 VITALS
OXYGEN SATURATION: 100 % | HEART RATE: 62 BPM | RESPIRATION RATE: 20 BRPM | TEMPERATURE: 98 F | DIASTOLIC BLOOD PRESSURE: 72 MMHG | SYSTOLIC BLOOD PRESSURE: 108 MMHG | HEIGHT: 59 IN | WEIGHT: 87.96 LBS

## 2019-09-01 VITALS
SYSTOLIC BLOOD PRESSURE: 121 MMHG | HEART RATE: 85 BPM | RESPIRATION RATE: 18 BRPM | HEIGHT: 59 IN | OXYGEN SATURATION: 100 % | WEIGHT: 87.96 LBS | DIASTOLIC BLOOD PRESSURE: 70 MMHG

## 2019-09-01 VITALS
HEART RATE: 60 BPM | RESPIRATION RATE: 18 BRPM | SYSTOLIC BLOOD PRESSURE: 139 MMHG | DIASTOLIC BLOOD PRESSURE: 64 MMHG | OXYGEN SATURATION: 100 % | TEMPERATURE: 97 F

## 2019-09-01 DIAGNOSIS — Z90.49 ACQUIRED ABSENCE OF OTHER SPECIFIED PARTS OF DIGESTIVE TRACT: Chronic | ICD-10-CM

## 2019-09-01 DIAGNOSIS — Z90.721 ACQUIRED ABSENCE OF OVARIES, UNILATERAL: Chronic | ICD-10-CM

## 2019-09-01 DIAGNOSIS — Z95.0 PRESENCE OF CARDIAC PACEMAKER: Chronic | ICD-10-CM

## 2019-09-01 DIAGNOSIS — T46.0X1A POISONING BY CARDIAC-STIMULANT GLYCOSIDES AND DRUGS OF SIMILAR ACTION, ACCIDENTAL (UNINTENTIONAL), INITIAL ENCOUNTER: ICD-10-CM

## 2019-09-01 LAB
ALBUMIN SERPL ELPH-MCNC: 2.9 G/DL — LOW (ref 3.5–5)
ALBUMIN SERPL ELPH-MCNC: 3.3 G/DL — SIGNIFICANT CHANGE UP (ref 3.3–5)
ALP SERPL-CCNC: 191 U/L — HIGH (ref 40–120)
ALP SERPL-CCNC: 248 U/L — HIGH (ref 40–120)
ALT FLD-CCNC: 76 U/L DA — HIGH (ref 10–60)
ALT FLD-CCNC: 77 U/L — HIGH (ref 10–45)
ANION GAP SERPL CALC-SCNC: 12 MMOL/L — SIGNIFICANT CHANGE UP (ref 5–17)
ANION GAP SERPL CALC-SCNC: 18 MMOL/L — HIGH (ref 5–17)
APPEARANCE UR: CLEAR — SIGNIFICANT CHANGE UP
APTT BLD: 30.2 SEC — SIGNIFICANT CHANGE UP (ref 27.5–36.3)
AST SERPL-CCNC: 116 U/L — HIGH (ref 10–40)
AST SERPL-CCNC: 90 U/L — HIGH (ref 10–40)
BASOPHILS # BLD AUTO: 0 K/UL — SIGNIFICANT CHANGE UP (ref 0–0.2)
BASOPHILS # BLD AUTO: 0.01 K/UL — SIGNIFICANT CHANGE UP (ref 0–0.2)
BASOPHILS NFR BLD AUTO: 0.1 % — SIGNIFICANT CHANGE UP (ref 0–2)
BASOPHILS NFR BLD AUTO: 0.5 % — SIGNIFICANT CHANGE UP (ref 0–2)
BILIRUB SERPL-MCNC: 0.6 MG/DL — SIGNIFICANT CHANGE UP (ref 0.2–1.2)
BILIRUB SERPL-MCNC: 1 MG/DL — SIGNIFICANT CHANGE UP (ref 0.2–1.2)
BILIRUB UR-MCNC: NEGATIVE — SIGNIFICANT CHANGE UP
BUN SERPL-MCNC: 37 MG/DL — HIGH (ref 7–23)
BUN SERPL-MCNC: 43 MG/DL — HIGH (ref 7–18)
CALCIUM SERPL-MCNC: 9.4 MG/DL — SIGNIFICANT CHANGE UP (ref 8.4–10.5)
CALCIUM SERPL-MCNC: 9.5 MG/DL — SIGNIFICANT CHANGE UP (ref 8.4–10.5)
CHLORIDE SERPL-SCNC: 95 MMOL/L — LOW (ref 96–108)
CHLORIDE SERPL-SCNC: 97 MMOL/L — SIGNIFICANT CHANGE UP (ref 96–108)
CK SERPL-CCNC: 42 U/L — SIGNIFICANT CHANGE UP (ref 21–215)
CO2 SERPL-SCNC: 18 MMOL/L — LOW (ref 22–31)
CO2 SERPL-SCNC: 23 MMOL/L — SIGNIFICANT CHANGE UP (ref 22–31)
COLOR SPEC: YELLOW — SIGNIFICANT CHANGE UP
CREAT SERPL-MCNC: 1.01 MG/DL — SIGNIFICANT CHANGE UP (ref 0.5–1.3)
CREAT SERPL-MCNC: 1.17 MG/DL — SIGNIFICANT CHANGE UP (ref 0.5–1.3)
DIFF PNL FLD: NEGATIVE — SIGNIFICANT CHANGE UP
DIGOXIN SERPL-MCNC: >5 NG/ML — CRITICAL HIGH (ref 0.8–2)
EOSINOPHIL # BLD AUTO: 0 K/UL — SIGNIFICANT CHANGE UP (ref 0–0.5)
EOSINOPHIL # BLD AUTO: 0.02 K/UL — SIGNIFICANT CHANGE UP (ref 0–0.5)
EOSINOPHIL NFR BLD AUTO: 0.3 % — SIGNIFICANT CHANGE UP (ref 0–6)
EOSINOPHIL NFR BLD AUTO: 0.3 % — SIGNIFICANT CHANGE UP (ref 0–6)
GLUCOSE SERPL-MCNC: 82 MG/DL — SIGNIFICANT CHANGE UP (ref 70–99)
GLUCOSE SERPL-MCNC: 94 MG/DL — SIGNIFICANT CHANGE UP (ref 70–99)
GLUCOSE UR QL: NEGATIVE — SIGNIFICANT CHANGE UP
HCT VFR BLD CALC: 42.6 % — SIGNIFICANT CHANGE UP (ref 34.5–45)
HCT VFR BLD CALC: 43.1 % — SIGNIFICANT CHANGE UP (ref 34.5–45)
HGB BLD-MCNC: 13.6 G/DL — SIGNIFICANT CHANGE UP (ref 11.5–15.5)
HGB BLD-MCNC: 14 G/DL — SIGNIFICANT CHANGE UP (ref 11.5–15.5)
IMM GRANULOCYTES NFR BLD AUTO: 0.4 % — SIGNIFICANT CHANGE UP (ref 0–1.5)
INR BLD: 1 RATIO — SIGNIFICANT CHANGE UP (ref 0.88–1.16)
KETONES UR-MCNC: ABNORMAL
LACTATE SERPL-SCNC: 1.1 MMOL/L — SIGNIFICANT CHANGE UP (ref 0.7–2)
LEUKOCYTE ESTERASE UR-ACNC: NEGATIVE — SIGNIFICANT CHANGE UP
LIDOCAIN IGE QN: 134 U/L — SIGNIFICANT CHANGE UP (ref 73–393)
LYMPHOCYTES # BLD AUTO: 1.78 K/UL — SIGNIFICANT CHANGE UP (ref 1–3.3)
LYMPHOCYTES # BLD AUTO: 2.4 K/UL — SIGNIFICANT CHANGE UP (ref 1–3.3)
LYMPHOCYTES # BLD AUTO: 23.5 % — SIGNIFICANT CHANGE UP (ref 13–44)
LYMPHOCYTES # BLD AUTO: 26.3 % — SIGNIFICANT CHANGE UP (ref 13–44)
MAGNESIUM SERPL-MCNC: 2.3 MG/DL — SIGNIFICANT CHANGE UP (ref 1.6–2.6)
MCHC RBC-ENTMCNC: 30.6 PG — SIGNIFICANT CHANGE UP (ref 27–34)
MCHC RBC-ENTMCNC: 30.7 PG — SIGNIFICANT CHANGE UP (ref 27–34)
MCHC RBC-ENTMCNC: 31.7 GM/DL — LOW (ref 32–36)
MCHC RBC-ENTMCNC: 32.9 GM/DL — SIGNIFICANT CHANGE UP (ref 32–36)
MCV RBC AUTO: 93 FL — SIGNIFICANT CHANGE UP (ref 80–100)
MCV RBC AUTO: 96.9 FL — SIGNIFICANT CHANGE UP (ref 80–100)
MONOCYTES # BLD AUTO: 0.46 K/UL — SIGNIFICANT CHANGE UP (ref 0–0.9)
MONOCYTES # BLD AUTO: 0.5 K/UL — SIGNIFICANT CHANGE UP (ref 0–0.9)
MONOCYTES NFR BLD AUTO: 5.8 % — SIGNIFICANT CHANGE UP (ref 2–14)
MONOCYTES NFR BLD AUTO: 6.1 % — SIGNIFICANT CHANGE UP (ref 2–14)
NEUTROPHILS # BLD AUTO: 5.29 K/UL — SIGNIFICANT CHANGE UP (ref 1.8–7.4)
NEUTROPHILS # BLD AUTO: 6.1 K/UL — SIGNIFICANT CHANGE UP (ref 1.8–7.4)
NEUTROPHILS NFR BLD AUTO: 67.1 % — SIGNIFICANT CHANGE UP (ref 43–77)
NEUTROPHILS NFR BLD AUTO: 69.6 % — SIGNIFICANT CHANGE UP (ref 43–77)
NITRITE UR-MCNC: NEGATIVE — SIGNIFICANT CHANGE UP
NRBC # BLD: 0 /100 WBCS — SIGNIFICANT CHANGE UP (ref 0–0)
NT-PROBNP SERPL-SCNC: 2324 PG/ML — HIGH (ref 0–450)
PH UR: 5 — SIGNIFICANT CHANGE UP (ref 5–8)
PLATELET # BLD AUTO: 126 K/UL — LOW (ref 150–400)
PLATELET # BLD AUTO: 141 K/UL — LOW (ref 150–400)
POTASSIUM SERPL-MCNC: 5.4 MMOL/L — HIGH (ref 3.5–5.3)
POTASSIUM SERPL-MCNC: 5.6 MMOL/L — HIGH (ref 3.5–5.3)
POTASSIUM SERPL-SCNC: 5.4 MMOL/L — HIGH (ref 3.5–5.3)
POTASSIUM SERPL-SCNC: 5.6 MMOL/L — HIGH (ref 3.5–5.3)
PROT SERPL-MCNC: 7.3 G/DL — SIGNIFICANT CHANGE UP (ref 6–8.3)
PROT SERPL-MCNC: 7.8 G/DL — SIGNIFICANT CHANGE UP (ref 6–8.3)
PROT UR-MCNC: 30 MG/DL
PROTHROM AB SERPL-ACNC: 11.1 SEC — SIGNIFICANT CHANGE UP (ref 10–12.9)
RBC # BLD: 4.44 M/UL — SIGNIFICANT CHANGE UP (ref 3.8–5.2)
RBC # BLD: 4.58 M/UL — SIGNIFICANT CHANGE UP (ref 3.8–5.2)
RBC # FLD: 14.5 % — SIGNIFICANT CHANGE UP (ref 10.3–14.5)
RBC # FLD: 15.6 % — HIGH (ref 10.3–14.5)
SODIUM SERPL-SCNC: 131 MMOL/L — LOW (ref 135–145)
SODIUM SERPL-SCNC: 132 MMOL/L — LOW (ref 135–145)
SP GR SPEC: 1.01 — SIGNIFICANT CHANGE UP (ref 1.01–1.02)
TROPONIN I SERPL-MCNC: 0.15 NG/ML — HIGH (ref 0–0.04)
TROPONIN T, HIGH SENSITIVITY RESULT: 35 NG/L — SIGNIFICANT CHANGE UP (ref 0–51)
TSH SERPL-MCNC: 2.42 UU/ML — SIGNIFICANT CHANGE UP (ref 0.34–4.82)
UROBILINOGEN FLD QL: NEGATIVE — SIGNIFICANT CHANGE UP
WBC # BLD: 7.59 K/UL — SIGNIFICANT CHANGE UP (ref 3.8–10.5)
WBC # BLD: 9 K/UL — SIGNIFICANT CHANGE UP (ref 3.8–10.5)
WBC # FLD AUTO: 7.59 K/UL — SIGNIFICANT CHANGE UP (ref 3.8–10.5)
WBC # FLD AUTO: 9 K/UL — SIGNIFICANT CHANGE UP (ref 3.8–10.5)

## 2019-09-01 PROCEDURE — 84484 ASSAY OF TROPONIN QUANT: CPT

## 2019-09-01 PROCEDURE — 83605 ASSAY OF LACTIC ACID: CPT

## 2019-09-01 PROCEDURE — 71045 X-RAY EXAM CHEST 1 VIEW: CPT | Mod: 26

## 2019-09-01 PROCEDURE — 93005 ELECTROCARDIOGRAM TRACING: CPT

## 2019-09-01 PROCEDURE — 87086 URINE CULTURE/COLONY COUNT: CPT

## 2019-09-01 PROCEDURE — 85027 COMPLETE CBC AUTOMATED: CPT

## 2019-09-01 PROCEDURE — 84443 ASSAY THYROID STIM HORMONE: CPT

## 2019-09-01 PROCEDURE — 80053 COMPREHEN METABOLIC PANEL: CPT

## 2019-09-01 PROCEDURE — 74177 CT ABD & PELVIS W/CONTRAST: CPT

## 2019-09-01 PROCEDURE — 85730 THROMBOPLASTIN TIME PARTIAL: CPT

## 2019-09-01 PROCEDURE — 74177 CT ABD & PELVIS W/CONTRAST: CPT | Mod: 26

## 2019-09-01 PROCEDURE — 83690 ASSAY OF LIPASE: CPT

## 2019-09-01 PROCEDURE — 99292 CRITICAL CARE ADDL 30 MIN: CPT | Mod: GC

## 2019-09-01 PROCEDURE — 81001 URINALYSIS AUTO W/SCOPE: CPT

## 2019-09-01 PROCEDURE — 83880 ASSAY OF NATRIURETIC PEPTIDE: CPT

## 2019-09-01 PROCEDURE — 71045 X-RAY EXAM CHEST 1 VIEW: CPT

## 2019-09-01 PROCEDURE — 99291 CRITICAL CARE FIRST HOUR: CPT | Mod: GC

## 2019-09-01 PROCEDURE — 96375 TX/PRO/DX INJ NEW DRUG ADDON: CPT

## 2019-09-01 PROCEDURE — 36415 COLL VENOUS BLD VENIPUNCTURE: CPT

## 2019-09-01 PROCEDURE — 85610 PROTHROMBIN TIME: CPT

## 2019-09-01 PROCEDURE — 99285 EMERGENCY DEPT VISIT HI MDM: CPT | Mod: 25

## 2019-09-01 PROCEDURE — 99291 CRITICAL CARE FIRST HOUR: CPT

## 2019-09-01 PROCEDURE — 99285 EMERGENCY DEPT VISIT HI MDM: CPT

## 2019-09-01 PROCEDURE — 96376 TX/PRO/DX INJ SAME DRUG ADON: CPT

## 2019-09-01 PROCEDURE — 83735 ASSAY OF MAGNESIUM: CPT

## 2019-09-01 PROCEDURE — 82550 ASSAY OF CK (CPK): CPT

## 2019-09-01 PROCEDURE — 93010 ELECTROCARDIOGRAM REPORT: CPT

## 2019-09-01 PROCEDURE — 80162 ASSAY OF DIGOXIN TOTAL: CPT

## 2019-09-01 PROCEDURE — 96365 THER/PROPH/DIAG IV INF INIT: CPT | Mod: XU

## 2019-09-01 PROCEDURE — 96366 THER/PROPH/DIAG IV INF ADDON: CPT

## 2019-09-01 RX ORDER — HEPARIN SODIUM 5000 [USP'U]/ML
5000 INJECTION INTRAVENOUS; SUBCUTANEOUS ONCE
Refills: 0 | Status: COMPLETED | OUTPATIENT
Start: 2019-09-01 | End: 2019-09-01

## 2019-09-01 RX ORDER — SODIUM CHLORIDE 9 MG/ML
1000 INJECTION, SOLUTION INTRAVENOUS ONCE
Refills: 0 | Status: COMPLETED | OUTPATIENT
Start: 2019-09-01 | End: 2019-09-01

## 2019-09-01 RX ORDER — FUROSEMIDE 40 MG
20 TABLET ORAL ONCE
Refills: 0 | Status: COMPLETED | OUTPATIENT
Start: 2019-09-01 | End: 2019-09-02

## 2019-09-01 RX ORDER — ASPIRIN/CALCIUM CARB/MAGNESIUM 324 MG
325 TABLET ORAL ONCE
Refills: 0 | Status: COMPLETED | OUTPATIENT
Start: 2019-09-01 | End: 2019-09-01

## 2019-09-01 RX ORDER — MORPHINE SULFATE 50 MG/1
2 CAPSULE, EXTENDED RELEASE ORAL ONCE
Refills: 0 | Status: DISCONTINUED | OUTPATIENT
Start: 2019-09-01 | End: 2019-09-01

## 2019-09-01 RX ORDER — ACETAMINOPHEN 500 MG
650 TABLET ORAL ONCE
Refills: 0 | Status: COMPLETED | OUTPATIENT
Start: 2019-09-01 | End: 2019-09-01

## 2019-09-01 RX ORDER — SODIUM CHLORIDE 9 MG/ML
500 INJECTION INTRAMUSCULAR; INTRAVENOUS; SUBCUTANEOUS ONCE
Refills: 0 | Status: COMPLETED | OUTPATIENT
Start: 2019-09-01 | End: 2019-09-01

## 2019-09-01 RX ORDER — OVINE DIGOXIN IMMUNE FAB 40 MG/1
120 INJECTION, POWDER, FOR SOLUTION INTRAVENOUS ONCE
Refills: 0 | Status: COMPLETED | OUTPATIENT
Start: 2019-09-01 | End: 2019-09-01

## 2019-09-01 RX ORDER — DEXTROSE 50 % IN WATER 50 %
50 SYRINGE (ML) INTRAVENOUS ONCE
Refills: 0 | Status: COMPLETED | OUTPATIENT
Start: 2019-09-01 | End: 2019-09-01

## 2019-09-01 RX ORDER — ALBUTEROL 90 UG/1
10 AEROSOL, METERED ORAL ONCE
Refills: 0 | Status: COMPLETED | OUTPATIENT
Start: 2019-09-01 | End: 2019-09-01

## 2019-09-01 RX ORDER — INSULIN HUMAN 100 [IU]/ML
10 INJECTION, SOLUTION SUBCUTANEOUS ONCE
Refills: 0 | Status: COMPLETED | OUTPATIENT
Start: 2019-09-01 | End: 2019-09-01

## 2019-09-01 RX ORDER — SODIUM ZIRCONIUM CYCLOSILICATE 10 G/10G
10 POWDER, FOR SUSPENSION ORAL ONCE
Refills: 0 | Status: COMPLETED | OUTPATIENT
Start: 2019-09-01 | End: 2019-09-02

## 2019-09-01 RX ADMIN — MORPHINE SULFATE 2 MILLIGRAM(S): 50 CAPSULE, EXTENDED RELEASE ORAL at 19:15

## 2019-09-01 RX ADMIN — OVINE DIGOXIN IMMUNE FAB 50 MILLIGRAM(S): 40 INJECTION, POWDER, FOR SOLUTION INTRAVENOUS at 20:37

## 2019-09-01 RX ADMIN — MORPHINE SULFATE 2 MILLIGRAM(S): 50 CAPSULE, EXTENDED RELEASE ORAL at 18:38

## 2019-09-01 RX ADMIN — Medication 100 MILLIGRAM(S): at 17:04

## 2019-09-01 RX ADMIN — MORPHINE SULFATE 2 MILLIGRAM(S): 50 CAPSULE, EXTENDED RELEASE ORAL at 17:04

## 2019-09-01 RX ADMIN — Medication 325 MILLIGRAM(S): at 18:38

## 2019-09-01 RX ADMIN — Medication 650 MILLIGRAM(S): at 23:07

## 2019-09-01 RX ADMIN — HEPARIN SODIUM 5000 UNIT(S): 5000 INJECTION INTRAVENOUS; SUBCUTANEOUS at 19:53

## 2019-09-01 RX ADMIN — Medication 600 MILLIGRAM(S): at 19:15

## 2019-09-01 RX ADMIN — SODIUM CHLORIDE 500 MILLILITER(S): 9 INJECTION INTRAMUSCULAR; INTRAVENOUS; SUBCUTANEOUS at 17:03

## 2019-09-01 RX ADMIN — SODIUM CHLORIDE 500 MILLILITER(S): 9 INJECTION INTRAMUSCULAR; INTRAVENOUS; SUBCUTANEOUS at 19:15

## 2019-09-01 RX ADMIN — Medication 650 MILLIGRAM(S): at 22:37

## 2019-09-01 NOTE — H&P ADULT - BACK COMMENTS
No bony tenderness, L paravertebral (lumbar region) mobile mild erythematous, tender nodule - patient unable to remember how long it has been there for

## 2019-09-01 NOTE — ED PROVIDER NOTE - OBJECTIVE STATEMENT
88f w hx afib not on a/c s/p ppm, HTN, HLD, IDDM, hep C. xfer'd from [facility] for dig toxicity and possible NSTEMI. Pt initially presented to OSH c/o diffuse abd pain and rectal pain x3 days. No fever or vomiting. Had CTAP w no acute pathology. 88f w hx afib not on a/c s/p ppm, HTN, HLD, IDDM, hep C. xfer'd from [facility] for dig toxicity and possible NSTEMI. Pt initially presented to OSH c/o diffuse abd pain and rectal pain x3 days. No fever or vomiting. Had CTAP w no acute pathology. Eval there revealed dig level>5 (max measured amount). Pt denies taking any extra doses of medication. Denies visual changes, palpitations, numbness or tingling.  was called there, and recommended dosing Digibind 125 IV. Pt was also noted to have elevated troponin there; case was discussed w cards who accepted as transfer. Pt denies cp or SOB at any point.

## 2019-09-01 NOTE — H&P ADULT - PROBLEM SELECTOR PLAN 1
Dig level >5 with SIERRA and hyperkalemia - symptomatic with nausea, decrease PO/anorexia, and end organ involvement with gen abd pain, SIERRA and AMS/acute encephalopathy, as well as hyperkalemia of 5.4  - s/p digibind at OSH  - will monitor clinically, serial dig level, renal function and K as well as tele monitoring (difficult to asses EKG due to PPM)  - Cardio eval appreciated, no need for CCU monitoring

## 2019-09-01 NOTE — ED ADULT NURSE NOTE - NSIMPLEMENTINTERV_GEN_ALL_ED
Implemented All Fall with Harm Risk Interventions:  [place] to call system. Call bell, personal items and telephone within reach. Instruct patient to call for assistance. Room bathroom lighting operational. Non-slip footwear when patient is off stretcher. Physically safe environment: no spills, clutter or unnecessary equipment. Stretcher in lowest position, wheels locked, appropriate side rails in place. Provide visual cue, wrist band, yellow gown, etc. Monitor gait and stability. Monitor for mental status changes and reorient to person, place, and time. Review medications for side effects contributing to fall risk. Reinforce activity limits and safety measures with patient and family. Provide visual clues: red socks.

## 2019-09-01 NOTE — ED PROVIDER NOTE - PMH
A-fib  no Eliquis since 1/2019  Constipation    Gastric AVM  EGD 2/2019 - cauterized  GIB (gastrointestinal bleeding)  2/2019 requiring 2 PRBCs  Glaucoma    Graves disease  no treatment as per patient and family  Hepatitis C virus  not treated  HTN (hypertension)    Multiple falls    Pacemaker  medtronic ADDRL1  2011  PNA (pneumonia)  2/2019   treated with ABX while in [facility]  T2DM (type 2 diabetes mellitus)  last A1c 7.8   4/11/19  Vertigo

## 2019-09-01 NOTE — ED ADULT NURSE REASSESSMENT NOTE - NS ED NURSE REASSESS COMMENT FT1
Patient received from ERIN Gates. Patient a&ox3, no acute distress, resp nonlabored, resting comfortably in stretcher. Denies headache, dizziness, chest pain, palpitations, SOB, abd pain, N/V/D, urinary symptoms, fevers, chills, weakness at this time. Pt placed in position of comfort. Pt educated on call bell system and provided call bell. Bed in lowest position, wheels locked, appropriate side rails raised. Pt denies needs at this time.

## 2019-09-01 NOTE — CONSULT NOTE ADULT - SUBJECTIVE AND OBJECTIVE BOX
HISTORY OF PRESENT ILLNESS:  87 YO F HCV, CHF, HTN, DM, Hypothyroid, Afib, CAD, Dementia, here for abdominal, nausea, and altered mental status for past few days. As per daughter in law at bedside, pt. is usually 100% dependant on ADL but more interactive with family until past few days when has gotten progressively altered. PResented to an outside hospital concerned for NSTEMI and called for transfer. Upon review of labs, apparent, pt. had elevated digoxin level, hyperkalemia and SIERRA. Poison center called and recommended 4 vials of digibind. Unclear if 3 or 4 given at outside hospital. Pt. transferred for further evaluation.     In the ED pt. mental status slowly improving, pt. more alert, no nausea or vomiting.     Allergies    No Known Allergies    Intolerances    	    MEDICATIONS:                  PAST MEDICAL & SURGICAL HISTORY:  Multiple falls  GIB (gastrointestinal bleeding): 2/2019 requiring 2 PRBCs  Gastric AVM: EGD 2/2019 - cauterized  PNA (pneumonia): 2/2019   treated with ABX while in [facility]  T2DM (type 2 diabetes mellitus): last A1c 7.8   4/11/19  Hepatitis C virus: not treated  A-fib: no Eliquis since 1/2019  Constipation  Glaucoma  Vertigo  Graves disease: no treatment as per patient and family  Pacemaker: medtronic ADDRL1  2011  HTN (hypertension)  S/P cardiac pacemaker procedure: Medtronic ADDRL1  2011  S/P cholecystectomy  S/P partial resection of colon: &gt; 5 yrs ago  H/O oophorectomy      FAMILY HISTORY:  Family history of hypertension  Family history of diabetes mellitus      SOCIAL HISTORY:    [ ] Non-smoker  [ ] Smoker  [ ] Alcohol      REVIEW OF SYSTEMS:  General: no fatigue/malaise, weight loss/gain.  Skin: no rashes.  Ophthalmologic: no blurred vision, no loss of vision. 	  ENT: no sore throat, rhinorrhea, sinus congestion.  Respiratory: no SOB, cough or wheeze.  Gastrointestinal:  N/V/ no D, no melena/hematemesis/hematochezia.  Genitourinary: no dysuria/hesitancy or hematuria.  Musculoskeletal: no myalgias or arthralgias.  Neurological: no changes in vision or hearing, no lightheadedness/dizziness, no syncope/near syncope	  Psychiatric: no unusual stress/anxiety.   Hematology/Lymphatics: no unusual bleeding, bruising and no lymphadenopathy.  Endocrine: no unusual thirst.   All others negative except as stated above and in HPI.    PHYSICAL EXAM:  T(C): 36.3 (09-01-19 @ 21:37), Max: 36.4 (09-01-19 @ 15:36)  HR: 77 (09-01-19 @ 23:13) (60 - 85)  BP: 120/72 (09-01-19 @ 23:13) (108/72 - 139/64)  RR: 16 (09-01-19 @ 23:13) (15 - 20)  SpO2: 99% (09-01-19 @ 23:13) (99% - 100%)  Wt(kg): --  I&O's Summary      Appearance: Normal	  HEENT:   Normal oral mucosa, PERRL, EOMI	  Lymphatic: No lymphadenopathy  Cardiovascular: Normal S1 S2, No JVD, No murmurs, No edema  Respiratory: Lungs clear to auscultation	  Psychiatry: A & O x 3, Mood & affect appropriate  Gastrointestinal:  Soft, Non-tender, + BS	  Skin: No rashes, No ecchymoses, No cyanosis	  Neurologic: Non-focal  Extremities: Normal range of motion, No clubbing, cyanosis or edema  Vascular: Peripheral pulses palpable 2+ bilaterally        LABS:	 	    CBC Full  -  ( 01 Sep 2019 22:07 )  WBC Count : 9.0 K/uL  Hemoglobin : 13.6 g/dL  Hematocrit : 43.1 %  Platelet Count - Automated : 141 K/uL  Mean Cell Volume : 96.9 fl  Mean Cell Hemoglobin : 30.7 pg  Mean Cell Hemoglobin Concentration : 31.7 gm/dL  Auto Neutrophil # : 6.1 K/uL  Auto Lymphocyte # : 2.4 K/uL  Auto Monocyte # : 0.5 K/uL  Auto Eosinophil # : 0.0 K/uL  Auto Basophil # : 0.0 K/uL  Auto Neutrophil % : 67.1 %  Auto Lymphocyte % : 26.3 %  Auto Monocyte % : 5.8 %  Auto Eosinophil % : 0.3 %  Auto Basophil % : 0.5 %    09-01    131<L>  |  95<L>  |  37<H>  ----------------------------<  94  5.6<H>   |  18<L>  |  1.01  09-01    132<L>  |  97  |  43<H>  ----------------------------<  82  5.4<H>   |  23  |  1.17    Ca    9.5      01 Sep 2019 22:07  Ca    9.4      01 Sep 2019 16:57  Mg     2.3     09-01  Mg     2.3     09-01    TPro  7.3  /  Alb  3.3  /  TBili  1.0  /  DBili  x   /  AST  116<H>  /  ALT  77<H>  /  AlkPhos  248<H>  09-01  TPro  7.8  /  Alb  2.9<L>  /  TBili  0.6  /  DBili  x   /  AST  90<H>  /  ALT  76<H>  /  AlkPhos  191<H>  09-01      proBNP: Serum Pro-Brain Natriuretic Peptide: 2032 pg/mL (09-01 @ 22:07)  Serum Pro-Brain Natriuretic Peptide: 2324 pg/mL (09-01 @ 16:57)    Lipid Profile:   HgA1c:   TSH: Thyroid Stimulating Hormone, Serum: 2.42 uU/mL (09-01 @ 16:57)        CARDIAC MARKERS:  Troponin I, Serum: 0.147 ng/mL (09-01 @ 16:57)    Digoxin Level, Serum (09.01.19 @ 16:57)    Digoxin Level, Serum: >5.0: TYPE:(C=Critical, N=Notification, A=Abnormal) C  TESTS: _DIGOXIN  DATE/TIME CALLED: _09/01/19 17:38  CALLED TO: _ERIN CAMP  READ BACK (2 Patient Identifiers)(Y/N): _Y  READ BACK VALUES (Y/N): _Y  CALLED BY: JESSICA09/01/19 17:39  Test repeated. ng/mL              TELEMETRY: 	    ECG:  	Ventricular paced   RADIOLOGY:  OTHER: 	    PREVIOUS DIAGNOSTIC TESTING:    [ ] Echocardiogram:  [ ]  Catheterization:  [ ] Stress Test:  	  	  ASSESSMENT/PLAN: 	  87 YO F with multiple comorbidities here for altered mental status, abdominal pain, nausea, and decreased PO intake. Foudn to have trop elevation in setting of SIERRA, elevated digoxin. Last dose digoxin at 1030 AM. Likely chronic dig toxicity. Discussed with Dr. Lewis [facility] tox fellow [phone]    Serial BMP, VBG to trend Creatinine, BMP, lactate and pH.   trend CK-CKMB  Hemodynamically stable  No need for CCU at this time  Admit tele.       Bobby Jamil  Cardiology Fellow  [phone]  All Cardiology service information can be found 24/7 on [email or web address], password: [email or web address]

## 2019-09-01 NOTE — H&P ADULT - PROBLEM SELECTOR PLAN 5
not on long term AC due to GIB  - will hold Dig and Amio (which can elevate Dig level)  - will hold Toprol for now due to hypotension SBP 80-90's, but awake and mentating.  will monitor closely and IVF prn

## 2019-09-01 NOTE — ED PROVIDER NOTE - PROGRESS NOTE DETAILS
Elizabeth Goldberger PGY-3: cards made aware of pt arrival, will follow along but not currently recommending ICU level of care Elizabeth Goldberger PGY-3: discussed case w tox, gwyn given slight rise in K compared to previous value. EKG w no obvious changes though paced. Pt already given digifab, will cont. to follow and see pt in morning Elizabeth Goldberger PGY-3: re possible back abscess, bedside ultrasound revealing heterogeneous hypoechoic area with large hyperechoic center- will hold off on I&D at this time. Initially ordered CT to eval for abscess but clinically appears superficial + CTAP w IV contrast already done and no concerning features in report

## 2019-09-01 NOTE — H&P ADULT - ASSESSMENT
87 y/o F HCV, cirrhosis, GIB due to AVM,  anasarca/ HFpEF, HTN, IDDM2, Grave's dz, Afib not on AC, PPM, mild dementia p/w nausea, decrease PO intake, gen abd pain, back/sacral pain and AMS to OSH transferred here for SIERRA, hyperkalemia, Dig toxicity and elevated troponin.

## 2019-09-01 NOTE — H&P ADULT - NSHPATTENDINGPLANDISCUSS_GEN_ALL_CORE
Dr. Huffman who requested for initial evaluation/H&P and will assume care of this patient in am of 9/2/19.

## 2019-09-01 NOTE — ED PROVIDER NOTE - MDM ORDERS SUBMITTED SELECTION
Reason for exam: additional evaluation requested from abnormal screening.

Last mammogram was performed less than 1 month ago.



History:

Patient is postmenopausal.

Retro-pectoral saline implants in both breasts, January 1997.

Took hormonal contraceptives beginning at age 16.



Physical Findings:

Nurse did not find any significant physical abnormalities on exam.



MG 3D Work Up W/Cad LT

CC and MLO view(s) were taken of the left breast.

Prior study comparison: March 15, 2019, bilateral MG 3d screen mammo imp/cad.  

January 5, 2018, bilateral MG 3d screen mammo imp/cad.

The breast tissue is heterogeneously dense. This may lower the sensitivity of 

mammography.  The previously seen abnormality resolves on additional views and 

appears as fibroglandular tissue compatible with summation. No suspicious 

abnormality. Precautionary ultrasound.



These results were verbally communicated with the patient and result sheet given 

to the patient on 3/26/19.





ASSESSMENT: Incomplete: need additional imaging evaluation, BI-RAD 0



RECOMMENDATION:

Ultrasound of the left breast.

(lower outer quadrant retroareolar)
Not Applicable

## 2019-09-01 NOTE — H&P ADULT - NSICDXPASTMEDICALHX_GEN_ALL_CORE_FT
PAST MEDICAL HISTORY:  A-fib no Eliquis since 1/2019    Constipation     Gastric AVM EGD 2/2019 - cauterized    GIB (gastrointestinal bleeding) 2/2019 requiring 2 PRBCs    Glaucoma     Graves disease no treatment as per patient and family    Hepatitis C virus not treated    HTN (hypertension)     Multiple falls     Pacemaker medtronic ADDRL1  2011    PNA (pneumonia) 2/2019   treated with ABX while in [facility]    T2DM (type 2 diabetes mellitus) last A1c 7.8   4/11/19    Vertigo

## 2019-09-01 NOTE — ED PROVIDER NOTE - OBJECTIVE STATEMENT
87 y/o F with a PMHx of DM, Afib, dementia and PSHx of cholecystectomy BIB a healthcare proxy c/o generalized abdominal pain x3 days, back pain and rectal pain. Pt also relates difficulty swallowing (only able to take her Lac[place]), aching to her B/L arms and a new lump on the skin of her lower back. She notes soft stool secondary to taking a stool softener due to constipation. Pt with no known allergies. Pt denies fever, nausea, vomiting, diarrhea, difficulty urinating, or any other acute complaints at this time. NKDA.

## 2019-09-01 NOTE — ED PROVIDER NOTE - NSSERVICENOTAVAIL_ED_A_ED_FT
Pt's EKG concerning for possible STEMI, though unclear at this time, along with digoxin toxicity, so cardiologist Dr. Deleon advises Pt to be transferred to [facility] for further

## 2019-09-01 NOTE — ED PROVIDER NOTE - PROGRESS NOTE DETAILS
Pt with no chest pain but vague abdominal pain, and troponin elevated, EKG with ventricular pacing, analysis using Sgarbosa criteria seems to indicate unlikely STEMI, however d/w cardiologist from [facility], Dr. Deleon, and he advises urgent transfer to ED at [facility] for further eval and management.  He advised Heparin bolus.  ASA already given and Pt had slight coughing/choking episode, so concern for aspiration risk.  Dr. Crews therefore says we can hold off on Brilinta for now.    -D/w , Dr. Jeffrey Lewis, and he advised DigiFab 120 mg IV, which was immediately ordered in conjunction with our pharmacist.   -Transfer initiated and ED attending, Dr. KENDRICK Hendrix, at [facility] ED accepts transfer.

## 2019-09-01 NOTE — ED PROVIDER NOTE - CARE PLAN
Principal Discharge DX:	Non-ST elevation myocardial infarction (NSTEMI)  Secondary Diagnosis:	Poisoning by digoxin, accidental or unintentional, initial encounter

## 2019-09-01 NOTE — H&P ADULT - ATTENDING COMMENTS
Continue:  11) L lumbar nodule - tender with mild erythema, unclear if acute or chronic - unable to clarify overnight.  s/p Clinda x1 at 3pm OSH for possibly abscess.  Will give Vanco 1gm x1 now due to hypotension with concern for infection.  To clarify with family in am or ID eval in am by primary team Continue:  11) L lumbar nodule - tender with mild erythema, unclear if acute or chronic - unable to clarify overnight.  s/p Clinda x1 at 3pm OSH for possibly abscess.  Will give Vanco 1gm x1 now due to hypotension with concern for infection.  To clarify with family in am or ID eval in am by primary team    12) Malnutrition - nutrition eval ordered    13) Wound care to eval sacral decub    I personally spent 45 min of critical care time for management of hypotension, hyperkalemia with dig toxicity.  Plan as discussed above, communicated with ACP and nurse.

## 2019-09-01 NOTE — H&P ADULT - HISTORY OF PRESENT ILLNESS
89 YO F HCV, CHF, HTN, DM, Hypothyroid, Afib, CAD, Dementia, here for abdominal, nausea, and altered mental status for past few days. As per daughter in law at bedside, pt. is usually 100% dependant on ADL but more interactive with family until past few days when has gotten progressively altered. PResented to an outside hospital concerned for NSTEMI and called for transfer. Upon review of labs, apparent, pt. had elevated digoxin level, hyperkalemia and SIERRA. Poison center called and recommended 4 vials of digibind. Unclear if 3 or 4 given at outside hospital. Pt. transferred for further evaluation. Hx mainly obtained from patient and documentations.  Unsuccessful trying to reach family (Yanna -daughter/HCP and Julian -son called multiple times overnight) to confirm Hx and medications.    87 y/o F HCV, cirrhosis, GIB due to AVM,  anasarca/ HFpEF, HTN, IDDM2, Grave's dz, Afib, PPM, mild dementia, transferred here from [facility]/OSH with concern for Dig toxicity and elevated Troponin in the setting of SIERRA.  Patient presented earlier today to [facility] with c/o, gen abdominal pain, nausea, decrease PO intake, back/sacral pain, and altered mental status for past few days per documentation.  Patient stated to live alone with 24 hr HHA (per patient), dependant on ADL but more interactive with family until past few days when she became more confused and less interactive with decrease PO intake per documentation.   Patient was evaluated at OSH, found to have elevated Troponin I of 1.47 in the setting of SIERRA (creatinine 1.17 from baseline 0.7-0.8 on July 2019) with hyperkalemia 5.4 and Digoxin level >5.  She was treated with 500cc LR, Clinda 600 iv x1 for cellulitis,  and Morphine 2mg iv x1 and underwent CT A/P w/o contrast.  Patient was transferred here to [facility] for Cardio evaluation with concern for NSTEMI and Dig toxicity.  Currently c/o back pain.  Denies fever, SOB, CP, lightheadedness or abd pain.    Patient was recently admitted to [facility] 7/ Hx mainly obtained from patient and documentations.  Unsuccessful trying to reach family (Yanna -daughter/HCP and Julian -son called multiple times overnight) to confirm Hx and medications.    87 y/o F HCV, cirrhosis, GIB due to AVM,  anasarca/ HFpEF, HTN, IDDM2, Grave's dz, Afib not on AC, PPM, mild dementia, transferred here from [facility]/OSH with concern for Dig toxicity and elevated Troponin in the setting of SIERRA.  Patient presented earlier today to [facility] with c/o, gen abdominal pain, nausea, decrease PO intake, back/sacral pain, and altered mental status for past few days per documentation.  Patient stated to live alone with 24 hr HHA (per patient), dependant on ADL but more interactive with family until past few days when she became more confused and less interactive with decrease PO intake per documentation.   Patient was evaluated at OSH, found to have elevated Troponin I of 1.47 in the setting of SIERRA (creatinine 1.17 from baseline 0.7-0.8 on July 2019) with hyperkalemia 5.4 and Digoxin level >5.  She was treated with 500cc LR, Clinda 600 iv x1 for cellulitis,  and Morphine 2mg iv x1 and underwent CT A/P w/o contrast.  Patient was transferred here to [facility] for Cardio evaluation with concern for NSTEMI and Dig toxicity.  Currently c/o back pain.  Denies fever, SOB, CP, lightheadedness or abd pain.    Patient was recently admitted to [facility] 7/15-7/23/19 for abd swelling, diagnosed with abd ascites due to cirrhosis and anasarca due to low albumin, treated with Lasix iv inpatient with transition to Lasix 40 PO, Aldactone 25, and Lactulose.  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  Patient was initially treated with Abx for CT finding of R sided pleural effusion with air - concerning for empyema, but observed off Abx later in the hospital stay.  TTE showed mild-mod MR, normal LV and RV, severe LAE, severe TR with mild pulm HTN.

## 2019-09-01 NOTE — H&P ADULT - PROBLEM SELECTOR PLAN 3
unclear if due to decrease PO intake or SIERRA is the result of anorexia  - improving with IV hydration  - will monitor strict I&O and renal function/electrolytes

## 2019-09-01 NOTE — H&P ADULT - PROBLEM SELECTOR PLAN 6
recently diagnosed with possible cirrhosis during last hospitalization 7/15-7/23/19, treated with increase dose of Lasix and Aldactone  - will hold Aldactone due to hyperkalemia  - will give Lasix dosing prn

## 2019-09-01 NOTE — ED PROVIDER NOTE - CARE PLAN
Principal Discharge DX:	Digoxin toxicity  Secondary Diagnosis:	Hyperkalemia Principal Discharge DX:	Digoxin toxicity  Secondary Diagnosis:	Hyperkalemia  Secondary Diagnosis:	Abscess of lower back

## 2019-09-01 NOTE — H&P ADULT - PROBLEM SELECTOR PLAN 2
s/p Albuterol neb s1, D50 x1, regular insulin 10 u x1, 1L LR and Lasix 20 iv x1 and [place] 1gmx1  - will repeat K level, renal function and VBG for pH  - PO diet encouraged s/p insulin in the setting of SIERRA to prevent hypoglycemia (personally fed 1 cup of yogurt)  - tele monitor

## 2019-09-01 NOTE — ED PROVIDER NOTE - CLINICAL SUMMARY MEDICAL DECISION MAKING FREE TEXT BOX
Patient with abdominal pain and difficulty swallowing with no change in mental status. Obtain labs, EKG, CT Abd/pelvis, give antibiotics for small area of soft tissue infection to small area of the back. No findings of fluctuance for pus to be drained.

## 2019-09-01 NOTE — ED PROVIDER NOTE - ATTENDING CONTRIBUTION TO CARE
------------ATTENDING NOTE------------   pt w/ family transferred by EMS for concerns for chest pain/NSTEMI complicated by digoxin toxicity, accepted by cardiology who met pt on ED arrival, pt c/o vague mid chest/epigastric pain and buttock pain over past days, HD stable, equal distal pulses, no focal numbness/weakness, overall soft benign abd, concerning as more hyperkalemic on repeat labs, d/w Tox and final Card recs for admission / close reassessments -->  - Tom Hendrix MD   ------------------------------------------------ ------------ATTENDING NOTE------------   pt w/ family transferred by EMS for concerns for chest pain/NSTEMI complicated by digoxin toxicity, accepted by cardiology who met pt on ED arrival, pt c/o vague mid chest/epigastric pain and buttock pain over past days, has red slight tender likely cutaneous abscess on L lower back w/o cellulitic changes (had IV antibiotics prior to transfer), HD stable, equal distal pulses, no focal numbness/weakness, overall soft benign abd, concerning as more hyperkalemic on repeat labs, d/w Tox and final Card recs for admission / close reassessments --> d/w pt's PCP and Dr Huffman for admission.  - Tom Hendrix MD   ------------------------------------------------

## 2019-09-01 NOTE — ED PROVIDER NOTE - PMH
A-fib  no Eliquis since 1/2019  Constipation    Gastric AVM  EGD 2/2019 - cauterized  GIB (gastrointestinal bleeding)  2/2019 requiring 2 PRBCs  Glaucoma    Graves disease  no treatment as per patient and family  Hepatitis C virus  not treated  HTN (hypertension)    Multiple falls    Pacemaker  medtronic ADDRL1  2011  PNA (pneumonia)  2/2019   treated with ABX while in [facility]  T2DM (type 2 diabetes mellitus)  last A1c 7.8   4/11/19  Vertigo

## 2019-09-01 NOTE — ED PROVIDER NOTE - NSRISKOFTRANSFER_ED_A_ED
Increased Pain/Deterioration of Condition En Route/Transportation Risk (There is always a risk of traffic delays resulting in deterioration of condition.)/Death or Disability

## 2019-09-01 NOTE — H&P ADULT - PROBLEM SELECTOR PLAN 10
Unable to reach family to clarify home meds  - To clarify all meds in am and complete medication reconciliation by primary team in am.

## 2019-09-01 NOTE — ED ADULT NURSE NOTE - OBJECTIVE STATEMENT
88y female arrived to ED transferred from [facility] for elevated digoxin levels. Patient PMHx DM, afib, pacemaker, dementia. Patient reports abd pain x3 days, back pain and rectal pain. Patient does present with a lump (nontender, moveable) over the spine on the lower back. Patient has stage 1 pressure ulcer on the sacrum. Patient taking stool softener, last BM last night. Digoxin level >5 at outside hospital, given digibind PTA. Patient A&Ox3 in ED, not ambulatory, not continent of bladder, family at the bedside, VS stable. Patient had nausea. Denies CP, SOB, v/d, fever, chills. 88y female arrived to ED transferred from [facility] for elevated digoxin levels. Patient PMHx DM, afib, pacemaker, dementia. Patient reports abd pain x3 days, back pain and rectal pain. Patient does present with a lump (nontender, moveable) over the spine on the lower back. Patient has stage 1 pressure ulcer on the sacrum. Patient taking stool softener, last BM last night. Digoxin level >5 at outside hospital, given digibind PTA. Patient A&Ox3 in ED, not ambulatory, not continent of bladder, family at the bedside, VS stable. Patient had nausea and dry-heaving x several days. Denies CP, SOB, v/d, fever, chills.

## 2019-09-01 NOTE — H&P ADULT - MENTAL STATUS
A&O to self and family (son and daughter, but not to current president), to place (rehab or hospital), but not to time (1921 as current year) - unclear if baseline since no family to confirm at bedside or by phone

## 2019-09-01 NOTE — ED ADULT NURSE NOTE - NSIMPLEMENTINTERV_GEN_ALL_ED
Implemented All Fall with Harm Risk Interventions:  [place] to call system. Call bell, personal items and telephone within reach. Instruct patient to call for assistance. Room bathroom lighting operational. Non-slip footwear when patient is off stretcher. Physically safe environment: no spills, clutter or unnecessary equipment. Stretcher in lowest position, wheels locked, appropriate side rails in place. Provide visual cue, wrist band, yellow gown, etc. Monitor gait and stability. Monitor for mental status changes and reorient to person, place, and time. Review medications for side effects contributing to fall risk. Reinforce activity limits and safety measures with patient and family. Provide visual clues: red socks.

## 2019-09-01 NOTE — H&P ADULT - NSHPSOCIALHISTORY_GEN_ALL_CORE
Lives with Lives with 24 hr HHA, retired , dependent with ADL, ambulates with a walker and 1 person assist per patient, no tobacco or alcohol

## 2019-09-02 DIAGNOSIS — E46 UNSPECIFIED PROTEIN-CALORIE MALNUTRITION: ICD-10-CM

## 2019-09-02 DIAGNOSIS — Z79.899 OTHER LONG TERM (CURRENT) DRUG THERAPY: ICD-10-CM

## 2019-09-02 DIAGNOSIS — Z29.9 ENCOUNTER FOR PROPHYLACTIC MEASURES, UNSPECIFIED: ICD-10-CM

## 2019-09-02 DIAGNOSIS — E11.69 TYPE 2 DIABETES MELLITUS WITH OTHER SPECIFIED COMPLICATION: ICD-10-CM

## 2019-09-02 DIAGNOSIS — N17.9 ACUTE KIDNEY FAILURE, UNSPECIFIED: ICD-10-CM

## 2019-09-02 DIAGNOSIS — T46.0X1A POISONING BY CARDIAC-STIMULANT GLYCOSIDES AND DRUGS OF SIMILAR ACTION, ACCIDENTAL (UNINTENTIONAL), INITIAL ENCOUNTER: ICD-10-CM

## 2019-09-02 DIAGNOSIS — I48.2 CHRONIC ATRIAL FIBRILLATION: ICD-10-CM

## 2019-09-02 DIAGNOSIS — R74.8 ABNORMAL LEVELS OF OTHER SERUM ENZYMES: ICD-10-CM

## 2019-09-02 DIAGNOSIS — K25.4 CHRONIC OR UNSPECIFIED GASTRIC ULCER WITH HEMORRHAGE: ICD-10-CM

## 2019-09-02 DIAGNOSIS — B19.20 UNSPECIFIED VIRAL HEPATITIS C WITHOUT HEPATIC COMA: ICD-10-CM

## 2019-09-02 DIAGNOSIS — E87.5 HYPERKALEMIA: ICD-10-CM

## 2019-09-02 LAB
ALBUMIN SERPL ELPH-MCNC: 2.3 G/DL — LOW (ref 3.3–5)
ALP SERPL-CCNC: 198 U/L — HIGH (ref 40–120)
ALT FLD-CCNC: 59 U/L — HIGH (ref 10–45)
ANION GAP SERPL CALC-SCNC: 13 MMOL/L — SIGNIFICANT CHANGE UP (ref 5–17)
ANION GAP SERPL CALC-SCNC: 17 MMOL/L — SIGNIFICANT CHANGE UP (ref 5–17)
ANION GAP SERPL CALC-SCNC: 18 MMOL/L — HIGH (ref 5–17)
AST SERPL-CCNC: 91 U/L — HIGH (ref 10–40)
BASE EXCESS BLDV CALC-SCNC: -5.3 MMOL/L — LOW (ref -2–2)
BASOPHILS # BLD AUTO: 0 K/UL — SIGNIFICANT CHANGE UP (ref 0–0.2)
BASOPHILS NFR BLD AUTO: 0 % — SIGNIFICANT CHANGE UP (ref 0–2)
BILIRUB SERPL-MCNC: 0.8 MG/DL — SIGNIFICANT CHANGE UP (ref 0.2–1.2)
BUN SERPL-MCNC: 32 MG/DL — HIGH (ref 7–23)
BUN SERPL-MCNC: 32 MG/DL — HIGH (ref 7–23)
BUN SERPL-MCNC: 36 MG/DL — HIGH (ref 7–23)
CA-I SERPL-SCNC: 1.04 MMOL/L — LOW (ref 1.12–1.3)
CALCIUM SERPL-MCNC: 7.2 MG/DL — LOW (ref 8.4–10.5)
CALCIUM SERPL-MCNC: 8.9 MG/DL — SIGNIFICANT CHANGE UP (ref 8.4–10.5)
CALCIUM SERPL-MCNC: 9.2 MG/DL — SIGNIFICANT CHANGE UP (ref 8.4–10.5)
CHLORIDE BLDV-SCNC: 111 MMOL/L — HIGH (ref 96–108)
CHLORIDE SERPL-SCNC: 105 MMOL/L — SIGNIFICANT CHANGE UP (ref 96–108)
CHLORIDE SERPL-SCNC: 94 MMOL/L — LOW (ref 96–108)
CHLORIDE SERPL-SCNC: 95 MMOL/L — LOW (ref 96–108)
CO2 BLDV-SCNC: 22 MMOL/L — SIGNIFICANT CHANGE UP (ref 22–30)
CO2 SERPL-SCNC: 12 MMOL/L — LOW (ref 22–31)
CO2 SERPL-SCNC: 16 MMOL/L — LOW (ref 22–31)
CO2 SERPL-SCNC: 23 MMOL/L — SIGNIFICANT CHANGE UP (ref 22–31)
CREAT SERPL-MCNC: 0.8 MG/DL — SIGNIFICANT CHANGE UP (ref 0.5–1.3)
CREAT SERPL-MCNC: 0.93 MG/DL — SIGNIFICANT CHANGE UP (ref 0.5–1.3)
CREAT SERPL-MCNC: 0.95 MG/DL — SIGNIFICANT CHANGE UP (ref 0.5–1.3)
DIGOXIN SERPL-MCNC: 1.4 NG/ML — SIGNIFICANT CHANGE UP (ref 0.8–2)
EOSINOPHIL # BLD AUTO: 0.01 K/UL — SIGNIFICANT CHANGE UP (ref 0–0.5)
EOSINOPHIL NFR BLD AUTO: 0.2 % — SIGNIFICANT CHANGE UP (ref 0–6)
GAS PNL BLDV: 129 MMOL/L — LOW (ref 135–145)
GAS PNL BLDV: SIGNIFICANT CHANGE UP
GAS PNL BLDV: SIGNIFICANT CHANGE UP
GLUCOSE BLDC GLUCOMTR-MCNC: 139 MG/DL — HIGH (ref 70–99)
GLUCOSE BLDC GLUCOMTR-MCNC: 165 MG/DL — HIGH (ref 70–99)
GLUCOSE BLDC GLUCOMTR-MCNC: 168 MG/DL — HIGH (ref 70–99)
GLUCOSE BLDC GLUCOMTR-MCNC: 206 MG/DL — HIGH (ref 70–99)
GLUCOSE BLDC GLUCOMTR-MCNC: 92 MG/DL — SIGNIFICANT CHANGE UP (ref 70–99)
GLUCOSE BLDV-MCNC: 142 MG/DL — HIGH (ref 70–99)
GLUCOSE SERPL-MCNC: 167 MG/DL — HIGH (ref 70–99)
GLUCOSE SERPL-MCNC: 192 MG/DL — HIGH (ref 70–99)
GLUCOSE SERPL-MCNC: 82 MG/DL — SIGNIFICANT CHANGE UP (ref 70–99)
HCO3 BLDV-SCNC: 20 MMOL/L — LOW (ref 21–29)
HCT VFR BLD CALC: 30 % — LOW (ref 34.5–45)
HCT VFR BLDA CALC: 35 % — LOW (ref 39–50)
HGB BLD CALC-MCNC: 11.5 G/DL — SIGNIFICANT CHANGE UP (ref 11.5–15.5)
HGB BLD-MCNC: 9.8 G/DL — LOW (ref 11.5–15.5)
IMM GRANULOCYTES NFR BLD AUTO: 0.5 % — SIGNIFICANT CHANGE UP (ref 0–1.5)
LACTATE BLDV-MCNC: 2.4 MMOL/L — HIGH (ref 0.7–2)
LYMPHOCYTES # BLD AUTO: 0.81 K/UL — LOW (ref 1–3.3)
LYMPHOCYTES # BLD AUTO: 14.8 % — SIGNIFICANT CHANGE UP (ref 13–44)
MCHC RBC-ENTMCNC: 31.1 PG — SIGNIFICANT CHANGE UP (ref 27–34)
MCHC RBC-ENTMCNC: 32.7 GM/DL — SIGNIFICANT CHANGE UP (ref 32–36)
MCV RBC AUTO: 95.2 FL — SIGNIFICANT CHANGE UP (ref 80–100)
MONOCYTES # BLD AUTO: 0.51 K/UL — SIGNIFICANT CHANGE UP (ref 0–0.9)
MONOCYTES NFR BLD AUTO: 9.3 % — SIGNIFICANT CHANGE UP (ref 2–14)
NEUTROPHILS # BLD AUTO: 4.1 K/UL — SIGNIFICANT CHANGE UP (ref 1.8–7.4)
NEUTROPHILS NFR BLD AUTO: 75.2 % — SIGNIFICANT CHANGE UP (ref 43–77)
PCO2 BLDV: 43 MMHG — SIGNIFICANT CHANGE UP (ref 35–50)
PH BLDV: 7.3 — LOW (ref 7.35–7.45)
PLATELET # BLD AUTO: 95 K/UL — LOW (ref 150–400)
PO2 BLDV: 36 MMHG — SIGNIFICANT CHANGE UP (ref 25–45)
POTASSIUM BLDV-SCNC: 3.3 MMOL/L — LOW (ref 3.5–5.3)
POTASSIUM SERPL-MCNC: 3.7 MMOL/L — SIGNIFICANT CHANGE UP (ref 3.5–5.3)
POTASSIUM SERPL-MCNC: 4.2 MMOL/L — SIGNIFICANT CHANGE UP (ref 3.5–5.3)
POTASSIUM SERPL-MCNC: 5.1 MMOL/L — SIGNIFICANT CHANGE UP (ref 3.5–5.3)
POTASSIUM SERPL-MCNC: 6.1 MMOL/L — HIGH (ref 3.5–5.3)
POTASSIUM SERPL-SCNC: 3.7 MMOL/L — SIGNIFICANT CHANGE UP (ref 3.5–5.3)
POTASSIUM SERPL-SCNC: 4.2 MMOL/L — SIGNIFICANT CHANGE UP (ref 3.5–5.3)
POTASSIUM SERPL-SCNC: 5.1 MMOL/L — SIGNIFICANT CHANGE UP (ref 3.5–5.3)
POTASSIUM SERPL-SCNC: 6.1 MMOL/L — HIGH (ref 3.5–5.3)
PROT SERPL-MCNC: 5.4 G/DL — LOW (ref 6–8.3)
RBC # BLD: 3.15 M/UL — LOW (ref 3.8–5.2)
RBC # FLD: 15.6 % — HIGH (ref 10.3–14.5)
SAO2 % BLDV: 63 % — LOW (ref 67–88)
SODIUM SERPL-SCNC: 128 MMOL/L — LOW (ref 135–145)
SODIUM SERPL-SCNC: 130 MMOL/L — LOW (ref 135–145)
SODIUM SERPL-SCNC: 135 MMOL/L — SIGNIFICANT CHANGE UP (ref 135–145)
TROPONIN T, HIGH SENSITIVITY RESULT: 22 NG/L — SIGNIFICANT CHANGE UP (ref 0–51)
TROPONIN T, HIGH SENSITIVITY RESULT: 30 NG/L — SIGNIFICANT CHANGE UP (ref 0–51)
WBC # BLD: 5.46 K/UL — SIGNIFICANT CHANGE UP (ref 3.8–10.5)
WBC # FLD AUTO: 5.46 K/UL — SIGNIFICANT CHANGE UP (ref 3.8–10.5)

## 2019-09-02 RX ORDER — HEPARIN SODIUM 5000 [USP'U]/ML
5000 INJECTION INTRAVENOUS; SUBCUTANEOUS EVERY 12 HOURS
Refills: 0 | Status: DISCONTINUED | OUTPATIENT
Start: 2019-09-02 | End: 2019-09-09

## 2019-09-02 RX ORDER — LATANOPROST 0.05 MG/ML
1 SOLUTION/ DROPS OPHTHALMIC; TOPICAL AT BEDTIME
Refills: 0 | Status: DISCONTINUED | OUTPATIENT
Start: 2019-09-02 | End: 2019-09-09

## 2019-09-02 RX ORDER — INSULIN LISPRO 100/ML
VIAL (ML) SUBCUTANEOUS
Refills: 0 | Status: DISCONTINUED | OUTPATIENT
Start: 2019-09-02 | End: 2019-09-09

## 2019-09-02 RX ORDER — MIRTAZAPINE 45 MG/1
7.5 TABLET, ORALLY DISINTEGRATING ORAL AT BEDTIME
Refills: 0 | Status: DISCONTINUED | OUTPATIENT
Start: 2019-09-02 | End: 2019-09-09

## 2019-09-02 RX ORDER — GLUCAGON INJECTION, SOLUTION 0.5 MG/.1ML
1 INJECTION, SOLUTION SUBCUTANEOUS ONCE
Refills: 0 | Status: DISCONTINUED | OUTPATIENT
Start: 2019-09-02 | End: 2019-09-09

## 2019-09-02 RX ORDER — DEXTROSE 50 % IN WATER 50 %
25 SYRINGE (ML) INTRAVENOUS ONCE
Refills: 0 | Status: DISCONTINUED | OUTPATIENT
Start: 2019-09-02 | End: 2019-09-09

## 2019-09-02 RX ORDER — DEXTROSE 50 % IN WATER 50 %
15 SYRINGE (ML) INTRAVENOUS ONCE
Refills: 0 | Status: DISCONTINUED | OUTPATIENT
Start: 2019-09-02 | End: 2019-09-09

## 2019-09-02 RX ORDER — ASPIRIN/CALCIUM CARB/MAGNESIUM 324 MG
81 TABLET ORAL DAILY
Refills: 0 | Status: DISCONTINUED | OUTPATIENT
Start: 2019-09-02 | End: 2019-09-09

## 2019-09-02 RX ORDER — PANTOPRAZOLE SODIUM 20 MG/1
40 TABLET, DELAYED RELEASE ORAL
Refills: 0 | Status: DISCONTINUED | OUTPATIENT
Start: 2019-09-02 | End: 2019-09-09

## 2019-09-02 RX ORDER — INFLUENZA VIRUS VACCINE 15; 15; 15; 15 UG/.5ML; UG/.5ML; UG/.5ML; UG/.5ML
0.5 SUSPENSION INTRAMUSCULAR ONCE
Refills: 0 | Status: DISCONTINUED | OUTPATIENT
Start: 2019-09-02 | End: 2019-09-09

## 2019-09-02 RX ORDER — VANCOMYCIN HCL 1 G
1000 VIAL (EA) INTRAVENOUS ONCE
Refills: 0 | Status: COMPLETED | OUTPATIENT
Start: 2019-09-02 | End: 2019-09-02

## 2019-09-02 RX ORDER — SODIUM CHLORIDE 9 MG/ML
1000 INJECTION, SOLUTION INTRAVENOUS
Refills: 0 | Status: DISCONTINUED | OUTPATIENT
Start: 2019-09-02 | End: 2019-09-09

## 2019-09-02 RX ORDER — DEXTROSE 50 % IN WATER 50 %
12.5 SYRINGE (ML) INTRAVENOUS ONCE
Refills: 0 | Status: DISCONTINUED | OUTPATIENT
Start: 2019-09-02 | End: 2019-09-09

## 2019-09-02 RX ADMIN — SODIUM ZIRCONIUM CYCLOSILICATE 10 GRAM(S): 10 POWDER, FOR SUSPENSION ORAL at 00:21

## 2019-09-02 RX ADMIN — LATANOPROST 1 DROP(S): 0.05 SOLUTION/ DROPS OPHTHALMIC; TOPICAL at 21:40

## 2019-09-02 RX ADMIN — Medication 20 MILLIGRAM(S): at 00:21

## 2019-09-02 RX ADMIN — Medication 1: at 18:05

## 2019-09-02 RX ADMIN — HEPARIN SODIUM 5000 UNIT(S): 5000 INJECTION INTRAVENOUS; SUBCUTANEOUS at 05:03

## 2019-09-02 RX ADMIN — INSULIN HUMAN 10 UNIT(S): 100 INJECTION, SOLUTION SUBCUTANEOUS at 00:20

## 2019-09-02 RX ADMIN — SODIUM CHLORIDE 1000 MILLILITER(S): 9 INJECTION, SOLUTION INTRAVENOUS at 00:20

## 2019-09-02 RX ADMIN — MIRTAZAPINE 7.5 MILLIGRAM(S): 45 TABLET, ORALLY DISINTEGRATING ORAL at 21:40

## 2019-09-02 RX ADMIN — ALBUTEROL 10 MILLIGRAM(S): 90 AEROSOL, METERED ORAL at 00:21

## 2019-09-02 RX ADMIN — Medication 2: at 09:19

## 2019-09-02 RX ADMIN — Medication 81 MILLIGRAM(S): at 13:52

## 2019-09-02 RX ADMIN — HEPARIN SODIUM 5000 UNIT(S): 5000 INJECTION INTRAVENOUS; SUBCUTANEOUS at 17:24

## 2019-09-02 RX ADMIN — PANTOPRAZOLE SODIUM 40 MILLIGRAM(S): 20 TABLET, DELAYED RELEASE ORAL at 06:24

## 2019-09-02 RX ADMIN — Medication 250 MILLIGRAM(S): at 04:55

## 2019-09-02 RX ADMIN — Medication 50 MILLILITER(S): at 00:20

## 2019-09-02 NOTE — CONSULT NOTE ADULT - ATTENDING COMMENTS
Belen: case discussed with fellow. I agree with the assessment and plan as documented above. page with questions [phone].

## 2019-09-02 NOTE — ED ADULT NURSE REASSESSMENT NOTE - NS ED NURSE REASSESS COMMENT FT1
Patient c/o SOB and CP s/p hyperkalemia protocol. Breath sounds clear and equal b/l. Patient sat up in bed, became hypotensive, 90's/50's. MD Montano paged, awaiting return page.

## 2019-09-02 NOTE — ED ADULT NURSE REASSESSMENT NOTE - NS ED NURSE REASSESS COMMENT FT1
MAR contacted regarding patient status. MAR contacted hospitalist, MD Montano who will come down and assess patient. Patient no longer c/o SOB or CP. VSS

## 2019-09-02 NOTE — PHYSICAL THERAPY INITIAL EVALUATION ADULT - DISCHARGE DISPOSITION, PT EVAL
rehabilitation facility/Pt. declines, wants to return home with 24/7 assist, if pt. to return home recommend Home PT

## 2019-09-02 NOTE — CONSULT NOTE ADULT - PROBLEM SELECTOR RECOMMENDATION 9
-the patient has already been treated for digoxin toxicity  -do not recheck dig level  -supportive care  -rest of care per primary team   -please contact us with any questions  -thank you for the consult

## 2019-09-02 NOTE — PHYSICAL THERAPY INITIAL EVALUATION ADULT - PERTINENT HX OF CURRENT PROBLEM, REHAB EVAL
Pt. 88 year old female admitted to ER 9/1/19 with altered mental status, decreased PO intake, abdominal pain and nausea, transferred from outside hospital with concern for digoxin toxicity and elevated troponin

## 2019-09-02 NOTE — PHYSICAL THERAPY INITIAL EVALUATION ADULT - GAIT DEVIATIONS NOTED, PT EVAL
increased time in double stance/decreased chung/decreased step length/decreased stride length/decreased weight-shifting ability

## 2019-09-02 NOTE — CONSULT NOTE ADULT - ASSESSMENT
Digoxin toxicity is already adequately treated as per  insert. The patient never had symptoms or ekg findings consistent with digoxin toxicity, which include pvcs, tachy or bradydysrythmias; however, treatment was indicated to a level > 5, Do not recheck a dig level because Ag-Ab complex will lead to falsely elevated Digoxin levels. Digoxin toxicity is already adequately treated as per  insert. The patient never had symptoms or ekg findings consistent with digoxin toxicity, which include pvcs, tachy or bradydysrythmias; however, treatment was indicated to a level > 5, Do not recheck a dig level because Ag-Ab complex will lead to falsely elevated Digoxin levels.      EKG paced rhythm, I personally viewed [place] from her stay at [facility], there is no evidence of tachydysrthmia or bradydysryhthmia or PVC's

## 2019-09-02 NOTE — CONSULT NOTE ADULT - SUBJECTIVE AND OBJECTIVE BOX
HPI: 88 y PMH afib not on ac, cholecystecomty  chf, htn, pacemaker, dm presented to [place] with abdominal pain. In the process of her workup they checked her level and it was >5. There was no known overdose because her family administer her medications.  She has no visual changes. She initially presented to [facility] at 4pm, level drawn at that time. At which time they called [facility] who recommended digiigab, Dr. Jeffrey Lewis recommended 120 mg of digifab. They already received it. prior to transfer.  jez anna but takes it in the morning     In the ED   wt: 39.9   vs: p78 122/77 rr15 87.4 100%   pe: aaox3, no focal deficity, abd ttp   ekg: no frequent pvc's on monitor, paced, capturing normal,   labs:cbc  9/13.6/43.1/141 jmm853/5.6/95/18/37/1.01<94 ag18, [id number], hhg080, [id number] alt77   imaging: ct a/p neg   interventions: digifab, 3 vials total dose     pending ros     social history: lives with family who administers meds     ICU Vital Signs Last 24 Hrs  T(C): 36.3 (02 Sep 2019 08:05), Max: 36.4 (01 Sep 2019 15:36)  T(F): 97.3 (02 Sep 2019 08:05), Max: 97.5 (01 Sep 2019 15:36)  HR: 79 (02 Sep 2019 09:57) (60 - 85)  BP: 134/69 (02 Sep 2019 08:05) (93/58 - 139/64)  BP(mean): --  ABP: --  ABP(mean): --  RR: 18 (02 Sep 2019 08:05) (15 - 20)  SpO2: 100% (02 Sep 2019 08:05) (99% - 100%)    pending physical exam                          9.8    5.46  )-----------( 95       ( 02 Sep 2019 08:53 )             30.0     09-02    x   |  x   |  x   ----------------------------<  x   5.1   |  x   |  x     Ca    8.9      02 Sep 2019 01:20  Mg     2.3     09-01    TPro  5.4<L>  /  Alb  2.3<L>  /  TBili  0.8  /  DBili  x   /  AST  91<H>  /  ALT  59<H>  /  AlkPhos  198<H>  09-02    Dig level > 5 HPI: 88 y PMH afib not on ac, cholecystecomty  chf, htn, pacemaker, dm presented to [place] with abdominal pain. In the process of her workup they checked her level and it was >5. There was no known overdose because her family administer her medications.  She has no visual changes. She initially presented to [facility] at 4pm, level drawn at that time. At which time they called [facility] who recommended digiigab, Dr. Jeffrey Lewis recommended 120 mg of digifab. They already received it. Prior to transfer.    Patient is unable to provide a coherent history herself. But is able to     jez unknown but takes it in the morning     In the ED   wt: 39.9   vs: p78 122/77 rr15 87.4 100%   pe: aaox3, no focal deficity, abd ttp   ekg: no frequent pvc's on monitor, paced, capturing normal,   labs:cbc  9/13.6/43.1/141 bri661/5.6/95/18/37/1.01<94 ag18, [id number], hyj414, [id number] alt77   imaging: ct a/p neg   interventions: digifab, 3 vials total dose     pending ros    CONSTITUTIONAL: No fever,  EYES: No redness  ENT: no sore throat  CARDIOVASCULAR: No chest pain,  RESPIRATORY: No cough, no shortness of breath  GI: + abdominal pain, + nausea, no vomiting,  GENITOURINARY: + dysuria  MUSKULOSKELETAL: No new pain in joints/muscles  SKIN: No rash  NEURO: No headache  ALL OTHER SYSTEMS NEGATIVE.      social history: lives with family who administers meds , unknown if ever smoke    ICU Vital Signs Last 24 Hrs  T(C): 36.3 (02 Sep 2019 08:05), Max: 36.4 (01 Sep 2019 15:36)  T(F): 97.3 (02 Sep 2019 08:05), Max: 97.5 (01 Sep 2019 15:36)  HR: 79 (02 Sep 2019 09:57) (60 - 85)  BP: 134/69 (02 Sep 2019 08:05) (93/58 - 139/64)  BP(mean): --  ABP: --  ABP(mean): --  RR: 18 (02 Sep 2019 08:05) (15 - 20)  SpO2: 100% (02 Sep 2019 08:05) (99% - 100%)    CONSTITUTIONAL: non-toxic, no acute distress  HEAD: Normocephalic; atraumatic,   EYES: EOM intact, pupills 3mm PERRL  ENMT: External appears normal; normal oropharynx  NECK: Supple; non-tender; normal ROM  CARD: RRR,, no cyanosis   RESP: Normal breathing pattern,  no respiratory rate,  ABD: Soft, non-distended; non-tender;   EXT: grossly normal ROM in all four extremities   SKIN: Warm, dry, no rash, not diaphoretic   NEURO: CN 2-12 grossly intact, Mental status AAOx1 oriented to self  5/5 strength all extremities  coordination grossly intact                            9.8    5.46  )-----------( 95       ( 02 Sep 2019 08:53 )             30.0     09-02    x   |  x   |  x   ----------------------------<  x   5.1   |  x   |  x     Ca    8.9      02 Sep 2019 01:20  Mg     2.3     09-01    TPro  5.4<L>  /  Alb  2.3<L>  /  TBili  0.8  /  DBili  x   /  AST  91<H>  /  ALT  59<H>  /  AlkPhos  198<H>  09-02    Dig level > 5

## 2019-09-02 NOTE — PHYSICAL THERAPY INITIAL EVALUATION ADULT - ADDITIONAL COMMENTS
Pt. reports resides in apartment, no stairs, has home health aide 24 hours x 7 days.  Pt. reports at home is able to get OOB with assist and walks short distances to chair with assist

## 2019-09-02 NOTE — CONSULT NOTE ADULT - SUBJECTIVE AND OBJECTIVE BOX
---___---___---___---___---___---___ ---___---___---___---___---___---___---___---___---                  M E D I C A L   A T T E N D I N G    C O N S U L T A T I O N   N O T E  ---___---___---___---___---___---___ ---___---___---___---___---___---___---___---___---       Pt seen and examined by me approximately thirty minutes ago.  ++CHIEF COMPLAINT:   Patient is a 88y old  Female who presents with a chief complaint of AMS, decrease PO, abd pain/nausea (02 Sep 2019 13:58)    ++  HPI:  Pt is an 89 y/o woman with PMH of HCV, cirrhosis, GIB due to AVM, HFpEF, HTN, IDDM2, Grave's dz, Afib not on AC, PPM, mild dementia, transferred here from [facility] with concern for Dig toxicity and elevated Troponin in the setting of SIERRA.    Patient presented [facility] with c/o, gen abdominal pain, nausea, decrease PO intake, back/sacral pain, and altered mental status for past few days per documentation.    Patient stated to live alone with 24 hr HHA dependant on ADLs.   Patient was evaluated and found to have elevated Troponin I of 1.47 in the setting of SIERRA (creatinine 1.17 from baseline 0.7-0.8 on 2019) with hyperkalemia 5.4 and Digoxin level >5.    She was treated with 500cc LR, Clinda 600 iv x1 for cellulitis,  and Morphine 2mg iv x1 and underwent CT A/P w/o contrast.  Patient was transferred here to [facility] for Cardio evaluation with concern for NSTEMI and Dig toxicity.    Currently seems to have improved in MS, but still per RN not eating well ( pt c/o not having teeth ( kashif on soft diet) and c/u some burning of throat at times).     Patient was recently admitted to [facility] 7/15-19 for abd swelling, diagnosed with abd ascites due to cirrhosis and anasarca due to low albumin, treated with Lasix iv inpatient with transition to Lasix 40 PO, Aldactone 25, and Lactulose.  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    TTE showed mild-mod MR, normal LV and RV, severe LAE, severe TR with mild pulm HTN. (01 Sep 2019 23:55)    ---___---___---___---___---___---  PAST MEDICAL & SURGICAL HISTORY:   falls  GIB  2019 requiring 2 PRBCs  Gastric AVM: EGD 2019 - cauterized  PNA  2019   treated with ABX while in [facility]  T2DM (type 2 diabetes mellitus): last A1c 7.8   19  Hepatitis C   A-fib: no Eliquis since 2019  Constipation  Glaucoma  Vertigo  Graves disease: no treatment as per patient and family  Pacemaker: medtronic ADDRL1    HTN (hypertension)  S/P cardiac pacemaker procedure: Medtronic ADDRL1    S/P cholecystectomy  S/P partial resection of colon: &gt; 5 yrs ago  H/O oophorectomy    ---___---___---___---___---___---   FAMILY HISTORY:  Family history of hypertension  Family history of diabetes mellitus    ---___---___---___---___---___---  SOCIAL HISTORY:  Alcohol: None reported  Smoking: None reported    ---___---___---___---___---___---  ALLERGIES:     No Known Allergies    ---___---___---___---___---___---  MEDICATIONS:  MEDICATIONS  (STANDING):  aspirin enteric coated 81 milliGRAM(s) Oral daily  dextrose 5%. 1000 milliLiter(s) (50 mL/Hr) IV Continuous <Continuous>  dextrose 50% Injectable 12.5 Gram(s) IV Push once  dextrose 50% Injectable 25 Gram(s) IV Push once  dextrose 50% Injectable 25 Gram(s) IV Push once  heparin  Injectable 5000 Unit(s) SubCutaneous every 12 hours  influenza   Vaccine 0.5 milliLiter(s) IntraMuscular once  insulin lispro (HumaLOG) corrective regimen sliding scale   SubCutaneous three times a day before meals  latanoprost 0.005% Ophthalmic Solution 1 Drop(s) Both EYES at bedtime  pantoprazole    Tablet 40 milliGRAM(s) Oral before breakfast    MEDICATIONS  (PRN):  dextrose 40% Gel 15 Gram(s) Oral once PRN Blood Glucose LESS THAN 70 milliGRAM(s)/deciliter  glucagon  Injectable 1 milliGRAM(s) IntraMuscular once PRN Glucose LESS THAN 70 milligrams/deciliter    ---___---___---___---___---___---  REVIEW OF SYSTEM:    GEN: no fever, no chills, no pain now  RESP: no SOB, no cough, no sputum  CVS: no chest pain, no palpitations, no edema  GI: no abdominal pain, no nausea, no vomiting, describes some occasional odynophagia with certain foods / juice)   : no dysuria, no frequency, no hematuria  Neuro: no headache, no dizziness  Derm : no itching, no rash    ---___---___---___---___---___---  VITAL SIGNS:  T(C): 36.3 (19 @ 08:05), Max: 36.3 (19 @ 21:37)  HR: 79 (19 @ 09:57) (76 - 85)  BP: 134/69 (19 @ 08:05) (93/58 - 134/69)  RR: 18 (19 @ 08:05) (15 - 18)  SpO2: 100% (19 @ 08:05) (99% - 100%)     POCT Blood Glucose.: 139 mg/dL (02 Sep 2019 13:01)  POCT Blood Glucose.: 206 mg/dL (02 Sep 2019 08:54)  POCT Blood Glucose.: 168 mg/dL (02 Sep 2019 01:07)  POCT Blood Glucose.: 92 mg/dL (02 Sep 2019 00:18)  I&O's Summary    ---___---___---___---___---___---  PHYSICAL EXAM:    GEN: A&O X 2 , NAD , comfortable, pleasant, a bit forgetful, cachectic   HEENT: NCAT, PERRL, MMM, hearing intact, temporal wasting       no pharyngeal / oral thrush or other abnormalities appreciated     Neck: supple , no JVD  CVS: S1S2 , Irregular , No M/R/G appreciated  PULM: CTA B/L,  no W/R/R appreciated  ABD.: soft. non tender, non distended,  bowel sounds present  Extrem: intact pulses , no edema   Derm: No rash , no ecchymoses  PSYCH : normal mood,  no delusion not anxious     ---___---___---___---___---___---            LAB AND IMAGIN.8    5.46  )-----------( 95       ( 02 Sep 2019 08:53 )             30.0        no CMP today    Creatinine:  0.93  <<==, 0.80  <<==, 1.01  <<==, 1.17  <<==    Ca    8.9      02 Sep 2019 01:20  Mg     2.3         TPro  5.4<L>  /  Alb  2.3<L>  /  TBili  0.8  /  DBili  x   /  AST  91<H>  /  ALT  59<H>  /  AlkPhos  198<H>      PT/INR - ( 01 Sep 2019 16:57 )   PT: 11.1 sec;   INR: 1.00 ratio    PTT - ( 01 Sep 2019 16:57 )  PTT:30.2 sec              ~~ High Sensitivity Troponin  ~~  22  <<--, 30  <<--, 35  <<--     Urinalysis Basic - ( 01 Sep 2019 19:50 )  Color: Yellow / Appearance: Clear / S.015 / pH: x  Gluc: x / Ketone: Moderate  / Bili: Negative / Urobili: Negative   Blood: x / Protein: 30 mg/dL / Nitrite: Negative   Leuk Esterase: Negative / RBC: 2-5 /HPF / WBC 0-2 /HPF   Sq Epi: x / Non Sq Epi: Few /HPF / Bacteria: Trace /HPF    TSH:      2.42   (19)       ,     3.61   (19)           HgA1C: 6.9  (19)        , 7.1  (19)        , 7.7  (19)             ---___---___---___---___---___---___ ---___---___---___---___---                         A S S E S S M E N T   A N D   P L A N :      ** Digoxin toxicity    resolved, repeat Dig level ok    no bradycardia noted    med mgmt otherwise per cardio     monitor    ** failure to thrive, severe protein calorie malnutrition and cachexia     encourage PO intake as discussed with pt    pt already on soft / puree diet ( as doesn't have teeth)     add nutritional supplements    nutrition consult requested     consider adding remeron 7.5 mg QHS    **Diabetes II, stable  ---monitor finger sticks closely  ---Continue Insulin regimen as above monitor  ---Diabetic diet  ---A1c controlled     ** pt with h/o HepC - Cirrhosis and GIB with chronic anemia     monitor H/H closely    PPI given symptoms as above    doubt need for EGD at this time     ** Chronic atrial fibrillation    currently rate controlled, off Dig    monitor     cardio mgmt appreciated    off AC due to prior GIB    Elevated troponin: unclear etiology, improved      ** SIERRA at other hospital, resolved     monitor     encourage PO hydration     -GI/DVT Prophylaxis.    --------------------------------------------  Case discussed with pt, RN, cardio   Education given on findings and plan of care.  ___________________________  Will follow with you.  Thank you,  CINDY Rivera D.O.  Pager: [phone]

## 2019-09-03 LAB
ANION GAP SERPL CALC-SCNC: 11 MMOL/L — SIGNIFICANT CHANGE UP (ref 5–17)
BUN SERPL-MCNC: 24 MG/DL — HIGH (ref 7–23)
CALCIUM SERPL-MCNC: 9.5 MG/DL — SIGNIFICANT CHANGE UP (ref 8.4–10.5)
CHLORIDE SERPL-SCNC: 99 MMOL/L — SIGNIFICANT CHANGE UP (ref 96–108)
CO2 SERPL-SCNC: 23 MMOL/L — SIGNIFICANT CHANGE UP (ref 22–31)
CREAT SERPL-MCNC: 0.77 MG/DL — SIGNIFICANT CHANGE UP (ref 0.5–1.3)
CULTURE RESULTS: SIGNIFICANT CHANGE UP
GLUCOSE BLDC GLUCOMTR-MCNC: 133 MG/DL — HIGH (ref 70–99)
GLUCOSE BLDC GLUCOMTR-MCNC: 143 MG/DL — HIGH (ref 70–99)
GLUCOSE BLDC GLUCOMTR-MCNC: 96 MG/DL — SIGNIFICANT CHANGE UP (ref 70–99)
GLUCOSE SERPL-MCNC: 156 MG/DL — HIGH (ref 70–99)
HCT VFR BLD CALC: 36.4 % — SIGNIFICANT CHANGE UP (ref 34.5–45)
HGB BLD-MCNC: 12.4 G/DL — SIGNIFICANT CHANGE UP (ref 11.5–15.5)
MCHC RBC-ENTMCNC: 30.5 PG — SIGNIFICANT CHANGE UP (ref 27–34)
MCHC RBC-ENTMCNC: 34.1 GM/DL — SIGNIFICANT CHANGE UP (ref 32–36)
MCV RBC AUTO: 89.7 FL — SIGNIFICANT CHANGE UP (ref 80–100)
PLATELET # BLD AUTO: 112 K/UL — LOW (ref 150–400)
POTASSIUM SERPL-MCNC: 3.9 MMOL/L — SIGNIFICANT CHANGE UP (ref 3.5–5.3)
POTASSIUM SERPL-SCNC: 3.9 MMOL/L — SIGNIFICANT CHANGE UP (ref 3.5–5.3)
RBC # BLD: 4.06 M/UL — SIGNIFICANT CHANGE UP (ref 3.8–5.2)
RBC # FLD: 15.3 % — HIGH (ref 10.3–14.5)
SODIUM SERPL-SCNC: 133 MMOL/L — LOW (ref 135–145)
SPECIMEN SOURCE: SIGNIFICANT CHANGE UP
WBC # BLD: 5.82 K/UL — SIGNIFICANT CHANGE UP (ref 3.8–10.5)
WBC # FLD AUTO: 5.82 K/UL — SIGNIFICANT CHANGE UP (ref 3.8–10.5)

## 2019-09-03 RX ORDER — DOCUSATE SODIUM 100 MG
1 CAPSULE ORAL
Qty: 0 | Refills: 0 | DISCHARGE

## 2019-09-03 RX ADMIN — HEPARIN SODIUM 5000 UNIT(S): 5000 INJECTION INTRAVENOUS; SUBCUTANEOUS at 17:59

## 2019-09-03 RX ADMIN — Medication 81 MILLIGRAM(S): at 13:26

## 2019-09-03 RX ADMIN — PANTOPRAZOLE SODIUM 40 MILLIGRAM(S): 20 TABLET, DELAYED RELEASE ORAL at 05:00

## 2019-09-03 RX ADMIN — LATANOPROST 1 DROP(S): 0.05 SOLUTION/ DROPS OPHTHALMIC; TOPICAL at 21:34

## 2019-09-03 RX ADMIN — MIRTAZAPINE 7.5 MILLIGRAM(S): 45 TABLET, ORALLY DISINTEGRATING ORAL at 21:34

## 2019-09-03 RX ADMIN — HEPARIN SODIUM 5000 UNIT(S): 5000 INJECTION INTRAVENOUS; SUBCUTANEOUS at 05:00

## 2019-09-03 NOTE — DIETITIAN INITIAL EVALUATION ADULT. - ETIOLOGY
increased needs in context of chronic illness, inadequate po to meet increased needs increased demand for nutrients

## 2019-09-03 NOTE — CHART NOTE - NSCHARTNOTEFT_GEN_A_CORE
Upon Nutritional Assessment by the Registered Dietitian your patient was determined to meet criteria / has evidence of the following diagnosis/diagnoses:          [ ]  Mild Protein Calorie Malnutrition        [ ]  Moderate Protein Calorie Malnutrition        [ x] Severe Protein Calorie Malnutrition        [ ] Unspecified Protein Calorie Malnutrition        [ x] Underweight / BMI <19        [ ] Morbid Obesity / BMI > 40      Findings as based on:  [x ] Comprehensive nutrition assessment   [ ] Nutrition Focused Physical Exam  [ x] Other: Please see initial nutrition assessment.  Physical findings: Severe muscle (clavicle, prominence of acromion process) depletion, severe subcutaneous fat depletion (buccal)  Weight loss: 5.6% x 3 months per flowsheets (Apr-Jul); per dosing weight, further weight loss Jul-Aug    Nutrition Plan/Recommendations:    1) dysphagia 1 + thin liquids, defer change in texture/consistency to medical team / SLP (consider consult).   2) Recommend Glucerna x2/day (220 kcal, 10 gm protein each).  Prosource No Carb x1pkt/day (60 kcal, 15 gm protein).  d/c Suplena.    3) Assist Pt with meals.  Offer po supplements.    4) Obtain preferences, as able.  RD to add Greek yogurt.    5) Discussed increased needs, optimizing po, utilizing po supplement with Pt.  5) Consider multivitamin.      PROVIDER Section:     By signing this assessment you are acknowledging and agree with the diagnosis/diagnoses assigned by the Registered Dietitian    Comments:

## 2019-09-03 NOTE — DIETITIAN INITIAL EVALUATION ADULT. - PROBLEM SELECTOR PLAN 4
in the setting of SIERRA  - will monitor serial cardiac makers, c/o chest pain earlier in ED, but currently CP free

## 2019-09-03 NOTE — DIETITIAN INITIAL EVALUATION ADULT. - PHYSICAL APPEARANCE
underweight/other (specify) Skin per nursing documentation: abscess, lower back  Edema: none documented  ht: 59 inches, weight: 87.8 lbs, BMI: 17.8 kg/m2, IBW: 95 lbs (+/- 10%), %IBW: 92%

## 2019-09-03 NOTE — DIETITIAN INITIAL EVALUATION ADULT. - ADD RECOMMEND
1) dysphagia 1 + thin liquids, defer change in texture/consistency to medical team / SLP (consider consult). 2) Recommend Glucerna x2/day (220 kcal, 10 gm protein each).  Prosource No Carb x1pkt/day (60 kcal, 15 gm protein).  d/c Suplena.  3) Assist Pt with meals.  Offer po supplements.  4) Obtain preferences, as able.  RD to add Greek yogurt.  5) Discussed increased needs, optimizing po, utilizing po supplement with Pt.  5) Consider multivitamin.

## 2019-09-03 NOTE — DIETITIAN INITIAL EVALUATION ADULT. - OTHER INFO
Source: Patient, electronic medical record    Patient provided limited information about intake PTA - per chart, Pt with poor appetite, nausea.  Patient reports to RD she has an aide who provides "ground up food," uses pepper, but not salt.  Notes that she has not been eating well, but unable to identify quantity of time.  Patient confirms NKFA; multivitamin noted per H&P.  HgbA1c 6.9% indicating good glycemic control; denies regular intake of juice.    Patient reports usual body weight of 130 lbs "6 or 7 years ago."  Previous RD note states usual body weight of 110 lbs.   Per [facility] records: 99.2 lbs (4/11/19), 93.6 lbs (7/23/19) -- indicated 5.6% weight loss x3 months.  Current dosing weight 39.9 kg (87.8 lbs), indicates further 6% weight loss x <2 months.  Noted Pt with CHF, cirrhosis - possible that weight loss is exacerbated by fluid, no edema currently documented. Patient presents with severe muscle wasting at clavicle, acromion process (observed); severe subcutaneous buccal fat wasting, with prominence of zygomatic arch.      At time of RD visit, breakfast untouched; noted opened Glucerna at bedside (only few sips taken).  Patient amenable to drinking Glucerna - flavor preference of strawberry confirmed.  Patient denies current n/v; reports BM x2 today.  Current diet order for pureed + thin liquids; noted dysphagia 3 and pureed diet during previous admission.  Patient reports eating ground foods due to missing teeth; consider consult to speech.  Pt amenable to receiving greek yogurt.

## 2019-09-03 NOTE — DIETITIAN INITIAL EVALUATION ADULT. - SIGNS/SYMPTOMS
severe muscle and subcutaneous fat depletion, weight loss (5.6% x 4 months, ?further 6% x1 month) chronic catabolic illness (CHF, cirrhosis), noted abscess

## 2019-09-03 NOTE — PROGRESS NOTE ADULT - ASSESSMENT
_________________________________________________________________________________________  ========>>  M E D I C A L   A T T E N D I N G    F O L L O W  U P  N O T E  <<=========  -----------------------------------------------------------------------------------------------------    - Patient seen and examined by me approximately sixty minutes ago.   - In summary,  DERRICK ELIAS is a 88y year old woman admitted for dig toxicity  - Patient today overall doing ok, comfortable, eating better    ==================>> REVIEW OF SYSTEM <<=================    GEN: no fever, no chills, no pain  RESP: no SOB, no cough, no sputum  CVS: no chest pain, no palpitations, no edema  GI: no abdominal pain, no nausea, no constipation, no diarrhea  : no dysuria, no frequency, no hematuria  Neuro: no headache, no dizziness  Derm : no itching, no rash    ==================>> PHYSICAL EXAM <<=================    GEN: A&O X 2 , NAD , comfortable, MS seems improved   HEENT: NCAT, PERRL, MMM, hearing intact  Neck: supple , no JVD  CVS: S1S2 , regular , No M/R/G appreciated  PULM: CTA B/L,  no W/R/R appreciated  ABD.: soft. non tender, non distended,  bowel sounds present  Extrem: intact pulses , no edema   PSYCH : normal mood,  not anxious      ==================>> MEDICATIONS <<====================    MEDICATIONS  (STANDING):  aspirin enteric coated 81 milliGRAM(s) Oral daily  dextrose 5%. 1000 milliLiter(s) (50 mL/Hr) IV Continuous <Continuous>  dextrose 50% Injectable 12.5 Gram(s) IV Push once  dextrose 50% Injectable 25 Gram(s) IV Push once  dextrose 50% Injectable 25 Gram(s) IV Push once  heparin  Injectable 5000 Unit(s) SubCutaneous every 12 hours  influenza   Vaccine 0.5 milliLiter(s) IntraMuscular once  insulin lispro (HumaLOG) corrective regimen sliding scale   SubCutaneous three times a day before meals  latanoprost 0.005% Ophthalmic Solution 1 Drop(s) Both EYES at bedtime  mirtazapine 7.5 milliGRAM(s) Oral at bedtime  pantoprazole    Tablet 40 milliGRAM(s) Oral before breakfast    MEDICATIONS  (PRN):  dextrose 40% Gel 15 Gram(s) Oral once PRN Blood Glucose LESS THAN 70 milliGRAM(s)/deciliter  glucagon  Injectable 1 milliGRAM(s) IntraMuscular once PRN Glucose LESS THAN 70 milligrams/deciliter      ==================>> VITAL SIGNS <<==================    T(C): 36.6 (19 @ 13:06), Max: 36.8 (19 @ 18:57)  HR: 84 (19 @ 13:06) (78 - 86)  BP: 129/85 (19 @ 13:06) (101/65 - 129/85)  RR: 18 (19 @ 13:06) (17 - 18)  SpO2: 99% (19 @ 13:06) (98% - 100%)     POCT Blood Glucose.: 133 mg/dL (03 Sep 2019 12:30)  POCT Blood Glucose.: 96 mg/dL (03 Sep 2019 08:29)  POCT Blood Glucose.: 165 mg/dL (02 Sep 2019 17:53)    I&O's Summary    02 Sep 2019 07:01  -  03 Sep 2019 07:00  --------------------------------------------------------  IN: 0 mL / OUT: 125 mL / NET: -125 mL    03 Sep 2019 07:  -  03 Sep 2019 14:06  --------------------------------------------------------  IN: 80 mL / OUT: 300 mL / NET: -220 mL         ==================>> LAB AND IMAGING <<==================                        12.4   5.82  )-----------( 112      ( 03 Sep 2019 11:10 )             36.4            133<L>  |  99  |  24<H>  ----------------------------<  156<H>  3.9   |  23  |  0.77    Sodium:   133  <==, 130  <==, 128  <==, 135  <==, 131  <==, 132  <==    Ca    9.5      03 Sep 2019 09:56  Mg     2.3         TPro  5.4<L>  /  Alb  2.3<L>  /  TBili  0.8  /  DBili  x   /  AST  91<H>  /  ALT  59<H>  /  AlkPhos  198<H>      PT/INR - ( 01 Sep 2019 16:57 )   PT: 11.1 sec;   INR: 1.00 ratio    PTT - ( 01 Sep 2019 16:57 )  PTT:30.2 sec               Urinalysis Basic - ( 01 Sep 2019 19:50 )  Color: Yellow / Appearance: Clear / S.015 / pH: x  Gluc: x / Ketone: Moderate  / Bili: Negative / Urobili: Negative   Blood: x / Protein: 30 mg/dL / Nitrite: Negative   Leuk Esterase: Negative / RBC: 2-5 /HPF / WBC 0-2 /HPF   Sq Epi: x / Non Sq Epi: Few /HPF / Bacteria: Trace /HPF    TSH:      2.42   (19)       ,     3.61   (19)           HgA1C: 6.9  (19)        , 7.1  (19)        , 7.7  (19)            ___________________________________________________________________________________  ===============>>  A S S E S S M E N T   A N D   P L A N <<===============  ------------------------------------------------------------------------------------------    ** Digoxin toxicity    resolved, repeat Dig level ok    no bradycardia noted    med mgmt otherwise per cardio     monitor    ** failure to thrive, severe protein calorie malnutrition and cachexia     encourage PO intake as discussed with pt    pt already on soft / puree diet ( as doesn't have teeth)     nutritional supplements    nutrition consult requested     remeron 7.5 mg QHS    **Diabetes II, stable  ---monitor finger sticks closely  ---Continue Insulin regimen as above monitor  ---Diabetic diet  ---A1c controlled     ** pt with h/o HepC - Cirrhosis and GIB with chronic anemia     monitor H/H closely    PPI given symptoms as above    doubt need for EGD at this time     ** Chronic atrial fibrillation    currently rate controlled, off Dig    monitor     cardio mgmt appreciated    off AC due to prior GIB    Elevated troponin: unclear etiology, improved      ** SIERRA at other hospital, resolved     monitor     encourage PO hydration     -GI/DVT Prophylaxis.    dispo planing   --------------------------------------------  Case discussed with pt,   Education given on findings and plan of care.  ___________________________  Will follow with you.  Thank you,  CINDY Rivera D.O.  Pager: [phone]

## 2019-09-03 NOTE — PHARMACOTHERAPY INTERVENTION NOTE - COMMENTS
Confirmed home medications with patient's daughter and pharmacy, updated in Outpatient Medication Review. Discrepancies communicated with NP.    Serafin Oh  PGY-1 Pharmacy Resident  Pager: [phone]

## 2019-09-03 NOTE — DIETITIAN INITIAL EVALUATION ADULT. - REASON INDICATOR FOR ASSESSMENT
Per chart: 87 y/o F HCV, cirrhosis, GIB due to AVM,  anasarca/ HFpEF, HTN, IDDM2, Grave's dz, Afib not on AC, PPM, mild dementia p/w nausea, decrease PO intake, gen abd pain, back/sacral pain and AMS to OSH transferred here for SIERRA, hyperkalemia, Dig toxicity and elevated troponin.

## 2019-09-04 LAB
ANION GAP SERPL CALC-SCNC: 9 MMOL/L — SIGNIFICANT CHANGE UP (ref 5–17)
BASOPHILS # BLD AUTO: 0 K/UL — SIGNIFICANT CHANGE UP (ref 0–0.2)
BASOPHILS NFR BLD AUTO: 0.1 % — SIGNIFICANT CHANGE UP (ref 0–2)
BUN SERPL-MCNC: 18 MG/DL — SIGNIFICANT CHANGE UP (ref 7–23)
CALCIUM SERPL-MCNC: 9.4 MG/DL — SIGNIFICANT CHANGE UP (ref 8.4–10.5)
CHLORIDE SERPL-SCNC: 100 MMOL/L — SIGNIFICANT CHANGE UP (ref 96–108)
CO2 SERPL-SCNC: 25 MMOL/L — SIGNIFICANT CHANGE UP (ref 22–31)
CREAT SERPL-MCNC: 0.69 MG/DL — SIGNIFICANT CHANGE UP (ref 0.5–1.3)
EOSINOPHIL # BLD AUTO: 0.1 K/UL — SIGNIFICANT CHANGE UP (ref 0–0.5)
EOSINOPHIL NFR BLD AUTO: 0.9 % — SIGNIFICANT CHANGE UP (ref 0–6)
GLUCOSE BLDC GLUCOMTR-MCNC: 117 MG/DL — HIGH (ref 70–99)
GLUCOSE BLDC GLUCOMTR-MCNC: 141 MG/DL — HIGH (ref 70–99)
GLUCOSE BLDC GLUCOMTR-MCNC: 146 MG/DL — HIGH (ref 70–99)
GLUCOSE SERPL-MCNC: 162 MG/DL — HIGH (ref 70–99)
HCT VFR BLD CALC: 36.6 % — SIGNIFICANT CHANGE UP (ref 34.5–45)
HGB BLD-MCNC: 12.3 G/DL — SIGNIFICANT CHANGE UP (ref 11.5–15.5)
INR BLD: 1.01 RATIO — SIGNIFICANT CHANGE UP (ref 0.88–1.16)
LYMPHOCYTES # BLD AUTO: 2 K/UL — SIGNIFICANT CHANGE UP (ref 1–3.3)
LYMPHOCYTES # BLD AUTO: 28.8 % — SIGNIFICANT CHANGE UP (ref 13–44)
MAGNESIUM SERPL-MCNC: 2 MG/DL — SIGNIFICANT CHANGE UP (ref 1.6–2.6)
MCHC RBC-ENTMCNC: 31.8 PG — SIGNIFICANT CHANGE UP (ref 27–34)
MCHC RBC-ENTMCNC: 33.6 GM/DL — SIGNIFICANT CHANGE UP (ref 32–36)
MCV RBC AUTO: 94.6 FL — SIGNIFICANT CHANGE UP (ref 80–100)
MONOCYTES # BLD AUTO: 0.5 K/UL — SIGNIFICANT CHANGE UP (ref 0–0.9)
MONOCYTES NFR BLD AUTO: 6.6 % — SIGNIFICANT CHANGE UP (ref 2–14)
NEUTROPHILS # BLD AUTO: 4.5 K/UL — SIGNIFICANT CHANGE UP (ref 1.8–7.4)
NEUTROPHILS NFR BLD AUTO: 63.6 % — SIGNIFICANT CHANGE UP (ref 43–77)
PHOSPHATE SERPL-MCNC: 1.6 MG/DL — LOW (ref 2.5–4.5)
PLATELET # BLD AUTO: 94 K/UL — LOW (ref 150–400)
POTASSIUM SERPL-MCNC: 3.8 MMOL/L — SIGNIFICANT CHANGE UP (ref 3.5–5.3)
POTASSIUM SERPL-SCNC: 3.8 MMOL/L — SIGNIFICANT CHANGE UP (ref 3.5–5.3)
PROTHROM AB SERPL-ACNC: 11.6 SEC — SIGNIFICANT CHANGE UP (ref 10–12.9)
RBC # BLD: 3.87 M/UL — SIGNIFICANT CHANGE UP (ref 3.8–5.2)
RBC # FLD: 14.7 % — HIGH (ref 10.3–14.5)
SODIUM SERPL-SCNC: 134 MMOL/L — LOW (ref 135–145)
WBC # BLD: 7 K/UL — SIGNIFICANT CHANGE UP (ref 3.8–10.5)
WBC # FLD AUTO: 7 K/UL — SIGNIFICANT CHANGE UP (ref 3.8–10.5)

## 2019-09-04 PROCEDURE — 76705 ECHO EXAM OF ABDOMEN: CPT | Mod: 26

## 2019-09-04 RX ORDER — POTASSIUM PHOSPHATE, MONOBASIC POTASSIUM PHOSPHATE, DIBASIC 236; 224 MG/ML; MG/ML
15 INJECTION, SOLUTION INTRAVENOUS EVERY 6 HOURS
Refills: 0 | Status: COMPLETED | OUTPATIENT
Start: 2019-09-04 | End: 2019-09-05

## 2019-09-04 RX ADMIN — POTASSIUM PHOSPHATE, MONOBASIC POTASSIUM PHOSPHATE, DIBASIC 62.5 MILLIMOLE(S): 236; 224 INJECTION, SOLUTION INTRAVENOUS at 21:00

## 2019-09-04 RX ADMIN — MIRTAZAPINE 7.5 MILLIGRAM(S): 45 TABLET, ORALLY DISINTEGRATING ORAL at 22:59

## 2019-09-04 RX ADMIN — HEPARIN SODIUM 5000 UNIT(S): 5000 INJECTION INTRAVENOUS; SUBCUTANEOUS at 05:51

## 2019-09-04 RX ADMIN — LATANOPROST 1 DROP(S): 0.05 SOLUTION/ DROPS OPHTHALMIC; TOPICAL at 22:26

## 2019-09-04 RX ADMIN — HEPARIN SODIUM 5000 UNIT(S): 5000 INJECTION INTRAVENOUS; SUBCUTANEOUS at 17:54

## 2019-09-04 RX ADMIN — PANTOPRAZOLE SODIUM 40 MILLIGRAM(S): 20 TABLET, DELAYED RELEASE ORAL at 05:51

## 2019-09-04 RX ADMIN — Medication 81 MILLIGRAM(S): at 17:54

## 2019-09-04 NOTE — PROGRESS NOTE ADULT - ASSESSMENT
_________________________________________________________________________________________  ========>>  M E D I C A L   A T T E N D I N G    F O L L O W  U P  N O T E  <<=========  -----------------------------------------------------------------------------------------------------    - Patient seen and examined by me approximately sixty minutes ago.   - In summary,  DERRICK ELIAS is a 88y year old woman admitted for dig toxicity  - Patient today overall doing ok, comfortable, eating ok      reportedly pt has had several episodes of diarrhea since yesterday         also a " lump" had been found on pt's lower back> as bellow     ==================>> REVIEW OF SYSTEM <<=================    GEN: no fever, no chills, + mild lower back pain  RESP: no SOB, no cough, no sputum  CVS: no chest pain, no palpitations, no edema  GI: no abdominal pain, no nausea, diarrhea as above   : no dysuria  Neuro: no headache, no dizziness  Derm : no itching, no rash    ==================>> PHYSICAL EXAM <<=================    GEN: A&O X 2 , NAD , comfortable, MS seems improved , cachectic   HEENT: NCAT, PERRL, MMM, hearing intact  Neck: supple , no JVD  CVS: S1S2 , regular , No M/R/G appreciated  PULM: CTA B/L,  no W/R/R appreciated  ABD.: soft. non tender, non distended,  bowel sounds present  Extrem: intact pulses , no edema       + lower back ( just to left of spine) tender ~3" erythematous and tender fluctuant mass / lesion seen   PSYCH : normal mood,  not anxious        ==================>> MEDICATIONS <<====================    aspirin enteric coated 81 milliGRAM(s) Oral daily  dextrose 5%. 1000 milliLiter(s) IV Continuous <Continuous>  dextrose 50% Injectable 12.5 Gram(s) IV Push once  dextrose 50% Injectable 25 Gram(s) IV Push once  dextrose 50% Injectable 25 Gram(s) IV Push once  heparin  Injectable 5000 Unit(s) SubCutaneous every 12 hours  influenza   Vaccine 0.5 milliLiter(s) IntraMuscular once  insulin lispro (HumaLOG) corrective regimen sliding scale   SubCutaneous three times a day before meals  latanoprost 0.005% Ophthalmic Solution 1 Drop(s) Both EYES at bedtime  mirtazapine 7.5 milliGRAM(s) Oral at bedtime  pantoprazole    Tablet 40 milliGRAM(s) Oral before breakfast    MEDICATIONS  (PRN):  dextrose 40% Gel 15 Gram(s) Oral once PRN Blood Glucose LESS THAN 70 milliGRAM(s)/deciliter  glucagon  Injectable 1 milliGRAM(s) IntraMuscular once PRN Glucose LESS THAN 70 milligrams/deciliter    ==================>> VITAL SIGNS <<==================    Vital Signs Last 24 Hrs  T(C): 36.5 (09-04-19 @ 13:30)  T(F): 97.7 (09-04-19 @ 13:30), Max: 98.2 (09-04-19 @ 04:56)  HR: 84 (09-04-19 @ 13:30) (83 - 85)  BP: 149/83 (09-04-19 @ 13:30)  RR: 18 (09-04-19 @ 13:30) (18 - 18)  SpO2: 97% (09-04-19 @ 13:30) (97% - 100%)      POCT Blood Glucose.: 117 mg/dL (04 Sep 2019 11:59)  POCT Blood Glucose.: 141 mg/dL (04 Sep 2019 08:47)  POCT Blood Glucose.: 143 mg/dL (03 Sep 2019 17:33)     ==================>> LAB AND IMAGING <<==================                        12.4   5.82  )-----------( 112      ( 03 Sep 2019 11:10 )             36.4        133<L>  |  99  |  24<H>  ----------------------------<  156<H>  3.9   |  23  |  0.77    Ca    9.5      03 Sep 2019 09:56    WBC count:   5.82 <<== ,  5.46 <<== ,  9.0 <<== ,  7.59 <<==   Hemoglobin:   12.4 <<==,  9.8 <<==,  13.6 <<==,  14.0 <<==  platelets:  112 <==, 95 <==, 141 <==, 126 <==    Creatinine:  0.77  <<==, 0.95  <<==, 0.93  <<==, 0.80  <<==, 1.01  <<==, 1.17  <<==  Sodium:   133  <==, 130  <==, 128  <==, 135  <==, 131  <==, 132  <==       AST:          91 <== , 116 <== , 90 <==      ALT:        59  <== , 77  <== , 76  <==      AP:        198  <=, 248  <=, 191  <=     Bili:        0.8  <=, 1.0  <=, 0.6  <=    < from: US Abdomen Limited (09.04.19 @ 11:20) >  IMPRESSION:   Well-circumscribed heterogeneous hypoechoic subcutaneous collection, with   minimal surrounding vascularity located in the soft tissues of the lower   left back. This correlates to a smaller soft tissue nodule seen on CT   7/15/2019, and likely represents an infected sebaceous cyst.  < end of copied text >      ___________________________________________________________________________________  ===============>>  A S S E S S M E N T   A N D   P L A N <<===============  ------------------------------------------------------------------------------------------    **? mass / collection of lower back: likely infected sebaceous cyst/ abscess      will need evaluation by IR / Sx for I & D     local care, pain mgmt, doubt need for abx at this time     send culture from liquid    ** reports of diarrhea      monitor vitals, labs / electrolytes  closely     if continues : check Cdiff    ** Digoxin toxicity    resolved, repeat Dig level ok    no bradycardia noted    med mgmt otherwise per cardio     monitor    ** failure to thrive, severe protein calorie malnutrition and cachexia     encourage PO intake as discussed with pt    pt already on soft / puree diet ( as doesn't have teeth)     nutritional supplements    nutrition     remeron     **Diabetes II, stable  ---monitor finger sticks closely  ---Continue Insulin regimen as above monitor  ---Diabetic diet  ---A1c controlled     ** pt with h/o HepC - Cirrhosis and GIB with chronic anemia     monitor H/H closely    PPI     ** Chronic atrial fibrillation    currently rate controlled, off Dig    monitor     cardio mgmt appreciated    off AC due to prior GIB    Elevated troponin: unclear etiology, improved      ** SIERRA at other hospital, resolved     monitor     encourage PO hydration     -GI/DVT Prophylaxis.    --------------------------------------------  Case discussed with pt, NP  Education given on findings and plan of care.  ___________________________  Will follow with you.  Thank you,  CINDY Rivera D.O.  Pager: [phone]

## 2019-09-05 LAB
ANION GAP SERPL CALC-SCNC: 10 MMOL/L — SIGNIFICANT CHANGE UP (ref 5–17)
BUN SERPL-MCNC: 18 MG/DL — SIGNIFICANT CHANGE UP (ref 7–23)
CALCIUM SERPL-MCNC: 8.9 MG/DL — SIGNIFICANT CHANGE UP (ref 8.4–10.5)
CHLORIDE SERPL-SCNC: 104 MMOL/L — SIGNIFICANT CHANGE UP (ref 96–108)
CO2 SERPL-SCNC: 25 MMOL/L — SIGNIFICANT CHANGE UP (ref 22–31)
CREAT SERPL-MCNC: 0.72 MG/DL — SIGNIFICANT CHANGE UP (ref 0.5–1.3)
GLUCOSE BLDC GLUCOMTR-MCNC: 124 MG/DL — HIGH (ref 70–99)
GLUCOSE BLDC GLUCOMTR-MCNC: 130 MG/DL — HIGH (ref 70–99)
GLUCOSE BLDC GLUCOMTR-MCNC: 192 MG/DL — HIGH (ref 70–99)
GLUCOSE BLDC GLUCOMTR-MCNC: 228 MG/DL — HIGH (ref 70–99)
GLUCOSE SERPL-MCNC: 151 MG/DL — HIGH (ref 70–99)
HCT VFR BLD CALC: 34.7 % — SIGNIFICANT CHANGE UP (ref 34.5–45)
HGB BLD-MCNC: 11.5 G/DL — SIGNIFICANT CHANGE UP (ref 11.5–15.5)
MAGNESIUM SERPL-MCNC: 2.1 MG/DL — SIGNIFICANT CHANGE UP (ref 1.6–2.6)
MCHC RBC-ENTMCNC: 30.6 PG — SIGNIFICANT CHANGE UP (ref 27–34)
MCHC RBC-ENTMCNC: 33.1 GM/DL — SIGNIFICANT CHANGE UP (ref 32–36)
MCV RBC AUTO: 92.3 FL — SIGNIFICANT CHANGE UP (ref 80–100)
PHOSPHATE SERPL-MCNC: 4 MG/DL — SIGNIFICANT CHANGE UP (ref 2.5–4.5)
PLATELET # BLD AUTO: 85 K/UL — LOW (ref 150–400)
POTASSIUM SERPL-MCNC: 4.8 MMOL/L — SIGNIFICANT CHANGE UP (ref 3.5–5.3)
POTASSIUM SERPL-SCNC: 4.8 MMOL/L — SIGNIFICANT CHANGE UP (ref 3.5–5.3)
RBC # BLD: 3.76 M/UL — LOW (ref 3.8–5.2)
RBC # FLD: 15.7 % — HIGH (ref 10.3–14.5)
SODIUM SERPL-SCNC: 139 MMOL/L — SIGNIFICANT CHANGE UP (ref 135–145)
WBC # BLD: 6.02 K/UL — SIGNIFICANT CHANGE UP (ref 3.8–10.5)
WBC # FLD AUTO: 6.02 K/UL — SIGNIFICANT CHANGE UP (ref 3.8–10.5)

## 2019-09-05 PROCEDURE — 10005 FNA BX W/US GDN 1ST LES: CPT

## 2019-09-05 RX ORDER — ACETAMINOPHEN 500 MG
650 TABLET ORAL EVERY 6 HOURS
Refills: 0 | Status: DISCONTINUED | OUTPATIENT
Start: 2019-09-05 | End: 2019-09-09

## 2019-09-05 RX ORDER — ACETAMINOPHEN 500 MG
650 TABLET ORAL ONCE
Refills: 0 | Status: COMPLETED | OUTPATIENT
Start: 2019-09-05 | End: 2019-09-05

## 2019-09-05 RX ADMIN — Medication 650 MILLIGRAM(S): at 06:34

## 2019-09-05 RX ADMIN — HEPARIN SODIUM 5000 UNIT(S): 5000 INJECTION INTRAVENOUS; SUBCUTANEOUS at 17:33

## 2019-09-05 RX ADMIN — Medication 650 MILLIGRAM(S): at 05:48

## 2019-09-05 RX ADMIN — Medication 81 MILLIGRAM(S): at 13:28

## 2019-09-05 RX ADMIN — PANTOPRAZOLE SODIUM 40 MILLIGRAM(S): 20 TABLET, DELAYED RELEASE ORAL at 05:49

## 2019-09-05 RX ADMIN — Medication 1: at 13:29

## 2019-09-05 RX ADMIN — POTASSIUM PHOSPHATE, MONOBASIC POTASSIUM PHOSPHATE, DIBASIC 62.5 MILLIMOLE(S): 236; 224 INJECTION, SOLUTION INTRAVENOUS at 02:00

## 2019-09-05 RX ADMIN — Medication 2: at 17:33

## 2019-09-05 RX ADMIN — HEPARIN SODIUM 5000 UNIT(S): 5000 INJECTION INTRAVENOUS; SUBCUTANEOUS at 05:49

## 2019-09-05 RX ADMIN — MIRTAZAPINE 7.5 MILLIGRAM(S): 45 TABLET, ORALLY DISINTEGRATING ORAL at 21:29

## 2019-09-05 RX ADMIN — LATANOPROST 1 DROP(S): 0.05 SOLUTION/ DROPS OPHTHALMIC; TOPICAL at 21:29

## 2019-09-05 NOTE — CHART NOTE - NSCHARTNOTEFT_GEN_A_CORE
Interventional Radiology Brief- Operative Note    Procedure: Ultrasound guided fine needle aspiration of left lower back subcutaneous collection.    Operators: Raquel Padilla    Anesthesia (type): Local 2% lidocaine    Contrast: None.    EBL: None.    Findings/Follow up Plan of Care: Successful ultrasound guided fine needle aspiration of left lower back subcutaneous collection.     Specimens Removed: 1cc aspirated and additional 2cc expressed of milky/cloudy white fluid.    Implants: None.    Complications: None.    Condition/Disposition: Stable / Back to floors.    Please call Interventional Radiology x [phone] with any questions, concerns, or issues.

## 2019-09-05 NOTE — PROGRESS NOTE ADULT - ASSESSMENT
_________________________________________________________________________________________  ========>>  M E D I C A L   A T T E N D I N G    F O L L O W  U P  N O T E  <<=========  -----------------------------------------------------------------------------------------------------    - Patient seen and examined by me approximately sixty minutes ago.   - In summary,  DERRICK ELIAS is a 88y year old woman admitted for dig toxicity  - Patient today overall doing ok, comfortable, eating ok      diarrhea improved er RN       pt post IR drainage of lower back collection     ==================>> REVIEW OF SYSTEM <<=================    GEN: no fever, no chills, + mild lower back pain  RESP: no SOB, no cough, no sputum  CVS: no chest pain, no palpitations, no edema  GI: no abdominal pain, no nausea, diarrhea as above   : no dysuria  Neuro: no headache, no dizziness  Derm : no itching, no rash    ==================>> PHYSICAL EXAM <<=================    GEN: A&O X 2 , NAD , comfortable, , cachectic, in chair   HEENT: NCAT, PERRL, MMM, hearing intact  Neck: supple , no JVD  CVS: S1S2 , regular , No M/R/G appreciated  PULM: CTA B/L,  no W/R/R appreciated  ABD.: soft. non tender, non distended,  bowel sounds present  Extrem: intact pulses , no edema       + lower back wound / lesion dressed   PSYCH : normal mood,  not anxious        ==================>> MEDICATIONS <<====================    aspirin enteric coated 81 milliGRAM(s) Oral daily  dextrose 5%. 1000 milliLiter(s) IV Continuous <Continuous>  dextrose 50% Injectable 12.5 Gram(s) IV Push once  dextrose 50% Injectable 25 Gram(s) IV Push once  dextrose 50% Injectable 25 Gram(s) IV Push once  heparin  Injectable 5000 Unit(s) SubCutaneous every 12 hours  influenza   Vaccine 0.5 milliLiter(s) IntraMuscular once  insulin lispro (HumaLOG) corrective regimen sliding scale   SubCutaneous three times a day before meals  latanoprost 0.005% Ophthalmic Solution 1 Drop(s) Both EYES at bedtime  mirtazapine 7.5 milliGRAM(s) Oral at bedtime  pantoprazole    Tablet 40 milliGRAM(s) Oral before breakfast    MEDICATIONS  (PRN):  acetaminophen   Tablet .. 650 milliGRAM(s) Oral every 6 hours PRN Mild Pain (1 - 3)  dextrose 40% Gel 15 Gram(s) Oral once PRN Blood Glucose LESS THAN 70 milliGRAM(s)/deciliter  glucagon  Injectable 1 milliGRAM(s) IntraMuscular once PRN Glucose LESS THAN 70 milligrams/deciliter    ==================>> VITAL SIGNS <<==================    Vital Signs Last 24 Hrs  T(C): 36.3 (09-05-19 @ 15:03)  T(F): 97.4 (09-05-19 @ 15:03), Max: 98.4 (09-05-19 @ 04:54)  HR: 68 (09-05-19 @ 15:03) (68 - 89)  BP: 113/74 (09-05-19 @ 15:03)  RR: 18 (09-05-19 @ 15:03) (18 - 18)  SpO2: 100% (09-05-19 @ 15:03) (98% - 100%)      POCT Blood Glucose.: 192 mg/dL (05 Sep 2019 13:22)  POCT Blood Glucose.: 130 mg/dL (05 Sep 2019 08:46)     ==================>> LAB AND IMAGING <<==================                        11.5   6.02  )-----------( 85       ( 05 Sep 2019 08:20 )             34.7        139  |  104  |  18  ----------------------------<  151<H>  4.8   |  25  |  0.72    Ca    8.9      05 Sep 2019 06:35  Phos  4.0     09-05  Mg     2.1     09-05    WBC count:   6.02 <<== ,  7.0 <<== ,  5.82 <<== ,  5.46 <<== ,  9.0 <<== ,  7.59 <<==   Hemoglobin:   11.5 <<==,  12.3 <<==,  12.4 <<==,  9.8 <<==,  13.6 <<==,  14.0 <<==  platelets:  85 <==, 94 <==, 112 <==, 95 <==, 141 <==, 126 <==    Creatinine:  0.72  <<==, 0.69  <<==, 0.77  <<==, 0.95  <<==, 0.93  <<==, 0.80  <<==  Sodium:   139  <==, 134  <==, 133  <==, 130  <==, 128  <==, 135  <==, 131  <==       AST:          91 <== , 116 <== , 90 <==      ALT:        59  <== , 77  <== , 76  <==      AP:        198  <=, 248  <=, 191  <=     Bili:        0.8  <=, 1.0  <=, 0.6  <=    pending results from drained fluid    < from: US Abdomen Limited (09.04.19 @ 11:20) >  IMPRESSION:   Well-circumscribed heterogeneous hypoechoic subcutaneous collection, with   minimal surrounding vascularity located in the soft tissues of the lower   left back. This correlates to a smaller soft tissue nodule seen on CT   7/15/2019, and likely represents an infected sebaceous cyst.  < end of copied text >      ___________________________________________________________________________________  ===============>>  A S S E S S M E N T   A N D   P L A N <<===============  ------------------------------------------------------------------------------------------    ** collection of lower back: likely infected sebaceous cyst       rule out infection / abscess      IR appreciated: post gainage      local care, pain mgmt,        decision on abx pending above     ** reports of diarrhea      monitor vitals, labs / electrolytes  closely     if continues : check Cdiff    ** Digoxin toxicity    resolved, repeat Dig level ok    no bradycardia noted    med mgmt otherwise per cardio     monitor    ** failure to thrive, severe protein calorie malnutrition and cachexia     encourage PO intake as discussed with pt    pt already on soft / puree diet ( as doesn't have teeth)     nutritional supplements    nutrition f/u and mgmt     remeron     noted s/s eval>> for MBS     **Diabetes II, stable  ---monitor finger sticks closely  ---Continue Insulin regimen as above monitor  ---Diabetic diet  ---A1c controlled     ** pt with h/o HepC - Cirrhosis and GIB with chronic anemia     monitor H/H closely    PPI     ** Chronic atrial fibrillation    currently rate controlled, off Dig    monitor     cardio mgmt appreciated    off AC due to prior GIB    Elevated troponin: unclear etiology, improved    -GI/DVT Prophylaxis.    --------------------------------------------  Case discussed with pt, NP, RN  Education given on findings and plan of care.  ___________________________  Will follow with you.  Thank you,  CINDY Rivera D.O.  Pager: [phone]

## 2019-09-05 NOTE — SWALLOW BEDSIDE ASSESSMENT ADULT - PHARYNGEAL PHASE
Multiple swallows Delayed pharyngeal swallow/Multiple swallows Delayed pharyngeal swallow/Wet vocal quality post oral intake/Throat clear post oral intake/Multiple swallows

## 2019-09-05 NOTE — SWALLOW BEDSIDE ASSESSMENT ADULT - SLP GENERAL OBSERVATIONS
Pt seated in chair. Daughter-in-law present. Pt reports food/liquids getting stuck in throat, pointing to pharyngeal and upper esophageal area. Reduced appetite. Daughter in law endorses that recently patient often coughs on liquids. Reports patient treated for pneumonia during hospitalization in June 2019 and Feb 2019.

## 2019-09-05 NOTE — SWALLOW BEDSIDE ASSESSMENT ADULT - ASR SWALLOW ASPIRATION MONITOR
change of breathing pattern/gurgly voice/pneumonia/cough/upper respiratory infection/fever/throat clearing

## 2019-09-05 NOTE — CHART NOTE - NSCHARTNOTEFT_GEN_A_CORE
Interventional Radiology Pre-Procedure Note    This is a 88y Female admitted for dig toxicity and found to have mass / collection of lower back referred for fine needle aspiration and culture.      Procedure:    Diagnosis/Indication: Left lower back collection / Fine needle aspiration of left lower back collection with culture.    PAST MEDICAL & SURGICAL HISTORY:  Multiple falls  GIB (gastrointestinal bleeding): 2/2019 requiring 2 PRBCs  Gastric AVM: EGD 2/2019 - cauterized  PNA (pneumonia): 2/2019   treated with ABX while in [facility]  T2DM (type 2 diabetes mellitus): last A1c 7.8   4/11/19  Hepatitis C virus: not treated  A-fib: no Eliquis since 1/2019  Constipation  Glaucoma  Vertigo  Graves disease: no treatment as per patient and family  Pacemaker: medtronic ADDRL1  2011  HTN (hypertension)  S/P cardiac pacemaker procedure: Medtronic ADDRL1  2011  S/P cholecystectomy  S/P partial resection of colon: &gt; 5 yrs ago  H/O oophorectomy       Allergies: No Known Allergies      LABS:  CBC Full  -  ( 05 Sep 2019 08:20 )  WBC Count : 6.02 K/uL  RBC Count : 3.76 M/uL  Hemoglobin : 11.5 g/dL  Hematocrit : 34.7 %  Platelet Count - Automated : 85 K/uL  Mean Cell Volume : 92.3 fl  Mean Cell Hemoglobin : 30.6 pg  Mean Cell Hemoglobin Concentration : 33.1 gm/dL  Auto Neutrophil # : x  Auto Lymphocyte # : x  Auto Monocyte # : x  Auto Eosinophil # : x  Auto Basophil # : x  Auto Neutrophil % : x  Auto Lymphocyte % : x  Auto Monocyte % : x  Auto Eosinophil % : x  Auto Basophil % : x    09-05    139  |  104  |  18  ----------------------------<  151<H>  4.8   |  25  |  0.72    Ca    8.9      05 Sep 2019 06:35  Phos  4.0     09-05  Mg     2.1     09-05      PT/INR - ( 04 Sep 2019 18:06 )   PT: 11.6 sec;   INR: 1.01 ratio         Plan:    Ultrasound guided fine needle aspiration of left lower back collection. Procedure/ risks/ benefits/ alternatives were explained, informed consent obtained from HCP daughter-in-law Yanan Irby who verbalizes understanding.

## 2019-09-05 NOTE — CHART NOTE - NSCHARTNOTEFT_GEN_A_CORE
Nutrition Follow Up Note  Patient seen for malnutrition follow up.  Chart reviewed.     Per chart: 89 y/o F HCV, cirrhosis, GIB due to AVM,  anasarca/ HFpEF, HTN, IDDM2, Grave's dz, Afib not on AC, PPM, mild dementia p/w nausea, decrease PO intake, gen abd pain, back/sacral pain and AMS to OSH transferred here for SIERRA.    Source: Patient, RN, electronic medical record    Diet: consistent carbohydrate, dysphagia 1 with thin liquids, Glucerna x1/day, Suplena x1/day, Prosource NoCarb x1/day    Patient sleepy during RD visit; visited this morning, and had not yet eaten breakfast, but drank some Suplena.  Pt visited again during lunch - reportedly finished Suplena and Prosource.  No lunch consumed yet, resting. Intake variable per flowsheets 0-75%.  Remeron noted. Patient denies n/v; BM x2 today.  Per RN/NP, Pt pending swallow evaluation.  Defer changes in diet texture to SLP.      Daily Weight in k.3 (), Weight in k.9 ()  ?6.9 kg loss - bed weights.  Continue to trend.      Pertinent Medications: MEDICATIONS  (STANDING):  aspirin enteric coated 81 milliGRAM(s) Oral daily  dextrose 5%. 1000 milliLiter(s) (50 mL/Hr) IV Continuous <Continuous>  dextrose 50% Injectable 12.5 Gram(s) IV Push once  dextrose 50% Injectable 25 Gram(s) IV Push once  dextrose 50% Injectable 25 Gram(s) IV Push once  heparin  Injectable 5000 Unit(s) SubCutaneous every 12 hours  influenza   Vaccine 0.5 milliLiter(s) IntraMuscular once  insulin lispro (HumaLOG) corrective regimen sliding scale   SubCutaneous three times a day before meals  latanoprost 0.005% Ophthalmic Solution 1 Drop(s) Both EYES at bedtime  mirtazapine 7.5 milliGRAM(s) Oral at bedtime  pantoprazole    Tablet 40 milliGRAM(s) Oral before breakfast    MEDICATIONS  (PRN):  acetaminophen   Tablet .. 650 milliGRAM(s) Oral every 6 hours PRN Mild Pain (1 - 3)  dextrose 40% Gel 15 Gram(s) Oral once PRN Blood Glucose LESS THAN 70 milliGRAM(s)/deciliter  glucagon  Injectable 1 milliGRAM(s) IntraMuscular once PRN Glucose LESS THAN 70 milligrams/deciliter    Pertinent Labs:  @ 06:35: Na 139, BUN 18, Cr 0.72, <H>, K+ 4.8, Phos 4.0, Mg 2.1, Alk Phos --, ALT/SGPT --, AST/SGOT --, HbA1c --   @ 18:06: Na 134<L>, BUN 18, Cr 0.69, <H>, K+ 3.8, Phos 1.6<L>, Mg 2.0, Alk Phos --, ALT/SGPT --, AST/SGOT --, HbA1c --    Finger Sticks:  POCT Blood Glucose.: 192 mg/dL ( @ 13:22)  POCT Blood Glucose.: 130 mg/dL ( @ 08:46)  POCT Blood Glucose.: 146 mg/dL ( @ 17:26)      Skin per nursing documentation: abscess lower back  Edema: none documented    Estimated Needs:   dosing 39.9 kg  Estimated Energy needs (30-35 kcal/kg): 3094-2146 kcal/d  Estimated Protein needs (1.4-1.6 gm/kg); 55.9-63.8 gm/d    Previous Nutrition Diagnosis: malnutrition / chronic, severe  Nutrition Diagnosis is: active - addressing with po supplements, pending SLP eval    New Nutrition Diagnosis: n/a     Interventions:     Recommend  1) Low sodium, Consistent carbohydrate diet  2) dysphagia 1 + thin liquids, defer change in texture/consistency pending SLP recommendations.   3) Recommend Glucerna x2/day (220 kcal, 10 gm protein each).  Prosource No Carb x1pkt/day (60 kcal, 15 gm protein).  d/c Suplena.    4) Continue to offer Pt po supplements between meals.  5) Obtain preferences, as able.  RD to add Greek yogurt.    6) Consider multivitamin.    Monitoring and Evaluation:     Continue to monitor Nutritional intake, Tolerance to diet prescription, weights, labs, skin integrity    RD remains available upon request and will follow up per protocol    Rachel Guzman MS, RD, CDN  Pager# [phone] Nutrition Follow Up Note  Patient seen for malnutrition follow up.  Chart reviewed.     Per chart: 89 y/o F HCV, cirrhosis, GIB due to AVM,  anasarca/ HFpEF, HTN, IDDM2, Grave's dz, Afib not on AC, PPM, mild dementia p/w nausea, decrease PO intake, gen abd pain, back/sacral pain and AMS to OSH transferred here for SIERRA.    Source: Patient, RN, electronic medical record    Diet: consistent carbohydrate, dysphagia 1 with thin liquids, Glucerna x1/day, Suplena x1/day, Prosource NoCarb x1/day    Patient sleepy during RD visit; visited this morning, and had not yet eaten breakfast, but drank some Suplena.  Pt visited again during lunch - reportedly finished Suplena and Prosource.  No lunch consumed yet, resting; unable to recall if she has received yogurt. Intake variable per flowsheets 0-75%.  Remeron noted. Patient denies n/v; BM x2 today - reportedly loose this morning, but no BMs since. Continue to monitor. Per RN/NP, Pt pending swallow evaluation.  Defer changes in diet texture to SLP.      Daily Weight in k.3 (), Weight in k.9 ()  ?6.9 kg loss - bed weights.  Continue to trend.      Pertinent Medications: MEDICATIONS  (STANDING):  aspirin enteric coated 81 milliGRAM(s) Oral daily  dextrose 5%. 1000 milliLiter(s) (50 mL/Hr) IV Continuous <Continuous>  dextrose 50% Injectable 12.5 Gram(s) IV Push once  dextrose 50% Injectable 25 Gram(s) IV Push once  dextrose 50% Injectable 25 Gram(s) IV Push once  heparin  Injectable 5000 Unit(s) SubCutaneous every 12 hours  influenza   Vaccine 0.5 milliLiter(s) IntraMuscular once  insulin lispro (HumaLOG) corrective regimen sliding scale   SubCutaneous three times a day before meals  latanoprost 0.005% Ophthalmic Solution 1 Drop(s) Both EYES at bedtime  mirtazapine 7.5 milliGRAM(s) Oral at bedtime  pantoprazole    Tablet 40 milliGRAM(s) Oral before breakfast    MEDICATIONS  (PRN):  acetaminophen   Tablet .. 650 milliGRAM(s) Oral every 6 hours PRN Mild Pain (1 - 3)  dextrose 40% Gel 15 Gram(s) Oral once PRN Blood Glucose LESS THAN 70 milliGRAM(s)/deciliter  glucagon  Injectable 1 milliGRAM(s) IntraMuscular once PRN Glucose LESS THAN 70 milligrams/deciliter    Pertinent Labs:  @ 06:35: Na 139, BUN 18, Cr 0.72, <H>, K+ 4.8, Phos 4.0, Mg 2.1, Alk Phos --, ALT/SGPT --, AST/SGOT --, HbA1c --   @ 18:06: Na 134<L>, BUN 18, Cr 0.69, <H>, K+ 3.8, Phos 1.6<L>, Mg 2.0, Alk Phos --, ALT/SGPT --, AST/SGOT --, HbA1c --    Finger Sticks:  POCT Blood Glucose.: 192 mg/dL ( @ 13:22)  POCT Blood Glucose.: 130 mg/dL ( @ 08:46)  POCT Blood Glucose.: 146 mg/dL ( @ 17:26)      Skin per nursing documentation: abscess lower back  Edema: none documented    Estimated Needs:   dosing 39.9 kg  Estimated Energy needs (30-35 kcal/kg): 5024-5954 kcal/d  Estimated Protein needs (1.4-1.6 gm/kg); 55.9-63.8 gm/d    Previous Nutrition Diagnosis: malnutrition / chronic, severe  Nutrition Diagnosis is: active - addressing with po supplements, pending SLP eval    New Nutrition Diagnosis: n/a     Interventions:     Recommend  1) Low sodium, Consistent carbohydrate diet  2) dysphagia 1 + thin liquids, defer change in texture/consistency pending SLP recommendations.   3) Recommend Glucerna x2/day (220 kcal, 10 gm protein each).  Prosource No Carb x1pkt/day (60 kcal, 15 gm protein).  d/c Suplena.    4) Continue to offer Pt po supplements between meals.  5) Obtain preferences, as able.  RD to add Greek yogurt.    6) Consider multivitamin.    Monitoring and Evaluation:     Continue to monitor Nutritional intake, Tolerance to diet prescription, weights, labs, skin integrity    RD remains available upon request and will follow up per protocol    Rachel Guzman MS, RD, CDN  Pager# [phone]

## 2019-09-05 NOTE — SWALLOW BEDSIDE ASSESSMENT ADULT - SLP PERTINENT HISTORY OF CURRENT PROBLEM
89 y/o F HCV, cirrhosis, GIB due to AVM,  anasarca/ HFpEF, HTN, IDDM2, Grave's dz, Afib not on AC, PPM, mild dementia, transferred here from [facility]/OS with concern for Dig toxicity and elevated Troponin in the setting of SIERRA.  Pt presented earlier today to [facility] with c/o gen abdominal pain, nausea, decreased PO intake, back/sacral pain, and altered mental status for past few days per documentation.  Lives alone with 24 hr HHA . Dependent on ADL but more interactive with family until past few days when she became more confused and less interactive with decrease PO intake per documentation.  Pt was evaluated at OSH, found to have elevated Troponin I in the setting of SIERRA with hyperkalemia and Digoxin level >5.  She underwent CT A/P w/o contrast.  Pt was transferred here to [facility] for Cardio evaluation with concern for NSTEMI and Dig toxicity.

## 2019-09-05 NOTE — SWALLOW BEDSIDE ASSESSMENT ADULT - SWALLOW EVAL: RECOMMENDED FEEDING/EATING TECHNIQUES
maintain upright posture during/after eating for 30 mins/position upright (90 degrees)/allow for swallow between intakes/crush medication (when feasible)/small sips/bites

## 2019-09-05 NOTE — SWALLOW BEDSIDE ASSESSMENT ADULT - SWALLOW EVAL: DIAGNOSIS
Patient presents with an oral and suspected pharyngeal dysphagia. Oral deficits marked by generalized weakness and reduced rate of motion. Adequate management of purees and liquids. Solids deferred due to edentulous status. Intermittent wet vocal quality and throat clearing on thin liquids suggestive of laryngeal penetration/aspiration. No overt signs on remaining consistencies trialed. Patient with h/o complaint of "food/liquids feeling stuck in throat" (points to thyroid protuberance and mid sternal) although without complaint in this regard during this assessment. MBS indicated to provide objective assessment of pharyngeal swallow.

## 2019-09-05 NOTE — SWALLOW BEDSIDE ASSESSMENT ADULT - COMMENTS
CT a/P 9/1: Absence of enteric contrast limits evaluation of the GI tract. Mildly constipated rectosigmoid colon. Ascites improved from the prior study. 3.0 x 1.5 cm right middle lobe lung lesion partially visualized. Recommend chest CT. Dilated common duct reidentified likely related to prior cholecystectomy. Celiac axis lymph nodes seen previously is smaller.  CXR 9/1: Improvement in right basilar opacity. However, because it has not resolved, follow-up CT is advised to exclude underlying mass.  US L Lower Back Soft Tissue 9/4: Well-circumscribed heterogeneous hypoechoic subcutaneous collection, with minimal surrounding vascularity located in the soft tissues of the lower L back. This correlates to a smaller soft tissue nodule seen on CT 7/15/2019, and likely represents an infected sebaceous cyst.  Digoxin toxicity --> s/p digibind, now resolved, will be clinically monitored. Cardiology following.  Hyperkalemia --> s/p Albuterol neb, regular insulin, LR, Lasix and Lakelma; PO diet encouraged s/p insulin in the setting of SIERRA to prevent hypoglycemia; Tele monitor.   SIERRA --> improving with IV hydration  Elevated troponin -->  In the setting of SIERRA; Monitoring serial cardiac makers, no c/o chest pain  Chronic A-Fib --> not on AC due to GIB  On insulin for DM2.  PPI for GI hemorrhage due to gastric ulcer. CT a/P 9/1: Absence of enteric contrast limits evaluation of the GI tract. Mildly constipated rectosigmoid colon. Ascites improved from the prior study. 3.0 x 1.5 cm right middle lobe lung lesion partially visualized. Recommend chest CT. Dilated common duct reidentified likely related to prior cholecystectomy. Celiac axis lymph nodes seen previously is smaller.  CXR 9/1: Improvement in right basilar opacity. However, because it has not resolved, follow-up CT is advised to exclude underlying mass.  US L Lower Back Soft Tissue 9/4: Well-circumscribed heterogeneous hypoechoic subcutaneous collection, with minimal surrounding vascularity located in the soft tissues of the lower L back. This correlates to a smaller soft tissue nodule seen on CT 7/15/2019, and likely represents an infected sebaceous cyst.  Digoxin toxicity --> s/p digibind, now resolved, will be clinically monitored. Cardiology following.  Hyperkalemia --> s/p Albuterol neb, regular insulin, LR, Lasix and Lakelma; PO diet encouraged s/p insulin in the setting of SIERRA to prevent hypoglycemia; Tele monitor.   SIERRA --> improving with IV hydration  Elevated troponin -->  In the setting of SIERRA; Monitoring serial cardiac makers, no c/o chest pain  Chronic A-Fib --> not on AC due to GIB  On insulin for DM2.  PPI for GI hemorrhage due to gastric ulcer.  Seen by Nutrition for malnutrition, weight loss: Intake variable per flowsheets 0-75%.  Remeron noted. Patient denies n/v; BM x2 today

## 2019-09-06 ENCOUNTER — TRANSCRIPTION ENCOUNTER (OUTPATIENT)
Age: 84
End: 2019-09-06

## 2019-09-06 LAB
ANION GAP SERPL CALC-SCNC: 11 MMOL/L — SIGNIFICANT CHANGE UP (ref 5–17)
BUN SERPL-MCNC: 19 MG/DL — SIGNIFICANT CHANGE UP (ref 7–23)
CALCIUM SERPL-MCNC: 9.2 MG/DL — SIGNIFICANT CHANGE UP (ref 8.4–10.5)
CHLORIDE SERPL-SCNC: 102 MMOL/L — SIGNIFICANT CHANGE UP (ref 96–108)
CO2 SERPL-SCNC: 24 MMOL/L — SIGNIFICANT CHANGE UP (ref 22–31)
CREAT SERPL-MCNC: 0.73 MG/DL — SIGNIFICANT CHANGE UP (ref 0.5–1.3)
GLUCOSE BLDC GLUCOMTR-MCNC: 131 MG/DL — HIGH (ref 70–99)
GLUCOSE BLDC GLUCOMTR-MCNC: 144 MG/DL — HIGH (ref 70–99)
GLUCOSE BLDC GLUCOMTR-MCNC: 154 MG/DL — HIGH (ref 70–99)
GLUCOSE SERPL-MCNC: 143 MG/DL — HIGH (ref 70–99)
HCT VFR BLD CALC: 40.5 % — SIGNIFICANT CHANGE UP (ref 34.5–45)
HGB BLD-MCNC: 13.2 G/DL — SIGNIFICANT CHANGE UP (ref 11.5–15.5)
MAGNESIUM SERPL-MCNC: 2.2 MG/DL — SIGNIFICANT CHANGE UP (ref 1.6–2.6)
MCHC RBC-ENTMCNC: 30.6 PG — SIGNIFICANT CHANGE UP (ref 27–34)
MCHC RBC-ENTMCNC: 32.6 GM/DL — SIGNIFICANT CHANGE UP (ref 32–36)
MCV RBC AUTO: 93.8 FL — SIGNIFICANT CHANGE UP (ref 80–100)
PLATELET # BLD AUTO: 91 K/UL — LOW (ref 150–400)
POTASSIUM SERPL-MCNC: 4.4 MMOL/L — SIGNIFICANT CHANGE UP (ref 3.5–5.3)
POTASSIUM SERPL-SCNC: 4.4 MMOL/L — SIGNIFICANT CHANGE UP (ref 3.5–5.3)
RBC # BLD: 4.32 M/UL — SIGNIFICANT CHANGE UP (ref 3.8–5.2)
RBC # FLD: 16 % — HIGH (ref 10.3–14.5)
SODIUM SERPL-SCNC: 137 MMOL/L — SIGNIFICANT CHANGE UP (ref 135–145)
WBC # BLD: 8.21 K/UL — SIGNIFICANT CHANGE UP (ref 3.8–10.5)
WBC # FLD AUTO: 8.21 K/UL — SIGNIFICANT CHANGE UP (ref 3.8–10.5)

## 2019-09-06 PROCEDURE — 74230 X-RAY XM SWLNG FUNCJ C+: CPT | Mod: 26

## 2019-09-06 RX ORDER — DOCUSATE SODIUM 100 MG
2 CAPSULE ORAL
Qty: 0 | Refills: 0 | DISCHARGE

## 2019-09-06 RX ORDER — FERROUS FUMARATE 350(115)MG
1 TABLET ORAL
Qty: 0 | Refills: 0 | DISCHARGE

## 2019-09-06 RX ORDER — CEPHALEXIN 500 MG
1 CAPSULE ORAL
Qty: 14 | Refills: 0
Start: 2019-09-06 | End: 2019-09-12

## 2019-09-06 RX ORDER — MIRTAZAPINE 45 MG/1
1 TABLET, ORALLY DISINTEGRATING ORAL
Qty: 30 | Refills: 0
Start: 2019-09-06 | End: 2019-10-05

## 2019-09-06 RX ORDER — MIRTAZAPINE 45 MG/1
2 TABLET, ORALLY DISINTEGRATING ORAL
Qty: 0 | Refills: 0 | DISCHARGE
Start: 2019-09-06 | End: 2019-10-05

## 2019-09-06 RX ORDER — CEPHALEXIN 500 MG
500 CAPSULE ORAL EVERY 12 HOURS
Refills: 0 | Status: DISCONTINUED | OUTPATIENT
Start: 2019-09-06 | End: 2019-09-09

## 2019-09-06 RX ORDER — SENNA PLUS 8.6 MG/1
1 TABLET ORAL
Qty: 0 | Refills: 0 | DISCHARGE

## 2019-09-06 RX ADMIN — HEPARIN SODIUM 5000 UNIT(S): 5000 INJECTION INTRAVENOUS; SUBCUTANEOUS at 18:02

## 2019-09-06 RX ADMIN — Medication 650 MILLIGRAM(S): at 12:53

## 2019-09-06 RX ADMIN — PANTOPRAZOLE SODIUM 40 MILLIGRAM(S): 20 TABLET, DELAYED RELEASE ORAL at 06:17

## 2019-09-06 RX ADMIN — MIRTAZAPINE 7.5 MILLIGRAM(S): 45 TABLET, ORALLY DISINTEGRATING ORAL at 21:48

## 2019-09-06 RX ADMIN — Medication 500 MILLIGRAM(S): at 18:03

## 2019-09-06 RX ADMIN — Medication 1: at 10:23

## 2019-09-06 RX ADMIN — HEPARIN SODIUM 5000 UNIT(S): 5000 INJECTION INTRAVENOUS; SUBCUTANEOUS at 06:17

## 2019-09-06 RX ADMIN — Medication 650 MILLIGRAM(S): at 08:01

## 2019-09-06 RX ADMIN — LATANOPROST 1 DROP(S): 0.05 SOLUTION/ DROPS OPHTHALMIC; TOPICAL at 21:48

## 2019-09-06 RX ADMIN — Medication 81 MILLIGRAM(S): at 12:16

## 2019-09-06 NOTE — DISCHARGE NOTE PROVIDER - HOSPITAL COURSE
AMS, decrease PO, abd pain/nausea - diagnosed w/ digoxin toxicity & off Digoxin, left lower back sebaceous infected cyst being treated w/ Keflex (antibiotic), incontinent associated dermatitis from diarrhea which resolved since admission        Well-circumscribed heterogeneous hypoechoic subcutaneous collection, with     minimal surrounding vascularity located in the soft tissues of the lower     left back. This correlates to a smaller soft tissue nodule seen on CT     7/15/2019, and likely represents an infected sebaceous cyst.    < end of copied text >            ___________________________________________________________________________________    ===============>>  A S S E S S M E N T   A N D   P L A N <<===============    ------------------------------------------------------------------------------------------        ** collection of lower back: likely infected sebaceous cyst         rule out infection / abscess        IR appreciated: post gainage        local care, pain mgmt,          decision on abx pending above         ** reports of diarrhea       resolved             ** Digoxin toxicity      resolved, repeat Dig level ok      no bradycardia noted      med mgmt otherwise per cardio       monitor        ** failure to thrive, severe protein calorie malnutrition and cachexia       encourage PO intake as discussed with pt      pt already on soft / puree diet ( as doesn't have teeth)       nutritional supplements      nutrition f/u and mgmt       remeron       noted s/s eval>> MBS - needs 100% supervision w/ eating - pureed diet w/ nectar thick liquids        **Diabetes II, stable    ---monitor finger sticks closely    ---Continue Insulin regimen as above monitor    ---Diabetic diet    ---A1c controlled         ** pt with h/o HepC - Cirrhosis and GIB with chronic anemia       monitor H/H closely      PPI         ** Chronic atrial fibrillation      currently rate controlled, off Dig      monitor       cardio mgmt appreciated      off AC due to prior GIB      Elevated troponin: unclear etiology, improved        -GI/DVT Prophylaxis. AMS, decrease PO, abd pain/nausea - diagnosed w/ digoxin toxicity & off Digoxin, left lower back sebaceous infected cyst being treated w/ Keflex (antibiotic), incontinent associated dermatitis from diarrhea which resolved since admission        Well-circumscribed heterogeneous hypoechoic subcutaneous collection, with     minimal surrounding vascularity located in the soft tissues of the lower     left back. This correlates to a smaller soft tissue nodule seen on CT     7/15/2019, and likely represents an infected sebaceous cyst.    < end of copied text >            ___________________________________________________________________________________    ===============>>  A S S E S S M E N T   A N D   P L A N <<===============    ------------------------------------------------------------------------------------------        ** collection of lower back: likely infected sebaceous cyst         rule out infection / abscess        IR appreciated: post gainage        local care, pain mgmt,          Kelflex given x 3 days but discontinued since cyst drained on 9/.5        ** reports of diarrhea       resolved             ** Digoxin toxicity      resolved, repeat Dig level ok      no bradycardia noted      med mgmt otherwise per cardio       monitor        ** failure to thrive, severe protein calorie malnutrition and cachexia       encourage PO intake as discussed with pt      pt already on soft / puree diet ( as doesn't have teeth)       nutritional supplements      nutrition f/u and mgmt       remeron       noted s/s eval>> MBS - needs 100% supervision w/ eating - pureed diet w/ nectar thick liquids        **Diabetes II, stable    ---monitor finger sticks closely    ---Continue Insulin regimen as above monitor    ---Diabetic diet    ---A1c controlled         ** pt with h/o HepC - Cirrhosis and GIB with chronic anemia       monitor H/H closely      PPI         ** Chronic atrial fibrillation      currently rate controlled, off Dig      monitor       cardio mgmt appreciated      off AC due to prior GIB      Elevated troponin: unclear etiology, improved        -GI/DVT Prophylaxis. AMS, decrease PO, abd pain/nausea - diagnosed w/ digoxin toxicity & off Digoxin, left lower back sebaceous infected cyst being treated w/ Keflex (antibiotic), incontinent associated dermatitis from diarrhea which resolved since admission        Well-circumscribed heterogeneous hypoechoic subcutaneous collection, with     minimal surrounding vascularity located in the soft tissues of the lower     left back. This correlates to a smaller soft tissue nodule seen on CT     7/15/2019, and likely represents an infected sebaceous cyst.    < end of copied text >            ___________________________________________________________________________________    ===============>>  A S S E S S M E N T   A N D   P L A N <<===============    ------------------------------------------------------------------------------------------        ** collection of lower back: likely infected sebaceous cyst         rule out infection / abscess        IR appreciated: post gainage        local care, pain mgmt,          Kelflex x 7 total days        ** reports of diarrhea       resolved             ** Digoxin toxicity      resolved, repeat Dig level ok      no bradycardia noted      med mgmt otherwise per cardio       monitor        ** failure to thrive, severe protein calorie malnutrition and cachexia       encourage PO intake as discussed with pt      pt already on soft / puree diet ( as doesn't have teeth)       nutritional supplements      nutrition f/u and mgmt       remeron       noted s/s eval>> MBS - needs 100% supervision w/ eating - pureed diet w/ nectar thick liquids        **Diabetes II, stable    ---monitor finger sticks closely    ---Continue Insulin regimen as above monitor    ---Diabetic diet    ---A1c controlled         ** pt with h/o HepC - Cirrhosis and GIB with chronic anemia       monitor H/H closely      PPI         ** Chronic atrial fibrillation      currently rate controlled, off Dig      monitor       cardio mgmt appreciated      off AC due to prior GIB      Elevated troponin: unclear etiology, improved        -GI/DVT Prophylaxis.

## 2019-09-06 NOTE — SWALLOW VFSS/MBS ASSESSMENT ADULT - RECOMMENDED FEEDING/EATING TECHNIQUES
allow for swallow between intakes/hard swallow w/ each bite or sip/maintain upright posture during/after eating for 30 mins/small sips/bites/crush medication (when feasible)/provide rest periods between swallows/Feed only when awake and alert./check mouth frequently for oral residue/pocketing/position upright (90 degrees)

## 2019-09-06 NOTE — DISCHARGE NOTE PROVIDER - NSDCFUADDAPPT_GEN_ALL_CORE_FT
follow up with primary care physician within one week after discharge  follow up with cardiology Dr. Huffman within 2 weeks after discharge

## 2019-09-06 NOTE — DISCHARGE NOTE PROVIDER - NSDCFUADDINST_GEN_ALL_CORE_FT
allow for swallow between intakes; check mouth frequently for oral residue/pocketing; crush medication (when feasible); hard swallow w/ each bite or sip; maintain upright posture during/after eating for 30 mins; position upright (90 degrees); provide rest periods between swallows; small sips/bites; Feed only when awake and alert.

## 2019-09-06 NOTE — SWALLOW VFSS/MBS ASSESSMENT ADULT - DELAYED INITIATION OF THE PHARYNGEAL SWALLOW (IN SECONDS)
Triggered at the level of pyriforms. Timely w/ teaspoon administration; delayed with straw to the level of the pyriforms

## 2019-09-06 NOTE — SWALLOW VFSS/MBS ASSESSMENT ADULT - PHARYNGEAL PHASE COMMENTS
Pharyngeal residue cleared on repeat swallows. Pharyngeal residue cleared on multiple repeat swallows. Residual penetrated material descends to the level of the vocal folds and is cleared with cued cough and repeat swallows. Pharyngeal residue cleared on multiple repeat swallows and liquid wash.

## 2019-09-06 NOTE — SWALLOW VFSS/MBS ASSESSMENT ADULT - NS SWALLOW VFSS REC ASPIR MON
pneumonia/throat clearing/change of breathing pattern/fever/position upright (90Y)/cough/gurgly voice/upper respiratory infection fever/pneumonia/Monitor for s/s aspiration/laryngeal penetration. If noted:  D/C p.o. intake, provide non-oral nutrition/hydration/meds, and contact this service @ [phone]/throat clearing/position upright (90Y)/cough/gurgly voice/change of breathing pattern/upper respiratory infection

## 2019-09-06 NOTE — SWALLOW VFSS/MBS ASSESSMENT ADULT - ROSENBEK'S PENETRATION ASPIRATION SCALE
(3) contrast remains above the vocal cords, visible residue remains (penetration) (2) contrast enters airway, remains above the vocal cords, no residue remains (penetration) (4) contrast contacts vocal cords, no residue remains (penetration) (1) no aspiration, contrast does not enter airway

## 2019-09-06 NOTE — DISCHARGE NOTE PROVIDER - NSDCCPCAREPLAN_GEN_ALL_CORE_FT
PRINCIPAL DISCHARGE DIAGNOSIS  Diagnosis: Digoxin toxicity  Assessment and Plan of Treatment: resolved - no longer on digoxin      SECONDARY DISCHARGE DIAGNOSES  Diagnosis: Type 2 diabetes mellitus with other specified complication, with long-term current use of insulin  Assessment and Plan of Treatment: Type 2 diabetes mellitus with other specified complication, with long-term current use of insulin  HgA1C this admission was 6.9%  Make sure you get your HgA1c checked every three months.  If you take oral diabetes medications, check your blood glucose two times a day.  If you take insulin, check your blood glucose before meals and at bedtime.  It's important not to skip any meals.  Keep a log of your blood glucose results and always take it with you to your doctor appointments.  Keep a list of your current medications including injectables and over the counter medications and bring this medication list with you to all your doctor appointments.  If you have not seen your opthalmologist this year call for appointment.  Check your feet daily for redness, sores, or openings. Do not self treat. If no improvement in two days call your primary care physician for an appointment.  Low blood sugar (hypoglycemia) is a blood sugar below 70mg/dl. Check your blood sugar if you feel signs/symptoms of hypoglycemia. If your blood sugar is below 70 take 15 grams of carbohydrates (ex 4 oz of apple juice, 3-4 glucosr tablets, or 4-6 oz of regular soda) wait 15 minutes and repeat blood sugar to make sure it comes up above 70.  If your blood sugar is above 70 and you are due for a meal, have a meal.  If you are not due for a meal have a snack.  This snack helps keeps your blood sugar at a safe range.      Diagnosis: Dysphagia  Assessment and Plan of Treatment: pureed diet with nectar thick liquids  100% supervision w/ eating - please refer to directions given another part of this note    Diagnosis: Abscess of lower back  Assessment and Plan of Treatment: keep area clean and dry - may leave open to air  Keflex 500 mg twice daily x 7 total days through 9/13  report any fever, chills, redness, swelling, increasing pain PRINCIPAL DISCHARGE DIAGNOSIS  Diagnosis: Digoxin toxicity  Assessment and Plan of Treatment: resolved - no longer on digoxin      SECONDARY DISCHARGE DIAGNOSES  Diagnosis: Hypophosphatemia  Assessment and Plan of Treatment: take phosphorus supplement as ordered  follow up phosphorus level w/ primary care physician    Diagnosis: Type 2 diabetes mellitus with other specified complication, with long-term current use of insulin  Assessment and Plan of Treatment: Type 2 diabetes mellitus with other specified complication, with long-term current use of insulin  HgA1C this admission was 6.9%  Make sure you get your HgA1c checked every three months.If you take insulin, check your blood glucose before meals and at bedtime.  It's important not to skip any meals.  Keep a log of your blood glucose results and always take it with you to your doctor appointments.  Keep a list of your current medications including injectables and over the counter medications and bring this medication list with you to all your doctor appointments.  If you have not seen your opthalmologist this year call for appointment.  Check your feet daily for redness, sores, or openings. Do not self treat. If no improvement in two days call your primary care physician for an appointment.  Low blood sugar (hypoglycemia) is a blood sugar below 70mg/dl. Check your blood sugar if you feel signs/symptoms of hypoglycemia. If your blood sugar is below 70 take 15 grams of carbohydrates (ex 4 oz of apple juice, 3-4 glucosr tablets, or 4-6 oz of regular soda) wait 15 minutes and repeat blood sugar to make sure it comes up above 70.  If your blood sugar is above 70 and you are due for a meal, have a meal.  If you are not due for a meal have a snack.  This snack helps keeps your blood sugar at a safe range.      Diagnosis: Dysphagia  Assessment and Plan of Treatment: pureed diet with nectar thick liquids  100% supervision w/ eating - please refer to directions given another part of this note    Diagnosis: Abscess of lower back  Assessment and Plan of Treatment: keep area clean and dry - may leave open to air  report any fever, chills, redness, swelling, increasing pain PRINCIPAL DISCHARGE DIAGNOSIS  Diagnosis: Digoxin toxicity  Assessment and Plan of Treatment: resolved - no longer on digoxin      SECONDARY DISCHARGE DIAGNOSES  Diagnosis: Hypophosphatemia  Assessment and Plan of Treatment: take phosphorus supplement as ordered  follow up phosphorus level w/ primary care physician    Diagnosis: Type 2 diabetes mellitus with other specified complication, with long-term current use of insulin  Assessment and Plan of Treatment: Type 2 diabetes mellitus with other specified complication, with long-term current use of insulin  HgA1C this admission was 6.9%  Make sure you get your HgA1c checked every three months.If you take insulin, check your blood glucose before meals and at bedtime.  It's important not to skip any meals.  Keep a log of your blood glucose results and always take it with you to your doctor appointments.  Keep a list of your current medications including injectables and over the counter medications and bring this medication list with you to all your doctor appointments.  If you have not seen your opthalmologist this year call for appointment.  Check your feet daily for redness, sores, or openings. Do not self treat. If no improvement in two days call your primary care physician for an appointment.  Low blood sugar (hypoglycemia) is a blood sugar below 70mg/dl. Check your blood sugar if you feel signs/symptoms of hypoglycemia. If your blood sugar is below 70 take 15 grams of carbohydrates (ex 4 oz of apple juice, 3-4 glucosr tablets, or 4-6 oz of regular soda) wait 15 minutes and repeat blood sugar to make sure it comes up above 70.  If your blood sugar is above 70 and you are due for a meal, have a meal.  If you are not due for a meal have a snack.  This snack helps keeps your blood sugar at a safe range.      Diagnosis: Dysphagia  Assessment and Plan of Treatment: pureed diet with nectar thick liquids  100% supervision w/ eating - please refer to directions given another part of this note    Diagnosis: Abscess of lower back  Assessment and Plan of Treatment: keep area clean and dry - may leave open to air  report any fever, chills, redness, swelling, increasing pain  antibiotics x 4 more days through mid day 9/13

## 2019-09-06 NOTE — SWALLOW VFSS/MBS ASSESSMENT ADULT - ESOPHAGEAL STAGE
Min esophageal retention and air distension noted in the area of C7. No evidence of backflow. Cleared on additional boluses.

## 2019-09-06 NOTE — SWALLOW VFSS/MBS ASSESSMENT ADULT - COMMENTS
CT a/P 9/1: Absence of enteric contrast limits evaluation of the GI tract. Mildly constipated rectosigmoid colon. Ascites improved from the prior study. 3.0 x 1.5 cm right middle lobe lung lesion partially visualized. Recommend chest CT. Dilated common duct reidentified likely related to prior cholecystectomy. Celiac axis lymph nodes seen previously is smaller.  CXR 9/1: Improvement in right basilar opacity. However, because it has not resolved, follow-up CT is advised to exclude underlying mass.  US L Lower Back Soft Tissue 9/4: Well-circumscribed heterogeneous hypoechoic subcutaneous collection, with minimal surrounding vascularity located in the soft tissues of the lower L back. This correlates to a smaller soft tissue nodule seen on CT 7/15/2019, and likely represents an infected sebaceous cyst.  Digoxin toxicity --> s/p digibind, now resolved, will be clinically monitored. Cardiology following.  Hyperkalemia --> s/p Albuterol neb, regular insulin, LR, Lasix and [place]; PO diet encouraged s/p insulin in the setting of SIERRA to prevent hypoglycemia; Tele monitor.   SIERRA --> improving with IV hydration  Elevated troponin -->  In the setting of SIERRA; Monitoring serial cardiac makers, no c/o chest pain  Chronic A-Fib --> not on AC due to GIB  On insulin for DM2.  PPI for GI hemorrhage due to gastric ulcer.  Seen by Nutrition for malnutrition, weight loss: Intake variable per flowsheets 0-75%.  Remeron noted. Patient denies n/v; BM x2 today.   Bedside swallow completed 9/5 with overt signs of laryngeal penetration/aspiration noted. Dysphagia 1, nectar and MBS recommended.

## 2019-09-06 NOTE — SWALLOW VFSS/MBS ASSESSMENT ADULT - UNSUCCESSFUL STRATEGIES TRIALED DURING PROCEDURE
chin tuck/Unable to achieve positioning due to c/o pain. chin tuck/Unable to achieve positioning 2/2 c/o pain.

## 2019-09-06 NOTE — DISCHARGE NOTE PROVIDER - REASON FOR ADMISSION
AMS, decrease PO, abd pain/nausea - diagnosed w/ digoxin toxicity & off Digoxin, left lower back sebaceous infected cyst being treated w/ Keflex (antibiotic), incontinent associated dermatitis from diarrhea which resolved since admission

## 2019-09-06 NOTE — SWALLOW VFSS/MBS ASSESSMENT ADULT - SUCCESSFUL STRATEGIES TRIALED DURING PROCEDURE
Plus repeat swallows; Effective in improving airway protection./productive volitional cough following clinician cue

## 2019-09-06 NOTE — SWALLOW VFSS/MBS ASSESSMENT ADULT - ORAL PHASE COMMENTS
Residue cleared with multiple swallows. Mild-moderate residue in the lateral sulcus and on the lingual surface. Multiple swallows to clear residue. Moderate diffuse oral residue cleared on multiple repeat swallows.

## 2019-09-06 NOTE — SWALLOW VFSS/MBS ASSESSMENT ADULT - SLP PERTINENT HISTORY OF CURRENT PROBLEM
89 y/o F HCV, cirrhosis, GIB due to AVM,  anasarca/ HFpEF, HTN, IDDM2, Grave's dz, Afib not on AC, PPM, mild dementia, transferred here from [facility]/OS with concern for Dig toxicity and elevated Troponin in the setting of SIERRA.  Pt presented earlier today to [facility] with c/o gen abdominal pain, nausea, decreased PO intake, back/sacral pain, and altered mental status for past few days per documentation.  Lives alone with 24 hr HHA . Dependent on ADL but more interactive with family until past few days when she became more confused and less interactive with decrease PO intake per documentation.  Pt was evaluated at OSH, found to have elevated Troponin I in the setting of SIERRA with hyperkalemia and Digoxin level >5.  She underwent CT A/P w/o contrast.  Pt was transferred here to [facility] for Cardio evaluation with concern for NSTEMI and Dig toxicity.

## 2019-09-06 NOTE — DISCHARGE NOTE PROVIDER - CARE PROVIDER_API CALL
Reyes Rivera (DO)  Internal Medicine  [address]  [place] [address]  Phone: [phone]  Fax: [phone]  Follow Up Time:     Angelina Huffman (DO)  Cardiovascular Disease  [address]  [place] [address]  Phone: [phone]  Fax: [phone]  Follow Up Time:

## 2019-09-06 NOTE — SWALLOW VFSS/MBS ASSESSMENT ADULT - RECOMMENDED CONSISTENCY
Dysphagia I with nectar thickened liquids. Dysphagia I with nectar thickened liquids.  Please enter as provider to RN:   1)small single sips  2)provide verbal cues for intermittent cough/throat clear and repeat swallow  3)allow for 2-3 swallows between bites/sips  4)check oral cavity for residue  5)maintain head/chin in neutral position throughout po intake (avoid hyperextension)

## 2019-09-06 NOTE — SWALLOW VFSS/MBS ASSESSMENT ADULT - DIAGNOSTIC IMPRESSIONS
Pt presents with oropharyngeal dysphagia. Oral transit and clearance reduced 2/2 generalized lingual weakness. Multiple swallows required to clear oral cavity. Pharyngeal trigger is delayed for thin liquid teaspoon and nectar via straw. There is a significant amount of pharyngeal residue which requires multiple repeat swallows to clear. Laryngeal penetration is evident intermittently for thin and nectar liquids as well as thin purees. Cued cough and repeat swallow is effective in clearing penetrated material for thin purees. Penetrated materials for nectars is retrieved on spontaneous repeat swallows. Chin tuck is attempt for thin liquids; however, Pt is unable to achieve adequate positioning for effective use of strategy at this time. There is no evidence of aspiration during this exam; however, Pt is judged to be at increased risk of aspiration of residual material and will need supervision and verbal cues to maintain safety of swallow. Of note, min retention and air distension is noted in the proximal esophagus. Further evaluation per GI if clinically warranted.

## 2019-09-06 NOTE — SWALLOW VFSS/MBS ASSESSMENT ADULT - SLP GENERAL OBSERVATIONS
Pt received in radiology secured in JUDSON chair. Pt was awake, but required intermittent verbal cues to remain alert. Complaints of back pain, relieved with repositioning. Able to follow directions.

## 2019-09-06 NOTE — SWALLOW VFSS/MBS ASSESSMENT ADULT - SPECIFY REASON(S)
to obejctively assess the swallow mechanism; r/o dysphagia. to objectively assess the swallow mechanism; r/o dysphagia.

## 2019-09-06 NOTE — SWALLOW VFSS/MBS ASSESSMENT ADULT - LARYNGEAL PENETRATION AFTER THE SWALLOW - SILENT
Mild/Over laryngeal surface of the epiglottis; Incomplete retrieval Trace Over laryngeal surface of the epiglottis and over the arytenoids deep to the level of the vocal folds. Cleared with cued cough and repeat swallows./Mild

## 2019-09-06 NOTE — SWALLOW VFSS/MBS ASSESSMENT ADULT - LARYNGEAL PENETRATION DURING THE SWALLOW - SILENT
Over arytenoids from pooling in the pyriforms and over the laryngeal surface of the epiglottis; Incomplete retrieval/Mild Mild/Over laryngeal surface of epiglottis; Retrieved on repeat swallows. Over laryngeal surface of the epiglottis and over the arytenoids./Mild

## 2019-09-07 LAB
ANION GAP SERPL CALC-SCNC: 10 MMOL/L — SIGNIFICANT CHANGE UP (ref 5–17)
BUN SERPL-MCNC: 23 MG/DL — SIGNIFICANT CHANGE UP (ref 7–23)
CALCIUM SERPL-MCNC: 9.4 MG/DL — SIGNIFICANT CHANGE UP (ref 8.4–10.5)
CHLORIDE SERPL-SCNC: 101 MMOL/L — SIGNIFICANT CHANGE UP (ref 96–108)
CO2 SERPL-SCNC: 26 MMOL/L — SIGNIFICANT CHANGE UP (ref 22–31)
CREAT SERPL-MCNC: 0.74 MG/DL — SIGNIFICANT CHANGE UP (ref 0.5–1.3)
CULTURE RESULTS: SIGNIFICANT CHANGE UP
GLUCOSE BLDC GLUCOMTR-MCNC: 144 MG/DL — HIGH (ref 70–99)
GLUCOSE BLDC GLUCOMTR-MCNC: 145 MG/DL — HIGH (ref 70–99)
GLUCOSE BLDC GLUCOMTR-MCNC: 153 MG/DL — HIGH (ref 70–99)
GLUCOSE BLDC GLUCOMTR-MCNC: 159 MG/DL — HIGH (ref 70–99)
GLUCOSE SERPL-MCNC: 142 MG/DL — HIGH (ref 70–99)
HCT VFR BLD CALC: 37.2 % — SIGNIFICANT CHANGE UP (ref 34.5–45)
HGB BLD-MCNC: 11.8 G/DL — SIGNIFICANT CHANGE UP (ref 11.5–15.5)
MAGNESIUM SERPL-MCNC: 2.1 MG/DL — SIGNIFICANT CHANGE UP (ref 1.6–2.6)
MCHC RBC-ENTMCNC: 29.3 PG — SIGNIFICANT CHANGE UP (ref 27–34)
MCHC RBC-ENTMCNC: 31.7 GM/DL — LOW (ref 32–36)
MCV RBC AUTO: 92.3 FL — SIGNIFICANT CHANGE UP (ref 80–100)
PHOSPHATE SERPL-MCNC: 1.7 MG/DL — LOW (ref 2.5–4.5)
PLATELET # BLD AUTO: 90 K/UL — LOW (ref 150–400)
POTASSIUM SERPL-MCNC: 4.1 MMOL/L — SIGNIFICANT CHANGE UP (ref 3.5–5.3)
POTASSIUM SERPL-SCNC: 4.1 MMOL/L — SIGNIFICANT CHANGE UP (ref 3.5–5.3)
RBC # BLD: 4.03 M/UL — SIGNIFICANT CHANGE UP (ref 3.8–5.2)
RBC # FLD: 15.9 % — HIGH (ref 10.3–14.5)
SODIUM SERPL-SCNC: 137 MMOL/L — SIGNIFICANT CHANGE UP (ref 135–145)
SPECIMEN SOURCE: SIGNIFICANT CHANGE UP
WBC # BLD: 5.61 K/UL — SIGNIFICANT CHANGE UP (ref 3.8–10.5)
WBC # FLD AUTO: 5.61 K/UL — SIGNIFICANT CHANGE UP (ref 3.8–10.5)

## 2019-09-07 RX ADMIN — HEPARIN SODIUM 5000 UNIT(S): 5000 INJECTION INTRAVENOUS; SUBCUTANEOUS at 05:42

## 2019-09-07 RX ADMIN — Medication 500 MILLIGRAM(S): at 05:42

## 2019-09-07 RX ADMIN — Medication 81 MILLIGRAM(S): at 12:49

## 2019-09-07 RX ADMIN — LATANOPROST 1 DROP(S): 0.05 SOLUTION/ DROPS OPHTHALMIC; TOPICAL at 22:36

## 2019-09-07 RX ADMIN — PANTOPRAZOLE SODIUM 40 MILLIGRAM(S): 20 TABLET, DELAYED RELEASE ORAL at 05:42

## 2019-09-07 RX ADMIN — Medication 500 MILLIGRAM(S): at 17:42

## 2019-09-07 RX ADMIN — MIRTAZAPINE 7.5 MILLIGRAM(S): 45 TABLET, ORALLY DISINTEGRATING ORAL at 22:36

## 2019-09-07 RX ADMIN — Medication 1: at 12:48

## 2019-09-07 RX ADMIN — HEPARIN SODIUM 5000 UNIT(S): 5000 INJECTION INTRAVENOUS; SUBCUTANEOUS at 17:47

## 2019-09-07 NOTE — PROGRESS NOTE ADULT - ASSESSMENT
_________________________________________________________________________________________  ========>>  M E D I C A L   A T T E N D I N G    F O L L O W  U P  N O T E  <<=========  -----------------------------------------------------------------------------------------------------    - Patient seen and examined by me earlier today.   - In summary,  DERRICK ELIAS is a 88y year old woman admitted for dig toxicity  - Patient today overall doing ok, comfortable, eating fairly    ==================>> REVIEW OF SYSTEM <<=================    GEN: no fever, no chills, + mild lower back pain, worse with movement   RESP: no SOB, no cough, no sputum  CVS: no chest pain, no palpitations, no edema  GI: no abdominal pain, no nausea, diarrhea as above   : no dysuria  Neuro: no headache, no dizziness  Derm : no itching, no rash    ==================>> PHYSICAL EXAM <<=================    GEN: A&O X 2 , NAD , comfortable, , cachectic, in bed ( pt encouraged to be more OOB with assist)   HEENT: NCAT, PERRL, MMM, hearing intact  Neck: supple , no JVD  CVS: S1S2 , regular , No M/R/G appreciated  PULM: CTA B/L,  no W/R/R appreciated  ABD.: soft. non tender, non distended,  bowel sounds present  Extrem: intact pulses , no edema       + lower back wound / lesion dressed   PSYCH : normal mood,  not anxious        ==================>> MEDICATIONS <<====================    aspirin enteric coated 81 milliGRAM(s) Oral daily  cephalexin 500 milliGRAM(s) Oral every 12 hours  dextrose 5%. 1000 milliLiter(s) IV Continuous <Continuous>  dextrose 50% Injectable 12.5 Gram(s) IV Push once  dextrose 50% Injectable 25 Gram(s) IV Push once  dextrose 50% Injectable 25 Gram(s) IV Push once  heparin  Injectable 5000 Unit(s) SubCutaneous every 12 hours  influenza   Vaccine 0.5 milliLiter(s) IntraMuscular once  insulin lispro (HumaLOG) corrective regimen sliding scale   SubCutaneous three times a day before meals  latanoprost 0.005% Ophthalmic Solution 1 Drop(s) Both EYES at bedtime  mirtazapine 7.5 milliGRAM(s) Oral at bedtime  pantoprazole    Tablet 40 milliGRAM(s) Oral before breakfast    MEDICATIONS  (PRN):  acetaminophen   Tablet .. 650 milliGRAM(s) Oral every 6 hours PRN Mild Pain (1 - 3)  dextrose 40% Gel 15 Gram(s) Oral once PRN Blood Glucose LESS THAN 70 milliGRAM(s)/deciliter  glucagon  Injectable 1 milliGRAM(s) IntraMuscular once PRN Glucose LESS THAN 70 milligrams/deciliter    ==================>> VITAL SIGNS <<==================    Vital Signs Last 24 Hrs  T(C): 36.7 (09-07-19 @ 05:31)  T(F): 98 (09-07-19 @ 05:31), Max: 98 (09-07-19 @ 05:31)  HR: 88 (09-07-19 @ 05:31) (88 - 90)  BP: 123/78 (09-07-19 @ 05:31)  RR: 18 (09-07-19 @ 05:31) (16 - 18)  SpO2: 98% (09-07-19 @ 05:31) (97% - 99%)      POCT Blood Glucose.: 144 mg/dL (07 Sep 2019 08:43)  POCT Blood Glucose.: 131 mg/dL (06 Sep 2019 17:37)  POCT Blood Glucose.: 144 mg/dL (06 Sep 2019 12:16)  POCT Blood Glucose.: 154 mg/dL (06 Sep 2019 10:20)     ==================>> LAB AND IMAGING <<==================                        13.2   8.21  )-----------( 91       ( 06 Sep 2019 08:46 )             40.5        09-07    137  |  101  |  23  ----------------------------<  142<H>  4.1   |  26  |  0.74    Ca    9.4      07 Sep 2019 07:08  Phos  1.7     09-07  Mg     2.1     09-07    WBC count:   8.21 <<== ,  6.02 <<== ,  7.0 <<== ,  5.82 <<==   Hemoglobin:   13.2 <<==,  11.5 <<==,  12.3 <<==,  12.4 <<==  platelets:  91 <==, 85 <==, 94 <==, 112 <==, 95 <==, 141 <==, 126 <==    Creatinine:  0.74  <<==, 0.73  <<==, 0.72  <<==, 0.69  <<==, 0.77  <<==, 0.95  <<==  Sodium:   137  <==, 137  <==, 139  <==, 134  <==, 133  <==, 130  <==, 128  <==    _______________________  C U L T U R E S :    Culture - Abscess with Gram Stain (collected 05 Sep 2019 17:22)  Source: .Abscess  Preliminary Report (06 Sep 2019 17:42):    No growth    Culture - Urine (collected 02 Sep 2019 11:56)  Source: .Urine  Final Report (03 Sep 2019 14:38):    Normal Urogenital dylon present    ___________________________________________________________________________________  ===============>>  A S S E S S M E N T   A N D   P L A N <<===============  ------------------------------------------------------------------------------------------    ** collection of lower back: likely infected sebaceous cyst     likely abscess as per IR notes: purulent fluid      IR appreciated: post gainage      local care, pain mgmt,      agree with abx as started >> would treat for 7-10 days      follow cultures / sensitivities     ** reports of diarrhea      monitor vitals, labs / electrolytes  closely     if continues : check Cdiff    ** Digoxin toxicity    resolved, repeat Dig level ok    no bradycardia noted    med mgmt otherwise per cardio     monitor    ** dysphagia, failure to thrive, severe protein calorie malnutrition and cachexia     encourage PO intake as discussed with pt    pt already on soft / puree diet ( as doesn't have teeth)     nutritional supplements    nutrition f/u and mgmt     remeron     MBS noted : continue dysphagia diet with aspiration precautions     **Diabetes II, stable  ---monitor finger sticks closely  ---Continue Insulin regimen as above monitor  ---Diabetic diet  ---A1c controlled     ** pt with h/o HepC - Cirrhosis and GIB with chronic anemia     monitor H/H closely    PPI     ** Chronic atrial fibrillation    currently rate controlled, off Dig    monitor     cardio mgmt appreciated    off AC due to prior GIB    Elevated troponin: unclear etiology, improved    -GI/DVT Prophylaxis.  -Dispo planing in process     --------------------------------------------  Case discussed with pt  Education given on findings and plan of care. encouraged increased PO intake and OOB as able   ___________________________  Will follow with you.  Thank you,  CINDY Rivera D.O.  Pager: [phone]

## 2019-09-08 LAB
GLUCOSE BLDC GLUCOMTR-MCNC: 104 MG/DL — HIGH (ref 70–99)
GLUCOSE BLDC GLUCOMTR-MCNC: 134 MG/DL — HIGH (ref 70–99)
GLUCOSE BLDC GLUCOMTR-MCNC: 212 MG/DL — HIGH (ref 70–99)

## 2019-09-08 PROCEDURE — 71250 CT THORAX DX C-: CPT | Mod: 26

## 2019-09-08 RX ADMIN — HEPARIN SODIUM 5000 UNIT(S): 5000 INJECTION INTRAVENOUS; SUBCUTANEOUS at 17:11

## 2019-09-08 RX ADMIN — Medication 2: at 12:10

## 2019-09-08 RX ADMIN — Medication 500 MILLIGRAM(S): at 17:09

## 2019-09-08 RX ADMIN — Medication 500 MILLIGRAM(S): at 05:45

## 2019-09-08 RX ADMIN — Medication 81 MILLIGRAM(S): at 12:10

## 2019-09-08 RX ADMIN — PANTOPRAZOLE SODIUM 40 MILLIGRAM(S): 20 TABLET, DELAYED RELEASE ORAL at 05:45

## 2019-09-08 RX ADMIN — LATANOPROST 1 DROP(S): 0.05 SOLUTION/ DROPS OPHTHALMIC; TOPICAL at 21:29

## 2019-09-08 RX ADMIN — HEPARIN SODIUM 5000 UNIT(S): 5000 INJECTION INTRAVENOUS; SUBCUTANEOUS at 05:45

## 2019-09-08 NOTE — PROGRESS NOTE ADULT - ASSESSMENT
_________________________________________________________________________________________  ========>>  M E D I C A L   A T T E N D I N G    F O L L O W  U P  N O T E  <<=========  -----------------------------------------------------------------------------------------------------    - Patient seen and examined by me earlier today.   - In summary,  DERRICK ELIAS is a 88y year old woman admitted for dig toxicity  - Patient today overall doing ok, comfortable, eating better    ==================>> REVIEW OF SYSTEM <<=================    GEN: no fever, no chills,  lower back pain improved per pt  RESP: no SOB, no cough, no sputum  CVS: no chest pain, no palpitations, no edema  GI: no abdominal pain, no nausea, diarrhea as above   : no dysuria  Neuro: no headache, no dizziness  Derm : no itching, no rash    ==================>> PHYSICAL EXAM <<=================    GEN: A&O X 2 , NAD , comfortable, , cachectic, in bed ( pt encouraged to be more OOB with assist and eat better)   HEENT: NCAT, PERRL, MMM, hearing intact  Neck: supple , no JVD  CVS: S1S2 , regular , No M/R/G appreciated  PULM: CTA B/L,  no W/R/R appreciated  ABD.: soft. non tender, non distended,  bowel sounds present  Extrem: intact pulses , no edema   PSYCH : normal mood,  not anxious       ==================>> MEDICATIONS <<====================    aspirin enteric coated 81 milliGRAM(s) Oral daily  cephalexin 500 milliGRAM(s) Oral every 12 hours  dextrose 5%. 1000 milliLiter(s) IV Continuous <Continuous>  dextrose 50% Injectable 12.5 Gram(s) IV Push once  dextrose 50% Injectable 25 Gram(s) IV Push once  dextrose 50% Injectable 25 Gram(s) IV Push once  heparin  Injectable 5000 Unit(s) SubCutaneous every 12 hours  influenza   Vaccine 0.5 milliLiter(s) IntraMuscular once  insulin lispro (HumaLOG) corrective regimen sliding scale   SubCutaneous three times a day before meals  latanoprost 0.005% Ophthalmic Solution 1 Drop(s) Both EYES at bedtime  mirtazapine 7.5 milliGRAM(s) Oral at bedtime  pantoprazole    Tablet 40 milliGRAM(s) Oral before breakfast    MEDICATIONS  (PRN):  acetaminophen   Tablet .. 650 milliGRAM(s) Oral every 6 hours PRN Mild Pain (1 - 3)  dextrose 40% Gel 15 Gram(s) Oral once PRN Blood Glucose LESS THAN 70 milliGRAM(s)/deciliter  glucagon  Injectable 1 milliGRAM(s) IntraMuscular once PRN Glucose LESS THAN 70 milligrams/deciliter    ==================>> VITAL SIGNS <<==================    Vital Signs Last 24 Hrs  T(C): 36.8 (09-08-19 @ 04:57)  T(F): 98.3 (09-08-19 @ 04:57), Max: 98.4 (09-07-19 @ 22:41)  HR: 89 (09-08-19 @ 04:57) (89 - 93)  BP: 126/81 (09-08-19 @ 04:57)  RR: 19 (09-08-19 @ 04:57) (18 - 19)  SpO2: 100% (09-08-19 @ 04:57) (97% - 100%)      POCT Blood Glucose.: 134 mg/dL (08 Sep 2019 08:25)  POCT Blood Glucose.: 159 mg/dL (07 Sep 2019 21:41)  POCT Blood Glucose.: 145 mg/dL (07 Sep 2019 17:35)  POCT Blood Glucose.: 153 mg/dL (07 Sep 2019 12:47)     ==================>> LAB AND IMAGING <<==================                        11.8   5.61  )-----------( 90       ( 07 Sep 2019 10:35 )             37.2        09-07    137  |  101  |  23  ----------------------------<  142<H>  4.1   |  26  |  0.74    Ca    9.4      07 Sep 2019 07:08  Phos  1.7     09-07  Mg     2.1     09-07    WBC count:   5.61 <<== ,  8.21 <<== ,  6.02 <<== ,  7.0 <<==   Hemoglobin:   11.8 <<==,  13.2 <<==,  11.5 <<==,  12.3 <<==  platelets:  90 <==, 91 <==, 85 <==, 94 <==, 112 <==    Creatinine:  0.74  <<==, 0.73  <<==, 0.72  <<==, 0.69  <<==, 0.77  <<==, 0.95  <<==  Sodium:   137  <==, 137  <==, 139  <==, 134  <==, 133  <==, 130  <==    _______________________  C U L T U R E S :    Culture - Abscess with Gram Stain (collected 05 Sep 2019 17:22)  Source: .Abscess  Final Report (07 Sep 2019 21:51):    Few Anaerobic Gram Positive Cocci Unable to Indentify further    Culture - Urine (collected 02 Sep 2019 11:56)  Source: .Urine  Final Report (03 Sep 2019 14:38):    Normal Urogenital dylon present      ___________________________________________________________________________________  ===============>>  A S S E S S M E N T   A N D   P L A N <<===============  ------------------------------------------------------------------------------------------    ** collection of lower back: likely infected sebaceous cyst     likely abscess as per IR notes: purulent fluid      IR appreciated: post drainage      local care, pain mgmt,      agree with abx as started >> would treat for 7-10 days      follow cultures / sensitivities     ** reports of diarrhea      monitor vitals, labs / electrolytes  closely     if continues : check Cdiff    ** Digoxin toxicity    resolved, repeat Dig level ok    no bradycardia noted    med mgmt otherwise per cardio     monitor    ** dysphagia, failure to thrive, severe protein calorie malnutrition and cachexia     encourage PO intake as discussed with pt    pt already on soft / puree diet ( as doesn't have teeth)     nutritional supplements    nutrition f/u and mgmt     remeron     MBS noted : continue dysphagia diet with aspiration precautions     **Diabetes II, stable  ---monitor finger sticks closely  ---Continue Insulin regimen as above monitor  ---Diabetic diet  ---A1c controlled     ** pt with h/o HepC - Cirrhosis and GIB with chronic anemia     monitor H/H closely    PPI     ** Chronic atrial fibrillation    currently rate controlled, off Dig    monitor     cardio mgmt appreciated    off AC due to prior GIB    Elevated troponin: unclear etiology, improved    -GI/DVT Prophylaxis.  -Dispo planing in process     --------------------------------------------  Case discussed with pt  Education given on findings and plan of care. encouraged increased PO intake and OOB as able   ___________________________  Will follow with you.  Thank you,  CINDY Rivera D.O.  Pager: [phone]

## 2019-09-09 ENCOUNTER — TRANSCRIPTION ENCOUNTER (OUTPATIENT)
Age: 84
End: 2019-09-09

## 2019-09-09 VITALS
SYSTOLIC BLOOD PRESSURE: 108 MMHG | RESPIRATION RATE: 18 BRPM | DIASTOLIC BLOOD PRESSURE: 67 MMHG | OXYGEN SATURATION: 94 % | HEART RATE: 87 BPM | TEMPERATURE: 98 F

## 2019-09-09 LAB
ANION GAP SERPL CALC-SCNC: 7 MMOL/L — SIGNIFICANT CHANGE UP (ref 5–17)
BUN SERPL-MCNC: 20 MG/DL — SIGNIFICANT CHANGE UP (ref 7–23)
CALCIUM SERPL-MCNC: 9.7 MG/DL — SIGNIFICANT CHANGE UP (ref 8.4–10.5)
CHLORIDE SERPL-SCNC: 103 MMOL/L — SIGNIFICANT CHANGE UP (ref 96–108)
CO2 SERPL-SCNC: 28 MMOL/L — SIGNIFICANT CHANGE UP (ref 22–31)
CREAT SERPL-MCNC: 0.73 MG/DL — SIGNIFICANT CHANGE UP (ref 0.5–1.3)
GLUCOSE BLDC GLUCOMTR-MCNC: 116 MG/DL — HIGH (ref 70–99)
GLUCOSE BLDC GLUCOMTR-MCNC: 151 MG/DL — HIGH (ref 70–99)
GLUCOSE SERPL-MCNC: 120 MG/DL — HIGH (ref 70–99)
PHOSPHATE SERPL-MCNC: 2.4 MG/DL — LOW (ref 2.5–4.5)
POTASSIUM SERPL-MCNC: 4.2 MMOL/L — SIGNIFICANT CHANGE UP (ref 3.5–5.3)
POTASSIUM SERPL-SCNC: 4.2 MMOL/L — SIGNIFICANT CHANGE UP (ref 3.5–5.3)
SODIUM SERPL-SCNC: 138 MMOL/L — SIGNIFICANT CHANGE UP (ref 135–145)

## 2019-09-09 PROCEDURE — 99238 HOSP IP/OBS DSCHRG MGMT 30/<: CPT

## 2019-09-09 RX ORDER — SODIUM,POTASSIUM PHOSPHATES 278-250MG
1 POWDER IN PACKET (EA) ORAL
Qty: 12 | Refills: 0
Start: 2019-09-09 | End: 2019-09-11

## 2019-09-09 RX ORDER — CEPHALEXIN 500 MG
1 CAPSULE ORAL
Qty: 8 | Refills: 0
Start: 2019-09-09 | End: 2019-09-12

## 2019-09-09 RX ORDER — SODIUM,POTASSIUM PHOSPHATES 278-250MG
1 POWDER IN PACKET (EA) ORAL
Refills: 0 | Status: DISCONTINUED | OUTPATIENT
Start: 2019-09-09 | End: 2019-09-09

## 2019-09-09 RX ADMIN — Medication 81 MILLIGRAM(S): at 11:28

## 2019-09-09 RX ADMIN — PANTOPRAZOLE SODIUM 40 MILLIGRAM(S): 20 TABLET, DELAYED RELEASE ORAL at 05:06

## 2019-09-09 RX ADMIN — Medication 500 MILLIGRAM(S): at 05:06

## 2019-09-09 RX ADMIN — Medication 1: at 13:03

## 2019-09-09 RX ADMIN — HEPARIN SODIUM 5000 UNIT(S): 5000 INJECTION INTRAVENOUS; SUBCUTANEOUS at 05:07

## 2019-09-09 NOTE — PROGRESS NOTE ADULT - PROVIDER SPECIALTY LIST ADULT
Cardiology
Internal Medicine
Cardiology

## 2019-09-09 NOTE — PROGRESS NOTE ADULT - SUBJECTIVE AND OBJECTIVE BOX
CARDIOLOGY     PROGRESS  NOTE   ________________________________________________    CHIEF COMPLAINT:Patient is a 88y old  Female who presents with a chief complaint of AMS, decrease PO, abd pain/nausea (02 Sep 2019 13:58)  doiong better.  	  REVIEW OF SYSTEMS:  CONSTITUTIONAL: No fever, weight loss, or fatigue  EYES: No eye pain, visual disturbances, or discharge  ENT:  No difficulty hearing, tinnitus, vertigo; No sinus or throat pain  NECK: No pain or stiffness  RESPIRATORY: No cough, wheezing, chills or hemoptysis; No Shortness of Breath  CARDIOVASCULAR: No chest pain, palpitations, passing out, dizziness, or leg swelling  GASTROINTESTINAL: No abdominal or epigastric pain. No nausea, vomiting, or hematemesis; No diarrhea or constipation. No melena or hematochezia.  GENITOURINARY: No dysuria, frequency, hematuria, or incontinence  NEUROLOGICAL: No headaches, memory loss, loss of strength, numbness, or tremors  SKIN: No itching, burning, rashes, or lesions   LYMPH Nodes: No enlarged glands  ENDOCRINE: No heat or cold intolerance; No hair loss  MUSCULOSKELETAL: No joint pain or swelling; No muscle, back, or extremity pain  PSYCHIATRIC: No depression, anxiety, mood swings, or difficulty sleeping  HEME/LYMPH: No easy bruising, or bleeding gums  ALLERGY AND IMMUNOLOGIC: No hives or eczema	    [ ] All others negative	  [ ] Unable to obtain    PHYSICAL EXAM:  T(C): 36.3 (09-03-19 @ 04:15), Max: 36.8 (09-02-19 @ 18:57)  HR: 82 (09-03-19 @ 04:15) (78 - 86)  BP: 121/77 (09-03-19 @ 04:15) (101/65 - 134/69)  RR: 18 (09-03-19 @ 04:15) (17 - 18)  SpO2: 100% (09-03-19 @ 04:15) (98% - 100%)  Wt(kg): --  I&O's Summary    02 Sep 2019 07:01  -  03 Sep 2019 06:54  --------------------------------------------------------  IN: 0 mL / OUT: 125 mL / NET: -125 mL        Appearance: Normal	  HEENT:   Normal oral mucosa, PERRL, EOMI	  Lymphatic: No lymphadenopathy  Cardiovascular: Normal S1 S2, No JVD, + murmurs, No edema  Respiratory: Lungs clear to auscultation	  Psychiatry: A & O x 3, Mood & affect appropriate  Gastrointestinal:  Soft, Non-tender, + BS	  Skin: No rashes, No ecchymoses, No cyanosis	  Neurologic: Non-focal  Extremities: Normal range of motion, No clubbing, cyanosis or edema  Vascular: Peripheral pulses palpable 2+ bilaterally    MEDICATIONS  (STANDING):  aspirin enteric coated 81 milliGRAM(s) Oral daily  dextrose 5%. 1000 milliLiter(s) (50 mL/Hr) IV Continuous <Continuous>  dextrose 50% Injectable 12.5 Gram(s) IV Push once  dextrose 50% Injectable 25 Gram(s) IV Push once  dextrose 50% Injectable 25 Gram(s) IV Push once  heparin  Injectable 5000 Unit(s) SubCutaneous every 12 hours  influenza   Vaccine 0.5 milliLiter(s) IntraMuscular once  insulin lispro (HumaLOG) corrective regimen sliding scale   SubCutaneous three times a day before meals  latanoprost 0.005% Ophthalmic Solution 1 Drop(s) Both EYES at bedtime  mirtazapine 7.5 milliGRAM(s) Oral at bedtime  pantoprazole    Tablet 40 milliGRAM(s) Oral before breakfast      TELEMETRY: 	    ECG:  	  RADIOLOGY:  OTHER: 	  	  LABS:	 	    CARDIAC MARKERS:  CARDIAC MARKERS ( 01 Sep 2019 19:45 )  x     / x     / 42 U/L / x     / x      CARDIAC MARKERS ( 01 Sep 2019 16:57 )  0.147 ng/mL / x     / x     / x     / x                                    9.8    5.46  )-----------( 95       ( 02 Sep 2019 08:53 )             30.0     09-02    130<L>  |  94<L>  |  32<H>  ----------------------------<  167<H>  3.7   |  23  |  0.95    Ca    9.2      02 Sep 2019 15:58  Mg     2.3     09-01    TPro  5.4<L>  /  Alb  2.3<L>  /  TBili  0.8  /  DBili  x   /  AST  91<H>  /  ALT  59<H>  /  AlkPhos  198<H>  09-02    proBNP: Serum Pro-Brain Natriuretic Peptide: 2032 pg/mL (09-01 @ 22:07)  Serum Pro-Brain Natriuretic Peptide: 2324 pg/mL (09-01 @ 16:57)    Lipid Profile:   HgA1c:   TSH: Thyroid Stimulating Hormone, Serum: 2.42 uU/mL (09-01 @ 16:57)    PT/INR - ( 01 Sep 2019 16:57 )   PT: 11.1 sec;   INR: 1.00 ratio         PTT - ( 01 Sep 2019 16:57 )  PTT:30.2 sec      Assessment and plan  ---------------------------  dig toxicity, s/p DIigi bind  check ppm  ivf  will get id to see the back lesion
CARDIOLOGY     PROGRESS  NOTE   ________________________________________________    CHIEF COMPLAINT:Patient is a 88y old  Female who presents with a chief complaint of AMS, decrease PO, abd pain/nausea (02 Sep 2019 11:10)  doing better.  	  REVIEW OF SYSTEMS:  CONSTITUTIONAL: No fever, weight loss, or fatigue  EYES: No eye pain, visual disturbances, or discharge  ENT:  No difficulty hearing, tinnitus, vertigo; No sinus or throat pain  NECK: No pain or stiffness  RESPIRATORY: No cough, wheezing, chills or hemoptysis; No Shortness of Breath  CARDIOVASCULAR: No chest pain, palpitations, passing out, dizziness, or leg swelling  GASTROINTESTINAL: No abdominal or epigastric pain. No nausea, vomiting, or hematemesis; No diarrhea or constipation. No melena or hematochezia.  GENITOURINARY: No dysuria, frequency, hematuria, or incontinence  NEUROLOGICAL: No headaches, memory loss, loss of strength, numbness, or tremors  SKIN: No itching, burning, rashes, or lesions   LYMPH Nodes: No enlarged glands  ENDOCRINE: No heat or cold intolerance; No hair loss  MUSCULOSKELETAL: No joint pain or swelling; No muscle, back, or extremity pain  PSYCHIATRIC: No depression, anxiety, mood swings, or difficulty sleeping  HEME/LYMPH: No easy bruising, or bleeding gums  ALLERGY AND IMMUNOLOGIC: No hives or eczema	    [ ] All others negative	  [x ] Unable to obtain    PHYSICAL EXAM:  T(C): 36.3 (09-02-19 @ 08:05), Max: 36.4 (09-01-19 @ 15:36)  HR: 79 (09-02-19 @ 09:57) (60 - 85)  BP: 134/69 (09-02-19 @ 08:05) (93/58 - 139/64)  RR: 18 (09-02-19 @ 08:05) (15 - 20)  SpO2: 100% (09-02-19 @ 08:05) (99% - 100%)  Wt(kg): --  I&O's Summary      Appearance: Normal	  HEENT:   Normal oral mucosa, PERRL, EOMI	  Lymphatic: No lymphadenopathy  Cardiovascular: Normal S1 S2, No JVD, + murmurs, No edema  Respiratory: rhonchi  Psychiatry: A & O x 3, Mood & affect appropriate  Gastrointestinal:  Soft, Non-tender, + BS	  Skin: No rashes, No ecchymoses, No cyanosis	  Neurologic: Non-focal  Extremities: Normal range of motion, No clubbing, cyanosis or edema  Vascular: Peripheral pulses palpable 2+ bilaterally    MEDICATIONS  (STANDING):  aspirin enteric coated 81 milliGRAM(s) Oral daily  dextrose 5%. 1000 milliLiter(s) (50 mL/Hr) IV Continuous <Continuous>  dextrose 50% Injectable 12.5 Gram(s) IV Push once  dextrose 50% Injectable 25 Gram(s) IV Push once  dextrose 50% Injectable 25 Gram(s) IV Push once  heparin  Injectable 5000 Unit(s) SubCutaneous every 12 hours  influenza   Vaccine 0.5 milliLiter(s) IntraMuscular once  insulin lispro (HumaLOG) corrective regimen sliding scale   SubCutaneous three times a day before meals  latanoprost 0.005% Ophthalmic Solution 1 Drop(s) Both EYES at bedtime  pantoprazole    Tablet 40 milliGRAM(s) Oral before breakfast      TELEMETRY: 	    ECG:  	  RADIOLOGY:  OTHER: 	  	  LABS:	 	    CARDIAC MARKERS:  CARDIAC MARKERS ( 01 Sep 2019 19:45 )  x     / x     / 42 U/L / x     / x      CARDIAC MARKERS ( 01 Sep 2019 16:57 )  0.147 ng/mL / x     / x     / x     / x                                    9.8    5.46  )-----------( 95       ( 02 Sep 2019 08:53 )             30.0     09-02    x   |  x   |  x   ----------------------------<  x   5.1   |  x   |  x     Ca    8.9      02 Sep 2019 01:20  Mg     2.3     09-01    TPro  5.4<L>  /  Alb  2.3<L>  /  TBili  0.8  /  DBili  x   /  AST  91<H>  /  ALT  59<H>  /  AlkPhos  198<H>  09-02    proBNP: Serum Pro-Brain Natriuretic Peptide: 2032 pg/mL (09-01 @ 22:07)  Serum Pro-Brain Natriuretic Peptide: 2324 pg/mL (09-01 @ 16:57)    Lipid Profile:   HgA1c:   TSH: Thyroid Stimulating Hormone, Serum: 2.42 uU/mL (09-01 @ 16:57)    PT/INR - ( 01 Sep 2019 16:57 )   PT: 11.1 sec;   INR: 1.00 ratio         PTT - ( 01 Sep 2019 16:57 )  PTT:30.2 sec  < from: Transthoracic Echocardiogram (07.17.19 @ 07:52) >  1. Mitral annular calcification. Mild to moderate mitral  regurgitation.  2. Severely dilated left atrium.  LA volume index = 60  cc/m2.  3. Normal left ventricular internal dimensions and wall  thicknesses.  4. Normal left ventricular systolic function.  5. Right ventricular enlargement with normal RV systolic  function.   A device lead is visualized in the right heart.  6. RV systolic pressure is 49 mm Hg. Mild pulmonary  hypertension.  7. There is severe tricuspid regurgitation.    < from: Xray Chest 1 View AP/PA (09.01.19 @ 17:36) >  Improvement in right basilar opacity. However, because it has not   resolved, follow-up CT is advised to exclude underlying mass.    Cardiomegaly. Pacemaker.    < end of copied text >    Comprehensive Metabolic Panel (09.02.19 @ 00:29)    Sodium, Serum: 135 mmol/L    Potassium, Serum: 4.2 mmol/L    Chloride, Serum: 105 mmol/L    Carbon Dioxide, Serum: 12 mmol/L    Anion Gap, Serum: 18 mmol/L    Blood Urea Nitrogen, Serum: 32 mg/dL    Creatinine, Serum: 0.80 mg/dL    Glucose, Serum: 82 mg/dL    Calcium, Total Serum: 7.2 mg/dL    Protein Total, Serum: 5.4 g/dL    Albumin, Serum: 2.3 g/dL    Bilirubin Total, Serum: 0.8 mg/dL    Alkaline Phosphatase, Serum: 198 U/L    Aspartate Aminotransferase (AST/SGOT): 91 U/L    Alanine Aminotransferase (ALT/SGPT): 59 U/L      < from: 12 Lead ECG (09.01.19 @ 22:34) >  Diagnosis Line Ventricular-paced rhythm  ABNORMAL ECG      < end of copied text >      Assessment and plan  ---------------------------  dig toxicity s/p Digibind  check lytes from today  will check ppm  pt with sig rv failure, avoid excess fluid  dvt prophylaxis  check lytes
CARDIOLOGY     PROGRESS  NOTE   ________________________________________________    CHIEF COMPLAINT:Patient is a 88y old  Female who presents with a chief complaint of AMS, decrease PO, abd pain/nausea (03 Sep 2019 14:05)    	  REVIEW OF SYSTEMS:  CONSTITUTIONAL: No fever, weight loss, or fatigue  EYES: No eye pain, visual disturbances, or discharge  ENT:  No difficulty hearing, tinnitus, vertigo; No sinus or throat pain  NECK: No pain or stiffness  RESPIRATORY: No cough, wheezing, chills or hemoptysis; No Shortness of Breath  CARDIOVASCULAR: No chest pain, palpitations, passing out, dizziness, or leg swelling  GASTROINTESTINAL: No abdominal or epigastric pain. No nausea, vomiting, or hematemesis; No diarrhea or constipation. No melena or hematochezia.  GENITOURINARY: No dysuria, frequency, hematuria, or incontinence  NEUROLOGICAL: No headaches, memory loss, loss of strength, numbness, or tremors  SKIN: No itching, burning, rashes, or lesions   LYMPH Nodes: No enlarged glands  ENDOCRINE: No heat or cold intolerance; No hair loss  MUSCULOSKELETAL: No joint pain or swelling; No muscle, back, or extremity pain  PSYCHIATRIC: No depression, anxiety, mood swings, or difficulty sleeping  HEME/LYMPH: No easy bruising, or bleeding gums  ALLERGY AND IMMUNOLOGIC: No hives or eczema	    [ ] All others negative	  [ ] Unable to obtain    PHYSICAL EXAM:  T(C): 36.8 (09-04-19 @ 04:56), Max: 36.8 (09-04-19 @ 04:56)  HR: 85 (09-04-19 @ 04:56) (83 - 85)  BP: 130/70 (09-04-19 @ 04:56) (121/71 - 130/70)  RR: 18 (09-04-19 @ 04:56) (18 - 18)  SpO2: 100% (09-04-19 @ 04:56) (99% - 100%)  Wt(kg): --  I&O's Summary    03 Sep 2019 07:01  -  04 Sep 2019 07:00  --------------------------------------------------------  IN: 320 mL / OUT: 450 mL / NET: -130 mL        Appearance: Normal	  HEENT:   Normal oral mucosa, PERRL, EOMI	  Lymphatic: No lymphadenopathy  Cardiovascular: Normal S1 S2, No JVD, + murmurs, No edema  Respiratory: Lungs clear to auscultation	  Psychiatry: A & O x 3, Mood & affect appropriate  Gastrointestinal:  Soft, Non-tender, + BS	  Skin: No rashes, No ecchymoses, No cyanosis	  Neurologic: Non-focal  Extremities: Normal range of motion, No clubbing, cyanosis or edema  Vascular: Peripheral pulses palpable 2+ bilaterally    MEDICATIONS  (STANDING):  aspirin enteric coated 81 milliGRAM(s) Oral daily  dextrose 5%. 1000 milliLiter(s) (50 mL/Hr) IV Continuous <Continuous>  dextrose 50% Injectable 12.5 Gram(s) IV Push once  dextrose 50% Injectable 25 Gram(s) IV Push once  dextrose 50% Injectable 25 Gram(s) IV Push once  heparin  Injectable 5000 Unit(s) SubCutaneous every 12 hours  influenza   Vaccine 0.5 milliLiter(s) IntraMuscular once  insulin lispro (HumaLOG) corrective regimen sliding scale   SubCutaneous three times a day before meals  latanoprost 0.005% Ophthalmic Solution 1 Drop(s) Both EYES at bedtime  mirtazapine 7.5 milliGRAM(s) Oral at bedtime  pantoprazole    Tablet 40 milliGRAM(s) Oral before breakfast      TELEMETRY: 	    ECG:  	  RADIOLOGY:  OTHER: 	  	  LABS:	 	    CARDIAC MARKERS:                                12.4   5.82  )-----------( 112      ( 03 Sep 2019 11:10 )             36.4     09-03    133<L>  |  99  |  24<H>  ----------------------------<  156<H>  3.9   |  23  |  0.77    Ca    9.5      03 Sep 2019 09:56      proBNP: Serum Pro-Brain Natriuretic Peptide: 2032 pg/mL (09-01 @ 22:07)  Serum Pro-Brain Natriuretic Peptide: 2324 pg/mL (09-01 @ 16:57)    Lipid Profile:   HgA1c:   TSH: Thyroid Stimulating Hormone, Serum: 2.42 uU/mL (09-01 @ 16:57)  < from: 12 Lead ECG (09.01.19 @ 22:34) >  Diagnosis Line Ventricular-paced rhythm  ABNORMAL ECG    < end of copied text >          Assessment and plan  ---------------------------  dig toxicity doing better.  nutrition eval  will adjust cardiac meds  tsh  subserous cyst/surgery eval  dvt prophylaxis  will add beta blocker if increase bp
CARDIOLOGY     PROGRESS  NOTE   ________________________________________________    CHIEF COMPLAINT:Patient is a 88y old  Female who presents with a chief complaint of AMS, decrease PO, abd pain/nausea (04 Sep 2019 16:58)    	  REVIEW OF SYSTEMS:  CONSTITUTIONAL: No fever, weight loss, or fatigue  EYES: No eye pain, visual disturbances, or discharge  ENT:  No difficulty hearing, tinnitus, vertigo; No sinus or throat pain  NECK: No pain or stiffness  RESPIRATORY: No cough, wheezing, chills or hemoptysis; No Shortness of Breath  CARDIOVASCULAR: No chest pain, palpitations, passing out, dizziness, or leg swelling  GASTROINTESTINAL: No abdominal or epigastric pain. No nausea, vomiting, or hematemesis; No diarrhea or constipation. No melena or hematochezia.  GENITOURINARY: No dysuria, frequency, hematuria, or incontinence  NEUROLOGICAL: No headaches, memory loss, loss of strength, numbness, or tremors  SKIN: No itching, burning, rashes, or lesions   LYMPH Nodes: No enlarged glands  ENDOCRINE: No heat or cold intolerance; No hair loss  MUSCULOSKELETAL: No joint pain or swelling; No muscle, back, or extremity pain  PSYCHIATRIC: No depression, anxiety, mood swings, or difficulty sleeping  HEME/LYMPH: No easy bruising, or bleeding gums  ALLERGY AND IMMUNOLOGIC: No hives or eczema	    [ ] All others negative	  [ ] Unable to obtain    PHYSICAL EXAM:  T(C): 36.9 (09-05-19 @ 04:54), Max: 36.9 (09-05-19 @ 04:54)  HR: 89 (09-05-19 @ 04:54) (84 - 89)  BP: 118/67 (09-05-19 @ 04:54) (107/66 - 149/83)  RR: 18 (09-05-19 @ 04:54) (18 - 18)  SpO2: 99% (09-05-19 @ 04:54) (97% - 99%)  Wt(kg): --  I&O's Summary    04 Sep 2019 07:01  -  05 Sep 2019 07:00  --------------------------------------------------------  IN: 650 mL / OUT: 100 mL / NET: 550 mL        Appearance: Normal	  HEENT:   Normal oral mucosa, PERRL, EOMI	  Lymphatic: No lymphadenopathy  Cardiovascular: Normal S1 S2, No JVD, + murmurs, No edema  Respiratory: Lungs clear to auscultation	  Psychiatry: A & O x 3, Mood & affect appropriate  Gastrointestinal:  Soft, Non-tender, + BS	  Skin: No rashes, No ecchymoses, No cyanosis	  Neurologic: Non-focal  Extremities: Normal range of motion, No clubbing, cyanosis or edema  Vascular: Peripheral pulses palpable 2+ bilaterally    MEDICATIONS  (STANDING):  aspirin enteric coated 81 milliGRAM(s) Oral daily  dextrose 5%. 1000 milliLiter(s) (50 mL/Hr) IV Continuous <Continuous>  dextrose 50% Injectable 12.5 Gram(s) IV Push once  dextrose 50% Injectable 25 Gram(s) IV Push once  dextrose 50% Injectable 25 Gram(s) IV Push once  heparin  Injectable 5000 Unit(s) SubCutaneous every 12 hours  influenza   Vaccine 0.5 milliLiter(s) IntraMuscular once  insulin lispro (HumaLOG) corrective regimen sliding scale   SubCutaneous three times a day before meals  latanoprost 0.005% Ophthalmic Solution 1 Drop(s) Both EYES at bedtime  mirtazapine 7.5 milliGRAM(s) Oral at bedtime  pantoprazole    Tablet 40 milliGRAM(s) Oral before breakfast      TELEMETRY: 	    ECG:  	  RADIOLOGY:  OTHER: 	  	  LABS:	 	    CARDIAC MARKERS:                                12.3   7.0   )-----------( 94       ( 04 Sep 2019 18:06 )             36.6     09-04    134<L>  |  100  |  18  ----------------------------<  162<H>  3.8   |  25  |  0.69    Ca    9.4      04 Sep 2019 18:06  Phos  1.6     09-04  Mg     2.0     09-04      proBNP: Serum Pro-Brain Natriuretic Peptide: 2032 pg/mL (09-01 @ 22:07)  Serum Pro-Brain Natriuretic Peptide: 2324 pg/mL (09-01 @ 16:57)    Lipid Profile:   HgA1c:   TSH: Thyroid Stimulating Hormone, Serum: 2.42 uU/mL (09-01 @ 16:57)    PT/INR - ( 04 Sep 2019 18:06 )   PT: 11.6 sec;   INR: 1.01 ratio               Assessment and plan  ---------------------------  Dig toxicity  severe malnutrition  abscess in the back awaiting surgery/IR  ?ecg  check ppm  dvt prophylaxis  continue with Remeron  oob to chair/physical therapy
CARDIOLOGY     PROGRESS  NOTE   ________________________________________________    CHIEF COMPLAINT:Patient is a 88y old  Female who presents with a chief complaint of AMS, decrease PO, abd pain/nausea (05 Sep 2019 17:25)    	  REVIEW OF SYSTEMS:  CONSTITUTIONAL: No fever, weight loss, or fatigue  EYES: No eye pain, visual disturbances, or discharge  ENT:  No difficulty hearing, tinnitus, vertigo; No sinus or throat pain  NECK: No pain or stiffness  RESPIRATORY: No cough, wheezing, chills or hemoptysis; No Shortness of Breath  CARDIOVASCULAR: No chest pain, palpitations, passing out, dizziness, or leg swelling  GASTROINTESTINAL: No abdominal or epigastric pain. No nausea, vomiting, or hematemesis; No diarrhea or constipation. No melena or hematochezia.  GENITOURINARY: No dysuria, frequency, hematuria, or incontinence  NEUROLOGICAL: No headaches, memory loss, loss of strength, numbness, or tremors  SKIN: No itching, burning, rashes, or lesions   LYMPH Nodes: No enlarged glands  ENDOCRINE: No heat or cold intolerance; No hair loss  MUSCULOSKELETAL: No joint pain or swelling; No muscle, back, or extremity pain  PSYCHIATRIC: No depression, anxiety, mood swings, or difficulty sleeping  HEME/LYMPH: No easy bruising, or bleeding gums  ALLERGY AND IMMUNOLOGIC: No hives or eczema	    [ ] All others negative	  [ ] Unable to obtain    PHYSICAL EXAM:  T(C): 36.5 (09-06-19 @ 06:15), Max: 36.9 (09-05-19 @ 21:12)  HR: 90 (09-06-19 @ 06:15) (68 - 90)  BP: 146/84 (09-06-19 @ 06:15) (113/74 - 146/84)  RR: 18 (09-06-19 @ 06:15) (18 - 18)  SpO2: 99% (09-06-19 @ 06:15) (99% - 100%)  Wt(kg): --  I&O's Summary    05 Sep 2019 07:01  -  06 Sep 2019 07:00  --------------------------------------------------------  IN: 660 mL / OUT: 300 mL / NET: 360 mL        Appearance: Normal	  HEENT:   Normal oral mucosa, PERRL, EOMI	  Lymphatic: No lymphadenopathy  Cardiovascular: Normal S1 S2, No JVD, + murmurs, No edema  Respiratory:rhonchi	  Psychiatry: A & O x 3, Mood & affect appropriate  Gastrointestinal:  Soft, Non-tender, + BS	  Skin: No rashes, No ecchymoses, No cyanosis	  Neurologic: Non-focal  Extremities: Normal range of motion, No clubbing, cyanosis or edema  Vascular: Peripheral pulses palpable 2+ bilaterally    MEDICATIONS  (STANDING):  aspirin enteric coated 81 milliGRAM(s) Oral daily  dextrose 5%. 1000 milliLiter(s) (50 mL/Hr) IV Continuous <Continuous>  dextrose 50% Injectable 12.5 Gram(s) IV Push once  dextrose 50% Injectable 25 Gram(s) IV Push once  dextrose 50% Injectable 25 Gram(s) IV Push once  heparin  Injectable 5000 Unit(s) SubCutaneous every 12 hours  influenza   Vaccine 0.5 milliLiter(s) IntraMuscular once  insulin lispro (HumaLOG) corrective regimen sliding scale   SubCutaneous three times a day before meals  latanoprost 0.005% Ophthalmic Solution 1 Drop(s) Both EYES at bedtime  mirtazapine 7.5 milliGRAM(s) Oral at bedtime  pantoprazole    Tablet 40 milliGRAM(s) Oral before breakfast      TELEMETRY: 	    ECG:  	  RADIOLOGY:  OTHER: 	  	  LABS:	 	    CARDIAC MARKERS:                                11.5   6.02  )-----------( 85       ( 05 Sep 2019 08:20 )             34.7     09-06    137  |  102  |  19  ----------------------------<  143<H>  4.4   |  24  |  0.73    Ca    9.2      06 Sep 2019 06:39  Phos  4.0     09-05  Mg     2.2     09-06      proBNP: Serum Pro-Brain Natriuretic Peptide: 2032 pg/mL (09-01 @ 22:07)  Serum Pro-Brain Natriuretic Peptide: 2324 pg/mL (09-01 @ 16:57)    Lipid Profile:   HgA1c:   TSH: Thyroid Stimulating Hormone, Serum: 2.42 uU/mL (09-01 @ 16:57)    PT/INR - ( 04 Sep 2019 18:06 )   PT: 11.6 sec;   INR: 1.01 ratio               Assessment and plan  ---------------------------  s./p drainage of the subcutaneous collection  awaiting cultures/results  increase nutrition  dvt prophylaxis  physical therapy  dc planning
CARDIOLOGY     PROGRESS  NOTE   ________________________________________________    CHIEF COMPLAINT:Patient is a 88y old  Female who presents with a chief complaint of AMS, decrease PO, abd pain/nausea (06 Sep 2019 17:40)  doing better.  	  REVIEW OF SYSTEMS:  CONSTITUTIONAL: No fever, weight loss, or fatigue  EYES: No eye pain, visual disturbances, or discharge  ENT:  No difficulty hearing, tinnitus, vertigo; No sinus or throat pain  NECK: No pain or stiffness  RESPIRATORY: No cough, wheezing, chills or hemoptysis; No Shortness of Breath  CARDIOVASCULAR: No chest pain, palpitations, passing out, dizziness, or leg swelling  GASTROINTESTINAL: No abdominal or epigastric pain. No nausea, vomiting, or hematemesis; No diarrhea or constipation. No melena or hematochezia.  GENITOURINARY: No dysuria, frequency, hematuria, or incontinence  NEUROLOGICAL: No headaches, memory loss, loss of strength, numbness, or tremors  SKIN: No itching, burning, rashes, or lesions   LYMPH Nodes: No enlarged glands  ENDOCRINE: No heat or cold intolerance; No hair loss  MUSCULOSKELETAL: No joint pain or swelling; No muscle, back, or extremity pain  PSYCHIATRIC: No depression, anxiety, mood swings, or difficulty sleeping  HEME/LYMPH: No easy bruising, or bleeding gums  ALLERGY AND IMMUNOLOGIC: No hives or eczema	    [ ] All others negative	  [x ] Unable to obtain    PHYSICAL EXAM:  T(C): 36.7 (09-07-19 @ 05:31), Max: 36.7 (09-07-19 @ 05:31)  HR: 88 (09-07-19 @ 05:31) (88 - 90)  BP: 123/78 (09-07-19 @ 05:31) (103/68 - 123/78)  RR: 18 (09-07-19 @ 05:31) (16 - 18)  SpO2: 98% (09-07-19 @ 05:31) (97% - 99%)  Wt(kg): --  I&O's Summary    06 Sep 2019 07:01  -  07 Sep 2019 07:00  --------------------------------------------------------  IN: 460 mL / OUT: 200 mL / NET: 260 mL        Appearance: Normal	  HEENT:   Normal oral mucosa, PERRL, EOMI	  Lymphatic: No lymphadenopathy  Cardiovascular: Normal S1 S2, No JVD, + murmurs, No edema  Respiratory: Lungs clear to auscultation	  Psychiatry: A & O x 3, Mood & affect appropriate  Gastrointestinal:  Soft, Non-tender, + BS	  Skin: No rashes, No ecchymoses, No cyanosis	  Neurologic: Non-focal  Extremities: Normal range of motion, No clubbing, cyanosis or edema  Vascular: Peripheral pulses palpable 2+ bilaterally    MEDICATIONS  (STANDING):  aspirin enteric coated 81 milliGRAM(s) Oral daily  cephalexin 500 milliGRAM(s) Oral every 12 hours  dextrose 5%. 1000 milliLiter(s) (50 mL/Hr) IV Continuous <Continuous>  dextrose 50% Injectable 12.5 Gram(s) IV Push once  dextrose 50% Injectable 25 Gram(s) IV Push once  dextrose 50% Injectable 25 Gram(s) IV Push once  heparin  Injectable 5000 Unit(s) SubCutaneous every 12 hours  influenza   Vaccine 0.5 milliLiter(s) IntraMuscular once  insulin lispro (HumaLOG) corrective regimen sliding scale   SubCutaneous three times a day before meals  latanoprost 0.005% Ophthalmic Solution 1 Drop(s) Both EYES at bedtime  mirtazapine 7.5 milliGRAM(s) Oral at bedtime  pantoprazole    Tablet 40 milliGRAM(s) Oral before breakfast      TELEMETRY: 	    ECG:  	  RADIOLOGY:  OTHER: 	  	  LABS:	 	    CARDIAC MARKERS:                                13.2   8.21  )-----------( 91       ( 06 Sep 2019 08:46 )             40.5     09-07    137  |  101  |  23  ----------------------------<  142<H>  4.1   |  26  |  0.74    Ca    9.4      07 Sep 2019 07:08  Phos  1.7     09-07  Mg     2.1     09-07      proBNP: Serum Pro-Brain Natriuretic Peptide: 2032 pg/mL (09-01 @ 22:07)  Serum Pro-Brain Natriuretic Peptide: 2324 pg/mL (09-01 @ 16:57)    Lipid Profile:   HgA1c:   TSH: Thyroid Stimulating Hormone, Serum: 2.42 uU/mL (09-01 @ 16:57)    Culture - Abscess with Gram Stain (09.05.19 @ 17:22)    Specimen Source: .Abscess    Culture Results:   No growth    Culture - Blood (05.31.19 @ 22:36)    Specimen Source: .Blood    Culture Results:   No growth at 5 days.          Assessment and plan  ---------------------------  dig toxicity  continue current meds  s/p drainage of abscess culture are all negative  dc abx  dvt prophylaxis  dc planning  malnutrition add ensure
CARDIOLOGY     PROGRESS  NOTE   ________________________________________________    CHIEF COMPLAINT:Patient is a 88y old  Female who presents with a chief complaint of AMS, decrease PO, abd pain/nausea (07 Sep 2019 10:11)  no complain.  	  REVIEW OF SYSTEMS:  CONSTITUTIONAL: No fever, weight loss, or fatigue  EYES: No eye pain, visual disturbances, or discharge  ENT:  No difficulty hearing, tinnitus, vertigo; No sinus or throat pain  NECK: No pain or stiffness  RESPIRATORY: No cough, wheezing, chills or hemoptysis; No Shortness of Breath  CARDIOVASCULAR: No chest pain, palpitations, passing out, dizziness, or leg swelling  GASTROINTESTINAL: No abdominal or epigastric pain. No nausea, vomiting, or hematemesis; No diarrhea or constipation. No melena or hematochezia.  GENITOURINARY: No dysuria, frequency, hematuria, or incontinence  NEUROLOGICAL: No headaches, memory loss, loss of strength, numbness, or tremors  SKIN: No itching, burning, rashes, or lesions   LYMPH Nodes: No enlarged glands  ENDOCRINE: No heat or cold intolerance; No hair loss  MUSCULOSKELETAL: No joint pain or swelling; No muscle, back, or extremity pain  PSYCHIATRIC: No depression, anxiety, mood swings, or difficulty sleeping  HEME/LYMPH: No easy bruising, or bleeding gums  ALLERGY AND IMMUNOLOGIC: No hives or eczema	    [ ] All others negative	  [ ] Unable to obtain    PHYSICAL EXAM:  T(C): 36.8 (09-08-19 @ 04:57), Max: 36.9 (09-07-19 @ 22:41)  HR: 89 (09-08-19 @ 04:57) (89 - 93)  BP: 126/81 (09-08-19 @ 04:57) (116/76 - 145/85)  RR: 19 (09-08-19 @ 04:57) (18 - 19)  SpO2: 100% (09-08-19 @ 04:57) (97% - 100%)  Wt(kg): --  I&O's Summary    07 Sep 2019 07:01  -  08 Sep 2019 07:00  --------------------------------------------------------  IN: 220 mL / OUT: 300 mL / NET: -80 mL        Appearance: Normal	  HEENT:   Normal oral mucosa, PERRL, EOMI	  Lymphatic: No lymphadenopathy  Cardiovascular: Normal S1 S2, No JVD, + murmurs, No edema  Respiratory: Lungs clear to auscultation	  Psychiatry: A & O x 3, Mood & affect appropriate  Gastrointestinal:  Soft, Non-tender, + BS	  Skin: No rashes, No ecchymoses, No cyanosis	  Neurologic: Non-focal  Extremities: Normal range of motion, No clubbing, cyanosis or edema  Vascular: Peripheral pulses palpable 2+ bilaterally    MEDICATIONS  (STANDING):  aspirin enteric coated 81 milliGRAM(s) Oral daily  cephalexin 500 milliGRAM(s) Oral every 12 hours  dextrose 5%. 1000 milliLiter(s) (50 mL/Hr) IV Continuous <Continuous>  dextrose 50% Injectable 12.5 Gram(s) IV Push once  dextrose 50% Injectable 25 Gram(s) IV Push once  dextrose 50% Injectable 25 Gram(s) IV Push once  heparin  Injectable 5000 Unit(s) SubCutaneous every 12 hours  influenza   Vaccine 0.5 milliLiter(s) IntraMuscular once  insulin lispro (HumaLOG) corrective regimen sliding scale   SubCutaneous three times a day before meals  latanoprost 0.005% Ophthalmic Solution 1 Drop(s) Both EYES at bedtime  mirtazapine 7.5 milliGRAM(s) Oral at bedtime  pantoprazole    Tablet 40 milliGRAM(s) Oral before breakfast      TELEMETRY: 	    ECG:  	  RADIOLOGY:  OTHER: 	  	  LABS:	 	    CARDIAC MARKERS:                                11.8   5.61  )-----------( 90       ( 07 Sep 2019 10:35 )             37.2     09-07    137  |  101  |  23  ----------------------------<  142<H>  4.1   |  26  |  0.74    Ca    9.4      07 Sep 2019 07:08  Phos  1.7     09-07  Mg     2.1     09-07      proBNP: Serum Pro-Brain Natriuretic Peptide: 2032 pg/mL (09-01 @ 22:07)  Serum Pro-Brain Natriuretic Peptide: 2324 pg/mL (09-01 @ 16:57)    Lipid Profile:   HgA1c:   TSH: Thyroid Stimulating Hormone, Serum: 2.42 uU/mL (09-01 @ 16:57)  Culture - Abscess with Gram Stain (09.05.19 @ 17:22)    Specimen Source: .Abscess    Culture Results:   Few Anaerobic Gram Positive Cocci Unable to Indentify further    < from: Xray Chest 1 View AP/PA (09.01.19 @ 17:36) >  Improvement in right basilar opacity. However, because it has not   resolved, follow-up CT is advised to exclude underlying mass.    Cardiomegaly. Pacemaker.    < end of copied text >          Assessment and plan  ---------------------------  dig toxicity has resolved  abnormal chest x ray, will order chest ct scan  asa daily  increase ambulation/nutrition  continue current meds  dvt prophylaxis
CARDIOLOGY     PROGRESS  NOTE   ________________________________________________    CHIEF COMPLAINT:Patient is a 88y old  Female who presents with a chief complaint of AMS, decrease PO, abd pain/nausea (08 Sep 2019 11:34)  no complain.  	  REVIEW OF SYSTEMS:  CONSTITUTIONAL: No fever, weight loss, or fatigue  EYES: No eye pain, visual disturbances, or discharge  ENT:  No difficulty hearing, tinnitus, vertigo; No sinus or throat pain  NECK: No pain or stiffness  RESPIRATORY: No cough, wheezing, chills or hemoptysis; No Shortness of Breath  CARDIOVASCULAR: No chest pain, palpitations, passing out, dizziness, or leg swelling  GASTROINTESTINAL: No abdominal or epigastric pain. No nausea, vomiting, or hematemesis; No diarrhea or constipation. No melena or hematochezia.  GENITOURINARY: No dysuria, frequency, hematuria, or incontinence  NEUROLOGICAL: No headaches, memory loss, loss of strength, numbness, or tremors  SKIN: No itching, burning, rashes, or lesions   LYMPH Nodes: No enlarged glands  ENDOCRINE: No heat or cold intolerance; No hair loss  MUSCULOSKELETAL: No joint pain or swelling; No muscle, back, or extremity pain  PSYCHIATRIC: No depression, anxiety, mood swings, or difficulty sleeping  HEME/LYMPH: No easy bruising, or bleeding gums  ALLERGY AND IMMUNOLOGIC: No hives or eczema	    [ ] All others negative	  [ ] Unable to obtain    PHYSICAL EXAM:  T(C): 36.5 (09-09-19 @ 05:03), Max: 37 (09-08-19 @ 21:46)  HR: 92 (09-09-19 @ 05:03) (80 - 98)  BP: 135/82 (09-09-19 @ 05:03) (105/70 - 144/86)  RR: 18 (09-09-19 @ 05:03) (18 - 18)  SpO2: 100% (09-09-19 @ 05:03) (99% - 100%)  Wt(kg): --  I&O's Summary    08 Sep 2019 07:01  -  09 Sep 2019 07:00  --------------------------------------------------------  IN: 600 mL / OUT: 500 mL / NET: 100 mL        Appearance: Normal	  HEENT:   Normal oral mucosa, PERRL, EOMI	  Lymphatic: No lymphadenopathy  Cardiovascular: Normal S1 S2, No JVD,= murmurs, No edema  Respiratory: Lungs clear to auscultation	  Psychiatry: A & O x 3, Mood & affect appropriate  Gastrointestinal:  Soft, Non-tender, + BS	  Skin: No rashes, No ecchymoses, No cyanosis	  Neurologic: Non-focal  Extremities: Normal range of motion, No clubbing, cyanosis or edema  Vascular: Peripheral pulses palpable 2+ bilaterally    MEDICATIONS  (STANDING):  aspirin enteric coated 81 milliGRAM(s) Oral daily  cephalexin 500 milliGRAM(s) Oral every 12 hours  dextrose 5%. 1000 milliLiter(s) (50 mL/Hr) IV Continuous <Continuous>  dextrose 50% Injectable 12.5 Gram(s) IV Push once  dextrose 50% Injectable 25 Gram(s) IV Push once  dextrose 50% Injectable 25 Gram(s) IV Push once  heparin  Injectable 5000 Unit(s) SubCutaneous every 12 hours  influenza   Vaccine 0.5 milliLiter(s) IntraMuscular once  insulin lispro (HumaLOG) corrective regimen sliding scale   SubCutaneous three times a day before meals  latanoprost 0.005% Ophthalmic Solution 1 Drop(s) Both EYES at bedtime  mirtazapine 7.5 milliGRAM(s) Oral at bedtime  pantoprazole    Tablet 40 milliGRAM(s) Oral before breakfast      TELEMETRY: 	    ECG:  	  RADIOLOGY:  OTHER: 	  	  LABS:	 	    CARDIAC MARKERS:                                11.8   5.61  )-----------( 90       ( 07 Sep 2019 10:35 )             37.2           proBNP: Serum Pro-Brain Natriuretic Peptide: 2032 pg/mL (09-01 @ 22:07)  Serum Pro-Brain Natriuretic Peptide: 2324 pg/mL (09-01 @ 16:57)    Lipid Profile:   HgA1c:   TSH: Thyroid Stimulating Hormone, Serum: 2.42 uU/mL (09-01 @ 16:57)    < from: CT Chest No Cont (09.08.19 @ 13:27) >  PLEURA: Trace right pleural effusion.    MEDIASTINUM AND ROLF: No lymphadenopathy.    VESSELS: Thoracic aorta and pulmonary artery normal in diameter. Aortic   atherosclerosis.    HEART: The heart is mildly enlarged.. No pericardial effusion. Calcific   coronary atherosclerosis versus stents. Severe mitral annulus   calcification. Mild aortic valve leaflet calcifications. Tips of the   leads of the right pacer within the right atrium and right ventricle.    CHEST WALL AND LOWER NECK: Heterogeneous thyroid gland with multiple   partially calcified nodules.    VISUALIZED UPPER ABDOMEN: Aortic atherosclerosis. Hyperdense liver   parenchyma possibly secondary to amiodarone.    BONES: Within normal limits.    IMPRESSION:     Mucoid impaction and subsegmental atelectasis within the lateral segment   of right middle lobe, decreased from prior. Recommend follow-up chest CT   in 3 months to determine resolution.    < end of copied text >        Assessment and plan  ---------------------------  ct chest noted  appetite is improving  continue current meds  dc keflex  dc to rehab

## 2019-09-09 NOTE — PROGRESS NOTE ADULT - ASSESSMENT
_________________________________________________________________________________________  ========>>  M E D I C A L   A T T E N D I N G    F O L L O W  U P  N O T E  <<=========  -----------------------------------------------------------------------------------------------------    - Patient seen and examined by me earlier today.   - In summary,  DERRICK ELIAS is a 88y year old woman admitted for dig toxicity  - Patient today overall doing ok, comfortable, eating better    ==================>> REVIEW OF SYSTEM <<=================    GEN: no fever, no chills,  lower back pain improved per pt  RESP: no SOB, no cough, no sputum  CVS: no chest pain, no palpitations, no edema  GI: no abdominal pain, no nausea, diarrhea as above   : no dysuria  Neuro: no headache, no dizziness  Derm : no itching, no rash    ==================>> PHYSICAL EXAM <<=================    GEN: A&O X 2 , NAD , comfortable, , cachectic, in bed ( pt encouraged to be more OOB with assist and eat better)   HEENT: NCAT, PERRL, MMM, hearing intact  Neck: supple , no JVD  CVS: S1S2 , regular , No M/R/G appreciated  PULM: CTA B/L,  no W/R/R appreciated  ABD.: soft. non tender, non distended,  bowel sounds present  Extrem: intact pulses , no edema   PSYCH : normal mood,  not anxious       ==================>> MEDICATIONS <<====================    aspirin enteric coated 81 milliGRAM(s) Oral daily  dextrose 5%. 1000 milliLiter(s) IV Continuous <Continuous>  dextrose 50% Injectable 12.5 Gram(s) IV Push once  dextrose 50% Injectable 25 Gram(s) IV Push once  dextrose 50% Injectable 25 Gram(s) IV Push once  heparin  Injectable 5000 Unit(s) SubCutaneous every 12 hours  influenza   Vaccine 0.5 milliLiter(s) IntraMuscular once  insulin lispro (HumaLOG) corrective regimen sliding scale   SubCutaneous three times a day before meals  latanoprost 0.005% Ophthalmic Solution 1 Drop(s) Both EYES at bedtime  mirtazapine 7.5 milliGRAM(s) Oral at bedtime  pantoprazole    Tablet 40 milliGRAM(s) Oral before breakfast  potassium phosphate / sodium phosphate powder 1 Packet(s) Oral four times a day    MEDICATIONS  (PRN):  acetaminophen   Tablet .. 650 milliGRAM(s) Oral every 6 hours PRN Mild Pain (1 - 3)  dextrose 40% Gel 15 Gram(s) Oral once PRN Blood Glucose LESS THAN 70 milliGRAM(s)/deciliter  glucagon  Injectable 1 milliGRAM(s) IntraMuscular once PRN Glucose LESS THAN 70 milligrams/deciliter    ==================>> VITAL SIGNS <<==================    Vital Signs Last 24 Hrs  T(C): 36.4 (09-09-19 @ 14:30)  T(F): 97.6 (09-09-19 @ 14:30), Max: 98.6 (09-08-19 @ 21:46)  HR: 87 (09-09-19 @ 14:30) (87 - 98)  BP: 108/67 (09-09-19 @ 14:30)  RR: 18 (09-09-19 @ 14:30) (18 - 18)  SpO2: 94% (09-09-19 @ 14:30) (94% - 100%)      POCT Blood Glucose.: 151 mg/dL (09 Sep 2019 12:23)  POCT Blood Glucose.: 116 mg/dL (09 Sep 2019 08:05)  POCT Blood Glucose.: 104 mg/dL (08 Sep 2019 16:59)     ==================>> LAB AND IMAGING <<==================       09-09    138  |  103  |  20  ----------------------------<  120<H>  4.2   |  28  |  0.73    Ca    9.7      09 Sep 2019 10:56  Phos  2.4     09-09      WBC count:   5.61 <<== ,  8.21 <<== ,  6.02 <<== ,  7.0 <<==   Hemoglobin:   11.8 <<==,  13.2 <<==,  11.5 <<==,  12.3 <<==  platelets:  90 <==, 91 <==, 85 <==, 94 <==    Creatinine:  0.73  <<==, 0.74  <<==, 0.73  <<==, 0.72  <<==, 0.69  <<==  Sodium:   138  <==, 137  <==, 137  <==, 139  <==, 134  <==    _______________________  C U L T U R E S :    Culture - Abscess with Gram Stain (collected 05 Sep 2019 17:22)  Source: .Abscess  Final Report (07 Sep 2019 21:51):    Few Anaerobic Gram Positive Cocci Unable to Indentify further    ___________________________________________________________________________________  ===============>>  A S S E S S M E N T   A N D   P L A N <<===============  ------------------------------------------------------------------------------------------    ** collection of lower back: likely infected sebaceous cyst     likely abscess as per IR notes: purulent fluid      IR appreciated: post drainage      local care, pain mgmt,      agree with abx as started >> would treat for 7 days     ** Digoxin toxicity;   resolved,    no bradycardia noted    med mgmt otherwise per cardio     monitor    ** dysphagia, failure to thrive, severe protein calorie malnutrition and cachexia     encourage PO intake as discussed with pt    pt already on soft / puree diet ( as doesn't have teeth)     nutritional supplements    nutrition f/u and mgmt     remeron     MBS noted : continue dysphagia diet with aspiration precautions     **Diabetes II, stable  ---monitor finger sticks closely  ---Continue Insulin regimen as above monitor  ---Diabetic diet  ---A1c controlled     ** pt with h/o HepC - Cirrhosis and GIB with chronic anemia     monitor H/H closely    PPI     ** Chronic atrial fibrillation    currently rate controlled, off Dig    monitor     cardio mgmt appreciated    off AC due to prior GIB    Elevated troponin: unclear etiology, improved    -GI/DVT Prophylaxis.  -Dispo planing in process     --------------------------------------------  Case discussed with NP  ___________________________  Will follow with you.  Thank you,  CINDY Rivera D.O.  Pager: [phone]

## 2019-09-09 NOTE — DISCHARGE NOTE NURSING/CASE MANAGEMENT/SOCIAL WORK - PATIENT PORTAL LINK FT
You can access the FollowMyHealth Patient Portal offered by [facility] by registering at the following website: [email or web address]. By joining [employer]’s FollowMyHealth portal, you will also be able to view your health information using other applications (apps) compatible with our system.

## 2019-09-09 NOTE — PROGRESS NOTE ADULT - REASON FOR ADMISSION
AMS, decrease PO, abd pain/nausea

## 2019-09-12 ENCOUNTER — EMERGENCY (EMERGENCY)
Facility: HOSPITAL | Age: 84
LOS: 1 days | Discharge: HOME | End: 2019-09-12
Attending: EMERGENCY MEDICINE
Payer: MEDICARE

## 2019-09-12 VITALS
TEMPERATURE: 98 F | OXYGEN SATURATION: 99 % | WEIGHT: 80.03 LBS | RESPIRATION RATE: 18 BRPM | SYSTOLIC BLOOD PRESSURE: 111 MMHG | HEART RATE: 91 BPM | DIASTOLIC BLOOD PRESSURE: 72 MMHG

## 2019-09-12 DIAGNOSIS — Z90.721 ACQUIRED ABSENCE OF OVARIES, UNILATERAL: Chronic | ICD-10-CM

## 2019-09-12 DIAGNOSIS — Z90.49 ACQUIRED ABSENCE OF OTHER SPECIFIED PARTS OF DIGESTIVE TRACT: Chronic | ICD-10-CM

## 2019-09-12 DIAGNOSIS — Z95.0 PRESENCE OF CARDIAC PACEMAKER: Chronic | ICD-10-CM

## 2019-09-12 PROCEDURE — 10060 I&D ABSCESS SIMPLE/SINGLE: CPT

## 2019-09-12 PROCEDURE — 99284 EMERGENCY DEPT VISIT MOD MDM: CPT

## 2019-09-12 RX ORDER — ACETAMINOPHEN 500 MG
650 TABLET ORAL ONCE
Refills: 0 | Status: COMPLETED | OUTPATIENT
Start: 2019-09-12 | End: 2019-09-12

## 2019-09-12 RX ORDER — CEPHALEXIN 500 MG
500 CAPSULE ORAL ONCE
Refills: 0 | Status: COMPLETED | OUTPATIENT
Start: 2019-09-12 | End: 2019-09-12

## 2019-09-12 RX ADMIN — Medication 1 TABLET(S): at 23:16

## 2019-09-12 RX ADMIN — Medication 650 MILLIGRAM(S): at 23:16

## 2019-09-12 RX ADMIN — Medication 500 MILLIGRAM(S): at 23:16

## 2019-09-12 NOTE — ED PROVIDER NOTE - EYES, MLM
Clear bilaterally, pupils equal, round and reactive to light.
substance induced mood disorder v depressive disorder  substance intoxication

## 2019-09-12 NOTE — ED PROVIDER NOTE - PMH
A-fib  no Eliquis since 1/2019  Constipation    Gastric AVM  EGD 2/2019 - cauterized  GIB (gastrointestinal bleeding)  2/2019 requiring 2 PRBCs  Glaucoma    Graves disease  no treatment as per patient and family  Hepatitis C virus  not treated  HTN (hypertension)    Multiple falls    Pacemaker  medtronic ADDRL1  2011  PNA (pneumonia)  2/2019   treated with ABX while in [facility]  T2DM (type 2 diabetes mellitus)  last A1c 7.8   4/11/19  Vertigo

## 2019-09-12 NOTE — ED PROVIDER NOTE - CONTEXT
Pt was hospitalized at [facility] x1 week ago where they performed I&D but the abscess has reoccurred

## 2019-09-12 NOTE — ED PROVIDER NOTE - PATIENT PORTAL LINK FT
You can access the FollowMyHealth Patient Portal offered by [facility] by registering at the following website: [email or web address]. By joining [email or web address]’s FollowMyHealth portal, you will also be able to view your health information using other applications (apps) compatible with our system.

## 2019-09-12 NOTE — DIETITIAN INITIAL EVALUATION ADULT. - PERTINENT MEDS FT
Medication:    Outpatient Medications Marked as Taking for the 8/19/19 encounter (Refill) with Laine Frederick MD   Medication Sig Dispense Refill   • amphetamine-dextroamphetamine XR (ADDERALL XR) 15 MG 24 hr capsule Take 1 capsule by mouth daily. Do not start before July 2, 2019. 30 capsule 0       Message to Prescriber:     [x] Pharmacy has been verified.    [] Patient completely out of medication (*Route encounter as high priority if checked)    [x] Patient informed refill request can take up to 3 business days to be processed    Patient currently has follow up appointment scheduled      
Pt filled Rx on 8/21/19.  
reviewed   MEDICATIONS  (STANDING):  amiodarone    Tablet 200 milliGRAM(s) Oral daily  aspirin enteric coated 81 milliGRAM(s) Oral daily  azithromycin  IVPB 500 milliGRAM(s) IV Intermittent every 24 hours  azithromycin  IVPB      cefTRIAXone   IVPB 1000 milliGRAM(s) IV Intermittent every 24 hours  dextrose 5%. 1000 milliLiter(s) (50 mL/Hr) IV Continuous <Continuous>  dextrose 50% Injectable 12.5 Gram(s) IV Push once  dextrose 50% Injectable 25 Gram(s) IV Push once  dextrose 50% Injectable 25 Gram(s) IV Push once  digoxin     Tablet 0.125 milliGRAM(s) Oral daily  docusate sodium 100 milliGRAM(s) Oral two times a day  furosemide   Injectable 40 milliGRAM(s) IV Push two times a day  heparin  Injectable 5000 Unit(s) SubCutaneous every 12 hours  insulin lispro (HumaLOG) corrective regimen sliding scale   SubCutaneous Before meals and at bedtime  insulin lispro Injectable (HumaLOG) 3 Unit(s) SubCutaneous three times a day before meals  latanoprost 0.005% Ophthalmic Solution 1 Drop(s) Both EYES at bedtime  metoprolol succinate ER 25 milliGRAM(s) Oral daily  multivitamin 1 Tablet(s) Oral daily  senna 2 Tablet(s) Oral at bedtime    MEDICATIONS  (PRN):  acetaminophen   Tablet .. 650 milliGRAM(s) Oral every 6 hours PRN Mild Pain (1 - 3)  dextrose 40% Gel 15 Gram(s) Oral once PRN Blood Glucose LESS THAN 70 milliGRAM(s)/deciLiter  glucagon  Injectable 1 milliGRAM(s) IntraMuscular once PRN Glucose <70 milliGRAM(s)/deciLiter

## 2019-09-12 NOTE — ED PROVIDER NOTE - CLINICAL SUMMARY MEDICAL DECISION MAKING FREE TEXT BOX
Patient presents to the ED with cutaneous abscess on lower back but does not have fever and is well appearing. Will perform I&D. Will prescribe Bactrim and well extend her course of Cephalexin.

## 2019-09-12 NOTE — ED PROVIDER NOTE - OBJECTIVE STATEMENT
88 year old female with PMHx of asthma and HTN presents to the ED with c/o cutaneous abscess on lower back x several days w/ pain and swelling. No recently active drainage. Pt was hospitalized at [facility] x1 week ago where they performed I&D. Pt temporarily improved but abscess has reoccurred. Pt denies fever, chills, or any other acute complaints. Pt currently taking Cephalexin antibiotics. NKDA.

## 2019-09-12 NOTE — ED ADULT NURSE NOTE - NSIMPLEMENTINTERV_GEN_ALL_ED
Implemented All Universal Safety Interventions:  [place] to call system. Call bell, personal items and telephone within reach. Instruct patient to call for assistance. Room bathroom lighting operational. Non-slip footwear when patient is off stretcher. Physically safe environment: no spills, clutter or unnecessary equipment. Stretcher in lowest position, wheels locked, appropriate side rails in place.

## 2019-09-12 NOTE — ED ADULT NURSE NOTE - PAIN RATING/NUMBER SCALE (0-10): REST
Spiritual Plan of Care     provided support to pt's daughter Mila outside pt's room.  Daughter reports pt's clergy has visited.   will follow up with pt on Friday per daughter's request.  Additional  support available prn.      Pt Name: Alicia Briones  Pt : 10/27/1931  Date: May 3, 2017    Referral source: Family    Reason for visit: Spiritual/Baptism/Ritual Support, Emotional Support    Visited with: Family/Friend    Family/Friend Assessment: Anxious, Coping, Relies on fede, Other (Overwhelmed)    Family/Friend  Intervention:  Support, Empathic Listening    Spiritual Plan of Care: Routine follow up, Follow up if requested    Patient Reported Outcome:  Reduced anxiety     5

## 2019-09-13 VITALS
DIASTOLIC BLOOD PRESSURE: 66 MMHG | HEART RATE: 88 BPM | TEMPERATURE: 98 F | OXYGEN SATURATION: 98 % | SYSTOLIC BLOOD PRESSURE: 115 MMHG | RESPIRATION RATE: 18 BRPM

## 2019-09-13 PROCEDURE — 99283 EMERGENCY DEPT VISIT LOW MDM: CPT | Mod: 25

## 2019-09-13 PROCEDURE — 87070 CULTURE OTHR SPECIMN AEROBIC: CPT

## 2019-09-13 PROCEDURE — 87205 SMEAR GRAM STAIN: CPT

## 2019-09-13 PROCEDURE — 10060 I&D ABSCESS SIMPLE/SINGLE: CPT

## 2019-09-13 PROCEDURE — 87075 CULTR BACTERIA EXCEPT BLOOD: CPT

## 2019-09-13 RX ORDER — CEPHALEXIN 500 MG
1 CAPSULE ORAL
Qty: 10 | Refills: 0
Start: 2019-09-13 | End: 2019-09-17

## 2019-09-13 RX ORDER — AZTREONAM 2 G
1 VIAL (EA) INJECTION
Qty: 14 | Refills: 0
Start: 2019-09-13 | End: 2019-09-19

## 2019-09-24 NOTE — H&P ADULT - PROBLEM/PLAN-5
From: Lizz Storey  To: KIRAN Yuen  Sent: 9/24/2019 5:04 AM PDT  Subject: Non-Urgent Medical Question    Sorry I think I spelt wrong... Orilissa  2pills a day.. One in am one pm...   ----- Message -----  From: KIRAN Yuen  Sent: 9/23/2019 6:41 PM PDT  To: Lizz Storey  Subject: RE: Non-Urgent Medical Question  I'm not finding orlissa in our system. Is that the correct spelling?  I really like Dr. Mira Phillips, she is with [facility] [phone]    ----- Message -----   From: Lizz Storey   Sent: 9/23/2019 2:22 PM PDT   To: KIRAN Yuen  Subject: Non-Urgent Medical Question    i am.. i have ibuprofen 800.. and im on orlissa which we switched from lupron and lupron was awesome! but i was on it for about 2 1/2 yrs.. i would appreciate a refill from you since i called them again and havent heard back.. i would love to know of a specialist you recommend! i was trying to wait to have kids but im not sure thats in the cards for me...  ----- Message -----  From: KIRAN uYen  Sent: 9/23/2019 8:54 AM PDT  To: Lizz Storey  Subject: RE: Non-Urgent Medical Question  Samuel Zamudio,  We can see you in the office if you need refills of the pain medication. Ultimately I question if your endometriosis treatment needs to be changes, which I would have to defer to your specialist. Are you taking ibuprofen as well?  Sharon LUIS       ----- Message -----   From: Lizz Storey   Sent: 9/20/2019 1:03 PM PDT   To: KIRAN Yuen  Subject: Non-Urgent Medical Question    aso... july called my gyno EVRYDAY!!! and never got a call back until he of course left the office this afternoon.. do you recommend someone because i dont want to have to rely on the er to help my pain, i feel like thats the whole reason i have a gyno!! thank you for your help i just dont know what to do! i  ----- Message -----  From: Poly Mcdowell,  KIRAN  Sent: 9/17/2019 2:33 PM PDT  To: Lizz Storey  Subject: RE: Non-Urgent Medical Question  Keep calling! If you really get in a bind let us know.    ----- Message -----   From: Lizz Prakash Storey   Sent: 9/17/2019 2:21 PM PDT   To: KIRAN Yuen  Subject: Non-Urgent Medical Question    ok, heres hoping that someone from his office actually follows up!!   ----- Message -----  From: KIRAN Yuen  Sent: 9/17/2019 1:30 PM PDT  To: Lizz Storey  Subject: RE: Non-Urgent Medical Question  The treatment that they have you on now is more than I can manage in primary care, so it would be preferable for you to continue to see him for this issue if possible.    ----- Message -----   From: Lizz Storey   Sent: 9/17/2019 10:39 AM PDT   To: KIRAN Yuen  Subject: Non-Urgent Medical Question    good morning dr weaver!!! long time no talk!! july been doing VERY well since the last time i saw you and cut out izzy-aid and HIGH sugar content and more h2o and have not had an issue (knock on wood) since i had my ultrasound!! i have however been in er on Saturday at [facility]. i saw dr allen who was extremely gentle and awesome , i saw her for endometriosis pain that was and still is uncontrolled july contacted dr fink my gyno but he hasnt gotten back to me but i also dont want to have to go back to er if he doesn't follow up with me. so my question is, i know your not a gyno but if he doesn't contact me for my visit and a plan am i able to see you as a back up for female issues? sorry for the drawn out message!           DISPLAY PLAN FREE TEXT

## 2019-10-01 PROCEDURE — 92611 MOTION FLUOROSCOPY/SWALLOW: CPT

## 2019-10-01 PROCEDURE — 82947 ASSAY GLUCOSE BLOOD QUANT: CPT

## 2019-10-01 PROCEDURE — 82330 ASSAY OF CALCIUM: CPT

## 2019-10-01 PROCEDURE — 97530 THERAPEUTIC ACTIVITIES: CPT

## 2019-10-01 PROCEDURE — 85014 HEMATOCRIT: CPT

## 2019-10-01 PROCEDURE — 84100 ASSAY OF PHOSPHORUS: CPT

## 2019-10-01 PROCEDURE — 97161 PT EVAL LOW COMPLEX 20 MIN: CPT

## 2019-10-01 PROCEDURE — 85027 COMPLETE CBC AUTOMATED: CPT

## 2019-10-01 PROCEDURE — 94640 AIRWAY INHALATION TREATMENT: CPT

## 2019-10-01 PROCEDURE — 83880 ASSAY OF NATRIURETIC PEPTIDE: CPT

## 2019-10-01 PROCEDURE — 80053 COMPREHEN METABOLIC PANEL: CPT

## 2019-10-01 PROCEDURE — 83605 ASSAY OF LACTIC ACID: CPT

## 2019-10-01 PROCEDURE — 92610 EVALUATE SWALLOWING FUNCTION: CPT

## 2019-10-01 PROCEDURE — 87205 SMEAR GRAM STAIN: CPT

## 2019-10-01 PROCEDURE — 84132 ASSAY OF SERUM POTASSIUM: CPT

## 2019-10-01 PROCEDURE — 84295 ASSAY OF SERUM SODIUM: CPT

## 2019-10-01 PROCEDURE — 80048 BASIC METABOLIC PNL TOTAL CA: CPT

## 2019-10-01 PROCEDURE — 85610 PROTHROMBIN TIME: CPT

## 2019-10-01 PROCEDURE — 84484 ASSAY OF TROPONIN QUANT: CPT

## 2019-10-01 PROCEDURE — 82435 ASSAY OF BLOOD CHLORIDE: CPT

## 2019-10-01 PROCEDURE — 93005 ELECTROCARDIOGRAM TRACING: CPT

## 2019-10-01 PROCEDURE — 10005 FNA BX W/US GDN 1ST LES: CPT

## 2019-10-01 PROCEDURE — C1729: CPT

## 2019-10-01 PROCEDURE — 82803 BLOOD GASES ANY COMBINATION: CPT

## 2019-10-01 PROCEDURE — 80162 ASSAY OF DIGOXIN TOTAL: CPT

## 2019-10-01 PROCEDURE — 97116 GAIT TRAINING THERAPY: CPT

## 2019-10-01 PROCEDURE — 87070 CULTURE OTHR SPECIMN AEROBIC: CPT

## 2019-10-01 PROCEDURE — 76705 ECHO EXAM OF ABDOMEN: CPT

## 2019-10-01 PROCEDURE — 82962 GLUCOSE BLOOD TEST: CPT

## 2019-10-01 PROCEDURE — 71250 CT THORAX DX C-: CPT

## 2019-10-01 PROCEDURE — 74230 X-RAY XM SWLNG FUNCJ C+: CPT

## 2019-10-01 PROCEDURE — 83735 ASSAY OF MAGNESIUM: CPT

## 2019-10-01 PROCEDURE — 99285 EMERGENCY DEPT VISIT HI MDM: CPT | Mod: 25

## 2020-01-06 NOTE — PATIENT PROFILE ADULT - ...
Psychiatric Complaint





- HPI Summary


HPI Summary: 


12 y/o female presented to [facility] complaining of suicidal ideation and a plan 

for self-inflicting cuts to her wrists worsening today. She tried to hurt 

herself with a razor but was stopped by her mother. She has been trying to find 

a counselor and is currently on a wait list. She sees a provider for her mental 

health; she has been on Zoloft for 2 months and Wellbutrin for 1-2 weeks. She 

has never been to the ED for her mental health. She denies any HI.





- History Of Current Complaint


Chief Complaint: EDSuicidal


Time Seen by Provider: 01/06/20 17:06


Hx Obtained From: Patient


Hx Last Menstrual Period: 2/19/19


Onset/Duration: Still Present


Timing: Constant


Severity Currently: Moderate


Character: Depressed


Aggravating Factor(s): Nothing


Alleviating Factor(s): Nothing


Associated Signs And Symptoms: Positive: Negative


Related History: Positive For: Prior Psychiatric Issues


Has Suicidal: Reports: Thoughts, With A Plan - cutting wrists





- Allergies/Home Medications


Allergies/Adverse Reactions: 


 Allergies











Allergy/AdvReac Type Severity Reaction Status Date / Time


 


amoxicillin Allergy Intermediate Rash Verified 01/06/20 17:05











Home Medications: 


 Home Medications





Bupropion XL* [Wellbutrin XL *] 150 mg PO DAILY 01/06/20 [History Confirmed 01/ 06/20]


Sertraline* [Zoloft*] 50 - 100 mg PO DAILY 01/06/20 [History Confirmed 01/06/20]











PMH/Surg Hx/FS Hx/Imm Hx


Endocrine/Hematology History: 


   Denies: Hx Diabetes, Hx Thyroid Disease


Cardiovascular History: 


   Denies: Hx Hypertension


Respiratory History: Reports: Hx Asthma


   Denies: Hx Chronic Obstructive Pulmonary Disease (COPD)


GI History: 


   Denies: Hx Ulcer


Psychiatric History: Reports: Hx Depression





- Surgical History


Surgical History: None


Surgery Procedure, Year, and Place: none


Infectious Disease History: No


Infectious Disease History: 


   Denies: Hx Hepatitis, Hx Human Immunodeficiency Virus (HIV), History Other 

Infectious Disease, Traveled Outside the [place] in Last 30 Days





- Family History


Known Family History: 


   Negative: Hypertension, Diabetes





- Social History


Alcohol Use: None


Hx Substance Use: No


Substance Use Type: Reports: None


Hx Tobacco Use: No


Smoking Status (MU): Never Smoked Tobacco





Review of Systems


Negative: Other - self-inflicted lacerations


Positive: Depressed, Other - SI with plan for cutting wrists; Negative: HI


All Other Systems Reviewed And Are Negative: Yes





Physical Exam





- Summary


Physical Exam Summary: 





Appearance: The patient is well-nourished in no acute distress and in no acute 

pain.





Skin: The skin is warm and dry, and skin color reflects adequate perfusion.





HEENT: The head is normocephalic and atraumatic. The pupils are equal and 

reactive. The conjunctivae are clear and without drainage. Nares are patent and 

without drainage. Mouth reveals moist mucous membranes, and the throat is 

without erythema and exudate. The external ears are intact. The ear canals are 

patent and without drainage. The tympanic membranes are intact.





Neck: The neck is supple with full range of motion and non-tender. There are no 

carotid bruits. There is no neck vein distension.





Respiratory: Chest is non-tender. Lungs are clear to auscultation and breath 

sounds are symmetrical and equal.





Cardiovascular: Heart is regular rate and rhythm. There is no murmur or rub 

auscultated. There is no peripheral edema and pulses are symmetrical and equal.





Abdomen: The abdomen is soft and non-tender. There are normal bowel sounds 

heard in all four quadrants and there is no organomegaly palpated.





Musculoskeletal: There is no back tenderness noted. Extremities are non-tender 

with full range of motion. There is good capillary refill. There is no 

peripheral edema or calf tenderness elicited.





Neurological: Patient is alert and oriented to person, place and time. The 

patient has symmetrical motor strength in all four extremities. Cranial nerves 

are grossly intact. Deep tendon reflexes are symmetrical and equal in all four 

extremities.





Psychiatric: The patient has an appropriate affect and does not exhibit any 

anxiety or depression.


Triage Information Reviewed: Yes


Vital Signs On Initial Exam: 


 Initial Vitals











Temp Pulse Resp BP Pulse Ox


 


 98.3 F   110   15   136/93   99 


 


 01/06/20 16:57  01/06/20 16:57  01/06/20 16:57  01/06/20 16:57  01/06/20 16:57











Vital Signs Reviewed: Yes





Procedures





- Sedation


Patient Received Moderate/Deep Sedation with Procedure: No





Diagnostics





- Vital Signs


 Vital Signs











  Temp Pulse Resp BP Pulse Ox


 


 01/06/20 16:57  98.3 F  110  15  136/93  99














- Laboratory


Lab Statement: Any lab studies that have been ordered have been reviewed, and 

results considered in the medical decision making process.





Re-Evaluation





- Re-Evaluation


  ** First Eval


Re-Evaluation Time: 17:25


Comment: Pt medically clear for MHE.





Course/Dx





- Course


Course Of Treatment: Diana was medically cleared in the emergency department 

and went to the flex unit for mental health eval.  We're awaiting a mental 

health evaluation at this time..





- Differential Dx/Clinical Impression


Provider Diagnosis: 


 Depression








Discharge ED





- Sign-Out/Discharge


Documenting (check all that apply): Sign-Out Patient


Signing out patient TO: Italo Robbins - pending MHE





- Discharge Plan


Condition: Stable


Referrals: 


Joaquín Marti MD [Primary Care Provider] - 





- Billing Disposition and Condition


Condition: STABLE





- Attestation Statements


Document Initiated by Kelliibe: Yes


Documenting Scribe: Diana Morgan


Provider For Whom Stephaniee is Documenting (Include Credential): Dr. Italo Segura MD


Scribe Attestation: 


Diana CUADRA scribed for Dr. Italo Segura MD on 01/06/20 at 2150. 


Scribe Documentation Reviewed: Yes


Provider Attestation: 


The documentation as recorded by the Diana granados accurately reflects 

the service I personally performed and the decisions made by me, Dr. Italo Segura MD


Status of Scribe Document: Viewed 11-Apr-2019 03:52:40

## 2020-06-03 ENCOUNTER — OUTPATIENT (OUTPATIENT)
Dept: OUTPATIENT SERVICES | Facility: HOSPITAL | Age: 85
LOS: 1 days | End: 2020-06-03
Payer: MEDICARE

## 2020-06-03 ENCOUNTER — APPOINTMENT (OUTPATIENT)
Dept: ULTRASOUND IMAGING | Facility: HOSPITAL | Age: 85
End: 2020-06-03
Payer: MEDICARE

## 2020-06-03 DIAGNOSIS — Z90.49 ACQUIRED ABSENCE OF OTHER SPECIFIED PARTS OF DIGESTIVE TRACT: Chronic | ICD-10-CM

## 2020-06-03 DIAGNOSIS — Z90.721 ACQUIRED ABSENCE OF OVARIES, UNILATERAL: Chronic | ICD-10-CM

## 2020-06-03 DIAGNOSIS — R18.8 OTHER ASCITES: ICD-10-CM

## 2020-06-03 DIAGNOSIS — Z95.0 PRESENCE OF CARDIAC PACEMAKER: Chronic | ICD-10-CM

## 2020-06-03 PROCEDURE — 76700 US EXAM ABDOM COMPLETE: CPT

## 2020-06-03 PROCEDURE — 76700 US EXAM ABDOM COMPLETE: CPT | Mod: 26

## 2020-06-03 PROCEDURE — 76856 US EXAM PELVIC COMPLETE: CPT

## 2020-06-03 PROCEDURE — 76856 US EXAM PELVIC COMPLETE: CPT | Mod: 26

## 2020-06-06 ENCOUNTER — APPOINTMENT (OUTPATIENT)
Dept: DISASTER EMERGENCY | Facility: CLINIC | Age: 85
End: 2020-06-06

## 2020-06-06 DIAGNOSIS — Z01.818 ENCOUNTER FOR OTHER PREPROCEDURAL EXAMINATION: ICD-10-CM

## 2020-06-06 LAB — SARS-COV-2 N GENE NPH QL NAA+PROBE: NOT DETECTED

## 2020-06-08 ENCOUNTER — OUTPATIENT (OUTPATIENT)
Dept: OUTPATIENT SERVICES | Facility: HOSPITAL | Age: 85
LOS: 1 days | End: 2020-06-08
Payer: MEDICARE

## 2020-06-08 ENCOUNTER — RESULT REVIEW (OUTPATIENT)
Age: 85
End: 2020-06-08

## 2020-06-08 ENCOUNTER — APPOINTMENT (OUTPATIENT)
Dept: INTERVENTIONAL RADIOLOGY/VASCULAR | Facility: HOSPITAL | Age: 85
End: 2020-06-08

## 2020-06-08 DIAGNOSIS — R97.8 OTHER ABNORMAL TUMOR MARKERS: ICD-10-CM

## 2020-06-08 DIAGNOSIS — Z90.49 ACQUIRED ABSENCE OF OTHER SPECIFIED PARTS OF DIGESTIVE TRACT: Chronic | ICD-10-CM

## 2020-06-08 DIAGNOSIS — R18.8 OTHER ASCITES: ICD-10-CM

## 2020-06-08 DIAGNOSIS — Z90.721 ACQUIRED ABSENCE OF OVARIES, UNILATERAL: Chronic | ICD-10-CM

## 2020-06-08 DIAGNOSIS — Z95.0 PRESENCE OF CARDIAC PACEMAKER: Chronic | ICD-10-CM

## 2020-06-08 LAB
B PERT IGG+IGM PNL SER: CLEAR — SIGNIFICANT CHANGE UP
COLOR FLD: SIGNIFICANT CHANGE UP
COMMENT - FLUIDS: SIGNIFICANT CHANGE UP
FLUID INTAKE SUBSTANCE CLASS: SIGNIFICANT CHANGE UP
FLUID SEGMENTED GRANULOCYTES: 30 % — SIGNIFICANT CHANGE UP
GRAM STN FLD: SIGNIFICANT CHANGE UP
LYMPHOCYTES # FLD: 61 % — SIGNIFICANT CHANGE UP
MONOS+MACROS # FLD: 9 % — SIGNIFICANT CHANGE UP
RCV VOL RI: 6 /UL — HIGH (ref 0–5)
SPECIMEN SOURCE: SIGNIFICANT CHANGE UP
TOTAL NUCLEATED CELL COUNT, BODY FLUID: 56 /UL — SIGNIFICANT CHANGE UP
TUBE TYPE: SIGNIFICANT CHANGE UP

## 2020-06-08 PROCEDURE — 88305 TISSUE EXAM BY PATHOLOGIST: CPT | Mod: 26

## 2020-06-08 PROCEDURE — 49083 ABD PARACENTESIS W/IMAGING: CPT

## 2020-06-08 PROCEDURE — 82042 OTHER SOURCE ALBUMIN QUAN EA: CPT

## 2020-06-08 PROCEDURE — 87205 SMEAR GRAM STAIN: CPT

## 2020-06-08 PROCEDURE — 87070 CULTURE OTHR SPECIMN AEROBIC: CPT

## 2020-06-08 PROCEDURE — 89051 BODY FLUID CELL COUNT: CPT

## 2020-06-08 PROCEDURE — 88108 CYTOPATH CONCENTRATE TECH: CPT

## 2020-06-08 PROCEDURE — 88108 CYTOPATH CONCENTRATE TECH: CPT | Mod: 26

## 2020-06-08 PROCEDURE — 87075 CULTR BACTERIA EXCEPT BLOOD: CPT

## 2020-06-08 PROCEDURE — 76942 ECHO GUIDE FOR BIOPSY: CPT

## 2020-06-08 PROCEDURE — C1769: CPT

## 2020-06-08 PROCEDURE — 83615 LACTATE (LD) (LDH) ENZYME: CPT

## 2020-06-08 PROCEDURE — 82945 GLUCOSE OTHER FLUID: CPT

## 2020-06-08 PROCEDURE — 84157 ASSAY OF PROTEIN OTHER: CPT

## 2020-06-08 PROCEDURE — C1729: CPT

## 2020-06-08 PROCEDURE — 88305 TISSUE EXAM BY PATHOLOGIST: CPT

## 2020-06-09 LAB
ALBUMIN FLD-MCNC: 0.8 G/DL — SIGNIFICANT CHANGE UP
GLUCOSE FLD-MCNC: 169 MG/DL — SIGNIFICANT CHANGE UP
LDH SERPL L TO P-CCNC: 80 U/L — SIGNIFICANT CHANGE UP
PROT FLD-MCNC: 2.4 G/DL — SIGNIFICANT CHANGE UP

## 2020-06-11 LAB — NON-GYNECOLOGICAL CYTOLOGY STUDY: SIGNIFICANT CHANGE UP

## 2020-06-13 LAB
CULTURE RESULTS: SIGNIFICANT CHANGE UP
SPECIMEN SOURCE: SIGNIFICANT CHANGE UP

## 2020-08-05 NOTE — ED PROVIDER NOTE - ATTESTATION, MLM
I have reviewed and confirmed nurses' notes for patient's medications, allergies, medical history, and surgical history. No lymphadedenopathy

## 2020-10-16 NOTE — PROGRESS NOTE ADULT - NOSE
no discharge/no deviation
no deviation/no discharge
clear discharge/no discharge/no deviation
no discharge/no deviation
no deviation/no discharge
none

## 2020-11-07 NOTE — ED PROVIDER NOTE - CARE PLAN
Degenerative hip disease  planned for MARLA in future     CAD   abn stress test   cath shows three vessel CAD   plan for cabg     Mod AS  stable   not visualized on repeat echo     HTN  add norvasc     CKD   fu with renal     abn EKG  q waves in anterior leads consistent with old MI     Pre-Operative Cardiac Risk Stratification and Optimization  Based on current ACC/AHA guidelines, patient history and physical exam, the patient is considered to have high risk   given significant 3vcad , will postpone non cardiac surgery till safe after cabg   Principal Discharge DX:	GIB (gastrointestinal bleeding)  Secondary Diagnosis:	PNA (pneumonia)  Secondary Diagnosis:	Dehydration

## 2020-11-16 NOTE — DIETITIAN INITIAL EVALUATION ADULT. - OTHER INFO
Continue Plan of care/no COVID symptom@ this time.,assessment WNL   Pt alert, oriented, well-communicated, lives home PTA; decreased appetite with poor intake, wt loss x 3m, worsening intake x 2wks PTA; Recent admissions with Sever Malnutrition identified 4/13/19 and 6/3/19; wiling to try Ensure supplement

## 2021-01-26 NOTE — ED PROVIDER NOTE - NS ED MD DISPO DISCHARGE CCDA
Health Maintenance Due   Topic Date Due   • DM/CKD Microalbumin  09/26/1949   • DTaP/Tdap/Td Vaccine (1 - Tdap) 09/26/1950   • Shingles Vaccine (1 of 2) 09/26/1981   • Influenza Vaccine (1) 09/01/2020   • Medicare Wellness Visit  06/15/2021       Patient notified of HM topics due, patient declines all immunizations today.           Patient/Caregiver provided printed discharge information.

## 2021-03-16 NOTE — PROGRESS NOTE ADULT - PROBLEM SELECTOR PLAN 7
The patient was called with the results of her iron level which was 18 and her CBC which was abnormal   The patient has a history of iron deficiency anemia  She has never had blood transfusions or iron infusions  She has never had a colonoscopy performed  Patient was referred to Gastroenterology and Hematology  I did recommend to the patient that she contact Hematology to get an appointment ASAP  pt already have HCP/ Ling will form - with DNR/ DNI/ No artificial feeding/ no ABD  family will discuss MOLST  again with pt and until then continue current wishes  Per family pt has poor appetite for a while, resulting in wt loss of 20lbs, dentures  adjustment needed in the future  IV gentle hydration started MOLST done with family and pt -  DNR/ DNI/ No artificial feeding/  determine use of ABD  family will discuss MOLST  again with pt and until then continue current wishes  Per family pt has poor appetite for a while, resulting in wt loss of 20lbs, dentures  adjustment needed in the future  IV gentle hydration started

## 2021-03-29 ENCOUNTER — INPATIENT (INPATIENT)
Facility: HOSPITAL | Age: 86
LOS: 3 days | Discharge: HOME | DRG: 308 | End: 2021-04-02
Attending: INTERNAL MEDICINE | Admitting: INTERNAL MEDICINE
Payer: MEDICARE

## 2021-03-29 VITALS
TEMPERATURE: 98 F | WEIGHT: 89.95 LBS | HEIGHT: 59 IN | HEART RATE: 66 BPM | DIASTOLIC BLOOD PRESSURE: 118 MMHG | RESPIRATION RATE: 20 BRPM | SYSTOLIC BLOOD PRESSURE: 200 MMHG | OXYGEN SATURATION: 99 %

## 2021-03-29 DIAGNOSIS — R42 DIZZINESS AND GIDDINESS: ICD-10-CM

## 2021-03-29 DIAGNOSIS — Z90.49 ACQUIRED ABSENCE OF OTHER SPECIFIED PARTS OF DIGESTIVE TRACT: Chronic | ICD-10-CM

## 2021-03-29 DIAGNOSIS — R27.0 ATAXIA, UNSPECIFIED: ICD-10-CM

## 2021-03-29 DIAGNOSIS — Z29.9 ENCOUNTER FOR PROPHYLACTIC MEASURES, UNSPECIFIED: ICD-10-CM

## 2021-03-29 DIAGNOSIS — Z90.721 ACQUIRED ABSENCE OF OVARIES, UNILATERAL: Chronic | ICD-10-CM

## 2021-03-29 DIAGNOSIS — Z71.89 OTHER SPECIFIED COUNSELING: ICD-10-CM

## 2021-03-29 DIAGNOSIS — R55 SYNCOPE AND COLLAPSE: ICD-10-CM

## 2021-03-29 DIAGNOSIS — I50.9 HEART FAILURE, UNSPECIFIED: ICD-10-CM

## 2021-03-29 DIAGNOSIS — E05.00 THYROTOXICOSIS WITH DIFFUSE GOITER WITHOUT THYROTOXIC CRISIS OR STORM: ICD-10-CM

## 2021-03-29 DIAGNOSIS — Z95.0 PRESENCE OF CARDIAC PACEMAKER: Chronic | ICD-10-CM

## 2021-03-29 DIAGNOSIS — Z90.710 ACQUIRED ABSENCE OF BOTH CERVIX AND UTERUS: Chronic | ICD-10-CM

## 2021-03-29 DIAGNOSIS — E11.9 TYPE 2 DIABETES MELLITUS WITHOUT COMPLICATIONS: ICD-10-CM

## 2021-03-29 DIAGNOSIS — I48.91 UNSPECIFIED ATRIAL FIBRILLATION: ICD-10-CM

## 2021-03-29 DIAGNOSIS — B19.20 UNSPECIFIED VIRAL HEPATITIS C WITHOUT HEPATIC COMA: ICD-10-CM

## 2021-03-29 DIAGNOSIS — J18.9 PNEUMONIA, UNSPECIFIED ORGANISM: ICD-10-CM

## 2021-03-29 LAB
ALBUMIN SERPL ELPH-MCNC: 2.2 G/DL — LOW (ref 3.5–5)
ALP SERPL-CCNC: 130 U/L — HIGH (ref 40–120)
ALT FLD-CCNC: 28 U/L DA — SIGNIFICANT CHANGE UP (ref 10–60)
ANION GAP SERPL CALC-SCNC: 5 MMOL/L — SIGNIFICANT CHANGE UP (ref 5–17)
AST SERPL-CCNC: 45 U/L — HIGH (ref 10–40)
BASOPHILS # BLD AUTO: 0.02 K/UL — SIGNIFICANT CHANGE UP (ref 0–0.2)
BASOPHILS NFR BLD AUTO: 0.3 % — SIGNIFICANT CHANGE UP (ref 0–2)
BILIRUB SERPL-MCNC: 0.3 MG/DL — SIGNIFICANT CHANGE UP (ref 0.2–1.2)
BUN SERPL-MCNC: 15 MG/DL — SIGNIFICANT CHANGE UP (ref 7–18)
CALCIUM SERPL-MCNC: 8.6 MG/DL — SIGNIFICANT CHANGE UP (ref 8.4–10.5)
CHLORIDE SERPL-SCNC: 107 MMOL/L — SIGNIFICANT CHANGE UP (ref 96–108)
CK SERPL-CCNC: 61 U/L — SIGNIFICANT CHANGE UP (ref 21–215)
CO2 SERPL-SCNC: 27 MMOL/L — SIGNIFICANT CHANGE UP (ref 22–31)
CREAT SERPL-MCNC: 0.97 MG/DL — SIGNIFICANT CHANGE UP (ref 0.5–1.3)
EOSINOPHIL # BLD AUTO: 0.13 K/UL — SIGNIFICANT CHANGE UP (ref 0–0.5)
EOSINOPHIL NFR BLD AUTO: 2 % — SIGNIFICANT CHANGE UP (ref 0–6)
GLUCOSE SERPL-MCNC: 161 MG/DL — HIGH (ref 70–99)
HCT VFR BLD CALC: 39.2 % — SIGNIFICANT CHANGE UP (ref 34.5–45)
HGB BLD-MCNC: 13 G/DL — SIGNIFICANT CHANGE UP (ref 11.5–15.5)
IMM GRANULOCYTES NFR BLD AUTO: 0.2 % — SIGNIFICANT CHANGE UP (ref 0–1.5)
LACTATE SERPL-SCNC: 1.7 MMOL/L — SIGNIFICANT CHANGE UP (ref 0.7–2)
LYMPHOCYTES # BLD AUTO: 2.38 K/UL — SIGNIFICANT CHANGE UP (ref 1–3.3)
LYMPHOCYTES # BLD AUTO: 37 % — SIGNIFICANT CHANGE UP (ref 13–44)
MAGNESIUM SERPL-MCNC: 2 MG/DL — SIGNIFICANT CHANGE UP (ref 1.6–2.6)
MCHC RBC-ENTMCNC: 30.2 PG — SIGNIFICANT CHANGE UP (ref 27–34)
MCHC RBC-ENTMCNC: 33.2 GM/DL — SIGNIFICANT CHANGE UP (ref 32–36)
MCV RBC AUTO: 91.2 FL — SIGNIFICANT CHANGE UP (ref 80–100)
MONOCYTES # BLD AUTO: 0.56 K/UL — SIGNIFICANT CHANGE UP (ref 0–0.9)
MONOCYTES NFR BLD AUTO: 8.7 % — SIGNIFICANT CHANGE UP (ref 2–14)
NEUTROPHILS # BLD AUTO: 3.33 K/UL — SIGNIFICANT CHANGE UP (ref 1.8–7.4)
NEUTROPHILS NFR BLD AUTO: 51.8 % — SIGNIFICANT CHANGE UP (ref 43–77)
NRBC # BLD: 0 /100 WBCS — SIGNIFICANT CHANGE UP (ref 0–0)
NT-PROBNP SERPL-SCNC: HIGH PG/ML (ref 0–450)
PLATELET # BLD AUTO: 95 K/UL — LOW (ref 150–400)
POTASSIUM SERPL-MCNC: 3.8 MMOL/L — SIGNIFICANT CHANGE UP (ref 3.5–5.3)
POTASSIUM SERPL-SCNC: 3.8 MMOL/L — SIGNIFICANT CHANGE UP (ref 3.5–5.3)
PROT SERPL-MCNC: 7.3 G/DL — SIGNIFICANT CHANGE UP (ref 6–8.3)
RBC # BLD: 4.3 M/UL — SIGNIFICANT CHANGE UP (ref 3.8–5.2)
RBC # FLD: 14.2 % — SIGNIFICANT CHANGE UP (ref 10.3–14.5)
SARS-COV-2 RNA SPEC QL NAA+PROBE: SIGNIFICANT CHANGE UP
SODIUM SERPL-SCNC: 139 MMOL/L — SIGNIFICANT CHANGE UP (ref 135–145)
TROPONIN I SERPL-MCNC: 0.02 NG/ML — SIGNIFICANT CHANGE UP (ref 0–0.04)
WBC # BLD: 6.43 K/UL — SIGNIFICANT CHANGE UP (ref 3.8–10.5)
WBC # FLD AUTO: 6.43 K/UL — SIGNIFICANT CHANGE UP (ref 3.8–10.5)

## 2021-03-29 PROCEDURE — 70450 CT HEAD/BRAIN W/O DYE: CPT | Mod: 26,MA

## 2021-03-29 PROCEDURE — 71045 X-RAY EXAM CHEST 1 VIEW: CPT | Mod: 26

## 2021-03-29 PROCEDURE — 99285 EMERGENCY DEPT VISIT HI MDM: CPT

## 2021-03-29 RX ORDER — INSULIN LISPRO 100/ML
VIAL (ML) SUBCUTANEOUS AT BEDTIME
Refills: 0 | Status: DISCONTINUED | OUTPATIENT
Start: 2021-03-29 | End: 2021-04-02

## 2021-03-29 RX ORDER — INSULIN LISPRO 100/ML
VIAL (ML) SUBCUTANEOUS
Refills: 0 | Status: DISCONTINUED | OUTPATIENT
Start: 2021-03-29 | End: 2021-04-02

## 2021-03-29 RX ORDER — CEFTRIAXONE 500 MG/1
1000 INJECTION, POWDER, FOR SOLUTION INTRAMUSCULAR; INTRAVENOUS ONCE
Refills: 0 | Status: COMPLETED | OUTPATIENT
Start: 2021-03-29 | End: 2021-03-29

## 2021-03-29 RX ORDER — ATORVASTATIN CALCIUM 80 MG/1
20 TABLET, FILM COATED ORAL AT BEDTIME
Refills: 0 | Status: DISCONTINUED | OUTPATIENT
Start: 2021-03-29 | End: 2021-04-02

## 2021-03-29 RX ORDER — LATANOPROST 0.05 MG/ML
1 SOLUTION/ DROPS OPHTHALMIC; TOPICAL AT BEDTIME
Refills: 0 | Status: DISCONTINUED | OUTPATIENT
Start: 2021-03-29 | End: 2021-04-02

## 2021-03-29 RX ORDER — ENOXAPARIN SODIUM 100 MG/ML
40 INJECTION SUBCUTANEOUS DAILY
Refills: 0 | Status: DISCONTINUED | OUTPATIENT
Start: 2021-03-29 | End: 2021-03-29

## 2021-03-29 RX ORDER — GLUCAGON INJECTION, SOLUTION 0.5 MG/.1ML
1 INJECTION, SOLUTION SUBCUTANEOUS ONCE
Refills: 0 | Status: DISCONTINUED | OUTPATIENT
Start: 2021-03-29 | End: 2021-04-02

## 2021-03-29 RX ORDER — DEXTROSE 50 % IN WATER 50 %
15 SYRINGE (ML) INTRAVENOUS ONCE
Refills: 0 | Status: DISCONTINUED | OUTPATIENT
Start: 2021-03-29 | End: 2021-04-02

## 2021-03-29 RX ORDER — ASPIRIN/CALCIUM CARB/MAGNESIUM 324 MG
81 TABLET ORAL DAILY
Refills: 0 | Status: DISCONTINUED | OUTPATIENT
Start: 2021-03-29 | End: 2021-04-02

## 2021-03-29 RX ORDER — AZITHROMYCIN 500 MG/1
500 TABLET, FILM COATED ORAL ONCE
Refills: 0 | Status: COMPLETED | OUTPATIENT
Start: 2021-03-29 | End: 2021-03-29

## 2021-03-29 RX ORDER — LISINOPRIL 2.5 MG/1
10 TABLET ORAL DAILY
Refills: 0 | Status: DISCONTINUED | OUTPATIENT
Start: 2021-03-29 | End: 2021-04-02

## 2021-03-29 RX ORDER — PANTOPRAZOLE SODIUM 20 MG/1
40 TABLET, DELAYED RELEASE ORAL
Refills: 0 | Status: DISCONTINUED | OUTPATIENT
Start: 2021-03-29 | End: 2021-04-02

## 2021-03-29 RX ORDER — MECLIZINE HCL 12.5 MG
12.5 TABLET ORAL ONCE
Refills: 0 | Status: COMPLETED | OUTPATIENT
Start: 2021-03-29 | End: 2021-03-29

## 2021-03-29 RX ORDER — METOPROLOL TARTRATE 50 MG
25 TABLET ORAL
Refills: 0 | Status: DISCONTINUED | OUTPATIENT
Start: 2021-03-29 | End: 2021-04-02

## 2021-03-29 RX ORDER — MIRTAZAPINE 45 MG/1
15 TABLET, ORALLY DISINTEGRATING ORAL AT BEDTIME
Refills: 0 | Status: DISCONTINUED | OUTPATIENT
Start: 2021-03-29 | End: 2021-04-02

## 2021-03-29 RX ORDER — HEPARIN SODIUM 5000 [USP'U]/ML
5000 INJECTION INTRAVENOUS; SUBCUTANEOUS EVERY 8 HOURS
Refills: 0 | Status: DISCONTINUED | OUTPATIENT
Start: 2021-03-29 | End: 2021-03-29

## 2021-03-29 RX ORDER — AZITHROMYCIN 500 MG/1
500 TABLET, FILM COATED ORAL EVERY 24 HOURS
Refills: 0 | Status: DISCONTINUED | OUTPATIENT
Start: 2021-03-29 | End: 2021-03-30

## 2021-03-29 RX ORDER — INSULIN HUMAN 4-8-12(60)
4 KIT INHALATION
Qty: 0 | Refills: 0 | DISCHARGE

## 2021-03-29 RX ORDER — SODIUM CHLORIDE 9 MG/ML
1000 INJECTION INTRAMUSCULAR; INTRAVENOUS; SUBCUTANEOUS
Refills: 0 | Status: DISCONTINUED | OUTPATIENT
Start: 2021-03-29 | End: 2021-03-30

## 2021-03-29 RX ORDER — METOCLOPRAMIDE HCL 10 MG
10 TABLET ORAL ONCE
Refills: 0 | Status: COMPLETED | OUTPATIENT
Start: 2021-03-29 | End: 2021-03-29

## 2021-03-29 RX ORDER — SODIUM CHLORIDE 9 MG/ML
1000 INJECTION, SOLUTION INTRAVENOUS
Refills: 0 | Status: DISCONTINUED | OUTPATIENT
Start: 2021-03-29 | End: 2021-04-02

## 2021-03-29 RX ORDER — HYDRALAZINE HCL 50 MG
10 TABLET ORAL ONCE
Refills: 0 | Status: COMPLETED | OUTPATIENT
Start: 2021-03-29 | End: 2021-03-29

## 2021-03-29 RX ORDER — CEFTRIAXONE 500 MG/1
1000 INJECTION, POWDER, FOR SOLUTION INTRAMUSCULAR; INTRAVENOUS EVERY 24 HOURS
Refills: 0 | Status: DISCONTINUED | OUTPATIENT
Start: 2021-03-29 | End: 2021-03-30

## 2021-03-29 RX ORDER — LISINOPRIL 2.5 MG/1
5 TABLET ORAL ONCE
Refills: 0 | Status: COMPLETED | OUTPATIENT
Start: 2021-03-29 | End: 2021-03-29

## 2021-03-29 RX ADMIN — SODIUM CHLORIDE 125 MILLILITER(S): 9 INJECTION INTRAMUSCULAR; INTRAVENOUS; SUBCUTANEOUS at 16:53

## 2021-03-29 RX ADMIN — AZITHROMYCIN 255 MILLIGRAM(S): 500 TABLET, FILM COATED ORAL at 16:07

## 2021-03-29 RX ADMIN — SODIUM CHLORIDE 125 MILLILITER(S): 9 INJECTION INTRAMUSCULAR; INTRAVENOUS; SUBCUTANEOUS at 16:52

## 2021-03-29 RX ADMIN — Medication 10 MILLIGRAM(S): at 19:54

## 2021-03-29 RX ADMIN — LISINOPRIL 5 MILLIGRAM(S): 2.5 TABLET ORAL at 23:34

## 2021-03-29 RX ADMIN — SODIUM CHLORIDE 125 MILLILITER(S): 9 INJECTION INTRAMUSCULAR; INTRAVENOUS; SUBCUTANEOUS at 15:59

## 2021-03-29 RX ADMIN — MIRTAZAPINE 15 MILLIGRAM(S): 45 TABLET, ORALLY DISINTEGRATING ORAL at 23:34

## 2021-03-29 RX ADMIN — CEFTRIAXONE 100 MILLIGRAM(S): 500 INJECTION, POWDER, FOR SOLUTION INTRAMUSCULAR; INTRAVENOUS at 16:06

## 2021-03-29 RX ADMIN — Medication 12.5 MILLIGRAM(S): at 15:59

## 2021-03-29 RX ADMIN — Medication 104 MILLIGRAM(S): at 15:59

## 2021-03-29 NOTE — H&P ADULT - PROBLEM SELECTOR PLAN 9
RISK                                                          Points  [] Previous VTE                                           3  [] Thrombophilia                                        2  [] Lower limb paralysis                              2   [] Current Cancer                                       2   [x] Immobilization > 24 hrs                        1  [] ICU/CCU stay > 24 hours                       1  [x] Age > 60                                                   1  Heparin 5000U SQ RISK                                                          Points  [] Previous VTE                                           3  [] Thrombophilia                                        2  [] Lower limb paralysis                              2   [] Current Cancer                                       2   [x] Immobilization > 24 hrs                        1  [] ICU/CCU stay > 24 hours                       1  [x] Age > 60                                                   1  SCD boots

## 2021-03-29 NOTE — H&P ADULT - PROBLEM SELECTOR PLAN 3
- p/w with cough and high blood pressure, Not clear history of fever or SOB   - PE : B/L intermittent crackles and coarse breath sounds,   - Likely 2/2 Pneumonia  - CXR : right-sided infiltrate  - no Leukocytosis  - pt is afebrile  - O2 Sat 98% on room air   - will start on Rocephin and Zithromax for CAP   - Albuterol PRN   - Supportive care and anti-tussives PRN  - supplemental O2 as needed  - Covid negative , f/u flu PCR   - f/u Procalcitonin  - f/u Bl Cx (Specimen received).

## 2021-03-29 NOTE — H&P ADULT - PROBLEM SELECTOR PLAN 4
Patient has echo done in July 2019 which shows EF >55% with normal systolic function  pro-BNP 11k on this admission  Patient was on diuretics earlier due to ascitic secondary to Hep C however it was discontinued outpatient   Currently not in exacerbation   Will get repeat echo,   Daily weights and strict fluid input/output monitoring   Admit to tele floor   Cardio Consult Dr Horn Patient has echo done in July 2019 which shows EF >55% with normal systolic function  pro-BNP 11k on this admission  Patient was on diuretics earlier due to ascitic secondary to Hep C however it was discontinued outpatient   Currently not in exacerbation   Will get repeat echo,   Daily weights and strict fluid input/output monitoring   Pacemaker interrogation in Am ([employer] ADDRL1  2011)  Admit to tele floor   Cardio Consult Dr Horn

## 2021-03-29 NOTE — H&P ADULT - PROBLEM SELECTOR PLAN 2
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 No acute intracranial hemorrhage.  Patient would likely benefit from MRI of the head, will get neurology evaluation for further recommendations   Fall precautions   Will continue Aspirin and start Lipitor 20mg for secondary prophylaxis (in case there is subacute infarct), primary team can make changes as per recommendations  Neurology consult Dr Lamar

## 2021-03-29 NOTE — H&P ADULT - ATTENDING COMMENTS
PATIENT SEEN AND EXAMINED. CASE D/W ER MD AND RESIDENT TEAM. ABOVE ROS/VS/PE REVIEWED AND VERIFIED.    89 y.o. female with h/o NIDDM, pt uses inhalation insulin Afrezza, last dose this am, wheelchair bound, as per daughter in law, pt c/o feeling nausea for past 7 days, dizziness, vertigo, ataxia, near syncope, no vomiting, no fever, recent infection, Pt received J&J vaccine last week. No CP, sob, confusion.  Pt checked her B/P was elevated.    # NEAR SYNCOPE - R/O CVA VS VERTIGO VS ORTHOSTATIC HYPOTENSION - NOTED CT HEAD, F/U MR BRAIN, F/U ECHOCARDIOGRAM, F/U CAROTID U/S, F/U TSH/LIPIDS/HBA1C, ON STATIN, BB, ACE-I, ASA  - NEUROLOGY CONSULT  # HYPERTENSIVE URGENCY - ON BB  # SUSPECT PNEUMONIA - PLACED ON ROCEPHIN + AZITHROMYCIN, F/U BCX  # THROMBOCYTOPENIA - SUSPECTED ITP, HEME/ONC CONSULT IN PROGRESS  # A.FIB W/ PPM  # HEART FAILURE - ELEVATED PRO-BNP - F/U ECHOCARDIOGRAM  # NIDDM  # HX OF GRAVES DX  # GI PPX    BK KAUFFMAN MD COVERING SANTO KAUFFMNA MD

## 2021-03-29 NOTE — H&P ADULT - PROBLEM SELECTOR MLM WRITE BOX
<?xml version="1.0" encoding="utf-16"?>  <ArrayOfAnyType xmlns:xsi="http://www.[employer].org/2001/XMLSchema-instance" xmlns:xsd="http://www.[employer].org/2001/XMLSchema">    <anyType xsi:type="ProblemHealthIssueRecord">      <isDummyHealthIssue>false</isDummyHealthIssue>      <OnsetYear>2021</OnsetYear>      <OnsetDay>29</OnsetDay>      <OnsetMonth>3</OnsetMonth>      <ExpectedResYear>0</ExpectedResYear>      <ExpectedResDay>0</ExpectedResDay>      <ExpectedResMonth>0</ExpectedResMonth>      <DescText>Near syncope</DescText>      <TouchedBy>[id number]</TouchedBy>      <TouchedWhen>3/29/2021 11:43:31 PM</TouchedWhen>      <CreatedBy>[email or web address]</CreatedBy>      <CreatedWhen>3/29/2021 11:43:31 PM</CreatedWhen>      <CodedHIGUID>[id number]</CodedHIGUID>      <HITypeGUID />      <GUID>[id number]</GUID>      <Description>Near syncope</Description>      <OrigDescription>Syncope and collapse</OrigDescription>      <ShortName>Near syncope</ShortName>      <TypeCode>PROBLEM/DX</TypeCode>      <Status>Active</Status>      <ScopeLevel>This Chart</ScopeLevel>      <Code>R55</Code>      <CodingScheme>I10</CodingScheme>      <TermUID>[id number]</TermUID>      <IMOCode>735986</IMOCode>      <THJ1Uyim>780.2</PKW6Pfuz>      <BRB97Gngk>R55</BRL94Ncin>      <ATPTermShortName />      <SNOMEDCTCode>156635295</SNOMEDCTCode>      <Discontinue>false</Discontinue>      <Modify>false</Modify>      <IsFavorite>false</IsFavorite>      <ShowQualifier>true</ShowQualifier>      <Selected>true</Selected>      <Revalidate>true</Revalidate>      <IsWhenPresent>false</IsWhenPresent>      <CreateAsRuleOut>false</CreateAsRuleOut>      <ProblemIdx>1</ProblemIdx>      <DisplayName>Near syncope</DisplayName>      <DoNotCreate>false</DoNotCreate>    </anyType>    <anyType xsi:type="ProblemHealthIssueRecord">      <isDummyHealthIssue>false</isDummyHealthIssue>      <OnsetYear>2021</OnsetYear>      <OnsetDay>29</OnsetDay>      <OnsetMonth>3</OnsetMonth>      <ExpectedResYear>0</ExpectedResYear>      <ExpectedResDay>0</ExpectedResDay>      <ExpectedResMonth>0</ExpectedResMonth>      <DescText>Ataxia</DescText>      <TouchedBy>[id number]_[email or web address]</TouchedBy>      <TouchedWhen>3/29/2021 11:43:32 PM</TouchedWhen>      <CreatedBy>[id number]</CreatedBy>      <CreatedWhen>3/29/2021 11:43:32 PM</CreatedWhen>      <CodedHIGUID>[id number]</CodedHIGUID>      <HITypeGUID />      <GUID>[id number]</GUID>      <Description>Ataxia</Description>      <OrigDescription>Ataxia, unspecified</OrigDescription>      <ShortName>Ataxia</ShortName>      <TypeCode>PROBLEM/DX</TypeCode>      <Status>Active</Status>      <ScopeLevel>This Chart</ScopeLevel>      <Code>R27.0</Code>      <CodingScheme>I10</CodingScheme>      <TermUID>[id number]</TermUID>      <IMOCode>30484</IMOCode>      <BOU5Vwnf>781.3</GXC1Fmlw>      <FNO91Hirz>R27.0</KTO26Kjej>      <ATPTermShortName />      <SNOMEDCTCode>05541318</SNOMEDCTCode>      <Discontinue>false</Discontinue>      <Modify>false</Modify>      <IsFavorite>false</IsFavorite>      <ShowQualifier>true</ShowQualifier>      <Selected>true</Selected>      <Revalidate>true</Revalidate>      <IsWhenPresent>false</IsWhenPresent>      <CreateAsRuleOut>false</CreateAsRuleOut>      <ProblemIdx>2</ProblemIdx>      <DisplayName>Ataxia</DisplayName>      <DoNotCreate>false</DoNotCreate>    </anyType>    <anyType xsi:type="ProblemHealthIssueRecord">      <isDummyHealthIssue>false</isDummyHealthIssue>      <OnsetYear>2021</OnsetYear>      <OnsetDay>29</OnsetDay>      <OnsetMonth>3</OnsetMonth>      <ExpectedResYear>0</ExpectedResYear>      <ExpectedResDay>0</ExpectedResDay>      <ExpectedResMonth>0</ExpectedResMonth>      <DescText>PNA (pneumonia)</DescText>      <TouchedBy>[id number]</TouchedBy>      <TouchedWhen>3/29/2021 9:21:19 PM</TouchedWhen>      <CreatedBy>[id number]_[email or web address]</CreatedBy>      <CreatedWhen>3/29/2021 9:21:19 PM</CreatedWhen>      <CodedHIGUID>[id number]</CodedHIGUID>      <HITypeGUID />      <GUID>[id number]</GUID>      <Description>PNA (pneumonia)</Description>      <OrigDescription>Pneumonia, unspecified organism</OrigDescription>      <ShortName>PNA (pneumonia)</ShortName>      <TypeCode>PROBLEM/DX</TypeCode>      <Status>Active</Status>      <ScopeLevel>This Chart</ScopeLevel>      <Code>J18.9</Code>      <CodingScheme>I10</CodingScheme>      <TermUID>[id number]</TermUID>      <IMOCode>3078549</IMOCode>      <AIZ8Puta>486</GLX1Whvx>      <WQM03Belb>J18.9</KUM77Dcrh>      <ATPTermShortName />      <SNOMEDCTCode>140548947</SNOMEDCTCode>      <Discontinue>false</Discontinue>      <Modify>false</Modify>      <IsFavorite>false</IsFavorite>      <ShowQualifier>true</ShowQualifier>      <Selected>true</Selected>      <Revalidate>true</Revalidate>      <IsWhenPresent>false</IsWhenPresent>      <CreateAsRuleOut>false</CreateAsRuleOut>      <ProblemIdx>3</ProblemIdx>      <DisplayName>PNA (pneumonia)</DisplayName>      <DoNotCreate>false</DoNotCreate>    </anyType>    <anyType xsi:type="ProblemHealthIssueRecord">      <isDummyHealthIssue>false</isDummyHealthIssue>      <OnsetYear>2021</OnsetYear>      <OnsetDay>29</OnsetDay>      <OnsetMonth>3</OnsetMonth>      <ExpectedResYear>0</ExpectedResYear>      <ExpectedResDay>0</ExpectedResDay>      <ExpectedResMonth>0</ExpectedResMonth>      <DescText>CHF (congestive heart failure)</DescText>      <TouchedBy>[id number]</TouchedBy>      <TouchedWhen>3/29/2021 9:21:19 PM</TouchedWhen>      <CreatedBy>[id number]_[email or web address]</CreatedBy>      <CreatedWhen>3/29/2021 9:21:19 PM</CreatedWhen>      <CodedHIGUID>[id number]</CodedHIGUID>      <HITypeGUID />      <GUID>[id number]</GUID>      <Description>CHF (congestive heart failure)</Description>      <OrigDescription>Heart failure, unspecified</OrigDescription>      <ShortName>CHF (congestive heart failure)</ShortName>      <TypeCode>PROBLEM/DX</TypeCode>      <Status>Active</Status>      <ScopeLevel>This Chart</ScopeLevel>      <Code>I50.9</Code>      <CodingScheme>I10</CodingScheme>      <TermUID>[id number]</TermUID>      <IMOCode>79072</IMOCode>      <DDF4Onis>428.0</WCF2Znrj>      <VGE46Yctt>I50.9</QIS32Dung>      <ATPTermShortName />      <SNOMEDCTCode>72670176</SNOMEDCTCode>      <Discontinue>false</Discontinue>      <Modify>false</Modify>      <IsFavorite>false</IsFavorite>      <ShowQualifier>true</ShowQualifier>      <Selected>true</Selected>      <Revalidate>true</Revalidate>      <IsWhenPresent>false</IsWhenPresent>      <CreateAsRuleOut>false</CreateAsRuleOut>      <ProblemIdx>4</ProblemIdx>      <DisplayName>CHF (congestive heart failure)</DisplayName>      <DoNotCreate>false</DoNotCreate>    </anyType>    <anyType xsi:type="ProblemHealthIssueRecord">      <isDummyHealthIssue>false</isDummyHealthIssue>      <OnsetYear>2021</OnsetYear>      <OnsetDay>29</OnsetDay>      <OnsetMonth>3</OnsetMonth>      <ExpectedResYear>0</ExpectedResYear>      <ExpectedResDay>0</ExpectedResDay>      <ExpectedResMonth>0</ExpectedResMonth>      <DescText>T2DM (type 2 diabetes mellitus)</DescText>      <TouchedBy>[id number]_[email or web address]</TouchedBy>      <TouchedWhen>3/29/2021 9:21:19 PM</TouchedWhen>      <CreatedBy>[id number]</CreatedBy>      <CreatedWhen>3/29/2021 9:21:19 PM</CreatedWhen>      <CodedHIGUID>[id number]</CodedHIGUID>      <HITypeGUID />      <GUID>[id number]</GUID>      <Description>T2DM (type 2 diabetes mellitus)</Description>      <OrigDescription>Type 2 diabetes mellitus without complications</OrigDescription>      <ShortName>T2DM (type 2 diabetes mellitus)</ShortName>      <TypeCode>PROBLEM/DX</TypeCode>      <Status>Active</Status>      <ScopeLevel>This Chart</ScopeLevel>      <Code>E11.9</Code>      <CodingScheme>I10</CodingScheme>      <TermUID>[id number]</TermUID>      <IMOCode>579902</IMOCode>      <KWM2Puzw>250.00</PCS1Sxvm>      <JPP95Nsta>E11.9</PUF65Icnb>      <ATPTermShortName />      <SNOMEDCTCode>96438756</SNOMEDCTCode>      <Discontinue>false</Discontinue>      <Modify>false</Modify>      <IsFavorite>false</IsFavorite>      <ShowQualifier>true</ShowQualifier>      <Selected>true</Selected>      <Revalidate>true</Revalidate>      <IsWhenPresent>false</IsWhenPresent>      <CreateAsRuleOut>false</CreateAsRuleOut>      <ProblemIdx>5</ProblemIdx>      <DisplayName>T2DM (type 2 diabetes mellitus)</DisplayName>      <DoNotCreate>false</DoNotCreate>    </anyType>    <anyType xsi:type="ProblemHealthIssueRecord">      <isDummyHealthIssue>false</isDummyHealthIssue>      <OnsetYear>2021</OnsetYear>      <OnsetDay>29</OnsetDay>      <OnsetMonth>3</OnsetMonth>      <ExpectedResYear>0</ExpectedResYear>      <ExpectedResDay>0</ExpectedResDay>      <ExpectedResMonth>0</ExpectedResMonth>      <DescText>Hepatitis C infection</DescText>      <TouchedBy>[id number]</TouchedBy>      <TouchedWhen>3/29/2021 11:43:32 PM</TouchedWhen>      <CreatedBy>[id number]</CreatedBy>      <CreatedWhen>3/29/2021 11:43:32 PM</CreatedWhen>      <CodedHIGUID>[id number]</CodedHIGUID>      <HITypeGUID />      <GUID>[id number]</GUID>      <Description>Hepatitis C infection</Description>      <OrigDescription>Unspecified viral hepatitis C without hepatic coma</OrigDescription>      <ShortName>Hepatitis C infection</ShortName>      <TypeCode>PROBLEM/DX</TypeCode>      <Status>Active</Status>      <ScopeLevel>This Chart</ScopeLevel>      <Code>B19.20</Code>      <CodingScheme>I10</CodingScheme>      <TermUID>[id number]</TermUID>      <IMOCode>0671851</IMOCode>      <KAI7Lquk>070.70</IAJ5Gocr>      <BKU82Xkla>B19.20</VGJ29Qoir>      <ATPTermShortName />      <SNOMEDCTCode>24244391</SNOMEDCTCode>      <Discontinue>false</Discontinue>      <Modify>false</Modify>      <IsFavorite>false</IsFavorite>      <ShowQualifier>true</ShowQualifier>      <Selected>true</Selected>      <Revalidate>true</Revalidate>      <IsWhenPresent>false</IsWhenPresent>      <CreateAsRuleOut>false</CreateAsRuleOut>      <ProblemIdx>6</ProblemIdx>      <DisplayName>Hepatitis C infection</DisplayName>      <DoNotCreate>false</DoNotCreate>    </anyType>    <anyType xsi:type="ProblemHealthIssueRecord">      <isDummyHealthIssue>false</isDummyHealthIssue>      <OnsetYear>2021</OnsetYear>      <OnsetDay>29</OnsetDay>      <OnsetMonth>3</OnsetMonth>      <ExpectedResYear>0</ExpectedResYear>      <ExpectedResDay>0</ExpectedResDay>      <ExpectedResMonth>0</ExpectedResMonth>      <DescText>Graves disease</DescText>      <TouchedBy>[id number]</TouchedBy>      <TouchedWhen>3/29/2021 9:21:20 PM</TouchedWhen>      <CreatedBy>[email or web address]_[email or web address]</CreatedBy>      <CreatedWhen>3/29/2021 9:21:20 PM</CreatedWhen>      <CodedHIGUID>[id number]</CodedHIGUID>      <HITypeGUID />      <GUID>[id number]</GUID>      <Description>Graves disease</Description>      <OrigDescription>Thyrotoxicosis with diffuse goiter without thyrotoxic crisis or storm</OrigDescription>      <ShortName>Graves disease</ShortName>      <TypeCode>PROBLEM/DX</TypeCode>      <Status>Active</Status>      <ScopeLevel>This Chart</ScopeLevel>      <Code>E05.00</Code>      <CodingScheme>I10</CodingScheme>      <TermUID>[id number]</TermUID>      <IMOCode>51607</IMOCode>      <EAE5Igse>242.00</EOY6Etca>      <NIT99Nmhh>E05.00</FHS76Bmmx>      <ATPTermShortName />      <SNOMEDCTCode>872566070</SNOMEDCTCode>      <Discontinue>false</Discontinue>      <Modify>false</Modify>      <IsFavorite>false</IsFavorite>      <ShowQualifier>true</ShowQualifier>      <Selected>true</Selected>      <Revalidate>true</Revalidate>      <IsWhenPresent>false</IsWhenPresent>      <CreateAsRuleOut>false</CreateAsRuleOut>      <ProblemIdx>7</ProblemIdx>      <DisplayName>Graves disease</DisplayName>      <DoNotCreate>false</DoNotCreate>    </anyType>    <anyType xsi:type="ProblemHealthIssueRecord">      <isDummyHealthIssue>false</isDummyHealthIssue>      <OnsetYear>2021</OnsetYear>      <OnsetDay>29</OnsetDay>      <OnsetMonth>3</OnsetMonth>      <ExpectedResYear>0</ExpectedResYear>      <ExpectedResDay>0</ExpectedResDay>      <ExpectedResMonth>0</ExpectedResMonth>      <DescText>A-fib</DescText>      <TouchedBy>[email or web address]</TouchedBy>      <TouchedWhen>3/29/2021 9:21:20 PM</TouchedWhen>      <CreatedBy>[email or web address]</CreatedBy>      <CreatedWhen>3/29/2021 9:21:20 PM</CreatedWhen>      <CodedHIGUID>[id number]</CodedHIGUID>      <HITypeGUID />      <GUID>[id number]</GUID>      <Description>A-fib</Description>      <OrigDescription>Unspecified atrial fibrillation</OrigDescription>      <ShortName>A-fib</ShortName>      <TypeCode>PROBLEM/DX</TypeCode>      <Status>Active</Status>      <ScopeLevel>This Chart</ScopeLevel>      <Code>I48.91</Code>      <CodingScheme>I10</CodingScheme>      <TermUID>[id number]</TermUID>      <IMOCode>62773</IMOCode>      <IZE7Ktzb>427.31</CTN8Xiqc>      <FQI97Fksn>I48.91</IGY61Tqgm>      <ATPTermShortName />      <SNOMEDCTCode>58620061</SNOMEDCTCode>      <Discontinue>false</Discontinue>      <Modify>false</Modify>      <IsFavorite>false</IsFavorite>      <ShowQualifier>true</ShowQualifier>      <Selected>true</Selected>      <Revalidate>true</Revalidate>      <IsWhenPresent>false</IsWhenPresent>      <CreateAsRuleOut>false</CreateAsRuleOut>      <ProblemIdx>8</ProblemIdx>      <DisplayName>A-fib</DisplayName>      <DoNotCreate>false</DoNotCreate>    </anyType>    <anyType xsi:type="ProblemHealthIssueRecord">      <isDummyHealthIssue>false</isDummyHealthIssue>      <OnsetYear>2021</OnsetYear>      <OnsetDay>29</OnsetDay>      <OnsetMonth>3</OnsetMonth>      <ExpectedResYear>0</ExpectedResYear>      <ExpectedResDay>0</ExpectedResDay>      <ExpectedResMonth>0</ExpectedResMonth>      <DescText>Prophylactic measure</DescText>      <TouchedBy>[id number]</TouchedBy>      <TouchedWhen>3/29/2021 9:21:20 PM</TouchedWhen>      <CreatedBy>[email or web address]</CreatedBy>      <CreatedWhen>3/29/2021 9:21:20 PM</CreatedWhen>      <CodedHIGUID>61127264756</CodedHIGUID>      <HITypeGUID />      <GUID>[id number]</GUID>      <Description>Prophylactic measure</Description>      <OrigDescription>Encounter for prophylactic measures, unspecified</OrigDescription>      <ShortName>Prophylactic measure</ShortName>      <TypeCode>PROBLEM/DX</TypeCode>      <Status>Active</Status>      <ScopeLevel>This Chart</ScopeLevel>      <Code>Z29.9</Code>      <CodingScheme>I10</CodingScheme>      <TermUID>[id number]</TermUID>      <IMOCode>046520</IMOCode>      <MUT3Nqtq>V07.9</YCS8Xecl>      <ZTD98Wbbv>Z29.9</REE25Nrja>      <ATPTermShortName />      <SNOMEDCTCode>862960185</SNOMEDCTCode>      <Discontinue>false</Discontinue>      <Modify>false</Modify>      <IsFavorite>false</IsFavorite>      <ShowQualifier>true</ShowQualifier>      <Selected>true</Selected>      <Revalidate>true</Revalidate>      <IsWhenPresent>false</IsWhenPresent>      <CreateAsRuleOut>false</CreateAsRuleOut>      <ProblemIdx>9</ProblemIdx>      <DisplayName>Prophylactic measure</DisplayName>      <DoNotCreate>false</DoNotCreate>    </anyType>    <anyType xsi:type="ProblemHealthIssueRecord">      <isDummyHealthIssue>false</isDummyHealthIssue>      <OnsetYear>2021</OnsetYear>      <OnsetDay>29</OnsetDay>      <OnsetMonth>3</OnsetMonth>      <ExpectedResYear>0</ExpectedResYear>      <ExpectedResDay>0</ExpectedResDay>      <ExpectedResMonth>0</ExpectedResMonth>      <DescText>Goals of care, counseling/discussion</DescText>      <TouchedBy>[id number]</TouchedBy>      <TouchedWhen>3/29/2021 9:21:20 PM</TouchedWhen>      <CreatedBy>[email or web address]</CreatedBy>      <CreatedWhen>3/29/2021 9:21:20 PM</CreatedWhen>      <CodedHIGUID>[id number]</CodedHIGUID>      <HITypeGUID />      <GUID>[id number]</GUID>      <Description>Goals of care, counseling/discussion</Description>      <OrigDescription>Other specified counseling</OrigDescription>      <ShortName>Goals of care, counseling/discussion</ShortName>      <TypeCode>PROBLEM/DX</TypeCode>      <Status>Active</Status>      <ScopeLevel>This Chart</ScopeLevel>      <Code>Z71.89</Code>      <CodingScheme>I10</CodingScheme>      <TermUID>[id number]</TermUID>      <IMOCode>767763</IMOCode>      <NYF0Vwfe>V65.49</OCC3Lglr>      <VHR84Vzsm>Z71.89</VKU29Sujk>      <ATPTermShortName />      <SNOMEDCTCode>432476243</SNOMEDCTCode>      <Discontinue>false</Discontinue>      <Modify>false</Modify>      <IsFavorite>false</IsFavorite>      <ShowQualifier>true</ShowQualifier>      <Selected>true</Selected>      <Revalidate>true</Revalidate>      <IsWhenPresent>false</IsWhenPresent>      <CreateAsRuleOut>false</CreateAsRuleOut>      <ProblemIdx>10</ProblemIdx>      <DisplayName>Goals of care, counseling/discussion</DisplayName>      <DoNotCreate>false</DoNotCreate>    </anyType>  </ArrayOfAnyType>

## 2021-03-29 NOTE — CONSULT NOTE ADULT - SUBJECTIVE AND OBJECTIVE BOX
Patient is a 89y old  Female who presents with a chief complaint of Hypertensive urgency (29 Mar 2021 21:12)      HPI:  89 y.o. female with h/o NIDDM, pt uses inhalation insulin Afrezza, last dose this am, wheelchair bound, as per daughter in law, pt c/o feeling nausea for past 7 days, dizziness, vertigo, ataxia, near syncope, no vomiting, no fever, recent infection, Pt received J&J vaccine last week. No CP, sob, confusion.  Pt checked her B/P was elevated. (29 Mar 2021 21:12) She has Hep c, CHF, Afib on eliquis       ROS:  Negative except for:    PAST MEDICAL & SURGICAL HISTORY:  Multiple falls    GIB (gastrointestinal bleeding)  2/2019 requiring 2 PRBCs    Gastric AVM  EGD 2/2019 - cauterized    PNA (pneumonia)  2/2019   [facility]    T2DM (type 2 diabetes mellitus)  last A1c 7.8   4/11/19    Hepatitis C virus  not treated    A-fib  no Eliquis since 1/2019    Constipation    Glaucoma    Vertigo    Graves disease  no treatment as per patient and family    Pacemaker  medtronic ADDRL1  2011    HTN (hypertension)    S/P hysterectomy    S/P cardiac pacemaker procedure  Medtronic ADDRL1  2011    S/P cholecystectomy    S/P partial resection of colon  &gt; 5 yrs ago    H/O oophorectomy        SOCIAL HISTORY:    FAMILY HISTORY:  Family history of hypertension    Family history of diabetes mellitus        MEDICATIONS  (STANDING):  aspirin enteric coated 81 milliGRAM(s) Oral daily  dextrose 40% Gel 15 Gram(s) Oral once  dextrose 5%. 1000 milliLiter(s) (50 mL/Hr) IV Continuous <Continuous>  enoxaparin Injectable 40 milliGRAM(s) SubCutaneous daily  glucagon  Injectable 1 milliGRAM(s) IntraMuscular once  insulin lispro (ADMELOG) corrective regimen sliding scale   SubCutaneous three times a day before meals  insulin lispro (ADMELOG) corrective regimen sliding scale   SubCutaneous at bedtime  latanoprost 0.005% Ophthalmic Solution 1 Drop(s) Both EYES at bedtime  lisinopril 10 milliGRAM(s) Oral daily  lisinopril 5 milliGRAM(s) Oral once  metoprolol tartrate 25 milliGRAM(s) Oral two times a day  mirtazapine 15 milliGRAM(s) Oral at bedtime  pantoprazole    Tablet 40 milliGRAM(s) Oral before breakfast  sodium chloride 0.9%. 1000 milliLiter(s) (125 mL/Hr) IV Continuous <Continuous>    MEDICATIONS  (PRN):      Allergies    No Known Allergies    Intolerances        Vital Signs Last 24 Hrs  T(C): 36.7 (29 Mar 2021 19:49), Max: 36.7 (29 Mar 2021 16:23)  T(F): 98.1 (29 Mar 2021 19:49), Max: 98.1 (29 Mar 2021 19:49)  HR: 97 (29 Mar 2021 19:49) (66 - 97)  BP: 185/108 (29 Mar 2021 19:49) (165/87 - 200/118)  BP(mean): --  RR: 20 (29 Mar 2021 19:49) (20 - 20)  SpO2: 98% (29 Mar 2021 19:49) (98% - 99%)    PHYSICAL EXAM  General: adult in NAD  HEENT: clear oropharynx, anicteric sclera, pink conjunctiva  Neck: supple  CV: normal S1/S2 with no murmur rubs or gallops  Lungs: positive air movement b/l ant lungs,clear to auscultation, no wheezes, no rales  Abdomen: soft non-tender non-distended, no hepatosplenomegaly  Ext: no clubbing cyanosis or edema  Skin: no rashes and no petechiae  Neuro: alert and oriented X 4, no focal deficits      LABS:                          13.0   6.43  )-----------( 95       ( 29 Mar 2021 14:38 )             39.2         Mean Cell Volume : 91.2 fl  Mean Cell Hemoglobin : 30.2 pg  Mean Cell Hemoglobin Concentration : 33.2 gm/dL  Auto Neutrophil # : 3.33 K/uL  Auto Lymphocyte # : 2.38 K/uL  Auto Monocyte # : 0.56 K/uL  Auto Eosinophil # : 0.13 K/uL  Auto Basophil # : 0.02 K/uL  Auto Neutrophil % : 51.8 %  Auto Lymphocyte % : 37.0 %  Auto Monocyte % : 8.7 %  Auto Eosinophil % : 2.0 %  Auto Basophil % : 0.3 %      Serial CBC's  03-29 @ 14:38  Hct-39.2 / Hgb-13.0 / Plat-95 / RBC-4.30 / WBC-6.43      03-29    139  |  107  |  15  ----------------------------<  161<H>  3.8   |  27  |  0.97    Ca    8.6      29 Mar 2021 14:38  Mg     2.0     03-29    TPro  7.3  /  Alb  2.2<L>  /  TBili  0.3  /  DBili  x   /  AST  45<H>  /  ALT  28  /  AlkPhos  130<H>  03-29                      BLOOD SMEAR INTERPRETATION:       RADIOLOGY & ADDITIONAL STUDIES:    < from: Xray Chest 1 View-PORTABLE IMMEDIATE (Xray Chest 1 View-PORTABLE IMMEDIATE .) (03.29.21 @ 14:21) >  PRIORS: 9/1/2019.    VIEWS: Portable AP radiography of the chest performed.    FINDINGS: Heart size appears within normal limits. Note is made of an indwelling pacemaker. There is opacity identified within the right lower lung field suggesting right-sided pneumonia. No acute left lung infiltrate is identified. Small right pleural effusion cannot be excluded. No definite left pleural effusion. No pneumothorax. No mediastinal shift. No acute osseous fractures.    IMPRESSION: There is opacity identified within the right lower lung field suggesting right-sided pneumonia. Small right pleural effusion cannot be excluded.      < end of copied text >

## 2021-03-29 NOTE — H&P ADULT - NSICDXPASTMEDICALHX_GEN_ALL_CORE_FT
PAST MEDICAL HISTORY:  A-fib no Eliquis since 1/2019    Constipation     Gastric AVM EGD 2/2019 - cauterized    GIB (gastrointestinal bleeding) 2/2019 requiring 2 PRBCs    Glaucoma     Graves disease no treatment as per patient and family    Hepatitis C virus not treated    HTN (hypertension)     Multiple falls     Pacemaker medtronic ADDRL1  2011    PNA (pneumonia) 2/2019   treated with ABX while in [facility]    T2DM (type 2 diabetes mellitus) last A1c 7.8   4/11/19    Vertigo

## 2021-03-29 NOTE — H&P ADULT - PROBLEM SELECTOR PLAN 6
Chronic Hep C , no more on diuretics for now   H/O Ascitics in the past.  Low platelet count, 2/2 cirrhosis?  daily CBC monitoring ,   Will give DVT prophylaxis with heparin  Heme onc consult Dr Cohn Chronic Hep C , no more on diuretics for now   H/O [facility] in the past.  Low platelet count, 2/2 cirrhosis?  daily CBC monitoring ,   Will give SCD boots for DVT prophylaxis as she has increase risk for bleeding due to thrombocytopenia and AVM   Heme onc consult Dr Cohn

## 2021-03-29 NOTE — ED PROVIDER NOTE - PMH
A-fib  no Eliquis since 1/2019  Constipation    Gastric AVM  EGD 2/2019 - cauterized  GIB (gastrointestinal bleeding)  2/2019 requiring 2 PRBCs  Glaucoma    Graves disease  no treatment as per patient and family  Hepatitis C virus  not treated  HTN (hypertension)    Multiple falls    Pacemaker  medtronic ADDRL1  2011  PNA (pneumonia)  2/2019   treated with ABX while in [facility]  T2DM (type 2 diabetes mellitus)  last A1c 7.8   4/11/19  Vertigo

## 2021-03-29 NOTE — H&P ADULT - NSHPPHYSICALEXAM_GEN_ALL_CORE
PHYSICAL EXAM:  GENERAL: NAD, speaks in full sentences, no signs of respiratory distress  HEAD:  Atraumatic, Normocephalic  EYES: EOMI, PERRLA, conjunctiva and sclera clear  NECK: Supple, No JVD  CHEST/LUNG: Clear to auscultation bilaterally; No wheeze; No crackles; No accessory muscles used  HEART: Regular rate and rhythm; No murmurs;   ABDOMEN: Soft, Nontender, Nondistended; Bowel sounds present; No guarding  EXTREMITIES:  2+ Peripheral Pulses, No cyanosis or edema  PSYCH: AAOx3  NEUROLOGY: non-focal  SKIN: No rashes or lesions PHYSICAL EXAM:  GENERAL: NAD, speaks in full sentences, no signs of respiratory distress  HEAD:  Atraumatic, Normocephalic  EYES: EOMI, PERRLA, conjunctiva and sclera clear  NECK: Supple, No JVD  CHEST/LUNG: Coarse breath sounds B/L   HEART: Regular rate and rhythm; No murmurs;   ABDOMEN: Soft, Nontender, Nondistended; Bowel sounds present; No guarding  EXTREMITIES:  2+ Peripheral Pulses, No cyanosis or edema  PSYCH: AAOx3  NEUROLOGY: Left leg weakness, [place] 3/5, Right extremity power 5/5 upper and lower, B/L Ataxia in hands, CN intact 2-12  SKIN: No rashes or lesions

## 2021-03-29 NOTE — ED PROVIDER NOTE - CARE PLAN
Principal Discharge DX:	Near syncope  Secondary Diagnosis:	PNA (pneumonia)  Secondary Diagnosis:	Dehydration  Secondary Diagnosis:	Vertigo

## 2021-03-29 NOTE — H&P ADULT - HISTORY OF PRESENT ILLNESS
89 y.o. female with h/o NIDDM, pt uses inhalation insulin Afrezza, last dose this am, wheelchair bound, as per daughter in law, pt c/o feeling nausea for past 7 days, dizziness, vertigo, ataxia, near syncope, no vomiting, no fever, recent infection, Pt received J&J vaccine last week. No CP, sob, confusion.  Pt checked her B/P was elevated. 89 y.o. female with h/o NIDDM, pt uses inhalation insulin Afrezza, last dose this am, wheelchair bound, as per daughter in law, pt c/o feeling nausea for past 7 days, dizziness, vertigo and increase in ataxia. Patient is not a good historian and very soft spoken, She is AAO 2 (oriented to self and place only) collateral history is obtained from Daughter in law who states that patient lives alone with 24/7 HHA and from last few days she is having high blood pressure. Patient also endorses nausea and appetite loss. Patient states that whenever she tries to eat she becomes nauseous. She also reports some dry cough and occasional chest pain , abdominal pain, constipation (Not present currently)   Pt received J&J vaccine last week.   Patient denies SOB, urinary problems, headaches, dizziness, diplopia, focal weakness, swallowing difficulty.    Off Record, Daughter in law Bernard Mohan is the POC     ED course:    EKG: NSR, Non specific ST and T wave abnormality.    Vital Signs Last 24 Hrs  T(C): 36.9 (29 Mar 2021 22:35), Max: 36.9 (29 Mar 2021 22:35)  T(F): 98.4 (29 Mar 2021 22:35), Max: 98.4 (29 Mar 2021 22:35)  HR: 74 (29 Mar 2021 22:35) (66 - 97)  BP: 166/75 (29 Mar 2021 22:35) (165/87 - 200/118)  RR: 20 (29 Mar 2021 22:35) (20 - 20)  SpO2: 98% (29 Mar 2021 22:35) (98% - 99%) 89 y.o. female with h/o NIDDM, pt uses inhalation insulin Afrezza, last dose this am, wheelchair bound, as per daughter in law, pt c/o feeling nausea for past 7 days, dizziness, vertigo and increase in ataxia. Patient is not a good historian and very soft spoken, She is AAO 2 (oriented to self and place only) collateral history is obtained from Daughter in law who states that patient lives alone with 24/7 HHA and from last few days she is having high blood pressure. Patient also endorses nausea and appetite loss. Patient states that whenever she tries to eat she becomes nauseous. She also reports some dry cough and occasional chest pain , abdominal pain, constipation (Not present currently)   Pt received J&J vaccine last week.   Patient denies SOB, urinary problems, headaches, dizziness, diplopia, focal weakness, swallowing difficulty.    Off Record, Daughter in law Bernard Mohan is the POC     ED course:    pr BNP 11k  Vital Signs Last 24 Hrs  T(C): 36.9 (29 Mar 2021 22:35), Max: 36.9 (29 Mar 2021 22:35)  T(F): 98.4 (29 Mar 2021 22:35), Max: 98.4 (29 Mar 2021 22:35)  HR: 74 (29 Mar 2021 22:35) (66 - 97)  BP: 166/75 (29 Mar 2021 22:35) (165/87 - 200/118)  RR: 20 (29 Mar 2021 22:35) (20 - 20)  SpO2: 98% (29 Mar 2021 22:35) (98% - 99%)

## 2021-03-29 NOTE — H&P ADULT - PROBLEM SELECTOR PLAN 1
- Dizziness, ataxia  - EKG: NSR   - Trops 1st set -ve, f/u T2  - CT head age indeterminate Temporal lobe infarct  - Tele monitoring   - f/u Orthostatics   - f/u carotid doppler   - f/u Echo  - f/u PT Eval

## 2021-03-29 NOTE — H&P ADULT - ASSESSMENT
89 y.o. female with h/o NIDDM, pt uses inhalation insulin Afrezza, last dose this am, wheelchair bound, as per daughter in law, pt c/o feeling nausea for past 7 days, dizziness, vertigo and increase in ataxia. Patient is admitted for Hypertensive urgency, R/O Stroke and CAP  89 y.o. female with h/o NIDDM, pt uses inhalation insulin Afrezza, last dose this am, wheelchair bound, as per daughter in law, pt c/o feeling nausea for past 7 days, dizziness, vertigo and increase in ataxia. Patient is admitted for Hypertensive urgency, R/O Stroke and CAP                 Problem 11   Hypertensive urgency:   Patient has high blood pressure was not on any medication   Started only 1 week ago with Toprol  Will continue Lopressor 25mg BID and Add lisinopril 10mg daily with parameters  Patient might benefit from Diuretics as CXR also shows some pleural effusions  CXR shows cardiomegaly   Dash diet   Cardio consult

## 2021-03-29 NOTE — ED PROVIDER NOTE - PSH
H/O oophorectomy    S/P cardiac pacemaker procedure  [employer] [id number]  2011  S/P cholecystectomy    S/P hysterectomy    S/P partial resection of colon  > 5 yrs ago

## 2021-03-29 NOTE — H&P ADULT - NSHPREVIEWOFSYSTEMS_GEN_ALL_CORE
.  CONSTITUTIONAL: No weakness, fevers or chills  EYES/ENT: No visual changes;  No vertigo or throat pain   NECK: No pain or stiffness  RESPIRATORY: No cough, wheezing, hemoptysis; No shortness of breath  CARDIOVASCULAR: No chest pain or palpitations  GASTROINTESTINAL: No abdominal or epigastric pain. No nausea, vomiting, or hematemesis; No diarrhea or constipation. No melena or hematochezia.  GENITOURINARY: No dysuria, frequency or hematuria  NEUROLOGICAL: No numbness or weakness  SKIN: No itching, burning, rashes, or lesions   All other review of systems is negative unless indicated above. CONSTITUTIONAL: No weakness, fevers or chills  EYES/ENT: No visual changes;  No vertigo or throat pain   NECK: No pain or stiffness  RESPIRATORY: No cough, wheezing, hemoptysis; No shortness of breath  CARDIOVASCULAR: No chest pain or palpitations  GASTROINTESTINAL: No abdominal or epigastric pain. No nausea, vomiting, or hematemesis; No diarrhea or constipation. No melena or hematochezia.  GENITOURINARY: No dysuria, frequency or hematuria  NEUROLOGICAL: No numbness or weakness  SKIN: No itching, burning, rashes, or lesions   All other review of systems is negative unless indicated above.

## 2021-03-29 NOTE — ED PROVIDER NOTE - OBJECTIVE STATEMENT
89 y.o. female with h/o NIDDM, pt uses inhalation insulin Afrezza, last dose this am, wheelchair bound, as per daughter in law, pt c/o feeling nausea for past 7 days, dizziness, vertigo, ataxia, DOD, no vomiting, no fever, recent infection, Pt received J&J vaccine last week. No CP, sob, confusion.  Pt checked her B/P was elevated. 89 y.o. female with h/o NIDDM, pt uses inhalation insulin Afrezza, last dose this am, wheelchair bound, as per daughter in law, pt c/o feeling nausea for past 7 days, dizziness, vertigo, ataxia, near syncope, no vomiting, no fever, recent infection, Pt received J&J vaccine last week. No CP, sob, confusion.  Pt checked her B/P was elevated.

## 2021-03-29 NOTE — H&P ADULT - NSICDXPASTSURGICALHX_GEN_ALL_CORE_FT
PAST SURGICAL HISTORY:  H/O oophorectomy     S/P cardiac pacemaker procedure [employer] ADDRL1  2011    S/P cholecystectomy     S/P hysterectomy     S/P partial resection of colon > 5 yrs ago

## 2021-03-30 DIAGNOSIS — I63.89 OTHER CEREBRAL INFARCTION: ICD-10-CM

## 2021-03-30 DIAGNOSIS — I50.9 HEART FAILURE, UNSPECIFIED: ICD-10-CM

## 2021-03-30 DIAGNOSIS — D69.6 THROMBOCYTOPENIA, UNSPECIFIED: ICD-10-CM

## 2021-03-30 DIAGNOSIS — I48.91 UNSPECIFIED ATRIAL FIBRILLATION: ICD-10-CM

## 2021-03-30 DIAGNOSIS — Z02.9 ENCOUNTER FOR ADMINISTRATIVE EXAMINATIONS, UNSPECIFIED: ICD-10-CM

## 2021-03-30 LAB
24R-OH-CALCIDIOL SERPL-MCNC: 12.4 NG/ML — LOW (ref 30–80)
A1C WITH ESTIMATED AVERAGE GLUCOSE RESULT: 6.9 % — HIGH (ref 4–5.6)
ALBUMIN SERPL ELPH-MCNC: 2.2 G/DL — LOW (ref 3.5–5)
ALP SERPL-CCNC: 113 U/L — SIGNIFICANT CHANGE UP (ref 40–120)
ALT FLD-CCNC: 26 U/L DA — SIGNIFICANT CHANGE UP (ref 10–60)
ANION GAP SERPL CALC-SCNC: 6 MMOL/L — SIGNIFICANT CHANGE UP (ref 5–17)
AST SERPL-CCNC: 40 U/L — SIGNIFICANT CHANGE UP (ref 10–40)
BASOPHILS # BLD AUTO: 0.03 K/UL — SIGNIFICANT CHANGE UP (ref 0–0.2)
BASOPHILS NFR BLD AUTO: 0.4 % — SIGNIFICANT CHANGE UP (ref 0–2)
BILIRUB SERPL-MCNC: 0.4 MG/DL — SIGNIFICANT CHANGE UP (ref 0.2–1.2)
BUN SERPL-MCNC: 13 MG/DL — SIGNIFICANT CHANGE UP (ref 7–18)
CALCIUM SERPL-MCNC: 8.4 MG/DL — SIGNIFICANT CHANGE UP (ref 8.4–10.5)
CHLORIDE SERPL-SCNC: 109 MMOL/L — HIGH (ref 96–108)
CHOLEST SERPL-MCNC: 247 MG/DL — HIGH
CO2 SERPL-SCNC: 27 MMOL/L — SIGNIFICANT CHANGE UP (ref 22–31)
COVID-19 SPIKE DOMAIN AB INTERP: NEGATIVE — SIGNIFICANT CHANGE UP
COVID-19 SPIKE DOMAIN ANTIBODY RESULT: 0.4 U/ML — SIGNIFICANT CHANGE UP
CREAT SERPL-MCNC: 0.91 MG/DL — SIGNIFICANT CHANGE UP (ref 0.5–1.3)
EOSINOPHIL # BLD AUTO: 0.08 K/UL — SIGNIFICANT CHANGE UP (ref 0–0.5)
EOSINOPHIL NFR BLD AUTO: 1 % — SIGNIFICANT CHANGE UP (ref 0–6)
ERYTHROCYTE [SEDIMENTATION RATE] IN BLOOD: 67 MM/HR — HIGH (ref 0–20)
ESTIMATED AVERAGE GLUCOSE: 151 MG/DL — HIGH (ref 68–114)
FERRITIN SERPL-MCNC: 279 NG/ML — HIGH (ref 15–150)
FOLATE SERPL-MCNC: 13.5 NG/ML — SIGNIFICANT CHANGE UP
GLUCOSE BLDC GLUCOMTR-MCNC: 113 MG/DL — HIGH (ref 70–99)
GLUCOSE BLDC GLUCOMTR-MCNC: 181 MG/DL — HIGH (ref 70–99)
GLUCOSE BLDC GLUCOMTR-MCNC: 205 MG/DL — HIGH (ref 70–99)
GLUCOSE BLDC GLUCOMTR-MCNC: 211 MG/DL — HIGH (ref 70–99)
GLUCOSE SERPL-MCNC: 125 MG/DL — HIGH (ref 70–99)
HCT VFR BLD CALC: 35.2 % — SIGNIFICANT CHANGE UP (ref 34.5–45)
HDLC SERPL-MCNC: 54 MG/DL — SIGNIFICANT CHANGE UP
HGB BLD-MCNC: 11.9 G/DL — SIGNIFICANT CHANGE UP (ref 11.5–15.5)
IMM GRANULOCYTES NFR BLD AUTO: 0.5 % — SIGNIFICANT CHANGE UP (ref 0–1.5)
LIPID PNL WITH DIRECT LDL SERPL: 172 MG/DL — HIGH
LYMPHOCYTES # BLD AUTO: 3.14 K/UL — SIGNIFICANT CHANGE UP (ref 1–3.3)
LYMPHOCYTES # BLD AUTO: 38.2 % — SIGNIFICANT CHANGE UP (ref 13–44)
MAGNESIUM SERPL-MCNC: 1.9 MG/DL — SIGNIFICANT CHANGE UP (ref 1.6–2.6)
MCHC RBC-ENTMCNC: 31 PG — SIGNIFICANT CHANGE UP (ref 27–34)
MCHC RBC-ENTMCNC: 33.8 GM/DL — SIGNIFICANT CHANGE UP (ref 32–36)
MCV RBC AUTO: 91.7 FL — SIGNIFICANT CHANGE UP (ref 80–100)
MONOCYTES # BLD AUTO: 0.66 K/UL — SIGNIFICANT CHANGE UP (ref 0–0.9)
MONOCYTES NFR BLD AUTO: 8 % — SIGNIFICANT CHANGE UP (ref 2–14)
NEUTROPHILS # BLD AUTO: 4.26 K/UL — SIGNIFICANT CHANGE UP (ref 1.8–7.4)
NEUTROPHILS NFR BLD AUTO: 51.9 % — SIGNIFICANT CHANGE UP (ref 43–77)
NON HDL CHOLESTEROL: 193 MG/DL — HIGH
NRBC # BLD: 0 /100 WBCS — SIGNIFICANT CHANGE UP (ref 0–0)
PHOSPHATE SERPL-MCNC: 3.1 MG/DL — SIGNIFICANT CHANGE UP (ref 2.5–4.5)
PLATELET # BLD AUTO: 99 K/UL — LOW (ref 150–400)
POTASSIUM SERPL-MCNC: 4.1 MMOL/L — SIGNIFICANT CHANGE UP (ref 3.5–5.3)
POTASSIUM SERPL-SCNC: 4.1 MMOL/L — SIGNIFICANT CHANGE UP (ref 3.5–5.3)
PROCALCITONIN SERPL-MCNC: 0.11 NG/ML — HIGH (ref 0.02–0.1)
PROT SERPL-MCNC: 6.9 G/DL — SIGNIFICANT CHANGE UP (ref 6–8.3)
RBC # BLD: 3.84 M/UL — SIGNIFICANT CHANGE UP (ref 3.8–5.2)
RBC # FLD: 14.4 % — SIGNIFICANT CHANGE UP (ref 10.3–14.5)
SARS-COV-2 IGG+IGM SERPL QL IA: 0.4 U/ML — SIGNIFICANT CHANGE UP
SARS-COV-2 IGG+IGM SERPL QL IA: NEGATIVE — SIGNIFICANT CHANGE UP
SODIUM SERPL-SCNC: 142 MMOL/L — SIGNIFICANT CHANGE UP (ref 135–145)
T3 SERPL-MCNC: 195 NG/DL — SIGNIFICANT CHANGE UP (ref 80–200)
TRIGL SERPL-MCNC: 104 MG/DL — SIGNIFICANT CHANGE UP
TSH SERPL-MCNC: 9.31 UU/ML — HIGH (ref 0.34–4.82)
VIT B12 SERPL-MCNC: 937 PG/ML — SIGNIFICANT CHANGE UP (ref 232–1245)
WBC # BLD: 8.21 K/UL — SIGNIFICANT CHANGE UP (ref 3.8–10.5)
WBC # FLD AUTO: 8.21 K/UL — SIGNIFICANT CHANGE UP (ref 3.8–10.5)

## 2021-03-30 PROCEDURE — 99221 1ST HOSP IP/OBS SF/LOW 40: CPT

## 2021-03-30 PROCEDURE — 93880 EXTRACRANIAL BILAT STUDY: CPT | Mod: 26

## 2021-03-30 PROCEDURE — 71250 CT THORAX DX C-: CPT | Mod: 26

## 2021-03-30 RX ORDER — MAGNESIUM SULFATE 500 MG/ML
2 VIAL (ML) INJECTION ONCE
Refills: 0 | Status: COMPLETED | OUTPATIENT
Start: 2021-03-30 | End: 2021-03-30

## 2021-03-30 RX ORDER — FUROSEMIDE 40 MG
40 TABLET ORAL DAILY
Refills: 0 | Status: DISCONTINUED | OUTPATIENT
Start: 2021-03-30 | End: 2021-04-02

## 2021-03-30 RX ADMIN — PANTOPRAZOLE SODIUM 40 MILLIGRAM(S): 20 TABLET, DELAYED RELEASE ORAL at 05:39

## 2021-03-30 RX ADMIN — Medication 40 MILLIGRAM(S): at 16:26

## 2021-03-30 RX ADMIN — LISINOPRIL 10 MILLIGRAM(S): 2.5 TABLET ORAL at 05:39

## 2021-03-30 RX ADMIN — LATANOPROST 1 DROP(S): 0.05 SOLUTION/ DROPS OPHTHALMIC; TOPICAL at 01:23

## 2021-03-30 RX ADMIN — LATANOPROST 1 DROP(S): 0.05 SOLUTION/ DROPS OPHTHALMIC; TOPICAL at 22:44

## 2021-03-30 RX ADMIN — Medication 81 MILLIGRAM(S): at 11:52

## 2021-03-30 RX ADMIN — ATORVASTATIN CALCIUM 20 MILLIGRAM(S): 80 TABLET, FILM COATED ORAL at 22:44

## 2021-03-30 RX ADMIN — Medication 50 GRAM(S): at 09:58

## 2021-03-30 RX ADMIN — MIRTAZAPINE 15 MILLIGRAM(S): 45 TABLET, ORALLY DISINTEGRATING ORAL at 22:44

## 2021-03-30 RX ADMIN — Medication 25 MILLIGRAM(S): at 05:38

## 2021-03-30 RX ADMIN — Medication 2: at 12:30

## 2021-03-30 RX ADMIN — Medication 25 MILLIGRAM(S): at 17:06

## 2021-03-30 RX ADMIN — SODIUM CHLORIDE 125 MILLILITER(S): 9 INJECTION INTRAMUSCULAR; INTRAVENOUS; SUBCUTANEOUS at 05:38

## 2021-03-30 RX ADMIN — Medication 0: at 22:52

## 2021-03-30 NOTE — PROGRESS NOTE ADULT - PROBLEM SELECTOR PLAN 2
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 Lamar

## 2021-03-30 NOTE — CONSULT NOTE ADULT - SUBJECTIVE AND OBJECTIVE BOX
++++++++++++++++++++NOTE NOT COMPLETED+++++++++++++++++++++++ ++++++++++++++++++++NOTE NOT COMPLETED+++++++++++++++++++++++    Patient is a 89y old  Female who presents with a chief complaint of Hypertensive urgency (30 Mar 2021 13:56)      HPI:  89 y.o. female with h/o NIDDM, pt uses inhalation insulin Afrezza, last dose this am, wheelchair bound, as per daughter in law, pt c/o feeling nausea for past 7 days, dizziness, vertigo and increase in ataxia. Patient is not a good historian and very soft spoken, She is AAO 2 (oriented to self and place only) collateral history is obtained from Daughter in law who states that patient lives alone with 24/7 HHA and from last few days she is having high blood pressure. Patient also endorses nausea and appetite loss. Patient states that whenever she tries to eat she becomes nauseous. She also reports some dry cough and occasional chest pain , abdominal pain, constipation (Not present currently)   Pt received J&J vaccine last week.   Patient denies SOB, urinary problems, headaches, dizziness, diplopia, focal weakness, swallowing difficulty.    Off Record, Daughter in law Bernard Mohan is the POC     ED course:    pr BNP 11k    Pt reports she came to the hospital for "blood pressure problem."  Reports being wheelchair bound, only transfers, "for some time", admits more than one year.  Reports PPM approx 8yrs ago.  Unable to provide more history.           Vital Signs Last 24 Hrs  T(C): 36.9 (29 Mar 2021 22:35), Max: 36.9 (29 Mar 2021 22:35)  T(F): 98.4 (29 Mar 2021 22:35), Max: 98.4 (29 Mar 2021 22:35)  HR: 74 (29 Mar 2021 22:35) (66 - 97)  BP: 166/75 (29 Mar 2021 22:35) (165/87 - 200/118)  RR: 20 (29 Mar 2021 22:35) (20 - 20)  SpO2: 98% (29 Mar 2021 22:35) (98% - 99%) (29 Mar 2021 21:12)         Neurological Review of Systems:  No difficulty with language.  No vision loss or double vision.  (+) Dizziness, vertigo or new hearing loss.  No difficulty with speech or swallowing.  No focal weakness.  No focal sensory changes.  No numbness or tingling in the bilateral lower extremities.  No difficulty with balance.  No difficulty with ambulation.        MEDICATIONS  (STANDING):  aspirin enteric coated 81 milliGRAM(s) Oral daily  atorvastatin 20 milliGRAM(s) Oral at bedtime  dextrose 40% Gel 15 Gram(s) Oral once  dextrose 5%. 1000 milliLiter(s) (50 mL/Hr) IV Continuous <Continuous>  furosemide   Injectable 40 milliGRAM(s) IV Push daily  glucagon  Injectable 1 milliGRAM(s) IntraMuscular once  insulin lispro (ADMELOG) corrective regimen sliding scale   SubCutaneous three times a day before meals  insulin lispro (ADMELOG) corrective regimen sliding scale   SubCutaneous at bedtime  latanoprost 0.005% Ophthalmic Solution 1 Drop(s) Both EYES at bedtime  lisinopril 10 milliGRAM(s) Oral daily  metoprolol tartrate 25 milliGRAM(s) Oral two times a day  mirtazapine 15 milliGRAM(s) Oral at bedtime  pantoprazole    Tablet 40 milliGRAM(s) Oral before breakfast    MEDICATIONS  (PRN):    Allergies    No Known Allergies    Intolerances      PAST MEDICAL & SURGICAL HISTORY:  HTN (hypertension)    Pacemaker  medtronic ADDRL1  2011    Graves disease  no treatment as per patient and family    Vertigo    Glaucoma    Constipation    A-fib  no Eliquis since 1/2019    Hepatitis C virus  not treated    T2DM (type 2 diabetes mellitus)  last A1c 7.8   4/11/19    PNA (pneumonia)  2/2019   treated with ABX while in [facility]    Gastric AVM  EGD 2/2019 - cauterized    GIB (gastrointestinal bleeding)  2/2019 requiring 2 PRBCs    Multiple falls    H/O oophorectomy    S/P partial resection of colon  &gt; 5 yrs ago    S/P cholecystectomy    S/P cardiac pacemaker procedure  Medtronic ADDRL1  2011    S/P hysterectomy      FAMILY HISTORY:  Family history of diabetes mellitus    Family history of hypertension      SOCIAL HISTORY: non smoker/ former smoker/ active smoker    Review of Systems:  Constitutional: No generalized weakness. No fevers or chills.                    Eyes, Ears, Mouth, Throat: No vision loss   Respiratory: No shortness of breath or cough.                                Cardiovascular: No chest pain or palpitations  Gastrointestinal: No nausea or vomiting.                                         Genitourinary: No urinary incontinence or burning on urination.  Musculoskeletal: No joint pain.                                                           Dermatologic: No rash.  Neurological: as per HPI                                                                      Psychiatric: No behavioral problems.  Endocrine: No known hypoglycemia.               Hematologic/Lymphatic: No easy bleeding.    O:  Vital Signs Last 24 Hrs  T(C): 36.7 (30 Mar 2021 11:35), Max: 36.9 (29 Mar 2021 22:35)  T(F): 98.1 (30 Mar 2021 11:35), Max: 98.4 (29 Mar 2021 22:35)  HR: 95 (30 Mar 2021 11:35) (74 - 103)  BP: 176/93 (30 Mar 2021 11:35) (156/86 - 189/99)  BP(mean): 104 (30 Mar 2021 04:45) (104 - 104)  RR: 18 (30 Mar 2021 11:35) (18 - 20)  SpO2: 100% (30 Mar 2021 11:35) (98% - 100%)    General Exam:   General appearance: No acute distress                 Cardiovascular: Pedal dorsalis pulses intact bilaterally    Mental Status: Oriented to self, date and place.  Attention intact.  No dysarthria, aphasia or neglect.  Knowledge intact.  Registration intact.  Short and long term memory grossly intact.      Cranial Nerves: CN I - not tested.  PERRL, EOMI, VFF, no nystagmus or diplopia.  No APD.  Fundi not visualized.  CN V1-3 intact to light touch and pinprick.  No facial asymmetry.  Hearing intact to finger rub bilaterally.  Tongue, uvula and palate midline.  Sternocleidomastoid and Trapezius intact bilaterally.    Motor:   Tone: normal.                  Strength intact throughout  No pronator drift bilaterally                      No dysmetria on finger-nose-finger or heel-shin-heel  No truncal ataxia.  No resting, postural or action tremor.  No myoclonus.    Sensation: intact to light touch, pinprick, vibration and proprioception    Deep Tendon Reflexes: 1+ bilateral biceps, triceps, brachioradialis, knee and ankle  Toes flexor bilaterally    Gait: normal and stable.  Romberg (-).    Other:     LABS:                        11.9   8.21  )-----------( 99       ( 30 Mar 2021 07:07 )             35.2     03-30    142  |  109<H>  |  13  ----------------------------<  125<H>  4.1   |  27  |  0.91    Ca    8.4      30 Mar 2021 07:07  Phos  3.1     03-30  Mg     1.9     03-30    TPro  6.9  /  Alb  2.2<L>  /  TBili  0.4  /  DBili  x   /  AST  40  /  ALT  26  /  AlkPhos  113  03-30            RADIOLOGY & ADDITIONAL STUDIES:    EKG:  tele:  TTE:  EEG: Patient is a 89y old  Female who presents with a chief complaint of Hypertensive urgency (30 Mar 2021 13:56)      HPI:  89 y.o. female with h/o NIDDM, pt uses inhalation insulin Afrezza, last dose this am, wheelchair bound, as per daughter in law, pt c/o feeling nausea for past 7 days, dizziness, vertigo and increase in ataxia. Patient is not a good historian and very soft spoken, She is AAO 2 (oriented to self and place only) collateral history is obtained from Daughter in law who states that patient lives alone with 24/7 HHA and from last few days she is having high blood pressure. Patient also endorses nausea and appetite loss. Patient states that whenever she tries to eat she becomes nauseous. She also reports some dry cough and occasional chest pain , abdominal pain, constipation (Not present currently)   Pt received J&J vaccine last week.   Patient denies SOB, urinary problems, headaches, dizziness, diplopia, focal weakness, swallowing difficulty.    Off Record, Daughter in law Bernard Mohan is the POC     ED course:    pr BNP 11k    Pt reports she came to the hospital for "blood pressure problem."  Reports being wheelchair bound d/t chronic left leg weakness, only transfers, "for some time", admits more than one year.  Reports "every once and a while" she has spinning sensation.  Reports PPM approx 8yrs ago.  Unable to provide more history.           Vital Signs Last 24 Hrs  T(C): 36.9 (29 Mar 2021 22:35), Max: 36.9 (29 Mar 2021 22:35)  T(F): 98.4 (29 Mar 2021 22:35), Max: 98.4 (29 Mar 2021 22:35)  HR: 74 (29 Mar 2021 22:35) (66 - 97)  BP: 166/75 (29 Mar 2021 22:35) (165/87 - 200/118)  RR: 20 (29 Mar 2021 22:35) (20 - 20)  SpO2: 98% (29 Mar 2021 22:35) (98% - 99%) (29 Mar 2021 21:12)         Neurological Review of Systems:  No difficulty with language.  No vision loss or double vision.  (+) Dizziness.  (+) Vertigo.  No new hearing loss.  No difficulty with speech or swallowing.  No focal weakness.  No focal sensory changes.  No numbness or tingling in the bilateral lower extremities.  No difficulty with balance.  No difficulty with ambulation.        MEDICATIONS  (STANDING):  aspirin enteric coated 81 milliGRAM(s) Oral daily  atorvastatin 20 milliGRAM(s) Oral at bedtime  dextrose 40% Gel 15 Gram(s) Oral once  dextrose 5%. 1000 milliLiter(s) (50 mL/Hr) IV Continuous <Continuous>  furosemide   Injectable 40 milliGRAM(s) IV Push daily  glucagon  Injectable 1 milliGRAM(s) IntraMuscular once  insulin lispro (ADMELOG) corrective regimen sliding scale   SubCutaneous three times a day before meals  insulin lispro (ADMELOG) corrective regimen sliding scale   SubCutaneous at bedtime  latanoprost 0.005% Ophthalmic Solution 1 Drop(s) Both EYES at bedtime  lisinopril 10 milliGRAM(s) Oral daily  metoprolol tartrate 25 milliGRAM(s) Oral two times a day  mirtazapine 15 milliGRAM(s) Oral at bedtime  pantoprazole    Tablet 40 milliGRAM(s) Oral before breakfast    MEDICATIONS  (PRN):    Allergies    No Known Allergies    Intolerances      PAST MEDICAL & SURGICAL HISTORY:  HTN (hypertension)    Pacemaker  medtronic ADDRL1  2011    Graves disease  no treatment as per patient and family    Vertigo    Glaucoma    Constipation    A-fib  no Eliquis since 1/2019    Hepatitis C virus  not treated    T2DM (type 2 diabetes mellitus)  last A1c 7.8   4/11/19    PNA (pneumonia)  2/2019   treated with ABX while in [facility]    Gastric AVM  EGD 2/2019 - cauterized    GIB (gastrointestinal bleeding)  2/2019 requiring 2 PRBCs    Multiple falls    H/O oophorectomy    S/P partial resection of colon  &gt; 5 yrs ago    S/P cholecystectomy    S/P cardiac pacemaker procedure  Medtronic ADDRL1  2011    S/P hysterectomy      FAMILY HISTORY:  Family history of diabetes mellitus    Family history of hypertension      SOCIAL HISTORY: non smoker    Review of Systems:  Constitutional: No generalized weakness. No fevers or chills             Eyes, Ears, Mouth, Throat: No vision loss.  Dizziness described as spinning sensation  Respiratory: No shortness of breath or cough                             Cardiovascular: No chest pain or palpitations  Gastrointestinal: No nausea or vomiting                                      Genitourinary: No urinary incontinence or burning on urination  Musculoskeletal: (+) Wheelchair bound                                                        Dermatologic: No rash  Neurological: as per HPI                                                                      Psychiatric: No behavioral problems  Endocrine: No known hypoglycemia            Hematologic/Lymphatic: No easy bleeding    O:  Vital Signs Last 24 Hrs  T(C): 36.7 (30 Mar 2021 11:35), Max: 36.9 (29 Mar 2021 22:35)  T(F): 98.1 (30 Mar 2021 11:35), Max: 98.4 (29 Mar 2021 22:35)  HR: 95 (30 Mar 2021 11:35) (74 - 103)  BP: 176/93 (30 Mar 2021 11:35) (156/86 - 189/99)  BP(mean): 104 (30 Mar 2021 04:45) (104 - 104)  RR: 18 (30 Mar 2021 11:35) (18 - 20)  SpO2: 100% (30 Mar 2021 11:35) (98% - 100%)    General Exam:   General appearance: No acute distress                 Cardiovascular: Pedal dorsalis pulses intact bilaterally    Mental Status: Oriented to self and place, not date.  Attention intact.  No dysarthria, aphasia or neglect.  Knowledge, registration, short and long term memory impaired.      Cranial Nerves: CN I - not tested.  PERRL, EOMI, VFF, no nystagmus or diplopia.  No APD.  Fundi not visualized.  CN V1-3 intact to light touch.  No facial asymmetry.  Hearing intact to finger rub bilaterally.  Tongue, uvula and palate midline.  Sternocleidomastoid and Trapezius intact bilaterally.    Motor:   Tone: Spastic                  Strength intact throughout-equally weak in all extremities   No pronator drift bilaterally                      No dysmetria on finger-nose-finger or heel-shin-heel  No truncal ataxia.  No resting, postural or action tremor.  No myoclonus.    Sensation: Impaired to light touch & pinprick    Deep Tendon Reflexes: 2+ right biceps, triceps, brachioradialis.  3+ left biceps, triceps, and brachioradialis.  1+ right knee and ankle.  Mute left knee and ankle.    Right toe mute.  Left Babinski.    Gait: Unsteady, unable to step off.    Other:   NIHSS=2 (Ataxia-1, sennsory-1)  MRS=5    LABS:                        11.9   8.21  )-----------( 99       ( 30 Mar 2021 07:07 )             35.2     03-30    142  |  109<H>  |  13  ----------------------------<  125<H>  4.1   |  27  |  0.91    Ca    8.4      30 Mar 2021 07:07  Phos  3.1     03-30  Mg     1.9     03-30    TPro  6.9  /  Alb  2.2<L>  /  TBili  0.4  /  DBili  x   /  AST  40  /  ALT  26  /  AlkPhos  113  03-30            RADIOLOGY & ADDITIONAL STUDIES:    < from: US Duplex Carotid Arteries Complete, Bilateral (03.30.21 @ 11:09) >    EXAM:  US DPLX CAROTIDS COMPL BI                            PROCEDURE DATE:  03/30/2021          INTERPRETATION:  CLINICAL INFORMATION: Syncope    COMPARISON: None available.    TECHNIQUE: Grayscale, color and spectral Doppler examination of both carotid arteries was performed.    FINDINGS:    No elevated velocities or abnormal waveforms are encountered.    Peak systolic velocities are as follows:    RIGHT:  PROX CCA = 72 cm/s  DIST CCA = 74 cm/s  PROX ICA = 101 cm/s  DIST ICA = 62 cm/s  ECA = 100 cm/s    LEFT:  PROX CCA = 76 cm/s  DIST CCA = 75 cm/s  PROX ICA = 80 cm/s  DIST ICA = 68 cm/s  ECA = 77 cm/s    Antegrade flow is noted within both vertebral arteries.    IMPRESSION: No significant hemodynamic stenosis of either carotid artery.    Measurement of carotid stenosis is based on velocity parameters that correlate the residual internal carotid diameter with that of the more distal vessel in accordance with a method such as the [place] [employer] Symptomatic Carotid Endarterectomy Trial (NASCET).                RORO CORADO MD; Attending Radiologist  This document has been electronically signed. Mar 30 2021 11:13AM    < end of copied text >    < from: CT Head No Cont (03.29.21 @ 17:13) >  EXAM:  CT BRAIN                            PROCEDURE DATE:  03/29/2021          INTERPRETATION:  CLINICAL INFORMATION: dizziness, vertigo. h/o a.fib, 10. .    TECHNIQUE: Sequential axial images were obtained from the vertex to the skull base without intravenous contrast. Coronal and sagittal reformations were obtained.    COMPARISON: Prior CT dated 5/28/2019.    FINDINGS:    Area of hypodensity in the right temporal lobe suggesting age-indeterminate infarct (2:26, 8:46). There is no acute intracranial hemorrhage. There are areas of hypodensity in the bilateral hemispheric white matter suggesting white matter microvascular ischemic change. There is cerebral volume loss.    There is no extraaxial fluid collection.    There is no displaced calvarial fracture. Status post bilateral intraocular lens implants. The visualized portions of the paranasal sinuses are well aerated. The mastoid air cells are well aerated.      IMPRESSION: Area of hypodensity in the right temporal lobe suggesting age-indeterminate infarct. No acute intracranial hemorrhage.            JOSE MARIA HENDERSON MD; Attending Radiologist  This document has been electronically signed. Mar 29 2021  5:27PM    < end of copied text >   Patient is a 89y old  Female who presents with a chief complaint of Hypertensive urgency (30 Mar 2021 13:56)      HPI:  89 y.o. female with h/o NIDDM, pt uses inhalation insulin Afrezza, last dose this am, wheelchair bound, as per daughter in law, pt c/o feeling nausea for past 7 days, dizziness, vertigo and increase in ataxia. Patient is not a good historian and very soft spoken, She is AAO 2 (oriented to self and place only) collateral history is obtained from Daughter in law who states that patient lives alone with 24/7 HHA and from last few days she is having high blood pressure. Patient also endorses nausea and appetite loss. Patient states that whenever she tries to eat she becomes nauseous. She also reports some dry cough and occasional chest pain , abdominal pain, constipation (Not present currently)   Pt received J&J vaccine last week.   Patient denies SOB, urinary problems, headaches, dizziness, diplopia, focal weakness, swallowing difficulty.    Off Record, Daughter in law Bernard Mohan is the POC     ED course:    pr BNP 11k    Pt reports she came to the hospital for "blood pressure problem."  Reports being wheelchair bound d/t chronic left leg weakness, only transfers, "for some time", admits more than one year.  Reports "every once and a while" she has spinning sensation.  Reports PPM approx 8yrs ago.  Unable to provide more history.           Vital Signs Last 24 Hrs  T(C): 36.9 (29 Mar 2021 22:35), Max: 36.9 (29 Mar 2021 22:35)  T(F): 98.4 (29 Mar 2021 22:35), Max: 98.4 (29 Mar 2021 22:35)  HR: 74 (29 Mar 2021 22:35) (66 - 97)  BP: 166/75 (29 Mar 2021 22:35) (165/87 - 200/118)  RR: 20 (29 Mar 2021 22:35) (20 - 20)  SpO2: 98% (29 Mar 2021 22:35) (98% - 99%) (29 Mar 2021 21:12)         Neurological Review of Systems:  No difficulty with language.  No vision loss or double vision.  (+) Dizziness.  (+) Vertigo.  No new hearing loss.  No difficulty with speech or swallowing.  No focal weakness.  No focal sensory changes.  No numbness or tingling in the bilateral lower extremities.  (+) Difficulty with balance.  (+) Difficulty with ambulation.        MEDICATIONS  (STANDING):  aspirin enteric coated 81 milliGRAM(s) Oral daily  atorvastatin 20 milliGRAM(s) Oral at bedtime  dextrose 40% Gel 15 Gram(s) Oral once  dextrose 5%. 1000 milliLiter(s) (50 mL/Hr) IV Continuous <Continuous>  furosemide   Injectable 40 milliGRAM(s) IV Push daily  glucagon  Injectable 1 milliGRAM(s) IntraMuscular once  insulin lispro (ADMELOG) corrective regimen sliding scale   SubCutaneous three times a day before meals  insulin lispro (ADMELOG) corrective regimen sliding scale   SubCutaneous at bedtime  latanoprost 0.005% Ophthalmic Solution 1 Drop(s) Both EYES at bedtime  lisinopril 10 milliGRAM(s) Oral daily  metoprolol tartrate 25 milliGRAM(s) Oral two times a day  mirtazapine 15 milliGRAM(s) Oral at bedtime  pantoprazole    Tablet 40 milliGRAM(s) Oral before breakfast    MEDICATIONS  (PRN):    Allergies    No Known Allergies    Intolerances      PAST MEDICAL & SURGICAL HISTORY:  HTN (hypertension)    Pacemaker  medtronic ADDRL1  2011    Graves disease  no treatment as per patient and family    Vertigo    Glaucoma    Constipation    A-fib  no Eliquis since 1/2019    Hepatitis C virus  not treated    T2DM (type 2 diabetes mellitus)  last A1c 7.8   4/11/19    PNA (pneumonia)  2/2019   treated with ABX while in [facility]    Gastric AVM  EGD 2/2019 - cauterized    GIB (gastrointestinal bleeding)  2/2019 requiring 2 PRBCs    Multiple falls    H/O oophorectomy    S/P partial resection of colon  &gt; 5 yrs ago    S/P cholecystectomy    S/P cardiac pacemaker procedure  Medtronic ADDRL1  2011    S/P hysterectomy      FAMILY HISTORY:  Family history of diabetes mellitus    Family history of hypertension      SOCIAL HISTORY: non smoker    Review of Systems:  Constitutional: No generalized weakness. No fevers or chills             Eyes, Ears, Mouth, Throat: No vision loss.  Dizziness described as spinning sensation  Respiratory: No shortness of breath or cough                             Cardiovascular: No chest pain or palpitations  Gastrointestinal: No nausea or vomiting                                      Genitourinary: No urinary incontinence or burning on urination  Musculoskeletal: (+) Wheelchair bound                                                        Dermatologic: No rash  Neurological: as per HPI                                                                      Psychiatric: No behavioral problems  Endocrine: No known hypoglycemia            Hematologic/Lymphatic: No easy bleeding    O:  Vital Signs Last 24 Hrs  T(C): 36.7 (30 Mar 2021 11:35), Max: 36.9 (29 Mar 2021 22:35)  T(F): 98.1 (30 Mar 2021 11:35), Max: 98.4 (29 Mar 2021 22:35)  HR: 95 (30 Mar 2021 11:35) (74 - 103)  BP: 176/93 (30 Mar 2021 11:35) (156/86 - 189/99)  BP(mean): 104 (30 Mar 2021 04:45) (104 - 104)  RR: 18 (30 Mar 2021 11:35) (18 - 20)  SpO2: 100% (30 Mar 2021 11:35) (98% - 100%)    General Exam:   General appearance: No acute distress                 Cardiovascular: Pedal dorsalis pulses intact bilaterally    Mental Status: Oriented to self and place, not date.  Attention intact.  No dysarthria, aphasia or neglect.  Knowledge, registration, short and long term memory impaired.      Cranial Nerves: CN I - not tested.  PERRL, EOMI, VFF, no nystagmus or diplopia.  No APD.  Fundi not visualized.  CN V1-3 intact to light touch.  No facial asymmetry.  Hearing intact to finger rub bilaterally.  Tongue, uvula and palate midline.  Sternocleidomastoid and Trapezius intact bilaterally.    Motor:   Tone: Spastic                  Strength intact throughout-equally weak in all extremities   No pronator drift bilaterally                      No dysmetria on finger-nose-finger or heel-shin-heel  No truncal ataxia.  No resting, postural or action tremor.  No myoclonus.    Sensation: Impaired to light touch & pinprick    Deep Tendon Reflexes: 2+ right biceps, triceps, brachioradialis.  3+ left biceps, triceps, and brachioradialis.  1+ right knee and ankle.  Mute left knee and ankle.    Right toe mute.  Left Babinski.    Gait: Unsteady, unable to step off.    Other:   NIHSS=2 (Ataxia-1, sennsory-1)  MRS=5    LABS:                        11.9   8.21  )-----------( 99       ( 30 Mar 2021 07:07 )             35.2     03-30    142  |  109<H>  |  13  ----------------------------<  125<H>  4.1   |  27  |  0.91    Ca    8.4      30 Mar 2021 07:07  Phos  3.1     03-30  Mg     1.9     03-30    TPro  6.9  /  Alb  2.2<L>  /  TBili  0.4  /  DBili  x   /  AST  40  /  ALT  26  /  AlkPhos  113  03-30            RADIOLOGY & ADDITIONAL STUDIES:    < from: US Duplex Carotid Arteries Complete, Bilateral (03.30.21 @ 11:09) >    EXAM:  US DPLX CAROTIDS COMPL BI                            PROCEDURE DATE:  03/30/2021          INTERPRETATION:  CLINICAL INFORMATION: Syncope    COMPARISON: None available.    TECHNIQUE: Grayscale, color and spectral Doppler examination of both carotid arteries was performed.    FINDINGS:    No elevated velocities or abnormal waveforms are encountered.    Peak systolic velocities are as follows:    RIGHT:  PROX CCA = 72 cm/s  DIST CCA = 74 cm/s  PROX ICA = 101 cm/s  DIST ICA = 62 cm/s  ECA = 100 cm/s    LEFT:  PROX CCA = 76 cm/s  DIST CCA = 75 cm/s  PROX ICA = 80 cm/s  DIST ICA = 68 cm/s  ECA = 77 cm/s    Antegrade flow is noted within both vertebral arteries.    IMPRESSION: No significant hemodynamic stenosis of either carotid artery.    Measurement of carotid stenosis is based on velocity parameters that correlate the residual internal carotid diameter with that of the more distal vessel in accordance with a method such as the [employer] American Symptomatic Carotid Endarterectomy Trial (NASCET).                RORO CORADO MD; Attending Radiologist  This document has been electronically signed. Mar 30 2021 11:13AM    < end of copied text >    < from: CT Head No Cont (03.29.21 @ 17:13) >  EXAM:  CT BRAIN                            PROCEDURE DATE:  03/29/2021          INTERPRETATION:  CLINICAL INFORMATION: dizziness, vertigo. h/o a.fib, 10. .    TECHNIQUE: Sequential axial images were obtained from the vertex to the skull base without intravenous contrast. Coronal and sagittal reformations were obtained.    COMPARISON: Prior CT dated 5/28/2019.    FINDINGS:    Area of hypodensity in the right temporal lobe suggesting age-indeterminate infarct (2:26, 8:46). There is no acute intracranial hemorrhage. There are areas of hypodensity in the bilateral hemispheric white matter suggesting white matter microvascular ischemic change. There is cerebral volume loss.    There is no extraaxial fluid collection.    There is no displaced calvarial fracture. Status post bilateral intraocular lens implants. The visualized portions of the paranasal sinuses are well aerated. The mastoid air cells are well aerated.      IMPRESSION: Area of hypodensity in the right temporal lobe suggesting age-indeterminate infarct. No acute intracranial hemorrhage.            JOSE MARIA HENDERSON MD; Attending Radiologist  This document has been electronically signed. Mar 29 2021  5:27PM    < end of copied text >

## 2021-03-30 NOTE — PROGRESS NOTE ADULT - ASSESSMENT
· Assessment	  89 year old lady with hepc,Afib on eliquis, CHF, ,DM, HTN presented with near syncope.  She was found to have right pneumonia and thrombocytopenia.    1. near syncope  H/O Afib on eliquis  ?CHF  ?hypoglycemia  cardiology consult    2. 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 pneumonia, on antibiotics

## 2021-03-30 NOTE — PATIENT PROFILE ADULT - STATED REASON FOR ADMISSION
Patient's response was "I don't even know. My family maybe just want to make sure I do everything right".

## 2021-03-30 NOTE — PROGRESS NOTE ADULT - SUBJECTIVE AND OBJECTIVE BOX
NP Note discussed with  Primary Attending    Patient is a 89y old  Female who presents with a chief complaint of Hypertensive urgency (30 Mar 2021 11:11)      INTERVAL HPI/OVERNIGHT EVENTS: no new complaints    MEDICATIONS  (STANDING):  aspirin enteric coated 81 milliGRAM(s) Oral daily  atorvastatin 20 milliGRAM(s) Oral at bedtime  azithromycin  IVPB 500 milliGRAM(s) IV Intermittent every 24 hours  cefTRIAXone   IVPB 1000 milliGRAM(s) IV Intermittent every 24 hours  dextrose 40% Gel 15 Gram(s) Oral once  dextrose 5%. 1000 milliLiter(s) (50 mL/Hr) IV Continuous <Continuous>  glucagon  Injectable 1 milliGRAM(s) IntraMuscular once  insulin lispro (ADMELOG) corrective regimen sliding scale   SubCutaneous three times a day before meals  insulin lispro (ADMELOG) corrective regimen sliding scale   SubCutaneous at bedtime  latanoprost 0.005% Ophthalmic Solution 1 Drop(s) Both EYES at bedtime  lisinopril 10 milliGRAM(s) Oral daily  metoprolol tartrate 25 milliGRAM(s) Oral two times a day  mirtazapine 15 milliGRAM(s) Oral at bedtime  pantoprazole    Tablet 40 milliGRAM(s) Oral before breakfast  sodium chloride 0.9%. 1000 milliLiter(s) (125 mL/Hr) IV Continuous <Continuous>    MEDICATIONS  (PRN):      __________________________________________________  REVIEW OF SYSTEMS:    CONSTITUTIONAL: No fever,   EYES: no acute visual disturbances  NECK: No pain or stiffness  RESPIRATORY: No cough; No shortness of breath  CARDIOVASCULAR: No chest pain, no palpitations  GASTROINTESTINAL: No pain. No nausea or vomiting; No diarrhea   NEUROLOGICAL: No headache or numbness, no tremors  MUSCULOSKELETAL: No joint pain, no muscle pain  GENITOURINARY: no dysuria, no frequency, no hesitancy  PSYCHIATRY: no depression , no anxiety  ALL OTHER  ROS negative        Vital Signs Last 24 Hrs  T(C): 36.7 (30 Mar 2021 11:35), Max: 36.9 (29 Mar 2021 22:35)  T(F): 98.1 (30 Mar 2021 11:35), Max: 98.4 (29 Mar 2021 22:35)  HR: 95 (30 Mar 2021 11:35) (74 - 103)  BP: 176/93 (30 Mar 2021 11:35) (156/86 - 189/99)  BP(mean): 104 (30 Mar 2021 04:45) (104 - 104)  RR: 18 (30 Mar 2021 11:35) (18 - 20)  SpO2: 100% (30 Mar 2021 11:35) (98% - 100%)    ________________________________________________  PHYSICAL EXAM:  GENERAL: NAD  HEENT: Normocephalic;  conjunctivae and sclerae clear; moist mucous membranes;   NECK : supple  CHEST/LUNG: Clear to auscultation bilaterally with good air entry   HEART: S1 S2  regular; no murmurs, gallops or rubs  ABDOMEN: Soft, Nontender, Nondistended; Bowel sounds present  EXTREMITIES: no cyanosis; no edema; no calf tenderness  SKIN: warm and dry; no rash  NERVOUS SYSTEM:  Awake and alert; Oriented  to place, person and time ; no new deficits    _________________________________________________  LABS:                        11.9   8.21  )-----------( 99       ( 30 Mar 2021 07:07 )             35.2     03-30    142  |  109<H>  |  13  ----------------------------<  125<H>  4.1   |  27  |  0.91    Ca    8.4      30 Mar 2021 07:07  Phos  3.1     03-30  Mg     1.9     03-30    TPro  6.9  /  Alb  2.2<L>  /  TBili  0.4  /  DBili  x   /  AST  40  /  ALT  26  /  AlkPhos  113  03-30        CAPILLARY BLOOD GLUCOSE      POCT Blood Glucose.: 205 mg/dL (30 Mar 2021 12:02)        RADIOLOGY & ADDITIONAL TESTS:    Imaging  Reviewed:  YES/NO    Consultant(s) Notes Reviewed:   YES/ No      Plan of care was discussed with patient and /or primary care giver; all questions and concerns were addressed  NP Note discussed with  Primary Attending    Patient is a 89y old  Female who presents with a chief complaint of Hypertensive urgency (30 Mar 2021 11:11)      INTERVAL HPI/OVERNIGHT EVENTS: no new complaints    MEDICATIONS  (STANDING):  aspirin enteric coated 81 milliGRAM(s) Oral daily  atorvastatin 20 milliGRAM(s) Oral at bedtime  azithromycin  IVPB 500 milliGRAM(s) IV Intermittent every 24 hours  cefTRIAXone   IVPB 1000 milliGRAM(s) IV Intermittent every 24 hours  dextrose 40% Gel 15 Gram(s) Oral once  dextrose 5%. 1000 milliLiter(s) (50 mL/Hr) IV Continuous <Continuous>  glucagon  Injectable 1 milliGRAM(s) IntraMuscular once  insulin lispro (ADMELOG) corrective regimen sliding scale   SubCutaneous three times a day before meals  insulin lispro (ADMELOG) corrective regimen sliding scale   SubCutaneous at bedtime  latanoprost 0.005% Ophthalmic Solution 1 Drop(s) Both EYES at bedtime  lisinopril 10 milliGRAM(s) Oral daily  metoprolol tartrate 25 milliGRAM(s) Oral two times a day  mirtazapine 15 milliGRAM(s) Oral at bedtime  pantoprazole    Tablet 40 milliGRAM(s) Oral before breakfast  sodium chloride 0.9%. 1000 milliLiter(s) (125 mL/Hr) IV Continuous <Continuous>    MEDICATIONS  (PRN):      __________________________________________________  REVIEW OF SYSTEMS:    CONSTITUTIONAL: No fever,   EYES: no acute visual disturbances  NECK: No pain or stiffness  RESPIRATORY: No cough; No shortness of breath  CARDIOVASCULAR: No chest pain, no palpitations  GASTROINTESTINAL: No pain. No nausea or vomiting; No diarrhea   NEUROLOGICAL: No headache or numbness, no tremors  MUSCULOSKELETAL: No joint pain, no muscle pain  GENITOURINARY: no dysuria, no frequency, no hesitancy  PSYCHIATRY: no depression , no anxiety  ALL OTHER  ROS negative        Vital Signs Last 24 Hrs  T(C): 36.7 (30 Mar 2021 11:35), Max: 36.9 (29 Mar 2021 22:35)  T(F): 98.1 (30 Mar 2021 11:35), Max: 98.4 (29 Mar 2021 22:35)  HR: 95 (30 Mar 2021 11:35) (74 - 103)  BP: 176/93 (30 Mar 2021 11:35) (156/86 - 189/99)  BP(mean): 104 (30 Mar 2021 04:45) (104 - 104)  RR: 18 (30 Mar 2021 11:35) (18 - 20)  SpO2: 100% (30 Mar 2021 11:35) (98% - 100%)    ________________________________________________  PHYSICAL EXAM:  GENERAL: edentulous, cachectic female lying comfortably in bed   HEENT: Normocephalic;  conjunctivae and sclerae clear; moist mucous membranes;   NECK : thyroid isthmus midline   CHEST/LUNG: bibasilar rales   HEART: tachycardic, systolic ejection murmur, no heaves, gallops or rubs   ABDOMEN: Soft, Nontender, Nondistended; Bowel sounds present  EXTREMITIES: no cyanosis; no edema; no calf tenderness  SKIN: warm and dry; no rash  MSK: + kyphosis   NERVOUS SYSTEM: Alert and oriented x 3, CN II-XII grossly intact, strength 4/5, + dorsiflexion and plantarflexion to RLE, minimal dorsiflexion and plantarflexion to LLE (baseline)     _________________________________________________  LABS:                        11.9   8.21  )-----------( 99       ( 30 Mar 2021 07:07 )             35.2     03-30    142  |  109<H>  |  13  ----------------------------<  125<H>  4.1   |  27  |  0.91    Ca    8.4      30 Mar 2021 07:07  Phos  3.1     03-30  Mg     1.9     03-30    TPro  6.9  /  Alb  2.2<L>  /  TBili  0.4  /  DBili  x   /  AST  40  /  ALT  26  /  AlkPhos  113  03-30        CAPILLARY BLOOD GLUCOSE      POCT Blood Glucose.: 205 mg/dL (30 Mar 2021 12:02)        RADIOLOGY & ADDITIONAL TESTS:    Imaging  Reviewed:  YES  < from: CT Chest No Cont (03.30.21 @ 11:24) >  IMPRESSION:  Small to moderate right pleural effusion and small left pleural effusion. Suspected mild pulmonary edema.      < from: US Duplex Carotid Arteries Complete, Bilateral (03.30.21 @ 11:09) >    IMPRESSION: No significant hemodynamic stenosis of either carotid artery.      < from: CT Head No Cont (03.29.21 @ 17:13) >    IMPRESSION: Area of hypodensity in the right temporal lobe suggesting age-indeterminate infarct. No acute intracranial hemorrhage.          Consultant(s) Notes Reviewed:   YES      Plan of care was discussed with patient and /or primary care giver; all questions and concerns were addressed

## 2021-03-30 NOTE — CONSULT NOTE ADULT - SUBJECTIVE AND OBJECTIVE BOX
HISTORY OF PRESENT ILLNESS: HPI:  89 y.o. female with h/o NIDDM, pt uses inhalation insulin Afrezza, last dose this am, wheelchair bound, as per daughter in law, pt c/o feeling nausea for past 7 days, dizziness, vertigo and increase in ataxia. Patient is not a good historian and very soft spoken, She is AAO 2 (oriented to self and place only) collateral history is obtained from Daughter in law who states that patient lives alone with 24/7 HHA and from last few days she is having high blood pressure. Patient also endorses nausea and appetite loss. Patient states that whenever she tries to eat she becomes nauseous. She also reports some dry cough and occasional chest pain , abdominal pain, constipation (Not present currently)   Pt received J&J vaccine last week.   Patient denies SOB, urinary problems, headaches, dizziness, diplopia, focal weakness, swallowing difficulty.    Off Record, Daughter in law Bernard Mohan is the POC     Overall, the patient does not know why she is here in the hospital when I speak to her. She is upset that her family is not present, and was not there at home for her.  This is notable different from history taken above by the ED.  She knows that she has a pacemaker, does not know what brand, who implanted it, or for what indication.  Per chart review, there was an implant in 2001 and it is unclear if the generator has been replaced since then.  EP was called specifically for abnormal findings on telemetry.  The patient's ROS is negative x 10 systems now.  Review of records and discussion w/ ED team re: possible pneumonia and definite right pleural effusion on CT scan.    PAST MEDICAL & SURGICAL HISTORY:  HTN (hypertension)    Pacemaker  medtronic ADDRL1  2011    Graves disease  no treatment as per patient and family    Vertigo    Glaucoma    Constipation    A-fib  no Eliquis since 1/2019    Hepatitis C virus  not treated    T2DM (type 2 diabetes mellitus)  last A1c 7.8   4/11/19    PNA (pneumonia)  2/2019   treated with ABX while in [facility]    Gastric AVM  EGD 2/2019 - cauterized    GIB (gastrointestinal bleeding)  2/2019 requiring 2 PRBCs    Multiple falls    H/O oophorectomy    S/P partial resection of colon  &gt; 5 yrs ago    S/P cholecystectomy    S/P cardiac pacemaker procedure  Medtronic ADDRL1  2011    S/P hysterectomy    MEDICATIONS  (STANDING):  aspirin enteric coated 81 milliGRAM(s) Oral daily  atorvastatin 20 milliGRAM(s) Oral at bedtime  dextrose 40% Gel 15 Gram(s) Oral once  dextrose 5%. 1000 milliLiter(s) (50 mL/Hr) IV Continuous <Continuous>  furosemide   Injectable 40 milliGRAM(s) IV Push daily  glucagon  Injectable 1 milliGRAM(s) IntraMuscular once  insulin lispro (ADMELOG) corrective regimen sliding scale   SubCutaneous three times a day before meals  insulin lispro (ADMELOG) corrective regimen sliding scale   SubCutaneous at bedtime  latanoprost 0.005% Ophthalmic Solution 1 Drop(s) Both EYES at bedtime  lisinopril 10 milliGRAM(s) Oral daily  metoprolol tartrate 25 milliGRAM(s) Oral two times a day  mirtazapine 15 milliGRAM(s) Oral at bedtime  pantoprazole    Tablet 40 milliGRAM(s) Oral before breakfast    Allergies    No Known Allergies    Intolerances    FAMILY HISTORY:  Family history of diabetes mellitus    Family history of hypertension      Non-contributary for premature coronary disease or sudden cardiac death    SOCIAL HISTORY:    [x ] Non-smoker  [ ] Smoker  [ ] Alcohol    PHYSICAL EXAM:  T(C): 36.8 (03-30-21 @ 16:00), Max: 36.9 (03-29-21 @ 22:35)  HR: 87 (03-30-21 @ 16:00) (74 - 103)  BP: 162/89 (03-30-21 @ 16:00) (156/86 - 189/99)  RR: 18 (03-30-21 @ 16:00) (18 - 20)  SpO2: 100% (03-30-21 @ 16:00) (98% - 100%)  Wt(kg): --    Appearance: Pleasantly confused frail elderly woman in no acute distress, lying flat.	  HEENT:   Normal oral mucosa, PERRL, EOMI	  Lymphatic: No lymphadenopathy , no edema  Cardiovascular: Normal S1 S2,  ++JVD, No murmurs , Peripheral pulses palpable 2+ bilaterally.  Pacemaker left upper chest.  Respiratory: Lungs clear to auscultation, except for poor air entry in right base, poor overall effort 	  Gastrointestinal:  Soft, Non-tender, + BS	  Skin: No rashes, No ecchymoses, No cyanosis, warm to touch  Musculoskeletal: Normal range of motion, normal strength  Psychiatry:  Mood & affect appropriate    TELEMETRY:NSR, with episodes of V-pacing tracking to 105bpm. 	    ECG:  NSR  Echo: pending  XRAy:  dual chamber pacemaker with leads in appropriate appearing position.  AP projection expands the heart borders, but the overall silhouette may be enlarged.  There is cranial angulation making the generator appear to sit lower on the chest wall and may make the RLL effusion appear larger.    CT chest notable for RLL effusion and significant cardiac bi-atrial enlargement.  pacemaker leads seen.  Significant MV calcification.  	  LABS:	 	                          11.9   8.21  )-----------( 99       ( 30 Mar 2021 07:07 )             35.2     03-30    142  |  109<H>  |  13  ----------------------------<  125<H>  4.1   |  27  |  0.91    Ca    8.4      30 Mar 2021 07:07  Phos  3.1     03-30  Mg     1.9     03-30    TPro  6.9  /  Alb  2.2<L>  /  TBili  0.4  /  DBili  x   /  AST  40  /  ALT  26  /  AlkPhos  113  03-30    TSH: Thyroid Stimulating Hormone, Serum: 9.31 uU/mL (03-30 @ 07:07)      ASSESSMENT/PLAN: 	89y Female, ? dementia, with signs/symptoms of CHF in the setting of hypertensive emergency, pending admission.  Thrombocytopenia noted.      Telemetry findings consistent with ventricular pacemaker tracking of the underlying atrial rate (sinus tachy to ~105).  No VT or SVT seen.  Medtronic pacemaker to be checked tomorrow.  CHF workup per general cardiology.  Will follow.    Pérez Ochoa M.D.  Cardiac Electrophysiology    office [phone]  pager [phone]

## 2021-03-30 NOTE — CONSULT NOTE ADULT - SUBJECTIVE AND OBJECTIVE BOX
HISTORY OF PRESENT ILLNESS: HPI:  89 y.o. female with h/o NIDDM, pt uses inhalation insulin Afrezza, last dose this am, wheelchair bound, as per daughter in law, pt c/o feeling nausea for past 7 days, dizziness, vertigo and increase in ataxia. Patient is not a good historian and very soft spoken, She is AAO 2 (oriented to self and place only) collateral history is obtained from Daughter in law who states that patient lives alone with 24/7 HHA and from last few days she is having high blood pressure. Patient also endorses nausea and appetite loss. Patient states that whenever she tries to eat she becomes nauseous. She also reports some dry cough and occasional chest pain , abdominal pain, constipation (Not present currently)   Pt received J&J vaccine last week.   On tele she has episodes of WCT that appear to be paced complexes.  Patient denies SOB, urinary problems, headaches, dizziness, diplopia, focal weakness, swallowing difficulty.        ED course:    pr BNP 11k  Vital Signs Last 24 Hrs  T(C): 36.9 (29 Mar 2021 22:35), Max: 36.9 (29 Mar 2021 22:35)  T(F): 98.4 (29 Mar 2021 22:35), Max: 98.4 (29 Mar 2021 22:35)  HR: 74 (29 Mar 2021 22:35) (66 - 97)  BP: 166/75 (29 Mar 2021 22:35) (165/87 - 200/118)  RR: 20 (29 Mar 2021 22:35) (20 - 20)  SpO2: 98% (29 Mar 2021 22:35) (98% - 99%) (29 Mar 2021 21:12)      PAST MEDICAL & SURGICAL HISTORY:  HTN (hypertension)    Pacemaker  medtronic ADDRL1  2011    Graves disease  no treatment as per patient and family    Vertigo    Glaucoma    Constipation    A-fib  no Eliquis since 1/2019    Hepatitis C virus  not treated    T2DM (type 2 diabetes mellitus)  last A1c 7.8   4/11/19    PNA (pneumonia)  2/2019   treated with ABX while in [facility]    Gastric AVM  EGD 2/2019 - cauterized    GIB (gastrointestinal bleeding)  2/2019 requiring 2 PRBCs    Multiple falls    H/O oophorectomy    S/P partial resection of colon  &gt; 5 yrs ago    S/P cholecystectomy    S/P cardiac pacemaker procedure  Medtronic ADDRL1  2011    S/P hysterectomy            MEDICATIONS:  MEDICATIONS  (STANDING):  aspirin enteric coated 81 milliGRAM(s) Oral daily  atorvastatin 20 milliGRAM(s) Oral at bedtime  azithromycin  IVPB 500 milliGRAM(s) IV Intermittent every 24 hours  cefTRIAXone   IVPB 1000 milliGRAM(s) IV Intermittent every 24 hours  dextrose 40% Gel 15 Gram(s) Oral once  dextrose 5%. 1000 milliLiter(s) (50 mL/Hr) IV Continuous <Continuous>  glucagon  Injectable 1 milliGRAM(s) IntraMuscular once  insulin lispro (ADMELOG) corrective regimen sliding scale   SubCutaneous three times a day before meals  insulin lispro (ADMELOG) corrective regimen sliding scale   SubCutaneous at bedtime  latanoprost 0.005% Ophthalmic Solution 1 Drop(s) Both EYES at bedtime  lisinopril 10 milliGRAM(s) Oral daily  metoprolol tartrate 25 milliGRAM(s) Oral two times a day  mirtazapine 15 milliGRAM(s) Oral at bedtime  pantoprazole    Tablet 40 milliGRAM(s) Oral before breakfast  sodium chloride 0.9%. 1000 milliLiter(s) (125 mL/Hr) IV Continuous <Continuous>      Allergies    No Known Allergies    Intolerances        FAMILY HISTORY:  Family history of diabetes mellitus    Family history of hypertension      Non-contributary for premature coronary disease or sudden cardiac death    SOCIAL HISTORY:    [ X] Non-smoker  [ ] Smoker  [ ] Alcohol        REVIEW OF SYSTEMS:  [ ]chest pain  [  ]shortness of breath  [  ]palpitations  [  ]syncope  [ ]near syncope [ ]upper extremity weakness   [ ] lower extremity weakness  [  ]diplopia  [  ]altered mental status   [  ]fevers  [ ]chills [ ]nausea  [ ]vomitting  [  ]dysphagia    [ ]abdominal pain  [ ]melena  [ ]BRBPR    [  ]epistaxis  [  ]rash    [ ]lower extremity edema        [X ] All others negative	  [ ] Unable to obtain      LABS:	 	    CARDIAC MARKERS:  CARDIAC MARKERS ( 29 Mar 2021 14:38 )  0.018 ng/mL / x     / 61 U/L / x     / x                                  11.9   8.21  )-----------( 99       ( 30 Mar 2021 07:07 )             35.2     Hb Trend: 11.9<--, 13.0<--    03-30    142  |  109<H>  |  13  ----------------------------<  125<H>  4.1   |  27  |  0.91    Ca    8.4      30 Mar 2021 07:07  Phos  3.1     03-30  Mg     1.9     03-30    TPro  6.9  /  Alb  2.2<L>  /  TBili  0.4  /  DBili  x   /  AST  40  /  ALT  26  /  AlkPhos  113  03-30    Creatinine Trend: 0.91<--, 0.97<--        proBNP: Serum Pro-Brain Natriuretic Peptide: 52487 pg/mL (03-29 @ 14:38)      TSH: Thyroid Stimulating Hormone, Serum: 9.31 uU/mL (03-30 @ 07:07)          PHYSICAL EXAM:  T(C): 36.7 (03-30-21 @ 08:35), Max: 36.9 (03-29-21 @ 22:35)  HR: 103 (03-30-21 @ 08:35) (66 - 103)  BP: 189/99 (03-30-21 @ 08:35) (156/86 - 200/118)  RR: 18 (03-30-21 @ 08:35) (18 - 20)  SpO2: 100% (03-30-21 @ 08:35) (98% - 100%)  Wt(kg): --   BMI (kg/m2): 18.2 (03-29-21 @ 13:11)  I&O's Summary      HEENT:  (-)icterus (-)pallor  CV: N S1 S2 1/6 ROLO (+)2 Pulses B/l  Resp:  Clear to ausculatation B/L, normal effort  GI: (+) BS Soft, NT, ND  Lymph:  (-)Edema, (-)obvious lymphadenopathy  Skin: Warm to touch, Normal turgor  Psych: Appropriate mood and affect        TELEMETRY: 	  Sinus, ? Brief PAT with V pacing    ECG:  	Sinus tach 102 BPM, cannot exclude septal infarct of indeterminant age, nonspecific T wave abnormality    RADIOLOGY:         CXR:   There is opacity identified within the right lower lung field suggesting right-sided pneumonia. Small right pleural effusion cannot be excluded.        ASSESSMENT/PLAN: 	89y Female DM, on insulin  wheelchair bound, as per daughter in law, pt c/o feeling nausea for past 7 days, dizziness, vertigo and increase in ataxia.     - elevated BNP, f/u echo  - Abx for PNA per primary team  - Interrogate PPM, Unclear why pt is pacing at higher HR rates ? Tracking a PAT Dr. Don gagnon  - will follow     I once again thank you for allowing me to participate in the care of your patient.  If you have any questions or concerns please do not hesitate to contact me.    Austin Horn MD, [facility]  BEEPER [phone]

## 2021-03-30 NOTE — PROGRESS NOTE ADULT - ASSESSMENT
<<<<INCOMPLETE>>>> 89 year old female with past medical history of DM (on inhalation insulin Afrezz), GIB, gastric AVM, Hepatitis C, A-Fib, PPM, glaucoma, Grave's disease, HTN and heart failure who presented to the ED with c/o dizziness and impaired coordination    <<INCOMPLETE>>>    89 y.o. female with h/o NIDDM, pt uses inhalation insulin Afrezza, last dose this am, wheelchair bound, as per daughter in law, pt c/o feeling nausea for past 7 days, dizziness, vertigo and increase in ataxia. Patient is not a good historian and very soft spoken, She is AAO 2 (oriented to self and place only) collateral history is obtained from Daughter in law who states that patient lives alone with 24/7 HHA and from last few days she is having high blood pressure. Patient also endorses nausea and appetite loss. Patient states that whenever she tries to eat she becomes nauseous. She also reports some dry cough and occasional chest pain , abdominal pain, constipation (Not present currently)   Pt received J&J vaccine last week.   Patient denies SOB, urinary problems, headaches, dizziness, diplopia, focal weakness, swallowing difficulty. 89 year old WC bound female with past medical history of DM (on inhalation insulin Zeke), GIB, gastric AVM, Hepatitis C, A-Fib (not on AC's), PPM, glaucoma, Grave's disease, HTN and heart failure who presented to the ED with c/o dizziness and impaired coordination. 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 Patient was empirically started on antibiotics for PNA on CXR- no leukocytosis or fevers, BNP > 11,000, CT Chest non-contrast confirms pulmonary edema for which she was given Lasix. Procalcitonin < 0.25 so antibiotics discontinued. TSH noted to be elevated at 9.31, per her endocrinologist Dr. Sierra thyroid function tests normal during prior visits, is not on methimazole for Grave's disease. CT chest noted calcifications on thyroid so US ordered. Thrombocytopenia noted on CBC, heme/onc following, LFT's normal (prior hx of Heptitis C). Echocardiogram pending, carotid dopplers normal. Electrophysiologist Dr. Ochoa consulted for PPM, cardiology Dr. Horn.      89 year old WC bound female with past medical history of DM (on inhalation insulin Afrealistair), GIB, gastric AVM, Hepatitis C, A-Fib (not on AC's), PPM, glaucoma, Grave's disease, HTN and heart failure who presented to the ED with c/o dizziness and impaired coordination. 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 Patient was empirically started on antibiotics for PNA on CXR- no leukocytosis or fevers, BNP > 11,000, CT Chest non-contrast confirms pulmonary edema for which she was given Lasix. Procalcitonin < 0.25 so antibiotics discontinued. TSH noted to be elevated at 9.31, per her endocrinologist, Dr. Sierra, thyroid function tests normal during prior visits, is not on methimazole for Grave's disease. CT chest noted calcifications on thyroid so US ordered. Thrombocytopenia noted on CBC, heme/onc following, LFT's normal (prior hx of Heptitis C). Echocardiogram pending, carotid dopplers normal. Electrophysiologist Dr. Ochoa consulted for PPM, cardiology Dr. Horn.

## 2021-03-30 NOTE — PROGRESS NOTE ADULT - SUBJECTIVE AND OBJECTIVE BOX
Patient is a 89y old  Female who presents with a chief complaint of Hypertensive urgency (30 Mar 2021 17:06)    PATIENT IS SEEN AND EXAMINED IN MEDICAL FLOOR.    ALLERGIES:  No Known Allergies      VITALS:    Vital Signs Last 24 Hrs  T(C): 36.8 (30 Mar 2021 16:00), Max: 36.9 (29 Mar 2021 22:35)  T(F): 98.2 (30 Mar 2021 16:00), Max: 98.4 (29 Mar 2021 22:35)  HR: 87 (30 Mar 2021 16:00) (74 - 103)  BP: 162/89 (30 Mar 2021 16:00) (156/86 - 189/99)  BP(mean): 103 (30 Mar 2021 16:00) (103 - 104)  RR: 18 (30 Mar 2021 16:00) (18 - 20)  SpO2: 100% (30 Mar 2021 16:00) (98% - 100%)    LABS:    CBC Full  -  ( 30 Mar 2021 07:07 )  WBC Count : 8.21 K/uL  RBC Count : 3.84 M/uL  Hemoglobin : 11.9 g/dL  Hematocrit : 35.2 %  Platelet Count - Automated : 99 K/uL  Mean Cell Volume : 91.7 fl  Mean Cell Hemoglobin : 31.0 pg  Mean Cell Hemoglobin Concentration : 33.8 gm/dL  Auto Neutrophil # : 4.26 K/uL  Auto Lymphocyte # : 3.14 K/uL  Auto Monocyte # : 0.66 K/uL  Auto Eosinophil # : 0.08 K/uL  Auto Basophil # : 0.03 K/uL  Auto Neutrophil % : 51.9 %  Auto Lymphocyte % : 38.2 %  Auto Monocyte % : 8.0 %  Auto Eosinophil % : 1.0 %  Auto Basophil % : 0.4 %      03-30    142  |  109<H>  |  13  ----------------------------<  125<H>  4.1   |  27  |  0.91    Ca    8.4      30 Mar 2021 07:07  Phos  3.1     03-30  Mg     1.9     03-30    TPro  6.9  /  Alb  2.2<L>  /  TBili  0.4  /  DBili  x   /  AST  40  /  ALT  26  /  AlkPhos  113  03-30    CAPILLARY BLOOD GLUCOSE      POCT Blood Glucose.: 113 mg/dL (30 Mar 2021 17:37)  POCT Blood Glucose.: 205 mg/dL (30 Mar 2021 12:02)    CARDIAC MARKERS ( 29 Mar 2021 14:38 )  0.018 ng/mL / x     / 61 U/L / x     / x          LIVER FUNCTIONS - ( 30 Mar 2021 07:07 )  Alb: 2.2 g/dL / Pro: 6.9 g/dL / ALK PHOS: 113 U/L / ALT: 26 U/L DA / AST: 40 U/L / GGT: x           Creatinine Trend: 0.91<--, 0.97<--  I&O's Summary              MEDICATIONS:    MEDICATIONS  (STANDING):  aspirin enteric coated 81 milliGRAM(s) Oral daily  atorvastatin 20 milliGRAM(s) Oral at bedtime  dextrose 40% Gel 15 Gram(s) Oral once  dextrose 5%. 1000 milliLiter(s) (50 mL/Hr) IV Continuous <Continuous>  furosemide   Injectable 40 milliGRAM(s) IV Push daily  glucagon  Injectable 1 milliGRAM(s) IntraMuscular once  insulin lispro (ADMELOG) corrective regimen sliding scale   SubCutaneous three times a day before meals  insulin lispro (ADMELOG) corrective regimen sliding scale   SubCutaneous at bedtime  latanoprost 0.005% Ophthalmic Solution 1 Drop(s) Both EYES at bedtime  lisinopril 10 milliGRAM(s) Oral daily  metoprolol tartrate 25 milliGRAM(s) Oral two times a day  mirtazapine 15 milliGRAM(s) Oral at bedtime  pantoprazole    Tablet 40 milliGRAM(s) Oral before breakfast      MEDICATIONS  (PRN):      REVIEW OF SYSTEMS:                           ALL ROS DONE [ X   ]    CONSTITUTIONAL:  LETHARGIC [   ], FEVER [   ], UNRESPONSIVE [   ]  CVS:  CP  [   ], SOB, [   ], PALPITATIONS [   ], DIZZYNESS [   ]  RS: COUGH [   ], SPUTUM [   ]  GI: ABDOMINAL PAIN [   ], NAUSEA [   ], VOMITINGS [   ], DIARRHEA [   ], CONSTIPATION [   ]  :  DYSURIA [   ], NOCTURIA [   ], INCREASED FREQUENCY [   ], DRIBLING [   ],  SKELETAL: PAINFUL JOINTS [   ], SWOLLEN JOINTS [   ], NECK ACHE [   ], LOW BACK ACHE [   ],  SKIN : ULCERS [   ], RASH [   ], ITCHING [   ]  CNS: HEAD ACHE [   ], DOUBLE VISION [   ], BLURRED VISION [   ], AMS / CONFUSION [   ], SEIZURES [   ], WEAKNESS [   ],TINGLING / NUMBNESS [   ]    PHYSICAL EXAMINATION:  GENERAL APPEARANCE: NO DISTRESS  HEENT:  NO PALLOR, NO  JVD,  NO   NODES, NECK SUPPLE  CVS: S1 +, S2 +,   RS: AEEB,  OCCASIONAL  RALES +,   NO RONCHI  ABD: SOFT, NT, NO, BS +  EXT: NO PE  SKIN: WARM,   SKELETAL:  ROM ACCEPTABLE  CNS:  AAO X    ,   DEFICITS    RADIOLOGY :      ASSESSMENT :     Syncope and collapse    No pertinent past medical history    HTN (hypertension)    DM (diabetes mellitus)    Pacemaker    Graves disease    Vertigo    Glaucoma    Constipation    A-fib    Hepatitis C virus    T2DM (type 2 diabetes mellitus)    PNA (pneumonia)    Intracranial hemorrhage, spontaneous intraparenchymal, due to cerebral AVM, chronic    Gastric AVM    GIB (gastrointestinal bleeding)    Multiple falls    No significant past surgical history    Artificial pacemaker    H/O oophorectomy    S/P partial resection of colon    S/P cholecystectomy    S/P cardiac pacemaker procedure    S/P hysterectomy        PLAN:  HPI:  89 y.o. female with h/o NIDDM, pt uses inhalation insulin Afrezza, last dose this am, wheelchair bound, as per daughter in law, pt c/o feeling nausea for past 7 days, dizziness, vertigo and increase in ataxia. Patient is not a good historian and very soft spoken, She is AAO 2 (oriented to self and place only) collateral history is obtained from Daughter in law who states that patient lives alone with 24/7 HHA and from last few days she is having high blood pressure. Patient also endorses nausea and appetite loss. Patient states that whenever she tries to eat she becomes nauseous. She also reports some dry cough and occasional chest pain , abdominal pain, constipation (Not present currently)   Pt received J&J vaccine last week.   Patient denies SOB, urinary problems, headaches, dizziness, diplopia, focal weakness, swallowing difficulty.    Off Record, Daughter in law Bernard Mohan is the POC     ED course:    pr BNP 11k  Vital Signs Last 24 Hrs  T(C): 36.9 (29 Mar 2021 22:35), Max: 36.9 (29 Mar 2021 22:35)  T(F): 98.4 (29 Mar 2021 22:35), Max: 98.4 (29 Mar 2021 22:35)  HR: 74 (29 Mar 2021 22:35) (66 - 97)  BP: 166/75 (29 Mar 2021 22:35) (165/87 - 200/118)  RR: 20 (29 Mar 2021 22:35) (20 - 20)  SpO2: 98% (29 Mar 2021 22:35) (98% - 99%) (29 Mar 2021 21:12)    -      Patient is a 89y old  Female who presents with a chief complaint of Hypertensive urgency (30 Mar 2021 17:06)    PATIENT IS SEEN AND EXAMINED IN MEDICAL FLOOR.    ALLERGIES:  No Known Allergies      VITALS:    Vital Signs Last 24 Hrs  T(C): 36.8 (30 Mar 2021 16:00), Max: 36.9 (29 Mar 2021 22:35)  T(F): 98.2 (30 Mar 2021 16:00), Max: 98.4 (29 Mar 2021 22:35)  HR: 87 (30 Mar 2021 16:00) (74 - 103)  BP: 162/89 (30 Mar 2021 16:00) (156/86 - 189/99)  BP(mean): 103 (30 Mar 2021 16:00) (103 - 104)  RR: 18 (30 Mar 2021 16:00) (18 - 20)  SpO2: 100% (30 Mar 2021 16:00) (98% - 100%)    LABS:    CBC Full  -  ( 30 Mar 2021 07:07 )  WBC Count : 8.21 K/uL  RBC Count : 3.84 M/uL  Hemoglobin : 11.9 g/dL  Hematocrit : 35.2 %  Platelet Count - Automated : 99 K/uL  Mean Cell Volume : 91.7 fl  Mean Cell Hemoglobin : 31.0 pg  Mean Cell Hemoglobin Concentration : 33.8 gm/dL  Auto Neutrophil # : 4.26 K/uL  Auto Lymphocyte # : 3.14 K/uL  Auto Monocyte # : 0.66 K/uL  Auto Eosinophil # : 0.08 K/uL  Auto Basophil # : 0.03 K/uL  Auto Neutrophil % : 51.9 %  Auto Lymphocyte % : 38.2 %  Auto Monocyte % : 8.0 %  Auto Eosinophil % : 1.0 %  Auto Basophil % : 0.4 %      03-30    142  |  109<H>  |  13  ----------------------------<  125<H>  4.1   |  27  |  0.91    Ca    8.4      30 Mar 2021 07:07  Phos  3.1     03-30  Mg     1.9     03-30    TPro  6.9  /  Alb  2.2<L>  /  TBili  0.4  /  DBili  x   /  AST  40  /  ALT  26  /  AlkPhos  113  03-30    CAPILLARY BLOOD GLUCOSE      POCT Blood Glucose.: 113 mg/dL (30 Mar 2021 17:37)  POCT Blood Glucose.: 205 mg/dL (30 Mar 2021 12:02)    CARDIAC MARKERS ( 29 Mar 2021 14:38 )  0.018 ng/mL / x     / 61 U/L / x     / x          LIVER FUNCTIONS - ( 30 Mar 2021 07:07 )  Alb: 2.2 g/dL / Pro: 6.9 g/dL / ALK PHOS: 113 U/L / ALT: 26 U/L DA / AST: 40 U/L / GGT: x           Creatinine Trend: 0.91<--, 0.97<--  I&O's Summary              MEDICATIONS:    MEDICATIONS  (STANDING):  aspirin enteric coated 81 milliGRAM(s) Oral daily  atorvastatin 20 milliGRAM(s) Oral at bedtime  dextrose 40% Gel 15 Gram(s) Oral once  dextrose 5%. 1000 milliLiter(s) (50 mL/Hr) IV Continuous <Continuous>  furosemide   Injectable 40 milliGRAM(s) IV Push daily  glucagon  Injectable 1 milliGRAM(s) IntraMuscular once  insulin lispro (ADMELOG) corrective regimen sliding scale   SubCutaneous three times a day before meals  insulin lispro (ADMELOG) corrective regimen sliding scale   SubCutaneous at bedtime  latanoprost 0.005% Ophthalmic Solution 1 Drop(s) Both EYES at bedtime  lisinopril 10 milliGRAM(s) Oral daily  metoprolol tartrate 25 milliGRAM(s) Oral two times a day  mirtazapine 15 milliGRAM(s) Oral at bedtime  pantoprazole    Tablet 40 milliGRAM(s) Oral before breakfast      MEDICATIONS  (PRN):      REVIEW OF SYSTEMS:                           ALL ROS DONE [ X   ]    CONSTITUTIONAL:  LETHARGIC [   ], FEVER [   ], UNRESPONSIVE [   ]  CVS:  CP  [   ], SOB, [   ], PALPITATIONS [   ], DIZZYNESS [   ]  RS: COUGH [   ], SPUTUM [   ]  GI: ABDOMINAL PAIN [   ], NAUSEA [   ], VOMITINGS [   ], DIARRHEA [   ], CONSTIPATION [   ]  :  DYSURIA [   ], NOCTURIA [   ], INCREASED FREQUENCY [   ], DRIBLING [   ],  SKELETAL: PAINFUL JOINTS [   ], SWOLLEN JOINTS [   ], NECK ACHE [   ], LOW BACK ACHE [   ],  SKIN : ULCERS [   ], RASH [   ], ITCHING [   ]  CNS: HEAD ACHE [   ], DOUBLE VISION [   ], BLURRED VISION [   ], AMS / CONFUSION [   ], SEIZURES [   ], WEAKNESS [   ],TINGLING / NUMBNESS [   ]    PHYSICAL EXAMINATION:  GENERAL APPEARANCE: NO DISTRESS  HEENT:  NO PALLOR, NO  JVD,  NO   NODES, NECK SUPPLE  CVS: S1 +, S2 +,  IRREGULAR RATE, PPM  RS: AEEB,  OCCASIONAL  RALES +,   NO RONCHI  ABD: SOFT, NT, NO, BS +  EXT: NO PE  SKIN: WARM,   SKELETAL:  ROM ACCEPTABLE  CNS:  AAO X  2-3 ,   WEAK IN ALL EXTREMITIES    RADIOLOGY :    RADIOLOGY REVIEWED    ASSESSMENT :     Syncope and collapse    No pertinent past medical history    HTN (hypertension)    DM (diabetes mellitus)    Pacemaker    Graves disease    Vertigo    Glaucoma    Constipation    A-fib    Hepatitis C virus    T2DM (type 2 diabetes mellitus)    PNA (pneumonia)    Intracranial hemorrhage, spontaneous intraparenchymal, due to cerebral AVM, chronic    Gastric AVM    GIB (gastrointestinal bleeding)    Multiple falls    No significant past surgical history    Artificial pacemaker    H/O oophorectomy    S/P partial resection of colon    S/P cholecystectomy    S/P cardiac pacemaker procedure    S/P hysterectomy        PLAN:  HPI:  89 y.o. female with h/o NIDDM, pt uses inhalation insulin Afrezza, last dose this am, wheelchair bound, as per daughter in law, pt c/o feeling nausea for past 7 days, dizziness, vertigo and increase in ataxia. Patient is not a good historian and very soft spoken, She is AAO 2 (oriented to self and place only) collateral history is obtained from Daughter in law who states that patient lives alone with 24/7 HHA and from last few days she is having high blood pressure. Patient also endorses nausea and appetite loss. Patient states that whenever she tries to eat she becomes nauseous. She also reports some dry cough and occasional chest pain , abdominal pain, constipation (Not present currently)   Pt received J&J vaccine last week.   Patient denies SOB, urinary problems, headaches, dizziness, diplopia, focal weakness, swallowing difficulty.    Off Record, Daughter in law Bernard Mohan is the POC     ED course:    pr BNP 11k  Vital Signs Last 24 Hrs  T(C): 36.9 (29 Mar 2021 22:35), Max: 36.9 (29 Mar 2021 22:35)  T(F): 98.4 (29 Mar 2021 22:35), Max: 98.4 (29 Mar 2021 22:35)  HR: 74 (29 Mar 2021 22:35) (66 - 97)  BP: 166/75 (29 Mar 2021 22:35) (165/87 - 200/118)  RR: 20 (29 Mar 2021 22:35) (20 - 20)  SpO2: 98% (29 Mar 2021 22:35) (98% - 99%) (29 Mar 2021 21:12)    # NEAR SYNCOPE - R/O CVA VS VERTIGO VS ORTHOSTATIC HYPOTENSION - NOTED CT HEAD, F/U ECHOCARDIOGRAM, REVIEWED CAROTID U/S, REVIEWED TSH/LIPIDS/HBA1C, ON STATIN, BB, ACE-I, ASA  - NEUROLOGY CONSULT - LESS SUSPICIOUS FOR ACUTE CVA - SUSPECT DEBILITIY - F/U PT EVAL  # HYPERTENSIVE URGENCY - ON BB  # LESS LIKELY PNEUMONIA - DCD ROCEPHIN + AZITHROMYCIN, F/U BCX  - GIVEN LOW PROCAL AND CT MORE SUSPICIOUS FOR PULMONARY   # MUCOID IMPACTION - ORDERED CHEST PT  # THROMBOCYTOPENIA - SUSPECTED ITP, HEME/ONC CONSULT IN PROGRESS  # A.FIB W/ PPM - EPISODES OF RVR - PACEMAKER INTERROGATED, ON BB, EP CONSULT IN PROGRESS  # HEART FAILURE - ELEVATED PRO-BNP, CT W/ PLEURAL EFFUSION  - F/U ECHOCARDIOGRAM, CARDIOLOGY CONSULT IN PROGRESS  - STARTED ON IV LASIX  # NIDDM  # HX OF GRAVES DX - NOW WITH ELEVATED TSH - NOT ON MEDICATION - F/U FT3 AND FT4, ENDOCRINOLOGY CONSULT PLACED  # CASE D/W DAUGHTER-IN-LAW VIA PHONE IN ER  # GI PPX    BK KAUFFMAN MD COVERING SANTO KAUFFMAN MD

## 2021-03-30 NOTE — PROGRESS NOTE ADULT - PROBLEM SELECTOR PLAN 1
BNP > 11,000  c/w lisinopril, metoprolol, ASA, Lipitor   CT Chest with pleural effusions  gentle diuresis with IV Lasix 40 mg daily   f/u echo  prior echo from 2019 EF > 55%  Cardiology Dr. Horn BNP > 11,000  c/w lisinopril, metoprolol, ASA, Lipitor   CT Chest with pleural effusions & mucous plug/atelectasis: Chest PT ordered  gentle diuresis with IV Lasix 40 mg daily   f/u echo  prior echo from 2019 EF > 55%  Cardiology Dr. Horn

## 2021-03-30 NOTE — PROGRESS NOTE ADULT - PROBLEM SELECTOR PLAN 4
not in A-Fib on telemetry  ASA  c/w metoprolol   not on AC's  continue telemetry  PPM: PABLO Ochoa not in A-Fib on telemetry  ASA  c/w metoprolol   not on AC's  continue telemetry  per EP: Telemetry findings consistent with ventricular pacemaker tracking of the underlying atrial rate  PPM: EP MD Ochoa to interrogate tomorrow

## 2021-03-30 NOTE — PATIENT PROFILE ADULT - CAREGIVER RELATION TO PATIENT
[No studies available for review at this time.] : No studies available for review at this time. The patient is a 72y Male complaining of see chief complaint quote. daughter-in law

## 2021-03-30 NOTE — PROGRESS NOTE ADULT - SUBJECTIVE AND OBJECTIVE BOX
awake  no pain, no fever    ED course:    pr BNP 11k  Vital Signs Last 24 Hrs  T(C): 36.9 (29 Mar 2021 22:35), Max: 36.9 (29 Mar 2021 22:35)  T(F): 98.4 (29 Mar 2021 22:35), Max: 98.4 (29 Mar 2021 22:35)  HR: 74 (29 Mar 2021 22:35) (66 - 97)  BP: 166/75 (29 Mar 2021 22:35) (165/87 - 200/118)  RR: 20 (29 Mar 2021 22:35) (20 - 20)  SpO2: 98% (29 Mar 2021 22:35) (98% - 99%) (29 Mar 2021 21:12)     Pt is seen and examined  pt is awake and lying in bed/out of bed to chair  pt seems comfortable and denies any complaints at this time    ROS:  Negative except for:    MEDICATIONS  (STANDING):  aspirin enteric coated 81 milliGRAM(s) Oral daily  atorvastatin 20 milliGRAM(s) Oral at bedtime  dextrose 40% Gel 15 Gram(s) Oral once  dextrose 5%. 1000 milliLiter(s) (50 mL/Hr) IV Continuous <Continuous>  furosemide   Injectable 40 milliGRAM(s) IV Push daily  glucagon  Injectable 1 milliGRAM(s) IntraMuscular once  insulin lispro (ADMELOG) corrective regimen sliding scale   SubCutaneous three times a day before meals  insulin lispro (ADMELOG) corrective regimen sliding scale   SubCutaneous at bedtime  latanoprost 0.005% Ophthalmic Solution 1 Drop(s) Both EYES at bedtime  lisinopril 10 milliGRAM(s) Oral daily  metoprolol tartrate 25 milliGRAM(s) Oral two times a day  mirtazapine 15 milliGRAM(s) Oral at bedtime  pantoprazole    Tablet 40 milliGRAM(s) Oral before breakfast    MEDICATIONS  (PRN):      Allergies    No Known Allergies    Intolerances        Vital Signs Last 24 Hrs  T(C): 36.8 (30 Mar 2021 16:00), Max: 36.9 (29 Mar 2021 22:35)  T(F): 98.2 (30 Mar 2021 16:00), Max: 98.4 (29 Mar 2021 22:35)  HR: 87 (30 Mar 2021 16:00) (74 - 103)  BP: 162/89 (30 Mar 2021 16:00) (156/86 - 189/99)  BP(mean): 103 (30 Mar 2021 16:00) (103 - 104)  RR: 18 (30 Mar 2021 16:00) (18 - 20)  SpO2: 100% (30 Mar 2021 16:00) (98% - 100%)    PHYSICAL EXAM  General: adult in NAD  HEENT: clear oropharynx, anicteric sclera, pink conjunctiva  Neck: supple  CV: normal S1/S2 with no murmur rubs or gallops  Lungs: positive air movement b/l ant lungs,clear to auscultation, no wheezes, no rales  Abdomen: soft non-tender non-distended, no hepatosplenomegaly  Ext: no clubbing cyanosis or edema  Skin: no rashes and no petechiae  Neuro: alert and oriented X 4, no focal deficits  LABS:                          11.9   8.21  )-----------( 99       ( 30 Mar 2021 07:07 )             35.2         Mean Cell Volume : 91.7 fl  Mean Cell Hemoglobin : 31.0 pg  Mean Cell Hemoglobin Concentration : 33.8 gm/dL  Auto Neutrophil # : 4.26 K/uL  Auto Lymphocyte # : 3.14 K/uL  Auto Monocyte # : 0.66 K/uL  Auto Eosinophil # : 0.08 K/uL  Auto Basophil # : 0.03 K/uL  Auto Neutrophil % : 51.9 %  Auto Lymphocyte % : 38.2 %  Auto Monocyte % : 8.0 %  Auto Eosinophil % : 1.0 %  Auto Basophil % : 0.4 %    Serial CBC  Hematocrit 35.2  Hemoglobin 11.9  Plat 99  RBC 3.84  WBC 8.21  Serial CBC  Hematocrit 39.2  Hemoglobin 13.0  Plat 95  RBC 4.30  WBC 6.43    03-30    142  |  109<H>  |  13  ----------------------------<  125<H>  4.1   |  27  |  0.91    Ca    8.4      30 Mar 2021 07:07  Phos  3.1     03-30  Mg     1.9     03-30    TPro  6.9  /  Alb  2.2<L>  /  TBili  0.4  /  DBili  x   /  AST  40  /  ALT  26  /  AlkPhos  113  03-30          Ferritin, Serum: 279 ng/mL (03-30 @ 10:19)  Folate, Serum: 13.5 ng/mL (03-30 @ 10:19)  Vitamin B12, Serum: 937 pg/mL (03-30 @ 10:19)            BLOOD SMEAR INTERPRETATION:       RADIOLOGY & ADDITIONAL STUDIES:

## 2021-03-31 DIAGNOSIS — F03.90 UNSPECIFIED DEMENTIA WITHOUT BEHAVIORAL DISTURBANCE: ICD-10-CM

## 2021-03-31 DIAGNOSIS — F05 DELIRIUM DUE TO KNOWN PHYSIOLOGICAL CONDITION: ICD-10-CM

## 2021-03-31 DIAGNOSIS — F03.91 UNSPECIFIED DEMENTIA WITH BEHAVIORAL DISTURBANCE: ICD-10-CM

## 2021-03-31 DIAGNOSIS — R53.81 OTHER MALAISE: ICD-10-CM

## 2021-03-31 DIAGNOSIS — Z51.5 ENCOUNTER FOR PALLIATIVE CARE: ICD-10-CM

## 2021-03-31 DIAGNOSIS — E43 UNSPECIFIED SEVERE PROTEIN-CALORIE MALNUTRITION: ICD-10-CM

## 2021-03-31 LAB
GLUCOSE BLDC GLUCOMTR-MCNC: 102 MG/DL — HIGH (ref 70–99)
GLUCOSE BLDC GLUCOMTR-MCNC: 121 MG/DL — HIGH (ref 70–99)
GLUCOSE BLDC GLUCOMTR-MCNC: 89 MG/DL — SIGNIFICANT CHANGE UP (ref 70–99)

## 2021-03-31 PROCEDURE — 99223 1ST HOSP IP/OBS HIGH 75: CPT

## 2021-03-31 PROCEDURE — 90792 PSYCH DIAG EVAL W/MED SRVCS: CPT

## 2021-03-31 RX ORDER — AMIODARONE HYDROCHLORIDE 400 MG/1
400 TABLET ORAL EVERY 8 HOURS
Refills: 0 | Status: DISCONTINUED | OUTPATIENT
Start: 2021-03-31 | End: 2021-04-02

## 2021-03-31 RX ORDER — AMIODARONE HYDROCHLORIDE 400 MG/1
200 TABLET ORAL DAILY
Refills: 0 | Status: CANCELLED | OUTPATIENT
Start: 2021-04-04 | End: 2021-04-02

## 2021-03-31 RX ORDER — OLANZAPINE 15 MG/1
2.5 TABLET, FILM COATED ORAL EVERY 8 HOURS
Refills: 0 | Status: DISCONTINUED | OUTPATIENT
Start: 2021-03-31 | End: 2021-04-01

## 2021-03-31 RX ORDER — OLANZAPINE 15 MG/1
2.5 TABLET, FILM COATED ORAL EVERY 12 HOURS
Refills: 0 | Status: DISCONTINUED | OUTPATIENT
Start: 2021-03-31 | End: 2021-04-02

## 2021-03-31 RX ORDER — OLANZAPINE 15 MG/1
2.5 TABLET, FILM COATED ORAL ONCE
Refills: 0 | Status: COMPLETED | OUTPATIENT
Start: 2021-03-31 | End: 2021-03-31

## 2021-03-31 RX ORDER — AMIODARONE HYDROCHLORIDE 400 MG/1
TABLET ORAL
Refills: 0 | Status: DISCONTINUED | OUTPATIENT
Start: 2021-03-31 | End: 2021-04-02

## 2021-03-31 RX ADMIN — Medication 25 MILLIGRAM(S): at 17:52

## 2021-03-31 RX ADMIN — OLANZAPINE 2.5 MILLIGRAM(S): 15 TABLET, FILM COATED ORAL at 06:14

## 2021-03-31 RX ADMIN — OLANZAPINE 2.5 MILLIGRAM(S): 15 TABLET, FILM COATED ORAL at 22:22

## 2021-03-31 RX ADMIN — Medication 40 MILLIGRAM(S): at 05:34

## 2021-03-31 RX ADMIN — AMIODARONE HYDROCHLORIDE 400 MILLIGRAM(S): 400 TABLET ORAL at 22:18

## 2021-03-31 RX ADMIN — Medication 0: at 22:17

## 2021-03-31 RX ADMIN — MIRTAZAPINE 15 MILLIGRAM(S): 45 TABLET, ORALLY DISINTEGRATING ORAL at 22:18

## 2021-03-31 RX ADMIN — ATORVASTATIN CALCIUM 20 MILLIGRAM(S): 80 TABLET, FILM COATED ORAL at 22:17

## 2021-03-31 NOTE — PROGRESS NOTE ADULT - SUBJECTIVE AND OBJECTIVE BOX
HISTORY OF PRESENT ILLNESS: HPI:  89 y.o. female with h/o NIDDM, pt uses inhalation insulin Afrezza, last dose this am, wheelchair bound, as per daughter in law, pt c/o feeling nausea for past 7 days, dizziness, vertigo and increase in ataxia. Patient is not a good historian and very soft spoken, She is AAO 2 (oriented to self and place only) collateral history is obtained from Daughter in law who states that patient lives alone with 24/7 HHA and from last few days she is having high blood pressure. Patient also endorses nausea and appetite loss. Patient states that whenever she tries to eat she becomes nauseous. She also reports some dry cough and occasional chest pain , abdominal pain, constipation (Not present currently)   Pt received J&J vaccine last week.   Patient denies SOB, urinary problems, headaches, dizziness, diplopia, focal weakness, swallowing difficulty.    Off Record, Daughter in law Bernard Mohan is the POC     Overall, the patient does not know why she is here in the hospital when I speak to her. She is upset that her family is not present, and was not there at home for her.  This is notable different from history taken above by the ED.  She knows that she has a pacemaker, does not know what brand, who implanted it, or for what indication.  Per chart review, there was an implant in 2001 and it is unclear if the generator has been replaced since then.  EP was called specifically for abnormal findings on telemetry.  The patient's ROS is negative x 10 systems now.  Review of records and discussion w/ ED team re: possible pneumonia and definite right pleural effusion on CT scan.    3/31- resting comfortably in bed, was moved to behavioral monitoring room overnight due to being combative or disruptive.  Medtronic pacemaker checked via [employer] at bedside.  No complaints today.    aspirin enteric coated 81 milliGRAM(s) Oral daily  atorvastatin 20 milliGRAM(s) Oral at bedtime  dextrose 40% Gel 15 Gram(s) Oral once  dextrose 5%. 1000 milliLiter(s) IV Continuous <Continuous>  furosemide   Injectable 40 milliGRAM(s) IV Push daily  glucagon  Injectable 1 milliGRAM(s) IntraMuscular once  insulin lispro (ADMELOG) corrective regimen sliding scale   SubCutaneous three times a day before meals  insulin lispro (ADMELOG) corrective regimen sliding scale   SubCutaneous at bedtime  latanoprost 0.005% Ophthalmic Solution 1 Drop(s) Both EYES at bedtime  lisinopril 10 milliGRAM(s) Oral daily  metoprolol tartrate 25 milliGRAM(s) Oral two times a day  mirtazapine 15 milliGRAM(s) Oral at bedtime  pantoprazole    Tablet 40 milliGRAM(s) Oral before breakfast                          11.9   8.21  )-----------( 99       ( 30 Mar 2021 07:07 )             35.2       03-30    142  |  109<H>  |  13  ----------------------------<  125<H>  4.1   |  27  |  0.91    Ca    8.4      30 Mar 2021 07:07  Phos  3.1     03-30  Mg     1.9     03-30    TPro  6.9  /  Alb  2.2<L>  /  TBili  0.4  /  DBili  x   /  AST  40  /  ALT  26  /  AlkPhos  113  03-30      CARDIAC MARKERS ( 29 Mar 2021 14:38 )  0.018 ng/mL / x     / 61 U/L / x     / x          T(C): 36.7 (03-31-21 @ 04:49), Max: 37.1 (03-31-21 @ 01:36)  HR: 100 (03-31-21 @ 10:02) (87 - 102)  BP: 168/92 (03-31-21 @ 10:02) (162/89 - 184/98)  RR: 17 (03-31-21 @ 10:02) (17 - 18)  SpO2: 98% (03-31-21 @ 10:02) (98% - 100%)  Wt(kg): --    I&O's Summary    31 Mar 2021 07:01  -  31 Mar 2021 12:37  --------------------------------------------------------  IN: 0 mL / OUT: 100 mL / NET: -100 mL    Appearance: Pleasantly confused frail elderly woman in no acute distress, lying flat.	  HEENT:   Normal oral mucosa, PERRL, EOMI	  Lymphatic: No lymphadenopathy , no edema  Cardiovascular: Normal S1 S2,  ++JVD, No murmurs , Peripheral pulses palpable 2+ bilaterally.  Pacemaker left upper chest.  Respiratory: Lungs clear to auscultation, except for poor air entry in right base, poor overall effort 	  Gastrointestinal:  Soft, Non-tender, + BS	  Skin: No rashes, No ecchymoses, No cyanosis, warm to touch  Musculoskeletal: Normal range of motion, normal strength  Psychiatry:  Mood & affect appropriate    TELEMETRY:NSR, with episodes of V-pacing tracking to 105bpm. 	    ECG:  NSR  XRAy:  dual chamber pacemaker with leads in appropriate appearing position.  AP projection expands the heart borders, but the overall silhouette may be enlarged.  There is cranial angulation making the generator appear to sit lower on the chest wall and may make the RLL effusion appear larger.      CT chest notable for RLL effusion and significant cardiac bi-atrial enlargement.  pacemaker leads seen.  Significant MV calcification.    Pacemaker check:  Medtronic pacemaker with ~4 yrs battery life remaining.  In good working order, with normal sensing and pacing parameters.  Arrhythmia log shows frequent / incessant runs of atrial tachycardia, often >20hrs/day on many days, with intermittent sinus rhythm.  	  Echo: EF 45-50%, LVH. Moderate MR. Severe LA enlargement.  Moderate PHTN.  The Sun Valve is very calcified. The basal and mid inferolateral walls are hypokinetic.    ASSESSMENT/PLAN: 	89y Female, ? dementia, with signs/symptoms of CHF in the setting of hypertensive emergency, pending admission.  Thrombocytopenia noted.      Pacemaker has been tracking at the upper programmed rate of 110bpm, in spite of atrial rate >150bpm.  CHF workup per general cardiology.  Rate control with beta blockers for now (can use higher doses like 50-75mg BID, and it wont have an adverse effect on her BP).    Given CHF, and structural heart disease (LVH, functionally abnormal Mitral valve), she will feel better and have less CHF symptoms in regular rhythm and at slower heart rates.    Recommend Amiodarone load, 400mg PO TID x 12 doses, followed by 200mg daily.    Pacemaker will prevent bradycardia.  Will follow.    Pérez Ochoa M.D.  Cardiac Electrophysiology    office [phone]  pager [phone]

## 2021-03-31 NOTE — PROGRESS NOTE ADULT - PROBLEM SELECTOR PLAN 6
thrombocytopenic precautions  prior hx of Hepatitis C  LFT's normal  trend CBC - unable to draw blood today

## 2021-03-31 NOTE — BEHAVIORAL HEALTH ASSESSMENT NOTE - FAMILY HISTORY OF SUBSTANCE ABUSE
Notified: On-Call Dr Fraga    10/28/17 9:04 PM    Notification Interaction: Page/telephone    Purpose of Notification: Pt BP continues to elevate despite the increase in norvasc and added PO  Hydralazine 25mg. Can't give IV hydralazine as pt's BP does not fall within parameters. Next time to give PO Hydralazine not until 2330.    Orders Received: MD does not want to order anything at this time stating that his current high BP ok considering his BP on admit.     Will give next PO hydralazine at 2330.     None known

## 2021-03-31 NOTE — CONSULT NOTE ADULT - CONVERSATION DETAILS
Palliative care NP spoke with the pt's son and daughter-in-law Yanna Healy who is also her HCP.  Discussed her current clinical condition.  Ms. Healy endorsed pt's appetite has decreased, she is incontinent of urine and stool, dependent on ADLs.    Explained that the pt is clinically appropriate for hospice.  Both the HCP and pt's son stated they are not ready for hospice.  They would like to continue with current medical management.    Pt remains DNR/DNI.  Plan discussed with primary team.   All question answered.  Support provided.

## 2021-03-31 NOTE — BEHAVIORAL HEALTH ASSESSMENT NOTE - SUMMARY
89F, lives alone with 24/7 HHA, with PHx of dementia per family (not formally dx) and MHx of DM on inhaled insulin, HTN, Afib (no longer on AC) s/p PM, hep C (no tx) and Graves dz (no tx), BIBEMS on 3/29 c/o nausea, dizziness, vertigo and ataxia and found to have HTN urgency, pleural effusion and suspected PNA. Psych consult was requested for recommendations on behavioral management, as pt has been combative and oppositional with staff, especially since she was moved from ED to . On exam, pt is somnolent and refuses to participate, but per collateral from DIL pt has mild dementia and has difficulty coping with unfamiliar surroundings and people. Impr multifactorial delirium superimposed on dementia without behavioral disturbance. For behavioral stabilization, pt appears likely to benefit from Zyprexa 2.5 mg q8h standing for next 24-48h (IM form is currently recommended, as pt is refusing to take PO), with Zyprexa 2.5 mg IM q12h PRN acute agitation. Pt does not appear to present an acute risk of harm to self or others at the time of assessment, and does not appear to be in need of admission to IP psych at the time of assessment.

## 2021-03-31 NOTE — BEHAVIORAL HEALTH ASSESSMENT NOTE - NSBHCONSULTRECOMMENDOTHER_PSY_A_CORE FT
1. Start Zyprexa 2.5 mg q8h standing for next 24-48h (IM form is currently recommended, as pt is refusing to take PO)  --Convert to PO at same dose as soon as pt starts to accept PO  2. For breakthrough acute agitation, recommend additional Zyprexa 2.5 mg PO or IM q12h PRN agitation  -- PO dose form is preferred if pt is able to swallow, but IM can be used as backup if PO refused or cannot be given  3. Continue delirium precautions: Frequent reorientation, familiar pictures and objects at bedside, natural light in daytime, consistent sleep/wake schedule, adequate hydration and nutrition, sensory aids (hearing aids, glasses) present if needed, minimizing noise and overstimulation, clustering care to minimize overnight interruptions, judicious use of deliriogenic medications (anticholinergics, benzodiazepines and opioid analgesics), minimize use of restraints  4. STRONGLY recommend maximum efforts to avoid use of restraints  --Recommend support, reorientation and reassurance as first line, PRN medication as second line, and increased supervision (ie enhanced or CO 1:1) as third line. Restraints should be considered ONLY after all of above measures have been tried and failed  5. Medical management as directed by primary team  6. Psychiatry will continue to follow  7. Case d/w DALE Alba of primary team

## 2021-03-31 NOTE — BEHAVIORAL HEALTH ASSESSMENT NOTE - HPI (INCLUDE ILLNESS QUALITY, SEVERITY, DURATION, TIMING, CONTEXT, MODIFYING FACTORS, ASSOCIATED SIGNS AND SYMPTOMS)
89F, lives alone with 24/7 HHA, with PHx of dementia per family (not formally dx) and MHx of DM on inhaled insulin, HTN, Afib (no longer on AC) s/p PM, hep C (no tx) and Graves dz (no tx), BIBEMS on 3/29 c/o nausea, dizziness, vertigo and ataxia and found to have HTN urgency, pleural effusion and suspected PNA. Psych consult was requested for recommendations on behavioral management, as pt has been combative and oppositional with staff, especially since she was moved from ED to . Per staff, code gray was called early this morning and pt was given acute agitation PRN of Zyprexa 2.5 mg IM (PO was attempted but pt refused). Pt seen in her room for interview, found lying in bed sleeping and appearing in NAD. MD attempts to wake pt for interview with voice and light touch; she briefly opens eyes but closes them again, and swats away MD's hand on several attempts. Due to pt's inability to participate, collateral is obtained from MARK Irby, who reports she is  to pt's son Julian Crandall. Per MARK, pt has had 24/7 HHA service since a series of hospitalizations in the summer and fall of 2019 (she was admitted to [facility] with ascites and anasarca 2/2 cirrhosis, and developed SIERRA and digoxin toxicity) during which mild dementia was noted. MARK reports that she visits pt several times a week, and generally finds her alert and participating in her own care (eg she likes to pick out the clothes she will wear each day), but she notes that pt is typically "very confused" when she wakes up from sleep or when unfamiliar people are caring for her. Per MARK, pt was doing "pretty well" in the ER, but per hospital reports has been much more confused and combative since she was moved to current room on 5S. MD recommends low-dose Zyprexa (2.5 mg q8h standing for next couple of days) to stabilize pt's behavior, and MARK agrees with the plan.

## 2021-03-31 NOTE — CHART NOTE - NSCHARTNOTEFT_GEN_A_CORE
EVENT:     SUBJECTIVE:    OBJECTIVE:  Vital Signs Last 24 Hrs  T(C): 36.7 (31 Mar 2021 04:49), Max: 37.1 (31 Mar 2021 01:36)  T(F): 98.1 (31 Mar 2021 04:49), Max: 98.8 (31 Mar 2021 01:36)  HR: 98 (31 Mar 2021 04:49) (87 - 103)  BP: 164/95 (31 Mar 2021 04:49) (162/89 - 189/99)  BP(mean): 103 (30 Mar 2021 16:00) (103 - 103)  RR: 18 (31 Mar 2021 04:49) (18 - 18)  SpO2: 98% (31 Mar 2021 04:49) (98% - 100%)    PHYSICAL EXAM:  Neuro: Awake and alert, oriented to person, place, and time  Cardiovascular: + S1, S2, no murmurs, rubs, or bruits  Respiratory: clear to auscultation bilaterally with good air entry   GI: Abdomen soft, non-tender, bowel sounds present   : Non distended;   Skin: warm and dry; no rash      LABS:                        11.9   8.21  )-----------( 99       ( 30 Mar 2021 07:07 )             35.2   CARDIAC MARKERS ( 29 Mar 2021 14:38 )  0.018 ng/mL / x     / 61 U/L / x     / x        03-30    142  |  109<H>  |  13  ----------------------------<  125<H>  4.1   |  27  |  0.91    Ca    8.4      30 Mar 2021 07:07  Phos  3.1     03-30  Mg     1.9     03-30    TPro  6.9  /  Alb  2.2<L>  /  TBili  0.4  /  DBili  x   /  AST  40  /  ALT  26  /  AlkPhos  113  03-30        EKG:   IMAGING:    ASSESSMENT:  HPI:  89 y.o. female with h/o NIDDM, pt uses inhalation insulin Afrezza, last dose this am, wheelchair bound, as per daughter in law, pt c/o feeling nausea for past 7 days, dizziness, vertigo and increase in ataxia. Patient is not a good historian and very soft spoken, She is AAO 2 (oriented to self and place only) collateral history is obtained from Daughter in law who states that patient lives alone with 24/7 HHA and from last few days she is having high blood pressure. Patient also endorses nausea and appetite loss. Patient states that whenever she tries to eat she becomes nauseous. She also reports some dry cough and occasional chest pain , abdominal pain, constipation (Not present currently)   Pt received J&J vaccine last week.   Patient denies SOB, urinary problems, headaches, dizziness, diplopia, focal weakness, swallowing difficulty.    Off Record, Daughter in law Bernard Mohan is the POC     ED course:    pr BNP 11k  Vital Signs Last 24 Hrs  T(C): 36.9 (29 Mar 2021 22:35), Max: 36.9 (29 Mar 2021 22:35)  T(F): 98.4 (29 Mar 2021 22:35), Max: 98.4 (29 Mar 2021 22:35)  HR: 74 (29 Mar 2021 22:35) (66 - 97)  BP: 166/75 (29 Mar 2021 22:35) (165/87 - 200/118)  RR: 20 (29 Mar 2021 22:35) (20 - 20)  SpO2: 98% (29 Mar 2021 22:35) (98% - 99%) (29 Mar 2021 21:12)      PLAN:     FOLLOW UP / RESULT: EVENT: Paged by RN, pt is confused, combative, and agitated    HPI:  89 year old WC bound female with past medical history of DM (on inhalation insulin Afrezz), GIB, gastric AVM, Hepatitis C, A-Fib (not on AC's), PPM, glaucoma, Grave's disease, HTN and heart failure who presented to the ED with c/o dizziness and impaired coordination. 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 Patient was empirically started on antibiotics for PNA on CXR- no leukocytosis or fevers, BNP > 11,000, CT Chest non-contrast confirms pulmonary edema for which she was given Lasix. Procalcitonin < 0.25 so antibiotics discontinued. TSH noted to be elevated at 9.31, per her endocrinologist, Dr. Sierra, thyroid function tests normal during prior visits, is not on methimazole for Grave's disease. CT chest noted calcifications on thyroid so US ordered. Thrombocytopenia noted on CBC, heme/onc following, LFT's normal (prior hx of Heptitis C). Echocardiogram pending, carotid dopplers normal. Electrophysiologist Dr. Ochoa consulted for PPM, cardiology Dr. Horn.     SUBJECTIVE: Unable to assess due to mental status. Pt is trying to pull out lines, drained, and tele monitor. Unable to follow commands, and be redirected.     OBJECTIVE:  Vital Signs Last 24 Hrs  T(C): 36.7 (31 Mar 2021 04:49), Max: 37.1 (31 Mar 2021 01:36)  T(F): 98.1 (31 Mar 2021 04:49), Max: 98.8 (31 Mar 2021 01:36)  HR: 98 (31 Mar 2021 04:49) (87 - 103)  BP: 164/95 (31 Mar 2021 04:49) (162/89 - 189/99)  BP(mean): 103 (30 Mar 2021 16:00) (103 - 103)  RR: 18 (31 Mar 2021 04:49) (18 - 18)  SpO2: 98% (31 Mar 2021 04:49) (98% - 100%)    PHYSICAL EXAM:  Neuro: Awake and alert, oriented to person  Cardiovascular: + S1, S2, no murmurs, rubs, or bruits  Respiratory: clear to auscultation bilaterally with good air entry   GI: Abdomen soft, non-tender, bowel sounds present   : Non distended;   Skin: warm and dry; no rash      LABS:                        11.9   8.21  )-----------( 99       ( 30 Mar 2021 07:07 )             35.2   CARDIAC MARKERS ( 29 Mar 2021 14:38 )  0.018 ng/mL / x     / 61 U/L / x     / x        03-30    142  |  109<H>  |  13  ----------------------------<  125<H>  4.1   |  27  |  0.91    Ca    8.4      30 Mar 2021 07:07  Phos  3.1     03-30  Mg     1.9     03-30    TPro  6.9  /  Alb  2.2<L>  /  TBili  0.4  /  DBili  x   /  AST  40  /  ALT  26  /  AlkPhos  113  03-30        EKG:   IMAGING:    ASSESSMENT: Acute agitation/confusion possibly due to ?dementia/ unfamiliar environment    PLAN:     -Zyprexa 2.5 IM x 1 dose, ordered STAT  -Enhanced supervision, ordered  -Continue current/present care  -Fall precautions     FOLLOW UP / RESULT:    -Monitor effectiveness of above intervention

## 2021-03-31 NOTE — PROGRESS NOTE ADULT - SUBJECTIVE AND OBJECTIVE BOX
Patient is a 89y old  Female who presents with a chief complaint of Hypertensive urgency (31 Mar 2021 14:20)    PATIENT IS SEEN AND EXAMINED IN MEDICAL FLOOR.  EDISON [    ]    ANISA [   ]      GT [   ]    ALLERGIES:  No Known Allergies      Daily Height in cm: 149.86 (31 Mar 2021 00:07)    Daily Weight in k.3 (30 Mar 2021 23:10)    VITALS:    Vital Signs Last 24 Hrs  T(C): 36.7 (31 Mar 2021 04:49), Max: 37.1 (31 Mar 2021 01:36)  T(F): 98.1 (31 Mar 2021 04:49), Max: 98.8 (31 Mar 2021 01:36)  HR: 100 (31 Mar 2021 10:02) (97 - 102)  BP: 168/92 (31 Mar 2021 10:02) (164/95 - 184/98)  BP(mean): --  RR: 17 (31 Mar 2021 10:02) (17 - 18)  SpO2: 98% (31 Mar 2021 10:02) (98% - 98%)    LABS:    CBC Full  -  ( 30 Mar 2021 07:07 )  WBC Count : 8.21 K/uL  RBC Count : 3.84 M/uL  Hemoglobin : 11.9 g/dL  Hematocrit : 35.2 %  Platelet Count - Automated : 99 K/uL  Mean Cell Volume : 91.7 fl  Mean Cell Hemoglobin : 31.0 pg  Mean Cell Hemoglobin Concentration : 33.8 gm/dL  Auto Neutrophil # : 4.26 K/uL  Auto Lymphocyte # : 3.14 K/uL  Auto Monocyte # : 0.66 K/uL  Auto Eosinophil # : 0.08 K/uL  Auto Basophil # : 0.03 K/uL  Auto Neutrophil % : 51.9 %  Auto Lymphocyte % : 38.2 %  Auto Monocyte % : 8.0 %  Auto Eosinophil % : 1.0 %  Auto Basophil % : 0.4 %      03-30    142  |  109<H>  |  13  ----------------------------<  125<H>  4.1   |  27  |  0.91    Ca    8.4      30 Mar 2021 07:07  Phos  3.1     03-30  Mg     1.9     03-30    TPro  6.9  /  Alb  2.2<L>  /  TBili  0.4  /  DBili  x   /  AST  40  /  ALT  26  /  AlkPhos  113      CAPILLARY BLOOD GLUCOSE      POCT Blood Glucose.: 102 mg/dL (31 Mar 2021 18:06)  POCT Blood Glucose.: 121 mg/dL (31 Mar 2021 11:32)  POCT Blood Glucose.: 211 mg/dL (30 Mar 2021 22:48)  POCT Blood Glucose.: 181 mg/dL (30 Mar 2021 21:13)        LIVER FUNCTIONS - ( 30 Mar 2021 07:07 )  Alb: 2.2 g/dL / Pro: 6.9 g/dL / ALK PHOS: 113 U/L / ALT: 26 U/L DA / AST: 40 U/L / GGT: x           Creatinine Trend: 0.91<--, 0.97<--  I&O's Summary    31 Mar 2021 07:01  -  31 Mar 2021 21:07  --------------------------------------------------------  IN: 0 mL / OUT: 100 mL / NET: -100 mL            .Blood Blood   @ 21:57   No growth to date.  --  --          MEDICATIONS:    MEDICATIONS  (STANDING):  aMIOdarone    Tablet   Oral   aMIOdarone    Tablet 400 milliGRAM(s) Oral every 8 hours  aspirin enteric coated 81 milliGRAM(s) Oral daily  atorvastatin 20 milliGRAM(s) Oral at bedtime  dextrose 40% Gel 15 Gram(s) Oral once  dextrose 5%. 1000 milliLiter(s) (50 mL/Hr) IV Continuous <Continuous>  furosemide   Injectable 40 milliGRAM(s) IV Push daily  glucagon  Injectable 1 milliGRAM(s) IntraMuscular once  insulin lispro (ADMELOG) corrective regimen sliding scale   SubCutaneous three times a day before meals  insulin lispro (ADMELOG) corrective regimen sliding scale   SubCutaneous at bedtime  latanoprost 0.005% Ophthalmic Solution 1 Drop(s) Both EYES at bedtime  lisinopril 10 milliGRAM(s) Oral daily  metoprolol tartrate 25 milliGRAM(s) Oral two times a day  mirtazapine 15 milliGRAM(s) Oral at bedtime  OLANZapine Injectable 2.5 milliGRAM(s) IntraMuscular every 8 hours  pantoprazole    Tablet 40 milliGRAM(s) Oral before breakfast      MEDICATIONS  (PRN):  OLANZapine Injectable 2.5 milliGRAM(s) IntraMuscular every 12 hours PRN acute agitation      REVIEW OF SYSTEMS:                           ALL ROS DONE [ X   ]    CONSTITUTIONAL:  LETHARGIC [   ], FEVER [   ], UNRESPONSIVE [   ]  CVS:  CP  [   ], SOB, [   ], PALPITATIONS [   ], DIZZYNESS [   ]  RS: COUGH [   ], SPUTUM [   ]  GI: ABDOMINAL PAIN [   ], NAUSEA [   ], VOMITINGS [   ], DIARRHEA [   ], CONSTIPATION [   ]  :  DYSURIA [   ], NOCTURIA [   ], INCREASED FREQUENCY [   ], DRIBLING [   ],  SKELETAL: PAINFUL JOINTS [   ], SWOLLEN JOINTS [   ], NECK ACHE [   ], LOW BACK ACHE [   ],  SKIN : ULCERS [   ], RASH [   ], ITCHING [   ]  CNS: HEAD ACHE [   ], DOUBLE VISION [   ], BLURRED VISION [   ], AMS / CONFUSION [   ], SEIZURES [   ], WEAKNESS [   ],TINGLING / NUMBNESS [   ]      REVIEW OF SYSTEMS:                           ALL ROS DONE [ X   ]    CONSTITUTIONAL:  LETHARGIC [   ], FEVER [   ], UNRESPONSIVE [   ]  CVS:  CP  [   ], SOB, [   ], PALPITATIONS [   ], DIZZYNESS [   ]  RS: COUGH [   ], SPUTUM [   ]  GI: ABDOMINAL PAIN [   ], NAUSEA [   ], VOMITINGS [   ], DIARRHEA [   ], CONSTIPATION [   ]  :  DYSURIA [   ], NOCTURIA [   ], INCREASED FREQUENCY [   ], DRIBLING [   ],  SKELETAL: PAINFUL JOINTS [   ], SWOLLEN JOINTS [   ], NECK ACHE [   ], LOW BACK ACHE [   ],  SKIN : ULCERS [   ], RASH [   ], ITCHING [   ]  CNS: HEAD ACHE [   ], DOUBLE VISION [   ], BLURRED VISION [   ], AMS / CONFUSION [   ], SEIZURES [   ], WEAKNESS [   ],TINGLING / NUMBNESS [   ]    PHYSICAL EXAMINATION:  GENERAL APPEARANCE: NO DISTRESS  HEENT:  NO PALLOR, NO  JVD,  NO   NODES, NECK SUPPLE  CVS: S1 +, S2 +,  IRREGULAR RATE, PPM  RS: AEEB,  OCCASIONAL  RALES +,   NO RONCHI  ABD: SOFT, NT, NO, BS +  EXT: NO PE  SKIN: WARM,   SKELETAL:  ROM ACCEPTABLE  CNS:  AAO X  2-3 ,   WEAK IN ALL EXTREMITIES    RADIOLOGY :    RADIOLOGY REVIEWED    ASSESSMENT :     Syncope and collapse    No pertinent past medical history    HTN (hypertension)    DM (diabetes mellitus)    Pacemaker    Graves disease    Vertigo    Glaucoma    Constipation    A-fib    Hepatitis C virus    T2DM (type 2 diabetes mellitus)    PNA (pneumonia)    Intracranial hemorrhage, spontaneous intraparenchymal, due to cerebral AVM, chronic    Gastric AVM    GIB (gastrointestinal bleeding)    Multiple falls    No significant past surgical history    Artificial pacemaker    H/O oophorectomy    S/P partial resection of colon    S/P cholecystectomy    S/P cardiac pacemaker procedure    S/P hysterectomy        PLAN:  HPI:  89 y.o. female with h/o NIDDM, pt uses inhalation insulin Afrezza, last dose this am, wheelchair bound, as per daughter in law, pt c/o feeling nausea for past 7 days, dizziness, vertigo and increase in ataxia. Patient is not a good historian and very soft spoken, She is AAO 2 (oriented to self and place only) collateral history is obtained from Daughter in law who states that patient lives alone with 24/7 HHA and from last few days she is having high blood pressure. Patient also endorses nausea and appetite loss. Patient states that whenever she tries to eat she becomes nauseous. She also reports some dry cough and occasional chest pain , abdominal pain, constipation (Not present currently)   Pt received J&J vaccine last week.   Patient denies SOB, urinary problems, headaches, dizziness, diplopia, focal weakness, swallowing difficulty.    Off Record, Daughter in law Bernard Mohan is the POC     ED course:    pr BNP 11k  Vital Signs Last 24 Hrs  T(C): 36.9 (29 Mar 2021 22:35), Max: 36.9 (29 Mar 2021 22:35)  T(F): 98.4 (29 Mar 2021 22:35), Max: 98.4 (29 Mar 2021 22:35)  HR: 74 (29 Mar 2021 22:35) (66 - 97)  BP: 166/75 (29 Mar 2021 22:35) (165/87 - 200/118)  RR: 20 (29 Mar 2021 22:35) (20 - 20)  SpO2: 98% (29 Mar 2021 22:35) (98% - 99%) (29 Mar 2021 21:12)    # NEAR SYNCOPE - R/O CVA VS VERTIGO VS ORTHOSTATIC HYPOTENSION - NOTED CT HEAD, F/U ECHOCARDIOGRAM, REVIEWED CAROTID U/S, REVIEWED TSH/LIPIDS/HBA1C, ON STATIN, BB, ACE-I, ASA  - NEUROLOGY CONSULT - LESS SUSPICIOUS FOR ACUTE CVA - SUSPECT DEBILITIY - F/U PT EVAL  # HYPERTENSIVE URGENCY - ON BB  # LESS LIKELY PNEUMONIA - DCD ROCEPHIN + AZITHROMYCIN, F/U BCX  - GIVEN LOW PROCAL AND CT MORE SUSPICIOUS FOR PULMONARY   # MUCOID IMPACTION - ORDERED CHEST PT  # THROMBOCYTOPENIA - SUSPECTED ITP, HEME/ONC CONSULT IN PROGRESS  # A.FIB W/ PPM - EPISODES OF RVR - PACEMAKER INTERROGATED, ON BB, EP CONSULT IN PROGRESS  # HEART FAILURE - ELEVATED PRO-BNP, CT W/ PLEURAL EFFUSION  - F/U ECHOCARDIOGRAM, CARDIOLOGY CONSULT IN PROGRESS  - STARTED ON IV LASIX  # NIDDM  # HX OF GRAVES DX - NOW WITH ELEVATED TSH - NOT ON MEDICATION - F/U FT3 AND FT4, ENDOCRINOLOGY CONSULT PLACED  # CASE D/W DAUGHTER-IN-LAW VIA PHONE IN ER  # GI PPX    BK KAUFFMAN MD COVERING SANTO KAUFFMAN MD   Patient is a 89y old  Female who presents with a chief complaint of Hypertensive urgency (31 Mar 2021 14:20)    PATIENT IS SEEN AND EXAMINED IN MEDICAL FLOOR    ALLERGIES:  No Known Allergies      Daily Height in cm: 149.86 (31 Mar 2021 00:07)    Daily Weight in k.3 (30 Mar 2021 23:10)    VITALS:    Vital Signs Last 24 Hrs  T(C): 36.7 (31 Mar 2021 04:49), Max: 37.1 (31 Mar 2021 01:36)  T(F): 98.1 (31 Mar 2021 04:49), Max: 98.8 (31 Mar 2021 01:36)  HR: 100 (31 Mar 2021 10:02) (97 - 102)  BP: 168/92 (31 Mar 2021 10:02) (164/95 - 184/98)  BP(mean): --  RR: 17 (31 Mar 2021 10:02) (17 - 18)  SpO2: 98% (31 Mar 2021 10:02) (98% - 98%)    LABS:    CBC Full  -  ( 30 Mar 2021 07:07 )  WBC Count : 8.21 K/uL  RBC Count : 3.84 M/uL  Hemoglobin : 11.9 g/dL  Hematocrit : 35.2 %  Platelet Count - Automated : 99 K/uL  Mean Cell Volume : 91.7 fl  Mean Cell Hemoglobin : 31.0 pg  Mean Cell Hemoglobin Concentration : 33.8 gm/dL  Auto Neutrophil # : 4.26 K/uL  Auto Lymphocyte # : 3.14 K/uL  Auto Monocyte # : 0.66 K/uL  Auto Eosinophil # : 0.08 K/uL  Auto Basophil # : 0.03 K/uL  Auto Neutrophil % : 51.9 %  Auto Lymphocyte % : 38.2 %  Auto Monocyte % : 8.0 %  Auto Eosinophil % : 1.0 %  Auto Basophil % : 0.4 %      03-30    142  |  109<H>  |  13  ----------------------------<  125<H>  4.1   |  27  |  0.91    Ca    8.4      30 Mar 2021 07:07  Phos  3.1     03-30  Mg     1.9     03-30    TPro  6.9  /  Alb  2.2<L>  /  TBili  0.4  /  DBili  x   /  AST  40  /  ALT  26  /  AlkPhos  113      CAPILLARY BLOOD GLUCOSE      POCT Blood Glucose.: 102 mg/dL (31 Mar 2021 18:06)  POCT Blood Glucose.: 121 mg/dL (31 Mar 2021 11:32)  POCT Blood Glucose.: 211 mg/dL (30 Mar 2021 22:48)  POCT Blood Glucose.: 181 mg/dL (30 Mar 2021 21:13)        LIVER FUNCTIONS - ( 30 Mar 2021 07:07 )  Alb: 2.2 g/dL / Pro: 6.9 g/dL / ALK PHOS: 113 U/L / ALT: 26 U/L DA / AST: 40 U/L / GGT: x           Creatinine Trend: 0.91<--, 0.97<--  I&O's Summary    31 Mar 2021 07:01  -  31 Mar 2021 21:07  --------------------------------------------------------  IN: 0 mL / OUT: 100 mL / NET: -100 mL      .Blood Blood   @ 21:57   No growth to date.  --  --        MEDICATIONS:    MEDICATIONS  (STANDING):  aMIOdarone    Tablet   Oral   aMIOdarone    Tablet 400 milliGRAM(s) Oral every 8 hours  aspirin enteric coated 81 milliGRAM(s) Oral daily  atorvastatin 20 milliGRAM(s) Oral at bedtime  dextrose 40% Gel 15 Gram(s) Oral once  dextrose 5%. 1000 milliLiter(s) (50 mL/Hr) IV Continuous <Continuous>  furosemide   Injectable 40 milliGRAM(s) IV Push daily  glucagon  Injectable 1 milliGRAM(s) IntraMuscular once  insulin lispro (ADMELOG) corrective regimen sliding scale   SubCutaneous three times a day before meals  insulin lispro (ADMELOG) corrective regimen sliding scale   SubCutaneous at bedtime  latanoprost 0.005% Ophthalmic Solution 1 Drop(s) Both EYES at bedtime  lisinopril 10 milliGRAM(s) Oral daily  metoprolol tartrate 25 milliGRAM(s) Oral two times a day  mirtazapine 15 milliGRAM(s) Oral at bedtime  OLANZapine Injectable 2.5 milliGRAM(s) IntraMuscular every 8 hours  pantoprazole    Tablet 40 milliGRAM(s) Oral before breakfast      MEDICATIONS  (PRN):  OLANZapine Injectable 2.5 milliGRAM(s) IntraMuscular every 12 hours PRN acute agitation      REVIEW OF SYSTEMS:                           ALL ROS DONE [ X   ]    CONSTITUTIONAL:  LETHARGIC [   ], FEVER [   ], UNRESPONSIVE [   ]  CVS:  CP  [   ], SOB, [   ], PALPITATIONS [   ], DIZZYNESS [   ]  RS: COUGH [   ], SPUTUM [   ]  GI: ABDOMINAL PAIN [   ], NAUSEA [   ], VOMITINGS [   ], DIARRHEA [   ], CONSTIPATION [   ]  :  DYSURIA [   ], NOCTURIA [   ], INCREASED FREQUENCY [   ], DRIBLING [   ],  SKELETAL: PAINFUL JOINTS [   ], SWOLLEN JOINTS [   ], NECK ACHE [   ], LOW BACK ACHE [   ],  SKIN : ULCERS [   ], RASH [   ], ITCHING [   ]  CNS: HEAD ACHE [   ], DOUBLE VISION [   ], BLURRED VISION [   ], AMS / CONFUSION [   ], SEIZURES [   ], WEAKNESS [   ],TINGLING / NUMBNESS [   ]      REVIEW OF SYSTEMS:                           ALL ROS DONE [ X   ]    CONSTITUTIONAL:  LETHARGIC [   ], FEVER [   ], UNRESPONSIVE [   ]  CVS:  CP  [   ], SOB, [   ], PALPITATIONS [   ], DIZZYNESS [   ]  RS: COUGH [   ], SPUTUM [   ]  GI: ABDOMINAL PAIN [   ], NAUSEA [   ], VOMITINGS [   ], DIARRHEA [   ], CONSTIPATION [   ]  :  DYSURIA [   ], NOCTURIA [   ], INCREASED FREQUENCY [   ], DRIBLING [   ],  SKELETAL: PAINFUL JOINTS [   ], SWOLLEN JOINTS [   ], NECK ACHE [   ], LOW BACK ACHE [   ],  SKIN : ULCERS [   ], RASH [   ], ITCHING [   ]  CNS: HEAD ACHE [   ], DOUBLE VISION [   ], BLURRED VISION [   ], AMS / CONFUSION [   ], SEIZURES [   ], WEAKNESS [   ],TINGLING / NUMBNESS [   ]    PHYSICAL EXAMINATION:  GENERAL APPEARANCE: NO DISTRESS  HEENT:  NO PALLOR, NO  JVD,  NO   NODES, NECK SUPPLE  CVS: S1 +, S2 +,  IRREGULAR RATE, PPM  RS: AEEB,  OCCASIONAL  RALES +,   NO RONCHI  ABD: SOFT, NT, NO, BS +  EXT: NO PE  SKIN: WARM,   SKELETAL:  ROM ACCEPTABLE  CNS:  AAO X  2-3 ,   WEAK IN ALL EXTREMITIES    RADIOLOGY :    RADIOLOGY REVIEWED    ASSESSMENT :     Syncope and collapse    No pertinent past medical history    HTN (hypertension)    DM (diabetes mellitus)    Pacemaker    Graves disease    Vertigo    Glaucoma    Constipation    A-fib    Hepatitis C virus    T2DM (type 2 diabetes mellitus)    PNA (pneumonia)    Intracranial hemorrhage, spontaneous intraparenchymal, due to cerebral AVM, chronic    Gastric AVM    GIB (gastrointestinal bleeding)    Multiple falls    No significant past surgical history    Artificial pacemaker    H/O oophorectomy    S/P partial resection of colon    S/P cholecystectomy    S/P cardiac pacemaker procedure    S/P hysterectomy        PLAN:  HPI:  89 y.o. female with h/o NIDDM, pt uses inhalation insulin Afrezza, last dose this am, wheelchair bound, as per daughter in law, pt c/o feeling nausea for past 7 days, dizziness, vertigo and increase in ataxia. Patient is not a good historian and very soft spoken, She is AAO 2 (oriented to self and place only) collateral history is obtained from Daughter in law who states that patient lives alone with 24/7 HHA and from last few days she is having high blood pressure. Patient also endorses nausea and appetite loss. Patient states that whenever she tries to eat she becomes nauseous. She also reports some dry cough and occasional chest pain , abdominal pain, constipation (Not present currently)   Pt received J&J vaccine last week.   Patient denies SOB, urinary problems, headaches, dizziness, diplopia, focal weakness, swallowing difficulty.    Off Record, Daughter in law Bernard Mohan is the POC     ED course:    pr BNP 11k  Vital Signs Last 24 Hrs  T(C): 36.9 (29 Mar 2021 22:35), Max: 36.9 (29 Mar 2021 22:35)  T(F): 98.4 (29 Mar 2021 22:35), Max: 98.4 (29 Mar 2021 22:35)  HR: 74 (29 Mar 2021 22:35) (66 - 97)  BP: 166/75 (29 Mar 2021 22:35) (165/87 - 200/118)  RR: 20 (29 Mar 2021 22:35) (20 - 20)  SpO2: 98% (29 Mar 2021 22:35) (98% - 99%) (29 Mar 2021 21:12)    # OVERNIGHT AGITATION - PER FAMILY - PATIENT HAS A HX OF MOOD DISTURBANCE - PSYCHIATRY CONSULT IN PROGRESS - PLACED PATIENT ON ZYPREXA  # NEAR SYNCOPE - R/O CVA VS VERTIGO VS ORTHOSTATIC HYPOTENSION - NOTED CT HEAD, F/U ECHOCARDIOGRAM, REVIEWED CAROTID U/S, REVIEWED TSH/LIPIDS/HBA1C, ON STATIN, BB, ACE-I, ASA  - NEUROLOGY CONSULT - LESS SUSPICIOUS FOR ACUTE CVA - SUSPECT DEBILITIY - F/U PT EVAL  # HYPERTENSIVE URGENCY - ON BB  # LESS LIKELY PNEUMONIA - DCD ROCEPHIN + AZITHROMYCIN, F/U BCX  - GIVEN LOW PROCAL AND CT MORE SUSPICIOUS FOR PULMONARY   # MUCOID IMPACTION - ORDERED CHEST PT  # THROMBOCYTOPENIA - SUSPECTED ITP, HEME/ONC CONSULT IN PROGRESS  # A.FIB W/ PPM - EPISODES OF RVR - PACEMAKER INTERROGATED [W/ EVIDENCE OF ATRIAL TACHYCARDIA], ON BB, EP CONSULT IN PROGRESS  - RECOMMENDATION FOR AMIODARONE [CLEARED BY ENDOCRINOLOGY]   # HEART FAILURE - ELEVATED PRO-BNP, CT W/ PLEURAL EFFUSION  - CARDIOLOGY CONSULT IN PROGRESS  - STARTED ON IV LASIX  - ECHOCARDIOGRAM W/ MILD-MOD MR, SEVERE LAE, SEVERE CONCENTRIC LVH, MILD SEGMENTAL LV SYSTOLIC DYSFXN, BASAL TO MID INFEROLATERAL WALL IS HYPOKINECTIC,, MODERATE DANITA, MODERATE PULMONARY HTN  - CARDIOLOGY RECOMMENDATION FOR MEDICAL MGMT AT THIS TIME - PATIENT IS ON ASA, STATIN, BB  # NIDDM  # HX OF GRAVES DX - NOW WITH ELEVATED TSH - NOT ON MEDICATION - F/U FT3 AND FT4, ENDOCRINOLOGY CONSULT PLACED  # CASE D/W DAUGHTER-IN-LAW HAILE GARY @ [phone]  # PALLIATIVE CARE CONSULT IN PROGRESS TO INITIATE GOC CONVERSATION  # GI PPX    BK KAUFFMAN MD COVERING SANTO KAUFFMAN MD

## 2021-03-31 NOTE — CONSULT NOTE ADULT - SUBJECTIVE AND OBJECTIVE BOX
HPI:  89 y.o. female with h/o NIDDM, pt uses inhalation insulin Afrezza, last dose this am, wheelchair bound, as per daughter in law, pt c/o feeling nausea for past 7 days, dizziness, vertigo and increase in ataxia. Patient is not a good historian and very soft spoken, She is AAO 2 (oriented to self and place only) collateral history is obtained from Daughter in law who states that patient lives alone with 24/7 HHA and from last few days she is having high blood pressure. Patient also endorses nausea and appetite loss. Patient states that whenever she tries to eat she becomes nauseous. She also reports some dry cough and occasional chest pain , abdominal pain, constipation (Not present currently)   Pt received J&J vaccine last week.     Interval hx: Pt seen at the bedside, she is agitated.  Refused exam.        PAST MEDICAL & SURGICAL HISTORY:  HTN (hypertension)    Pacemaker  medtronic ADDRL1  2011    Graves disease  no treatment as per patient and family    Vertigo    Glaucoma    Constipation    A-fib  no Eliquis since 1/2019    Hepatitis C virus  not treated    T2DM (type 2 diabetes mellitus)  last A1c 7.8   4/11/19    PNA (pneumonia)  2/2019   treated with ABX while in [facility]    Gastric AVM  EGD 2/2019 - cauterized    GIB (gastrointestinal bleeding)  2/2019 requiring 2 PRBCs    Multiple falls    H/O oophorectomy    S/P partial resection of colon  & gt; 5 yrs ago    S/P cholecystectomy    S/P cardiac pacemaker procedure  Medtronic ADDRL1  2011    S/P hysterectomy        SOCIAL HISTORY:    Admitted from:  home alone has HHA 24/7     [employer]ism:  Protestant                                     Surrogate/HCP/Guardian:  Yanna Healy          Phone#: [phone]  Julian Karley  Phone#  [phone]    FAMILY HISTORY:  Family history of diabetes mellitus    Family history of hypertension      Baseline ADLs (prior to admission): dependent    Allergies    No Known Allergies    Intolerances      Present Symptoms:    Unable to obtain due to poor mentation    MEDICATIONS  (STANDING):  aspirin enteric coated 81 milliGRAM(s) Oral daily  atorvastatin 20 milliGRAM(s) Oral at bedtime  dextrose 40% Gel 15 Gram(s) Oral once  dextrose 5%. 1000 milliLiter(s) (50 mL/Hr) IV Continuous <Continuous>  furosemide   Injectable 40 milliGRAM(s) IV Push daily  glucagon  Injectable 1 milliGRAM(s) IntraMuscular once  insulin lispro (ADMELOG) corrective regimen sliding scale   SubCutaneous three times a day before meals  insulin lispro (ADMELOG) corrective regimen sliding scale   SubCutaneous at bedtime  latanoprost 0.005% Ophthalmic Solution 1 Drop(s) Both EYES at bedtime  lisinopril 10 milliGRAM(s) Oral daily  metoprolol tartrate 25 milliGRAM(s) Oral two times a day  mirtazapine 15 milliGRAM(s) Oral at bedtime  pantoprazole    Tablet 40 milliGRAM(s) Oral before breakfast    MEDICATIONS  (PRN):      PHYSICAL EXAM:    Vital Signs Last 24 Hrs  T(C): 36.7 (31 Mar 2021 04:49), Max: 37.1 (31 Mar 2021 01:36)  T(F): 98.1 (31 Mar 2021 04:49), Max: 98.8 (31 Mar 2021 01:36)  HR: 100 (31 Mar 2021 10:02) (87 - 102)  BP: 168/92 (31 Mar 2021 10:02) (162/89 - 184/98)  BP(mean): 103 (30 Mar 2021 16:00) (103 - 103)  RR: 17 (31 Mar 2021 10:02) (17 - 18)  SpO2: 98% (31 Mar 2021 10:02) (98% - 100%)      Full PE not done 2/2 to pt's mentation   General: Elderly woman, cachetic, bitemporal wasting, NAD  Karnofsky Performance Score/Palliative Performance Status Version2:40    %    HEENT: bitemporal wasting, moist mucous membrane  Lungs: unlabored on RA  CV: normal  tachycardia  GI: normal  distended  tender  incontinent               PEG/NG/OG tube  constipation  last BM:   : normal  incontinent  oliguria/anuria  carroll  Musculoskeletal: normal  weakness  edema             ambulatory  bedbound/wheelchair bound  Skin: normal  pressure ulcers: stage: edema: other:  Neuro: no deficits cognitive impairment dsyphagia/dysarthria paresis: other:  Oral intake ability: unable/only mouth care [minimal moderate full capability]  Diet: [NPO]    LABS:                        11.9   8.21  )-----------( 99       ( 30 Mar 2021 07:07 )             35.2     03-30    142  |  109<H>  |  13  ----------------------------<  125<H>  4.1   |  27  |  0.91    Ca    8.4      30 Mar 2021 07:07  Phos  3.1     03-30  Mg     1.9     03-30    TPro  6.9  /  Alb  2.2<L>  /  TBili  0.4  /  DBili  x   /  AST  40  /  ALT  26  /  AlkPhos  113  03-30    < from: CT Head No Cont (03.29.21 @ 17:13) >  EXAM:  CT BRAIN                            PROCEDURE DATE:  03/29/2021          INTERPRETATION:  CLINICAL INFORMATION: dizziness, vertigo. h/o a.fib, 10. .    TECHNIQUE: Sequential axial images were obtained from the vertex to the skull base without intravenous contrast. Coronal and sagittal reformations were obtained.    COMPARISON: Prior CT dated 5/28/2019.    FINDINGS:    Area of hypodensity in the right temporal lobe suggesting age-indeterminate infarct (2:26, 8:46). There is no acute intracranial hemorrhage. There are areas of hypodensity in the bilateral hemispheric white matter suggesting white matter microvascular ischemic change. There is cerebral volume loss.    There is no extraaxial fluid collection.    There is no displaced calvarial fracture. Status post bilateral intraocular lens implants. The visualized portions of the paranasal sinuses are well aerated. The mastoid air cells are well aerated.      IMPRESSION: Area of hypodensity in the right temporal lobe suggesting age-indeterminate infarct. No acute intracranial hemorrhage.    < end of copied text >  < from: CT Chest No Cont (03.30.21 @ 11:24) >  EXAM:  CT CHEST                            PROCEDURE DATE:  03/30/2021          INTERPRETATION:  CLINICAL INFORMATION: Abnormal chest x-ray, concerning for aspiration pneumonia versus pleural effusion.    COMPARISON: 9/8/2019    CONTRAST/COMPLICATIONS:  IV Contrast: NONE  Oral Contrast: NONE  Complications: None reported at time of study completion    PROCEDURE:  CT of the Chest was performed.  Sagittal and coronal reformats were performed.    FINDINGS:    LUNGS AND AIRWAYS: Patent central airways.  Subsegmental atelectasis in the bilateral lower lobes. Mucoid impaction and subsegmental atelectasis in the right middle lobe, slightly decreased from prior imaging. No focal consolidation. Mild hazy groundglass opacities in the right upper lobesuspicious for mild edema.  PLEURA: Small left pleural effusion and small to moderate right pleural effusion.  MEDIASTINUM AND ROLF: No lymphadenopathy.  VESSELS: Atherosclerotic calcifications of the aorta and coronary arteries.  HEART: Cardiomegaly. No pericardial effusion.  CHEST WALL AND LOWER NECK: Multiple bilateral thyroid calcifications. Left chest wall pacemaker device. Nonspecific bilateral breast calcifications.  VISUALIZED UPPER ABDOMEN: Small ascites. Nonobstructing 5 mm right renal calculus.  BONES: Degenerative changes.    IMPRESSION:  Small to moderate right pleural effusion and small left pleural effusion. Suspected mild pulmonary edema.    < end of copied text >      RADIOLOGY & ADDITIONAL STUDIES:  Reviewed    ADVANCE DIRECTIVES: DNR/DNI   HPI:  89 y.o. female with h/o NIDDM, pt uses inhalation insulin Afrezza, last dose this am, wheelchair bound, as per daughter in law, pt c/o feeling nausea for past 7 days, dizziness, vertigo and increase in ataxia. Patient is not a good historian and very soft spoken, She is AAO 2 (oriented to self and place only) collateral history is obtained from Daughter in law who states that patient lives alone with 24/7 HHA and from last few days she is having high blood pressure. Patient also endorses nausea and appetite loss. Patient states that whenever she tries to eat she becomes nauseous. She also reports some dry cough and occasional chest pain , abdominal pain, constipation (Not present currently)   Pt received J&J vaccine last week.     Interval hx: Pt seen at the bedside, she is agitated.  Refused exam.        PAST MEDICAL & SURGICAL HISTORY:  HTN (hypertension)    Pacemaker  medtronic ADDRL1  2011    Graves disease  no treatment as per patient and family    Vertigo    Glaucoma    Constipation    A-fib  no Eliquis since 1/2019    Hepatitis C virus  not treated    T2DM (type 2 diabetes mellitus)  last A1c 7.8   4/11/19    PNA (pneumonia)  2/2019   treated with ABX while in [facility]    Gastric AVM  EGD 2/2019 - cauterized    GIB (gastrointestinal bleeding)  2/2019 requiring 2 PRBCs    Multiple falls    H/O oophorectomy    S/P partial resection of colon  & gt; 5 yrs ago    S/P cholecystectomy    S/P cardiac pacemaker procedure  Medtronic ADDRL1  2011    S/P hysterectomy        SOCIAL HISTORY:    Admitted from:  home alone has HHA 24/7     Scientology:  Episcopal                                     Surrogate/HCP/Guardian:  Yanna Healy          Phone#: [phone]  Julian Karley  Phone#  [phone]    FAMILY HISTORY:  Family history of diabetes mellitus    Family history of hypertension      Baseline ADLs (prior to admission): dependent    Allergies    No Known Allergies    Intolerances      Present Symptoms:    Unable to obtain due to poor mentation    MEDICATIONS  (STANDING):  aspirin enteric coated 81 milliGRAM(s) Oral daily  atorvastatin 20 milliGRAM(s) Oral at bedtime  dextrose 40% Gel 15 Gram(s) Oral once  dextrose 5%. 1000 milliLiter(s) (50 mL/Hr) IV Continuous <Continuous>  furosemide   Injectable 40 milliGRAM(s) IV Push daily  glucagon  Injectable 1 milliGRAM(s) IntraMuscular once  insulin lispro (ADMELOG) corrective regimen sliding scale   SubCutaneous three times a day before meals  insulin lispro (ADMELOG) corrective regimen sliding scale   SubCutaneous at bedtime  latanoprost 0.005% Ophthalmic Solution 1 Drop(s) Both EYES at bedtime  lisinopril 10 milliGRAM(s) Oral daily  metoprolol tartrate 25 milliGRAM(s) Oral two times a day  mirtazapine 15 milliGRAM(s) Oral at bedtime  pantoprazole    Tablet 40 milliGRAM(s) Oral before breakfast    MEDICATIONS  (PRN):      PHYSICAL EXAM:    Vital Signs Last 24 Hrs  T(C): 36.7 (31 Mar 2021 04:49), Max: 37.1 (31 Mar 2021 01:36)  T(F): 98.1 (31 Mar 2021 04:49), Max: 98.8 (31 Mar 2021 01:36)  HR: 100 (31 Mar 2021 10:02) (87 - 102)  BP: 168/92 (31 Mar 2021 10:02) (162/89 - 184/98)  BP(mean): 103 (30 Mar 2021 16:00) (103 - 103)  RR: 17 (31 Mar 2021 10:02) (17 - 18)  SpO2: 98% (31 Mar 2021 10:02) (98% - 100%)      Full PE not done 2/2 to pt uncooperative  General: Elderly woman, cachetic, bitemporal wasting, NAD  Karnofsky Performance Score/Palliative Performance Status Version2:40    %    HEENT: bitemporal wasting, moist mucous membrane  Lungs: unlabored on RA       LABS:                        11.9   8.21  )-----------( 99       ( 30 Mar 2021 07:07 )             35.2     03-30    142  |  109<H>  |  13  ----------------------------<  125<H>  4.1   |  27  |  0.91    Ca    8.4      30 Mar 2021 07:07  Phos  3.1     03-30  Mg     1.9     03-30    TPro  6.9  /  Alb  2.2<L>  /  TBili  0.4  /  DBili  x   /  AST  40  /  ALT  26  /  AlkPhos  113  03-30    < from: CT Head No Cont (03.29.21 @ 17:13) >  EXAM:  CT BRAIN                            PROCEDURE DATE:  03/29/2021          INTERPRETATION:  CLINICAL INFORMATION: dizziness, vertigo. h/o a.fib, 10. .    TECHNIQUE: Sequential axial images were obtained from the vertex to the skull base without intravenous contrast. Coronal and sagittal reformations were obtained.    COMPARISON: Prior CT dated 5/28/2019.    FINDINGS:    Area of hypodensity in the right temporal lobe suggesting age-indeterminate infarct (2:26, 8:46). There is no acute intracranial hemorrhage. There are areas of hypodensity in the bilateral hemispheric white matter suggesting white matter microvascular ischemic change. There is cerebral volume loss.    There is no extraaxial fluid collection.    There is no displaced calvarial fracture. Status post bilateral intraocular lens implants. The visualized portions of the paranasal sinuses are well aerated. The mastoid air cells are well aerated.      IMPRESSION: Area of hypodensity in the right temporal lobe suggesting age-indeterminate infarct. No acute intracranial hemorrhage.    < end of copied text >  < from: CT Chest No Cont (03.30.21 @ 11:24) >  EXAM:  CT CHEST                            PROCEDURE DATE:  03/30/2021          INTERPRETATION:  CLINICAL INFORMATION: Abnormal chest x-ray, concerning for aspiration pneumonia versus pleural effusion.    COMPARISON: 9/8/2019    CONTRAST/COMPLICATIONS:  IV Contrast: NONE  Oral Contrast: NONE  Complications: None reported at time of study completion    PROCEDURE:  CT of the Chest was performed.  Sagittal and coronal reformats were performed.    FINDINGS:    LUNGS AND AIRWAYS: Patent central airways.  Subsegmental atelectasis in the bilateral lower lobes. Mucoid impaction and subsegmental atelectasis in the right middle lobe, slightly decreased from prior imaging. No focal consolidation. Mild hazy groundglass opacities in the right upper lobesuspicious for mild edema.  PLEURA: Small left pleural effusion and small to moderate right pleural effusion.  MEDIASTINUM AND ROLF: No lymphadenopathy.  VESSELS: Atherosclerotic calcifications of the aorta and coronary arteries.  HEART: Cardiomegaly. No pericardial effusion.  CHEST WALL AND LOWER NECK: Multiple bilateral thyroid calcifications. Left chest wall pacemaker device. Nonspecific bilateral breast calcifications.  VISUALIZED UPPER ABDOMEN: Small ascites. Nonobstructing 5 mm right renal calculus.  BONES: Degenerative changes.    IMPRESSION:  Small to moderate right pleural effusion and small left pleural effusion. Suspected mild pulmonary edema.    < end of copied text >      RADIOLOGY & ADDITIONAL STUDIES:  Reviewed    ADVANCE DIRECTIVES: DNR/DNI

## 2021-03-31 NOTE — BEHAVIORAL HEALTH ASSESSMENT NOTE - RISK ASSESSMENT
Low Acute Suicide Risk Risk factors: Older age, mild dementia, multiple medical problems. Protective factors: Absent h/o SI/SA/SIB, stable domicile with high level of care (24/7 HHAs), supportive family, attachment to independence, help seeking. Acute risk level appears low at the time of assessment, but chronic risk may be mildly elevated due to above risk factors.

## 2021-03-31 NOTE — PROGRESS NOTE ADULT - PROBLEM SELECTOR PLAN 5
not in A-Fib on telemetry  ASA  c/w metoprolol   not on AC's  continue telemetry  per EP: Telemetry findings consistent with ventricular pacemaker tracking of the underlying atrial rate  PPM: EP MD Ochoa to interrogate today and recommended amiodarone regimen not in A-Fib on telemetry  ASA  c/w metoprolol   not on AC's  continue telemetry  per EP: Telemetry findings consistent with ventricular pacemaker tracking of the underlying atrial rate  PPM: EP MD Ochoa to interrogate today   amiodarone loading dose will start today

## 2021-03-31 NOTE — PROGRESS NOTE ADULT - SUBJECTIVE AND OBJECTIVE BOX
awake  answer questions  no fever or sob  no pain    ROS:  Negative except for:    MEDICATIONS  (STANDING):  aspirin enteric coated 81 milliGRAM(s) Oral daily  atorvastatin 20 milliGRAM(s) Oral at bedtime  dextrose 40% Gel 15 Gram(s) Oral once  dextrose 5%. 1000 milliLiter(s) (50 mL/Hr) IV Continuous <Continuous>  furosemide   Injectable 40 milliGRAM(s) IV Push daily  glucagon  Injectable 1 milliGRAM(s) IntraMuscular once  insulin lispro (ADMELOG) corrective regimen sliding scale   SubCutaneous three times a day before meals  insulin lispro (ADMELOG) corrective regimen sliding scale   SubCutaneous at bedtime  latanoprost 0.005% Ophthalmic Solution 1 Drop(s) Both EYES at bedtime  lisinopril 10 milliGRAM(s) Oral daily  metoprolol tartrate 25 milliGRAM(s) Oral two times a day  mirtazapine 15 milliGRAM(s) Oral at bedtime  pantoprazole    Tablet 40 milliGRAM(s) Oral before breakfast    MEDICATIONS  (PRN):      Allergies    No Known Allergies    Intolerances        Vital Signs Last 24 Hrs  T(C): 36.7 (31 Mar 2021 04:49), Max: 37.1 (31 Mar 2021 01:36)  T(F): 98.1 (31 Mar 2021 04:49), Max: 98.8 (31 Mar 2021 01:36)  HR: 100 (31 Mar 2021 10:02) (87 - 102)  BP: 168/92 (31 Mar 2021 10:02) (162/89 - 184/98)  BP(mean): 103 (30 Mar 2021 16:00) (103 - 103)  RR: 17 (31 Mar 2021 10:02) (17 - 18)  SpO2: 98% (31 Mar 2021 10:02) (98% - 100%)    PHYSICAL EXAM  General: adult in NAD  HEENT: clear oropharynx, anicteric sclera, pink conjunctiva  Neck: supple  CV: normal S1/S2 with no murmur rubs or gallops  Lungs: positive air movement b/l ant lungs,clear to auscultation, no wheezes, no rales  Abdomen: soft non-tender non-distended, no hepatosplenomegaly  Ext: no clubbing cyanosis or edema  Skin: no rashes and no petechiae  Neuro: alert and oriented X 4, no focal deficits  LABS:                          11.9   8.21  )-----------( 99       ( 30 Mar 2021 07:07 )             35.2         Mean Cell Volume : 91.7 fl  Mean Cell Hemoglobin : 31.0 pg  Mean Cell Hemoglobin Concentration : 33.8 gm/dL  Auto Neutrophil # : 4.26 K/uL  Auto Lymphocyte # : 3.14 K/uL  Auto Monocyte # : 0.66 K/uL  Auto Eosinophil # : 0.08 K/uL  Auto Basophil # : 0.03 K/uL  Auto Neutrophil % : 51.9 %  Auto Lymphocyte % : 38.2 %  Auto Monocyte % : 8.0 %  Auto Eosinophil % : 1.0 %  Auto Basophil % : 0.4 %    Serial CBC  Hematocrit 35.2  Hemoglobin 11.9  Plat 99  RBC 3.84  WBC 8.21  Serial CBC  Hematocrit 39.2  Hemoglobin 13.0  Plat 95  RBC 4.30  WBC 6.43    03-30    142  |  109<H>  |  13  ----------------------------<  125<H>  4.1   |  27  |  0.91    Ca    8.4      30 Mar 2021 07:07  Phos  3.1     03-30  Mg     1.9     03-30    TPro  6.9  /  Alb  2.2<L>  /  TBili  0.4  /  DBili  x   /  AST  40  /  ALT  26  /  AlkPhos  113  03-30          Ferritin, Serum: 279 ng/mL (03-30 @ 10:19)  Folate, Serum: 13.5 ng/mL (03-30 @ 10:19)  Vitamin B12, Serum: 937 pg/mL (03-30 @ 10:19)            BLOOD SMEAR INTERPRETATION:       RADIOLOGY & ADDITIONAL STUDIES:

## 2021-03-31 NOTE — BEHAVIORAL HEALTH ASSESSMENT NOTE - NSBHCHARTREVIEWVS_PSY_A_CORE FT
Vital Signs Last 24 Hrs  T(C): 36.7 (31 Mar 2021 04:49), Max: 37.1 (31 Mar 2021 01:36)  T(F): 98.1 (31 Mar 2021 04:49), Max: 98.8 (31 Mar 2021 01:36)  HR: 100 (31 Mar 2021 10:02) (87 - 102)  BP: 168/92 (31 Mar 2021 10:02) (162/89 - 184/98)  BP(mean): 103 (30 Mar 2021 16:00) (103 - 103)  RR: 17 (31 Mar 2021 10:02) (17 - 18)  SpO2: 98% (31 Mar 2021 10:02) (98% - 100%)

## 2021-03-31 NOTE — PROGRESS NOTE ADULT - PROBLEM SELECTOR PLAN 4
TSH > 9  normal T4, T3  thyroid with calcifications, f/u thyroid US - unable today, due to behavioral disturbance   Endocrinology Dr. Sierra
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
## 2021-03-31 NOTE — BEHAVIORAL HEALTH ASSESSMENT NOTE - NSBHSOCIALHXDETAILSFT_PSY_A_CORE
Retired. Has 2 sons (1 in [place], 1 in [place]). Lives alone with 24/7 A coverage. MARK Irby is HCP and closely involved in her care.

## 2021-03-31 NOTE — BEHAVIORAL HEALTH ASSESSMENT NOTE - NSBHCHARTREVIEWIMAGING_PSY_A_CORE FT
< from: CT Head No Cont (03.29.21 @ 17:13) >    FINDINGS:    Area of hypodensity in the right temporal lobe suggesting age-indeterminate infarct (2:26, 8:46). There is no acute intracranial hemorrhage. There are areas of hypodensity in the bilateral hemispheric white matter suggesting white matter microvascular ischemic change. There is cerebral volume loss.    There is no extraaxial fluid collection.    There is no displaced calvarial fracture. Status post bilateral intraocular lens implants. The visualized portions of the paranasal sinuses are well aerated. The mastoid air cells are well aerated.    < end of copied text >  < from: CT Chest No Cont (03.30.21 @ 11:24) >    FINDINGS:    LUNGS AND AIRWAYS: Patent central airways.  Subsegmental atelectasis in the bilateral lower lobes. Mucoid impaction and subsegmental atelectasis in the right middle lobe, slightly decreased from prior imaging. No focal consolidation. Mild hazy groundglass opacities in the right upper lobesuspicious for mild edema.  PLEURA: Small left pleural effusion and small to moderate right pleural effusion.  MEDIASTINUM AND ROLF: No lymphadenopathy.  VESSELS: Atherosclerotic calcifications of the aorta and coronary arteries.  HEART: Cardiomegaly. No pericardial effusion.  CHEST WALL AND LOWER NECK: Multiple bilateral thyroid calcifications. Left chest wall pacemaker device. Nonspecific bilateral breast calcifications.  VISUALIZED UPPER ABDOMEN: Small ascites. Nonobstructing 5 mm right renal calculus.  BONES: Degenerative changes.    < end of copied text >

## 2021-03-31 NOTE — PROGRESS NOTE ADULT - PROBLEM SELECTOR PLAN 2
as per previous chart in 2019  pt was agitated this morning, physically aggressive towards staff  seen by psych - unable to assess pt due to noncooperation  starting zyprexa 2.5mg IM q 8hours for now

## 2021-03-31 NOTE — PROGRESS NOTE ADULT - ASSESSMENT
89 year old WC bound female with past medical history of DM (on inhalation insulin Afrezz), GIB, gastric AVM, Hepatitis C, A-Fib (not on AC's), PPM, glaucoma, Grave's disease, HTN and heart failure who presented to the ED with c/o dizziness and impaired coordination. 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 Patient was empirically started on antibiotics for PNA on CXR- no leukocytosis or fevers, BNP > 11,000, CT Chest non-contrast confirms pulmonary edema for which she was given Lasix. Procalcitonin < 0.25 so antibiotics discontinued. TSH noted to be elevated at 9.31, per her endocrinologist, Dr. Sierra, thyroid function tests normal during prior visits, is not on methimazole for Grave's disease. CT chest noted calcifications on thyroid so US ordered. Thrombocytopenia noted on CBC, heme/onc following, LFT's normal (prior hx of Heptitis C). Echocardiogram  reviewed , carotid dopplers normal. Electrophysiologist Dr. Ochoa consulted for PPM, cardiology Dr. Horn.     pt was agitated all morning, s/p zyprexa 2.5mg IM, refused all care including taking meds and food. aggressive towards staff.

## 2021-03-31 NOTE — PHYSICAL THERAPY INITIAL EVALUATION ADULT - GENERAL OBSERVATIONS, REHAB EVAL
unstable balance with nurse/pca assistance during ambulation bed to bed transfers, baseline distant wheelchair locomotion and available 24/7 caregiver pt observe unstable balance with nurse/pca assistance during ambulation bed to bed transfers, baseline distant wheelchair locomotion and available 24/7 caregiver

## 2021-03-31 NOTE — BEHAVIORAL HEALTH ASSESSMENT NOTE - NSBHCHARTREVIEWINVESTIGATE_PSY_A_CORE FT
< from: 12 Lead ECG (03.30.21 @ 09:49) >    QTC Calculation(Bazett) 445 ms    R Axis 9 degrees    T Axis 207 degrees    Diagnosis Line Sinus  Septal infarct , age undetermined  ST & T wave abnormality, consider lateral ischemia  Abnormal ECG    < end of copied text >

## 2021-03-31 NOTE — CONSULT NOTE ADULT - ATTENDING COMMENTS
89 y F hx Hep C cirrhosis, advancing dementia, mainly WCbound, adm for n/v, poor po intake. on Remeron. Irritable, NAD. DNR/DNI on file. Family wishes to continue conservative medical management at this time. Has 24h HHA. Palliative will follow as needed.

## 2021-03-31 NOTE — PROGRESS NOTE ADULT - PROBLEM SELECTOR PLAN 1
BNP > 11,000  c/w lisinopril, metoprolol, ASA, Lipitor   CT Chest with pleural effusions & mucous plug/atelectasis: Chest PT ordered  gentle diuresis with IV Lasix 40 mg daily   echo as above   prior echo from 2019 EF > 55%  Cardiology Dr. Horn  EP Dr. Ochoa - s/p PPM interrogation today,  recommended amiodarone   pending endo clearance to start amiodarone due to elevated TSH BNP > 11,000  c/w lisinopril, metoprolol, ASA, Lipitor   CT Chest with pleural effusions & mucous plug/atelectasis: Chest PT ordered  gentle diuresis with IV Lasix 40 mg daily   echo as above   prior echo from 2019 EF > 55%  Cardiology Dr. Horn  EP Dr. Ochoa - s/p PPM interrogation today,  recommended amiodarone   Dr. Sierra cleared to start amiodarone load   f/u TSH in AM again

## 2021-03-31 NOTE — BEHAVIORAL HEALTH ASSESSMENT NOTE - NSBHCHARTREVIEWLAB_PSY_A_CORE FT
11.9   8.21  )-----------( 99       ( 30 Mar 2021 07:07 )             35.2   03-30    142  |  109<H>  |  13  ----------------------------<  125<H>  4.1   |  27  |  0.91    Ca    8.4      30 Mar 2021 07:07  Phos  3.1     03-30  Mg     1.9     03-30    TPro  6.9  /  Alb  2.2<L>  /  TBili  0.4  /  DBili  x   /  AST  40  /  ALT  26  /  AlkPhos  113  03-30

## 2021-03-31 NOTE — PROGRESS NOTE ADULT - SUBJECTIVE AND OBJECTIVE BOX
Patient denies chest pain or shortness of breath.   Review of systems otherwise (-)  	  MEDICATIONS:  MEDICATIONS  (STANDING):  aspirin enteric coated 81 milliGRAM(s) Oral daily  atorvastatin 20 milliGRAM(s) Oral at bedtime  dextrose 40% Gel 15 Gram(s) Oral once  dextrose 5%. 1000 milliLiter(s) (50 mL/Hr) IV Continuous <Continuous>  furosemide   Injectable 40 milliGRAM(s) IV Push daily  glucagon  Injectable 1 milliGRAM(s) IntraMuscular once  insulin lispro (ADMELOG) corrective regimen sliding scale   SubCutaneous three times a day before meals  insulin lispro (ADMELOG) corrective regimen sliding scale   SubCutaneous at bedtime  latanoprost 0.005% Ophthalmic Solution 1 Drop(s) Both EYES at bedtime  lisinopril 10 milliGRAM(s) Oral daily  metoprolol tartrate 25 milliGRAM(s) Oral two times a day  mirtazapine 15 milliGRAM(s) Oral at bedtime  pantoprazole    Tablet 40 milliGRAM(s) Oral before breakfast      LABS:	 	    CARDIAC MARKERS:  CARDIAC MARKERS ( 29 Mar 2021 14:38 )  0.018 ng/mL / x     / 61 U/L / x     / x                                    11.9   8.21  )-----------( 99       ( 30 Mar 2021 07:07 )             35.2     Hemoglobin: 11.9 g/dL (03-30 @ 07:07)  Hemoglobin: 13.0 g/dL (03-29 @ 14:38)      03-30    142  |  109<H>  |  13  ----------------------------<  125<H>  4.1   |  27  |  0.91    Ca    8.4      30 Mar 2021 07:07  Phos  3.1     03-30  Mg     1.9     03-30    TPro  6.9  /  Alb  2.2<L>  /  TBili  0.4  /  DBili  x   /  AST  40  /  ALT  26  /  AlkPhos  113  03-30    Creatinine Trend: 0.91<--, 0.97<--        PHYSICAL EXAM:  T(C): 36.7 (03-31-21 @ 04:49), Max: 37.1 (03-31-21 @ 01:36)  HR: 100 (03-31-21 @ 10:02) (87 - 102)  BP: 168/92 (03-31-21 @ 10:02) (162/89 - 184/98)  RR: 17 (03-31-21 @ 10:02) (17 - 18)  SpO2: 98% (03-31-21 @ 10:02) (98% - 100%)  Wt(kg): --  I&O's Summary    31 Mar 2021 07:01  -  31 Mar 2021 13:41  --------------------------------------------------------  IN: 0 mL / OUT: 100 mL / NET: -100 mL      Height (cm): 149.9 (03-31 @ 00:07)  Weight (kg): 41.3 (03-31 @ 00:07)  BMI (kg/m2): 18.4 (03-31 @ 00:07)  BSA (m2): 1.32 (03-31 @ 00:07)    HEENT:  (-)icterus (-)pallor  CV: N S1 S2 1/6 ROLO (+)2 Pulses B/l  Resp:  Clear to ausculatation B/L, normal effort  GI: (+) BS Soft, NT, ND  Lymph:  (-)Edema, (-)obvious lymphadenopathy  Skin: Warm to touch, Normal turgor  Psych: Appropriate mood and affect      TELEMETRY: 	  Sinus, PAT with pacing          ASSESSMENT/PLAN: 	89y  Female DM, on insulin  wheelchair bound, as per daughter in law, pt c/o feeling nausea for past 7 days, dizziness, vertigo and increase in ataxia.     - echo noted, Mild segmental LV dysfx, given advance age and dementia will pursue medical management of CAD.  Cont asa, statin bb  - Abx for PNA per primary team  - EP porter lappreciated, frequent atrial arrhythmia on interrogation, will attempt rhythm control with karolina Horn MD, [facility]  BEEPER [phone]

## 2021-04-01 ENCOUNTER — TRANSCRIPTION ENCOUNTER (OUTPATIENT)
Age: 86
End: 2021-04-01

## 2021-04-01 DIAGNOSIS — E11.9 TYPE 2 DIABETES MELLITUS WITHOUT COMPLICATIONS: ICD-10-CM

## 2021-04-01 LAB
GLUCOSE BLDC GLUCOMTR-MCNC: 131 MG/DL — HIGH (ref 70–99)
GLUCOSE BLDC GLUCOMTR-MCNC: 157 MG/DL — HIGH (ref 70–99)
HCT VFR BLD CALC: 36.4 % — SIGNIFICANT CHANGE UP (ref 34.5–45)
HGB BLD-MCNC: 12 G/DL — SIGNIFICANT CHANGE UP (ref 11.5–15.5)
M PNEUMO IGM SER-ACNC: 5.14 INDEX — HIGH (ref 0–0.9)
MCHC RBC-ENTMCNC: 30.4 PG — SIGNIFICANT CHANGE UP (ref 27–34)
MCHC RBC-ENTMCNC: 33 GM/DL — SIGNIFICANT CHANGE UP (ref 32–36)
MCV RBC AUTO: 92.2 FL — SIGNIFICANT CHANGE UP (ref 80–100)
MYCOPLASMA AG SPEC QL: POSITIVE
NRBC # BLD: 0 /100 WBCS — SIGNIFICANT CHANGE UP (ref 0–0)
PLATELET # BLD AUTO: 90 K/UL — LOW (ref 150–400)
RBC # BLD: 3.95 M/UL — SIGNIFICANT CHANGE UP (ref 3.8–5.2)
RBC # FLD: 14 % — SIGNIFICANT CHANGE UP (ref 10.3–14.5)
T4 SERPL-MCNC: 15.3 UG/DL — HIGH
WBC # BLD: 8.27 K/UL — SIGNIFICANT CHANGE UP (ref 3.8–10.5)
WBC # FLD AUTO: 8.27 K/UL — SIGNIFICANT CHANGE UP (ref 3.8–10.5)

## 2021-04-01 PROCEDURE — 99231 SBSQ HOSP IP/OBS SF/LOW 25: CPT | Mod: 95

## 2021-04-01 PROCEDURE — 76536 US EXAM OF HEAD AND NECK: CPT | Mod: 26

## 2021-04-01 RX ORDER — OLANZAPINE 15 MG/1
2.5 TABLET, FILM COATED ORAL EVERY 8 HOURS
Refills: 0 | Status: DISCONTINUED | OUTPATIENT
Start: 2021-04-01 | End: 2021-04-02

## 2021-04-01 RX ORDER — SODIUM CHLORIDE 9 MG/ML
1000 INJECTION, SOLUTION INTRAVENOUS
Refills: 0 | Status: DISCONTINUED | OUTPATIENT
Start: 2021-04-01 | End: 2021-04-02

## 2021-04-01 RX ORDER — POTASSIUM CHLORIDE 20 MEQ
40 PACKET (EA) ORAL ONCE
Refills: 0 | Status: COMPLETED | OUTPATIENT
Start: 2021-04-01 | End: 2021-04-01

## 2021-04-01 RX ADMIN — MIRTAZAPINE 15 MILLIGRAM(S): 45 TABLET, ORALLY DISINTEGRATING ORAL at 23:04

## 2021-04-01 RX ADMIN — LISINOPRIL 10 MILLIGRAM(S): 2.5 TABLET ORAL at 06:11

## 2021-04-01 RX ADMIN — PANTOPRAZOLE SODIUM 40 MILLIGRAM(S): 20 TABLET, DELAYED RELEASE ORAL at 06:11

## 2021-04-01 RX ADMIN — AMIODARONE HYDROCHLORIDE 400 MILLIGRAM(S): 400 TABLET ORAL at 06:11

## 2021-04-01 RX ADMIN — Medication 40 MILLIGRAM(S): at 06:11

## 2021-04-01 RX ADMIN — Medication 25 MILLIGRAM(S): at 06:11

## 2021-04-01 RX ADMIN — LATANOPROST 1 DROP(S): 0.05 SOLUTION/ DROPS OPHTHALMIC; TOPICAL at 23:04

## 2021-04-01 RX ADMIN — OLANZAPINE 2.5 MILLIGRAM(S): 15 TABLET, FILM COATED ORAL at 06:11

## 2021-04-01 RX ADMIN — Medication 40 MILLIEQUIVALENT(S): at 23:04

## 2021-04-01 RX ADMIN — OLANZAPINE 2.5 MILLIGRAM(S): 15 TABLET, FILM COATED ORAL at 23:03

## 2021-04-01 RX ADMIN — ATORVASTATIN CALCIUM 20 MILLIGRAM(S): 80 TABLET, FILM COATED ORAL at 23:03

## 2021-04-01 RX ADMIN — AMIODARONE HYDROCHLORIDE 400 MILLIGRAM(S): 400 TABLET ORAL at 23:04

## 2021-04-01 RX ADMIN — Medication 81 MILLIGRAM(S): at 13:53

## 2021-04-01 RX ADMIN — AMIODARONE HYDROCHLORIDE 400 MILLIGRAM(S): 400 TABLET ORAL at 13:53

## 2021-04-01 RX ADMIN — Medication 25 MILLIGRAM(S): at 17:38

## 2021-04-01 NOTE — CONSULT NOTE ADULT - PROBLEM SELECTOR RECOMMENDATION 2
Likely 2/2 advanced dementia.  Family reports recent decreased appetite.  Cachetic, bitemporal wasting with muscle mass/fat loss.  Albumin 2.2.  Diet as tolerated.  Nutrition consult
Good control  on premeal afrezza as out pt  cont low dose correction doses  fsg ac and hs

## 2021-04-01 NOTE — PROGRESS NOTE ADULT - ASSESSMENT
89 year old WC bound female with past medical history of DM (on inhalation insulin Afrezz), GIB, gastric AVM, Hepatitis C, A-Fib (not on AC's), PPM, glaucoma, Grave's disease, HTN and heart failure who presented to the ED with c/o dizziness and impaired coordination. 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 Patient was empirically started on antibiotics for PNA on CXR- no leukocytosis or fevers, BNP > 11,000, CT Chest non-contrast confirms pulmonary edema for which she was given Lasix. Procalcitonin < 0.25 so antibiotics discontinued. TSH noted to be elevated at 9.31, per her endocrinologist, Dr. Sierra, thyroid function tests normal during prior visits, is not on methimazole for Grave's disease. CT chest noted calcifications on thyroid so US ordered. Thrombocytopenia noted on CBC, heme/onc following, LFT's normal (prior hx of Heptitis C). Echocardiogram  reviewed , carotid dopplers normal. Electrophysiologist Dr. Ochoa consulted for PPM, cardiology Dr. Horn.     pt was agitated all morning, s/p zyprexa 2.5mg IM, refused all care including taking meds and food. aggressive towards staff.    89 year old WC bound female with past medical history of DM (on inhalation insulin Afrezz), GIB, gastric AVM, Hepatitis C, A-Fib (not on AC's), PPM, glaucoma, Grave's disease, HTN and heart failure who presented to the ED with c/o dizziness and impaired coordination. 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 Patient was empirically started on antibiotics for PNA on CXR- no leukocytosis or fevers, BNP > 11,000, CT Chest non-contrast confirms pulmonary edema for which she was given Lasix. Procalcitonin < 0.25 so antibiotics discontinued. TSH noted to be elevated at 9.31, per her endocrinologist, Dr. Sierra, thyroid function tests normal during prior visits, is not on methimazole for Grave's disease. CT chest noted calcifications on thyroid so US ordered. Thrombocytopenia noted on CBC, heme/onc following, LFT's normal (prior hx of Heptitis C). Echocardiogram  reviewed , carotid dopplers normal. Electrophysiologist Dr. Ochoa consulted for PPM, cardiology Dr. Horn.     pt was agitated all morning, s/p zyprexa 2.5mg IM, refused all care including taking meds and food. aggressive towards staff.     4/1 - pt is calm , cooperative with exam, no c/o chest pain, moderate appetite today, taking all meds , no c/o voiced  face time offered.

## 2021-04-01 NOTE — PROGRESS NOTE ADULT - PROBLEM SELECTOR PLAN 6
thrombocytopenic precautions  prior hx of Hepatitis C  LFT's normal  trend CBC - unable to draw blood today thrombocytopenic precautions  prior hx of Hepatitis C  LFT's normal  trend CBC -stable

## 2021-04-01 NOTE — PROGRESS NOTE ADULT - SUBJECTIVE AND OBJECTIVE BOX
NP Note discussed with  Primary Attending    Patient is a 89y old  Female who presents with a chief complaint of Hypertensive urgency (01 Apr 2021 13:11)      INTERVAL HPI/OVERNIGHT EVENTS: no new complaints    MEDICATIONS  (STANDING):  aMIOdarone    Tablet   Oral   aMIOdarone    Tablet 400 milliGRAM(s) Oral every 8 hours  aspirin enteric coated 81 milliGRAM(s) Oral daily  atorvastatin 20 milliGRAM(s) Oral at bedtime  dextrose 40% Gel 15 Gram(s) Oral once  dextrose 5% + sodium chloride 0.45%. 1000 milliLiter(s) (60 mL/Hr) IV Continuous <Continuous>  dextrose 5%. 1000 milliLiter(s) (50 mL/Hr) IV Continuous <Continuous>  furosemide   Injectable 40 milliGRAM(s) IV Push daily  glucagon  Injectable 1 milliGRAM(s) IntraMuscular once  insulin lispro (ADMELOG) corrective regimen sliding scale   SubCutaneous three times a day before meals  insulin lispro (ADMELOG) corrective regimen sliding scale   SubCutaneous at bedtime  latanoprost 0.005% Ophthalmic Solution 1 Drop(s) Both EYES at bedtime  lisinopril 10 milliGRAM(s) Oral daily  metoprolol tartrate 25 milliGRAM(s) Oral two times a day  mirtazapine 15 milliGRAM(s) Oral at bedtime  OLANZapine 2.5 milliGRAM(s) Oral every 8 hours  pantoprazole    Tablet 40 milliGRAM(s) Oral before breakfast  potassium chloride    Tablet ER 40 milliEquivalent(s) Oral once    MEDICATIONS  (PRN):  OLANZapine Injectable 2.5 milliGRAM(s) IntraMuscular every 12 hours PRN acute agitation      __________________________________________________  REVIEW OF SYSTEMS:    CONSTITUTIONAL: No fever,   EYES: no acute visual disturbances  NECK: No pain or stiffness  RESPIRATORY: No cough; No shortness of breath  CARDIOVASCULAR: No chest pain, no palpitations  GASTROINTESTINAL: No pain. No nausea or vomiting; No diarrhea   NEUROLOGICAL: No headache or numbness, no tremors  MUSCULOSKELETAL: No joint pain, no muscle pain  GENITOURINARY: no dysuria, no frequency, no hesitancy  PSYCHIATRY: no depression , no anxiety  ALL OTHER  ROS negative        Vital Signs Last 24 Hrs  T(C): 36.9 (01 Apr 2021 13:56), Max: 36.9 (01 Apr 2021 13:56)  T(F): 98.4 (01 Apr 2021 13:56), Max: 98.4 (01 Apr 2021 13:56)  HR: 92 (01 Apr 2021 13:56) (92 - 99)  BP: 143/84 (01 Apr 2021 13:56) (143/84 - 170/90)  BP(mean): --  RR: 18 (01 Apr 2021 13:56) (17 - 18)  SpO2: 97% (01 Apr 2021 13:56) (97% - 99%)    ________________________________________________  PHYSICAL EXAM:  GENERAL: NAD  HEENT: Normocephalic;  conjunctivae and sclerae clear; moist mucous membranes;   NECK : supple  CHEST/LUNG: Clear to auscultation bilaterally with good air entry   HEART: S1 S2  regular; no murmurs, gallops or rubs  ABDOMEN: Soft, Nontender, Nondistended; Bowel sounds present  EXTREMITIES: no cyanosis; no edema; no calf tenderness  SKIN: warm and dry; no rash  NERVOUS SYSTEM:  Awake and alert; Oriented  to place, person and time ; no new deficits    _________________________________________________  LABS:                        12.0   8.27  )-----------( 90       ( 01 Apr 2021 06:04 )             36.4     04-01    143  |  106  |  22<H>  ----------------------------<  76  3.4<L>   |  26  |  1.04    Ca    9.1      01 Apr 2021 06:04          CAPILLARY BLOOD GLUCOSE      POCT Blood Glucose.: 131 mg/dL (01 Apr 2021 12:05)  POCT Blood Glucose.: 81 mg/dL (01 Apr 2021 07:40)  POCT Blood Glucose.: 89 mg/dL (31 Mar 2021 22:02)  POCT Blood Glucose.: 102 mg/dL (31 Mar 2021 18:06)        RADIOLOGY & ADDITIONAL TESTS:    Imaging Personally Reviewed:  YES/NO    Consultant(s) Notes Reviewed:   YES/ No    Care Discussed with Consultants :     Plan of care was discussed with patient and /or primary care giver; all questions and concerns were addressed and care was aligned with patient's wishes.     NP Note discussed with  Primary Attending    Patient is a 89y old  Female who presents with a chief complaint of Hypertensive urgency (01 Apr 2021 13:11)      INTERVAL HPI/OVERNIGHT EVENTS: no new complaints    MEDICATIONS  (STANDING):  aMIOdarone    Tablet   Oral   aMIOdarone    Tablet 400 milliGRAM(s) Oral every 8 hours  aspirin enteric coated 81 milliGRAM(s) Oral daily  atorvastatin 20 milliGRAM(s) Oral at bedtime  dextrose 40% Gel 15 Gram(s) Oral once  dextrose 5% + sodium chloride 0.45%. 1000 milliLiter(s) (60 mL/Hr) IV Continuous <Continuous>  dextrose 5%. 1000 milliLiter(s) (50 mL/Hr) IV Continuous <Continuous>  furosemide   Injectable 40 milliGRAM(s) IV Push daily  glucagon  Injectable 1 milliGRAM(s) IntraMuscular once  insulin lispro (ADMELOG) corrective regimen sliding scale   SubCutaneous three times a day before meals  insulin lispro (ADMELOG) corrective regimen sliding scale   SubCutaneous at bedtime  latanoprost 0.005% Ophthalmic Solution 1 Drop(s) Both EYES at bedtime  lisinopril 10 milliGRAM(s) Oral daily  metoprolol tartrate 25 milliGRAM(s) Oral two times a day  mirtazapine 15 milliGRAM(s) Oral at bedtime  OLANZapine 2.5 milliGRAM(s) Oral every 8 hours  pantoprazole    Tablet 40 milliGRAM(s) Oral before breakfast  potassium chloride    Tablet ER 40 milliEquivalent(s) Oral once    MEDICATIONS  (PRN):  OLANZapine Injectable 2.5 milliGRAM(s) IntraMuscular every 12 hours PRN acute agitation      __________________________________________________  REVIEW OF SYSTEMS:    CONSTITUTIONAL: No fever,   EYES: no acute visual disturbances  NECK: No pain or stiffness  RESPIRATORY: No cough; No shortness of breath  CARDIOVASCULAR: No chest pain, no palpitations  GASTROINTESTINAL: No pain. No nausea or vomiting; No diarrhea   NEUROLOGICAL: No headache or numbness, no tremors  MUSCULOSKELETAL: No joint pain, no muscle pain  GENITOURINARY: no dysuria, no frequency, no hesitancy  PSYCHIATRY: no depression , no anxiety  ALL OTHER  ROS negative        Vital Signs Last 24 Hrs  T(C): 36.9 (01 Apr 2021 13:56), Max: 36.9 (01 Apr 2021 13:56)  T(F): 98.4 (01 Apr 2021 13:56), Max: 98.4 (01 Apr 2021 13:56)  HR: 92 (01 Apr 2021 13:56) (92 - 99)  BP: 143/84 (01 Apr 2021 13:56) (143/84 - 170/90)  BP(mean): --  RR: 18 (01 Apr 2021 13:56) (17 - 18)  SpO2: 97% (01 Apr 2021 13:56) (97% - 99%)    ________________________________________________  PHYSICAL EXAM:  GENERAL: NAD  HEENT: Normocephalic;  conjunctivae and sclerae clear; moist mucous membranes;   NECK : supple  CHEST/LUNG: Clear to auscultation bilaterally with good air entry   HEART: S1 S2  regular; no murmurs, gallops or rubs  ABDOMEN: Soft, Nontender, Nondistended; Bowel sounds present  EXTREMITIES: no cyanosis; no edema; no calf tenderness  SKIN: warm and dry; no rash  NERVOUS SYSTEM:  Awake and alert; Oriented  to place, person ; no new deficits    _________________________________________________  LABS:                        12.0   8.27  )-----------( 90       ( 01 Apr 2021 06:04 )             36.4     04-01    143  |  106  |  22<H>  ----------------------------<  76  3.4<L>   |  26  |  1.04    Ca    9.1      01 Apr 2021 06:04          CAPILLARY BLOOD GLUCOSE      POCT Blood Glucose.: 131 mg/dL (01 Apr 2021 12:05)  POCT Blood Glucose.: 81 mg/dL (01 Apr 2021 07:40)  POCT Blood Glucose.: 89 mg/dL (31 Mar 2021 22:02)  POCT Blood Glucose.: 102 mg/dL (31 Mar 2021 18:06)        RADIOLOGY & ADDITIONAL TESTS:  < from: CT Chest No Cont (03.30.21 @ 11:24) >    EXAM:  CT CHEST                            PROCEDURE DATE:  03/30/2021          INTERPRETATION:  CLINICAL INFORMATION: Abnormal chest x-ray, concerning for aspiration pneumonia versus pleural effusion.    COMPARISON: 9/8/2019    CONTRAST/COMPLICATIONS:  IV Contrast: NONE  Oral Contrast: NONE  Complications: None reported at time of study completion    PROCEDURE:  CT of the Chest was performed.  Sagittal and coronal reformats were performed.    FINDINGS:    LUNGS AND AIRWAYS: Patent central airways.  Subsegmental atelectasis in the bilateral lower lobes. Mucoid impaction and subsegmental atelectasis in the right middle lobe, slightly decreased from prior imaging. No focal consolidation. Mild hazy groundglass opacities in the right upper lobesuspicious for mild edema.  PLEURA: Small left pleural effusion and small to moderate right pleural effusion.  MEDIASTINUM AND ROLF: No lymphadenopathy.  VESSELS: Atherosclerotic calcifications of the aorta and coronary arteries.  HEART: Cardiomegaly. No pericardial effusion.  CHEST WALL AND LOWER NECK: Multiple bilateral thyroid calcifications. Left chest wall pacemaker device. Nonspecific bilateral breast calcifications.  VISUALIZED UPPER ABDOMEN: Small ascites. Nonobstructing 5 mm right renal calculus.  BONES: Degenerative changes.    IMPRESSION:  Small to moderate right pleural effusion and small left pleural effusion. Suspected mild pulmonary edema.              CHINEDU NEWMAN MD; Attending Radiologist  This document has been electronically signed. Mar 30 2021  2:05PM    < end of copied text >  < from: US Duplex Carotid Arteries Complete, Bilateral (03.30.21 @ 11:09) >    EXAM:  US DPLX CAROTIDS COMPL BI                            PROCEDURE DATE:  03/30/2021          INTERPRETATION:  CLINICAL INFORMATION: Syncope    COMPARISON: None available.    TECHNIQUE: Grayscale, color and spectral Doppler examination of both carotid arteries was performed.    FINDINGS:    No elevated velocities or abnormal waveforms are encountered.    Peak systolic velocities are as follows:    RIGHT:  PROX CCA = 72 cm/s  DIST CCA = 74 cm/s  PROX ICA = 101 cm/s  DIST ICA = 62 cm/s  ECA = 100 cm/s    LEFT:  PROX CCA = 76 cm/s  DIST CCA = 75 cm/s  PROX ICA = 80 cm/s  DIST ICA = 68 cm/s  ECA = 77 cm/s    Antegrade flow is noted within both vertebral arteries.    IMPRESSION: No significant hemodynamic stenosis of either carotid artery.    Measurement of carotid stenosis is based on velocity parameters that correlate the residual internal carotid diameter with that of the more distal vessel in accordance with a method such as the [employer] Symptomatic Carotid Endarterectomy Trial (NASCET).                RORO CORADO MD; Attending Radiologist  This document has been electronically signed. Mar 30 2021 11:13AM    < end of copied text >  < from: Transthoracic Echocardiogram (03.30.21 @ 07:16) >    Patient name: DERRICK ELIAS  YOB: 1931   Age: 89 (F)   MR#: [id number]  Study Date: 3/30/2021  Location: [address] [place], [place] [address]Sonographer: Gauri Alves RDCS  Study quality: Fair  Referring Physician: Bao Gauthier MD  Blood Pressure: 162/89 mmHg  Height: 150 cm  Weight: 41 kg  BSA: 1.3 m2  ------------------------------------------------------------------------    PROCEDURE: Transthoracic echocardiogram with 2-D, M-Mode  and complete spectral and color flow Doppler.  INDICATION: Hypertensive heart disease without heart  failure (I11.9)  HISTORY:  ------------------------------------------------------------------------  DIMENSIONS:  Dimensions:     Normal Values:  LA:     4.7 cm    2.0 - 4.0 cm  Ao:     3.1 cm    2.0 - 3.8 cm  SEPTUM: 1.1 cm    0.6 - 1.2 cm  PWT:    1.1 cm    0.6 - 1.1 cm  LVIDd:  4.4 cm    3.0 - 5.6 cm  LVIDs:  3.5 cm    1.8 - 4.0 cm      Derived Variables:  LVMI: 129 g/m2  RWT: 0.50  Ejection Fraction Visual Estimate: 45-50 %    ------------------------------------------------------------------------  OBSERVATIONS:  Mitral Valve: Mitral annular calcification, and calcified  mitral leaflets with normal diastolic opening. Mild to  moderate mitral regurgitation.  Aortic Root: Normal aortic root.  Aortic Valve: Calcified trileaflet aortic valve with normal  opening.  Left Atrium: Severe left atrial enlargement.  Left Ventricle: Mild segmental left ventricular systolic  dysfunction.  The basal to mid inferolateral wall is  hypokinetic. Severe concentric left ventricular  hypertrophy.  Right Heart: Moderate right atrial enlargement. Right  ventricle not well visualized.   Probably normal right  ventricular size and systolic function.   A device lead is  visualized in the right heart. There is moderate-severe  tricuspid regurgitation. There is mild pulmonic  regurgitation.  Pericardium/PleuraNormal pericardium with no pericardial  effusion.  Hemodynamic: RA Pressure is 10 mm Hg. RV systolic pressure  is 54 mm Hg. Moderate pulmonary hypertension.  ------------------------------------------------------------------------  CONCLUSIONS:  1. Mitral annular calcification, and calcified mitral  leaflets with normal diastolic opening. Mild to moderate  mitral regurgitation.  2. Severe left atrial enlargement.  3. Severe concentric left ventricular hypertrophy.  4. Mild segmental left ventricular systolic dysfunction.  The basal to mid inferolateral wall is hypokinetic.  5. Moderate right atrial enlargement.  6. Right ventricle not well visualized.   Probably normal  right ventricular size and systolic function.   A device  lead is visualized in the right heart.  7. RV systolic pressure is 54 mm Hg. Moderate pulmonary  hypertension.    ------------------------------------------------------------------------  Confirmed on  3/31/2021 - 11:29:53 by Austin Horn MD  ------------------------------------------------------------------------    < end of copied text >    Imaging Personally Reviewed:  YES    Consultant(s) Notes Reviewed:   YES    Care Discussed with Consultants : cardiology/ EP/ endocrinology     Plan of care was discussed with patient and /or primary care giver; all questions and concerns were addressed and care was aligned with patient's wishes.

## 2021-04-01 NOTE — PROGRESS NOTE ADULT - PROBLEM SELECTOR PLAN 3
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 Lamar
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 Lamar
TSH > 9  follow up T4, T3  thyroid with calcifications, f/u thyroid US  Endocrinology Dr. Sierra

## 2021-04-01 NOTE — DISCHARGE NOTE PROVIDER - NSDCMRMEDTOKEN_GEN_ALL_CORE_FT
Afrezza 4 units inhalation powder: 4 unit(s) inhaled 3 times a day (with meals)  aspirin 81 mg oral delayed release tablet: 1 tab(s) orally once a day (at bedtime)  latanoprost 0.005% ophthalmic solution: 1 drop(s) to both eyes once a day (at bedtime)  metoprolol succinate 25 mg oral tablet, extended release: 1 tab(s) orally once a day  mirtazapine 7.5 mg oral tablet: 2 tab(s) orally once a day (at bedtime)   Afrezza 4 units inhalation powder: 4 unit(s) inhaled 3 times a day (with meals)  amiodarone 200 mg oral tablet: 2 tab(s) orally every 8 hours  x 6 doses  amiodarone 200 mg oral tablet: 1 tab(s) orally once a day   aspirin 81 mg oral delayed release tablet: 1 tab(s) orally once a day (at bedtime)  atorvastatin 20 mg oral tablet: 1 tab(s) orally once a day (at bedtime)  furosemide 40 mg oral tablet: 1 tab(s) orally once a day  latanoprost 0.005% ophthalmic solution: 1 drop(s) to both eyes once a day (at bedtime)  lisinopril 10 mg oral tablet: 1 tab(s) orally once a day  metoprolol tartrate 25 mg oral tablet: 1 tab(s) orally 2 times a day  OLANZapine 2.5 mg oral tablet: 1 tab(s) orally once a day in the AM   OLANZapine 5 mg oral tablet: 1 tab(s) orally once a day (at bedtime)   pantoprazole 40 mg oral delayed release tablet: 1 tab(s) orally once a day (before a meal)

## 2021-04-01 NOTE — PROGRESS NOTE ADULT - PROBLEM SELECTOR PLAN 2
as per previous chart in 2019  pt was agitated this morning, physically aggressive towards staff  seen by psych - unable to assess pt due to noncooperation  starting zyprexa 2.5mg IM q 8hours for now as per previous chart in 2019  pt was agitated 3/31 physically aggressive towards staff  seen by psych - started zyprexa IM   improving in behavior today, pt took P.O meds thus changed IM to  P.O zyprexa 2.5mg q 8hours standing   continue PRN zyprexa 2.5mg IM  BID  Dr Antonio is following

## 2021-04-01 NOTE — CONSULT NOTE ADULT - SUBJECTIVE AND OBJECTIVE BOX
Patient is a 89y old  Female who presents with a chief complaint of Hypertensive urgency (01 Apr 2021 09:23)      HPI:  89 y.o. female with h/o NIDDM, pt uses inhalation insulin Afrezza, last dose this am, wheelchair bound, as per daughter in law, pt c/o feeling nausea for past 7 days, dizziness, vertigo and increase in ataxia. Patient is not a good historian and very soft spoken, She is AAO 2 (oriented to self and place only) collateral history is obtained from Daughter in law who states that patient lives alone with 24/7 HHA and from last few days she is having high blood pressure. Patient also endorses nausea and appetite loss. Patient states that whenever she tries to eat she becomes nauseous. She also reports some dry cough and occasional chest pain , abdominal pain, constipation (Not present currently)   Pt received J&J vaccine last week.   Patient denies SOB, urinary problems, headaches, dizziness, diplopia, focal weakness, swallowing difficulty.    Off Record, Daughter in law Bernard Mohan is the POC     ED course:    pr BNP 11k  Vital Signs Last 24 Hrs  T(C): 36.9 (29 Mar 2021 22:35), Max: 36.9 (29 Mar 2021 22:35)  T(F): 98.4 (29 Mar 2021 22:35), Max: 98.4 (29 Mar 2021 22:35)  HR: 74 (29 Mar 2021 22:35) (66 - 97)  BP: 166/75 (29 Mar 2021 22:35) (165/87 - 200/118)  RR: 20 (29 Mar 2021 22:35) (20 - 20)  SpO2: 98% (29 Mar 2021 22:35) (98% - 99%) (29 Mar 2021 21:12)      PAST MEDICAL & SURGICAL HISTORY:  HTN (hypertension)    Pacemaker  medtronic ADDRL1  2011    Graves disease  no treatment as per patient and family    Vertigo    Glaucoma    Constipation    A-fib  no Eliquis since 1/2019    Hepatitis C virus  not treated    T2DM (type 2 diabetes mellitus)  last A1c 7.8   4/11/19    PNA (pneumonia)  2/2019   treated with ABX while in [facility]    Gastric AVM  EGD 2/2019 - cauterized    GIB (gastrointestinal bleeding)  2/2019 requiring 2 PRBCs    Multiple falls    H/O oophorectomy    S/P partial resection of colon  &gt; 5 yrs ago    S/P cholecystectomy    S/P cardiac pacemaker procedure  Medtronic ADDRL1  2011    S/P hysterectomy           MEDICATIONS  (STANDING):  aMIOdarone    Tablet   Oral   aMIOdarone    Tablet 400 milliGRAM(s) Oral every 8 hours  aspirin enteric coated 81 milliGRAM(s) Oral daily  atorvastatin 20 milliGRAM(s) Oral at bedtime  dextrose 40% Gel 15 Gram(s) Oral once  dextrose 5% + sodium chloride 0.45%. 1000 milliLiter(s) (60 mL/Hr) IV Continuous <Continuous>  dextrose 5%. 1000 milliLiter(s) (50 mL/Hr) IV Continuous <Continuous>  furosemide   Injectable 40 milliGRAM(s) IV Push daily  glucagon  Injectable 1 milliGRAM(s) IntraMuscular once  insulin lispro (ADMELOG) corrective regimen sliding scale   SubCutaneous three times a day before meals  insulin lispro (ADMELOG) corrective regimen sliding scale   SubCutaneous at bedtime  latanoprost 0.005% Ophthalmic Solution 1 Drop(s) Both EYES at bedtime  lisinopril 10 milliGRAM(s) Oral daily  metoprolol tartrate 25 milliGRAM(s) Oral two times a day  mirtazapine 15 milliGRAM(s) Oral at bedtime  OLANZapine 2.5 milliGRAM(s) Oral every 8 hours  pantoprazole    Tablet 40 milliGRAM(s) Oral before breakfast  potassium chloride    Tablet ER 40 milliEquivalent(s) Oral once    MEDICATIONS  (PRN):  OLANZapine Injectable 2.5 milliGRAM(s) IntraMuscular every 12 hours PRN acute agitation      FAMILY HISTORY:  Family history of diabetes mellitus    Family history of hypertension        SOCIAL HISTORY:      REVIEW OF SYSTEMS:  CONSTITUTIONAL: No fever, weight loss, or fatigue  EYES: No eye pain, visual disturbances, or discharge  ENT:  No difficulty hearing, tinnitus, vertigo; No sinus or throat pain  NECK: No pain or stiffness  RESPIRATORY: No cough, wheezing, chills or hemoptysis; No Shortness of Breath  CARDIOVASCULAR: No chest pain, palpitations, passing out, dizziness, or leg swelling  GASTROINTESTINAL: No abdominal or epigastric pain. No nausea, vomiting, or hematemesis; No diarrhea or constipation. No melena or hematochezia.  GENITOURINARY: No dysuria, frequency, hematuria, or incontinence  NEUROLOGICAL: No headaches, memory loss, loss of strength, numbness, or tremors  SKIN: No itching, burning, rashes, or lesions   LYMPH Nodes: No enlarged glands  ENDOCRINE: No heat or cold intolerance; No hair loss  MUSCULOSKELETAL: No joint pain or swelling; No muscle, back, or extremity pain  PSYCHIATRIC: No depression, anxiety, mood swings, or difficulty sleeping  HEME/LYMPH: No easy bruising, or bleeding gums  ALLERGY AND IMMUNOLOGIC: No hives or eczema	        Vital Signs Last 24 Hrs  T(C): 36.7 (01 Apr 2021 05:45), Max: 36.7 (01 Apr 2021 05:45)  T(F): 98.1 (01 Apr 2021 05:45), Max: 98.1 (01 Apr 2021 05:45)  HR: 99 (01 Apr 2021 05:45) (95 - 99)  BP: 145/78 (01 Apr 2021 05:45) (145/78 - 170/90)  BP(mean): --  RR: 17 (01 Apr 2021 05:45) (17 - 18)  SpO2: 98% (01 Apr 2021 05:45) (98% - 99%)      Constitutional:    NC/AT:    HEENT:    Neck:  No JVD, bruits or thyromegaly    Respiratory:  Clear without rales or rhonchi    Cardiovascular:  RR without murmur, rub or gallop.    Gastrointestinal: Soft without hepatosplenomegaly.    Extremities: without cyanosis, clubbing or edema.    Neurological:  Oriented   x      . No gross sensory or motor defects.        LABS:                        12.0   8.27  )-----------( 90       ( 01 Apr 2021 06:04 )             36.4     04-01    143  |  106  |  22<H>  ----------------------------<  76  3.4<L>   |  26  |  1.04    Ca    9.1      01 Apr 2021 06:04              CAPILLARY BLOOD GLUCOSE      POCT Blood Glucose.: 81 mg/dL (01 Apr 2021 07:40)  POCT Blood Glucose.: 89 mg/dL (31 Mar 2021 22:02)  POCT Blood Glucose.: 102 mg/dL (31 Mar 2021 18:06)  POCT Blood Glucose.: 121 mg/dL (31 Mar 2021 11:32)      RADIOLOGY & ADDITIONAL STUDIES: Patient is a 89y old  Female who presents with a chief complaint of Hypertensive urgency (01 Apr 2021 09:23)      HPI:  89 y.o. female with h/o NIDDM, pt uses inhalation insulin Afrezza, last dose this am, wheelchair bound, as per daughter in law, pt c/o feeling nausea for past 7 days, dizziness, vertigo and increase in ataxia. Patient is not a good historian and very soft spoken, She is AAO 2 (oriented to self and place only) collateral history is obtained from Daughter in law who states that patient lives alone with 24/7 HHA and from last few days she is having high blood pressure. Patient also endorses nausea and appetite loss. Patient states that whenever she tries to eat she becomes nauseous. She also reports some dry cough and occasional chest pain , abdominal pain, constipation (Not present currently)   Pt received J&J vaccine last week.   Patient denies SOB, urinary problems, headaches, dizziness, diplopia, focal weakness, swallowing difficulty.    Off Record, Daughter in law Bernard Mohan is the POC     ED course:    pr BNP 11k  Vital Signs Last 24 Hrs  T(C): 36.9 (29 Mar 2021 22:35), Max: 36.9 (29 Mar 2021 22:35)  T(F): 98.4 (29 Mar 2021 22:35), Max: 98.4 (29 Mar 2021 22:35)  HR: 74 (29 Mar 2021 22:35) (66 - 97)  BP: 166/75 (29 Mar 2021 22:35) (165/87 - 200/118)  RR: 20 (29 Mar 2021 22:35) (20 - 20)  SpO2: 98% (29 Mar 2021 22:35) (98% - 99%) (29 Mar 2021 21:12)      PAST MEDICAL & SURGICAL HISTORY:  HTN (hypertension)    Pacemaker  medtronic ADDRL1  2011    Graves disease  no treatment as per patient and family    Vertigo    Glaucoma    Constipation    A-fib  no Eliquis since 1/2019    Hepatitis C virus  not treated    T2DM (type 2 diabetes mellitus)  last A1c 7.8   4/11/19    PNA (pneumonia)  2/2019   treated with ABX while in [facility]    Gastric AVM  EGD 2/2019 - cauterized    GIB (gastrointestinal bleeding)  2/2019 requiring 2 PRBCs    Multiple falls    H/O oophorectomy    S/P partial resection of colon  &gt; 5 yrs ago    S/P cholecystectomy    S/P cardiac pacemaker procedure  Medtronic ADDRL1  2011    S/P hysterectomy           MEDICATIONS  (STANDING):  aMIOdarone    Tablet   Oral   aMIOdarone    Tablet 400 milliGRAM(s) Oral every 8 hours  aspirin enteric coated 81 milliGRAM(s) Oral daily  atorvastatin 20 milliGRAM(s) Oral at bedtime  dextrose 40% Gel 15 Gram(s) Oral once  dextrose 5% + sodium chloride 0.45%. 1000 milliLiter(s) (60 mL/Hr) IV Continuous <Continuous>  dextrose 5%. 1000 milliLiter(s) (50 mL/Hr) IV Continuous <Continuous>  furosemide   Injectable 40 milliGRAM(s) IV Push daily  glucagon  Injectable 1 milliGRAM(s) IntraMuscular once  insulin lispro (ADMELOG) corrective regimen sliding scale   SubCutaneous three times a day before meals  insulin lispro (ADMELOG) corrective regimen sliding scale   SubCutaneous at bedtime  latanoprost 0.005% Ophthalmic Solution 1 Drop(s) Both EYES at bedtime  lisinopril 10 milliGRAM(s) Oral daily  metoprolol tartrate 25 milliGRAM(s) Oral two times a day  mirtazapine 15 milliGRAM(s) Oral at bedtime  OLANZapine 2.5 milliGRAM(s) Oral every 8 hours  pantoprazole    Tablet 40 milliGRAM(s) Oral before breakfast  potassium chloride    Tablet ER 40 milliEquivalent(s) Oral once    MEDICATIONS  (PRN):  OLANZapine Injectable 2.5 milliGRAM(s) IntraMuscular every 12 hours PRN acute agitation      FAMILY HISTORY:  Family history of diabetes mellitus    Family history of hypertension        SOCIAL HISTORY:      REVIEW OF SYSTEMS:  CONSTITUTIONAL: No fever, weight loss, or fatigue  EYES: No eye pain, visual disturbances, or discharge  ENT:  No difficulty hearing, tinnitus, vertigo; No sinus or throat pain  NECK: No pain or stiffness  RESPIRATORY: No cough, wheezing, chills or hemoptysis; No Shortness of Breath  CARDIOVASCULAR: No chest pain, palpitations, passing out, dizziness, or leg swelling  GASTROINTESTINAL: No abdominal or epigastric pain. No nausea, vomiting, or hematemesis; No diarrhea or constipation. No melena or hematochezia.  GENITOURINARY: No dysuria, frequency, hematuria, or incontinence  NEUROLOGICAL: No headaches, memory loss, loss of strength, numbness, or tremors  SKIN: No itching, burning, rashes, or lesions   LYMPH Nodes: No enlarged glands  ENDOCRINE: No heat or cold intolerance; No hair loss  MUSCULOSKELETAL: No joint pain or swelling; No muscle, back, or extremity pain  PSYCHIATRIC: No depression, anxiety, mood swings, or difficulty sleeping  HEME/LYMPH: No easy bruising, or bleeding gums  ALLERGY AND IMMUNOLOGIC: No hives or eczema	        Vital Signs Last 24 Hrs  T(C): 36.7 (01 Apr 2021 05:45), Max: 36.7 (01 Apr 2021 05:45)  T(F): 98.1 (01 Apr 2021 05:45), Max: 98.1 (01 Apr 2021 05:45)  HR: 99 (01 Apr 2021 05:45) (95 - 99)  BP: 145/78 (01 Apr 2021 05:45) (145/78 - 170/90)  BP(mean): --  RR: 17 (01 Apr 2021 05:45) (17 - 18)  SpO2: 98% (01 Apr 2021 05:45) (98% - 99%)      Constitutional:    HEENT:    Neck:  No JVD, bruits or thyromegaly    Respiratory:  Clear without rales or rhonchi    Cardiovascular:  RR without murmur, rub or gallop.    Gastrointestinal: Soft without hepatosplenomegaly.    Extremities: without cyanosis, clubbing or edema.    Neurological:  Oriented   x 2     . No gross sensory or motor defects.        LABS:                        12.0   8.27  )-----------( 90       ( 01 Apr 2021 06:04 )             36.4     04-01    143  |  106  |  22<H>  ----------------------------<  76  3.4<L>   |  26  |  1.04    Ca    9.1      01 Apr 2021 06:04        A1C with Estimated Average Glucose (03.30.21 @ 10:07)    A1C with Estimated Average Glucose Result: 6.9: Method: Immunoassay         Thyroid Stimulating Hormone, Serum in AM (04.01.21 @ 06:04)    Thyroid Stimulating Hormone, Serum: 3.06 uU/mL            CAPILLARY BLOOD GLUCOSE      POCT Blood Glucose.: 81 mg/dL (01 Apr 2021 07:40)  POCT Blood Glucose.: 89 mg/dL (31 Mar 2021 22:02)  POCT Blood Glucose.: 102 mg/dL (31 Mar 2021 18:06)  POCT Blood Glucose.: 121 mg/dL (31 Mar 2021 11:32)      RADIOLOGY & ADDITIONAL STUDIES:

## 2021-04-01 NOTE — PROGRESS NOTE ADULT - PROBLEM SELECTOR PLAN 7
ISS per protocol  fingersticks AC, HS   A1C acceptable
DVT: SCD's while in bed  GI: Protonix
ISS per protocol  fingersticks AC, HS   A1C acceptable

## 2021-04-01 NOTE — PROGRESS NOTE ADULT - SUBJECTIVE AND OBJECTIVE BOX
Patient is a 89y old  Female who presents with a chief complaint of Hypertensive urgency (01 Apr 2021 16:42)    PATIENT IS SEEN AND EXAMINED IN MEDICAL FLOOR.  NGT [    ]    LANGE [   ]      GT [   ]    ALLERGIES:  No Known Allergies      Daily     Daily     VITALS:    Vital Signs Last 24 Hrs  T(C): 36.3 (01 Apr 2021 21:43), Max: 36.9 (01 Apr 2021 13:56)  T(F): 97.4 (01 Apr 2021 21:43), Max: 98.4 (01 Apr 2021 13:56)  HR: 82 (01 Apr 2021 21:43) (82 - 99)  BP: 143/84 (01 Apr 2021 21:43) (138/71 - 152/82)  BP(mean): --  RR: 18 (01 Apr 2021 21:43) (17 - 18)  SpO2: 97% (01 Apr 2021 21:43) (96% - 98%)    LABS:    CBC Full  -  ( 01 Apr 2021 06:04 )  WBC Count : 8.27 K/uL  RBC Count : 3.95 M/uL  Hemoglobin : 12.0 g/dL  Hematocrit : 36.4 %  Platelet Count - Automated : 90 K/uL  Mean Cell Volume : 92.2 fl  Mean Cell Hemoglobin : 30.4 pg  Mean Cell Hemoglobin Concentration : 33.0 gm/dL  Auto Neutrophil # : x  Auto Lymphocyte # : x  Auto Monocyte # : x  Auto Eosinophil # : x  Auto Basophil # : x  Auto Neutrophil % : x  Auto Lymphocyte % : x  Auto Monocyte % : x  Auto Eosinophil % : x  Auto Basophil % : x      04-01    143  |  106  |  22<H>  ----------------------------<  76  3.4<L>   |  26  |  1.04    Ca    9.1      01 Apr 2021 06:04      CAPILLARY BLOOD GLUCOSE      POCT Blood Glucose.: 157 mg/dL (01 Apr 2021 22:31)  POCT Blood Glucose.: 131 mg/dL (01 Apr 2021 12:05)  POCT Blood Glucose.: 81 mg/dL (01 Apr 2021 07:40)          Creatinine Trend: 1.04<--, 0.91<--, 0.97<--  I&O's Summary    31 Mar 2021 07:01  - 01 Apr 2021 07:00  --------------------------------------------------------  IN: 0 mL / OUT: 100 mL / NET: -100 mL            .Blood Blood  03-29 @ 21:57   No growth to date.  --  --          MEDICATIONS:    MEDICATIONS  (STANDING):  aMIOdarone    Tablet   Oral   aMIOdarone    Tablet 400 milliGRAM(s) Oral every 8 hours  aspirin enteric coated 81 milliGRAM(s) Oral daily  atorvastatin 20 milliGRAM(s) Oral at bedtime  dextrose 40% Gel 15 Gram(s) Oral once  dextrose 5% + sodium chloride 0.45%. 1000 milliLiter(s) (60 mL/Hr) IV Continuous <Continuous>  dextrose 5%. 1000 milliLiter(s) (50 mL/Hr) IV Continuous <Continuous>  furosemide   Injectable 40 milliGRAM(s) IV Push daily  glucagon  Injectable 1 milliGRAM(s) IntraMuscular once  insulin lispro (ADMELOG) corrective regimen sliding scale   SubCutaneous three times a day before meals  insulin lispro (ADMELOG) corrective regimen sliding scale   SubCutaneous at bedtime  latanoprost 0.005% Ophthalmic Solution 1 Drop(s) Both EYES at bedtime  lisinopril 10 milliGRAM(s) Oral daily  metoprolol tartrate 25 milliGRAM(s) Oral two times a day  mirtazapine 15 milliGRAM(s) Oral at bedtime  OLANZapine 2.5 milliGRAM(s) Oral every 8 hours  pantoprazole    Tablet 40 milliGRAM(s) Oral before breakfast  potassium chloride    Tablet ER 40 milliEquivalent(s) Oral once      MEDICATIONS  (PRN):  OLANZapine Injectable 2.5 milliGRAM(s) IntraMuscular every 12 hours PRN acute agitation      REVIEW OF SYSTEMS:                           ALL ROS DONE [ X   ]    CONSTITUTIONAL:  LETHARGIC [   ], FEVER [   ], UNRESPONSIVE [   ]  CVS:  CP  [   ], SOB, [   ], PALPITATIONS [   ], DIZZYNESS [   ]  RS: COUGH [   ], SPUTUM [   ]  GI: ABDOMINAL PAIN [   ], NAUSEA [   ], VOMITINGS [   ], DIARRHEA [   ], CONSTIPATION [   ]  :  DYSURIA [   ], NOCTURIA [   ], INCREASED FREQUENCY [   ], DRIBLING [   ],  SKELETAL: PAINFUL JOINTS [   ], SWOLLEN JOINTS [   ], NECK ACHE [   ], LOW BACK ACHE [   ],  SKIN : ULCERS [   ], RASH [   ], ITCHING [   ]  CNS: HEAD ACHE [   ], DOUBLE VISION [   ], BLURRED VISION [   ], AMS / CONFUSION [   ], SEIZURES [   ], WEAKNESS [   ],TINGLING / NUMBNESS [   ]    PHYSICAL EXAMINATION:  GENERAL APPEARANCE: NO DISTRESS  HEENT:  NO PALLOR, NO  JVD,  NO   NODES, NECK SUPPLE  CVS: S1 +, S2 +,  IRREGULAR RATE, PPM  RS: AEEB,  OCCASIONAL  RALES +,   NO RONCHI  ABD: SOFT, NT, NO, BS +  EXT: NO PE  SKIN: WARM,   SKELETAL:  ROM ACCEPTABLE  CNS:  AAO X  2-3 ,   WEAK IN ALL EXTREMITIES    RADIOLOGY :    RADIOLOGY REVIEWED    ASSESSMENT :     Syncope and collapse    No pertinent past medical history    HTN (hypertension)    DM (diabetes mellitus)    Pacemaker    Graves disease    Vertigo    Glaucoma    Constipation    A-fib    Hepatitis C virus    T2DM (type 2 diabetes mellitus)    PNA (pneumonia)    Intracranial hemorrhage, spontaneous intraparenchymal, due to cerebral AVM, chronic    Gastric AVM    GIB (gastrointestinal bleeding)    Multiple falls    No significant past surgical history    Artificial pacemaker    H/O oophorectomy    S/P partial resection of colon    S/P cholecystectomy    S/P cardiac pacemaker procedure    S/P hysterectomy        PLAN:  HPI:  89 y.o. female with h/o NIDDM, pt uses inhalation insulin Afrezza, last dose this am, wheelchair bound, as per daughter in law, pt c/o feeling nausea for past 7 days, dizziness, vertigo and increase in ataxia. Patient is not a good historian and very soft spoken, She is AAO 2 (oriented to self and place only) collateral history is obtained from Daughter in law who states that patient lives alone with 24/7 HHA and from last few days she is having high blood pressure. Patient also endorses nausea and appetite loss. Patient states that whenever she tries to eat she becomes nauseous. She also reports some dry cough and occasional chest pain , abdominal pain, constipation (Not present currently)   Pt received J&J vaccine last week.   Patient denies SOB, urinary problems, headaches, dizziness, diplopia, focal weakness, swallowing difficulty.    Off Record, Daughter in law Bernard Mohan is the POC     ED course:    pr BNP 11k  Vital Signs Last 24 Hrs  T(C): 36.9 (29 Mar 2021 22:35), Max: 36.9 (29 Mar 2021 22:35)  T(F): 98.4 (29 Mar 2021 22:35), Max: 98.4 (29 Mar 2021 22:35)  HR: 74 (29 Mar 2021 22:35) (66 - 97)  BP: 166/75 (29 Mar 2021 22:35) (165/87 - 200/118)  RR: 20 (29 Mar 2021 22:35) (20 - 20)  SpO2: 98% (29 Mar 2021 22:35) (98% - 99%) (29 Mar 2021 21:12)    # AGITATION - PER FAMILY - PATIENT HAS A HX OF MOOD DISTURBANCE - PSYCHIATRY CONSULT IN PROGRESS - PLACED PATIENT ON ZYPREXA W/ IMPROVEMENT  # NEAR SYNCOPE - R/O CVA VS VERTIGO VS ORTHOSTATIC HYPOTENSION - NOTED CT HEAD, F/U ECHOCARDIOGRAM, REVIEWED CAROTID U/S, REVIEWED TSH/LIPIDS/HBA1C, ON STATIN, BB, ACE-I, ASA  - NEUROLOGY CONSULT - LESS SUSPICIOUS FOR ACUTE CVA - SUSPECT DEBILITIY - F/U PT EVAL  # HYPERTENSIVE URGENCY - ON BB  # LESS LIKELY PNEUMONIA - DCD ROCEPHIN + AZITHROMYCIN, F/U BCX  - GIVEN LOW PROCAL AND CT MORE SUSPICIOUS FOR PULMONARY   # MUCOID IMPACTION - ORDERED CHEST PT  # THROMBOCYTOPENIA - SUSPECTED ITP, HEME/ONC CONSULT IN PROGRESS  # A.FIB W/ PPM - EPISODES OF RVR - PACEMAKER INTERROGATED [W/ EVIDENCE OF ATRIAL TACHYCARDIA], ON BB, EP CONSULT IN PROGRESS  - RECOMMENDATION FOR AMIODARONE [CLEARED BY ENDOCRINOLOGY]   # HEART FAILURE - ELEVATED PRO-BNP, CT W/ PLEURAL EFFUSION  - CARDIOLOGY CONSULT IN PROGRESS  - STARTED ON IV LASIX  - ECHOCARDIOGRAM W/ MILD-MOD MR, SEVERE LAE, SEVERE CONCENTRIC LVH, MILD SEGMENTAL LV SYSTOLIC DYSFXN, BASAL TO MID INFEROLATERAL WALL IS HYPOKINECTIC,, MODERATE DANITA, MODERATE PULMONARY HTN  - CARDIOLOGY RECOMMENDATION FOR MEDICAL MGMT AT THIS TIME - PATIENT IS ON ASA, STATIN, BB  # HYPOKALEMIA - REPLETING WITH SUPPLEMENT  # NIDDM  # HX OF GRAVES DX - NOW WITH ELEVATED TSH - NOT ON MEDICATION - REVIEWED FT3 AND FT4, NOTED THAT REPEAT TSH HAS IMPROVED, ENDOCRINOLOGY CONSULT IN PROGRESS  - THYROID U/S W/ NODULES - WILL NEED OUTPATIENT BIOPSY   # CASE D/W DAUGHTER-IN-LAW HAILE GARY @ [phone] [3/31]  # PALLIATIVE CARE CONSULT IN PROGRESS TO INITIATE GOC CONVERSATION  # GI PPX    BK KAUFFMAN MD COVERING SANTO KAUFFMAN MD

## 2021-04-01 NOTE — PROGRESS NOTE BEHAVIORAL HEALTH - NSBHCONSULTMEDS_PSY_A_CORE FT
Continue with previous plan. No changes made.    1. Start Zyprexa 2.5 mg q8h standing for next 24-48h (IM form is currently recommended, as pt is refusing to take PO)  --Convert to PO at same dose as soon as pt starts to accept PO  2. For breakthrough acute agitation, recommend additional Zyprexa 2.5 mg PO or IM q12h PRN agitation  -- PO dose form is preferred if pt is able to swallow, but IM can be used as backup if PO refused or cannot be given  3. Continue delirium precautions: Frequent reorientation, familiar pictures and objects at bedside, natural light in daytime, consistent sleep/wake schedule, adequate hydration and nutrition, sensory aids (hearing aids, glasses) present if needed, minimizing noise and overstimulation, clustering care to minimize overnight interruptions, judicious use of deliriogenic medications (anticholinergics, benzodiazepines and opioid analgesics), minimize use of restraints  4. STRONGLY recommend maximum efforts to avoid use of restraints  --Recommend support, reorientation and reassurance as first line, PRN medication as second line, and increased supervision (ie enhanced or CO 1:1) as third line. Restraints should be considered ONLY after all of above measures have been tried and failed  5. Medical management as directed by primary team  6. Psychiatry will continue to follow

## 2021-04-01 NOTE — PROGRESS NOTE ADULT - PROBLEM SELECTOR PLAN 1
BNP > 11,000  c/w lisinopril, metoprolol, ASA, Lipitor   CT Chest with pleural effusions & mucous plug/atelectasis: Chest PT ordered  gentle diuresis with IV Lasix 40 mg daily   echo as above   prior echo from 2019 EF > 55%  Cardiology Dr. Honr  EP Dr. Ochoa - s/p PPM interrogation today,  recommended amiodarone   Dr. Sierra cleared to start amiodarone load   f/u TSH in AM again BNP > 11,000  c/w lisinopril, metoprolol, ASA, Lipitor   CT Chest with pleural effusions & mucous plug/atelectasis: Chest PT ordered  gentle diuresis with IV Lasix 40 mg daily   echo as above   prior echo from 2019 EF > 55%  Cardiology Dr. Horn  EP Dr. Ochoa - s/p PPM interrogation today,  recommended amiodarone   Dr. Sierra cleared to start amiodarone load   f/u TSH  WNL today

## 2021-04-01 NOTE — PROGRESS NOTE ADULT - SUBJECTIVE AND OBJECTIVE BOX
HISTORY OF PRESENT ILLNESS: HPI:  89 y.o. female with h/o NIDDM, pt uses inhalation insulin Afrezza, last dose this am, wheelchair bound, as per daughter in law, pt c/o feeling nausea for past 7 days, dizziness, vertigo and increase in ataxia. Patient is not a good historian and very soft spoken, She is AAO 2 (oriented to self and place only) collateral history is obtained from Daughter in law who states that patient lives alone with 24/7 HHA and from last few days she is having high blood pressure. Patient also endorses nausea and appetite loss. Patient states that whenever she tries to eat she becomes nauseous. She also reports some dry cough and occasional chest pain , abdominal pain, constipation (Not present currently)   Pt received J&J vaccine last week.   Patient denies SOB, urinary problems, headaches, dizziness, diplopia, focal weakness, swallowing difficulty.    Off Record, Daughter in law Bernard Mohan is the POC     Overall, the patient does not know why she is here in the hospital when I speak to her. She is upset that her family is not present, and was not there at home for her.  This is notable different from history taken above by the ED.  She knows that she has a pacemaker, does not know what brand, who implanted it, or for what indication.  Per chart review, there was an implant in 2001 and it is unclear if the generator has been replaced since then.  EP was called specifically for abnormal findings on telemetry.  The patient's ROS is negative x 10 systems now.  Review of records and discussion w/ ED team re: possible pneumonia and definite right pleural effusion on CT scan.    3/31- resting comfortably in bed, was moved to behavioral monitoring room overnight due to being combative or disruptive.  Medtronic pacemaker checked via [employer] at bedside.  No complaints today.  4/1- resting comfortably, no acute distress, does not know why she is here.    aMIOdarone    Tablet   Oral   aMIOdarone    Tablet 400 milliGRAM(s) Oral every 8 hours  aspirin enteric coated 81 milliGRAM(s) Oral daily  atorvastatin 20 milliGRAM(s) Oral at bedtime  dextrose 40% Gel 15 Gram(s) Oral once  dextrose 5% + sodium chloride 0.45%. 1000 milliLiter(s) IV Continuous <Continuous>  dextrose 5%. 1000 milliLiter(s) IV Continuous <Continuous>  furosemide   Injectable 40 milliGRAM(s) IV Push daily  glucagon  Injectable 1 milliGRAM(s) IntraMuscular once  insulin lispro (ADMELOG) corrective regimen sliding scale   SubCutaneous three times a day before meals  insulin lispro (ADMELOG) corrective regimen sliding scale   SubCutaneous at bedtime  latanoprost 0.005% Ophthalmic Solution 1 Drop(s) Both EYES at bedtime  lisinopril 10 milliGRAM(s) Oral daily  metoprolol tartrate 25 milliGRAM(s) Oral two times a day  mirtazapine 15 milliGRAM(s) Oral at bedtime  OLANZapine 2.5 milliGRAM(s) Oral every 8 hours  OLANZapine Injectable 2.5 milliGRAM(s) IntraMuscular every 12 hours PRN  pantoprazole    Tablet 40 milliGRAM(s) Oral before breakfast  potassium chloride    Tablet ER 40 milliEquivalent(s) Oral once                            12.0   8.27  )-----------( 90       ( 01 Apr 2021 06:04 )             36.4       04-01    143  |  106  |  22<H>  ----------------------------<  76  3.4<L>   |  26  |  1.04    Ca    9.1      01 Apr 2021 06:04        CARDIAC MARKERS ( 29 Mar 2021 14:38 )  0.018 ng/mL / x     / 61 U/L / x     / x          T(C): 36.7 (04-01-21 @ 05:45), Max: 36.7 (04-01-21 @ 05:45)  HR: 99 (04-01-21 @ 05:45) (95 - 100)  BP: 145/78 (04-01-21 @ 05:45) (145/78 - 170/90)  RR: 17 (04-01-21 @ 05:45) (17 - 18)  SpO2: 98% (04-01-21 @ 05:45) (98% - 99%)  Wt(kg): --    I&O's Summary    31 Mar 2021 07:01  -  01 Apr 2021 07:00  --------------------------------------------------------  IN: 0 mL / OUT: 100 mL / NET: -100 mL    Appearance: Pleasantly confused frail elderly woman in no acute distress, lying flat.	  HEENT:   Normal oral mucosa, PERRL, EOMI	  Lymphatic: No lymphadenopathy , no edema  Cardiovascular: Normal S1 S2,  ++JVD, No murmurs , Peripheral pulses palpable 2+ bilaterally.  Pacemaker left upper chest.  Respiratory: Lungs clear to auscultation, except for poor air entry in right base, poor overall effort 	  Gastrointestinal:  Soft, Non-tender, + BS	  Skin: No rashes, No ecchymoses, No cyanosis, warm to touch  Musculoskeletal: Normal range of motion, normal strength  Psychiatry:  Mood & affect appropriate    TELEMETRY:NSR, with episodes of V-pacing tracking to 105bpm. 	    ECG:  NSR  XRAy:  dual chamber pacemaker with leads in appropriate appearing position.  AP projection expands the heart borders, but the overall silhouette may be enlarged.  There is cranial angulation making the generator appear to sit lower on the chest wall and may make the RLL effusion appear larger.      CT chest notable for RLL effusion and significant cardiac bi-atrial enlargement.  pacemaker leads seen.  Significant MV calcification.    Pacemaker check:  Medtronic pacemaker with ~4 yrs battery life remaining.  In good working order, with normal sensing and pacing parameters.  Arrhythmia log shows frequent / incessant runs of atrial tachycardia, often >20hrs/day on many days, with intermittent sinus rhythm.  	  Echo: EF 45-50%, LVH. Moderate MR. Severe LA enlargement.  Moderate PHTN.  The Sun Valve is very calcified. The basal and mid inferolateral walls are hypokinetic.    ASSESSMENT/PLAN: 	89y Female, ? dementia, with signs/symptoms of CHF in the setting of hypertensive emergency, pending admission.  Thrombocytopenia noted.      Pacemaker has been tracking at the upper programmed rate of 110bpm, in spite of atrial rate >150bpm.  CHF workup per general cardiology.  Given CHF, and structural heart disease (LVH, functionally abnormal Mitral valve), she will feel better and have less CHF symptoms in regular rhythm and at slower heart rates.    Recommend Amiodarone load, 400mg PO TID x 12 doses, followed by 200mg daily.    Pacemaker will prevent bradycardia.  Will follow.    Pérez Ochoa M.D.  Cardiac Electrophysiology    office [phone]  pager [phone]

## 2021-04-01 NOTE — DISCHARGE NOTE PROVIDER - NSDCCPCAREPLAN_GEN_ALL_CORE_FT
PRINCIPAL DISCHARGE DIAGNOSIS  Diagnosis: Near syncope  Assessment and Plan of Treatment:         SECONDARY DISCHARGE DIAGNOSES  Diagnosis: PNA (pneumonia)  Assessment and Plan of Treatment: 2/2019   treated with ABX while in [facility]    Diagnosis: Dehydration  Assessment and Plan of Treatment:     Diagnosis: Vertigo  Assessment and Plan of Treatment:      PRINCIPAL DISCHARGE DIAGNOSIS  Diagnosis: Near syncope  Assessment and Plan of Treatment: WHAT YOU NEED TO KNOW:  Near syncope, also called presyncope, is the feeling that you may faint (lose consciousness), but you do not. You can control some health conditions that cause near syncope. Your healthcare providers can help you create a plan to manage near syncope and prevent episodes.  DISCHARGE INSTRUCTIONS:  Seek care immediately if:  You have sudden chest pain.  You have trouble breathing or shortness of breath.  You have vision changes, are sweating, and have nausea while you are sitting or lying down.  You feel dizzy or flushed and your heart is fluttering.  You lose consciousness.  You cannot use your arm, hand, foot, or leg, or it feels weak.  You have trouble speaking or understanding others when they speak.  Move slowly and let yourself get used to one position before you move to another position. This is very important when you change from a lying or sitting position to a standing position. Take some deep breaths before you stand up from a lying position. Stand up slowly. Sudden movements may cause a fainting spell. Sit on the side of the bed or couch for a few minutes before you stand up. If you are on bedrest, try to be upright for about 2 hours each day, or as directed. Do not lock your legs if you are standing for a long period of time. Move your legs and bend your knees to keep blood flowing.  You were seen by a cardiologist and electrophysiologist.   The interrogation of your pacemaker showed:  Medtronic pacemaker with 4 yrs battery life remaining.  In good working order, with normal sensing and pacing parameters. It also showed that your heart rate origininating from your atria is faster than normal. The electrophysiologist recommended that you start Amiodarone to prevent symptoms of congestive heart failure and slow this rate.  Please seek medical attention if you have chest pain, shortness of breath, leg swelling or you lose consciousness.         SECONDARY DISCHARGE DIAGNOSES  Diagnosis: Dementia  Assessment and Plan of Treatment: Dementia  You have a diagnosis of dementia.  You had changes in your behavior due to being in the hospital and having dementia. Being in the hospital can be confusing and disorienting.   You were started on the medication Zyprexa for your behavioral changes and seen by a psychiatrist.  The psychiatrist recommended XXXXX    Diagnosis: Graves disease  Assessment and Plan of Treatment: Graves disease  Your TSH was elevated when you came to the hospital. Your endocrinologist Dr. Sierra followed you and the TSH improved without medication.   We completed a thyroid ultrasound that showed   Dominant thyroid nodule on the left mid to lower pole with foci of calcification. This is a moderately suspicious TI-RADS level 4 nodule and fine-needle aspiration is recommended.  Although amiodarone can effect your thyroid function, you were cleared by endocrinology to start the medication.  Please follow up with Dr. Sierra in the next week for routine monitoring of your diabetes and thyroid function tests.  Seek immediate medical attention if you are having heart palpitatins or fast heart rate.       PRINCIPAL DISCHARGE DIAGNOSIS  Diagnosis: Atrial tachycardia  Assessment and Plan of Treatment: Patient was evaluated by Cardiology and Electrophysiology   Her Pacemaker was interrogated and revealed Atrial Tachycardia for which Amiodarone was started;  - She is being discharged on Amiodarone load; she is to continue Amiodarone 400mg po three times each day for 6 more doses   then switch to 200mg daily begining 4/5/21  - She will need to be followed closely by cardiology for continued care while on Amiodarone; please follow up with Dr Horn within 1-2 weeks   It is very important for her to have her blood work checked for her thyroid ( Continue to follow up with Dr Sierra) and her Liver. She will also need yearly routine eye exams and chest xrays while on this drug.         SECONDARY DISCHARGE DIAGNOSES  Diagnosis: Graves disease  Assessment and Plan of Treatment: Graves disease  Her TSH was elevated when she  came to the hospital. Her  endocrinologist Dr. Sierra followed her and the TSH improved without medication.   We completed a thyroid ultrasound that showed   Dominant thyroid nodule on the left mid to lower pole with foci of calcification. This is a moderately suspicious TI-RADS level 4 nodule and fine-needle aspiration is recommended.  Although amiodarone can effect thyroid function, she was  cleared by endocrinology to start the medication.  Please follow up with Dr. Sierra in the next Month as scheduled for routine monitoring of her diabetes and thyroid function tests.  Seek immediate medical attention if she is having heart palpitatins or fast heart rate.      Diagnosis: Thrombocytopenia  Assessment and Plan of Treatment: She was followed by Hematologist, Dr Cohn and Her thrombocytopenia is believed to be likely due to her Hepatitis C history   Patient is currently stable. It is recommended that patient continue to follow up with Dr Cohn for continued monitoring and care    Diagnosis: Dementia  Assessment and Plan of Treatment: Dementia  Patient has  a diagnosis of dementia.  She had changes in her behavior due to being in the hospital and having dementia. Being in the hospital can be confusing and disorienting.   She was  started on the medication Zyprexa for alcon behavioral changes and seen by a psychiatrist.  The psychiatrist recommended Zyprexa 2.5 mg po 1 tab in the morning and two tabs ( 5mg) in the evening for 30days. She may continued regimen after 30days but only under the supervision of your PMD/Psychiatrist.    Diagnosis: CHF (congestive heart failure)  Assessment and Plan of Treatment:       Diagnosis: Atrial tachycardia  Assessment and Plan of Treatment: Enter shared details here, e.g., 'Noted on CXR 1/1/16.'         Diagnosis: Atrial tachycardia  Assessment and Plan of Treatment:           PRINCIPAL DISCHARGE DIAGNOSIS  Diagnosis: Near syncope  Assessment and Plan of Treatment: She was followed by A Neurologist, Cardiologist and Electrophysiologist while hospitalized;  Workup revealed presyncope likely in the setting of atrial arrhythmias per her Pacemaker interrogation and telemetry findings.   - She was started on medications for her heart and it is very important that she continue to follow up with Cardiology for continued care  - She should additionally continue to follow up with her PMD/ Primary ACP Leann Avitia within 1-2 weeks of discharge.        SECONDARY DISCHARGE DIAGNOSES  Diagnosis: Graves disease  Assessment and Plan of Treatment: Graves disease  Her TSH was elevated when she  came to the hospital. Her  endocrinologist Dr. Sierra followed her and the TSH improved without medication.   We completed a thyroid ultrasound that showed   Dominant thyroid nodule on the left mid to lower pole with foci of calcification. This is a moderately suspicious TI-RADS level 4 nodule and fine-needle aspiration is recommended.  Although amiodarone can effect thyroid function, she was  cleared by endocrinology to start the medication.  Please follow up with Dr. Sierra in the next Month as scheduled for routine monitoring of her diabetes and thyroid function tests.  Seek immediate medical attention if she is having heart palpitatins or fast heart rate.      Diagnosis: Dementia  Assessment and Plan of Treatment: Dementia  Patient has  a diagnosis of dementia.  She had changes in her behavior due to being in the hospital and having dementia. Being in the hospital can be confusing and disorienting.   She was  started on the medication Zyprexa for alcon behavioral changes and seen by a psychiatrist.  The psychiatrist recommended Zyprexa 2.5 mg po 1 tab in the morning and two tabs ( 5mg) in the evening for 30days. She may continued regimen after 30days but only under the supervision of your PMD/Psychiatrist.    Diagnosis: Atrial tachycardia  Assessment and Plan of Treatment:          Diagnosis: Atrial tachycardia  Assessment and Plan of Treatment:          Diagnosis: Thrombocytopenia  Assessment and Plan of Treatment: She was followed by Hematologist, Dr Cohn and Her thrombocytopenia is believed to be likely due to her Hepatitis C history   Patient is currently stable. It is recommended that patient continue to follow up with Dr Cohn for continued monitoring and care    Diagnosis: CHF (congestive heart failure)  Assessment and Plan of Treatment:     Diagnosis: Atrial tachycardia  Assessment and Plan of Treatment: Patient was evaluated by Cardiology and Electrophysiology   Her Pacemaker was interrogated and revealed Atrial Tachycardia for which Amiodarone was started;  - She is being discharged on Amiodarone load; she is to continue Amiodarone 400mg po three times each day for 6 more doses   then switch to 200mg daily begining 4/5/21  - She will need to be followed closely by cardiology for continued care while on Amiodarone; please follow up with Dr Horn within 1-2 weeks   It is very important for her to have her blood work checked for her thyroid ( Continue to follow up with Dr Sierra) and her Liver. She will also need yearly routine eye exams and chest xrays while on this drug.       Diagnosis: Vitamin D deficiency  Assessment and Plan of Treatment: - Recommend to continue with daily vitamin D supplement  and continue to follow up with her Primary Care provider for routine monitoring.     PRINCIPAL DISCHARGE DIAGNOSIS  Diagnosis: Near syncope  Assessment and Plan of Treatment: She was followed by A Neurologist, Cardiologist and Electrophysiologist while hospitalized;  Workup revealed presyncope likely in the setting of atrial arrhythmias per her Pacemaker interrogation ( done by Dr. Ochoa on 3/31)  and telemetry findings.   - She was started on medications for her heart and it is very important that she continue to follow up with Cardiology for continued care  - She should additionally continue to follow up with her PMD/ Primary ACP Leann Avitia within 1-2 weeks of discharge.        SECONDARY DISCHARGE DIAGNOSES  Diagnosis: Graves disease  Assessment and Plan of Treatment: Graves disease  Her TSH was elevated when she  came to the hospital. Her  endocrinologist Dr. Sierra followed her and the TSH improved without medication.   We completed a thyroid ultrasound that showed   Dominant thyroid nodule on the left mid to lower pole with foci of calcification. This is a moderately suspicious TI-RADS level 4 nodule and fine-needle aspiration is recommended.  Although amiodarone can effect thyroid function, she was  cleared by endocrinology to start the medication.  Please follow up with Dr. Sierra in the next Month as scheduled for routine monitoring of her diabetes and thyroid function tests.  Seek immediate medical attention if she is having heart palpitatins or fast heart rate.      Diagnosis: Dementia  Assessment and Plan of Treatment: Dementia  Patient has  a diagnosis of dementia.  She had changes in her behavior due to being in the hospital and having dementia. Being in the hospital can be confusing and disorienting.   She was  started on the medication Zyprexa for alcon behavioral changes and seen by a psychiatrist.  The psychiatrist recommended Zyprexa 2.5 mg po 1 tab in the morning and two tabs ( 5mg) in the evening for 30days. She may continued regimen after 30days but only under the supervision of your PMD/Psychiatrist.    Diagnosis: Atrial tachycardia  Assessment and Plan of Treatment: continue amiodarone as prescribed   amiodarone 400mg three times daily until 4/42pm as prescribed and continue 200mg of amiodarone once completed 400mg loading dose   need to follow up with physician who placed pacemaker       Diagnosis: Thrombocytopenia  Assessment and Plan of Treatment: She was followed by Hematologist, Dr Cohn and Her thrombocytopenia is believed to be likely due to her Hepatitis C history   Patient is currently stable. It is recommended that patient continue to follow up with Dr Cohn for continued monitoring and care    Diagnosis: CHF (congestive heart failure)  Assessment and Plan of Treatment: Continue to take lasix 40mg once daily as prescribed and please follow up with cardiology Dr. Horn for further management    Diagnosis: Atrial tachycardia  Assessment and Plan of Treatment: Patient was evaluated by Cardiology and Electrophysiology   Her Pacemaker was interrogated and revealed Atrial Tachycardia for which Amiodarone was started;  - She is being discharged on Amiodarone load; she is to continue Amiodarone 400mg po three times each day for 6 more doses   then switch to 200mg daily begining 4/5/21  - She will need to be followed closely by cardiology for continued care while on Amiodarone; please follow up with Dr Horn within 1-2 weeks   It is very important for her to have her blood work checked for her thyroid ( Continue to follow up with Dr Sierra) and her Liver. She will also need yearly routine eye exams and chest xrays while on this drug.       Diagnosis: Vitamin D deficiency  Assessment and Plan of Treatment: - Recommend to continue with daily vitamin D supplement  and continue to follow up with her Primary Care provider for routine monitoring.

## 2021-04-01 NOTE — CONSULT NOTE ADULT - ASSESSMENT
89 year old lady with hepc,Afib on eliquis, CHF, ,DM, HTN presented with near syncope.  She was found to have right pneumonia and thrombocytopenia.    1. near syncope  H/O Afib on eliquis  ?CHF  ?hypoglycemia  cardiology consult    2. 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 pneumonia, on antibiotics  
89 y.o. female with h/o NIDDM, pt uses inhalation insulin Afrezza, last dose this am, wheelchair bound, as per daughter in law, pt c/o feeling nausea for past 7 days, dizziness, vertigo and increase in ataxia. Pt with hx of Graves on no meds 
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

## 2021-04-01 NOTE — DISCHARGE NOTE PROVIDER - PROVIDER TOKENS
PROVIDER:[TOKEN:[[id number]],FOLLOWUP:[1 week]],PROVIDER:[TOKEN:[[id number]],FOLLOWUP:[1 week]],PROVIDER:[TOKEN:[[id number]],FOLLOWUP:[1 week]] PROVIDER:[TOKEN:[[id number]],FOLLOWUP:[1 week]],PROVIDER:[TOKEN:[[id number]],FOLLOWUP:[1 week]],PROVIDER:[TOKEN:[[id number]],FOLLOWUP:[1 week]],FREE:[LAST:[Sadi],FIRST:[Olive],PHONE:[(   )    -],FAX:[(   )    -],ADDRESS:[[facility]]]

## 2021-04-01 NOTE — PROGRESS NOTE ADULT - PROBLEM SELECTOR PLAN 8
DVT: SCD's while in bed  GI: Protonix  s/p Christoph and Christoph COVID vaccine lat week
DVT: SCD's while in bed  GI: Protonix  s/p Christoph and Christoph COVID vaccine lat week
pending thyroid US, S & S eval, echo, endo eval

## 2021-04-01 NOTE — CONSULT NOTE ADULT - PROBLEM SELECTOR RECOMMENDATION 9
Advanced dementia.  Pt is incontinent of urine and stool.  Dependent on ADLs.  FAST 7 b minimum.  Pt is clinically appropriate for hospice.    Educated family bout hospice philosophy and locations of care.  They are not ready to discuss hospice.  Pt is DNR/DNI.
past hx of graves   currently euthyroid  no intervention  check tsi level

## 2021-04-01 NOTE — CONSULT NOTE ADULT - PROBLEM SELECTOR RECOMMENDATION 3
on amiodorone per cardio  risk of hyperthyroidism d/w prim team  f/u tfts closely
Per family pt was ambulatory with walker.  Currently bedbound.  Dependent. High risk for skin failure.  Skin care per protocol.  Frequent positioning  Pt recommending home PT

## 2021-04-01 NOTE — PROGRESS NOTE ADULT - PROBLEM SELECTOR PROBLEM 2
Dementia with behavioral disturbance
Infarction of temporal lobe
Dementia with behavioral disturbance

## 2021-04-01 NOTE — CONSULT NOTE ADULT - CONSULT REQUESTED DATE/TIME
30-Mar-2021 11:11
30-Mar-2021 11:10
30-Mar-2021 17:07
29-Mar-2021 21:39
01-Apr-2021 10:09
31-Mar-2021 12:28

## 2021-04-01 NOTE — PROGRESS NOTE ADULT - SUBJECTIVE AND OBJECTIVE BOX
Patient denies chest pain or shortness of breath.   Confused Review of systems otherwise (-)  	  aMIOdarone    Tablet   Oral   aMIOdarone    Tablet 400 milliGRAM(s) Oral every 8 hours  aspirin enteric coated 81 milliGRAM(s) Oral daily  atorvastatin 20 milliGRAM(s) Oral at bedtime  dextrose 40% Gel 15 Gram(s) Oral once  dextrose 5% + sodium chloride 0.45%. 1000 milliLiter(s) IV Continuous <Continuous>  dextrose 5%. 1000 milliLiter(s) IV Continuous <Continuous>  furosemide   Injectable 40 milliGRAM(s) IV Push daily  glucagon  Injectable 1 milliGRAM(s) IntraMuscular once  insulin lispro (ADMELOG) corrective regimen sliding scale   SubCutaneous three times a day before meals  insulin lispro (ADMELOG) corrective regimen sliding scale   SubCutaneous at bedtime  latanoprost 0.005% Ophthalmic Solution 1 Drop(s) Both EYES at bedtime  lisinopril 10 milliGRAM(s) Oral daily  metoprolol tartrate 25 milliGRAM(s) Oral two times a day  mirtazapine 15 milliGRAM(s) Oral at bedtime  OLANZapine 2.5 milliGRAM(s) Oral every 8 hours  OLANZapine Injectable 2.5 milliGRAM(s) IntraMuscular every 12 hours PRN  pantoprazole    Tablet 40 milliGRAM(s) Oral before breakfast  potassium chloride    Tablet ER 40 milliEquivalent(s) Oral once                            12.0   8.27  )-----------( 90       ( 01 Apr 2021 06:04 )             36.4       Hemoglobin: 12.0 g/dL (04-01 @ 06:04)  Hemoglobin: 11.9 g/dL (03-30 @ 07:07)  Hemoglobin: 13.0 g/dL (03-29 @ 14:38)      04-01    143  |  106  |  22<H>  ----------------------------<  76  3.4<L>   |  26  |  1.04    Ca    9.1      01 Apr 2021 06:04      Creatinine Trend: 1.04<--, 0.91<--, 0.97<--    COAGS:     CARDIAC MARKERS ( 29 Mar 2021 14:38 )  0.018 ng/mL / x     / 61 U/L / x     / x            T(C): 36.7 (04-01-21 @ 11:52), Max: 36.7 (04-01-21 @ 05:45)  HR: 94 (04-01-21 @ 11:52) (94 - 99)  BP: 152/82 (04-01-21 @ 11:52) (145/78 - 170/90)  RR: 18 (04-01-21 @ 11:52) (17 - 18)  SpO2: 97% (04-01-21 @ 11:52) (97% - 99%)  Wt(kg): --    I&O's Summary    31 Mar 2021 07:01  -  01 Apr 2021 07:00  --------------------------------------------------------  IN: 0 mL / OUT: 100 mL / NET: -100 mL        HEENT:  (-)icterus (-)pallor  CV: N S1 S2 1/6 ROLO (+)2 Pulses B/l  Resp:  Clear to ausculatation B/L, normal effort  GI: (+) BS Soft, NT, ND  Lymph:  (-)Edema, (-)obvious lymphadenopathy  Skin: Warm to touch, Normal turgor  Psych: Appropriate mood and affect      TELEMETRY: 	  Sinus, pacing          ASSESSMENT/PLAN: 	89y  Female DM, on insulin  wheelchair bound, as per daughter in law, pt c/o feeling nausea for past 7 days, dizziness, vertigo and increase in ataxia.     - echo noted, Mild segmental LV dysfx, given advance age and dementia will pursue medical management of CAD.  Cont asa, statin bb  - Abx for PNA per primary team  - EP porter lappreciated, frequent atrial arrhythmia on interrogation, will attempt rhythm control with amio  - Currently under goinf amio load    Austin Horn MD, [facility]  BEEPER [phone]

## 2021-04-01 NOTE — DISCHARGE NOTE PROVIDER - CARE PROVIDER_API CALL
Ovidio Cohn  INTERNAL MEDICINE  [address]  [place] [id number]  Phone: [phone]  Fax: [phone]  Follow Up Time: 1 week    Austin Horn)  Cardiovascular Disease  [facility] [address]  Phone: [phone]  Fax: [phone]  Follow Up Time: 1 week    Louise Sierra)  EndocrinologyMetabDiabetes  [address] [place] [address]  Phone: [phone]  Fax: [phone]  Follow Up Time: 1 week   Ovidio Cohn  INTERNAL MEDICINE  [address] [place] [address]  Phone: [phone]  Fax: [phone]  Follow Up Time: 1 week    Austin Horn)  Cardiovascular Disease  [address] [facility] [address]  [place] [address]  Phone: [phone]  Fax: [phone]  Follow Up Time: 1 week    Louise Sierra)  EndocrinologyMetabDiabetes  [address] [place] [address]  Phone: [phone]  Fax: [phone]  Follow Up Time: 1 week    Leann Avitia  Primary Care [facility]  Phone: (   )    -  Fax: (   )    -  Follow Up Time:

## 2021-04-01 NOTE — DISCHARGE NOTE PROVIDER - HOSPITAL COURSE
89 year old WC bound female with past medical history of DM (on inhalation insulin Afrezza), GIB, gastric AVM, Hepatitis C, A-Fib (not on AC's), PPM, glaucoma, Grave's disease, HTN and heart failure who presented to the ED with c/o dizziness and impaired coordination. 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 Patient was empirically started on antibiotics for PNA on CXR- no leukocytosis or fevers, BNP > 11,000, CT Chest non-contrast confirms pulmonary edema for which she was given Lasix. Procalcitonin < 0.25 so antibiotics discontinued. TSH noted to be elevated at 9.31, per her endocrinologist, Dr. Sierra, thyroid function tests normal during prior visits, is not on methimazole for Grave's disease. Repeated TSH was WNL, no needs medical intervention at this time, adivsed to have f/u visit in 2 months.  CT chest noted calcifications on thyroid so US ordered and showed [id number]     Thrombocytopenia noted on CBC, heme/onc following, likely due to hx of Hep C,  LFT's normal, monitor CBC, stable throughout the hospital stay. Echocardiogram  reviewed , carotid dopplers normal. Electrophysiologist Dr. Ochoa consulted for PPM, interrogated and showed atrial tachy thus Amiodarone loading regimen started and pt tolerated well.  Cardiology Dr. Horn consulted, gentle diuresis during this admission which pt responded well on regimen, rate controlled.     During this admission pt was agitated/ physically aggressive towards others, started zyprexa and psych followed, cognition improved later on admission, will d/bessy home on ---xxxxxx as psych recommended     Pt recommended home with home PT.  Pt tolerated amiodarone and PPM functioning well, thus discussed with attending and interdisciplinary team and decision was made for discharge.     Please refer to Pt's complete medical chart with documents for a full hospital course, for this is only a brief summary.\ 89 year old WC bound female with past medical history of DM (on inhalation insulin Afrezza), GIB, gastric AVM, Hepatitis C, A-Fib (not on AC's), PPM, glaucoma, Grave's disease, HTN and heart failure who presented to the ED with c/o dizziness and impaired coordination. 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 Patient was empirically started on antibiotics for PNA on CXR- no leukocytosis or fevers, BNP > 11,000, CT Chest non-contrast confirms pulmonary edema for which she was given Lasix. Procalcitonin < 0.25 so antibiotics discontinued. TSH noted to be elevated at 9.31, per her endocrinologist, Dr. Sierra, thyroid function tests normal during prior visits, is not on methimazole for Grave's disease. Repeated TSH was WNL, no needs medical intervention at this time, adivsed to have f/u visit in 2 months.  CT chest noted calcifications on thyroid so US ordered and showed   Dominant thyroid nodule on the left mid to lower pole with foci of calcification. This is a moderately suspicious TI-RADS level 4 nodule and fine-needle aspiration is recommended.       Thrombocytopenia noted on CBC, heme/onc following, likely due to hx of Hep C,  LFT's normal, monitor CBC, stable throughout the hospital stay. Echocardiogram  reviewed , carotid dopplers normal. Electrophysiologist Dr. Ochoa consulted for PPM, interrogated and showed atrial tachy thus Amiodarone loading regimen started and pt tolerated well.  Cardiology Dr. Horn consulted, gentle diuresis during this admission which pt responded well on regimen, rate controlled.     During this admission pt was agitated/ physically aggressive towards others, started zyprexa and psych followed, cognition improved later on admission, will d/bessy home on ---xxxxxx as psych recommended     Pt recommended home with home PT.  Pt tolerated amiodarone and PPM functioning well, thus discussed with attending and interdisciplinary team and decision was made for discharge.     Please refer to Pt's complete medical chart with documents for a full hospital course, for this is only a brief summary.        <<<<INCOMPLETE>>>> 89 year old WC bound female with past medical history of DM type 2 (on inhalation insulin Afrezza), GIB, gastric AVM, Hepatitis C, A-Fib (not on AC), PPM, glaucoma, Grave's disease, HTN and heart failure who presented to the ED with c/o dizziness and impaired coordination. 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 Patient was empirically started on antibiotics for PNA on CXR- no leukocytosis or fevers, BNP > 11,000, CT Chest non-contrast confirms pulmonary edema for which she was given Lasix. Procalcitonin < 0.25 so antibiotics discontinued. TSH noted to be elevated at 9.31, per her endocrinologist, Dr. Sierra, thyroid function tests normal during prior visits, is not on methimazole for Grave's disease. Repeated TSH was WNL, no needs medical intervention at this time, adivsed to have f/u visit in 2 months.  CT chest noted calcifications on thyroid so US ordered and showed dominant thyroid nodule on the left mid to lower pole with foci of calcification. This is a moderately suspicious TI-RADS level 4 nodule and fine-needle aspiration was recommended.       Thrombocytopenia noted on CBC, heme/onc following, likely due to hx of Hep C,  LFT's normal, monitor CBC, stable throughout the hospital stay. Echocardiogram  reviewed , carotid dopplers normal. Electrophysiologist Dr. Ochoa consulted for PPM, interrogated and showed atrial tachy thus Amiodarone loading regimen started and pt tolerated well.  Cardiology Dr. Horn consulted, gentle diuresis during this admission which pt responded well on regimen, rate controlled.     During this admission pt was agitated/ physically aggressive towards others, started zyprexa and psych followed, cognition improved later on admission, will be dischargeded  home on  zyprexa.    Pt recommended home with home PT.  Pt tolerated amiodarone and PPM functioning well, thus discussed with attending and interdisciplinary team and decision was made for discharge.     Please refer to Pt's complete medical chart with documents for a full hospital course, for this is only a brief summary.

## 2021-04-01 NOTE — PROGRESS NOTE ADULT - PROBLEM SELECTOR PLAN 4
TSH > 9  normal T4, T3  thyroid with calcifications, f/u thyroid US - unable today, due to behavioral disturbance   Endocrinology Dr. Sierra TSH > 9  normal T4, T3  thyroid with calcifications, f/u thyroid US - performed pending result   Endocrinology Dr. Sierra - no medical intervention as her TSH improved to Normal range today, advised to have f/u evaluation in 2 months, per MARK Raines, pt has endo f/u appt in May/2021

## 2021-04-01 NOTE — PROGRESS NOTE ADULT - PROBLEM SELECTOR PLAN 10
pending thyroid US  pending speech and swallow evaluation  pending endo recc pending Cardio recc for lasix on disch   likely home with home PT tomorrow

## 2021-04-01 NOTE — PROGRESS NOTE ADULT - PROBLEM SELECTOR PLAN 5
not in A-Fib on telemetry  ASA  c/w metoprolol   not on AC's  continue telemetry  per EP: Telemetry findings consistent with ventricular pacemaker tracking of the underlying atrial rate  PPM: EP MD Ochoa to interrogate today   amiodarone loading dose will start today not in A-Fib on telemetry  ASA  c/w metoprolol   not on AC's  continue telemetry  per EP: Telemetry findings consistent with ventricular pacemaker tracking of the underlying atrial rate  PPM: EP MD Ochoa to interrogate 3/31   amiodarone loading dose started yesterday, continue tx

## 2021-04-01 NOTE — PROGRESS NOTE BEHAVIORAL HEALTH - NSBHCONSULTMEDPRNREASON_PSY_A_CORE
anxiety.../agitation...
Alert and oriented to person, place and time, memory intact, behavior appropriate to situation, PERRL.

## 2021-04-02 ENCOUNTER — TRANSCRIPTION ENCOUNTER (OUTPATIENT)
Age: 86
End: 2021-04-02

## 2021-04-02 VITALS
DIASTOLIC BLOOD PRESSURE: 84 MMHG | OXYGEN SATURATION: 97 % | TEMPERATURE: 98 F | SYSTOLIC BLOOD PRESSURE: 153 MMHG | RESPIRATION RATE: 17 BRPM | HEART RATE: 78 BPM

## 2021-04-02 DIAGNOSIS — I47.1 SUPRAVENTRICULAR TACHYCARDIA: ICD-10-CM

## 2021-04-02 LAB
ANION GAP SERPL CALC-SCNC: 8 MMOL/L — SIGNIFICANT CHANGE UP (ref 5–17)
BUN SERPL-MCNC: 33 MG/DL — HIGH (ref 7–18)
CALCIUM SERPL-MCNC: 8.9 MG/DL — SIGNIFICANT CHANGE UP (ref 8.4–10.5)
CHLORIDE SERPL-SCNC: 104 MMOL/L — SIGNIFICANT CHANGE UP (ref 96–108)
CO2 SERPL-SCNC: 27 MMOL/L — SIGNIFICANT CHANGE UP (ref 22–31)
CREAT SERPL-MCNC: 1.25 MG/DL — SIGNIFICANT CHANGE UP (ref 0.5–1.3)
GLUCOSE BLDC GLUCOMTR-MCNC: 106 MG/DL — HIGH (ref 70–99)
GLUCOSE BLDC GLUCOMTR-MCNC: 149 MG/DL — HIGH (ref 70–99)
GLUCOSE BLDC GLUCOMTR-MCNC: 245 MG/DL — HIGH (ref 70–99)
GLUCOSE SERPL-MCNC: 125 MG/DL — HIGH (ref 70–99)
HCT VFR BLD CALC: 35 % — SIGNIFICANT CHANGE UP (ref 34.5–45)
HGB BLD-MCNC: 11.8 G/DL — SIGNIFICANT CHANGE UP (ref 11.5–15.5)
MCHC RBC-ENTMCNC: 30.2 PG — SIGNIFICANT CHANGE UP (ref 27–34)
MCHC RBC-ENTMCNC: 33.7 GM/DL — SIGNIFICANT CHANGE UP (ref 32–36)
MCV RBC AUTO: 89.5 FL — SIGNIFICANT CHANGE UP (ref 80–100)
NRBC # BLD: 0 /100 WBCS — SIGNIFICANT CHANGE UP (ref 0–0)
PLATELET # BLD AUTO: 90 K/UL — LOW (ref 150–400)
POTASSIUM SERPL-MCNC: 4 MMOL/L — SIGNIFICANT CHANGE UP (ref 3.5–5.3)
POTASSIUM SERPL-SCNC: 4 MMOL/L — SIGNIFICANT CHANGE UP (ref 3.5–5.3)
RBC # BLD: 3.91 M/UL — SIGNIFICANT CHANGE UP (ref 3.8–5.2)
RBC # FLD: 14.1 % — SIGNIFICANT CHANGE UP (ref 10.3–14.5)
SODIUM SERPL-SCNC: 139 MMOL/L — SIGNIFICANT CHANGE UP (ref 135–145)
WBC # BLD: 6.58 K/UL — SIGNIFICANT CHANGE UP (ref 3.8–10.5)
WBC # FLD AUTO: 6.58 K/UL — SIGNIFICANT CHANGE UP (ref 3.8–10.5)

## 2021-04-02 PROCEDURE — 93005 ELECTROCARDIOGRAM TRACING: CPT

## 2021-04-02 PROCEDURE — 84484 ASSAY OF TROPONIN QUANT: CPT

## 2021-04-02 PROCEDURE — 93880 EXTRACRANIAL BILAT STUDY: CPT

## 2021-04-02 PROCEDURE — 76536 US EXAM OF HEAD AND NECK: CPT

## 2021-04-02 PROCEDURE — 36415 COLL VENOUS BLD VENIPUNCTURE: CPT

## 2021-04-02 PROCEDURE — 87635 SARS-COV-2 COVID-19 AMP PRB: CPT

## 2021-04-02 PROCEDURE — 80053 COMPREHEN METABOLIC PANEL: CPT

## 2021-04-02 PROCEDURE — 96374 THER/PROPH/DIAG INJ IV PUSH: CPT

## 2021-04-02 PROCEDURE — 71045 X-RAY EXAM CHEST 1 VIEW: CPT

## 2021-04-02 PROCEDURE — 84145 PROCALCITONIN (PCT): CPT

## 2021-04-02 PROCEDURE — 93306 TTE W/DOPPLER COMPLETE: CPT

## 2021-04-02 PROCEDURE — 82746 ASSAY OF FOLIC ACID SERUM: CPT

## 2021-04-02 PROCEDURE — 92610 EVALUATE SWALLOWING FUNCTION: CPT

## 2021-04-02 PROCEDURE — 83880 ASSAY OF NATRIURETIC PEPTIDE: CPT

## 2021-04-02 PROCEDURE — 97161 PT EVAL LOW COMPLEX 20 MIN: CPT

## 2021-04-02 PROCEDURE — 70450 CT HEAD/BRAIN W/O DYE: CPT

## 2021-04-02 PROCEDURE — 71250 CT THORAX DX C-: CPT

## 2021-04-02 PROCEDURE — 84436 ASSAY OF TOTAL THYROXINE: CPT

## 2021-04-02 PROCEDURE — 84100 ASSAY OF PHOSPHORUS: CPT

## 2021-04-02 PROCEDURE — 82550 ASSAY OF CK (CPK): CPT

## 2021-04-02 PROCEDURE — 99285 EMERGENCY DEPT VISIT HI MDM: CPT | Mod: 25

## 2021-04-02 PROCEDURE — 85025 COMPLETE CBC W/AUTO DIFF WBC: CPT

## 2021-04-02 PROCEDURE — 87040 BLOOD CULTURE FOR BACTERIA: CPT

## 2021-04-02 PROCEDURE — 82962 GLUCOSE BLOOD TEST: CPT

## 2021-04-02 PROCEDURE — 83735 ASSAY OF MAGNESIUM: CPT

## 2021-04-02 PROCEDURE — 83036 HEMOGLOBIN GLYCOSYLATED A1C: CPT

## 2021-04-02 PROCEDURE — 80048 BASIC METABOLIC PNL TOTAL CA: CPT

## 2021-04-02 PROCEDURE — 86769 SARS-COV-2 COVID-19 ANTIBODY: CPT

## 2021-04-02 PROCEDURE — 80061 LIPID PANEL: CPT

## 2021-04-02 PROCEDURE — 82306 VITAMIN D 25 HYDROXY: CPT

## 2021-04-02 PROCEDURE — 99232 SBSQ HOSP IP/OBS MODERATE 35: CPT

## 2021-04-02 PROCEDURE — 84480 ASSAY TRIIODOTHYRONINE (T3): CPT

## 2021-04-02 PROCEDURE — 82607 VITAMIN B-12: CPT

## 2021-04-02 PROCEDURE — 85652 RBC SED RATE AUTOMATED: CPT

## 2021-04-02 PROCEDURE — 82728 ASSAY OF FERRITIN: CPT

## 2021-04-02 PROCEDURE — 83605 ASSAY OF LACTIC ACID: CPT

## 2021-04-02 PROCEDURE — 85027 COMPLETE CBC AUTOMATED: CPT

## 2021-04-02 PROCEDURE — 86738 MYCOPLASMA ANTIBODY: CPT

## 2021-04-02 PROCEDURE — 84443 ASSAY THYROID STIM HORMONE: CPT

## 2021-04-02 PROCEDURE — 96375 TX/PRO/DX INJ NEW DRUG ADDON: CPT

## 2021-04-02 RX ORDER — LISINOPRIL 2.5 MG/1
1 TABLET ORAL
Qty: 30 | Refills: 0
Start: 2021-04-02 | End: 2021-05-01

## 2021-04-02 RX ORDER — FUROSEMIDE 40 MG
40 TABLET ORAL DAILY
Refills: 0 | Status: DISCONTINUED | OUTPATIENT
Start: 2021-04-02 | End: 2021-04-02

## 2021-04-02 RX ORDER — OLANZAPINE 15 MG/1
2.5 TABLET, FILM COATED ORAL DAILY
Refills: 0 | Status: DISCONTINUED | OUTPATIENT
Start: 2021-04-02 | End: 2021-04-02

## 2021-04-02 RX ORDER — OLANZAPINE 15 MG/1
1 TABLET, FILM COATED ORAL
Qty: 30 | Refills: 0
Start: 2021-04-02 | End: 2021-05-01

## 2021-04-02 RX ORDER — METOPROLOL TARTRATE 50 MG
1 TABLET ORAL
Qty: 60 | Refills: 0
Start: 2021-04-02 | End: 2021-05-01

## 2021-04-02 RX ORDER — AMIODARONE HYDROCHLORIDE 400 MG/1
2 TABLET ORAL
Qty: 12 | Refills: 0
Start: 2021-04-02 | End: 2021-04-03

## 2021-04-02 RX ORDER — ATORVASTATIN CALCIUM 80 MG/1
1 TABLET, FILM COATED ORAL
Qty: 30 | Refills: 0
Start: 2021-04-02 | End: 2021-05-01

## 2021-04-02 RX ORDER — METOPROLOL TARTRATE 50 MG
1 TABLET ORAL
Qty: 0 | Refills: 0 | DISCHARGE

## 2021-04-02 RX ORDER — FUROSEMIDE 40 MG
1 TABLET ORAL
Qty: 30 | Refills: 0
Start: 2021-04-02 | End: 2021-05-01

## 2021-04-02 RX ORDER — OLANZAPINE 15 MG/1
5 TABLET, FILM COATED ORAL AT BEDTIME
Refills: 0 | Status: DISCONTINUED | OUTPATIENT
Start: 2021-04-02 | End: 2021-04-02

## 2021-04-02 RX ORDER — PANTOPRAZOLE SODIUM 20 MG/1
1 TABLET, DELAYED RELEASE ORAL
Qty: 30 | Refills: 0
Start: 2021-04-02 | End: 2021-05-01

## 2021-04-02 RX ADMIN — Medication 2: at 12:32

## 2021-04-02 RX ADMIN — OLANZAPINE 2.5 MILLIGRAM(S): 15 TABLET, FILM COATED ORAL at 14:39

## 2021-04-02 RX ADMIN — Medication 40 MILLIGRAM(S): at 05:33

## 2021-04-02 RX ADMIN — OLANZAPINE 2.5 MILLIGRAM(S): 15 TABLET, FILM COATED ORAL at 05:33

## 2021-04-02 RX ADMIN — AMIODARONE HYDROCHLORIDE 400 MILLIGRAM(S): 400 TABLET ORAL at 14:37

## 2021-04-02 RX ADMIN — Medication 25 MILLIGRAM(S): at 05:33

## 2021-04-02 RX ADMIN — PANTOPRAZOLE SODIUM 40 MILLIGRAM(S): 20 TABLET, DELAYED RELEASE ORAL at 05:33

## 2021-04-02 RX ADMIN — AMIODARONE HYDROCHLORIDE 400 MILLIGRAM(S): 400 TABLET ORAL at 05:33

## 2021-04-02 RX ADMIN — LISINOPRIL 10 MILLIGRAM(S): 2.5 TABLET ORAL at 05:33

## 2021-04-02 NOTE — PROGRESS NOTE ADULT - SUBJECTIVE AND OBJECTIVE BOX
alert, not in distress, no fever or chills      ROS:  Negative except for:    MEDICATIONS  (STANDING):  aMIOdarone    Tablet   Oral   aMIOdarone    Tablet 400 milliGRAM(s) Oral every 8 hours  aspirin enteric coated 81 milliGRAM(s) Oral daily  atorvastatin 20 milliGRAM(s) Oral at bedtime  dextrose 40% Gel 15 Gram(s) Oral once  dextrose 5% + sodium chloride 0.45%. 1000 milliLiter(s) (60 mL/Hr) IV Continuous <Continuous>  dextrose 5%. 1000 milliLiter(s) (50 mL/Hr) IV Continuous <Continuous>  furosemide    Tablet 40 milliGRAM(s) Oral daily  glucagon  Injectable 1 milliGRAM(s) IntraMuscular once  insulin lispro (ADMELOG) corrective regimen sliding scale   SubCutaneous three times a day before meals  insulin lispro (ADMELOG) corrective regimen sliding scale   SubCutaneous at bedtime  latanoprost 0.005% Ophthalmic Solution 1 Drop(s) Both EYES at bedtime  lisinopril 10 milliGRAM(s) Oral daily  metoprolol tartrate 25 milliGRAM(s) Oral two times a day  mirtazapine 15 milliGRAM(s) Oral at bedtime  OLANZapine 2.5 milliGRAM(s) Oral daily  OLANZapine 5 milliGRAM(s) Oral at bedtime  pantoprazole    Tablet 40 milliGRAM(s) Oral before breakfast    MEDICATIONS  (PRN):      Allergies    No Known Allergies    Intolerances        Vital Signs Last 24 Hrs  T(C): 36.4 (02 Apr 2021 13:30), Max: 36.8 (02 Apr 2021 02:11)  T(F): 97.5 (02 Apr 2021 13:30), Max: 98.3 (02 Apr 2021 02:11)  HR: 78 (02 Apr 2021 13:30) (78 - 91)  BP: 153/84 (02 Apr 2021 13:30) (138/71 - 165/97)  BP(mean): --  RR: 17 (02 Apr 2021 13:30) (17 - 18)  SpO2: 97% (02 Apr 2021 13:30) (96% - 99%)    PHYSICAL EXAM  General: adult in NAD  HEENT: clear oropharynx, anicteric sclera, pink conjunctiva  Neck: supple  CV: normal S1/S2 with no murmur rubs or gallops  Lungs: positive air movement b/l ant lungs,clear to auscultation, no wheezes, no rales  Abdomen: soft non-tender non-distended, no hepatosplenomegaly  Ext: no clubbing cyanosis or edema  Skin: no rashes and no petechiae  Neuro: alert and oriented X 4, no focal deficits  LABS:                          11.8   6.58  )-----------( 90       ( 02 Apr 2021 07:31 )             35.0         Mean Cell Volume : 89.5 fl  Mean Cell Hemoglobin : 30.2 pg  Mean Cell Hemoglobin Concentration : 33.7 gm/dL  Auto Neutrophil # : x  Auto Lymphocyte # : x  Auto Monocyte # : x  Auto Eosinophil # : x  Auto Basophil # : x  Auto Neutrophil % : x  Auto Lymphocyte % : x  Auto Monocyte % : x  Auto Eosinophil % : x  Auto Basophil % : x    Serial CBC  Hematocrit 35.0  Hemoglobin 11.8  Plat 90  RBC 3.91  WBC 6.58  Serial CBC  Hematocrit 36.4  Hemoglobin 12.0  Plat 90  RBC 3.95  WBC 8.27  Serial CBC  Hematocrit 35.2  Hemoglobin 11.9  Plat 99  RBC 3.84  WBC 8.21    04-02    139  |  104  |  33<H>  ----------------------------<  125<H>  4.0   |  27  |  1.25    Ca    8.9      02 Apr 2021 07:31            Ferritin, Serum: 279 ng/mL (03-30 @ 10:19)  Folate, Serum: 13.5 ng/mL (03-30 @ 10:19)  Vitamin B12, Serum: 937 pg/mL (03-30 @ 10:19)            BLOOD SMEAR INTERPRETATION:       RADIOLOGY & ADDITIONAL STUDIES:

## 2021-04-02 NOTE — PROGRESS NOTE ADULT - SUBJECTIVE AND OBJECTIVE BOX
Interval Events:  pt in nad    Allergies    No Known Allergies    Intolerances      Endocrine/Metabolic Medications:  atorvastatin 20 milliGRAM(s) Oral at bedtime  dextrose 40% Gel 15 Gram(s) Oral once  glucagon  Injectable 1 milliGRAM(s) IntraMuscular once  insulin lispro (ADMELOG) corrective regimen sliding scale   SubCutaneous three times a day before meals  insulin lispro (ADMELOG) corrective regimen sliding scale   SubCutaneous at bedtime      Vital Signs Last 24 Hrs  T(C): 36.7 (02 Apr 2021 05:03), Max: 36.9 (01 Apr 2021 13:56)  T(F): 98.1 (02 Apr 2021 05:03), Max: 98.4 (01 Apr 2021 13:56)  HR: 80 (02 Apr 2021 05:03) (80 - 92)  BP: 140/71 (02 Apr 2021 05:03) (138/71 - 165/97)  BP(mean): --  RR: 18 (02 Apr 2021 05:03) (18 - 18)  SpO2: 99% (02 Apr 2021 05:03) (96% - 99%)      PHYSICAL EXAM  All physical exam findings normal, except those marked:  General:	Alert, active, cooperative, NAD, well hydrated  .		[] Abnormal:  Neck		Normal: supple, no cervical adenopathy, no palpable thyroid  .		[] Abnormal:  Cardiovascular	Normal: regular rate, normal S1, S2, no murmurs  .		[] Abnormal:  Respiratory	Normal: no chest wall deformity, normal respiratory pattern, CTA B/L  .		[] Abnormal:  Abdominal	Normal: soft, ND, NT, bowel sounds present, no masses, no organomegaly  .		[] Abnormal:  		Normal normal genitalia, testes descended, circumcised/uncircumcised  .		Sonja stage:			Breast sonja:  .		Menstrual history:  .		[] Abnormal:  Extremities	Normal: FROM x4  .		[] Abnormal:  Skin		Normal: intact and not indurated, no rash, no acanthosis nigricans  .		[] Abnormal:  Neurologic	Normal: grossly intact  .		[] Abnormal:    LABS                        11.8   6.58  )-----------( 90       ( 02 Apr 2021 07:31 )             35.0                               139    |  104    |  33                  Calcium: 8.9   / iCa: x      (04-02 @ 07:31)    ----------------------------<  125       Magnesium: x                                4.0     |  27     |  1.25             Phosphorous: x          CAPILLARY BLOOD GLUCOSE      POCT Blood Glucose.: 245 mg/dL (02 Apr 2021 11:41)  POCT Blood Glucose.: 149 mg/dL (02 Apr 2021 08:08)  POCT Blood Glucose.: 157 mg/dL (01 Apr 2021 22:31)  POCT Blood Glucose.: 106 mg/dL (01 Apr 2021 16:36)        Assesment/plan    89 y.o. female with h/o NIDDM, pt uses inhalation insulin Afrezza, last dose this am, wheelchair bound, as per daughter in law, pt c/o feeling nausea for past 7 days, dizziness, vertigo and increase in ataxia. Pt with hx of Graves on no meds        Problem/Recommendation - 1:  Problem: Graves disease. Recommendation: past hx of graves   currently euthyroid  no intervention  check tsi level.  f/u tfts as out pt as pt started on amiodorone      Problem/Recommendation - 2:  ·  Problem: Diabetes mellitus.  Recommendation: Good control  on pre meal afrezza as out pt  cont low dose correction doses  fsg ac and hs.      Problem/Recommendation - 3:  ·  Problem: Atrial fibrillation.  Recommendation: on amiodorone per cardio  risk of hyperthyroidism d/w prim team  f/u tfts closely

## 2021-04-02 NOTE — PROGRESS NOTE ADULT - SUBJECTIVE AND OBJECTIVE BOX
Patient denies chest pain or shortness of breath.   Confused Review of systems otherwise (-)  	  aMIOdarone    Tablet   Oral   aMIOdarone    Tablet 400 milliGRAM(s) Oral every 8 hours  aspirin enteric coated 81 milliGRAM(s) Oral daily  atorvastatin 20 milliGRAM(s) Oral at bedtime  dextrose 40% Gel 15 Gram(s) Oral once  dextrose 5% + sodium chloride 0.45%. 1000 milliLiter(s) IV Continuous <Continuous>  dextrose 5%. 1000 milliLiter(s) IV Continuous <Continuous>  furosemide    Tablet 40 milliGRAM(s) Oral daily  glucagon  Injectable 1 milliGRAM(s) IntraMuscular once  insulin lispro (ADMELOG) corrective regimen sliding scale   SubCutaneous three times a day before meals  insulin lispro (ADMELOG) corrective regimen sliding scale   SubCutaneous at bedtime  latanoprost 0.005% Ophthalmic Solution 1 Drop(s) Both EYES at bedtime  lisinopril 10 milliGRAM(s) Oral daily  metoprolol tartrate 25 milliGRAM(s) Oral two times a day  mirtazapine 15 milliGRAM(s) Oral at bedtime  OLANZapine 2.5 milliGRAM(s) Oral daily  OLANZapine 5 milliGRAM(s) Oral at bedtime  pantoprazole    Tablet 40 milliGRAM(s) Oral before breakfast                            11.8   6.58  )-----------( 90       ( 02 Apr 2021 07:31 )             35.0       Hemoglobin: 11.8 g/dL (04-02 @ 07:31)  Hemoglobin: 12.0 g/dL (04-01 @ 06:04)  Hemoglobin: 11.9 g/dL (03-30 @ 07:07)  Hemoglobin: 13.0 g/dL (03-29 @ 14:38)      04-02    139  |  104  |  33<H>  ----------------------------<  125<H>  4.0   |  27  |  1.25    Ca    8.9      02 Apr 2021 07:31      Creatinine Trend: 1.25<--, 1.04<--, 0.91<--, 0.97<--    COAGS:           T(C): 36.7 (04-02-21 @ 05:03), Max: 36.9 (04-01-21 @ 13:56)  HR: 80 (04-02-21 @ 05:03) (80 - 92)  BP: 140/71 (04-02-21 @ 05:03) (138/71 - 165/97)  RR: 18 (04-02-21 @ 05:03) (18 - 18)  SpO2: 99% (04-02-21 @ 05:03) (96% - 99%)  Wt(kg): --    I&O's Summary        HEENT:  (-)icterus (-)pallor  CV: N S1 S2 1/6 ROLO (+)2 Pulses B/l  Resp:  Clear to ausculatation B/L, normal effort  GI: (+) BS Soft, NT, ND  Lymph:  (-)Edema, (-)obvious lymphadenopathy  Skin: Warm to touch, Normal turgor  Psych: Appropriate mood and affect      TELEMETRY: 	  Sinus, ? episodes of AT          ASSESSMENT/PLAN: 	89y  Female DM, on insulin  wheelchair bound, as per daughter in law, pt c/o feeling nausea for past 7 days, dizziness, vertigo and increase in ataxia.     - echo noted, Mild segmental LV dysfx, given advance age and dementia will pursue medical management of CAD.  Cont asa, statin bb  - EP porter lappreciated, frequent atrial arrhythmia on interrogation, will attempt rhythm control with amio  - Change Lasix to 40 mg PO daily upon D/C    Austin Horn MD, [facility]  BEEPER [phone]

## 2021-04-02 NOTE — DISCHARGE NOTE NURSING/CASE MANAGEMENT/SOCIAL WORK - PATIENT PORTAL LINK FT
You can access the FollowMyHealth Patient Portal offered by [facility] by registering at the following website: [email or web address]. By joining [facility]’s FollowMyHealth portal, you will also be able to view your health information using other applications (apps) compatible with our system.

## 2021-04-02 NOTE — PROGRESS NOTE ADULT - REASON FOR ADMISSION
Hypertensive urgency

## 2021-04-02 NOTE — PROGRESS NOTE BEHAVIORAL HEALTH - NSBHCHARTREVIEWLAB_PSY_A_CORE FT
11.8   6.58  )-----------( 90       ( 02 Apr 2021 07:31 )             35.0   04-02    139  |  104  |  33<H>  ----------------------------<  125<H>  4.0   |  27  |  1.25    Ca    8.9      02 Apr 2021 07:31

## 2021-04-02 NOTE — PROGRESS NOTE ADULT - SUBJECTIVE AND OBJECTIVE BOX
HISTORY OF PRESENT ILLNESS: HPI:  89 y.o. female with h/o NIDDM, pt uses inhalation insulin Afrezza, last dose this am, wheelchair bound, as per daughter in law, pt c/o feeling nausea for past 7 days, dizziness, vertigo and increase in ataxia. Patient is not a good historian and very soft spoken, She is AAO 2 (oriented to self and place only) collateral history is obtained from Daughter in law who states that patient lives alone with 24/7 HHA and from last few days she is having high blood pressure. Patient also endorses nausea and appetite loss. Patient states that whenever she tries to eat she becomes nauseous. She also reports some dry cough and occasional chest pain , abdominal pain, constipation (Not present currently)   Pt received J&J vaccine last week.   Patient denies SOB, urinary problems, headaches, dizziness, diplopia, focal weakness, swallowing difficulty.    Off Record, Daughter in law Bernard Mohan is the POC     Overall, the patient does not know why she is here in the hospital when I speak to her. She is upset that her family is not present, and was not there at home for her.  This is notable different from history taken above by the ED.  She knows that she has a pacemaker, does not know what brand, who implanted it, or for what indication.  Per chart review, there was an implant in 2001 and it is unclear if the generator has been replaced since then.  EP was called specifically for abnormal findings on telemetry.  The patient's ROS is negative x 10 systems now.  Review of records and discussion w/ ED team re: possible pneumonia and definite right pleural effusion on CT scan.    3/31- resting comfortably in bed, was moved to behavioral monitoring room overnight due to being combative or disruptive.  Medtronic pacemaker checked via [employer] at bedside.  No complaints today.  4/1- resting comfortably, no acute distress, does not know why she is here.  4/2- unchanged from prior. telemetry with change in rate from 100 to 70s-80s with some variability.  unable to see P-waves on telemetry.    aMIOdarone    Tablet   Oral   aMIOdarone    Tablet 400 milliGRAM(s) Oral every 8 hours  aspirin enteric coated 81 milliGRAM(s) Oral daily  atorvastatin 20 milliGRAM(s) Oral at bedtime  dextrose 40% Gel 15 Gram(s) Oral once  dextrose 5% + sodium chloride 0.45%. 1000 milliLiter(s) IV Continuous <Continuous>  dextrose 5%. 1000 milliLiter(s) IV Continuous <Continuous>  furosemide   Injectable 40 milliGRAM(s) IV Push daily  glucagon  Injectable 1 milliGRAM(s) IntraMuscular once  insulin lispro (ADMELOG) corrective regimen sliding scale   SubCutaneous three times a day before meals  insulin lispro (ADMELOG) corrective regimen sliding scale   SubCutaneous at bedtime  latanoprost 0.005% Ophthalmic Solution 1 Drop(s) Both EYES at bedtime  lisinopril 10 milliGRAM(s) Oral daily  metoprolol tartrate 25 milliGRAM(s) Oral two times a day  mirtazapine 15 milliGRAM(s) Oral at bedtime  OLANZapine 2.5 milliGRAM(s) Oral every 8 hours  pantoprazole    Tablet 40 milliGRAM(s) Oral before breakfast                            11.8   6.58  )-----------( 90       ( 02 Apr 2021 07:31 )             35.0       04-02    139  |  104  |  33<H>  ----------------------------<  125<H>  4.0   |  27  |  1.25    Ca    8.9      02 Apr 2021 07:31      T(C): 36.7 (04-02-21 @ 05:03), Max: 36.9 (04-01-21 @ 13:56)  HR: 80 (04-02-21 @ 05:03) (80 - 94)  BP: 140/71 (04-02-21 @ 05:03) (138/71 - 165/97)  RR: 18 (04-02-21 @ 05:03) (18 - 18)  SpO2: 99% (04-02-21 @ 05:03) (96% - 99%)  Wt(kg): --    I&O's Summary    Appearance: Pleasantly confused frail elderly woman in no acute distress, lying flat.	  HEENT:   Normal oral mucosa, PERRL, EOMI	  Lymphatic: No lymphadenopathy , no edema  Cardiovascular: Normal S1 S2,  ++JVD, No murmurs , Peripheral pulses palpable 2+ bilaterally.  Pacemaker left upper chest.  Respiratory: Lungs clear to auscultation, except for poor air entry in right base, poor overall effort 	  Gastrointestinal:  Soft, Non-tender, + BS	  Skin: No rashes, No ecchymoses, No cyanosis, warm to touch  Musculoskeletal: Normal range of motion, normal strength  Psychiatry:  Mood & affect appropriate    TELEMETRY: 2:1 AT, ventricular rate of 100bpm --> 80-90bpm but unable to see atrial activity on telemetry.    ECG:  NSR  XRAy:  dual chamber pacemaker with leads in appropriate appearing position.  AP projection expands the heart borders, but the overall silhouette may be enlarged.  There is cranial angulation making the generator appear to sit lower on the chest wall and may make the RLL effusion appear larger.      CT chest notable for RLL effusion and significant cardiac bi-atrial enlargement.  pacemaker leads seen.  Significant MV calcification.    Pacemaker check:  Medtronic pacemaker with ~4 yrs battery life remaining.  In good working order, with normal sensing and pacing parameters.  Arrhythmia log shows frequent / incessant runs of atrial tachycardia, often >20hrs/day on many days, with intermittent sinus rhythm.  	  Echo: EF 45-50%, LVH. Moderate MR. Severe LA enlargement.  Moderate PHTN.  The Sun Valve is very calcified. The basal and mid inferolateral walls are hypokinetic.    ASSESSMENT/PLAN: 	89y Female, ? dementia, with signs/symptoms of CHF in the setting of hypertensive emergency, pending admission.  Thrombocytopenia noted.      Given CHF, and structural heart disease (LVH, functionally abnormal Mitral valve), she will feel better and have less CHF symptoms in regular rhythm and at slower heart rates.    Recommend Amiodarone load, 400mg PO TID x 12 doses, followed by 200mg daily.    Pacemaker will prevent bradycardia.  Can finish Amiodarone load as outpatient.    Followup with her pacemaker-implanting cardiologist.    Pérez Ochoa M.D.  Cardiac Electrophysiology    office [phone]  pager [phone]

## 2021-04-02 NOTE — PROGRESS NOTE BEHAVIORAL HEALTH - NSBHCONSULTRECOMMENDOTHER_PSY_A_CORE FT
1. Change Zyprexa scheduling to 2.5 mg PO qam/5 mg PO qhs standing to address reported daily pattern of agitation at home  --Issue 30d Rx for above regimen, which can be continued by PMD at home  2. DC mirtazapine at family request  3. Continue delirium precautions: Frequent reorientation, familiar pictures and objects at bedside, natural light in daytime, consistent sleep/wake schedule, adequate hydration and nutrition, sensory aids (hearing aids, glasses) present if needed, minimizing noise and overstimulation, clustering care to minimize overnight interruptions, judicious use of deliriogenic medications (anticholinergics, benzodiazepines and opioid analgesics), minimize use of restraints  4. Medical management as directed by primary team  5. Pt is psych cleared for D/C home as soon as she is medically optimized  6. Psychiatry is signing off in anticipation of DC. Reconsult if additional issues arise as inpatient  7. Case d/w DALE Koehler of primary team    Sharon Hinds MD  Director, Consultation-Liaison Psychiatry Service  [id number]

## 2021-04-02 NOTE — PROGRESS NOTE BEHAVIORAL HEALTH - NSBHFUPINTERVALHXFT_PSY_A_CORE
Per chart and staff reports, in past 24h pt has been much more cooperative with care and has started accepting PO medications. Pt seen in her room for f/u, found lying in bed awake, alert and in NAD. In contrast with prior encounter, pt is cooperative with MD's interview, describing mood as "much better" and denying SI/HI/AVH. After encounter, MD speaks to MARK who has called nurses' station to discuss recommendations for pt's DC home later today. MARK states that she has spoken to pt on FaceTime and believes she is doing very well on Zyprexa 2.5 mg q8h which was recommended by MD. MD recommends changing dosing schedule to 2.5 mg qam/5 mg qhs when pt goes home, given family reports that pt is frequently more agitated at night, and MARK agrees. MARK expresses preference to DC mirtazapine, which pt was started on last year during admission to [facility], as she believes it has not been very helpful and prefers pt continue on Zyprexa monotherapy. MD agrees to recommend mirtazapine be DC'd.
Interval chart reviewed. Patient currently is on mirtazapine 15mg qHS and Zyprexa 2.5mg q8. Receiving doses of Zyprexa 2.5mg PRN IM as needed. Per RN was sleeping all of yesterday and overnight. Calm, cooperative, eating meals and taking medications. When interviewed on telecart this morning, patient is more awake, states her mood is "good," is AOx2. Looks around the room and endorses not feeling safe. There was no verbal or physical aggression. She denied any AH or VH. She denied any SI/HI.

## 2021-04-02 NOTE — PROGRESS NOTE ADULT - PROVIDER SPECIALTY LIST ADULT
Electrophysiology
Endocrinology
Cardiology
Heme/Onc
Internal Medicine
Internal Medicine
Cardiology
Cardiology
Heme/Onc
Internal Medicine
Internal Medicine
Electrophysiology
Heme/Onc
Electrophysiology
Internal Medicine
Internal Medicine

## 2021-04-02 NOTE — PROGRESS NOTE BEHAVIORAL HEALTH - SUMMARY
89F, lives alone with 24/7 HHA, with PHx of dementia per family (not formally dx) and MHx of DM on inhaled insulin, HTN, Afib (no longer on AC) s/p PM, hep C (no tx) and Graves dz (no tx), BIBEMS on 3/29 c/o nausea, dizziness, vertigo and ataxia and found to have HTN urgency, pleural effusion and suspected PNA. Psych consult was requested for recommendations on behavioral management, as pt has been combative and oppositional with staff, especially since she was moved from ED to . On initial exam, pt is somnolent and refuses to participate, but per collateral from DIL pt has mild dementia and has difficulty coping with unfamiliar surroundings and people. Impr multifactorial delirium superimposed on dementia without behavioral disturbance. On f/u exam today, pt appears to be responding well to Zyprexa 2.5 mg q8h standing which was started on 3/31 for behavioral stabilization. Pt now appears stable for DC home with 24h HHA coverage, and we recommend c/w current TDD of Zyprexa, but with modified scheduling of 2.5 mg qam/5 mg qhs to better address reported daily pattern of agitation at home. Pt is psych cleared for D/C home as soon as she is medically optimized. Pt does not appear to present an acute risk of harm to self or others at the time of assessment, and does not appear to be in need of admission to IP psych at the time of assessment.
89F, lives alone with 24/7 HHA, with PHx of dementia per family (not formally dx) and MHx of DM on inhaled insulin, HTN, Afib (no longer on AC) s/p PM, hep C (no tx) and Graves dz (no tx), BIBEMS on 3/29 c/o nausea, dizziness, vertigo and ataxia and found to have HTN urgency, pleural effusion and suspected PNA. Psych consult was requested for recommendations on behavioral management, as pt has been combative and oppositional with staff, especially since she was moved from ED to .    Today: Overall some improvements in behavior

## 2021-04-03 LAB
CULTURE RESULTS: SIGNIFICANT CHANGE UP
CULTURE RESULTS: SIGNIFICANT CHANGE UP
SPECIMEN SOURCE: SIGNIFICANT CHANGE UP
SPECIMEN SOURCE: SIGNIFICANT CHANGE UP

## 2021-04-05 RX ORDER — AMIODARONE HYDROCHLORIDE 400 MG/1
1 TABLET ORAL
Qty: 30 | Refills: 0
Start: 2021-04-05 | End: 2021-05-04

## 2021-04-21 NOTE — PATIENT PROFILE ADULT - NSPROMUTPARTICIPCAREFT_GEN_A_NUR
pt wants son or daughter in law to answer profile questions due to forgetfulness Consent (Scalp)/Introductory Paragraph: The rationale for Mohs was explained to the patient and consent was obtained. The risks, benefits and alternatives to therapy were discussed in detail. Specifically, the risks of changes in hair growth pattern secondary to repair, infection, scarring, bleeding, prolonged wound healing, incomplete removal, allergy to anesthesia, nerve injury and recurrence were addressed. Prior to the procedure, the treatment site was clearly identified and confirmed by the patient. All components of Universal Protocol/PAUSE Rule completed.

## 2021-05-18 NOTE — ED ADULT TRIAGE NOTE - BSA (M2)
Diego, prescription sent in for tizanidine and methylprednisolone
Dr. Barakat-  See pt's [employer] message. Pt requesting rx for tizanidine.  Please advise.  
1.3

## 2021-07-07 NOTE — ED ADULT NURSE NOTE - NS ED NURSE LEVEL OF CONSCIOUSNESS AFFECT
Subjective   Patient ID: Rika is a 67 year old female who presents for her Welcome to Medicare Wellness Visit.    Chief Complaint   Patient presents with   • Medicare Wellness Visit      MEDICARE WELLNESS.MEDICARE ADVANTAGE         Rika has a past medical history of H/O complete eye exam (2018), H/O mammogram (11/16/2019), and Routine Papanicolaou smear (12/17/2018).    Rika has Welcome to Medicare preventive visit; Trochanteric bursitis of right hip; and Cyst of right eyelid on their problem list.    Rika has a past surgical history that includes Endometrial biopsy.    Rika reports that she has never smoked. She has never used smokeless tobacco. She reports current alcohol use.    Medical and social history reviewed: Yes  Modifiable risk factors for disease detection: none    Her family history includes Alcohol Abuse in her father; Depression in her mother; Hyperthyroid in her niece; Hypothyroid in her mother; Stroke in her father.    Rika   Current Outpatient Medications   Medication Sig Dispense Refill   • ALPRAZolam (XANAX) 0.25 MG tablet Take 1 tablet by mouth nightly as needed for Sleep or Anxiety. 6 tablet 0     No current facility-administered medications for this visit.       [id number] - [place], [place] - [facility] [address] AT [facility]  [address]  [facility] [address]  Phone: [phone] Fax: [phone]      Patient Care Team:  Fallon Mendez MD as PCP - General (Internal Medicine)      Depression PHQ2/9:  Little interest or pleasure in activity?: Not at all  Feeling down, depressed or hopeless?: Not at all  Initial depression screening score:: 0         Depression assessment/plan: Depression screening is negative no further plan needed.     Cognitive/Functional Assessment: no evidence of cognitive dysfunction by direct observation    STEADI-Fall Risk  Assessment of Fall Risk (STEADI) for Patients equal/greater than 18 Years of Age: Yes  I have  fallen in the past year: No  I Use or Have Been Advised to Use a Cane or Walker to Get Around Safely: No  Sometimes I Feel Unsteady When I am Walking: No  I Steady Myself by Holding Onto Furniture When Walking at Home: No  I am Worried About Falling: No  I Need to Push With My Hands to Stand Up from a Chair: No  I Have Some Trouble Stepping Up Onto a Curb: No  I Often Have to Rush to the Toilet: No  I Have Lost Some Feeling in My Feet: No  I Take Medicine That Sometimes Makes Me Feel Lightheaded or More Tired Than Usual: No  I Take Medicine to Help Me Sleep or Improve My Mood: No  I Often Feel Sad or Depressed: No  STEADI Fall Score: 0    Hearing Impairment:   none  Vision/Hearing Screening:   No exam data present     End of life planning discussed after receiving individual consent from the patient: Discussed with patient. Patient understand need and will complete forms.  Forms provided      Body mass index is 25.24 kg/m².  BMI ASSESSMENT/PLAN:  Patient BMI is within normal range.     Needed Screening/Treatment:   Cardiovascular screening - Lipids , Diabetes screening , Annual mammogram , Colorectal cancer screening  and Immunizations reviewed and patient needs: Pneumovax    Needed follow up:  Declines mammogram and colonoscopy; agrees to stool for blood and bone density      Educate, , and referrals, as deemed appropriate, based on the results of the review and evaluation services described in all of the above.          HRA form completed and reviewed with patient  Consultants: none  Depression screen: negative  Hearing screen: normal  Fall risk: none  Activities of daily living: independent  Home safety issues:  none  Cognitive impairment: none  Personal health advice/education:  Referral for identified health risks:  Screening schedule: mammogram 2019 and prefers to not repeat now and will do stool for blood that she has at home  Needs  pneumovax; declines flu    Had pfizer vaccine and no issues    Review  of Systems   Constitutional: Negative for chills, fever and unexpected weight change.   HENT: Negative for ear pain, hearing loss, postnasal drip, trouble swallowing and voice change.         As per HPI   Eyes: Negative for pain, discharge and visual disturbance.   Respiratory: Negative for cough, shortness of breath and wheezing.    Cardiovascular: Negative for chest pain, palpitations and leg swelling.   Gastrointestinal: Negative for abdominal pain, constipation and diarrhea.   Endocrine: Negative for polydipsia and polyuria.   Genitourinary: Negative for frequency, hematuria and menstrual problem.        No breast lump   Musculoskeletal: Negative for arthralgias and back pain.        As per HPI   Skin: Negative for rash.   Allergic/Immunologic: Negative for environmental allergies and food allergies.   Neurological: Negative for dizziness, tremors, speech difficulty, weakness, numbness and headaches.   Hematological: Does not bruise/bleed easily.   Psychiatric/Behavioral: Negative for confusion and suicidal ideas. The patient is not nervous/anxious.        Objective   Vitals: /60   Pulse 84   Temp 98.4 °F (36.9 °C)   Resp 12   Ht 5' 2\" (1.575 m)   Wt 62.6 kg (138 lb)   BMI 25.24 kg/m²   BSA 1.63 m²       Physical Exam  Vitals and nursing note reviewed.   Constitutional:       Appearance: Normal appearance. She is well-developed.   HENT:      Head: Normocephalic and atraumatic.      Right Ear: Hearing, tympanic membrane, ear canal and external ear normal. There is no impacted cerumen.      Left Ear: Hearing, tympanic membrane, ear canal and external ear normal. There is no impacted cerumen.      Nose: Nose normal.      Mouth/Throat:      Mouth: Mucous membranes are moist.      Pharynx: No oropharyngeal exudate.   Eyes:      General: No scleral icterus.        Right eye: No discharge.         Left eye: No discharge.      Conjunctiva/sclera: Conjunctivae normal.   Neck:      Thyroid: No thyromegaly.    Cardiovascular:      Rate and Rhythm: Normal rate and regular rhythm.      Pulses: Normal pulses.      Heart sounds: Normal heart sounds. No murmur heard.     Pulmonary:      Effort: Pulmonary effort is normal. No respiratory distress.      Breath sounds: Normal breath sounds. No wheezing or rales.   Chest:      Breasts:         Right: No inverted nipple or mass.         Left: No inverted nipple or mass.   Abdominal:      General: Bowel sounds are normal.      Palpations: Abdomen is soft. There is no mass.      Tenderness: There is no abdominal tenderness.      Hernia: No hernia is present.   Musculoskeletal:         General: No tenderness or deformity.      Cervical back: Normal range of motion and neck supple.      Right lower leg: No edema.      Left lower leg: No edema.   Lymphadenopathy:      Cervical: No cervical adenopathy.   Skin:     General: Skin is warm and dry.      Findings: No rash.   Neurological:      General: No focal deficit present.      Mental Status: She is alert and oriented to person, place, and time.      Cranial Nerves: No cranial nerve deficit.      Sensory: No sensory deficit.      Motor: No abnormal muscle tone.   Psychiatric:         Mood and Affect: Mood normal.     Assessment   Problem List Items Addressed This Visit        Endocrine    Mixed hyperlipidemia     Repeat labs         Relevant Orders    CBC WITH DIFFERENTIAL    COMPREHENSIVE METABOLIC PANEL    LIPID PANEL WITH REFLEX    THYROID STIMULATING HORMONE    VITAMIN D -25 HYDROXY       Other    Encounter for Medicare annual wellness exam - Primary     Pneumovax today         Screening for osteoporosis    Relevant Orders    BD DEXA AXIAL SKELETON    VITAMIN D -25 HYDROXY          Schedule follow up: in a year, at next annual exam    Screening schedule/checklist for next 5-10 years:  Health Maintenance Due   Topic Date Due   • COVID-19 Vaccine (1) Never done   • DTaP/Tdap/Td Vaccine (1 - Tdap) Never done   • Shingles Vaccine (1 of  2) Never done   • Colorectal Cancer Screen-  Never done   • Hepatitis C Screening  Never done   • Osteoporosis Screening  Never done   • Medicare Wellness Visit  09/25/2020   • Pneumococcal Vaccine 65+ (2 of 2 - PPSV23) 09/25/2020        A written education, counseling, referral, and plan for obtaining appropriate screening services has been given to patient. See patient instructions.    Calm

## 2021-08-03 NOTE — DISCHARGE NOTE PROVIDER - NSDCQMAMI_CARD_ALL_CORE
No [Follow-up Visit ___] : a follow-up visit  for [unfilled] [[employer] ] : provided by [employer]   [Time Spent: ____ minutes] : Total time spent using  services: [unfilled] minutes. The patient's primary language is not English thus required  services.

## 2021-08-16 NOTE — PATIENT PROFILE ADULT - SPECIFY WHICH ONES ARE ON CHART
IOP stable on current drop therapy. Continue Betimol QAM OU. Continue Xalatan QHS OU. Do Not Resuscitate (DNR)/Medical Orders for Life-Sustaining Treatment (MOLST)/Health Care Proxy (HCP)

## 2021-08-17 NOTE — H&P ADULT - PROBLEM SELECTOR PROBLEM 1
Diagnosis: Breast Cancer  Regimen: Kanjinti  Cycle/Day: C9D1  Is this a C1D1 appt?  No    Josseline Payton NP is supervising clinician today.    ECOG:   ECOG [21 1251]   ECOG Performance Status 1       Review and verified Advanced Directives: No, patient has no interest in completing Advanced Directives at this time    Verified if patient has state DNR bracelet on: No; Full Code    Nursing Assessment:   A focused nursing assessment addressing the toxicity of chemotherapy was performed and the patient reports the following: MA assessment reviewed and accepted.     Pre-Treatment: Treatment consent signed  Patient has valid pre-authorization  VS completed  Height and weight verified  BSA independently double checked & verified by two practitioners  Premed orders, including hydration, are verified prior to administration  Treatment parameters verified in patient protocol  Lab results checked  Chemotherapy doses independently doubled checked & verified by two practitioners    Treatment: Refer to [facility] and MAR for line assessment and medication administration  Chemotherapy has not ; double checked & verified by two practitioners  Appearance and physical integrity of drugs meets standard of drug monograph; double checked & verified by two practitioners  Rate set on infusion pump is in alignment with ordered rate; double checked & verified by two practitioners  Blood return confirmed before, during and after treatment administered  Infusion pump used for non-vesicant drugs    Post Treatment: Treatment tolerated well; no adverse reaction    Does the Patient have a central line?  yes:   Device: Port  Central Line Site: Right  and Chest  Central Line Site Appearance: WDL  Is this a port? Yes: Implanted port accessed using a 20 gauge non-coring needle primed with 0.9% sodium chloride. Good blood return obtained.  Blood obtained by lab.  Site Care: Aseptic site care per protocol, Sterile gauze and tape dressing applied  and Injection caps changed/applied  Central line flushed with: 20 ml 0.9 normal saline and 100 un/ml- 500 units heparin  Central line removed: no  Dallas Needle: Dallas needle de-accessed    Transfusion: Not needed    Integrative Medicine: No    Oral Chemotherapy: No    Education: No new instructions needed    Next appointment scheduled: 9/7/2021  Patient instructed to call the office with any questions or concerns.    Patient Discharged: patient discharged to home per self, ambulatory     Fall

## 2021-10-09 NOTE — ED PROVIDER NOTE - CADM POA URETHRAL CATHETER
Pt resting comfortably.  Breakfast tray provided for patient when she wakes.  Mom has no concerns at this time   No

## 2021-12-16 NOTE — ED PROVIDER NOTE - PROGRESS NOTE DETAILS
Incision & Drainage
pt with rt temporal infarct, age undetermined, pt also with PNA, will admit pt.  Case d/w Dr. Cohn, advsied to admit to Dr. Gauthier.  Case d/w Dr. Gauthier.  No Ecotrin, pt with AVM.

## 2022-03-05 NOTE — ED PROVIDER NOTE - CARDIAC, MLM
You can access the FollowMyHealth Patient Portal offered by [facility] by registering at the following website: [email or web address]. By joining [facility]’s FollowMyHealth portal, you will also be able to view your health information using other applications (apps) compatible with our system.
Normal rate, regular rhythm.  Heart sounds S1, S2.  No murmurs, rubs or gallops.

## 2022-06-01 NOTE — DIETITIAN INITIAL EVALUATION ADULT. - NUTRITION DIAGNOSTIC TERMINOLOGY #1
CC:  Rody Kaufman is here today for Establish Care    Medications:   Added preferred pharmacy  Refills needed today?  YES  denies Latex allergy or sensitivity  Health Maintenance Due   Topic Date Due   • Pneumococcal Vaccine 0-64 (1 - PCV) Never done   • Colorectal Cancer Screen-  Never done   • Lung Cancer Screening  Never done   • Shingles Vaccine (1 of 2) Never done   • DTaP/Tdap/Td Vaccine (3 - Tdap) 06/07/2020   • Breast Cancer Screening  12/02/2020       Patient is due for topics as listed above but is not proceeding with Immunization(s) Dtap/Tdap/Td, Pneumococcal and Shingles, Colorectal Cancer Screening: Colonoscopy and Mammogram at this time. Orders placed for Mammogram.  Patient would like communication of their results via:        Cell Phone:   Telephone Information:   Mobile [phone]     Okay to leave a message containing results? Yes                                  Nutrient

## 2022-07-18 NOTE — DISCHARGE NOTE ADULT - NS AS DC STROKE DX YN
no Electrodesiccation And Curettage Text: The wound bed was treated with electrodesiccation and curettage after the biopsy was performed.
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
## 2023-01-01 NOTE — ED PROVIDER NOTE - CRITICAL CARE PROVIDED
FAMILY MEDICINE  PROGRESS NOTE      Resident: Marleni Nguyen M.D. (PGY-2)  Attending: Loi Broussard M.D.    PATIENT ID:  NAME:  Jm Lainez  MRN:               [id number]  YOB: 2023    CC: Birth    Birth History: Baby marion Lainez Infant KRISTEN still pending.       ASSESSMENT/PLAN: Baby marion Lainez Martinez, [facility] who  recommended transfer to NICU for lumbar puncture and further treatment. Consent will be obtained or lumbar puncture.     #maternal antibody positive  Mother blood type A+, antibody positive. Infant blood type O, KRISTEN pending.     Plan:  Circumcision: Desired.    Hep B: Given  Vitamin K and Erythromycin: Given   Dispo: Trasnfer to NICU for further care    Marleni Nguyen M.D.   PGY-2  [facility]          documentation/additional history taking/direct patient care (not related to procedure)/interpretation of diagnostic studies/consultation with other physicians/consult w/ pt's family directly relating to pts condition

## 2023-02-28 NOTE — ED ADULT TRIAGE NOTE - SPO2 (%)
100 Nsaids Pregnancy And Lactation Text: These medications are considered safe up to 30 weeks gestation. It is excreted in breast milk.

## 2023-03-20 NOTE — CONSULT NOTE ADULT - SUBJECTIVE AND OBJECTIVE BOX
CHIEF COMPLAINT:Patient is a 87y old  Female who presents with a chief complaint of weakness,cough.    HPI:  88 y/o F from home ambulates independently pt w/ PMHx of DM, HTN, A-Fib ( on Eliquis), hx of GI bleed, AVM, Duodenal ulcer, gastritis, CHF, Glaucoma, grave's disease, HTN, cardiac arrest (s/p medtronic PPM), SBO S/p intestinal resection and Vertigo and PSHx of Oophorectomy pr presents to ED c/o weakness, cough x 4 days.    Patient is AXO=3 prefers daughter to give history. Daughter states she was feeling tired since 1 month. But since  morning she was so weak, was unable to do all houshold chores. Daughter noticed that she has Black tarry stools x in her last few bowel movements, so she stopped giving her eliquis and aspirin 6 days ago. Patient also developed dry cough and dyspnea since 4 days.  Pt denies chest pain, fever, nausea, vomiting, palpitations  and all other complaints. Patient had colonscopy and Endoscopy done in July 2018 that showed AVM, Antral ulcer and gastritis. Colonoscopy was normal. Dr. Huffman has done Endoscopy in Nov 2018 which showed healing ulcer. She was initially on Pradaxa for Afib but had GI bleeding so was started on Eliquis in Nov 2018.      In ED, patient's vital signs were stable, H/H: 4.6/16.6, Occult positive, probnp: 1592, Troponin 0.265 CXR shows New right base findings."     Goals of care: DNR/DNI, Can have central line, IV pressor. (06 Feb 2019 17:48)      PAST MEDICAL & SURGICAL HISTORY:  A-fib  Constipation  Glaucoma  Vertigo  Graves disease  Pacemaker  DM (diabetes mellitus)  HTN (hypertension)  H/O oophorectomy  Artificial pacemaker      MEDICATIONS  (STANDING):  azithromycin  IVPB 500 milliGRAM(s) IV Intermittent every 24 hours  cefTRIAXone   IVPB 1 Gram(s) IV Intermittent every 24 hours  digoxin     Tablet 0.125 milliGRAM(s) Oral daily  insulin glargine Injectable (LANTUS) 10 Unit(s) SubCutaneous at bedtime  insulin lispro (HumaLOG) corrective regimen sliding scale   SubCutaneous three times a day before meals  pantoprazole  Injectable 40 milliGRAM(s) IV Push two times a day    MEDICATIONS  (PRN):  acetaminophen   Tablet .. 650 milliGRAM(s) Oral every 6 hours PRN Mild Pain (1 - 3)      FAMILY HISTORY:  Family history of hypertension  Family history of diabetes mellitus (Father)      SOCIAL HISTORY:    [x ] Non-smoker    [ x] Alcohol-denies    Allergies    No Known Allergies    Intolerances    	    REVIEW OF SYSTEMS:  CONSTITUTIONAL: No fever, weight loss, or fatigue  EYES: No eye pain, visual disturbances, or discharge  ENT:  No difficulty hearing, tinnitus, vertigo; No sinus or throat pain  NECK: No pain or stiffness  RESPIRATORY: No cough, wheezing, chills or hemoptysis; + Shortness of Breath  CARDIOVASCULAR: No chest pain, palpitations, passing out, dizziness, or leg swelling  GASTROINTESTINAL: No abdominal or epigastric pain. No nausea, vomiting, or hematemesis; No diarrhea or constipation. + melena.  GENITOURINARY: No dysuria, frequency, hematuria, or incontinence  NEUROLOGICAL: No headaches, memory loss, loss of strength, numbness, or tremors  SKIN: No itching, burning, rashes, or lesions   LYMPH Nodes: No enlarged glands  ENDOCRINE: No heat or cold intolerance; No hair loss  MUSCULOSKELETAL: No joint pain or swelling; No muscle, back, or extremity pain  PSYCHIATRIC: No depression, anxiety, mood swings, or difficulty sleeping  HEME/LYMPH: No easy bruising, or bleeding gums  ALLERGY AND IMMUNOLOGIC: No hives or eczema	      PHYSICAL EXAM:  T(C): 36.8 (02-07-19 @ 05:09), Max: 37.4 (02-06-19 @ 20:01)  HR: 60 (02-07-19 @ 05:09) (59 - 68)  BP: 148/60 (02-07-19 @ 05:09) (123/69 - 168/51)  RR: 18 (02-07-19 @ 05:09) (18 - 18)  SpO2: 100% (02-07-19 @ 05:09) (98% - 100%)  Wt(kg): --  I&O's Summary      Appearance: Normal	  HEENT:   Normal oral mucosa, PERRL, EOMI	  Lymphatic: No lymphadenopathy  Cardiovascular: Normal S1 S2, No JVD, No murmurs, No edema  Respiratory: ec BS RT base  Psychiatry: A & O x 3, Mood & affect appropriate  Gastrointestinal:  Soft, Non-tender, + BS	  Skin: No rashes, No ecchymoses, No cyanosis	  Neurologic: Non-focal  Extremities: Normal range of motion, No clubbing, cyanosis or edema  Vascular: Peripheral pulses palpable 2+ bilaterally      ECG:  	a paced v sensed     	  LABS:	 	    CARDIAC MARKERS:  CARDIAC MARKERS ( 07 Feb 2019 06:06 )  0.186 ng/mL / x     / 64 U/L / x     / 2.0 ng/mL  CARDIAC MARKERS ( 06 Feb 2019 21:20 )  0.192 ng/mL / x     / 68 U/L / x     / 2.3 ng/mL  CARDIAC MARKERS ( 06 Feb 2019 14:13 )  0.265 ng/mL / x     / 62 U/L / x     / x                                  8.1    6.7   )-----------( 90       ( 07 Feb 2019 06:06 )             26.3     02-07    140  |  109<H>  |  18  ----------------------------<  161<H>  4.0   |  27  |  0.93    Ca    8.0<L>      07 Feb 2019 06:06  Phos  2.4     02-07  Mg     2.0     02-07    TPro  5.6<L>  /  Alb  2.4<L>  /  TBili  1.1  /  DBili  x   /  AST  33  /  ALT  32  /  AlkPhos  57  02-07    proBNP: Serum Pro-Brain Natriuretic Peptide: 1592 pg/mL (02-06 @ 14:13)    Lipid Profile: Cholesterol 99  LDL 48  HDL 31      HgA1c: Hemoglobin A1C, Whole Blood: 7.2 % (02-07 @ 09:52)    TSH: Thyroid Stimulating Hormone, Serum: 2.06 uU/mL (02-07 @ 06:06)      Occult Blood, Feces (02.06.19 @ 15:40)    Occult Blood, Feces: Positive      OBSERVATIONS:  Mitral Valve: Mitral annular calcification. Mild mitral  regurgitation.  Aortic Root: Normal aortic root.  Aortic Valve: Calcified trileaflet aortic valve with normal  opening.  Left Atrium: Moderately dilated left atrium.  LA volume  index = 45 cc/m2.  Left Ventricle: Endocardium not well visualized; grossly  normal left ventricular systolic function. Normal left  ventricular internal dimensions and wall thicknesses.  Right Heart: Normal right atrium. Right ventricular  enlargement with normal RV systolic function (TAPSE 2.1 cm)    A device lead is visualized in the right heart. There is  severe tricuspid regurgitation. There is mild pulmonic  regurgitation.  Pericardium/PleuraNormal pericardium with no pericardial  effusion.  Hemodynamic: RA Pressure is 8 mm Hg. RV systolic pressure  is 63 mm Hg. Severe pulmonary hypertension. Adult home

## 2023-03-21 NOTE — PATIENT PROFILE ADULT - MONEY FOR FOOD
Doc Oden, APRN-CNP  [address] [facility]  [id number]  [address] [address]  Dept: [phone]  Dept Fax: [phone]     Patient ID: Yvonne Dubois is a 24 y.o. female Established patient. HPI    Virtual visit today for got scratched by indoor cat on Saturday night, the cat was outside rolling around in the grass and she went to grab her quickly and then cat grabbed on to her face, she has abrasions to the right side of her face and left side by lip and cheek. She is worried because they are red around and sore. Pt denies any fever or chills. Pt today denies any HA, chest pain, or SOB. Pt denies any N/V/D/C or abdominal pain. Otherwise pt doing well on current tx and no other concerns today. The patient's past medical, surgical, social, and family history as well as his current medications and allergies were reviewed as documented in today's encounter by GILBERT Gonsales. Previous office notes, labs, imaging and hospital records were reviewed prior to and during encounter.     Current Outpatient Medications on File Prior to Visit   Medication Sig Dispense Refill    desvenlafaxine succinate (PRISTIQ) 50 MG TB24 extended release tablet Take 1 tablet by mouth daily 90 tablet 1    pantoprazole (PROTONIX) 20 MG tablet Take 1 tablet by mouth every morning (before breakfast) 90 tablet 1    propranolol (INDERAL) 20 MG tablet Take 2 tablets by mouth daily 180 tablet 1    lamoTRIgine (LAMICTAL) 150 MG tablet Take 1 tablet by mouth daily 90 tablet 1    topiramate (TOPAMAX) 25 MG tablet Take 1 tablet by mouth nightly 60 tablet 3    butalbital-acetaminophen-caffeine (FIORICET, ESGIC) -40 MG per tablet Take 1 tablet by mouth every 6 hours as needed for Headaches or Migraine 90 tablet 0    vitamin D (ERGOCALCIFEROL) 1.25 MG (81665 UT) CAPS capsule Take 1 capsule by mouth once a week 4 capsule 3     No current facility-administered medications
no

## 2023-04-10 NOTE — PROGRESS NOTE ADULT - RS GEN PE MLT RESP DETAILS PC
Family Medicine Residency  Latrell Antonio MD    Subjective:     Ken Amezcua is a 27 y.o. female who presents for Obesity management     Patient is losing weight on Ozempic.  Patient reports no medication side effects.  Patient is engaging in lifestyle modification with regular exercises and healthy diet.  Patient is ready to increase dose of Ozempic at this time.    Patient reports dysphoric mood despite being compliant with Prozac 20 mg daily.  Patient reports associated anxiety.  Patient reports that she is going through job change at the moment and that has had a steady stress.  Patient denies suicidal, homicidal ideations or manic episodes.  Denies hallucinations.    The following portions of the patient's history were reviewed and updated as appropriate: allergies, current medications, past family history, past medical history, past social history, past surgical history and problem list.    Past Medical Hx:  Past Medical History:   Diagnosis Date   • Abnormal weight gain    • Attention deficit hyperactivity disorder     Dr Rosy Mcclellan   • Encounter for general counseling on prescription of oral contraceptives    • Encounter for insertion of intrauterine contraceptive device     - mirena         Past Surgical Hx:  Past Surgical History:   Procedure Laterality Date   • GALLBLADDER SURGERY  01/13/2018       Current Meds:    Current Outpatient Medications:   •  busPIRone (BUSPAR) 15 MG tablet, Take 1 tablet by mouth As Needed (anxiety)., Disp: 30 tablet, Rfl: 1  •  Cholecalciferol (Vitamin D) 50 MCG (2000 UT) capsule, Take 1 capsule by mouth Daily for 90 days., Disp: 90 capsule, Rfl: 0  •  FLUoxetine (PROzac) 40 MG capsule, Take 1 capsule by mouth Daily for 90 days., Disp: 90 capsule, Rfl: 0  •  lamoTRIgine (LaMICtal) 25 MG tablet, Take 1 tablet by mouth Daily for 90 days., Disp: 90 tablet, Rfl: 0  •  levothyroxine (SYNTHROID, LEVOTHROID) 75 MCG tablet, Take 1 tablet by mouth Daily for 90 days., Disp:  "90 tablet, Rfl: 0  •  Prenatal Vit-Fe Fumarate-FA (Prenatal Vitamin and Mineral) 28-0.8 MG tablet, Take 1 each by mouth Daily for 90 days., Disp: 90 tablet, Rfl: 1  •  Semaglutide, 1 MG/DOSE, (OZEMPIC) 4 MG/3ML solution pen-injector, Inject 1 mg under the skin into the appropriate area as directed 1 (One) Time Per Week., Disp: 3 mL, Rfl: 0    Allergies:  Allergies   Allergen Reactions   • Bactrim [Sulfamethoxazole-Trimethoprim] Nausea And Vomiting       Family Hx:  Family History   Problem Relation Age of Onset   • Hypothyroidism Mother    • Cancer Other         other 2nd degree relative, [place]   • Breast cancer Neg Hx    • Colon cancer Neg Hx    • Diabetes Neg Hx    • Ovarian cancer Neg Hx    • Endometrial cancer Neg Hx         Social History:  Social History     Socioeconomic History   • Marital status: Single   Tobacco Use   • Smoking status: Never   • Smokeless tobacco: Never   Substance and Sexual Activity   • Alcohol use: No   • Drug use: No   • Sexual activity: Yes       Review of Systems  Review of Systems   Constitutional: Negative.    HENT: Negative.    Eyes: Negative.    Respiratory: Negative.    Cardiovascular: Negative.    Gastrointestinal: Negative.    Endocrine: Negative.    Genitourinary: Negative.    Musculoskeletal: Negative.    Skin: Negative.    Neurological: Negative.    Psychiatric/Behavioral: Positive for dysphoric mood. Negative for behavioral problems, confusion, decreased concentration, hallucinations, sleep disturbance and suicidal ideas. The patient is nervous/anxious. The patient is not hyperactive.        Objective:     /79   Pulse 96   Temp 98.5 °F (36.9 °C)   Ht 162.6 cm (64\")   Wt 85.5 kg (188 lb 7 oz)   SpO2 99%   BMI 32.35 kg/m²   Physical Exam  Vitals and nursing note reviewed.   Constitutional:       General: She is not in acute distress.     Appearance: She is obese. She is not diaphoretic.   HENT:      Head: Normocephalic and atraumatic.      Right Ear: External ear " normal.      Left Ear: External ear normal.   Eyes:      General: No scleral icterus.     Extraocular Movements: Extraocular movements intact.      Conjunctiva/sclera: Conjunctivae normal.   Cardiovascular:      Rate and Rhythm: Normal rate and regular rhythm.      Heart sounds: Normal heart sounds.   Pulmonary:      Effort: Pulmonary effort is normal. No respiratory distress.      Breath sounds: Normal breath sounds.   Chest:      Chest wall: No tenderness.   Abdominal:      General: Bowel sounds are normal.      Palpations: Abdomen is soft.      Tenderness: There is no abdominal tenderness.   Musculoskeletal:         General: No swelling or tenderness.      Cervical back: Normal range of motion and neck supple.      Right lower leg: No edema.      Left lower leg: No edema.   Skin:     General: Skin is warm and dry.      Capillary Refill: Capillary refill takes less than 2 seconds.      Findings: No erythema or rash.   Neurological:      General: No focal deficit present.      Mental Status: She is alert and oriented to person, place, and time.      Motor: No weakness.   Psychiatric:         Behavior: Behavior normal.          Assessment/Plan:     Diagnoses and all orders for this visit:    1. Morbid (severe) obesity due to excess calories (Primary)  -     Semaglutide, 1 MG/DOSE, (OZEMPIC) 4 MG/3ML solution pen-injector; Inject 1 mg under the skin into the appropriate area as directed 1 (One) Time Per Week.  Dispense: 3 mL; Refill: 0    2. Moderate episode of recurrent major depressive disorder  -     FLUoxetine (PROzac) 40 MG capsule; Take 1 capsule by mouth Daily for 90 days.  Dispense: 90 capsule; Refill: 0    3. SHAYNE (generalized anxiety disorder)  -     FLUoxetine (PROzac) 40 MG capsule; Take 1 capsule by mouth Daily for 90 days.  Dispense: 90 capsule; Refill: 0    4. Low vitamin D level  -     Cholecalciferol (Vitamin D) 50 MCG (2000 UT) capsule; Take 1 capsule by mouth Daily for 90 days.  Dispense: 90  capsule; Refill: 0  -     Vitamin D 25 hydroxy; Future        · Patient advised to follow-up with therapist.  · Increased Prozac to 40 mg daily.  · Increased Ozempic to 1 mg weekly    Plan is to continue to increase Ozempic to the maximum tolerable dose.    Continue lifestyle modification with regular exercise and a healthy diet.     Depression screening: Up to date; last screen 1/25/2023     Follow-up:     Return in about 4 weeks (around 5/8/2023), or if symptoms worsen or fail to improve, for Next scheduled follow up.    Preventative:  Health Maintenance   Topic Date Due   • ANNUAL PHYSICAL  Never done   • PAP SMEAR  Never done   • TDAP/TD VACCINES (1 - Tdap) 03/06/2024 (Originally 4/4/2015)   • INFLUENZA VACCINE  08/01/2023   • LIPID PANEL  01/25/2024   • HEPATITIS C SCREENING  Completed   • Pneumococcal Vaccine 0-64  Aged Out   • COVID-19 Vaccine  Discontinued   • CHLAMYDIA SCREENING  Discontinued     Female Preventative: Exercises regularly  Recommended: covid vaccination  Vaccine Counseling: declined     Weight  Body mass index is 32.35 kg/m².      Alcohol use:  reports no history of alcohol use.  Nicotine status  reports that she has never smoked. She has never used smokeless tobacco.     Goals    None         RISK SCORE: 3      Leatha Antonio M.D. PGY 3  [facility]  [address] [place] [address]  Office: [phone]  This document has been electronically signed by Latrell Antonio MD on April 14, 2023 19:37 CDT  Part of this note may be an electronic transcription/translation of spoken language to printed text, using a dragon dictation system.       respirations non-labored/breath sounds equal/airway patent/good air movement

## 2023-04-18 NOTE — DIETITIAN INITIAL EVALUATION ADULT. - PATIENT PROFILE REVIEWED
RN writer performing post-procedure follow-up phone call.  Patient of: Toi Salinas, DO  Post-Op Day 6 of Appendectomy - laparoscopic  RN writer was able to speak with patient/patient representative  Patient reports:No Post Op Concerns and/or questions. No other needs at this time  Interventions Recommended: n/a  Post-Op Appointment Scheduled: yes  with Rachel Paget, PA-C on 4/27 at [facility].          
yes

## 2023-05-23 NOTE — PROGRESS NOTE ADULT - CONSTITUTIONAL DETAILS
Large Joint Aspiration/Injection: bilateral knee    Date/Time: 5/22/2023 1:00 PM  Performed by: Kaya Ordoñez PA-C  Authorized by: Kaya Ordoñez PA-C     Consent Done?:  Yes (Verbal)  Indications:  Pain  Site marked: the procedure site was marked    Timeout: prior to procedure the correct patient, procedure, and site was verified    Prep: patient was prepped and draped in usual sterile fashion      Local anesthesia used?: Yes    Local anesthetic:  Topical anesthetic    Details:  Needle Size:  22 G  Ultrasonic Guidance for needle placement?: No    Approach:  Superior  Location:  Knee  Laterality:  Bilateral  Site:  Bilateral knee  Medications (Right):  20 mg sodium hyaluronate (EUFLEXXA) 10 mg/mL(mw 2.4 -3.6 million)  Medications (Left):  20 mg sodium hyaluronate (EUFLEXXA) 10 mg/mL(mw 2.4 -3.6 million)  Patient tolerance:  Patient tolerated the procedure well with no immediate complications     Bilateral Euflexxa injection  3/3   well-groomed/well-developed/well-nourished/no distress

## 2023-06-26 NOTE — ED PROVIDER NOTE - MDM ORDERS SUBMITTED SELECTION
From: Lowell Cosby  To: Paula Jain  Sent: 6/23/2023 8:12 PM CDT  Subject: Chlamydia Test    Hi Dr. Jain,     When we talked about getting the test you mentioned that i can still get it done, however the concern was from last Thursday. I read that it can take 2 weeks for chlamydia to incubate before tests can be done. Would it be possible to get another order for a second test done first or second week of July?    Thanks,  Lowell   Labs/Imaging Studies

## 2023-09-06 NOTE — ED PROVIDER NOTE - RESPIRATORY NEGATIVE STATEMENT, MLM
Will wait until after Dr. Chem apt.   Papers are on my desk    no chest pain, no cough, and no shortness of breath.

## 2023-11-04 NOTE — PHYSICAL THERAPY INITIAL EVALUATION ADULT - ASSISTIVE DEVICE FOR TRANSFER: STAND/SIT, REHAB EVAL
Pt discharged home in stable condition. Vitals stable and no iv in place. Discharge paperwork reviewed and verbally understood by patient at this time. Ambulatory. Lyft ordered for patient to get home.  Ok for Marisela, [address]  11/04/23 [id number] rolling walker

## 2024-01-11 NOTE — PROGRESS NOTE ADULT - ASSESSMENT
_________________________________________________________________________________________  ========>>  M E D I C A L   A T T E N D I N G    F O L L O W  U P  N O T E  <<=========  -----------------------------------------------------------------------------------------------------    - Patient seen and examined by me approximately sixty minutes ago.   - In summary,  DERRICK ELIAS is a 88y year old woman admitted for dig toxicity  - Patient today overall doing ok, comfortable, eating ok    ==================>> REVIEW OF SYSTEM <<=================    GEN: no fever, no chills, + mild lower back pain  RESP: no SOB, no cough, no sputum  CVS: no chest pain, no palpitations, no edema  GI: no abdominal pain, no nausea, diarrhea as above   : no dysuria  Neuro: no headache, no dizziness  Derm : no itching, no rash    ==================>> PHYSICAL EXAM <<=================    GEN: A&O X 2 , NAD , comfortable, , cachectic, in chair   HEENT: NCAT, PERRL, MMM, hearing intact  Neck: supple , no JVD  CVS: S1S2 , regular , No M/R/G appreciated  PULM: CTA B/L,  no W/R/R appreciated  ABD.: soft. non tender, non distended,  bowel sounds present  Extrem: intact pulses , no edema       + lower back wound / lesion dressed   PSYCH : normal mood,  not anxious      ==================>> MEDICATIONS <<====================    aspirin enteric coated 81 milliGRAM(s) Oral daily  cephalexin 500 milliGRAM(s) Oral every 12 hours  dextrose 5%. 1000 milliLiter(s) IV Continuous <Continuous>  dextrose 50% Injectable 12.5 Gram(s) IV Push once  dextrose 50% Injectable 25 Gram(s) IV Push once  dextrose 50% Injectable 25 Gram(s) IV Push once  heparin  Injectable 5000 Unit(s) SubCutaneous every 12 hours  influenza   Vaccine 0.5 milliLiter(s) IntraMuscular once  insulin lispro (HumaLOG) corrective regimen sliding scale   SubCutaneous three times a day before meals  latanoprost 0.005% Ophthalmic Solution 1 Drop(s) Both EYES at bedtime  mirtazapine 7.5 milliGRAM(s) Oral at bedtime  pantoprazole    Tablet 40 milliGRAM(s) Oral before breakfast    MEDICATIONS  (PRN):  acetaminophen   Tablet .. 650 milliGRAM(s) Oral every 6 hours PRN Mild Pain (1 - 3)  dextrose 40% Gel 15 Gram(s) Oral once PRN Blood Glucose LESS THAN 70 milliGRAM(s)/deciliter  glucagon  Injectable 1 milliGRAM(s) IntraMuscular once PRN Glucose LESS THAN 70 milligrams/deciliter    ==================>> VITAL SIGNS <<==================    Vital Signs Last 24 Hrs  T(C): 35.3 (09-06-19 @ 12:19)  T(F): 95.6 (09-06-19 @ 12:19), Max: 98.4 (09-05-19 @ 21:12)  HR: 90 (09-06-19 @ 12:19) (90 - 90)  BP: 103/68 (09-06-19 @ 12:19)  RR: 18 (09-06-19 @ 12:19) (18 - 18)  SpO2: 97% (09-06-19 @ 12:19) (97% - 99%)      POCT Blood Glucose.: 131 mg/dL (06 Sep 2019 17:37)  POCT Blood Glucose.: 144 mg/dL (06 Sep 2019 12:16)  POCT Blood Glucose.: 154 mg/dL (06 Sep 2019 10:20)  POCT Blood Glucose.: 124 mg/dL (05 Sep 2019 21:34)     ==================>> LAB AND IMAGING <<==================                        13.2   8.21  )-----------( 91       ( 06 Sep 2019 08:46 )             40.5        137  |  102  |  19  ----------------------------<  143<H>  4.4   |  24  |  0.73    Ca    9.2      06 Sep 2019 06:39  Phos  4.0     09-05  Mg     2.2     09-06    WBC count:   8.21 <<== ,  6.02 <<== ,  7.0 <<== ,  5.82 <<== ,  5.46 <<== ,  9.0 <<==   Hemoglobin:   13.2 <<==,  11.5 <<==,  12.3 <<==,  12.4 <<==,  9.8 <<==,  13.6 <<==  platelets:  91 <==, 85 <==, 94 <==, 112 <==, 95 <==, 141 <==, 126 <==    Creatinine:  0.73  <<==, 0.72  <<==, 0.69  <<==, 0.77  <<==, 0.95  <<==, 0.93  <<==  Sodium:   137  <==, 139  <==, 134  <==, 133  <==, 130  <==, 128  <==, 135  <==       AST:          91 <== , 116 <==      ALT:        59  <== , 77  <==      AP:        198  <=, 248  <=     Bili:        0.8  <=, 1.0  <=    _______________________  C U L T U R E S :    Culture - Abscess with Gram Stain (collected 05 Sep 2019 17:22)  Source: .Abscess  Preliminary Report (06 Sep 2019 17:42):    No growth    Culture - Urine (collected 02 Sep 2019 11:56)  Source: .Urine  Final Report (03 Sep 2019 14:38):    Normal Urogenital dylon present    ___________________________________________________________________________________  ===============>>  A S S E S S M E N T   A N D   P L A N <<===============  ------------------------------------------------------------------------------------------    ** collection of lower back: likely infected sebaceous cyst     likely abscess as per IR notes: purulent fluid      IR appreciated: post gainage      local care, pain mgmt,      agree with abx as started     follow cultures / sensitivities     ** reports of diarrhea      monitor vitals, labs / electrolytes  closely     if continues : check Cdiff    ** Digoxin toxicity    resolved, repeat Dig level ok    no bradycardia noted    med mgmt otherwise per cardio     monitor    ** dysphagia, failure to thrive, severe protein calorie malnutrition and cachexia     encourage PO intake as discussed with pt    pt already on soft / puree diet ( as doesn't have teeth)     nutritional supplements    nutrition f/u and mgmt     remeron     MBS noted : continue dysphagia diet with aspiration precautions     **Diabetes II, stable  ---monitor finger sticks closely  ---Continue Insulin regimen as above monitor  ---Diabetic diet  ---A1c controlled     ** pt with h/o HepC - Cirrhosis and GIB with chronic anemia     monitor H/H closely    PPI     ** Chronic atrial fibrillation    currently rate controlled, off Dig    monitor     cardio mgmt appreciated    off AC due to prior GIB    Elevated troponin: unclear etiology, improved    -GI/DVT Prophylaxis.  -Dispo planing in place     --------------------------------------------  Case discussed with pt, NP  Education given on findings and plan of care.  ___________________________  Will follow with you.  Thank you,  CINDY Rivera D.O.  Pager: [phone] show

## 2024-03-07 NOTE — ED ADULT TRIAGE NOTE - BMI (KG/M2)
Chart reviewed patient this morning. Personally reviewed echocardiogram and without any RWMA and normal EF. No further workup needed at this time.     Cardiology team will sign off, please call with any further questions.    Nicole Pandya DO  Cardiology Fellow, PGY IV     21.8

## 2024-03-07 NOTE — H&P ADULT - NECK DETAILS
Patient's Insurance: [facility] Medicare  Patient ID# [id number]     Per [facility] Medicare :  Synvisc-One 48mg/6 mL (x1)  is NOT COVERED.    Denial Reason:   \"Chronic Pain is not a FDA Approved diagnosis\"    Available options include:  -Change treatment  Choose an FDA Approved Diagnosis with imaging results supporting the diagnosis of Primary Osteoarthritis      
Request type: New Start  Date of last injection: n/a  Date of next injection: 3/21/24 (should be at least 16 days out from request based on standard 15 day insurance turn around timeline)     DRUG/DOSE: Synvisc-One 48mg/6 mL (x1)    ADMINISTRATION: 20610 (without US guidance)  FREQUENCY: Every 6 months  KNEE: left knee  DIAGNOSIS DESCRIPTION AND ICD-10 CODE:  Chronic Pain of left knee M25.561, M25.562, G89.29   Requesting Provider: Sean Dye MD    Site of Care: Provider's office   
no JVD/supple

## 2024-11-15 NOTE — ED ADULT NURSE NOTE - CINV DISCH MEDS REVIEWED YN
Yes

## 2025-01-12 NOTE — DISCHARGE NOTE ADULT - CARE PLAN
Principal Discharge DX:	GIB (gastrointestinal bleeding)  Goal:	To avoid NSAIDs and follow up with your primary care doctor in a week  Assessment and plan of treatment:	You were admitted to the hospital secondary to stomach bleed, you were transfused 2 units of blood and you were seen by Dr. Huffman and you had endoscopy and stomach bleed was arrested. Please take all medications as advised and follow up with your primary care doctor in a week.  Secondary Diagnosis:	DM (diabetes mellitus)  Assessment and plan of treatment:	Continue with your blood sugar medication. HbAIC was found 7.2 on admission.  You must maintain a healthy diet that consist of low sugar, low fat, low sodium diet. Exercise frequently if possible. Consider repeating your Hemoglobin A1c within 3 months after discharge to monitor your average blood glucose control. Follow up with primary care physician in one week after discharge.  Secondary Diagnosis:	HTN (hypertension)  Assessment and plan of treatment:	Continue with blood pressure medication. Maintain a healthy diet that consist of low sugar, low fat, low sodium diet. Exercise frequently if possible.  Follow up with primary care physician in one week after discharge.  Secondary Diagnosis:	PNA (pneumonia)  Assessment and plan of treatment:	You were found to have pneumonia. Take all your medications as advised.  Secondary Diagnosis:	A-fib AARTI Crain Principal Discharge DX:	GIB (gastrointestinal bleeding)  Goal:	To avoid NSAIDs and follow up with your primary care doctor in a week  Assessment and plan of treatment:	You were admitted to the hospital secondary to stomach bleed, you were transfused 2 units of blood and you were seen by Dr. Huffman and you had endoscopy. Please take all medications as advised and follow up with your primary care doctor in a week.  Secondary Diagnosis:	DM (diabetes mellitus)  Assessment and plan of treatment:	Continue with your blood sugar medication. HbAIC was found 7.2 on admission.  You must maintain a healthy diet that consist of low sugar, low fat, low sodium diet. Exercise frequently if possible. Consider repeating your Hemoglobin A1c within 3 months after discharge to monitor your average blood glucose control. Follow up with primary care physician in one week after discharge.  Secondary Diagnosis:	HTN (hypertension)  Assessment and plan of treatment:	Continue with blood pressure medication. Maintain a healthy diet that consist of low sugar, low fat, low sodium diet. Exercise frequently if possible.  Follow up with primary care physician in one week after discharge.  Secondary Diagnosis:	PNA (pneumonia)  Assessment and plan of treatment:	You were found to have pneumonia. Take all your medications as advised.  Secondary Diagnosis:	A-fib Principal Discharge DX:	GIB (gastrointestinal bleeding)  Goal:	To avoid NSAIDs and follow up with your primary care doctor in a week  Assessment and plan of treatment:	You were admitted to the hospital secondary to stomach bleed, you were transfused 2 units of blood and you were seen by Dr. Huffman and you had endoscopy. Please take all medications as advised and follow up with your primary care doctor in a week.  Secondary Diagnosis:	DM (diabetes mellitus)  Assessment and plan of treatment:	Continue with your blood sugar medication. HbAIC was found 7.2 on admission.  You must maintain a healthy diet that consist of low sugar, low fat, low sodium diet. Exercise frequently if possible. Consider repeating your Hemoglobin A1c within 3 months after discharge to monitor your average blood glucose control. Follow up with primary care physician in one week after discharge. Your medication regimen for this medical condition is not changed.  Secondary Diagnosis:	HTN (hypertension)  Assessment and plan of treatment:	Continue with blood pressure medication. Maintain a healthy diet that consist of low sugar, low fat, low sodium diet. Exercise frequently if possible.  Follow up with primary care physician in one week after discharge.  Secondary Diagnosis:	PNA (pneumonia)  Assessment and plan of treatment:	You were found to have pneumonia. Take all your medications as advised.  Secondary Diagnosis:	A-fib

## 2025-03-19 NOTE — PATIENT PROFILE ADULT. - NS TRANSFER EYEGLASSES PAIRS
Samples Given: Winlevi Initiate Treatment: Refilled clindamycin and Epiduo forte. \\nContinue clindamycin lotion and Epiduo ( mix together).  Winlevi samples in AM. Call if pt wants prescription. Render In Strict Bullet Format?: No Detail Level: Zone 1 pair

## 2025-04-21 NOTE — PROGRESS NOTE BEHAVIORAL HEALTH - NSBHCHARTREVIEWVS_PSY_A_CORE FT
Yes Vital Signs Last 24 Hrs  T(C): 36.4 (02 Apr 2021 13:30), Max: 36.8 (02 Apr 2021 02:11)  T(F): 97.5 (02 Apr 2021 13:30), Max: 98.3 (02 Apr 2021 02:11)  HR: 78 (02 Apr 2021 13:30) (78 - 91)  BP: 153/84 (02 Apr 2021 13:30) (138/71 - 165/97)  BP(mean): --  RR: 17 (02 Apr 2021 13:30) (17 - 18)  SpO2: 97% (02 Apr 2021 13:30) (96% - 99%)

## 2025-05-16 NOTE — PROGRESS NOTE ADULT - PROBLEM/PLAN-7
DISPLAY PLAN FREE TEXT
Pt with syncopal episode today after walking dog.  On period with cramping and clots.  Told mom after passing out she was still feeling dizzy.  Was okay earlier today.    Exam: pale conjunctivae, normal skin, soft NT abdomen, no focal weakness, cap refill <2s  Plan: labs, ekg, ivf, preg test    Number of diagnoses or management options:  [ ] Referral or consultation made    Complexity of data reviewed:  [x] Decision made to obtain old record(s) or additional history from the family, caretaker, or other source  [x] Laboratory test(s) ordered and/or reviewed  [x] Independent interpretation of EKG by Dr. Alex Drew: NSR @ 60  [ ] Independent interpretation of Radiology by Dr. Alex Drew:   [ ] I, Alex Drew, had a discussion with    Risk (Management Options):  [ ] High: emergency surgery; monitored drug therapy; controlled parenteral therapy; decision for DNR
DISPLAY PLAN FREE TEXT
DISPLAY PLAN FREE TEXT

## 2025-06-07 NOTE — PROGRESS NOTE ADULT - MOUTH
Stable hemoglobin compared to yesterday.  Status post CT yesterday with no acute findings  
normal mouth and gums/moist
moist/normal mouth and gums

## 2025-06-24 NOTE — PROGRESS NOTE ADULT - MS EXT PE MLT D E PC
Pt called because blood pressure medication adjustment has been making her dizzy and she also has not received sleep apnea testing take home kit yet.   
no clubbing/no cyanosis
no clubbing/no cyanosis
no cyanosis/no clubbing

## 2025-07-07 NOTE — CONSULT NOTE ADULT - CONSULT REASON
Left message for pt to call back.   
un cont dm/ high tsh
abnormal EKG
Dizziness
pneumonia, near syncope, thrombocytopenia
Syncope
Discuss complex medical decision making related to GOC
